# Patient Record
Sex: FEMALE | Race: WHITE | NOT HISPANIC OR LATINO | Employment: OTHER | URBAN - METROPOLITAN AREA
[De-identification: names, ages, dates, MRNs, and addresses within clinical notes are randomized per-mention and may not be internally consistent; named-entity substitution may affect disease eponyms.]

---

## 2017-04-18 ENCOUNTER — HOSPITAL ENCOUNTER (OUTPATIENT)
Dept: LAB | Facility: MEDICAL CENTER | Age: 82
End: 2017-04-18
Attending: INTERNAL MEDICINE
Payer: MEDICARE

## 2017-04-18 LAB
ALBUMIN SERPL BCP-MCNC: 4.5 G/DL (ref 3.2–4.9)
ALBUMIN/GLOB SERPL: 1.6 G/DL
ALP SERPL-CCNC: 62 U/L (ref 30–99)
ALT SERPL-CCNC: 16 U/L (ref 2–50)
ANION GAP SERPL CALC-SCNC: 7 MMOL/L (ref 0–11.9)
APPEARANCE UR: CLEAR
AST SERPL-CCNC: 24 U/L (ref 12–45)
BACTERIA #/AREA URNS HPF: ABNORMAL /HPF
BASOPHILS # BLD AUTO: 1.3 % (ref 0–1.8)
BASOPHILS # BLD: 0.11 K/UL (ref 0–0.12)
BILIRUB SERPL-MCNC: 0.6 MG/DL (ref 0.1–1.5)
BILIRUB UR QL STRIP.AUTO: NEGATIVE
BUN SERPL-MCNC: 14 MG/DL (ref 8–22)
CALCIUM SERPL-MCNC: 10.1 MG/DL (ref 8.5–10.5)
CHLORIDE SERPL-SCNC: 104 MMOL/L (ref 96–112)
CHOLEST SERPL-MCNC: 171 MG/DL (ref 100–199)
CO2 SERPL-SCNC: 29 MMOL/L (ref 20–33)
COLOR UR: YELLOW
CREAT SERPL-MCNC: 0.99 MG/DL (ref 0.5–1.4)
EOSINOPHIL # BLD AUTO: 0.28 K/UL (ref 0–0.51)
EOSINOPHIL NFR BLD: 3.3 % (ref 0–6.9)
EPI CELLS #/AREA URNS HPF: ABNORMAL /HPF
ERYTHROCYTE [DISTWIDTH] IN BLOOD BY AUTOMATED COUNT: 47.8 FL (ref 35.9–50)
EST. AVERAGE GLUCOSE BLD GHB EST-MCNC: 117 MG/DL
GFR SERPL CREATININE-BSD FRML MDRD: 54 ML/MIN/1.73 M 2
GLOBULIN SER CALC-MCNC: 2.9 G/DL (ref 1.9–3.5)
GLUCOSE SERPL-MCNC: 92 MG/DL (ref 65–99)
GLUCOSE UR STRIP.AUTO-MCNC: NEGATIVE MG/DL
HBA1C MFR BLD: 5.7 % (ref 0–5.6)
HCT VFR BLD AUTO: 46 % (ref 37–47)
HDLC SERPL-MCNC: 80 MG/DL
HGB BLD-MCNC: 14.9 G/DL (ref 12–16)
IMM GRANULOCYTES # BLD AUTO: 0.03 K/UL (ref 0–0.11)
IMM GRANULOCYTES NFR BLD AUTO: 0.4 % (ref 0–0.9)
KETONES UR STRIP.AUTO-MCNC: NEGATIVE MG/DL
LDLC SERPL CALC-MCNC: 72 MG/DL
LEUKOCYTE ESTERASE UR QL STRIP.AUTO: ABNORMAL
LYMPHOCYTES # BLD AUTO: 2.38 K/UL (ref 1–4.8)
LYMPHOCYTES NFR BLD: 28.2 % (ref 22–41)
MCH RBC QN AUTO: 31.2 PG (ref 27–33)
MCHC RBC AUTO-ENTMCNC: 32.4 G/DL (ref 33.6–35)
MCV RBC AUTO: 96.2 FL (ref 81.4–97.8)
MICRO URNS: ABNORMAL
MONOCYTES # BLD AUTO: 0.72 K/UL (ref 0–0.85)
MONOCYTES NFR BLD AUTO: 8.5 % (ref 0–13.4)
MUCOUS THREADS #/AREA URNS HPF: ABNORMAL /HPF
NEUTROPHILS # BLD AUTO: 4.92 K/UL (ref 2–7.15)
NEUTROPHILS NFR BLD: 58.3 % (ref 44–72)
NITRITE UR QL STRIP.AUTO: NEGATIVE
NRBC # BLD AUTO: 0 K/UL
NRBC BLD AUTO-RTO: 0 /100 WBC
PH UR STRIP.AUTO: 6.5 [PH]
PLATELET # BLD AUTO: 322 K/UL (ref 164–446)
PMV BLD AUTO: 10.6 FL (ref 9–12.9)
POTASSIUM SERPL-SCNC: 3.8 MMOL/L (ref 3.6–5.5)
PROT SERPL-MCNC: 7.4 G/DL (ref 6–8.2)
PROT UR QL STRIP: NEGATIVE MG/DL
RBC # BLD AUTO: 4.78 M/UL (ref 4.2–5.4)
RBC # URNS HPF: ABNORMAL /HPF
RBC UR QL AUTO: ABNORMAL
SODIUM SERPL-SCNC: 140 MMOL/L (ref 135–145)
SP GR UR STRIP.AUTO: 1.01
T4 SERPL-MCNC: 6.7 UG/DL (ref 4–12)
TRIGL SERPL-MCNC: 96 MG/DL (ref 0–149)
TSH SERPL DL<=0.005 MIU/L-ACNC: 1.96 UIU/ML (ref 0.3–3.7)
WBC # BLD AUTO: 8.4 K/UL (ref 4.8–10.8)
WBC #/AREA URNS HPF: ABNORMAL /HPF

## 2017-04-18 PROCEDURE — 81001 URINALYSIS AUTO W/SCOPE: CPT

## 2017-04-18 PROCEDURE — 83036 HEMOGLOBIN GLYCOSYLATED A1C: CPT

## 2017-04-18 PROCEDURE — 80061 LIPID PANEL: CPT

## 2017-04-18 PROCEDURE — 84436 ASSAY OF TOTAL THYROXINE: CPT

## 2017-04-18 PROCEDURE — 36415 COLL VENOUS BLD VENIPUNCTURE: CPT

## 2017-04-18 PROCEDURE — 80053 COMPREHEN METABOLIC PANEL: CPT

## 2017-04-18 PROCEDURE — 85025 COMPLETE CBC W/AUTO DIFF WBC: CPT

## 2017-04-18 PROCEDURE — 84443 ASSAY THYROID STIM HORMONE: CPT

## 2017-10-07 ENCOUNTER — HOSPITAL ENCOUNTER (OUTPATIENT)
Dept: LAB | Facility: MEDICAL CENTER | Age: 82
End: 2017-10-07
Attending: FAMILY MEDICINE
Payer: MEDICARE

## 2017-10-07 LAB
ANION GAP SERPL CALC-SCNC: 10 MMOL/L (ref 0–11.9)
BUN SERPL-MCNC: 12 MG/DL (ref 8–22)
CALCIUM SERPL-MCNC: 9.7 MG/DL (ref 8.5–10.5)
CHLORIDE SERPL-SCNC: 103 MMOL/L (ref 96–112)
CO2 SERPL-SCNC: 25 MMOL/L (ref 20–33)
CREAT SERPL-MCNC: 0.88 MG/DL (ref 0.5–1.4)
EST. AVERAGE GLUCOSE BLD GHB EST-MCNC: 117 MG/DL
GFR SERPL CREATININE-BSD FRML MDRD: >60 ML/MIN/1.73 M 2
GLUCOSE SERPL-MCNC: 86 MG/DL (ref 65–99)
HBA1C MFR BLD: 5.7 % (ref 0–5.6)
POTASSIUM SERPL-SCNC: 4 MMOL/L (ref 3.6–5.5)
SODIUM SERPL-SCNC: 138 MMOL/L (ref 135–145)

## 2017-10-07 PROCEDURE — 80048 BASIC METABOLIC PNL TOTAL CA: CPT

## 2017-10-07 PROCEDURE — 80061 LIPID PANEL: CPT

## 2017-10-07 PROCEDURE — 36415 COLL VENOUS BLD VENIPUNCTURE: CPT

## 2017-10-07 PROCEDURE — 83036 HEMOGLOBIN GLYCOSYLATED A1C: CPT

## 2017-10-09 LAB
ALBUMIN SERPL BCP-MCNC: 4.4 G/DL (ref 3.2–4.9)
ALBUMIN/GLOB SERPL: 1.6 G/DL
ALP SERPL-CCNC: 61 U/L (ref 30–99)
ALT SERPL-CCNC: 14 U/L (ref 2–50)
ANION GAP SERPL CALC-SCNC: 10 MMOL/L (ref 0–11.9)
AST SERPL-CCNC: 22 U/L (ref 12–45)
BILIRUB SERPL-MCNC: 0.5 MG/DL (ref 0.1–1.5)
BUN SERPL-MCNC: 12 MG/DL (ref 8–22)
CALCIUM SERPL-MCNC: 9.7 MG/DL (ref 8.5–10.5)
CHLORIDE SERPL-SCNC: 103 MMOL/L (ref 96–112)
CHOLEST SERPL-MCNC: 174 MG/DL (ref 100–199)
CO2 SERPL-SCNC: 25 MMOL/L (ref 20–33)
CREAT SERPL-MCNC: 0.88 MG/DL (ref 0.5–1.4)
GLOBULIN SER CALC-MCNC: 2.7 G/DL (ref 1.9–3.5)
GLUCOSE SERPL-MCNC: 86 MG/DL (ref 65–99)
HDLC SERPL-MCNC: 68 MG/DL
LDLC SERPL CALC-MCNC: 89 MG/DL
POTASSIUM SERPL-SCNC: 4 MMOL/L (ref 3.6–5.5)
PROT SERPL-MCNC: 7.1 G/DL (ref 6–8.2)
SODIUM SERPL-SCNC: 138 MMOL/L (ref 135–145)
TRIGL SERPL-MCNC: 86 MG/DL (ref 0–149)

## 2017-11-21 ENCOUNTER — HOSPITAL ENCOUNTER (OUTPATIENT)
Dept: RADIOLOGY | Facility: MEDICAL CENTER | Age: 82
End: 2017-11-21
Attending: FAMILY MEDICINE
Payer: MEDICARE

## 2017-11-21 DIAGNOSIS — Z12.39 SCREENING FOR MALIGNANT NEOPLASM OF BREAST: ICD-10-CM

## 2017-11-21 PROCEDURE — G0202 SCR MAMMO BI INCL CAD: HCPCS

## 2018-04-12 ENCOUNTER — HOSPITAL ENCOUNTER (OUTPATIENT)
Dept: LAB | Facility: MEDICAL CENTER | Age: 83
End: 2018-04-12
Attending: FAMILY MEDICINE
Payer: MEDICARE

## 2018-04-12 LAB
ALBUMIN SERPL BCP-MCNC: 4.6 G/DL (ref 3.2–4.9)
ALBUMIN/GLOB SERPL: 1.6 G/DL
ALP SERPL-CCNC: 58 U/L (ref 30–99)
ALT SERPL-CCNC: 15 U/L (ref 2–50)
ANION GAP SERPL CALC-SCNC: 7 MMOL/L (ref 0–11.9)
AST SERPL-CCNC: 23 U/L (ref 12–45)
BILIRUB SERPL-MCNC: 0.7 MG/DL (ref 0.1–1.5)
BUN SERPL-MCNC: 16 MG/DL (ref 8–22)
CALCIUM SERPL-MCNC: 9.7 MG/DL (ref 8.5–10.5)
CHLORIDE SERPL-SCNC: 104 MMOL/L (ref 96–112)
CHOLEST SERPL-MCNC: 171 MG/DL (ref 100–199)
CO2 SERPL-SCNC: 28 MMOL/L (ref 20–33)
CREAT SERPL-MCNC: 1.02 MG/DL (ref 0.5–1.4)
EST. AVERAGE GLUCOSE BLD GHB EST-MCNC: 117 MG/DL
GLOBULIN SER CALC-MCNC: 2.9 G/DL (ref 1.9–3.5)
GLUCOSE SERPL-MCNC: 96 MG/DL (ref 65–99)
HBA1C MFR BLD: 5.7 % (ref 0–5.6)
HDLC SERPL-MCNC: 66 MG/DL
LDLC SERPL CALC-MCNC: 85 MG/DL
POTASSIUM SERPL-SCNC: 4.1 MMOL/L (ref 3.6–5.5)
PROT SERPL-MCNC: 7.5 G/DL (ref 6–8.2)
SODIUM SERPL-SCNC: 139 MMOL/L (ref 135–145)
TRIGL SERPL-MCNC: 99 MG/DL (ref 0–149)

## 2018-04-12 PROCEDURE — 80053 COMPREHEN METABOLIC PANEL: CPT

## 2018-04-12 PROCEDURE — 83036 HEMOGLOBIN GLYCOSYLATED A1C: CPT

## 2018-04-12 PROCEDURE — 36415 COLL VENOUS BLD VENIPUNCTURE: CPT

## 2018-04-12 PROCEDURE — 80061 LIPID PANEL: CPT

## 2018-05-18 DIAGNOSIS — Z01.812 PRE-OPERATIVE LABORATORY EXAMINATION: ICD-10-CM

## 2018-05-18 DIAGNOSIS — Z01.810 PRE-OPERATIVE CARDIOVASCULAR EXAMINATION: ICD-10-CM

## 2018-05-18 LAB
ANION GAP SERPL CALC-SCNC: 8 MMOL/L (ref 0–11.9)
BUN SERPL-MCNC: 16 MG/DL (ref 8–22)
CALCIUM SERPL-MCNC: 9.7 MG/DL (ref 8.5–10.5)
CHLORIDE SERPL-SCNC: 104 MMOL/L (ref 96–112)
CO2 SERPL-SCNC: 24 MMOL/L (ref 20–33)
CREAT SERPL-MCNC: 1.01 MG/DL (ref 0.5–1.4)
EKG IMPRESSION: NORMAL
GLUCOSE SERPL-MCNC: 97 MG/DL (ref 65–99)
POTASSIUM SERPL-SCNC: 3.9 MMOL/L (ref 3.6–5.5)
SODIUM SERPL-SCNC: 136 MMOL/L (ref 135–145)

## 2018-05-18 PROCEDURE — 36415 COLL VENOUS BLD VENIPUNCTURE: CPT

## 2018-05-18 PROCEDURE — 80048 BASIC METABOLIC PNL TOTAL CA: CPT

## 2018-05-18 PROCEDURE — 93005 ELECTROCARDIOGRAM TRACING: CPT

## 2018-05-18 PROCEDURE — 93010 ELECTROCARDIOGRAM REPORT: CPT | Performed by: INTERNAL MEDICINE

## 2018-05-18 RX ORDER — DIPHENHYDRAMINE HCL 25 MG
25 TABLET ORAL EVERY 6 HOURS PRN
COMMUNITY
End: 2020-03-02

## 2018-05-18 NOTE — OR NURSING
Preadmit appt:  Patient instructed to continue regularly prescribed medications through day before surgery.  Pt will stop vitamins and herbals today and plans to stop aspirin one week before surgery. She and  will call dr to confirm arrival time the day of surgery.

## 2018-05-29 ENCOUNTER — HOSPITAL ENCOUNTER (OUTPATIENT)
Facility: MEDICAL CENTER | Age: 83
End: 2018-05-29
Attending: ORTHOPAEDIC SURGERY | Admitting: ORTHOPAEDIC SURGERY
Payer: MEDICARE

## 2018-05-29 VITALS
RESPIRATION RATE: 16 BRPM | TEMPERATURE: 96.8 F | SYSTOLIC BLOOD PRESSURE: 171 MMHG | BODY MASS INDEX: 28.64 KG/M2 | WEIGHT: 167.77 LBS | DIASTOLIC BLOOD PRESSURE: 84 MMHG | HEIGHT: 64 IN | OXYGEN SATURATION: 95 % | HEART RATE: 58 BPM

## 2018-05-29 PROBLEM — M65.331 TRIGGER FINGER, RIGHT MIDDLE FINGER: Status: ACTIVE | Noted: 2018-05-29

## 2018-05-29 PROCEDURE — A9270 NON-COVERED ITEM OR SERVICE: HCPCS | Performed by: ORTHOPAEDIC SURGERY

## 2018-05-29 PROCEDURE — 160048 HCHG OR STATISTICAL LEVEL 1-5: Performed by: ORTHOPAEDIC SURGERY

## 2018-05-29 PROCEDURE — 160002 HCHG RECOVERY MINUTES (STAT): Performed by: ORTHOPAEDIC SURGERY

## 2018-05-29 PROCEDURE — 160046 HCHG PACU - 1ST 60 MINS PHASE II: Performed by: ORTHOPAEDIC SURGERY

## 2018-05-29 PROCEDURE — 700111 HCHG RX REV CODE 636 W/ 250 OVERRIDE (IP)

## 2018-05-29 PROCEDURE — A6222 GAUZE <=16 IN NO W/SAL W/O B: HCPCS | Performed by: ORTHOPAEDIC SURGERY

## 2018-05-29 PROCEDURE — 700102 HCHG RX REV CODE 250 W/ 637 OVERRIDE(OP): Performed by: ORTHOPAEDIC SURGERY

## 2018-05-29 PROCEDURE — 501838 HCHG SUTURE GENERAL: Performed by: ORTHOPAEDIC SURGERY

## 2018-05-29 PROCEDURE — 160025 RECOVERY II MINUTES (STATS): Performed by: ORTHOPAEDIC SURGERY

## 2018-05-29 PROCEDURE — 502576 HCHG PACK, HAND: Performed by: ORTHOPAEDIC SURGERY

## 2018-05-29 PROCEDURE — 700111 HCHG RX REV CODE 636 W/ 250 OVERRIDE (IP): Performed by: ORTHOPAEDIC SURGERY

## 2018-05-29 PROCEDURE — 160028 HCHG SURGERY MINUTES - 1ST 30 MINS LEVEL 3: Performed by: ORTHOPAEDIC SURGERY

## 2018-05-29 PROCEDURE — 700105 HCHG RX REV CODE 258: Performed by: ORTHOPAEDIC SURGERY

## 2018-05-29 PROCEDURE — 700101 HCHG RX REV CODE 250

## 2018-05-29 PROCEDURE — 160009 HCHG ANES TIME/MIN: Performed by: ORTHOPAEDIC SURGERY

## 2018-05-29 PROCEDURE — 160035 HCHG PACU - 1ST 60 MINS PHASE I: Performed by: ORTHOPAEDIC SURGERY

## 2018-05-29 RX ORDER — SCOLOPAMINE TRANSDERMAL SYSTEM 1 MG/1
1 PATCH, EXTENDED RELEASE TRANSDERMAL
Status: DISCONTINUED | OUTPATIENT
Start: 2018-05-29 | End: 2018-05-29 | Stop reason: HOSPADM

## 2018-05-29 RX ORDER — LIDOCAINE HYDROCHLORIDE 10 MG/ML
INJECTION, SOLUTION INFILTRATION; PERINEURAL
Status: DISCONTINUED | OUTPATIENT
Start: 2018-05-29 | End: 2018-05-29 | Stop reason: HOSPADM

## 2018-05-29 RX ORDER — DEXAMETHASONE SODIUM PHOSPHATE 4 MG/ML
4 INJECTION, SOLUTION INTRA-ARTICULAR; INTRALESIONAL; INTRAMUSCULAR; INTRAVENOUS; SOFT TISSUE
Status: DISCONTINUED | OUTPATIENT
Start: 2018-05-29 | End: 2018-05-29 | Stop reason: HOSPADM

## 2018-05-29 RX ORDER — ONDANSETRON 2 MG/ML
4 INJECTION INTRAMUSCULAR; INTRAVENOUS EVERY 4 HOURS PRN
Status: DISCONTINUED | OUTPATIENT
Start: 2018-05-29 | End: 2018-05-29 | Stop reason: HOSPADM

## 2018-05-29 RX ORDER — HALOPERIDOL 5 MG/ML
1 INJECTION INTRAMUSCULAR EVERY 6 HOURS PRN
Status: DISCONTINUED | OUTPATIENT
Start: 2018-05-29 | End: 2018-05-29 | Stop reason: HOSPADM

## 2018-05-29 RX ORDER — BUPIVACAINE HYDROCHLORIDE 5 MG/ML
INJECTION, SOLUTION EPIDURAL; INTRACAUDAL
Status: DISCONTINUED | OUTPATIENT
Start: 2018-05-29 | End: 2018-05-29 | Stop reason: HOSPADM

## 2018-05-29 RX ORDER — LIDOCAINE HYDROCHLORIDE 10 MG/ML
INJECTION, SOLUTION EPIDURAL; INFILTRATION; INTRACAUDAL; PERINEURAL
Status: COMPLETED
Start: 2018-05-29 | End: 2018-05-29

## 2018-05-29 RX ORDER — SODIUM CHLORIDE, SODIUM LACTATE, POTASSIUM CHLORIDE, CALCIUM CHLORIDE 600; 310; 30; 20 MG/100ML; MG/100ML; MG/100ML; MG/100ML
INJECTION, SOLUTION INTRAVENOUS
Status: DISCONTINUED | OUTPATIENT
Start: 2018-05-29 | End: 2018-05-29 | Stop reason: HOSPADM

## 2018-05-29 RX ORDER — HYDROCODONE BITARTRATE AND ACETAMINOPHEN 7.5; 325 MG/1; MG/1
0.5 TABLET ORAL EVERY 4 HOURS PRN
Status: DISCONTINUED | OUTPATIENT
Start: 2018-05-29 | End: 2018-05-29 | Stop reason: HOSPADM

## 2018-05-29 RX ADMIN — SODIUM CHLORIDE, POTASSIUM CHLORIDE, SODIUM LACTATE AND CALCIUM CHLORIDE: 600; 310; 30; 20 INJECTION, SOLUTION INTRAVENOUS at 07:52

## 2018-05-29 RX ADMIN — LIDOCAINE HYDROCHLORIDE 0.1 ML: 10 INJECTION, SOLUTION EPIDURAL; INFILTRATION; INTRACAUDAL; PERINEURAL at 07:45

## 2018-05-29 ASSESSMENT — PAIN SCALES - GENERAL
PAINLEVEL_OUTOF10: 0

## 2018-05-29 NOTE — OR NURSING
0942: care assumed of awake/alert pt who denies discomfort. L wrist elevated with icepack over dressing.  0950: Pt dressed in own clothes with assistance, resting comfortably.  1000: report to Linnea HURST

## 2018-05-29 NOTE — OR NURSING
1055  Received from pacu  Up to chair without difficulty left hand dressing c/d/I  Fingers warm  Elevated  Ice pack applied  No c/o pain  1130 meets discharge criteria

## 2018-05-29 NOTE — OR NURSING
1055 received from pacu  Up to chair without difficulty  Left hand dressing c/d/I  Fingers warm  Cap refill<3 sec  elevted

## 2018-05-29 NOTE — OR NURSING
1040: Dr Pfeiffer updated with BP, does not wish to treat in PACU - advises pt take own meds immediately on arrival home.  1051: Orders received from surgeon. No change in surgical site assessment. Meets criteria to transfer to Stage 2

## 2018-05-29 NOTE — OR NURSING
1000 Received report from Kathy HURST, assumed care of pt. Pt awake, denies nausea and pain with non-labored and spontaneous respirations via room air, VSS. Pt meets criteria for stage II status. Pt stage II status in PACU I with same RN pt awaiting D/C order from MD.    1017 Report to ARIS Chavez.

## 2018-05-29 NOTE — OR SURGEON
Immediate Post OP Note    PreOp Diagnosis: R middle trigger finger    PostOp Diagnosis: same    Procedure(s):  TRIGGER FINGER RELEASE-  MIDDLE - Wound Class: Clean    Surgeon(s):  Frandy Liz M.D.    Anesthesiologist/Type of Anesthesia:  Anesthesiologist: Vandana Pfeiffer M.D./General    Surgical Staff:  Circulator: Sabine Allen R.N.  Scrub Person: He Louie    Specimens removed if any:  * No specimens in log *    Estimated Blood Loss: min    Findings: adequate release    Complications: none        5/29/2018 10:50 AM Frandy Liz M.D.

## 2018-05-29 NOTE — OP REPORT
DATE OF SERVICE:  05/29/2018    PREOPERATIVE DIAGNOSES:  Left middle trigger finger.    POSTOPERATIVE DIAGNOSES:  Left middle trigger finger.    PROCEDURE PERFORMED:  Left middle finger trigger release.    SURGEON:  Frandy Liz MD    ANESTHESIA:  IV sedation with local.    ESTIMATED BLOOD LOSS:  Minimal.    DRAINS:  None.    COMPLICATIONS:  None.    INDICATIONS:  The patient is a female who has symptoms of a trigger finger,   wishes to have an operative release.  Risks, benefits, complications, and   alternatives were explained to the patient.  All questions were answered.  No   guarantees were given.  Signed consent was obtained.    DESCRIPTION OF PROCEDURE:  The patient was taken to operating room and laid   supine on the table.  All bony prominences were well padded.  Good IV sedation   was given.  The left upper extremity was prepped and draped in the usual   sterile fashion using a ChloraPrep.  Limb was exsanguinated with elevation,   tourniquet raised, total tourniquet time was under 15 minutes.  The area was   infiltrated with Marcaine and lidocaine, both without epinephrine.  An oblique   incision was made over the A1 pulley, was identified, sharply incised,   carried proximally and distally, found to be a nice release.  Tendon tracked   nicely with no catching.  Wound irrigated, closed with nylon in simple   fashion.  Sterile dressing of Xeroform, 4x4, Sof-Rol, bias was applied.  All   needle and sponge counts were correct.  The patient tolerated the procedure   well and was transferred to recovery in stable condition.       ____________________________________     MD RADHA LA / NTS    DD:  05/29/2018 11:57:25  DT:  05/29/2018 12:15:49    D#:  [id number]  Job#:  [id number]

## 2018-05-29 NOTE — DISCHARGE INSTRUCTIONS
ACTIVITY: Rest and take it easy for the first 24 hours.  A responsible adult is recommended to remain with you during that time.  It is normal to feel sleepy.  We encourage you to not do anything that requires balance, judgment or coordination.    MILD FLU-LIKE SYMPTOMS ARE NORMAL. YOU MAY EXPERIENCE GENERALIZED MUSCLE ACHES, THROAT IRRITATION, HEADACHE AND/OR SOME NAUSEA.    FOR 24 HOURS DO NOT:  Drive, operate machinery or run household appliances.  Drink beer or alcoholic beverages.   Make important decisions or sign legal documents.    SPECIAL INSTRUCTIONS: Follow up with your primary care physician about your change in EKG as discussed with your doctor prior to surgery.  Please bring the copy of your EKG with you. Take your blood pressure medication as soon as you get home.    Keep dressing clean and dry   Elevate extremity   May remove dressing 3-4 days and shower   Encourage moving all fingers    DIET: To avoid nausea, slowly advance diet as tolerated, avoiding spicy or greasy foods for the first day.  Add more substantial food to your diet according to your physician's instructions.  INCREASE FLUIDS AND FIBER TO AVOID CONSTIPATION.    FOLLOW-UP APPOINTMENT:  A follow-up appointment should be arranged with your doctor; call to schedule.    You should CALL YOUR PHYSICIAN if you develop:  Fever greater than 101 degrees F.  Pain not relieved by medication, or persistent nausea or vomiting.  Excessive bleeding (blood soaking through dressing) or unexpected drainage from the wound.  Extreme redness or swelling around the incision site, drainage of pus or foul smelling drainage.  Inability to urinate or empty your bladder within 8 hours.  Problems with breathing or chest pain.    You should call 911 if you develop problems with breathing or chest pain.  If you are unable to contact your doctor or surgical center, you should go to the nearest emergency room or urgent care center.  Physician's telephone #: [phone]    If any questions arise, call your doctor.  If your doctor is not available, please feel free to call the Surgical Center at [phone].  The Center is open Monday through Friday from 7AM to 7PM.  You can also call the HEALTH HOTLINE open 24 hours/day, 7 days/week and speak to a nurse at [phone], or toll free at [phone].    A registered nurse may call you a few days after your surgery to see how you are doing after your procedure.    MEDICATIONS: Resume taking daily medication.  Take prescribed pain medication with food.  If no medication is prescribed, you may take non-aspirin pain medication if needed.  PAIN MEDICATION CAN BE VERY CONSTIPATING.  Take a stool softener or laxative such as senokot, pericolace, or milk of magnesia if needed.    Prescription given for Ultram.  Last pain medication given at n/a.    If your physician has prescribed pain medication that includes Acetaminophen (Tylenol), do not take additional Acetaminophen (Tylenol) while taking the prescribed medication.    Depression / Suicide Risk    As you are discharged from this [facility] facility, it is important to learn how to keep safe from harming yourself.    Recognize the warning signs:  · Abrupt changes in personality, positive or negative- including increase in energy   · Giving away possessions  · Change in eating patterns- significant weight changes-  positive or negative  · Change in sleeping patterns- unable to sleep or sleeping all the time   · Unwillingness or inability to communicate  · Depression  · Unusual sadness, discouragement and loneliness  · Talk of wanting to die  · Neglect of personal appearance   · Rebelliousness- reckless behavior  · Withdrawal from people/activities they love  · Confusion- inability to concentrate     If you or a loved one observes any of these behaviors or has concerns about self-harm, here's what you can do:  · Talk about it- your feelings and reasons for harming  yourself  · Remove any means that you might use to hurt yourself (examples: pills, rope, extension cords, firearm)  · Get professional help from the community (Mental Health, Substance Abuse, psychological counseling)  · Do not be alone:Call your Safe Contact- someone whom you trust who will be there for you.  · Call your local CRISIS HOTLINE 310-3329 or 889-976-0741  · Call your local Children's Mobile Crisis Response Team [place] (354) 231-3898 or www.[employer]Co  · Call the toll free National Suicide Prevention Hotlines   · National Suicide Prevention Lifeline 254-369-EMEQ (2752)  · National Hope Line Network 800-SUICIDE (481-4964)

## 2018-05-29 NOTE — OR NURSING
0854: To PACU from OR via bed, respirations spontaneous and non-labored. Icepack applied over c/d/i left hand surgical dressings. No c/o pain or nausea at this time.  0905: No change.  0920: No change.  0935: No change.  0940: Meets criteria to transfer to stage 2.  Awaiting discharge orders.  0942: Handoff report given to ARIS Chvaez.

## 2018-10-30 ENCOUNTER — HOSPITAL ENCOUNTER (OUTPATIENT)
Dept: LAB | Facility: MEDICAL CENTER | Age: 83
End: 2018-10-30
Attending: FAMILY MEDICINE
Payer: MEDICARE

## 2018-10-30 LAB
ALBUMIN SERPL BCP-MCNC: 4.5 G/DL (ref 3.2–4.9)
ALBUMIN/GLOB SERPL: 1.6 G/DL
ALP SERPL-CCNC: 52 U/L (ref 30–99)
ALT SERPL-CCNC: 15 U/L (ref 2–50)
ANION GAP SERPL CALC-SCNC: 10 MMOL/L (ref 0–11.9)
AST SERPL-CCNC: 19 U/L (ref 12–45)
BILIRUB SERPL-MCNC: 0.9 MG/DL (ref 0.1–1.5)
BUN SERPL-MCNC: 13 MG/DL (ref 8–22)
CALCIUM SERPL-MCNC: 10.2 MG/DL (ref 8.5–10.5)
CHLORIDE SERPL-SCNC: 104 MMOL/L (ref 96–112)
CHOLEST SERPL-MCNC: 153 MG/DL (ref 100–199)
CO2 SERPL-SCNC: 26 MMOL/L (ref 20–33)
CREAT SERPL-MCNC: 0.95 MG/DL (ref 0.5–1.4)
EST. AVERAGE GLUCOSE BLD GHB EST-MCNC: 131 MG/DL
FASTING STATUS PATIENT QL REPORTED: NORMAL
GLOBULIN SER CALC-MCNC: 2.9 G/DL (ref 1.9–3.5)
GLUCOSE SERPL-MCNC: 80 MG/DL (ref 65–99)
HBA1C MFR BLD: 6.2 % (ref 0–5.6)
HDLC SERPL-MCNC: 69 MG/DL
LDLC SERPL CALC-MCNC: 64 MG/DL
POTASSIUM SERPL-SCNC: 3.8 MMOL/L (ref 3.6–5.5)
PROT SERPL-MCNC: 7.4 G/DL (ref 6–8.2)
SODIUM SERPL-SCNC: 140 MMOL/L (ref 135–145)
TRIGL SERPL-MCNC: 102 MG/DL (ref 0–149)

## 2018-10-30 PROCEDURE — 36415 COLL VENOUS BLD VENIPUNCTURE: CPT

## 2018-10-30 PROCEDURE — 83036 HEMOGLOBIN GLYCOSYLATED A1C: CPT

## 2018-10-30 PROCEDURE — 80061 LIPID PANEL: CPT

## 2018-10-30 PROCEDURE — 80053 COMPREHEN METABOLIC PANEL: CPT

## 2019-04-08 ENCOUNTER — OFFICE VISIT (OUTPATIENT)
Dept: URGENT CARE | Facility: CLINIC | Age: 84
End: 2019-04-08
Payer: MEDICARE

## 2019-04-08 ENCOUNTER — APPOINTMENT (OUTPATIENT)
Dept: RADIOLOGY | Facility: IMAGING CENTER | Age: 84
End: 2019-04-08
Attending: NURSE PRACTITIONER
Payer: MEDICARE

## 2019-04-08 VITALS
TEMPERATURE: 98.4 F | OXYGEN SATURATION: 94 % | DIASTOLIC BLOOD PRESSURE: 86 MMHG | BODY MASS INDEX: 28.68 KG/M2 | RESPIRATION RATE: 16 BRPM | HEIGHT: 64 IN | WEIGHT: 168 LBS | HEART RATE: 80 BPM | SYSTOLIC BLOOD PRESSURE: 118 MMHG

## 2019-04-08 DIAGNOSIS — S20.211A RIB CONTUSION, RIGHT, INITIAL ENCOUNTER: ICD-10-CM

## 2019-04-08 DIAGNOSIS — R07.81 RIB PAIN ON RIGHT SIDE: ICD-10-CM
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
## 2019-04-08 PROCEDURE — 71101 X-RAY EXAM UNILAT RIBS/CHEST: CPT | Mod: 26,RT | Performed by: NURSE PRACTITIONER

## 2019-04-08 PROCEDURE — 99203 OFFICE O/P NEW LOW 30 MIN: CPT | Performed by: NURSE PRACTITIONER

## 2019-04-08 ASSESSMENT — ENCOUNTER SYMPTOMS
SENSORY CHANGE: 0
FALLS: 1
SPEECH CHANGE: 0
PALPITATIONS: 0
NECK PAIN: 0
VOMITING: 0
SPUTUM PRODUCTION: 0
ABDOMINAL PAIN: 0
HEADACHES: 0
SHORTNESS OF BREATH: 0
FEVER: 0
LOSS OF CONSCIOUSNESS: 0
COUGH: 0
WHEEZING: 0
DIZZINESS: 0
ANOREXIA: 0

## 2019-04-08 ASSESSMENT — LIFESTYLE VARIABLES: SUBSTANCE_ABUSE: 0

## 2019-04-08 NOTE — PROGRESS NOTES
"Subjective:      Linda Casas is a 84 y.o. female who presents with Rib Injury (x 1 wk, Rt. side rib injury after fall.   Pain worse with deep breaths and sneezing. )        Reviewed past medical, surgical and family history with patient. Reviewed prescription and OTC medications with patient in electronic health record today.       Allergies   Allergen Reactions   • Ampicillin Rash     Feels \"rotten\"          Rib Injury   This is a new problem. The current episode started in the past 7 days (Mechanical trip and fall into her yard onto a pile of bricks. ). The problem occurs constantly. The problem has been unchanged. Pertinent negatives include no abdominal pain, anorexia, chest pain, coughing, fever, headaches, neck pain or vomiting. The symptoms are aggravated by walking, twisting and coughing (deep breathing). She has tried NSAIDs, acetaminophen and ice for the symptoms. The treatment provided mild relief.       Review of Systems   Constitutional: Negative for fever.   Respiratory: Negative for cough, sputum production, shortness of breath and wheezing.    Cardiovascular: Negative for chest pain and palpitations.   Gastrointestinal: Negative for abdominal pain, anorexia and vomiting.   Musculoskeletal: Positive for falls. Negative for neck pain.   Neurological: Negative for dizziness, sensory change, speech change, loss of consciousness and headaches.   Psychiatric/Behavioral: Negative for substance abuse.           Objective:     /86 (BP Location: Left arm, Patient Position: Sitting, BP Cuff Size: Adult)   Pulse 80   Temp 36.9 °C (98.4 °F) (Temporal)   Resp 16   Ht 1.626 m (5' 4\")   Wt 76.2 kg (168 lb)   SpO2 94%   BMI 28.84 kg/m²      Physical Exam   Constitutional: She is oriented to person, place, and time. Vital signs are normal. She appears well-developed and well-nourished.  Non-toxic appearance. She does not have a sickly appearance. She does not appear ill. No distress. "   HENT:   Head: Normocephalic.   Right Ear: Hearing, external ear and ear canal normal. Tympanic membrane is injected. Tympanic membrane is not erythematous. No middle ear effusion.   Left Ear: Hearing, external ear and ear canal normal. Tympanic membrane is injected. Tympanic membrane is not erythematous.  No middle ear effusion.   Nose: Rhinorrhea present. No mucosal edema. Right sinus exhibits no maxillary sinus tenderness and no frontal sinus tenderness. Left sinus exhibits no maxillary sinus tenderness and no frontal sinus tenderness.   Mouth/Throat: Uvula is midline. Posterior oropharyngeal erythema present. No oropharyngeal exudate, posterior oropharyngeal edema or tonsillar abscesses.   Eyes: Pupils are equal, round, and reactive to light.   Neck: Trachea normal, normal range of motion and full passive range of motion without pain. Neck supple.   Cardiovascular: Normal rate and regular rhythm.  PMI is not displaced.    Pulmonary/Chest: Effort normal and breath sounds normal. No respiratory distress. She has no decreased breath sounds. She has no wheezes. She has no rhonchi. She has no rales.   Abdominal: Soft. Normal appearance. There is no tenderness.   Musculoskeletal:        Thoracic back: She exhibits decreased range of motion and tenderness. She exhibits no bony tenderness, no swelling, no edema, no deformity, no pain, no spasm and normal pulse.        Arms:  Lymphadenopathy:     She has no cervical adenopathy.        Right: No supraclavicular adenopathy present.        Left: No supraclavicular adenopathy present.   Neurological: She is alert and oriented to person, place, and time. She has normal strength. Gait normal.   Skin: Skin is warm and dry.   Psychiatric: She has a normal mood and affect. Her speech is normal and behavior is normal.   Nursing note and vitals reviewed.      Xray: no acute rib fracture or cardiopulmonary process  by my read.  Radiologist impression:     4//2019 4:38  PM    HISTORY/REASON FOR EXAM:  Pain Following Trauma.  Right rib/chest pain    TECHNIQUE/EXAM DESCRIPTION AND NUMBER OF VIEWS:  3 images of the right ribs and chest.    COMPARISON: 1 view chest 3/4/2014    FINDINGS:  The lungs are clear.  Lungs are probably hyperinflated.    Heart and mediastinum: normal in size.    Pleura: There are no pleural effusion or pneumothoraces.    Osseous structures: No significant bony abnormality is present.     Impression       1.  No acute cardiopulmonary abnormality identified.    2.  No right rib fracture identified   Reading Provider Reading Date               Assessment/Plan:     1. Rib pain on right side  [id number] (WITH 1-VIEW CXR) RIGHT   2. Rib contusion, right, initial encounter     3. Fall due to stumbling, initial encounter         Deep breathing exercises discussed with pt.   OTC  analgesic of choice. Follow manufactures dosing and safety precautions.     Ice/ heat packs prn pain     Return to clinic or PCP  4-5 days if current symptoms are not resolving in a satisfactory manner or sooner if new or worsening symptoms occur.   Patient was advised of signs and symptoms which would warrant further evaluation and /or emergent evaluation in ER.  Verbalized agreement with this treatment plan and seemed to understand without barriers. Questions were encouraged and answered to patients satisfaction.     Aftercare instructions were given to pt

## 2019-04-26 ENCOUNTER — HOSPITAL ENCOUNTER (OUTPATIENT)
Dept: LAB | Facility: MEDICAL CENTER | Age: 84
End: 2019-04-26
Attending: FAMILY MEDICINE
Payer: MEDICARE

## 2019-04-26 LAB
ALBUMIN SERPL BCP-MCNC: 4.7 G/DL (ref 3.2–4.9)
ALBUMIN/GLOB SERPL: 1.6 G/DL
ALP SERPL-CCNC: 57 U/L (ref 30–99)
ALT SERPL-CCNC: 12 U/L (ref 2–50)
ANION GAP SERPL CALC-SCNC: 8 MMOL/L (ref 0–11.9)
AST SERPL-CCNC: 22 U/L (ref 12–45)
BILIRUB SERPL-MCNC: 0.8 MG/DL (ref 0.1–1.5)
BUN SERPL-MCNC: 19 MG/DL (ref 8–22)
CALCIUM SERPL-MCNC: 9.9 MG/DL (ref 8.5–10.5)
CHLORIDE SERPL-SCNC: 106 MMOL/L (ref 96–112)
CHOLEST SERPL-MCNC: 164 MG/DL (ref 100–199)
CO2 SERPL-SCNC: 27 MMOL/L (ref 20–33)
CREAT SERPL-MCNC: 0.98 MG/DL (ref 0.5–1.4)
EST. AVERAGE GLUCOSE BLD GHB EST-MCNC: 123 MG/DL
FASTING STATUS PATIENT QL REPORTED: NORMAL
GLOBULIN SER CALC-MCNC: 2.9 G/DL (ref 1.9–3.5)
GLUCOSE SERPL-MCNC: 102 MG/DL (ref 65–99)
HBA1C MFR BLD: 5.9 % (ref 0–5.6)
HDLC SERPL-MCNC: 71 MG/DL
LDLC SERPL CALC-MCNC: 78 MG/DL
POTASSIUM SERPL-SCNC: 4 MMOL/L (ref 3.6–5.5)
PROT SERPL-MCNC: 7.6 G/DL (ref 6–8.2)
SODIUM SERPL-SCNC: 141 MMOL/L (ref 135–145)
TRIGL SERPL-MCNC: 74 MG/DL (ref 0–149)

## 2019-04-26 PROCEDURE — 80053 COMPREHEN METABOLIC PANEL: CPT

## 2019-04-26 PROCEDURE — 80061 LIPID PANEL: CPT

## 2019-04-26 PROCEDURE — 36415 COLL VENOUS BLD VENIPUNCTURE: CPT

## 2019-04-26 PROCEDURE — 83036 HEMOGLOBIN GLYCOSYLATED A1C: CPT

## 2019-07-24 ENCOUNTER — APPOINTMENT (OUTPATIENT)
Dept: RADIOLOGY | Facility: IMAGING CENTER | Age: 84
End: 2019-07-24
Attending: FAMILY MEDICINE
Payer: MEDICARE

## 2019-07-24 ENCOUNTER — OFFICE VISIT (OUTPATIENT)
Dept: URGENT CARE | Facility: CLINIC | Age: 84
End: 2019-07-24
Payer: MEDICARE

## 2019-07-24 VITALS
HEIGHT: 64 IN | BODY MASS INDEX: 26.63 KG/M2 | RESPIRATION RATE: 16 BRPM | TEMPERATURE: 97.1 F | HEART RATE: 76 BPM | WEIGHT: 156 LBS | DIASTOLIC BLOOD PRESSURE: 80 MMHG | OXYGEN SATURATION: 94 % | SYSTOLIC BLOOD PRESSURE: 118 MMHG

## 2019-07-24 DIAGNOSIS — S89.92XA INJURY OF LEFT KNEE, INITIAL ENCOUNTER: ICD-10-CM

## 2019-07-24 DIAGNOSIS — S59.902A INJURY OF LEFT ELBOW, INITIAL ENCOUNTER: ICD-10-CM

## 2019-07-24 DIAGNOSIS — R07.81 PLEURITIC CHEST PAIN: ICD-10-CM
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
## 2019-07-24 DIAGNOSIS — S80.02XA CONTUSION OF LEFT KNEE, INITIAL ENCOUNTER: ICD-10-CM

## 2019-07-24 DIAGNOSIS — S50.02XA CONTUSION OF LEFT ELBOW, INITIAL ENCOUNTER: ICD-10-CM

## 2019-07-24 DIAGNOSIS — S69.92XA HAND INJURY, LEFT, INITIAL ENCOUNTER: ICD-10-CM

## 2019-07-24 DIAGNOSIS — S62.397A OTHER FRACTURE OF FIFTH METACARPAL BONE, LEFT HAND, INITIAL ENCOUNTER FOR CLOSED FRACTURE: ICD-10-CM

## 2019-07-24 DIAGNOSIS — S00.83XA CONTUSION OF FACE, INITIAL ENCOUNTER: ICD-10-CM

## 2019-07-24 PROCEDURE — 73080 X-RAY EXAM OF ELBOW: CPT | Mod: TC,LT | Performed by: FAMILY MEDICINE

## 2019-07-24 PROCEDURE — 71046 X-RAY EXAM CHEST 2 VIEWS: CPT | Mod: TC | Performed by: FAMILY MEDICINE

## 2019-07-24 PROCEDURE — 73130 X-RAY EXAM OF HAND: CPT | Mod: TC,LT | Performed by: FAMILY MEDICINE

## 2019-07-24 PROCEDURE — 73564 X-RAY EXAM KNEE 4 OR MORE: CPT | Mod: TC,LT | Performed by: FAMILY MEDICINE

## 2019-07-24 PROCEDURE — 99214 OFFICE O/P EST MOD 30 MIN: CPT | Performed by: FAMILY MEDICINE

## 2019-07-24 ASSESSMENT — ENCOUNTER SYMPTOMS
MYALGIAS: 0
EYE DISCHARGE: 0
BRUISES/BLEEDS EASILY: 0
FOCAL WEAKNESS: 0
DOUBLE VISION: 0
WEIGHT LOSS: 0
BLURRED VISION: 0
EYE REDNESS: 0
SENSORY CHANGE: 0

## 2019-07-24 NOTE — PROGRESS NOTES
Subjective:      Linda Casas is a 85 y.o. female who presents with Fall (x today, fell and brusing all over, pain on Lt small finger and brusing on Lt. side of head)            Onset this morning mechanical GLF in yard. Tripped over garden hose. Fell onto pavers striking left forehead and face, left elbow, and left hand. No LOC. Denies HA. No mental status change. Pain severity L hand 9/10. Left elbow 6/10. L face is mild pain. No vomit. No vision change. No neck or back pain.  No CP or abdominal pain.         Review of Systems   Constitutional: Negative for malaise/fatigue and weight loss.   Eyes: Negative for blurred vision, double vision, discharge and redness.   Musculoskeletal: Negative for joint pain and myalgias.   Skin: Negative for itching and rash.   Neurological: Negative for sensory change and focal weakness.   Endo/Heme/Allergies: Does not bruise/bleed easily.     .  Medications, Allergies, and current problem list reviewed today in Epic  PMH:  has a past medical history of Anxiety (9/14/2010); Cataract; High cholesterol; Hypertension; OSTEOPOROSIS (9/14/2010); Pain (03/04/14); Snoring; and Urinary incontinence. She also has no past medical history of Breast cancer (HCC).  MEDS:   Current Outpatient Prescriptions:   •  diphenhydrAMINE (BENADRYL) 25 MG Tab, Take 25 mg by mouth every 6 hours as needed for Sleep., Disp: , Rfl:   •  fluoxetine (PROZAC) 40 MG capsule, Take 40 mg by mouth every day., Disp: , Rfl:   •  pravastatin (PRAVACHOL) 10 MG Tab, Take 10 mg by mouth every evening., Disp: , Rfl:   •  Cyanocobalamin (VITAMIN B-12) 1000 MCG Tab, Take 1,000 mcg by mouth every day., Disp: , Rfl:   •  Cholecalciferol (VITAMIN D-3 PO), Take  by mouth every day. Takes two, Disp: , Rfl:   •  vitamin e (VITAMIN E) 400 UNIT Cap, Take 400 Units by mouth every day., Disp: , Rfl:   •  aspirin EC (ECOTRIN) 81 MG Tablet Delayed Response, Take 81 mg by mouth every evening., Disp: , Rfl:   •   "Apoaequorin (PREVAGEN EXTRA STRENGTH PO), Take 1 Cap by mouth every day., Disp: , Rfl:   •  LISINOPRIL PO, Take 1 Cap by mouth every day., Disp: , Rfl:   ALLERGIES:   Allergies   Allergen Reactions   • Ampicillin Rash     Feels \"rotten\"      SURGHX:   Past Surgical History:   Procedure Laterality Date   • TRIGGER FINGER RELEASE Left 5/29/2018    Procedure: TRIGGER FINGER RELEASE-  MIDDLE;  Surgeon: Frandy Liz M.D.;  Location: SURGERY [facility];  Service: Orthopedics   • JULIAN BY LAPAROSCOPY  5/23/2016    Procedure: JULIAN BY LAPAROSCOPY;  Surgeon: Adan Kearns M.D.;  Location: SURGERY [facility];  Service:    • KNEE ARTHROSCOPY  3/11/2014    Performed by Bonilla Valera M.D. at SURGERY [facility]   • BLADDER SUSPENSION  2001   • HYSTERECTOMY, TOTAL ABDOMINAL  2000   • COLON RESECTION      partial; no CA     SOCHX:  reports that she quit smoking about 49 years ago. She has a 6.00 pack-year smoking history. She has never used smokeless tobacco. She reports that she does not drink alcohol or use drugs.  FH: Reviewed with patient, not pertinent to this visit.        Objective:     /80 (BP Location: Left arm, Patient Position: Sitting, BP Cuff Size: Adult)   Pulse 76   Temp 36.2 °C (97.1 °F) (Temporal)   Resp 16   Ht 1.626 m (5' 4\")   Wt 70.8 kg (156 lb)   SpO2 94%   BMI 26.78 kg/m²      Physical Exam   Constitutional: She is oriented to person, place, and time. She appears well-developed and well-nourished. No distress.   HENT:   Head: Normocephalic and atraumatic.       Right Ear: External ear normal.   Left Ear: External ear normal.   Eyes: Pupils are equal, round, and reactive to light. EOM are normal.   Neck: Normal range of motion. Neck supple.   No midline bony tenderness or axial load tenderness   Cardiovascular: Normal rate, regular rhythm and normal heart sounds.    No murmur heard.  Pulmonary/Chest: Effort normal and breath sounds normal.   Musculoskeletal:   Left elbow: " Tender radial aspect without deformity.  Range of motion intact.  Distal neurovascular intact.     Left hand: Tender fifth metacarpal without obvious deformity.  Marked soft tissue swelling and ecchymosis.  Distal neurovascular intact.   Neurological: She is alert and oriented to person, place, and time. No cranial nerve deficit.   No focal deficits   Skin: Skin is warm and dry. No rash noted.   Few superficial abrasions               Assessment/Plan:   X-ray left hand per radiology:  1.  There is a mildly comminuted but nondisplaced fracture of the left 5th metacarpal head/neck region.  2.  There is multifocal degenerative change throughout the hand and wrist.    X-ray left elbow per radiology:  1.  There is no acute fracture or joint effusion of the left elbow.  2.  There is mild degenerative calcification along the epicondylar regions of the distal humerus.    1. Fall, initial encounter     2. Contusion of face, initial encounter     3. Other fracture of fifth metacarpal bone, left hand, initial encounter for closed fracture  REFERRAL TO SPORTS MEDICINE   4. Contusion of left elbow, initial encounter     5. Contusion of left knee, initial encounter     6. Pleuritic chest pain  DX-CHEST-2 VIEWS     Differential diagnosis, natural history, supportive care, and indications for immediate follow-up discussed at length.

## 2019-07-24 NOTE — PROGRESS NOTES
Progressively worse anterior chest wall pain with associated shortness of breath while in clinic.  No hemoptysis.    Exam:  Chest: Tender inferior sternum and lower left anterior ribs without deformity.  No crepitus or subcutaneous air.  Lungs remain clear to auscultation.    Chest x-ray no active disease per radiology

## 2019-07-24 NOTE — PROGRESS NOTES
Left knee pain noticed while in urgent care walking to x-ray.  No swelling.  Pain is moderate to severe with weightbearing.  No locking.  No prior injury.  Small superficial abrasion.    Exam:  Left knee: Tender lateral aspect of tibial plateau.  Range of motion intact.  No obvious effusion.  No instability.  Distal neurovascular intact.    X-ray left knee per radiology:  1.  There is no acute fracture or joint effusion.  2.  There is mild degenerative change in all 3 compartments.

## 2019-07-30 ENCOUNTER — OFFICE VISIT (OUTPATIENT)
Dept: MEDICAL GROUP | Facility: CLINIC | Age: 84
End: 2019-07-30
Payer: MEDICARE

## 2019-07-30 VITALS
WEIGHT: 156 LBS | HEART RATE: 82 BPM | BODY MASS INDEX: 26.63 KG/M2 | TEMPERATURE: 98.7 F | HEIGHT: 64 IN | OXYGEN SATURATION: 96 % | SYSTOLIC BLOOD PRESSURE: 120 MMHG | DIASTOLIC BLOOD PRESSURE: 76 MMHG | RESPIRATION RATE: 16 BRPM

## 2019-07-30 DIAGNOSIS — S62.337A CLOSED FRACTURE OF NECK OF FIFTH METACARPAL BONE OF LEFT HAND, INITIAL ENCOUNTER: ICD-10-CM

## 2019-07-30 PROCEDURE — 26600 TREAT METACARPAL FRACTURE: CPT | Mod: F4 | Performed by: FAMILY MEDICINE

## 2019-07-30 ASSESSMENT — ENCOUNTER SYMPTOMS
DIZZINESS: 0
CHILLS: 0
VOMITING: 0
FEVER: 0
NAUSEA: 0
SHORTNESS OF BREATH: 0

## 2019-07-30 NOTE — PROGRESS NOTES
Subjective:      Linda Casas is a 85 y.o. female who presents with Hand Injury (Referral from / L hand injury )     Referred by Mckay Henderson M.D. for evaluation of LEFT hand pain (right-hand-dominant)    HPI   LEFT hand pain (right-hand-dominant)  Date of injury, July 24, 2019  fall in her garden on gravel  Pain is predominantly at the fifth metacarpal head with motion of the LEFT hand at the MCP joints  No radiation  ache  Improved with rest immobilization  POSITIVE swelling which has persisted  POSITIVE night symptoms due to splint  Not currently taking medication for pain  Seen at urgent care  Denies any prior issues or injuries to the LEFT hand    She is an avid  and homemaker    Review of Systems   Constitutional: Negative for chills and fever.   Respiratory: Negative for shortness of breath.    Cardiovascular: Positive for chest pain.   Gastrointestinal: Negative for nausea and vomiting.   Neurological: Negative for dizziness.   She did have some pain with deep inspiration after the fall which has been slowly improving    PMH:  has a past medical history of Anxiety (9/14/2010); Cataract; High cholesterol; Hypertension; OSTEOPOROSIS (9/14/2010); Pain (03/04/14); Snoring; and Urinary incontinence. She also has no past medical history of Breast cancer (HCC).  MEDS:   Current Outpatient Prescriptions:   •  diphenhydrAMINE (BENADRYL) 25 MG Tab, Take 25 mg by mouth every 6 hours as needed for Sleep., Disp: , Rfl:   •  Apoaequorin (PREVAGEN EXTRA STRENGTH PO), Take 1 Cap by mouth every day., Disp: , Rfl:   •  LISINOPRIL PO, Take 1 Cap by mouth every day., Disp: , Rfl:   •  fluoxetine (PROZAC) 40 MG capsule, Take 40 mg by mouth every day., Disp: , Rfl:   •  pravastatin (PRAVACHOL) 10 MG Tab, Take 10 mg by mouth every evening., Disp: , Rfl:   •  Cyanocobalamin (VITAMIN B-12) 1000 MCG Tab, Take 1,000 mcg by mouth every day., Disp: , Rfl:   •  Cholecalciferol (VITAMIN D-3 PO), Take  by mouth  "every day. Takes two, Disp: , Rfl:   •  vitamin e (VITAMIN E) 400 UNIT Cap, Take 400 Units by mouth every day., Disp: , Rfl:   •  aspirin EC (ECOTRIN) 81 MG Tablet Delayed Response, Take 81 mg by mouth every evening., Disp: , Rfl:   ALLERGIES:   Allergies   Allergen Reactions   • Ampicillin Rash     Feels \"rotten\"      SURGHX:   Past Surgical History:   Procedure Laterality Date   • TRIGGER FINGER RELEASE Left 5/29/2018    Procedure: TRIGGER FINGER RELEASE-  MIDDLE;  Surgeon: Frandy Liz M.D.;  Location: SURGERY [facility];  Service: Orthopedics   • JULIAN BY LAPAROSCOPY  5/23/2016    Procedure: JULIAN BY LAPAROSCOPY;  Surgeon: Adan Kearns M.D.;  Location: SURGERY [facility];  Service:    • KNEE ARTHROSCOPY  3/11/2014    Performed by Bonilla Valera M.D. at SURGERY [facility]   • BLADDER SUSPENSION  2001   • HYSTERECTOMY, TOTAL ABDOMINAL  2000   • COLON RESECTION      partial; no CA     SOCHX:  reports that she quit smoking about 49 years ago. She has a 6.00 pack-year smoking history. She has never used smokeless tobacco. She reports that she does not drink alcohol or use drugs.  FH: Family history was reviewed, no pertinent findings to report     Objective:     /76 (BP Location: Right arm, Patient Position: Sitting, BP Cuff Size: Adult)   Pulse 82   Temp 37.1 °C (98.7 °F) (Temporal)   Resp 16   Ht 1.626 m (5' 4\")   Wt 70.8 kg (156 lb)   SpO2 96%   BMI 26.78 kg/m²       Physical Exam     Hand exam    NAD  Alert and oriented    BILATERAL WRIST exam  Range of motion intact  No tenderness along scaphoid, TFCC insertion, distal radius or distal ulna  Tinel's testing is NEGATIVE  The hand is otherwise neurovascularly intact    BILATERAL hand exam  POSITIVE swelling of the LEFT hand compared to the right  NO deformity or rotational deformity  Range of motion of all MCP, DIP and PIP joints limited on the LEFT hand compared to the right to approximately 70% normal motion  Pinky " opposition NORMAL, but some slight difficulty performing in the LEFT  Grind test is NEGATIVE  Collateral ligament testing is NORMAL     Assessment/Plan:     1. Closed fracture of neck of fifth metacarpal bone of left hand, initial encounter       Date of injury, July 24, 2019  fall in her garden on gravel    Fracture was stabilized in the office TODAY (July 30, 2019) any customized removable ulnar gutter splint    The plan is to continue fracture immobilization for a total of 4 weeks (removal on or after August 21, 2019)    Return in about 2 weeks (around 8/13/2019).  For fracture check            7/24/2019 10:13 AM    HISTORY/REASON FOR EXAM:  Fall with left 5th metacarpal pain.      TECHNIQUE/EXAM DESCRIPTION AND NUMBER OF VIEWS:  3 views of the LEFT hand.    COMPARISON: None    FINDINGS:    There is mild swelling over the ulnar aspect of the left hand.    There is a mildly comminuted but not significantly displaced fracture of the left 5th metacarpal head and neck region. There is no definite intra-articular extension.    Remainder of the hand demonstrates multifocal degenerative change throughout the IP joints as well as the 1st CMC joint. There is degenerative change in the wrist with calcification of the TFCC.    Bones are mildly osteopenic.   Impression       1.  There is a mildly comminuted but nondisplaced fracture of the left 5th metacarpal head/neck region.  2.  There is multifocal degenerative change throughout the hand and wrist.     Interpreted in the office today with the patient    Thank you Mckay Henderson M.D. for allowing me to participate in caring for your patient.  Around April 2018

## 2019-08-13 ENCOUNTER — OFFICE VISIT (OUTPATIENT)
Dept: MEDICAL GROUP | Facility: CLINIC | Age: 84
End: 2019-08-13
Payer: MEDICARE

## 2019-08-13 ENCOUNTER — APPOINTMENT (OUTPATIENT)
Dept: RADIOLOGY | Facility: IMAGING CENTER | Age: 84
End: 2019-08-13
Attending: FAMILY MEDICINE
Payer: MEDICARE

## 2019-08-13 VITALS
OXYGEN SATURATION: 91 % | SYSTOLIC BLOOD PRESSURE: 118 MMHG | BODY MASS INDEX: 26.63 KG/M2 | RESPIRATION RATE: 18 BRPM | DIASTOLIC BLOOD PRESSURE: 72 MMHG | HEART RATE: 87 BPM | WEIGHT: 156 LBS | HEIGHT: 64 IN | TEMPERATURE: 98 F

## 2019-08-13 DIAGNOSIS — S62.337D: ICD-10-CM

## 2019-08-13 DIAGNOSIS — M25.532 WRIST PAIN, LEFT: ICD-10-CM

## 2019-08-13 PROCEDURE — 99024 POSTOP FOLLOW-UP VISIT: CPT | Performed by: FAMILY MEDICINE

## 2019-08-13 PROCEDURE — 73110 X-RAY EXAM OF WRIST: CPT | Mod: TC,LT | Performed by: FAMILY MEDICINE

## 2019-08-13 NOTE — PROGRESS NOTES
"Subjective:      Linda Casas is a 85 y.o. female who presents with Hand Injury (Referral from / L hand injury )     Referred by Mckay Henderson M.D. for evaluation of LEFT hand pain (right-hand-dominant)    HPI   LEFT hand pain (right-hand-dominant)  Date of injury, July 24, 2019  fall in her garden on gravel  Pain is predominantly at the fifth metacarpal head with motion of the LEFT hand at the MCP joints  Fracture site is IMPROVED    Now she has NEW problem  She has had intermittent LEFT wrist pain  Radial sided  Intermittently aching and sharp  Associated with swelling  No new or repeat injury    She is an avid  and homemaker     Objective:     /72 (BP Location: Right arm, Patient Position: Sitting, BP Cuff Size: Adult)   Pulse 87   Temp 36.7 °C (98 °F) (Temporal)   Resp 18   Ht 1.626 m (5' 4\")   Wt 70.8 kg (156 lb)   SpO2 91%   BMI 26.78 kg/m²         Physical Exam     Hand exam    NAD  Alert and oriented    BILATERAL WRIST exam  Range of motion intact  POSITIVE tenderness along the LEFT scaphoid, with mild tenderness along the distal radius  The hand is otherwise neurovascularly intact    BILATERAL hand exam  POSITIVE swelling of the LEFT hand compared to the right  NO deformity or rotational deformity  Range of motion of all MCP, DIP and PIP joints limited on the LEFT hand compared to the right to approximately 70% normal motion  Pinky opposition NORMAL, but some slight difficulty performing in the LEFT  Grind test is NEGATIVE  Collateral ligament testing is NORMAL     Assessment/Plan:     1. Wrist pain, left  DX-WRIST-COMPLETE 3+ LEFT   2. Closed fracture of neck of fifth metacarpal bone of left hand with routine healing, subsequent encounter       Date of injury, July 24, 2019  fall in her garden on gravel    Fracture was stabilized (July 30, 2019) any customized removable ulnar gutter splint    The plan is to continue fracture immobilization for a total of 4 weeks " (removal on or after August 21, 2019)    NEW complaint at today's visit, radial sided wrist pain with distal radius tenderness and scaphoid tenderness  Repeat x-rays performed in the office TODAY (August 13, 2019) demonstrates normal alignment without notable fracture    Return in about 2 weeks (around 8/27/2019).  For fracture check, to see how she is doing OUT of immobilization        8/13/2019 9:38 AM    HISTORY/REASON FOR EXAM:  Left wrist pain, trauma.    TECHNIQUE/EXAM DESCRIPTION AND NUMBER OF VIEWS:  4 views of the LEFT wrist.    COMPARISON: 7/24/2019    FINDINGS:    BONE MINERALIZATION: Slightly decreased.  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 No erosions.  FRACTURE: Minimally comminuted fracture of the fifth metacarpal neck, similar to prior study. No other acute fractures.  DISLOCATION: None.  SOFT TISSUES: Chondrocalcinosis of TFCC.      Impression         1. No acute fracture or dislocation.  2. Minimally comminuted fracture of the fifth metacarpal neck, similar to prior study.  3. Chondrocalcinosis of TFCC, suggestive of underlying CPPD.                 7/24/2019 10:13 AM    HISTORY/REASON FOR EXAM:  Fall with left 5th metacarpal pain.      TECHNIQUE/EXAM DESCRIPTION AND NUMBER OF VIEWS:  3 views of the LEFT hand.    COMPARISON: None    FINDINGS:    There is mild swelling over the ulnar aspect of the left hand.    There is a mildly comminuted but not significantly displaced fracture of the left 5th metacarpal head and neck region. There is no definite intra-articular extension.    Remainder of the hand demonstrates multifocal degenerative change throughout the IP joints as well as the 1st CMC joint. There is degenerative change in the wrist with calcification of the TFCC.    Bones are mildly osteopenic.   Impression       1.  There is a mildly comminuted but nondisplaced fracture of the left 5th metacarpal head/neck region.  2.  There is multifocal degenerative change throughout the hand and  wrist.     Thank you Mckay Henderson M.D. for allowing me to participate in caring for your patient.  Around April 2018

## 2019-08-27 ENCOUNTER — OFFICE VISIT (OUTPATIENT)
Dept: MEDICAL GROUP | Facility: CLINIC | Age: 84
End: 2019-08-27
Payer: MEDICARE

## 2019-08-27 VITALS
SYSTOLIC BLOOD PRESSURE: 110 MMHG | RESPIRATION RATE: 18 BRPM | WEIGHT: 156 LBS | DIASTOLIC BLOOD PRESSURE: 72 MMHG | TEMPERATURE: 97.3 F | HEIGHT: 64 IN | BODY MASS INDEX: 26.63 KG/M2 | OXYGEN SATURATION: 94 % | HEART RATE: 91 BPM

## 2019-08-27 DIAGNOSIS — S62.337D: ICD-10-CM

## 2019-08-27 DIAGNOSIS — M25.532 WRIST PAIN, LEFT: ICD-10-CM

## 2019-08-27 DIAGNOSIS — M77.02 MEDIAL EPICONDYLITIS, LEFT: ICD-10-CM

## 2019-08-27 PROCEDURE — 99213 OFFICE O/P EST LOW 20 MIN: CPT | Mod: 25 | Performed by: FAMILY MEDICINE

## 2019-08-27 NOTE — PROGRESS NOTES
"Subjective:      Linda Casas is a 85 y.o. female who presents with Hand Injury (Referral from / L hand injury )     Referred by Mckay Henderson M.D. for evaluation of LEFT hand pain (right-hand-dominant)    HPI   LEFT hand pain (right-hand-dominant)  Date of injury, July 24, 2019  fall in her garden on gravel  Pain is predominantly at the fifth metacarpal head with motion of the LEFT hand at the MCP joints  No fx site pain    Now having pain alt LEFT medial elbow  Also noticed some pain in the LEFT medial elbow  Worse with direct pressure the LEFT medial elbow  No radicular symptoms or radiation the pain  No numbness or tingling in her LEFT hand  She does not recall any trauma to the elbow  No swelling     LEFT wrist, last time she had pain was 3 days at the whole wrist  Achy pain, worse with palpation  No radiation of the pain  No discernible wrist swelling    She is an avid  and homemaker     Objective:     /72 (BP Location: Left arm, Patient Position: Sitting, BP Cuff Size: Adult)   Pulse 91   Temp 36.3 °C (97.3 °F) (Temporal)   Resp 18   Ht 1.626 m (5' 4\")   Wt 70.8 kg (156 lb)   SpO2 94%   BMI 26.78 kg/m²     Physical Exam     Hand exam    NAD  Alert and oriented    BILATERAL WRIST exam  Range of motion intact  NO tenderness along the LEFT scaphoid, MINUTE tenderness along the distal radius  The hand is otherwise neurovascularly intact    BILATERAL hand exam  POSITIVE swelling of the LEFT hand compared to the right  NO deformity or rotational deformity  Range of motion of all MCP, DIP and PIP joints limited on the LEFT hand compared to the right to approximately 70% normal motion  Pinky opposition NORMAL, but some slight difficulty performing in the LEFT  Grind test is NEGATIVE  Collateral ligament testing is NORMAL    BILATERAL ELBOW exam  Range of motion [intact]  [No] swelling  POSITIVE tenderness at the LEFT medial epicondyle without tenderness at the lateral " epicondyle  Mccall test [NEGATIVE]     Assessment/Plan:     1. Closed fracture of neck of fifth metacarpal bone of left hand with routine healing, subsequent encounter     2. Wrist pain, left     3. Medial epicondylitis, left       Date of injury, July 24, 2019  fall in her garden on gravel    Fracture was stabilized (July 30, 2019) any customized removable ulnar gutter splint    Completed fracture immobilization for a total of 4 weeks (removal on or after August 21, 2019)  Advised to discontinue splint use at this time since she is still using it  Her  states that she had taken off 2 days early and started doing some gardening, since she was experiencing some pain, she went ahead and put it back on until today's visit    She has significant wrist stiffness  Recommended occupational therapy, but patient DECLINED at this time since she is NOT a big fan of occupational therapy    Return in about 2 weeks (around 9/10/2019).  For recheck of her LEFT wrist range of motion, consider trying to recommend occupational therapy again if it is still significant since this may be contributing to her LEFT medial epicondylitis and will result in decreased function over time        8/13/2019 9:38 AM    HISTORY/REASON FOR EXAM:  Left wrist pain, trauma.    TECHNIQUE/EXAM DESCRIPTION AND NUMBER OF VIEWS:  4 views of the LEFT wrist.    COMPARISON: 7/24/2019    FINDINGS:    BONE MINERALIZATION: Slightly decreased.  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 No erosions.  FRACTURE: Minimally comminuted fracture of the fifth metacarpal neck, similar to prior study. No other acute fractures.  DISLOCATION: None.  SOFT TISSUES: Chondrocalcinosis of TFCC.      Impression         1. No acute fracture or dislocation.  2. Minimally comminuted fracture of the fifth metacarpal neck, similar to prior study.  3. Chondrocalcinosis of TFCC, suggestive of underlying CPPD.                 7/24/2019 10:13 AM    HISTORY/REASON FOR EXAM:   Fall with left 5th metacarpal pain.      TECHNIQUE/EXAM DESCRIPTION AND NUMBER OF VIEWS:  3 views of the LEFT hand.    COMPARISON: None    FINDINGS:    There is mild swelling over the ulnar aspect of the left hand.    There is a mildly comminuted but not significantly displaced fracture of the left 5th metacarpal head and neck region. There is no definite intra-articular extension.    Remainder of the hand demonstrates multifocal degenerative change throughout the IP joints as well as the 1st CMC joint. There is degenerative change in the wrist with calcification of the TFCC.    Bones are mildly osteopenic.   Impression       1.  There is a mildly comminuted but nondisplaced fracture of the left 5th metacarpal head/neck region.  2.  There is multifocal degenerative change throughout the hand and wrist.     Thank you Mckay Henderson M.D. for allowing me to participate in caring for your patient.  Around April 2018

## 2019-09-10 ENCOUNTER — OFFICE VISIT (OUTPATIENT)
Dept: MEDICAL GROUP | Facility: CLINIC | Age: 84
End: 2019-09-10
Payer: MEDICARE

## 2019-09-10 VITALS
DIASTOLIC BLOOD PRESSURE: 76 MMHG | WEIGHT: 156 LBS | RESPIRATION RATE: 16 BRPM | HEART RATE: 77 BPM | TEMPERATURE: 98.2 F | SYSTOLIC BLOOD PRESSURE: 120 MMHG | HEIGHT: 64 IN | OXYGEN SATURATION: 97 % | BODY MASS INDEX: 26.63 KG/M2

## 2019-09-10 DIAGNOSIS — S62.337D: ICD-10-CM

## 2019-09-10 PROCEDURE — 99024 POSTOP FOLLOW-UP VISIT: CPT | Performed by: FAMILY MEDICINE

## 2019-09-10 NOTE — PROGRESS NOTES
"Subjective:      Linda Casas is a 85 y.o. female who presents with Hand Injury (Referral from / L hand injury )     Referred by Mckay Henderson M.D. for evaluation of LEFT hand pain (right-hand-dominant)    HPI   FOLLOW UP LEFT hand fifth metacarpal fracture (right-hand-dominant)  Date of injury, July 24, 2019  fall in her garden on gravel  Pain is predominantly at the fifth metacarpal head with motion of the LEFT hand at the MCP joints  No longer having pain  She is regained full function of her hand  No restrictions at this point    She is an avid  and homemaker     Objective:     /76 (BP Location: Right arm, Patient Position: Sitting, BP Cuff Size: Adult)   Pulse 77   Temp 36.8 °C (98.2 °F) (Temporal)   Resp 16   Ht 1.626 m (5' 4\")   Wt 70.8 kg (156 lb)   SpO2 97%   BMI 26.78 kg/m²     Physical Exam     Hand exam    NAD  Alert and oriented    BILATERAL hand exam  NO swelling   NO deformity or rotational deformity  Range of motion of all MCP, DIP and PIP joints NORMAL  Grind test is NEGATIVE  Collateral ligament testing is NORMAL     Assessment/Plan:     1. Closed fracture of neck of fifth metacarpal bone of left hand with routine healing, subsequent encounter       Date of injury, July 24, 2019  fall in her garden on gravel    Fracture was stabilized (July 30, 2019) any customized removable ulnar gutter splint    Fracture immobilized for a total of 4 weeks (removed after August 21, 2019)    NO wrist stiffness    Follow-up as needed        8/13/2019 9:38 AM    HISTORY/REASON FOR EXAM:  Left wrist pain, trauma.    TECHNIQUE/EXAM DESCRIPTION AND NUMBER OF VIEWS:  4 views of the LEFT wrist.    COMPARISON: 7/24/2019    FINDINGS:    BONE MINERALIZATION: Slightly decreased.  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 No erosions.  FRACTURE: Minimally comminuted fracture of the fifth metacarpal neck, similar to prior study. No other acute fractures.  DISLOCATION: None.  SOFT " TISSUES: Chondrocalcinosis of TFCC.      Impression         1. No acute fracture or dislocation.  2. Minimally comminuted fracture of the fifth metacarpal neck, similar to prior study.  3. Chondrocalcinosis of TFCC, suggestive of underlying CPPD.                 7/24/2019 10:13 AM    HISTORY/REASON FOR EXAM:  Fall with left 5th metacarpal pain.      TECHNIQUE/EXAM DESCRIPTION AND NUMBER OF VIEWS:  3 views of the LEFT hand.    COMPARISON: None    FINDINGS:    There is mild swelling over the ulnar aspect of the left hand.    There is a mildly comminuted but not significantly displaced fracture of the left 5th metacarpal head and neck region. There is no definite intra-articular extension.    Remainder of the hand demonstrates multifocal degenerative change throughout the IP joints as well as the 1st CMC joint. There is degenerative change in the wrist with calcification of the TFCC.    Bones are mildly osteopenic.   Impression       1.  There is a mildly comminuted but nondisplaced fracture of the left 5th metacarpal head/neck region.  2.  There is multifocal degenerative change throughout the hand and wrist.     Thank you Mckay Henderson M.D. for allowing me to participate in caring for your patient.  Around April 2018

## 2019-09-11 ENCOUNTER — HOSPITAL ENCOUNTER (EMERGENCY)
Facility: MEDICAL CENTER | Age: 84
End: 2019-09-11
Attending: EMERGENCY MEDICINE
Payer: MEDICARE

## 2019-09-11 VITALS
SYSTOLIC BLOOD PRESSURE: 155 MMHG | HEART RATE: 85 BPM | TEMPERATURE: 97.6 F | BODY MASS INDEX: 26.95 KG/M2 | RESPIRATION RATE: 18 BRPM | OXYGEN SATURATION: 93 % | WEIGHT: 157.85 LBS | DIASTOLIC BLOOD PRESSURE: 94 MMHG | HEIGHT: 64 IN

## 2019-09-11 DIAGNOSIS — R04.0 EPISTAXIS: ICD-10-CM

## 2019-09-11 PROCEDURE — 99284 EMERGENCY DEPT VISIT MOD MDM: CPT

## 2019-09-11 PROCEDURE — A9270 NON-COVERED ITEM OR SERVICE: HCPCS | Performed by: EMERGENCY MEDICINE

## 2019-09-11 PROCEDURE — 700101 HCHG RX REV CODE 250

## 2019-09-11 PROCEDURE — 303620 HCHG EPISTAXIS CONTROL

## 2019-09-11 PROCEDURE — 700101 HCHG RX REV CODE 250: Performed by: EMERGENCY MEDICINE

## 2019-09-11 PROCEDURE — 700102 HCHG RX REV CODE 250 W/ 637 OVERRIDE(OP): Performed by: EMERGENCY MEDICINE

## 2019-09-11 PROCEDURE — A9270 NON-COVERED ITEM OR SERVICE: HCPCS

## 2019-09-11 PROCEDURE — 700102 HCHG RX REV CODE 250 W/ 637 OVERRIDE(OP)

## 2019-09-11 RX ADMIN — SILVER NITRATE APPLICATORS 4 STICK: 25; 75 STICK TOPICAL at 01:15

## 2019-09-11 RX ADMIN — Medication 1 ML: at 01:15

## 2019-09-11 RX ADMIN — PHENYLEPHRINE HYDROCHLORIDE 1 SPRAY: 0.25 SPRAY NASAL at 01:15

## 2019-09-11 RX ADMIN — SILVER NITRATE APPLICATORS 1 APPLICATOR: 25; 75 STICK TOPICAL at 02:15

## 2019-09-11 RX ADMIN — SILVER NITRATE APPLICATORS 4 APPLICATOR: 25; 75 STICK TOPICAL at 03:00

## 2019-09-11 RX ADMIN — TETRACAINE HCL 1 ML: 10 INJECTION SUBARACHNOID at 01:15

## 2019-09-11 RX ADMIN — PHENYLEPHRINE HYDROCHLORIDE 1 SPRAY: 0.25 SPRAY NASAL at 02:15

## 2019-09-11 RX ADMIN — PHENYLEPHRINE HYDROCHLORIDE 1 SPRAY: 0.25 SPRAY NASAL at 03:00

## 2019-09-11 NOTE — ED TRIAGE NOTES
"Chief Complaint   Patient presents with   • Epistaxis     hx of same, needed cauterization       Pt to triage for the above complaint.  Pt states that her nose has been bleeding for two hours and does report taking Aspirin.  Pt does appear to be gagging on the blood and is actively bleeding despite clamp being in place.      Charge RN notified. Pt to R12 via wheelchair, given Pineda to manage blood.    BP (!) 166/102   Pulse 85   Temp 36 °C (96.8 °F) (Temporal)   Resp 18   Ht 1.626 m (5' 4\")   Wt 71.6 kg (157 lb 13.6 oz)   SpO2 93%   BMI 27.09 kg/m²      "

## 2019-09-11 NOTE — ED NOTES
Pt discharged home with , no IV.  Bleeding stopped at this time.  Pt and  instructed on careful use of afrin and nose clip if bleeding resumes.  Able to ambulate with steady gait to exit independently.

## 2019-09-11 NOTE — DISCHARGE INSTRUCTIONS
Most nosebleeds can be stopped at home by using the following directions:    1. Apply 15 minutes of direct pressure using the nose clip. Then,  2. Use 2 sprays of Afrin in the affected side. Then,  3. Apply 15 minutes of direct pressure using the nose clip. Then,  4. Use 2 sprays of Afrin in the affected side. Then,  5. Apply 15 minutes of direct pressure using a nose clip.    Seek medical attention if this 45 minute procedure does not stop your nosebleed.    Follow up with Dr. Hawkins using the contact information provided here, or with your own ENT physician.

## 2019-09-11 NOTE — ED PROVIDER NOTES
ED Provider Note    Scribed for Robbie Vo M.D. by Robbie Vo. 2019  1:05 AM    CHIEF COMPLAINT  Chief Complaint   Patient presents with   • Epistaxis     hx of same, needed cauterization        HPI  Linda Casas is a 85 y.o. female who presents to the Emergency Room for epistaxis from the left nare for the last 2 hours.  She had a self-limited nosebleed within the past couple of weeks.  She had a more significant bleed, similar to this 1, about 10 years ago, and had to have cautery performed by ENT.  She has had no trauma.  She is not anticoagulated.  She has not had any recent illness or injury.    REVIEW OF SYSTEMS  See HPI for further details.    PAST MEDICAL HISTORY   has a past medical history of Anxiety (2010), Cataract, High cholesterol, Hypertension, OSTEOPOROSIS (2010), Pain (14), Snoring, and Urinary incontinence.    SOCIAL HISTORY  Social History     Tobacco Use   • Smoking status: Former Smoker     Packs/day: 2.00     Years: 3.00     Pack years: 6.00     Last attempt to quit: 1970     Years since quittin.7   • Smokeless tobacco: Never Used   Substance and Sexual Activity   • Alcohol use: No     Alcohol/week: 0.0 oz   • Drug use: No   • Sexual activity: Not on file       SURGICAL HISTORY   has a past surgical history that includes hysterectomy, total abdominal (); bladder suspension (); colon resection; knee arthroscopy (3/11/2014); regla by laparoscopy (2016); and trigger finger release (Left, 2018).    CURRENT MEDICATIONS  Home Medications     Reviewed by Iram Moore R.N. (Registered Nurse) on 19 at 0052  Med List Status: Partial   Medication Last Dose Status   Apoaequorin (PREVAGEN EXTRA STRENGTH PO)  Active   aspirin EC (ECOTRIN) 81 MG Tablet Delayed Response 9/10/2019 Active   Cholecalciferol (VITAMIN D-3 PO)  Active   Cyanocobalamin (VITAMIN B-12) 1000 MCG Tab  Active   diphenhydrAMINE (BENADRYL) 25 MG Tab   "Active   fluoxetine (PROZAC) 40 MG capsule  Active   LISINOPRIL PO  Active   pravastatin (PRAVACHOL) 10 MG Tab  Active   vitamin e (VITAMIN E) 400 UNIT Cap  Active                ALLERGIES  Allergies   Allergen Reactions   • Ampicillin Rash     Feels \"rotten\"        PHYSICAL EXAM  VITAL SIGNS: BP (!) 166/102   Pulse 85   Temp 36 °C (96.8 °F) (Temporal)   Resp 18   Ht 1.626 m (5' 4\")   Wt 71.6 kg (157 lb 13.6 oz)   SpO2 93%   BMI 27.09 kg/m²   Pulse ox interpretation: I interpret this pulse ox as normal.  Constitutional: Alert in no apparent distress.  HENT: Normocephalic, Atraumatic, Bilateral external ears normal. Nose normal.  Slow steady drip of fresh blood from the left nare.  Eyes: Conjunctiva normal, non-icteric.   Heart: Regular rate and rythm, no murmurs.    Lungs: Clear to auscultation bilaterally.  Skin: Warm, Dry, No erythema, No rash.   Neurologic: Alert, Grossly non-focal.   Psychiatric: Affect normal, Judgment normal, Mood normal, Appears appropriate and not intoxicated.     COURSE & MEDICAL DECISION MAKING  The patient's VS, Nurses notes reviewed. (See chart for details)    1:05 AM Patient seen and examined at bedside.  She will be treated with an epistaxis procedure.    Epistaxis Procedure Note    Indication: Bleeding    Pre-medication: Topical LET    Procedure: The patient was positioned appropriately and the nares were cleared as well as possible. The bleeding site was localized to the left nare. LET was applied via cotton ball and a nasal clip was applied for 10 minutes. Bleeding slowed after 10 minutes but was not completely stopped. A new LET-soaked cotton ball was applied for 10 minutes.  Hemostasis was improved, and a bleeding vessel at the lower anterior septum was cauterized, but there still appears to be bleeding below the inferior turbinate, perhaps more posteriorly. Afrin was applied via cotton ball and nasal clip for 10 minutes. The low mid septal bleeding vessel was clearly " visible, but too wet to cauterize. Per family request, another 10 minute attempt with Afrin on a cotton ball and nasal clip was attempted. Epistaxis was completely obtained, and the site of the previous bleeding vessel was cauterized with silver nitrate, and the patient was observed for an additional 10 minutes, without recurrence of bleeding.    The patient tolerated the procedure well.    Complications: None    Procedure Time: 50 Minutes     The patient will return for new or worsening symptoms and is stable at the time of discharge.    The patient is referred to a primary physician for blood pressure management, diabetic screening, and for all other preventative health concerns.    DISPOSITION:  Patient will be discharged home in stable condition.    FOLLOW UP:  Trent Hawkins M.D.  [address] [place] [id number]  [phone]      For ENT consultation      OUTPATIENT MEDICATIONS:  New Prescriptions    No medications on file         FINAL IMPRESSION  1. Epistaxis

## 2019-09-12 ENCOUNTER — HOSPITAL ENCOUNTER (EMERGENCY)
Facility: MEDICAL CENTER | Age: 84
End: 2019-09-12
Attending: EMERGENCY MEDICINE
Payer: MEDICARE

## 2019-09-12 VITALS
WEIGHT: 157.41 LBS | HEIGHT: 64 IN | TEMPERATURE: 97.9 F | DIASTOLIC BLOOD PRESSURE: 72 MMHG | BODY MASS INDEX: 26.87 KG/M2 | OXYGEN SATURATION: 98 % | RESPIRATION RATE: 16 BRPM | HEART RATE: 80 BPM | SYSTOLIC BLOOD PRESSURE: 150 MMHG

## 2019-09-12 DIAGNOSIS — R04.0 EPISTAXIS: ICD-10-CM

## 2019-09-12 PROCEDURE — 303620 HCHG EPISTAXIS CONTROL

## 2019-09-12 PROCEDURE — 99283 EMERGENCY DEPT VISIT LOW MDM: CPT

## 2019-09-12 RX ORDER — CEPHALEXIN 500 MG/1
500 CAPSULE ORAL 4 TIMES DAILY
Qty: 20 CAP | Refills: 0 | Status: SHIPPED | OUTPATIENT
Start: 2019-09-12 | End: 2019-09-17

## 2019-09-12 NOTE — ED PROVIDER NOTES
ED Provider Note    CHIEF COMPLAINT  Chief Complaint   Patient presents with   • Epistaxis     x1.5 hours. Pt seen in ER last night for the same thing and pt's nose was cauterized but it started back up again when she was coughing today. Pt takes baby aspirin daily, no other blood thinners.       HPI  Linda Casas is a 85 y.o. female who presents to the emerge department for recurrent epistaxis.  Patient was here yesterday for similar.  Cauterized and did well today however this evening had a coughing/sneezing fit and then had rebleeding.  As per instructed to use the nasal clamp and Afrin with no success and after running out of the Afrin that said to come the emerge department for further care.    REVIEW OF SYSTEMS  See HPI for further details. All other systems are negative.     PAST MEDICAL HISTORY   has a past medical history of Anxiety (2010), Cataract, High cholesterol, Hypertension, OSTEOPOROSIS (2010), Pain (14), Snoring, and Urinary incontinence.    SOCIAL HISTORY  Social History     Tobacco Use   • Smoking status: Former Smoker     Packs/day: 2.00     Years: 3.00     Pack years: 6.00     Last attempt to quit: 1970     Years since quittin.7   • Smokeless tobacco: Never Used   Substance and Sexual Activity   • Alcohol use: No     Alcohol/week: 0.0 oz   • Drug use: No   • Sexual activity: Not on file       SURGICAL HISTORY   has a past surgical history that includes hysterectomy, total abdominal (); bladder suspension (); colon resection; knee arthroscopy (3/11/2014); regla by laparoscopy (2016); and trigger finger release (Left, 2018).    CURRENT MEDICATIONS  Home Medications     Reviewed by Xiomara Santizo R.N. (Registered Nurse) on 19 at 0231  Med List Status: <None>   Medication Last Dose Status   Apoaequorin (PREVAGEN EXTRA STRENGTH PO)  Active   aspirin EC (ECOTRIN) 81 MG Tablet Delayed Response  Active   Cholecalciferol  "(VITAMIN D-3 PO)  Active   Cyanocobalamin (VITAMIN B-12) 1000 MCG Tab  Active   diphenhydrAMINE (BENADRYL) 25 MG Tab  Active   fluoxetine (PROZAC) 40 MG capsule  Active   LISINOPRIL PO  Active   pravastatin (PRAVACHOL) 10 MG Tab  Active   vitamin e (VITAMIN E) 400 UNIT Cap  Active                ALLERGIES  Allergies   Allergen Reactions   • Ampicillin Rash     Feels \"rotten\"        PHYSICAL EXAM  VITAL SIGNS: /72   Pulse 80   Temp 36.6 °C (97.9 °F) (Temporal)   Resp 16   Ht 1.626 m (5' 4\")   Wt 71.4 kg (157 lb 6.5 oz)   SpO2 98%   BMI 27.02 kg/m²  @RACHEAL[[id number]::@  Pulse ox interpretation: I interpret this pulse ox as normal.  Constitutional: Alert in no apparent distress.  HENT: Normocephalic, Atraumatic, Bilateral external ears normal.  Active bleeding site from the left K plexus.  Eyes: Pupils are equal and reactive. Conjunctiva normal, non-icteric.   Heart: Regular rate and rythm, no murmurs.    Lungs: Clear to auscultation bilaterally.  Skin: Warm, Dry, No erythema, No rash.   Neurologic: Alert, Grossly non-focal.   Psychiatric: Affect normal, Judgment normal, Mood normal, Appears appropriate and not intoxicated.     Procedure note:    Nasal bleed kit used to visualize left nares and persistent bleeding from the left anterior nose.  Given prior cautery and lack of vasoconstrictive therapy being successful we have through shared decision making agreed upon a nasal packing which was placed (Rhino Rocket).  Patient tolerated well.      COURSE & MEDICAL DECISION MAKING  Pertinent Labs & Imaging studies reviewed. (See chart for details)  Patient presented the emerge part with the above complaint.  Repeat epistaxis.  She tolerated the nasal packing well and bleeding has stopped.  She is understanding of return precautions yet again but is also understanding of outpatient follow-up with ENT for packing removal.  She has been started empirically on antibiotics given the packing placement.  New para  The " patient will not drink alcohol nor drive with prescribed medications. The patient will return for worsening symptoms and is stable at the time of discharge. The patient verbalizes understanding and will comply.    FINAL IMPRESSION  1. Epistaxis               Electronically signed by: Herbert Powell, 9/12/2019 3:16 AM

## 2019-09-12 NOTE — ED TRIAGE NOTES
"Susugulshannilson Dickinsonluca Casas   85 y.o. female   Chief Complaint   Patient presents with   • Epistaxis     x1.5 hours. Pt seen in ER last night for the same thing and pt's nose was cauterized but it started back up again when she was coughing today. Pt takes baby aspirin daily, no other blood thinners.      Pt amb to triage with steady gait for above complaint. Moderate bleeding noted in triage with pt periodically spitting out blood. Pt has nose clamp on from last night's ER visit as well.    Pt is alert and oriented, speaking in full sentences, follows commands and responds appropriately to questions. NAD. Resp are even and unlabored.   Pt placed in lobby. Pt educated on triage process. Pt encouraged to alert staff for any changes.    BP (!) 169/88   Pulse 88   Temp 36.4 °C (97.5 °F) (Temporal)   Resp 14   Ht 1.626 m (5' 4\")   Wt 71.4 kg (157 lb 6.5 oz)   SpO2 95%   BMI 27.02 kg/m²   "

## 2019-09-25 ENCOUNTER — HOSPITAL ENCOUNTER (OUTPATIENT)
Dept: RADIOLOGY | Facility: MEDICAL CENTER | Age: 84
End: 2019-09-25
Attending: FAMILY MEDICINE
Payer: MEDICARE

## 2019-09-25 DIAGNOSIS — Z12.39 BREAST SCREENING, UNSPECIFIED: ICD-10-CM

## 2019-09-25 PROCEDURE — 77063 BREAST TOMOSYNTHESIS BI: CPT

## 2019-10-28 ENCOUNTER — HOSPITAL ENCOUNTER (OUTPATIENT)
Dept: LAB | Facility: MEDICAL CENTER | Age: 84
End: 2019-10-28
Attending: FAMILY MEDICINE
Payer: MEDICARE

## 2019-10-28 LAB
ALBUMIN SERPL BCP-MCNC: 4.3 G/DL (ref 3.2–4.9)
ALBUMIN/GLOB SERPL: 1.5 G/DL
ALP SERPL-CCNC: 50 U/L (ref 30–99)
ALT SERPL-CCNC: 9 U/L (ref 2–50)
ANION GAP SERPL CALC-SCNC: 8 MMOL/L (ref 0–11.9)
AST SERPL-CCNC: 20 U/L (ref 12–45)
BILIRUB SERPL-MCNC: 0.6 MG/DL (ref 0.1–1.5)
BUN SERPL-MCNC: 16 MG/DL (ref 8–22)
CALCIUM SERPL-MCNC: 9.3 MG/DL (ref 8.5–10.5)
CHLORIDE SERPL-SCNC: 106 MMOL/L (ref 96–112)
CHOLEST SERPL-MCNC: 198 MG/DL (ref 100–199)
CO2 SERPL-SCNC: 26 MMOL/L (ref 20–33)
CREAT SERPL-MCNC: 1.02 MG/DL (ref 0.5–1.4)
FASTING STATUS PATIENT QL REPORTED: NORMAL
GLOBULIN SER CALC-MCNC: 2.9 G/DL (ref 1.9–3.5)
GLUCOSE SERPL-MCNC: 89 MG/DL (ref 65–99)
HDLC SERPL-MCNC: 65 MG/DL
LDLC SERPL CALC-MCNC: 112 MG/DL
POTASSIUM SERPL-SCNC: 3.6 MMOL/L (ref 3.6–5.5)
PROT SERPL-MCNC: 7.2 G/DL (ref 6–8.2)
SODIUM SERPL-SCNC: 140 MMOL/L (ref 135–145)
TRIGL SERPL-MCNC: 105 MG/DL (ref 0–149)

## 2019-10-28 PROCEDURE — 80053 COMPREHEN METABOLIC PANEL: CPT

## 2019-10-28 PROCEDURE — 80061 LIPID PANEL: CPT

## 2019-10-28 PROCEDURE — 83036 HEMOGLOBIN GLYCOSYLATED A1C: CPT

## 2019-10-28 PROCEDURE — 36415 COLL VENOUS BLD VENIPUNCTURE: CPT

## 2019-10-29 LAB
EST. AVERAGE GLUCOSE BLD GHB EST-MCNC: 103 MG/DL
HBA1C MFR BLD: 5.2 % (ref 0–5.6)

## 2019-11-26 ENCOUNTER — OFFICE VISIT (OUTPATIENT)
Dept: MEDICAL GROUP | Facility: PHYSICIAN GROUP | Age: 84
End: 2019-11-26
Payer: MEDICARE

## 2019-11-26 VITALS
DIASTOLIC BLOOD PRESSURE: 62 MMHG | SYSTOLIC BLOOD PRESSURE: 110 MMHG | WEIGHT: 154.8 LBS | BODY MASS INDEX: 26.43 KG/M2 | HEART RATE: 70 BPM | RESPIRATION RATE: 16 BRPM | TEMPERATURE: 98.6 F | HEIGHT: 64 IN | OXYGEN SATURATION: 94 %

## 2019-11-26 DIAGNOSIS — Z91.81 AT HIGH RISK FOR INJURY RELATED TO FALL: ICD-10-CM

## 2019-11-26 DIAGNOSIS — M80.80XS LOCALIZED OSTEOPOROSIS WITH CURRENT PATHOLOGICAL FRACTURE, SEQUELA: ICD-10-CM

## 2019-11-26 DIAGNOSIS — M85.80 OSTEOPENIA, UNSPECIFIED LOCATION: Chronic | ICD-10-CM

## 2019-11-26 DIAGNOSIS — I10 ESSENTIAL HYPERTENSION: ICD-10-CM

## 2019-11-26 PROCEDURE — 99214 OFFICE O/P EST MOD 30 MIN: CPT | Performed by: FAMILY MEDICINE

## 2019-11-26 RX ORDER — LOSARTAN POTASSIUM 100 MG/1
50 TABLET ORAL
Refills: 0 | COMMUNITY
Start: 2019-10-21 | End: 2020-01-06 | Stop reason: SDUPTHER

## 2019-11-26 ASSESSMENT — PATIENT HEALTH QUESTIONNAIRE - PHQ9: CLINICAL INTERPRETATION OF PHQ2 SCORE: 0

## 2019-11-26 NOTE — ASSESSMENT & PLAN NOTE
Stable. Monitoring BP at home. Currently taking losartan 50mg qOD as directed.  Denies lightheadedness, vision changes, headache, palpitations or leg swelling. BP is 110/62.

## 2019-11-26 NOTE — PROGRESS NOTES
Subjective:     Chief Complaint   Patient presents with   • Establish Care     no concerns        HPI:   Linda presents today to discuss the following.  Prior PCP: Dr. Nicholson    At high risk for injury related to fall  The patient has a hx of frequent falls. She recently fell  and experienced a left 5th metacarpal fracture.   She has not gone through PT for help. No hx of hip fractures.   Last DXA scan was  and only positive for osteopenia.     Hypertension goal BP (blood pressure) < 140/80  Stable. Monitoring BP at home. Currently taking losartan 50mg qOD as directed.  Denies lightheadedness, vision changes, headache, palpitations or leg swelling. BP is 110/62.       Osteopenia  The patient completed a DEXA scan back in  which was only positive for osteopenia.  She was noted to have a hip fracture risk greater than 3%.  As mentioned, she does have an increased risk for falls.  Per chart review she used to be on bisphosphonate treatment back in ?  She appears to be on a bisphosphonate holiday.  She is taking vitamin D for fall prevention.      Past Medical History:   Diagnosis Date   • Anxiety 2010   • Cataract    • High cholesterol    • Hypertension     history of but no treatment   • OSTEOPOROSIS 2010   • Pain 14    6/10=L knee   • Snoring     no sleep study   • Urinary incontinence     only at times at night when sleeping       Social History     Tobacco Use   • Smoking status: Former Smoker     Packs/day: 2.00     Years: 3.00     Pack years: 6.00     Last attempt to quit: 1970     Years since quittin.9   • Smokeless tobacco: Never Used   Substance Use Topics   • Alcohol use: Not Currently     Alcohol/week: 0.0 oz     Comment: rarely    • Drug use: Never       Current Outpatient Medications Ordered in Epic   Medication Sig Dispense Refill   • losartan (COZAAR) 100 MG Tab Take 50 mg by mouth every day.  0   • diphenhydrAMINE (BENADRYL) 25 MG Tab Take 25 mg by mouth every  "6 hours as needed for Sleep.     • Apoaequorin (PREVAGEN EXTRA STRENGTH PO) Take 1 Cap by mouth every day.     • fluoxetine (PROZAC) 40 MG capsule Take 40 mg by mouth every day.     • pravastatin (PRAVACHOL) 10 MG Tab Take 10 mg by mouth every evening.     • Cyanocobalamin (VITAMIN B-12) 1000 MCG Tab Take 1,000 mcg by mouth every day.     • Cholecalciferol (VITAMIN D-3 PO) Take  by mouth every day. Takes two     • vitamin e (VITAMIN E) 400 UNIT Cap Take 400 Units by mouth every day.     • aspirin EC (ECOTRIN) 81 MG Tablet Delayed Response Take 81 mg by mouth every evening.       No current Epic-ordered facility-administered medications on file.        Allergies:  Ampicillin    Health Maintenance: Completed    ROS:  Gen: no fevers/chills,   Eyes: no changes in vision  ENT: no sore throat, no hearing loss, no bloody nose  Pulm: no sob, no cough  CV: no chest pain, no palpitations  GI: no nausea/vomiting, no diarrhea  Neuro: no headaches    Objective:       Exam:  /62 (BP Location: Left arm, Patient Position: Sitting, BP Cuff Size: Adult)   Pulse 70   Temp 37 °C (98.6 °F) (Temporal)   Resp 16   Ht 1.626 m (5' 4\")   Wt 70.2 kg (154 lb 12.8 oz)   SpO2 94%   BMI 26.57 kg/m²  Body mass index is 26.57 kg/m².    Gen: Alert and oriented, No apparent distress.  HENT: TMs are clear. Oropharynx is w/o erythema or exudates. Neck is supple without cervical lymphadenopathy. No JVD.   Eyes: PERRLA  Lungs: Normal effort, CTA bilaterally, no wheezes, rhonchi, or rales  CV: Regular rate and rhythm. No murmurs, rubs, or gallops.  Abdomen: Soft, nontender, nondistended. Normal bowel sounds. Liver and spleen are not palpable  Ext: No clubbing, cyanosis, edema.  Skin: no rash, lesions or ulcers  Neuro: Moves all extremities.  Psych: AAOx3    Assessment & Plan:     85 y.o. female with the following -     1. At high risk for injury related to fall  This is a chronic, stable condition. Of note, she is at high risk for injury " related to fall.  She had a fall most recently in September 2019.  Currently she is taking vitamin D supplementation which does help to prevent falls.  At this time I recommend physical therapy for balance and gait strengthening.  The patient would like to hold off on physical therapy at this time.  Fall precautions were given.  We will continue to address physical therapy at a later time.    2. Localized osteoporosis with current pathological fracture, sequela  3. Osteopenia, unspecified location  This is a chronic condition, stable.  Patient is noted to have a history of osteoporosis in the past.  Most recent DEXA scan in 2015 was only positive for osteopenia.  She has been off bisphosphonates for many years.  No further DEXA scan has been done in the past.  At this time I recommend repeat DEXA scan. If she were to be positive for osteoporosis again, she would be agreeable to starting bisphosphonate medication again.  Patient is agreeable to plan.  -Await DEXA scan findings  - DS-BONE DENSITY STUDY (DEXA); Future    4. Essential hypertension  This is a chronic condition, stable.  The patient has a history of hypertension currently well controlled with losartan 50 mg daily.  Blood pressure in the office today is 110/62.  Goal blood pressure for her would be less than 150/90 per JNC 8 guidelines.  -Continue present regimen      Return in about 3 months (around 2/26/2020) for FU on conditions .    Please note that this dictation was created using voice recognition software. I have made every reasonable attempt to correct obvious errors, but I expect that there are errors of grammar and possibly content that I did not discover before finalizing the note.

## 2019-11-26 NOTE — ASSESSMENT & PLAN NOTE
The patient has a hx of frequent falls. She recently fell 9/19 and experienced a left 5th metacarpal fracture.   She has not gone through PT for help. No hx of hip fractures.   Last DXA scan was 2015 and only positive for osteopenia.

## 2019-11-26 NOTE — ASSESSMENT & PLAN NOTE
The patient completed a DEXA scan back in 2015 which was only positive for osteopenia.  She was noted to have a hip fracture risk greater than 3%.  As mentioned, she does have an increased risk for falls.  Per chart review she used to be on bisphosphonate treatment back in 2012?  She appears to be on a bisphosphonate holiday.  She is taking vitamin D for fall prevention.

## 2019-12-10 ENCOUNTER — TELEPHONE (OUTPATIENT)
Dept: MEDICAL GROUP | Facility: PHYSICIAN GROUP | Age: 84
End: 2019-12-10

## 2019-12-10 ENCOUNTER — HOSPITAL ENCOUNTER (OUTPATIENT)
Dept: RADIOLOGY | Facility: MEDICAL CENTER | Age: 84
End: 2019-12-10
Attending: FAMILY MEDICINE
Payer: MEDICARE

## 2019-12-10 DIAGNOSIS — Z91.81 AT HIGH RISK FOR INJURY RELATED TO FALL: ICD-10-CM

## 2019-12-10 DIAGNOSIS — M81.0 AGE-RELATED OSTEOPOROSIS WITHOUT CURRENT PATHOLOGICAL FRACTURE: ICD-10-CM

## 2019-12-10 DIAGNOSIS — M80.80XS LOCALIZED OSTEOPOROSIS WITH CURRENT PATHOLOGICAL FRACTURE, SEQUELA: ICD-10-CM

## 2019-12-10 PROCEDURE — 77080 DXA BONE DENSITY AXIAL: CPT

## 2019-12-10 RX ORDER — ALENDRONATE SODIUM 70 MG/1
70 TABLET ORAL
Qty: 4 TAB | Refills: 11 | Status: SHIPPED | OUTPATIENT
Start: 2019-12-10 | End: 2020-04-20 | Stop reason: SDUPTHER

## 2019-12-10 NOTE — TELEPHONE ENCOUNTER
Phone Number Called: [phone] (home)       Call outcome: left message for patient to call back regarding message below    Message: lvm to call back regarding lab results. LM

## 2019-12-10 NOTE — TELEPHONE ENCOUNTER
During previous phone call I let patient know medication would be sent today and to follow up with pharmacy. PAOLO

## 2019-12-10 NOTE — TELEPHONE ENCOUNTER
Phone Number Called: [phone] (home)     Call outcome: spoke to patient regarding message below    Message: patient would like to move forward with medication

## 2019-12-10 NOTE — TELEPHONE ENCOUNTER
----- Message from Otto Odom M.D. sent at 12/10/2019 10:05 AM PST -----  Please call patient to let her know that her bone scan results are in.  She is positive for osteoporosis which is extreme weakening of her bones.  I strongly recommend that we begin treatment for osteoporosis.  The medication is taken once a week.  I would like to know if she is agreeable to starting this treatment.  I could send the prescription to her pharmacy.  Please let me know    Thank you,    Otto Odom MD  Family Medicine Physician  [facility]  [phone]

## 2020-01-06 RX ORDER — LOSARTAN POTASSIUM 100 MG/1
50 TABLET ORAL
Qty: 30 TAB | Refills: 0 | Status: SHIPPED | OUTPATIENT
Start: 2020-01-06 | End: 2020-02-07

## 2020-03-02 ENCOUNTER — OFFICE VISIT (OUTPATIENT)
Dept: MEDICAL GROUP | Facility: PHYSICIAN GROUP | Age: 85
End: 2020-03-02
Payer: MEDICARE

## 2020-03-02 VITALS
SYSTOLIC BLOOD PRESSURE: 110 MMHG | WEIGHT: 154.8 LBS | HEIGHT: 64 IN | DIASTOLIC BLOOD PRESSURE: 70 MMHG | OXYGEN SATURATION: 94 % | TEMPERATURE: 97.8 F | RESPIRATION RATE: 16 BRPM | HEART RATE: 76 BPM | BODY MASS INDEX: 26.43 KG/M2

## 2020-03-02 DIAGNOSIS — R53.83 OTHER FATIGUE: ICD-10-CM

## 2020-03-02 PROCEDURE — 99214 OFFICE O/P EST MOD 30 MIN: CPT | Performed by: FAMILY MEDICINE

## 2020-03-02 NOTE — PROGRESS NOTES
Subjective:     Chief Complaint   Patient presents with   • Back Pain     rt mid back pain    • Fatigue       HPI:   Linda presents today to discuss the following.    Other fatigue  This is a new condition.  Symptom onset: The patient has been feeling fatigued for the past 3 months.   Current symptoms: She is sleeping well and approx 9hrs nightly. During the day she starts feeling tired.   Since onset symptoms are: Unchanged  Modifying factors: Tested negative for DM 10/19.   Treatments tried: The patient is on different medications and benadryl is on her med list. She is unsure if taking.   Associated symptoms: Denies any blood loss.         Past Medical History:   Diagnosis Date   • Anxiety 2010   • Cataract    • High cholesterol    • Hypertension     history of but no treatment   • OSTEOPOROSIS 2010   • Pain 03/04/14    6/10=L knee   • Snoring     no sleep study   • Urinary incontinence     only at times at night when sleeping       Social History     Tobacco Use   • Smoking status: Former Smoker     Packs/day: 2.00     Years: 3.00     Pack years: 6.00     Last attempt to quit: 1970     Years since quittin.2   • Smokeless tobacco: Never Used   Substance Use Topics   • Alcohol use: Not Currently     Alcohol/week: 0.0 oz     Comment: rarely    • Drug use: Never       Current Outpatient Medications Ordered in Epic   Medication Sig Dispense Refill   • losartan (COZAAR) 100 MG Tab Take 1/2 tablet by mouth every day. 90 Tab 0   • alendronate (FOSAMAX) 70 MG Tab Take 1 Tab by mouth every 7 days. 4 Tab 11   • Apoaequorin (PREVAGEN EXTRA STRENGTH PO) Take 1 Cap by mouth every day.     • fluoxetine (PROZAC) 40 MG capsule Take 40 mg by mouth every day.     • pravastatin (PRAVACHOL) 10 MG Tab Take 10 mg by mouth every evening.     • Cyanocobalamin (VITAMIN B-12) 1000 MCG Tab Take 1,000 mcg by mouth every day.     • Cholecalciferol (VITAMIN D-3 PO) Take  by mouth every day. Takes two     • vitamin e  "(VITAMIN E) 400 UNIT Cap Take 400 Units by mouth every day.       No current [facility]-ordered facility-administered medications on file.        Allergies:  Ampicillin    Health Maintenance: Completed    ROS:  Gen: no fevers/chills, no changes in weight  Eyes: no changes in vision  ENT: no sore throat, no hearing loss, no bloody nose  Pulm: no sob, no cough  CV: no chest pain, no palpitations  GI: no nausea/vomiting, no diarrhea      Objective:     Exam:  /70 (BP Location: Left arm, Patient Position: Sitting, BP Cuff Size: Adult)   Pulse 76   Temp 36.6 °C (97.8 °F) (Temporal)   Resp 16   Ht 1.626 m (5' 4\")   Wt 70.2 kg (154 lb 12.8 oz)   SpO2 94%   BMI 26.57 kg/m²  Body mass index is 26.57 kg/m².    Constitutional: Alert, no distress, well-groomed.  Skin: Warm, dry, good turgor, no rashes in visible areas.  Eye: Equal, round and reactive, conjunctiva clear, lids normal.  ENMT: Lips without lesions, good dentition, moist mucous membranes.  Neck: Trachea midline, no masses, no thyromegaly.  Respiratory: Unlabored respiratory effort, no cough.  MSK: Normal gait, moves all extremities.  Neuro: Grossly non-focal.   Psych: Alert and oriented x3, normal affect and mood.    Assessment & Plan:     85 y.o. female with the following -     1. Other fatigue  This is a new problem.  The patient mentions that over the past 3 months she has been feeling increasingly fatigued.  Etiology is not entirely clear.  However, I did go over her medication reconciliation and I see Benadryl as an active medication.  She is unsure whether she is taking this medication or not.  I did discuss the potential side effect of this medication and could explain her fatigue.  She will go home to verify that she is not taking this medicine.  Moreover, I will establish baseline labs to make sure that were not missing any anemia or thyroid disease.  The patient is agreeable to plan.  - CBC WITHOUT DIFFERENTIAL; Future  - Comp Metabolic Panel; " Future  - TSH WITH REFLEX TO FT4; Future      Return in about 3 months (around 6/2/2020) for FU on fatigue .    Please note that this dictation was created using voice recognition software. I have made every reasonable attempt to correct obvious errors, but I expect that there are errors of grammar and possibly content that I did not discover before finalizing the note.

## 2020-03-02 NOTE — ASSESSMENT & PLAN NOTE
This is a new condition.  Symptom onset: The patient has been feeling fatigued for the past 3 months.   Current symptoms: She is sleeping well and approx 9hrs nightly. During the day she starts feeling tired.   Since onset symptoms are: Unchanged  Modifying factors: Tested negative for DM 10/19.   Treatments tried: The patient is on different medications and benadryl is on her med list. She is unsure if taking.   Associated symptoms: Denies any blood loss.

## 2020-03-10 ENCOUNTER — HOSPITAL ENCOUNTER (OUTPATIENT)
Dept: LAB | Facility: MEDICAL CENTER | Age: 85
End: 2020-03-10
Attending: FAMILY MEDICINE
Payer: MEDICARE

## 2020-03-10 DIAGNOSIS — R53.83 OTHER FATIGUE: ICD-10-CM

## 2020-03-10 LAB
ALBUMIN SERPL BCP-MCNC: 4.4 G/DL (ref 3.2–4.9)
ALBUMIN/GLOB SERPL: 1.5 G/DL
ALP SERPL-CCNC: 52 U/L (ref 30–99)
ALT SERPL-CCNC: 9 U/L (ref 2–50)
ANION GAP SERPL CALC-SCNC: 11 MMOL/L (ref 0–11.9)
AST SERPL-CCNC: 17 U/L (ref 12–45)
BILIRUB SERPL-MCNC: 0.7 MG/DL (ref 0.1–1.5)
BUN SERPL-MCNC: 14 MG/DL (ref 8–22)
CALCIUM SERPL-MCNC: 10.5 MG/DL (ref 8.5–10.5)
CHLORIDE SERPL-SCNC: 104 MMOL/L (ref 96–112)
CO2 SERPL-SCNC: 27 MMOL/L (ref 20–33)
CREAT SERPL-MCNC: 1.02 MG/DL (ref 0.5–1.4)
ERYTHROCYTE [DISTWIDTH] IN BLOOD BY AUTOMATED COUNT: 51.7 FL (ref 35.9–50)
GLOBULIN SER CALC-MCNC: 3 G/DL (ref 1.9–3.5)
GLUCOSE SERPL-MCNC: 90 MG/DL (ref 65–99)
HCT VFR BLD AUTO: 46.5 % (ref 37–47)
HGB BLD-MCNC: 15.2 G/DL (ref 12–16)
MCH RBC QN AUTO: 30.5 PG (ref 27–33)
MCHC RBC AUTO-ENTMCNC: 32.7 G/DL (ref 33.6–35)
MCV RBC AUTO: 93.2 FL (ref 81.4–97.8)
PLATELET # BLD AUTO: 295 K/UL (ref 164–446)
PMV BLD AUTO: 10.5 FL (ref 9–12.9)
POTASSIUM SERPL-SCNC: 3.7 MMOL/L (ref 3.6–5.5)
PROT SERPL-MCNC: 7.4 G/DL (ref 6–8.2)
RBC # BLD AUTO: 4.99 M/UL (ref 4.2–5.4)
SODIUM SERPL-SCNC: 142 MMOL/L (ref 135–145)
WBC # BLD AUTO: 7.9 K/UL (ref 4.8–10.8)

## 2020-03-10 PROCEDURE — 80053 COMPREHEN METABOLIC PANEL: CPT

## 2020-03-10 PROCEDURE — 84443 ASSAY THYROID STIM HORMONE: CPT

## 2020-03-10 PROCEDURE — 36415 COLL VENOUS BLD VENIPUNCTURE: CPT

## 2020-03-10 PROCEDURE — 85027 COMPLETE CBC AUTOMATED: CPT

## 2020-03-12 ENCOUNTER — TELEPHONE (OUTPATIENT)
Dept: MEDICAL GROUP | Facility: PHYSICIAN GROUP | Age: 85
End: 2020-03-12

## 2020-03-12 LAB — TSH SERPL DL<=0.005 MIU/L-ACNC: 2.24 UIU/ML (ref 0.38–5.33)

## 2020-03-12 NOTE — TELEPHONE ENCOUNTER
----- Message from Otto Odom M.D. sent at 3/12/2020 11:49 AM PDT -----  Please call patient to let know:    Thyroid results are also normal.    Thank you,    Otto Odom MD  Family Medicine Physician  [facility]  [phone]

## 2020-03-12 NOTE — TELEPHONE ENCOUNTER
Phone Number Called: [phone] (home)     Call outcome: Spoke to Patients      Message: notified of results. No questions

## 2020-03-12 NOTE — TELEPHONE ENCOUNTER
Phone Number Called: [phone] (home)     Call outcome: Spoke to patient regarding message below.    Message: Spoke to Pt  Chilo. Informed of results,voiced understanding.    ----- Message from Otto Odom M.D. sent at 3/12/2020  9:09 AM PDT -----  Please call patient to let know:    Her electrolytes are normal and liver is working well.    Kidney function is stable.  The rest of her labs are stable.  Thank you,    Otto Odom MD  Family Medicine Physician  [facility]  [phone]

## 2020-04-20 ENCOUNTER — TELEPHONE (OUTPATIENT)
Dept: MEDICAL GROUP | Facility: PHYSICIAN GROUP | Age: 85
End: 2020-04-20

## 2020-04-20 DIAGNOSIS — M81.0 AGE-RELATED OSTEOPOROSIS WITHOUT CURRENT PATHOLOGICAL FRACTURE: ICD-10-CM

## 2020-04-20 RX ORDER — ALENDRONATE SODIUM 70 MG/1
70 TABLET ORAL
Qty: 4 TAB | Refills: 11 | Status: SHIPPED | OUTPATIENT
Start: 2020-04-20 | End: 2022-04-04

## 2020-04-20 RX ORDER — FLUOXETINE HYDROCHLORIDE 40 MG/1
40 CAPSULE ORAL DAILY
Qty: 90 CAP | Refills: 3 | Status: SHIPPED | OUTPATIENT
Start: 2020-04-20 | End: 2021-05-25 | Stop reason: SDUPTHER

## 2020-04-20 NOTE — TELEPHONE ENCOUNTER
1. Caller Name: Pt's  Peter                      Call Back Number: [phone] (home)       How would the patient prefer to be contacted with a response: Phone call do NOT leave a detailed message    Pt's  called requesting a call back.

## 2020-04-20 NOTE — TELEPHONE ENCOUNTER
Phone Number Called: [phone] (home)     Call outcome: Did not leave a detailed message. Requested patient to call back.

## 2020-05-07 ENCOUNTER — APPOINTMENT (OUTPATIENT)
Dept: RADIOLOGY | Facility: MEDICAL CENTER | Age: 85
End: 2020-05-07
Attending: EMERGENCY MEDICINE
Payer: MEDICARE

## 2020-05-07 ENCOUNTER — HOSPITAL ENCOUNTER (EMERGENCY)
Facility: MEDICAL CENTER | Age: 85
End: 2020-05-07
Attending: EMERGENCY MEDICINE
Payer: MEDICARE

## 2020-05-07 ENCOUNTER — PATIENT OUTREACH (OUTPATIENT)
Dept: HEALTH INFORMATION MANAGEMENT | Facility: OTHER | Age: 85
End: 2020-05-07

## 2020-05-07 VITALS
HEART RATE: 62 BPM | BODY MASS INDEX: 27.12 KG/M2 | WEIGHT: 158 LBS | SYSTOLIC BLOOD PRESSURE: 170 MMHG | TEMPERATURE: 98.4 F | OXYGEN SATURATION: 95 % | DIASTOLIC BLOOD PRESSURE: 93 MMHG | RESPIRATION RATE: 22 BRPM

## 2020-05-07 DIAGNOSIS — I16.0 HYPERTENSIVE URGENCY: ICD-10-CM

## 2020-05-07 LAB
ANION GAP SERPL CALC-SCNC: 12 MMOL/L (ref 7–16)
BASOPHILS # BLD AUTO: 0.7 % (ref 0–1.8)
BASOPHILS # BLD: 0.05 K/UL (ref 0–0.12)
BUN SERPL-MCNC: 15 MG/DL (ref 8–22)
CALCIUM SERPL-MCNC: 10 MG/DL (ref 8.5–10.5)
CHLORIDE SERPL-SCNC: 103 MMOL/L (ref 96–112)
CO2 SERPL-SCNC: 25 MMOL/L (ref 20–33)
CREAT SERPL-MCNC: 0.81 MG/DL (ref 0.5–1.4)
EKG IMPRESSION: NORMAL
EOSINOPHIL # BLD AUTO: 0.16 K/UL (ref 0–0.51)
EOSINOPHIL NFR BLD: 2.1 % (ref 0–6.9)
ERYTHROCYTE [DISTWIDTH] IN BLOOD BY AUTOMATED COUNT: 50.4 FL (ref 35.9–50)
GLUCOSE SERPL-MCNC: 100 MG/DL (ref 65–99)
HCT VFR BLD AUTO: 44.2 % (ref 37–47)
HGB BLD-MCNC: 14.2 G/DL (ref 12–16)
IMM GRANULOCYTES # BLD AUTO: 0.02 K/UL (ref 0–0.11)
IMM GRANULOCYTES NFR BLD AUTO: 0.3 % (ref 0–0.9)
LYMPHOCYTES # BLD AUTO: 1.54 K/UL (ref 1–4.8)
LYMPHOCYTES NFR BLD: 20.6 % (ref 22–41)
MCH RBC QN AUTO: 30.1 PG (ref 27–33)
MCHC RBC AUTO-ENTMCNC: 32.1 G/DL (ref 33.6–35)
MCV RBC AUTO: 93.6 FL (ref 81.4–97.8)
MONOCYTES # BLD AUTO: 0.68 K/UL (ref 0–0.85)
MONOCYTES NFR BLD AUTO: 9.1 % (ref 0–13.4)
NEUTROPHILS # BLD AUTO: 5.03 K/UL (ref 2–7.15)
NEUTROPHILS NFR BLD: 67.2 % (ref 44–72)
NRBC # BLD AUTO: 0 K/UL
NRBC BLD-RTO: 0 /100 WBC
NT-PROBNP SERPL IA-MCNC: 1284 PG/ML (ref 0–125)
PLATELET # BLD AUTO: 283 K/UL (ref 164–446)
PMV BLD AUTO: 10.6 FL (ref 9–12.9)
POTASSIUM SERPL-SCNC: 3.9 MMOL/L (ref 3.6–5.5)
RBC # BLD AUTO: 4.72 M/UL (ref 4.2–5.4)
SODIUM SERPL-SCNC: 140 MMOL/L (ref 135–145)
TROPONIN T SERPL-MCNC: 10 NG/L (ref 6–19)
WBC # BLD AUTO: 7.5 K/UL (ref 4.8–10.8)

## 2020-05-07 PROCEDURE — 83880 ASSAY OF NATRIURETIC PEPTIDE: CPT

## 2020-05-07 PROCEDURE — 84484 ASSAY OF TROPONIN QUANT: CPT

## 2020-05-07 PROCEDURE — 80048 BASIC METABOLIC PNL TOTAL CA: CPT

## 2020-05-07 PROCEDURE — 99284 EMERGENCY DEPT VISIT MOD MDM: CPT

## 2020-05-07 PROCEDURE — 71045 X-RAY EXAM CHEST 1 VIEW: CPT

## 2020-05-07 PROCEDURE — 700102 HCHG RX REV CODE 250 W/ 637 OVERRIDE(OP): Performed by: EMERGENCY MEDICINE

## 2020-05-07 PROCEDURE — A9270 NON-COVERED ITEM OR SERVICE: HCPCS | Performed by: EMERGENCY MEDICINE

## 2020-05-07 PROCEDURE — 85025 COMPLETE CBC W/AUTO DIFF WBC: CPT

## 2020-05-07 PROCEDURE — 93005 ELECTROCARDIOGRAM TRACING: CPT | Performed by: EMERGENCY MEDICINE

## 2020-05-07 RX ORDER — ENALAPRIL MALEATE 5 MG/1
5 TABLET ORAL ONCE
Status: COMPLETED | OUTPATIENT
Start: 2020-05-07 | End: 2020-05-07

## 2020-05-07 RX ORDER — LOSARTAN POTASSIUM 100 MG/1
100 TABLET ORAL DAILY
Qty: 90 TAB | Refills: 0 | Status: SHIPPED | OUTPATIENT
Start: 2020-05-07 | End: 2020-06-06

## 2020-05-07 RX ADMIN — ENALAPRIL MALEATE 5 MG: 5 TABLET ORAL at 11:16

## 2020-05-07 ASSESSMENT — FIBROSIS 4 INDEX: FIB4 SCORE: 1.65

## 2020-05-07 NOTE — PROGRESS NOTES
Mbr spouse called stating his wife has been having high BP and wanted to know what to do. Adv our active symptom protocol states to take pt to ER. Mbr spouse asked where is it located adv he can go to [facility] on [address] [facility] or the one in [facility]. Mbr spouse understood and agreed to take pt to ER

## 2020-05-07 NOTE — ED PROVIDER NOTES
ED Provider Note    CHIEF COMPLAINT  Chief Complaint   Patient presents with   • Hypertension       HPI  Linda Casas is a 86 y.o. female who presents complaining of her blood pressure being out of control over the last week.  Patient states she has been taking her blood pressure medication without any results.  Patient denies headache chest pain numbness tingling or weakness the extremities or difficulty breathing.  Patient states her blood pressures been running 200 systolic at home    REVIEW OF SYSTEMS  See HPI for further details.  No fever no chills.  No cough or cold symptoms.  No vomiting or diarrhea.  All other systems are negative.    PAST MEDICAL HISTORY  Past Medical History:   Diagnosis Date   • Anxiety 2010   • Cataract    • High cholesterol    • Hypertension     history of but no treatment   • OSTEOPOROSIS 2010   • Pain 03/04/14    6/10=L knee   • Snoring     no sleep study   • Urinary incontinence     only at times at night when sleeping       FAMILY HISTORY  Family History   Problem Relation Age of Onset   • No Known Problems Father    • No Known Problems Mother        SOCIAL HISTORY  Social History     Socioeconomic History   • Marital status:      Spouse name: Not on file   • Number of children: Not on file   • Years of education: Not on file   • Highest education level: Not on file   Occupational History   • Not on file   Social Needs   • Financial resource strain: Not on file   • Food insecurity     Worry: Not on file     Inability: Not on file   • Transportation needs     Medical: Not on file     Non-medical: Not on file   Tobacco Use   • Smoking status: Former Smoker     Packs/day: 2.00     Years: 3.00     Pack years: 6.00     Last attempt to quit: 1970     Years since quittin.3   • Smokeless tobacco: Never Used   Substance and Sexual Activity   • Alcohol use: Not Currently     Alcohol/week: 0.0 oz     Comment: rarely    • Drug use: Never   • Sexual  activity: Yes     Partners: Male   Lifestyle   • Physical activity     Days per week: Not on file     Minutes per session: Not on file   • Stress: Not on file   Relationships   • Social connections     Talks on phone: Not on file     Gets together: Not on file     Attends [employer] service: Not on file     Active member of club or organization: Not on file     Attends meetings of clubs or organizations: Not on file     Relationship status: Not on file   • Intimate partner violence     Fear of current or ex partner: Not on file     Emotionally abused: Not on file     Physically abused: Not on file     Forced sexual activity: Not on file   Other Topics Concern   • Not on file   Social History Narrative   • Not on file       SURGICAL HISTORY  Past Surgical History:   Procedure Laterality Date   • TRIGGER FINGER RELEASE Left 5/29/2018    Procedure: TRIGGER FINGER RELEASE-  MIDDLE;  Surgeon: Frandy Liz M.D.;  Location: SURGERY [facility];  Service: Orthopedics   • JULIAN BY LAPAROSCOPY  5/23/2016    Procedure: JULIAN BY LAPAROSCOPY;  Surgeon: Adan Kearns M.D.;  Location: SURGERY [facility];  Service:    • KNEE ARTHROSCOPY  3/11/2014    Performed by Bonilla Valera M.D. at SURGERY [facility]   • BLADDER SUSPENSION  2001   • HYSTERECTOMY, TOTAL ABDOMINAL  2000   • COLON RESECTION      partial; no CA       CURRENT MEDICATIONS  Home Medications     Reviewed by Iram King R.N. (Registered Nurse) on 05/07/20 at 0937  Med List Status: <None>   Medication Last Dose Status   alendronate (FOSAMAX) 70 MG Tab  Active   Apoaequorin (PREVAGEN EXTRA STRENGTH PO)  Active   Cholecalciferol (VITAMIN D-3 PO)  Active   Cyanocobalamin (VITAMIN B-12) 1000 MCG Tab  Active   fluoxetine (PROZAC) 40 MG capsule  Active   losartan (COZAAR) 100 MG Tab  Active   pravastatin (PRAVACHOL) 10 MG Tab  Active   vitamin e (VITAMIN E) 400 UNIT Cap  Active                ALLERGIES  Allergies   Allergen Reactions   • Ampicillin  "Rash     Feels \"rotten\"        PHYSICAL EXAM  VITAL SIGNS: BP (!) 166/98   Pulse 72   Temp 36.3 °C (97.3 °F) (Temporal)   Resp 14   Wt 71.7 kg (158 lb)   SpO2 94%   BMI 27.12 kg/m²   Constitutional: Well developed, Well nourished, No acute distress, Non-toxic appearance.   HENT: Normocephalic, Atraumatic, Bilateral external ears normal, Oropharynx moist, No oral exudates, Nose normal.   Eyes: CIERRA, EOMI, Conjunctiva normal, No discharge.   Neck: Normal range of motion, No tenderness, Supple, No stridor. No nuchal rigidity  Lymphatic: No lymphadenopathy noted.   Cardiovascular: Regular rate and rhythm  Thorax & Lungs: Clear without wheezing  Abdomen: Bowel sounds normal, Soft, No tenderness, No masses, No pulsatile masses.   Skin: Warm, Dry, No erythema, No rash.   Back: No tenderness, No CVA tenderness.   Extremities: No edema, No tenderness, No cyanosis, No clubbing. Dorsalis pedis pulses 2+ equal bilaterally. Radial pulses 2+ equal bilaterally.  Neurologic: Alert & oriented x 3, Normal motor function, Normal sensory function, No focal deficits noted.     EKG  Sinus rhythm at a rate of 66.  Normal P waves.  Abnormal QRS with left bundle branch block.  Abnormal ST segments and T waves.  Abnormal EKG showing a left bundle branch block without acute change.    RADIOLOGY/PROCEDURES  DX-CHEST-PORTABLE (1 VIEW)   Final Result      Cardiomegaly.          Results for orders placed or performed during the hospital encounter of 05/07/20   CBC WITH DIFFERENTIAL   Result Value Ref Range    WBC 7.5 4.8 - 10.8 K/uL    RBC 4.72 4.20 - 5.40 M/uL    Hemoglobin 14.2 12.0 - 16.0 g/dL    Hematocrit 44.2 37.0 - 47.0 %    MCV 93.6 81.4 - 97.8 fL    MCH 30.1 27.0 - 33.0 pg    MCHC 32.1 (L) 33.6 - 35.0 g/dL    RDW 50.4 (H) 35.9 - 50.0 fL    Platelet Count 283 164 - 446 K/uL    MPV 10.6 9.0 - 12.9 fL    Neutrophils-Polys 67.20 44.00 - 72.00 %    Lymphocytes 20.60 (L) 22.00 - 41.00 %    Monocytes 9.10 0.00 - 13.40 %    Eosinophils " 2.10 0.00 - 6.90 %    Basophils 0.70 0.00 - 1.80 %    Immature Granulocytes 0.30 0.00 - 0.90 %    Nucleated RBC 0.00 /100 WBC    Neutrophils (Absolute) 5.03 2.00 - 7.15 K/uL    Lymphs (Absolute) 1.54 1.00 - 4.80 K/uL    Monos (Absolute) 0.68 0.00 - 0.85 K/uL    Eos (Absolute) 0.16 0.00 - 0.51 K/uL    Baso (Absolute) 0.05 0.00 - 0.12 K/uL    Immature Granulocytes (abs) 0.02 0.00 - 0.11 K/uL    NRBC (Absolute) 0.00 K/uL   BASIC METABOLIC PANEL   Result Value Ref Range    Sodium 140 135 - 145 mmol/L    Potassium 3.9 3.6 - 5.5 mmol/L    Chloride 103 96 - 112 mmol/L    Co2 25 20 - 33 mmol/L    Glucose 100 (H) 65 - 99 mg/dL    Bun 15 8 - 22 mg/dL    Creatinine 0.81 0.50 - 1.40 mg/dL    Calcium 10.0 8.5 - 10.5 mg/dL    Anion Gap 12.0 7.0 - 16.0   TROPONIN   Result Value Ref Range    Troponin T 10 6 - 19 ng/L   proBrain Natriuretic Peptide, NT   Result Value Ref Range    NT-proBNP 1284 (H) 0 - 125 pg/mL   ESTIMATED GFR   Result Value Ref Range    GFR If African American >60 >60 mL/min/1.73 m 2    GFR If Non African American >60 >60 mL/min/1.73 m 2   EKG   Result Value Ref Range    Report       [facility]    Test Date:  2020  Pt Name:    JAMES SWAN           Department: ER  MRN:        [id number]                      Room:       [facility]  Gender:     Female                       Technician: [id number]  :        [id number]                   Requested By:ER TRIAGE PROTOCOL  Order #:    [id number]                    Reading MD:    Measurements  Intervals                                Axis  Rate:       66                           P:          33  AK:         176                          QRS:        -66  QRSD:       136                          T:          32  QT:         456  QTc:        478    Interpretive Statements  SINUS RHYTHM  LEFT BUNDLE BRANCH BLOCK  Compared to ECG 2018 11:11:11  No significant changes         COURSE & MEDICAL DECISION MAKING  Pertinent Labs & Imaging  studies reviewed. (See chart for details)  Patient has a mild elevation of her BNP.  But is otherwise asymptomatic not hypoxic and not short of breath.  I discussed this case with the cardiologist on call Dr. Kirkland who thought she should receive an outpatient echo and we will double her Cozaar for now.    Patient was discharged home in stable condition      FINAL IMPRESSION  1.  Hypertension  2.  Elevated BNP  3.      Please note that this dictation was created using voice recognition software. I have worked with consultants from the vendor as well as technical experts from [facility] to optimize the interface. I have made every reasonable attempt to correct obvious errors, but I expect that there are errors of grammar and possibly content that I did not discover before finalizing the note.      Electronically signed by: Jt Núñez M.D., 5/7/2020 10:35 AM

## 2020-05-07 NOTE — ED NOTES
Pt. States dizziness intermittently, pt denies chest pain, pt. In bed resting, will continue to monitor.

## 2020-05-07 NOTE — ED TRIAGE NOTES
Patient comes in with complaints of elevated BP, history of elevated bp for which she takes medications, no other complaints, no sob or CP.  Patient is very hard of hearing.

## 2020-05-07 NOTE — ED NOTES
Pt. Being discharged at this time, pt. Denies questions or needs, pt. Provided with discharge paperwork and printed prescriptions. Pt. Ambulates out of ED independently.

## 2020-05-13 ENCOUNTER — TELEPHONE (OUTPATIENT)
Dept: CARDIOLOGY | Facility: MEDICAL CENTER | Age: 85
End: 2020-05-13

## 2020-05-13 ENCOUNTER — OFFICE VISIT (OUTPATIENT)
Dept: MEDICAL GROUP | Facility: PHYSICIAN GROUP | Age: 85
End: 2020-05-13
Payer: MEDICARE

## 2020-05-13 ENCOUNTER — HOSPITAL ENCOUNTER (OUTPATIENT)
Dept: CARDIOLOGY | Facility: MEDICAL CENTER | Age: 85
End: 2020-05-13
Attending: NURSE PRACTITIONER
Payer: MEDICARE

## 2020-05-13 VITALS
SYSTOLIC BLOOD PRESSURE: 128 MMHG | WEIGHT: 151 LBS | BODY MASS INDEX: 25.78 KG/M2 | DIASTOLIC BLOOD PRESSURE: 88 MMHG | HEIGHT: 64 IN | RESPIRATION RATE: 18 BRPM | TEMPERATURE: 97.2 F | HEART RATE: 67 BPM | OXYGEN SATURATION: 88 %

## 2020-05-13 DIAGNOSIS — I51.7 CARDIOMEGALY: ICD-10-CM

## 2020-05-13 DIAGNOSIS — I10 HYPERTENSION GOAL BP (BLOOD PRESSURE) < 140/80: Chronic | ICD-10-CM

## 2020-05-13 LAB
LV EJECT FRACT  99904: 60
LV EJECT FRACT MOD 2C 99903: 73.34
LV EJECT FRACT MOD 4C 99902: 62.59
LV EJECT FRACT MOD BP 99901: 65.94

## 2020-05-13 PROCEDURE — 93306 TTE W/DOPPLER COMPLETE: CPT | Mod: 26 | Performed by: INTERNAL MEDICINE

## 2020-05-13 PROCEDURE — 93306 TTE W/DOPPLER COMPLETE: CPT

## 2020-05-13 PROCEDURE — 99214 OFFICE O/P EST MOD 30 MIN: CPT | Performed by: NURSE PRACTITIONER

## 2020-05-13 RX ORDER — AMLODIPINE BESYLATE 5 MG/1
5 TABLET ORAL DAILY
Qty: 90 TAB | Refills: 0 | Status: SHIPPED | OUTPATIENT
Start: 2020-05-13 | End: 2020-06-12

## 2020-05-13 ASSESSMENT — FIBROSIS 4 INDEX: FIB4 SCORE: 1.72

## 2020-05-13 ASSESSMENT — PATIENT HEALTH QUESTIONNAIRE - PHQ9: CLINICAL INTERPRETATION OF PHQ2 SCORE: 0

## 2020-05-13 NOTE — PROGRESS NOTES
Chief Complaint   Patient presents with   • Dizziness     x2 weeks ago, dizziness, pt was seen in the ER, BP issues   • Medication Management     jonathan does not seem to be working       HISTORY OF PRESENT ILLNESS: Patient is a 86 y.o. female established patient who presents today to discuss HTN.    Hypertension goal BP (blood pressure) < 140/80  Chronic in nature. Was seen in the hospital 5/7/2020 related to uncontrolled blood pressure. States BP continues to be uncontrolled. States that she has been taking 100mg Losartan in the morning and 50mg in the evening.  Patient states that even adding 50 mg in the evening she is noticing blood pressure is 160/100 in the morning prior to her first dose of blood pressure medicine at over 500.  Patient states that blood pressure will start to increase in the afternoons and evening time which is why they added the 50 mg, but states they are still seeing elevated blood pressures.  Patient's blood pressure has been consistently 150s to 160s over 90s 200s despite taking medication.  Patient states that when her blood pressure is elevated she is having lightheaded dizziness.  States that she does not currently have any dizziness.  Patient states that she has not felt like she would fall but her  states that she has fallen 4 times recently.  States that she has not injured herself with falls.  Denies any vision changes, headaches, palpitations or leg swelling.  In the hospital there was some noted abnormality on her EKG as well as mild elevation of BNP.  Chest x-ray did show some cardiomegaly.  Outpatient echo was recommended.   states that he is concerned she has a blockage and states that he would like her seen by cardiology.       Patient Active Problem List    Diagnosis Date Noted   • Other fatigue 03/02/2020   • At high risk for injury related to fall 11/26/2019   • Trigger finger, right middle finger 05/29/2018   • Cholecystolithiasis 05/23/2016   • Tear of  medial cartilage or meniscus of knee, current 2014   • Hypertension goal BP (blood pressure) < 140/80 2012   • Osteopenia 2010   • Anxiety 2010       Allergies:Ampicillin    Current Outpatient Medications   Medication Sig Dispense Refill   • amLODIPine (NORVASC) 5 MG Tab Take 1 Tab by mouth every day. 90 Tab 0   • losartan (COZAAR) 100 MG Tab Take 1 Tab by mouth every day for 30 days. 90 Tab 0   • alendronate (FOSAMAX) 70 MG Tab Take 1 Tab by mouth every 7 days. 4 Tab 11   • fluoxetine (PROZAC) 40 MG capsule Take 1 Cap by mouth every day. 90 Cap 3   • Apoaequorin (PREVAGEN EXTRA STRENGTH PO) Take 1 Cap by mouth every day.     • pravastatin (PRAVACHOL) 10 MG Tab Take 10 mg by mouth every evening.     • Cyanocobalamin (VITAMIN B-12) 1000 MCG Tab Take 1,000 mcg by mouth every day.     • Cholecalciferol (VITAMIN D-3 PO) Take  by mouth every day. Takes two     • vitamin e (VITAMIN E) 400 UNIT Cap Take 400 Units by mouth every day.       No current facility-administered medications for this visit.        Social History     Tobacco Use   • Smoking status: Former Smoker     Packs/day: 2.00     Years: 3.00     Pack years: 6.00     Last attempt to quit: 1970     Years since quittin.3   • Smokeless tobacco: Never Used   Substance Use Topics   • Alcohol use: Not Currently     Alcohol/week: 0.0 oz     Comment: rarely    • Drug use: Never       Family Status   Relation Name Status   • Fa  (Not Specified)        unknown   • Mo   at age 84     Family History   Problem Relation Age of Onset   • No Known Problems Father    • No Known Problems Mother        Review of Systems:   Constitutional:  Negative for fever, chills, weight loss and malaise/fatigue.   HEENT:  Negative for ear pain, nosebleeds, congestion, sore throat and neck pain.    Eyes:  Negative for blurred vision.   Respiratory:  Negative for cough, sputum production, shortness of breath and wheezing.    Cardiovascular:  Negative for  "chest pain, palpitations, orthopnea and leg swelling.   Gastrointestinal:  Negative for heartburn, nausea, vomiting and abdominal pain.   Genitourinary:  Negative for dysuria, urgency and frequency.   Musculoskeletal:  Negative for myalgias, back pain and joint pain.   Skin:  Negative for rash and itching.   Neurological:  Negative for dizziness, tingling, tremors, sensory change, focal weakness and headaches.   Endo/Heme/Allergies:  Does not bruise/bleed easily.   Psychiatric/Behavioral:  Negative for depression, suicidal ideas and memory loss.  The patient is not nervous/anxious and does not have insomnia.    All other systems reviewed and are negative except as in HPI.    Exam:  /88   Pulse 67   Temp 36.2 °C (97.2 °F)   Resp 18   Ht 1.626 m (5' 4\")   Wt 68.5 kg (151 lb)   SpO2 88%   General:  Normal appearing. No distress.  Pulmonary:  Clear to ausculation.  Normal effort. No rales, ronchi, or wheezing.  Cardiovascular:  Regular rate and rhythm without murmur. Carotid and radial pulses are intact and equal bilaterally.  Abdomen:  Soft, nontender, nondistended. Normal bowel sounds. Liver and spleen are not palpable  Neurologic:  Grossly nonfocal  Lymph:  No cervical, supraclavicular or axillary lymph nodes are palpable  Skin:  Warm and dry.  No obvious lesions.  Musculoskeletal:  Normal gait. No extremity cyanosis, clubbing, or edema.  Psych:  Normal mood and affect. Alert and oriented x3. Judgment and insight is normal.      PLAN:    1. Cardiomegaly  Plan at this time is for patient to complete echocardiogram as recommended in the hospital.  Patient denies any chest pain, shortness of breath and blood pressure is within normal limits in clinic today.  Note that she has been contacted by cardiology and echo was completed today,  - EC-ECHOCARDIOGRAM COMPLETE W/O CONT; Future  - REFERRAL TO CARDIOLOGY    2. Hypertension goal BP (blood pressure) < 140/80  Considering patient report of elevated blood " pressure at home especially in the evening, early morning plan at this time is for patient to take losartan 100 mg in the a.m. discussed with patient that greater than 100 mg is above recommended dosing for losartan.  Plan to add amlodipine 5 mg in the evening and monitor blood pressure closely.  Discussed with patient other options for blood pressure management including hydrochlorothiazide which patient declines at this time related to concern with frequent urination.  - REFERRAL TO CARDIOLOGY  - amLODIPine (NORVASC) 5 MG Tab; Take 1 Tab by mouth every day.  Dispense: 90 Tab; Refill: 0    Follow-up with Dr. Odom in 2 weeks. Patient is encouraged to be seen in the emergency room for chest pain, palpitations, shortness of breath, dizziness, severe abdominal pain or other concerning symptoms.    Please note that this dictation was created using voice recognition software. I have made every reasonable attempt to correct obvious errors, but I expect that there are errors of grammar and possibly content that I did not discover before finalizing the note.    Assessment/Plan:  1. Cardiomegaly  EC-ECHOCARDIOGRAM COMPLETE W/O CONT    REFERRAL TO CARDIOLOGY   2. Hypertension goal BP (blood pressure) < 140/80  REFERRAL TO CARDIOLOGY    amLODIPine (NORVASC) 5 MG Tab          I have placed the below orders and discussed them with an approved delegating provider. The MA is performing the below orders under the direction of Dr. Lin.

## 2020-05-13 NOTE — TELEPHONE ENCOUNTER
Called patient to see if he has followed with a cardiologist in the past to get records prior to new patient appt with [facility]. Spoke with , who states she has not seen a cardiologist in the past, nor has she had any cardiac testing outside of [facility].

## 2020-05-13 NOTE — ASSESSMENT & PLAN NOTE
Chronic in nature. Was seen in the hospital 5/7/2020 related to uncontrolled blood pressure. States BP continues to be uncontrolled. States that she has been taking 100mg Losartan in the morning and 50mg in the evening.  Patient states that even adding 50 mg in the evening she is noticing blood pressure is 160/100 in the morning prior to her first dose of blood pressure medicine at over 500.  Patient states that blood pressure will start to increase in the afternoons and evening time which is why they added the 50 mg, but states they are still seeing elevated blood pressures.  Patient's blood pressure has been consistently 150s to 160s over 90s 200s despite taking medication.  Patient states that when her blood pressure is elevated she is having lightheaded dizziness.  States that she does not currently have any dizziness.  Patient states that she has not felt like she would fall but her  states that she has fallen 4 times recently.  States that she has not injured herself with falls.  Denies any vision changes, headaches, palpitations or leg swelling.  In the hospital there was some noted abnormality on her EKG as well as mild elevation of BNP.  Chest x-ray did show some cardiomegaly.  Outpatient echo was recommended.   states that he is concerned she has a blockage and states that he would like her seen by cardiology.

## 2020-05-15 ENCOUNTER — OFFICE VISIT (OUTPATIENT)
Dept: CARDIOLOGY | Facility: MEDICAL CENTER | Age: 85
End: 2020-05-15
Payer: MEDICARE

## 2020-05-15 VITALS
BODY MASS INDEX: 25.63 KG/M2 | WEIGHT: 150.13 LBS | DIASTOLIC BLOOD PRESSURE: 80 MMHG | SYSTOLIC BLOOD PRESSURE: 136 MMHG | HEIGHT: 64 IN | OXYGEN SATURATION: 93 % | HEART RATE: 64 BPM

## 2020-05-15 DIAGNOSIS — R42 VERTIGO: ICD-10-CM

## 2020-05-15 DIAGNOSIS — I10 HYPERTENSION, ESSENTIAL: ICD-10-CM

## 2020-05-15 PROCEDURE — 99204 OFFICE O/P NEW MOD 45 MIN: CPT | Performed by: INTERNAL MEDICINE

## 2020-05-15 ASSESSMENT — FIBROSIS 4 INDEX: FIB4 SCORE: 1.72

## 2020-05-15 NOTE — PROGRESS NOTES
CARDIOLOGY NEW PATIENT CONSULTATION    PCP: Otto Odom M.D.    1. Hypertension, essential  Poorly controlled.  Amlodipine initiated 2 days ago with some improvement in pressure already.  Recommended continuing home blood pressure monitoring.  If blood pressure remains greater than 150 in 48 hours she will increase amlodipine to 10 mg daily.  Continue losartan.  Call with blood pressure results in 7 to 10 days    2. Vertigo  At least moderate intensity of vertigo associated with head movements.  No signs of cerebellar disease on examination today.  Suspect peripheral vertigo.  Recommended expectant management for the time being.  If symptoms persist by the time she sees Dr. Odom in follow-up it might be reasonable to pursue additional diagnostics at that time.    3.  Valvular heart disease mild aortic and mitral insufficiency  - Can consider echocardiograms every few years depending on her overall health status    Follow up with myself in 4 weeks    Chief Complaint   Patient presents with   • Hypertension   • Cardiomegaly   • Dizziness       History: Linda Casas is a 86 y.o. female with a past medical history of hypertension and hyperlipidemia presenting for consultation regarding high blood pressure and vertigo.  Symptoms have been present for the last several days and resulted in a fall and ER trip. She has been found to have high blood pressure but no other pathology on limited evaluation. Amlodipine 5 mg daily was added to losartan 100 mg recently and BP has declined from 180s to 140s. She continue to experience episodic moderate intensity vertigo- most pronounced with position changes and head movements. She is monitoring her BP twice daily and during acute symptoms. She gardens daily and has no angina, dyspnea, edema or near syncope.     ROS:  All other systems reviewed and negative except as per the HPI    PE:  /80 (BP Location: Left arm, Patient Position: Sitting, BP  "Cuff Size: Adult)   Pulse 64   Ht 1.626 m (5' 4\")   Wt 68.1 kg (150 lb 2.1 oz)   SpO2 93%   BMI 25.77 kg/m²   GEN: Well appearing  HEENT: Symmetric face. Anicteric sclerae. Moist mucus membranes  NECK: No JVD. No lymphadenopathy  CARDIAC: Normal PMI, regular, normal S1, S2.  VASCULATURE: Normal carotid amplitude without bruit.   RESP: Clear to auscultation bilaterally  ABD: Soft, non-tender, non-distended  EXT: No edema, no clubbing or cyanosis  SKIN: Warm and dry  NEURO: No gross deficits  PSYCH: Appropriate affect, participates in conversation    Past Medical History:   Diagnosis Date   • Anxiety 9/14/2010   • Cataract    • High cholesterol    • Hypertension     history of but no treatment   • OSTEOPOROSIS 9/14/2010   • Pain 03/04/14    6/10=L knee   • Snoring     no sleep study   • Urinary incontinence     only at times at night when sleeping     Past Surgical History:   Procedure Laterality Date   • TRIGGER FINGER RELEASE Left 5/29/2018    Procedure: TRIGGER FINGER RELEASE-  MIDDLE;  Surgeon: Frandy Liz M.D.;  Location: SURGERY [facility];  Service: Orthopedics   • JULIAN BY LAPAROSCOPY  5/23/2016    Procedure: JULIAN BY LAPAROSCOPY;  Surgeon: Adan Kearns M.D.;  Location: [facility];  Service:    • KNEE ARTHROSCOPY  3/11/2014    Performed by Bonilla Valera M.D. at SURGERY [facility] ORS   • BLADDER SUSPENSION  2001   • HYSTERECTOMY, TOTAL ABDOMINAL  2000   • COLON RESECTION      partial; no CA     Allergies   Allergen Reactions   • Ampicillin Rash     Feels \"rotten\"      Outpatient Encounter Medications as of 5/15/2020   Medication Sig Dispense Refill   • amLODIPine (NORVASC) 5 MG Tab Take 1 Tab by mouth every day. 90 Tab 0   • losartan (COZAAR) 100 MG Tab Take 1 Tab by mouth every day for 30 days. 90 Tab 0   • alendronate (FOSAMAX) 70 MG Tab Take 1 Tab by mouth every 7 days. 4 Tab 11   • fluoxetine (PROZAC) 40 MG capsule Take 1 Cap by mouth every day. 90 Cap 3   • Apoaequorin " (PREVAGEN EXTRA STRENGTH PO) Take 1 Cap by mouth every day.     • pravastatin (PRAVACHOL) 10 MG Tab Take 10 mg by mouth every evening.     • Cyanocobalamin (VITAMIN B-12) 1000 MCG Tab Take 1,000 mcg by mouth every day.     • Cholecalciferol (VITAMIN D-3 PO) Take  by mouth every day. Takes two     • vitamin e (VITAMIN E) 400 UNIT Cap Take 400 Units by mouth every day.       No facility-administered encounter medications on file as of 5/15/2020.      Social History     Socioeconomic History   • Marital status:      Spouse name: Not on file   • Number of children: Not on file   • Years of education: Not on file   • Highest education level: Not on file   Occupational History   • Not on file   Social Needs   • Financial resource strain: Not on file   • Food insecurity     Worry: Not on file     Inability: Not on file   • Transportation needs     Medical: Not on file     Non-medical: Not on file   Tobacco Use   • Smoking status: Former Smoker     Packs/day: 2.00     Years: 3.00     Pack years: 6.00     Last attempt to quit: 1970     Years since quittin.4   • Smokeless tobacco: Never Used   Substance and Sexual Activity   • Alcohol use: Not Currently     Alcohol/week: 0.0 oz     Comment: rarely    • Drug use: Never   • Sexual activity: Yes     Partners: Male   Lifestyle   • Physical activity     Days per week: Not on file     Minutes per session: Not on file   • Stress: Not on file   Relationships   • Social connections     Talks on phone: Not on file     Gets together: Not on file     Attends Taoist service: Not on file     Active member of club or organization: Not on file     Attends meetings of clubs or organizations: Not on file     Relationship status: Not on file   • Intimate partner violence     Fear of current or ex partner: Not on file     Emotionally abused: Not on file     Physically abused: Not on file     Forced sexual activity: Not on file   Other Topics Concern   • Not on file   Social  History Narrative   • Not on file         Family History   Problem Relation Age of Onset   • No Known Problems Father    • No Known Problems Mother          Studies  Lab Results   Component Value Date/Time    CHOLSTRLTOT 198 10/28/2019 08:28 AM     (H) 10/28/2019 08:28 AM    HDL 65 10/28/2019 08:28 AM    TRIGLYCERIDE 105 10/28/2019 08:28 AM       Lab Results   Component Value Date/Time    SODIUM 140 05/07/2020 10:40 AM    POTASSIUM 3.9 05/07/2020 10:40 AM    CHLORIDE 103 05/07/2020 10:40 AM    CO2 25 05/07/2020 10:40 AM    GLUCOSE 100 (H) 05/07/2020 10:40 AM    BUN 15 05/07/2020 10:40 AM    CREATININE 0.81 05/07/2020 10:40 AM    CREATININE 0.9 08/28/2006 11:45 AM     Lab Results   Component Value Date/Time    ALKPHOSPHAT 52 03/10/2020 09:03 AM    ASTSGOT 17 03/10/2020 09:03 AM    ALTSGPT 9 03/10/2020 09:03 AM    TBILIRUBIN 0.7 03/10/2020 09:03 AM        For this encounter I directly reviewed ECG tracings, Echo images and medical records I agree with the interpretations in the electronic health record

## 2020-05-15 NOTE — PATIENT INSTRUCTIONS
If BP remains > 150 after 2 days then increase to amlodipine to 10 mg daily    Call with BP in 7-10 days.

## 2020-05-18 ENCOUNTER — TELEPHONE (OUTPATIENT)
Dept: MEDICAL GROUP | Facility: PHYSICIAN GROUP | Age: 85
End: 2020-05-18

## 2020-05-18 NOTE — TELEPHONE ENCOUNTER
----- Message from RUTHIE Zhou sent at 5/15/2020  6:42 PM PDT -----  Please call pt and give lab results: I see this was discussed with Dr. Hernandez, overall it is reassuring. Please let myself or Dr. Hernandez know if you have questions or concerns.

## 2020-05-18 NOTE — TELEPHONE ENCOUNTER
Phone Number Called: [phone] (home)     Call outcome: Spoke to patient regarding message below.    Message: Spoke with patient's  and let them know providers message.

## 2020-05-19 ENCOUNTER — TELEPHONE (OUTPATIENT)
Dept: CARDIOLOGY | Facility: MEDICAL CENTER | Age: 85
End: 2020-05-19

## 2020-05-19 NOTE — TELEPHONE ENCOUNTER
MC/efrain    Pt's  Chilo calling to report pt's b/p was good 117/70, then all of a sudden b/p went up  177/100.   He is asking for med advice & discussion going forward.  Please call Chilo .

## 2020-05-19 NOTE — TELEPHONE ENCOUNTER
Returned call. Chilo explains that at night pt's BP has been 115-120/70-80, so he hasn't been giving her amlodipine 5mg in the evening because he was afraid BP would drop too low. But then in the mornings her BP is 170s/100. Advised that as long as SBP is greater than 100 in the evening and pt is feeling well, he should give amlodipine. He agrees and will notify us of further issues.

## 2020-05-20 NOTE — DOCUMENTATION QUERY
[facility]                                                                       Query Response Note      PATIENT:               JAMES SWAN  ACCT #:                  [id number]  MRN:                     [id number]  :                      1934  ADMIT DATE:       2020 9:44 AM  DISCH DATE:        2020 12:26 PM  RESPONDING  PROVIDER #:        [id number]           QUERY TEXT:    Your assistance is needed in determining the reason  for NATRIURETIC PEPTIDE (BNP) that was ordered.  This service is non-covered by the diagnoses documented in the medical record.      Can you please provide an additional diagnosis that describes the sign or symptom that  (prompted/initiated) the NATRIURETIC PEPTIDE (BNP).    NOTE:  If an appropriate answer is not listed below, please respond with a new note.        The patient's Clinical Indicators include:  NATRIURETIC PEPTIDE (BNP)  Options provided:   -- Other related diagnosis, Please specify diagnosis demonstrating medical necessity for lab/imaging/other test.)   -- Unable to determine      Query created by: Razia Keyes on 2020 8:24 AM    RESPONSE TEXT:    Unable to determine          Electronically signed by:  REINALDO MAE MD 2020 2:26 PM

## 2020-05-29 ENCOUNTER — TELEPHONE (OUTPATIENT)
Dept: CARDIOLOGY | Facility: MEDICAL CENTER | Age: 85
End: 2020-05-29

## 2020-05-29 NOTE — TELEPHONE ENCOUNTER
Returned call to pt's , Chilo. Chilo reports pt has had low BP all day. He says her lowest BP today has been 105/72. Chilo states that pt has been feeling dizzy all day, only when walking. He states she has drank 2 bottles of water today. Advised pt's  to encourage pt to drink more water, rise slowly when standing and continue to monitor BP. Advised Chilo that if pt's dizziness gets worse or she feels like she is going to pass out, she should go to the ER, otherwise her ongoing dizziness should be discussed at pt's PCP appt on 6/2/20.

## 2020-05-29 NOTE — TELEPHONE ENCOUNTER
MC/reza    Pt's  Chilo calling to report pt having dizziness and low b/p all day today.  B/P few minutes ago: 105/72 h/r 82.    Please call to advise about meds etc.[phone]

## 2020-06-02 ENCOUNTER — OFFICE VISIT (OUTPATIENT)
Dept: MEDICAL GROUP | Facility: PHYSICIAN GROUP | Age: 85
End: 2020-06-02
Payer: MEDICARE

## 2020-06-02 VITALS
WEIGHT: 152.8 LBS | HEART RATE: 80 BPM | BODY MASS INDEX: 26.09 KG/M2 | HEIGHT: 64 IN | DIASTOLIC BLOOD PRESSURE: 78 MMHG | SYSTOLIC BLOOD PRESSURE: 128 MMHG | OXYGEN SATURATION: 97 % | RESPIRATION RATE: 16 BRPM | TEMPERATURE: 97.7 F

## 2020-06-02 DIAGNOSIS — I10 ESSENTIAL HYPERTENSION: ICD-10-CM

## 2020-06-02 DIAGNOSIS — F41.9 ANXIETY: Chronic | ICD-10-CM

## 2020-06-02 PROCEDURE — 99213 OFFICE O/P EST LOW 20 MIN: CPT | Performed by: FAMILY MEDICINE

## 2020-06-02 ASSESSMENT — FIBROSIS 4 INDEX: FIB4 SCORE: 1.72

## 2020-06-02 NOTE — ASSESSMENT & PLAN NOTE
This is a chronic problem.  The patient has a hx of anxiety currently on prozac 40mg daily which does help.   Denies any recent stressful events in her life. However, the recent covid19 events has increased her stress levels.

## 2020-06-02 NOTE — NON-PROVIDER
Annual Health Assessment Questions:    1.  Are you currently engaging in any exercise or physical activity? Yes    2.  How would you describe your mood or emotional well-being today? good    3.  Have you had any falls in the last year? No    4.  Have you noticed any problems with your balance or had difficulty walking? No    5.  In the last six months have you experienced any leakage of urine? No    6. DPA/Advanced Directive: Patient has Advanced Directive on file.

## 2020-06-02 NOTE — PROGRESS NOTES
Subjective:     Chief Complaint   Patient presents with   • Follow-Up       HPI:   Linda presents today to discuss the following.    Essential hypertension  This is a chronic condition.  Onset: The patient has a history of hypertension recently poorly controlled.  She had to go to the emergency room early May 2020 for this.  Blood pressure at the time was persistently in the 160s systolic.    Modifying factors: Since then the patient was taking over 100 mg of losartan daily.  Typically she was doing 100 mg in the morning and 50 mg at night.  She met with 1 of our providers for follow-up and instead continue with losartan 100 mg daily and added amlodipine 5 mg at night.  She also met with cardiology for this and they decided to continue the current regimen.   Current symptoms: None. However, she has not taken her BP pills over the past 2 days.  Blood pressure in the office today is 128/78.   Associated symptoms: None.     Anxiety  This is a chronic problem.  The patient has a hx of anxiety currently on prozac 40mg daily which does help.   Denies any recent stressful events in her life. However, the recent covid19 events has increased her stress levels.       Past Medical History:   Diagnosis Date   • Anxiety 2010   • Cataract    • High cholesterol    • Hypertension     history of but no treatment   • OSTEOPOROSIS 2010   • Pain 14    6/10=L knee   • Snoring     no sleep study   • Urinary incontinence     only at times at night when sleeping       Social History     Tobacco Use   • Smoking status: Former Smoker     Packs/day: 2.00     Years: 3.00     Pack years: 6.00     Last attempt to quit: 1970     Years since quittin.4   • Smokeless tobacco: Never Used   Substance Use Topics   • Alcohol use: Not Currently     Alcohol/week: 0.0 oz     Comment: rarely    • Drug use: Never       Current Outpatient Medications Ordered in Epic   Medication Sig Dispense Refill   • amLODIPine (NORVASC) 5 MG Tab  "Take 1 Tab by mouth every day. 90 Tab 0   • losartan (COZAAR) 100 MG Tab Take 1 Tab by mouth every day for 30 days. 90 Tab 0   • alendronate (FOSAMAX) 70 MG Tab Take 1 Tab by mouth every 7 days. 4 Tab 11   • fluoxetine (PROZAC) 40 MG capsule Take 1 Cap by mouth every day. 90 Cap 3   • Apoaequorin (PREVAGEN EXTRA STRENGTH PO) Take 1 Cap by mouth every day.     • pravastatin (PRAVACHOL) 10 MG Tab Take 10 mg by mouth every evening.     • Cyanocobalamin (VITAMIN B-12) 1000 MCG Tab Take 1,000 mcg by mouth every day.     • Cholecalciferol (VITAMIN D-3 PO) Take  by mouth every day. Takes two     • vitamin e (VITAMIN E) 400 UNIT Cap Take 400 Units by mouth every day.       No current [facility]-ordered facility-administered medications on file.        Allergies:  Ampicillin    Health Maintenance: Completed    ROS:  Gen: no fevers/chills, no changes in weight  Eyes: no changes in vision  ENT: no sore throat, no hearing loss, no bloody nose  Pulm: no sob, no cough  CV: no chest pain, no palpitations      Objective:     Exam:  /78 (BP Location: Left arm, Patient Position: Sitting, BP Cuff Size: Adult)   Pulse 80   Temp 36.5 °C (97.7 °F) (Temporal)   Resp 16   Ht 1.626 m (5' 4\")   Wt 69.3 kg (152 lb 12.8 oz)   SpO2 97%   BMI 26.23 kg/m²  Body mass index is 26.23 kg/m².    Constitutional: Alert, no distress, well-groomed.  Skin: Warm, dry, good turgor, no rashes in visible areas.  Eye: Equal, round and reactive, conjunctiva clear, lids normal.  ENMT: Lips without lesions, good dentition, moist mucous membranes.  Neck: Trachea midline, no masses, no thyromegaly.  Respiratory: Unlabored respiratory effort, no cough.  MSK: Normal gait, moves all extremities.  Neuro: Grossly non-focal.   Psych: Alert and oriented x3, normal affect and mood.      Assessment & Plan:     86 y.o. female with the following -     1. Essential hypertension  This is a chronic issue.  The patient has a history of essential hypertension.  She was " previously taking losartan 50 mg daily.  However her blood pressure was going up into the 160s over 90s and had to go to the emergency room.  They went up on losartan to 100 mg daily and followed up with us in cardiology.  Blood pressure was still not controlled and instead she was placed on amlodipine 5 mg along with losartan 100 mg.  This caused a lot of dizziness.  Self discontinued her medicines approximately 2 days ago.  Blood pressure in the office today without medicine is 128/78.  As such, I would like her to continue to log her blood pressure at home.  Goal blood pressure for her should be less than 150/90 per JNC 8 guidelines.  If her blood pressure is starting to go beyond 150/90 the patient was instructed to restart losartan at 50 mg daily.  Notify me of this.  She will hold off taking amlodipine as this causes dizziness.  Return precautions given today.    2. Anxiety  Is a chronic issue.  The patient has a history of anxiety controlled with Prozac 40 mg daily.  She has had elevated blood pressure in the past and I wonder if there was an anxiety component with.  She mentions that COVID-19 has caused stress.  She continues to take Prozac 40 mg daily.      Return in about 3 months (around 9/2/2020) for HTN.    Please note that this dictation was created using voice recognition software. I have made every reasonable attempt to correct obvious errors, but I expect that there are errors of grammar and possibly content that I did not discover before finalizing the note.

## 2020-06-02 NOTE — ASSESSMENT & PLAN NOTE
This is a chronic condition.  Onset: The patient has a history of hypertension recently poorly controlled.  She had to go to the emergency room early May 2020 for this.  Blood pressure at the time was persistently in the 160s systolic.    Modifying factors: Since then the patient was taking over 100 mg of losartan daily.  Typically she was doing 100 mg in the morning and 50 mg at night.  She met with 1 of our providers for follow-up and instead continue with losartan 100 mg daily and added amlodipine 5 mg at night.  She also met with cardiology for this and they decided to continue the current regimen.   Current symptoms: None. However, she has not taken her BP pills over the past 2 days.  Blood pressure in the office today is 128/78.   Associated symptoms: None.

## 2020-06-12 ENCOUNTER — OFFICE VISIT (OUTPATIENT)
Dept: CARDIOLOGY | Facility: MEDICAL CENTER | Age: 85
End: 2020-06-12
Payer: MEDICARE

## 2020-06-12 VITALS
HEART RATE: 64 BPM | DIASTOLIC BLOOD PRESSURE: 62 MMHG | SYSTOLIC BLOOD PRESSURE: 114 MMHG | OXYGEN SATURATION: 93 % | HEIGHT: 64 IN | BODY MASS INDEX: 26.53 KG/M2 | WEIGHT: 155.42 LBS

## 2020-06-12 DIAGNOSIS — I10 HYPERTENSION, ESSENTIAL: ICD-10-CM

## 2020-06-12 DIAGNOSIS — E78.5 DYSLIPIDEMIA: ICD-10-CM

## 2020-06-12 PROCEDURE — 99213 OFFICE O/P EST LOW 20 MIN: CPT | Performed by: INTERNAL MEDICINE

## 2020-06-12 ASSESSMENT — FIBROSIS 4 INDEX: FIB4 SCORE: 1.72

## 2020-06-12 NOTE — PROGRESS NOTES
"CARDIOLOGY OUTPATIENT FOLLOWUP    PCP: Otto Odom M.D.    1. Hypertension, essential  Well-controlled daily medication, reasonable to continue using amlodipine as needed.  No longer having orthostasis.    2. Dyslipidemia  Reasonable control without medication.      Follow up with myself as needed      Chief Complaint   Patient presents with   • Hypertension       History: Linda Casas is a 86 y.o. female hypertension hyperlipidemia.  Last month when I saw her she was reporting vertigo sensations triggered by head movements.  Today she reports that these were actually more lightheadedness in quality.  They are associated with antihypertensive medication, particularly aggravated after the introduction of amlodipine.  She has been monitoring the home blood pressure twice a day and will taking amlodipine as needed.  With this approach she records typical blood pressure readings of 120/50 and is no longer having these lightheaded episodes.  She continues to attend regarding managing numerous xavier which she works diligently at protecting them from the wind transporting them in and out of her home.  She does all this without cardiovascular symptoms.    ROS:  All other systems reviewed and negative except as per the HPI    PE:  /62 (BP Location: Left arm, Patient Position: Sitting, BP Cuff Size: Adult)   Pulse 64   Ht 1.626 m (5' 4\")   Wt 70.5 kg (155 lb 6.8 oz)   SpO2 93%   BMI 26.68 kg/m²   Gen: Well appearing  HEENT: Symmetric face. Anicteric sclerae. Moist mucus membranes  NECK: No JVD. No lymphadenopathy  CARDIAC: Normal PMI, regular, normal S1, S2.  VASCULATURE: Normal carotid amplitude without bruit.   RESP: Clear to auscultation bilaterally  ABD: Soft, non-tender, non-distended  EXT: No edema, no clubbing or cyanosis  SKIN: Warm and dry  NEURO: No gross deficits  PSYCH: Appropriate affect, participates in conversation    Past Medical History:   Diagnosis Date   • Anxiety " "2010   • Cataract    • High cholesterol    • Hypertension     history of but no treatment   • OSTEOPOROSIS 2010   • Pain 14    6/10=L knee   • Snoring     no sleep study   • Urinary incontinence     only at times at night when sleeping     Allergies   Allergen Reactions   • Ampicillin Rash     Feels \"rotten\"      Outpatient Encounter Medications as of 2020   Medication Sig Dispense Refill   • aspirin 81 MG tablet Take 81 mg by mouth every day.     • alendronate (FOSAMAX) 70 MG Tab Take 1 Tab by mouth every 7 days. 4 Tab 11   • fluoxetine (PROZAC) 40 MG capsule Take 1 Cap by mouth every day. 90 Cap 3   • Apoaequorin (PREVAGEN EXTRA STRENGTH PO) Take 1 Cap by mouth every day.     • Cyanocobalamin (VITAMIN B-12) 1000 MCG Tab Take 1,000 mcg by mouth every day.     • Cholecalciferol (VITAMIN D-3 PO) Take  by mouth every day. Takes two     • vitamin e (VITAMIN E) 400 UNIT Cap Take 400 Units by mouth every day.     • [DISCONTINUED] amLODIPine (NORVASC) 5 MG Tab Take 1 Tab by mouth every day. 90 Tab 0   • [DISCONTINUED] pravastatin (PRAVACHOL) 10 MG Tab Take 10 mg by mouth every evening.       No facility-administered encounter medications on file as of 2020.      Social History     Socioeconomic History   • Marital status:      Spouse name: Not on file   • Number of children: Not on file   • Years of education: Not on file   • Highest education level: Not on file   Occupational History   • Not on file   Social Needs   • Financial resource strain: Not on file   • Food insecurity     Worry: Not on file     Inability: Not on file   • Transportation needs     Medical: Not on file     Non-medical: Not on file   Tobacco Use   • Smoking status: Former Smoker     Packs/day: 2.00     Years: 3.00     Pack years: 6.00     Last attempt to quit: 1970     Years since quittin.4   • Smokeless tobacco: Never Used   Substance and Sexual Activity   • Alcohol use: Not Currently     Alcohol/week: 0.0 " oz     Comment: rarely    • Drug use: Never   • Sexual activity: Yes     Partners: Male   Lifestyle   • Physical activity     Days per week: Not on file     Minutes per session: Not on file   • Stress: Not on file   Relationships   • Social connections     Talks on phone: Not on file     Gets together: Not on file     Attends Adventism service: Not on file     Active member of club or organization: Not on file     Attends meetings of clubs or organizations: Not on file     Relationship status: Not on file   • Intimate partner violence     Fear of current or ex partner: Not on file     Emotionally abused: Not on file     Physically abused: Not on file     Forced sexual activity: Not on file   Other Topics Concern   • Not on file   Social History Narrative   • Not on file       Studies  Lab Results   Component Value Date/Time    CHOLSTRLTOT 198 10/28/2019 08:28 AM     (H) 10/28/2019 08:28 AM    HDL 65 10/28/2019 08:28 AM    TRIGLYCERIDE 105 10/28/2019 08:28 AM       Lab Results   Component Value Date/Time    SODIUM 140 05/07/2020 10:40 AM    POTASSIUM 3.9 05/07/2020 10:40 AM    CHLORIDE 103 05/07/2020 10:40 AM    CO2 25 05/07/2020 10:40 AM    GLUCOSE 100 (H) 05/07/2020 10:40 AM    BUN 15 05/07/2020 10:40 AM    CREATININE 0.81 05/07/2020 10:40 AM    CREATININE 0.9 08/28/2006 11:45 AM     Lab Results   Component Value Date/Time    ALKPHOSPHAT 52 03/10/2020 09:03 AM    ASTSGOT 17 03/10/2020 09:03 AM    ALTSGPT 9 03/10/2020 09:03 AM    TBILIRUBIN 0.7 03/10/2020 09:03 AM        For this encounter I directly reviewed ECG tracings and medical records I agree with the interpretations in the electronic health record

## 2020-09-26 ENCOUNTER — IMMUNIZATION (OUTPATIENT)
Dept: SOCIAL WORK | Facility: CLINIC | Age: 85
End: 2020-09-26
Payer: MEDICARE

## 2020-09-26 DIAGNOSIS — Z23 NEED FOR VACCINATION: ICD-10-CM

## 2020-09-26 PROCEDURE — 90662 IIV NO PRSV INCREASED AG IM: CPT | Performed by: REGISTERED NURSE

## 2020-09-26 PROCEDURE — G0008 ADMIN INFLUENZA VIRUS VAC: HCPCS | Performed by: REGISTERED NURSE

## 2020-11-09 ENCOUNTER — OFFICE VISIT (OUTPATIENT)
Dept: MEDICAL GROUP | Facility: PHYSICIAN GROUP | Age: 85
End: 2020-11-09
Payer: MEDICARE

## 2020-11-09 ENCOUNTER — HOSPITAL ENCOUNTER (OUTPATIENT)
Facility: MEDICAL CENTER | Age: 85
End: 2020-11-09
Attending: FAMILY MEDICINE
Payer: MEDICARE

## 2020-11-09 VITALS
RESPIRATION RATE: 18 BRPM | OXYGEN SATURATION: 95 % | BODY MASS INDEX: 25.3 KG/M2 | HEIGHT: 64 IN | TEMPERATURE: 98.2 F | HEART RATE: 80 BPM | SYSTOLIC BLOOD PRESSURE: 128 MMHG | WEIGHT: 148.2 LBS | DIASTOLIC BLOOD PRESSURE: 84 MMHG

## 2020-11-09 DIAGNOSIS — R39.9 UTI SYMPTOMS: ICD-10-CM

## 2020-11-09 DIAGNOSIS — N30.00 ACUTE CYSTITIS WITHOUT HEMATURIA: ICD-10-CM

## 2020-11-09 LAB
APPEARANCE UR: CLEAR
BILIRUB UR STRIP-MCNC: NORMAL MG/DL
COLOR UR AUTO: YELLOW
GLUCOSE UR STRIP.AUTO-MCNC: NORMAL MG/DL
KETONES UR STRIP.AUTO-MCNC: NORMAL MG/DL
LEUKOCYTE ESTERASE UR QL STRIP.AUTO: NORMAL
NITRITE UR QL STRIP.AUTO: NORMAL
PH UR STRIP.AUTO: 5.5 [PH] (ref 5–8)
PROT UR QL STRIP: NORMAL MG/DL
RBC UR QL AUTO: NORMAL
SP GR UR STRIP.AUTO: 1.02
UROBILINOGEN UR STRIP-MCNC: 0.2 MG/DL

## 2020-11-09 PROCEDURE — 87077 CULTURE AEROBIC IDENTIFY: CPT

## 2020-11-09 PROCEDURE — 81002 URINALYSIS NONAUTO W/O SCOPE: CPT | Performed by: FAMILY MEDICINE

## 2020-11-09 PROCEDURE — 87086 URINE CULTURE/COLONY COUNT: CPT

## 2020-11-09 PROCEDURE — 99214 OFFICE O/P EST MOD 30 MIN: CPT | Performed by: FAMILY MEDICINE

## 2020-11-09 PROCEDURE — 99000 SPECIMEN HANDLING OFFICE-LAB: CPT | Performed by: FAMILY MEDICINE

## 2020-11-09 PROCEDURE — 87186 SC STD MICRODIL/AGAR DIL: CPT

## 2020-11-09 RX ORDER — NITROFURANTOIN 25; 75 MG/1; MG/1
100 CAPSULE ORAL 2 TIMES DAILY
Qty: 10 CAP | Refills: 0 | Status: SHIPPED | OUTPATIENT
Start: 2020-11-09 | End: 2020-11-14

## 2020-11-09 ASSESSMENT — FIBROSIS 4 INDEX: FIB4 SCORE: 1.72

## 2020-11-09 NOTE — PROGRESS NOTES
"  Chief Complaint   Patient presents with   • UTI       HPI:  Symptom onset: 10 days ago   Current symptoms: Painful, urgent, frequent voids. No blood noted in urine.  Since onset symptoms are: Unchanged  Treatments tried: OTC antispasm med.  Associated symptoms: Negative for fever, flank pain, nausea and vomiting, vaginal discharge, pelvic pain.  History is negative for frequent UTI.     ROS:  Denies fever, chills, vomiting or abdominal pain.     OBJECTIVE:  /84 (BP Location: Left arm, Patient Position: Sitting, BP Cuff Size: Adult)   Pulse 80   Temp 36.8 °C (98.2 °F) (Temporal)   Resp 18   Ht 1.626 m (5' 4\")   Wt 67.2 kg (148 lb 3.2 oz)   SpO2 95%   Gen: Alert, NAD.  Chest: Lungs clear to auscultation, CV RRR.  Abdomen: Soft, tender in suprapubic region. No CVAT. Normal bowel sounds.     Lab Results   Component Value Date    POCCOLOR Yellow 11/09/2020    POCAPPEAR Clear 11/09/2020    POCLEUKEST Small 11/09/2020    POCNITRITE Pos 11/09/2020    POCUROBILIGE 0.2 11/09/2020    POCPROTEIN Neg 11/09/2020    POCURPH 5.5 11/09/2020    POCBLOOD Neg 11/09/2020    POCSPGRV 1.025 11/09/2020    POCKETONES Neg 11/09/2020    POCBILIRUBIN Neg 11/09/2020    POCGLUCUA Neg 11/09/2020          ASSESSMENT/PLAN:     1. UTI symptoms    2. Acute cystitis without hematuria          1. Abnormal urine dipstick in office. Urine sent for culture. Start antibiotics.  2. Provided education to drink plenty of fluids, wipe front to back every void and bowel movement.   3. Return to clinic if symptoms not improving within 3-4 days or in case of vomiting, fever, increasing pain.  "

## 2020-11-12 ENCOUNTER — TELEPHONE (OUTPATIENT)
Dept: MEDICAL GROUP | Facility: PHYSICIAN GROUP | Age: 85
End: 2020-11-12

## 2020-11-12 LAB
BACTERIA UR CULT: ABNORMAL
BACTERIA UR CULT: ABNORMAL
SIGNIFICANT IND 70042: ABNORMAL
SITE SITE: ABNORMAL
SOURCE SOURCE: ABNORMAL

## 2020-11-12 NOTE — TELEPHONE ENCOUNTER
----- Message from Otto Odom M.D. sent at 11/12/2020  9:24 AM PST -----  Urine test results are positive for UTI.  Continue with the antibiotic    Thank you,    Dr. DAREN Odom  Family Medicine Physician  [facility]  [phone]

## 2020-11-12 NOTE — TELEPHONE ENCOUNTER
Phone Number Called: [phone] (home)       Call outcome: Spoke to patient regarding message below.    Message: Patient notified of results and to continue with antibiotic. Patient verbalized understanding and had no questions or concerns.

## 2021-01-07 ENCOUNTER — OFFICE VISIT (OUTPATIENT)
Dept: MEDICAL GROUP | Facility: PHYSICIAN GROUP | Age: 86
End: 2021-01-07
Payer: MEDICARE

## 2021-01-07 VITALS
HEART RATE: 80 BPM | HEIGHT: 64 IN | OXYGEN SATURATION: 97 % | RESPIRATION RATE: 16 BRPM | DIASTOLIC BLOOD PRESSURE: 86 MMHG | SYSTOLIC BLOOD PRESSURE: 126 MMHG | TEMPERATURE: 98.7 F | BODY MASS INDEX: 25.99 KG/M2 | WEIGHT: 152.2 LBS

## 2021-01-07 DIAGNOSIS — R41.3 MEMORY LOSS: ICD-10-CM

## 2021-01-07 PROCEDURE — 99214 OFFICE O/P EST MOD 30 MIN: CPT | Performed by: FAMILY MEDICINE

## 2021-01-07 ASSESSMENT — FIBROSIS 4 INDEX: FIB4 SCORE: 1.72

## 2021-01-07 ASSESSMENT — PATIENT HEALTH QUESTIONNAIRE - PHQ9: CLINICAL INTERPRETATION OF PHQ2 SCORE: 0

## 2021-01-07 NOTE — PROGRESS NOTES
Subjective:     Chief Complaint   Patient presents with   • Memory Loss   • UTI     from visit        HPI:   Linda presents today to discuss the following.    Memory loss  This is a new condition.    Symptom onset: started about past year   Current symptoms: short term memory loss and worsening. No long term memory loss. No longer driving.   Since onset symptoms are: Unchanged  Modifying factors: no family hx of dementia. TSH checked 3/20 and normal. Denies any depression.   Treatments tried: none   Associated symptoms: Denies          Past Medical History:   Diagnosis Date   • Anxiety 2010   • Cataract    • High cholesterol    • Hypertension     history of but no treatment   • OSTEOPOROSIS 2010   • Pain 03/04/14    6/10=L knee   • Snoring     no sleep study   • Urinary incontinence     only at times at night when sleeping       Social History     Tobacco Use   • Smoking status: Former Smoker     Packs/day: 2.00     Years: 3.00     Pack years: 6.00     Quit date: 1970     Years since quittin.0   • Smokeless tobacco: Never Used   Substance Use Topics   • Alcohol use: Not Currently     Alcohol/week: 0.0 oz     Comment: rarely    • Drug use: Never       Current Outpatient Medications Ordered in Epic   Medication Sig Dispense Refill   • aspirin 81 MG tablet Take 81 mg by mouth every day.     • alendronate (FOSAMAX) 70 MG Tab Take 1 Tab by mouth every 7 days. 4 Tab 11   • fluoxetine (PROZAC) 40 MG capsule Take 1 Cap by mouth every day. 90 Cap 3   • Apoaequorin (PREVAGEN EXTRA STRENGTH PO) Take 1 Cap by mouth every day.     • Cyanocobalamin (VITAMIN B-12) 1000 MCG Tab Take 1,000 mcg by mouth every day.     • Cholecalciferol (VITAMIN D-3 PO) Take  by mouth every day. Takes two     • vitamin e (VITAMIN E) 400 UNIT Cap Take 400 Units by mouth every day.       No current [facility]-ordered facility-administered medications on file.        Allergies:  Ampicillin    Health Maintenance:  "Completed    ROS:  Gen: no fevers/chills, no changes in weight  Eyes: no changes in vision  ENT: no sore throat, no hearing loss, no bloody nose  Pulm: no sob, no cough  CV: no chest pain, no palpitations  GI: no nausea/vomiting, no diarrhea    Objective:     Exam:  /86 (BP Location: Left arm, Patient Position: Sitting, BP Cuff Size: Adult)   Pulse 80   Temp 37.1 °C (98.7 °F) (Temporal)   Resp 16   Ht 1.626 m (5' 4\")   Wt 69 kg (152 lb 3.2 oz)   SpO2 97%   BMI 26.13 kg/m²  Body mass index is 26.13 kg/m².    Constitutional: Alert, no distress, well-groomed.  Skin: Warm, dry, good turgor, no rashes in visible areas.  Eye: Equal, round and reactive, conjunctiva clear, lids normal.  ENMT: Lips without lesions, good dentition, moist mucous membranes.  Neck: Trachea midline, no masses, no thyromegaly.  Respiratory: Unlabored respiratory effort, no cough.  MSK: Normal gait, moves all extremities.  Neuro: Grossly non-focal.   Psych: Alert and oriented x3, normal affect and mood.    MoCA testing 15 points out of 30    Assessment & Plan:     86 y.o. female with the following -     1. Memory loss  This is a new problem.  The patient's  mentions that the patient has been increasingly forgetful over this past year.  She often has to ask the same question multiple times.  She has trouble with attention as well.  Denies any long-term memory loss at this time.  There is a heavy concern for dementia.  MoCA testing in the office today is 15 out of 30 points.  At this time I would like her to be evaluated by neurology.  Patient and  are agreeable to plan.  - REFERRAL TO NEUROLOGY      Return in about 6 months (around 7/7/2021).    Please note that this dictation was created using voice recognition software. I have made every reasonable attempt to correct obvious errors, but I expect that there are errors of grammar and possibly content that I did not discover before finalizing the note.      "

## 2021-01-07 NOTE — ASSESSMENT & PLAN NOTE
This is a new condition.    Symptom onset: started about past year   Current symptoms: short term memory loss and worsening. No long term memory loss. No longer driving.   Since onset symptoms are: Unchanged  Modifying factors: no family hx of dementia. TSH checked 3/20 and normal. Denies any depression.   Treatments tried: none   Associated symptoms: Denies

## 2021-01-08 DIAGNOSIS — Z23 NEED FOR VACCINATION: ICD-10-CM

## 2021-01-28 ENCOUNTER — IMMUNIZATION (OUTPATIENT)
Dept: FAMILY PLANNING/WOMEN'S HEALTH CLINIC | Facility: IMMUNIZATION CENTER | Age: 86
End: 2021-01-28
Attending: INTERNAL MEDICINE
Payer: MEDICARE

## 2021-01-28 DIAGNOSIS — Z23 NEED FOR VACCINATION: ICD-10-CM

## 2021-01-28 DIAGNOSIS — Z23 ENCOUNTER FOR VACCINATION: Primary | ICD-10-CM

## 2021-01-28 PROCEDURE — 91301 MODERNA SARS-COV-2 VACCINE: CPT | Performed by: INTERNAL MEDICINE

## 2021-01-28 PROCEDURE — 0011A MODERNA SARS-COV-2 VACCINE: CPT | Performed by: INTERNAL MEDICINE

## 2021-02-22 ENCOUNTER — OFFICE VISIT (OUTPATIENT)
Dept: NEUROLOGY | Facility: MEDICAL CENTER | Age: 86
End: 2021-02-22
Attending: STUDENT IN AN ORGANIZED HEALTH CARE EDUCATION/TRAINING PROGRAM
Payer: MEDICARE

## 2021-02-22 VITALS
DIASTOLIC BLOOD PRESSURE: 76 MMHG | BODY MASS INDEX: 25.73 KG/M2 | TEMPERATURE: 97.1 F | SYSTOLIC BLOOD PRESSURE: 118 MMHG | HEART RATE: 71 BPM | OXYGEN SATURATION: 97 % | WEIGHT: 149.91 LBS

## 2021-02-22 DIAGNOSIS — G30.9 MIXED DEMENTIA (HCC): ICD-10-CM

## 2021-02-22 DIAGNOSIS — R41.3 MEMORY LOSS: ICD-10-CM

## 2021-02-22 DIAGNOSIS — F01.50 MIXED DEMENTIA (HCC): ICD-10-CM

## 2021-02-22 DIAGNOSIS — R41.89 COGNITIVE IMPAIRMENT: ICD-10-CM

## 2021-02-22 DIAGNOSIS — F02.80 MIXED DEMENTIA (HCC): ICD-10-CM

## 2021-02-22 PROCEDURE — 99211 OFF/OP EST MAY X REQ PHY/QHP: CPT | Performed by: STUDENT IN AN ORGANIZED HEALTH CARE EDUCATION/TRAINING PROGRAM

## 2021-02-22 PROCEDURE — 99204 OFFICE O/P NEW MOD 45 MIN: CPT | Performed by: STUDENT IN AN ORGANIZED HEALTH CARE EDUCATION/TRAINING PROGRAM

## 2021-02-22 RX ORDER — DONEPEZIL HYDROCHLORIDE 5 MG/1
5 TABLET, FILM COATED ORAL EVERY EVENING
Qty: 30 TABLET | Refills: 5 | Status: SHIPPED | OUTPATIENT
Start: 2021-02-22 | End: 2022-04-04

## 2021-02-22 ASSESSMENT — FIBROSIS 4 INDEX: FIB4 SCORE: 1.72

## 2021-02-22 NOTE — PATIENT INSTRUCTIONS
Neurology Visit 02/22/21:    1. Proper Diet: We recommend the Mediterranean diet   2. Proper Vascular Health: Making sure that your primary care provider (PCP) is screening for and treating all vascular risk factors (diabetes, high cholesterol, high blood pressure, and such)  3. Quit smoking, if you smoke   4. Exercise as tolerated with a goal of at least 30 minutes 5 days a week or a total of 150 minutes per week  5. Focus on what you enjoy and remove stress from your life. Meditation is highly recommended.  6. Try learning new hobbies or skills, even if you’re not great at them  7. Regular social interaction: Maintain an active social life as much as possible   8. Keep your brain active with cognitively stimulating activities such as brain games and puzzles  9. Address ALL sleeping issues:  • Get 7-8 hours of sleep per night  • It is crucial to treat sleep apnea   10. Maintain a predictable daily routine  11. Maintain adequate hydration  12. B complex vitamins  13. Vitamin C  14. Vitamin E in food  15. Vitamin D supplementation if deficient  16. Eat fish  17. Dental hygiene  18. Use buneo (curcumin)  19. Consume foods that are good antioxidants:  • Beans  • Berries  • Grape juice  • Pomegranate juice  • Green tea    Fred Stone M.D.

## 2021-02-22 NOTE — PROGRESS NOTES
GENERAL NEUROLOGY INITIAL ENCOUNTER  REFERRING PROVIDER:  Otto Odom M.D.  [address]  [place] [address]      CHIEF COMPLAINT(S): Memory loss    History of present illness:  Linda Casas 86 y.o. female presents today for memory loss. She arrives with her       Symptom onset: started abot 7-8 months ago per husaband. Forgetting things she just said, forgets what she sais mid sentence.  Current symptoms: short term memory loss and worsening. No long term memory loss. No longer driving.   Since onset symptoms are: Unchanged  Modifying factors: no family hx of dementia. TSH checked 3/20 and normal. Denies any depression.   Treatments tried: none   Associated symptoms: Denies      Patient's PMH, PSH, FH, and SH were reviewed.    Medications and allergies were reviewed.        Past medical history:   Past Medical History:   Diagnosis Date   • Anxiety 9/14/2010   • Cataract    • High cholesterol    • Hypertension     history of but no treatment   • OSTEOPOROSIS 9/14/2010   • Pain 03/04/14    6/10=L knee   • Snoring     no sleep study   • Urinary incontinence     only at times at night when sleeping       Past surgical history:   Past Surgical History:   Procedure Laterality Date   • TRIGGER FINGER RELEASE Left 5/29/2018    Procedure: TRIGGER FINGER RELEASE-  MIDDLE;  Surgeon: Frandy Liz M.D.;  Location: [facility];  Service: Orthopedics   • JULIAN BY LAPAROSCOPY  5/23/2016    Procedure: JULIAN BY LAPAROSCOPY;  Surgeon: Adan Kearns M.D.;  Location: [facility];  Service:    • KNEE ARTHROSCOPY  3/11/2014    Performed by Bonilla Valera M.D. at [facility]   • BLADDER SUSPENSION  2001   • HYSTERECTOMY, TOTAL ABDOMINAL  2000   • COLON RESECTION      partial; no CA       Family history:   Family History   Problem Relation Age of Onset   • No Known Problems Father    • No Known Problems Mother        Social history:   Social History      Socioeconomic History   • Marital status:      Spouse name: Not on file   • Number of children: Not on file   • Years of education: Not on file   • Highest education level: Not on file   Occupational History   • Not on file   Tobacco Use   • Smoking status: Former Smoker     Packs/day: 2.00     Years: 3.00     Pack years: 6.00     Quit date: 1970     Years since quittin.1   • Smokeless tobacco: Never Used   Substance and Sexual Activity   • Alcohol use: Not Currently     Alcohol/week: 0.0 oz     Comment: rarely    • Drug use: Never   • Sexual activity: Yes     Partners: Male   Other Topics Concern   • Not on file   Social History Narrative   • Not on file     Social Determinants of Health     Financial Resource Strain:    • Difficulty of Paying Living Expenses:    Food Insecurity:    • Worried About Running Out of Food in the Last Year:    • Ran Out of Food in the Last Year:    Transportation Needs:    • Lack of Transportation (Medical):    • Lack of Transportation (Non-Medical):    Physical Activity:    • Days of Exercise per Week:    • Minutes of Exercise per Session:    Stress:    • Feeling of Stress :    Social Connections:    • Frequency of Communication with Friends and Family:    • Frequency of Social Gatherings with Friends and Family:    • Attends L[employer] day Services:    • Active Member of Clubs or Organizations:    • Attends Club or Organization Meetings:    • Marital Status:    Intimate Partner Violence:    • Fear of Current or Ex-Partner:    • Emotionally Abused:    • Physically Abused:    • Sexually Abused:        Current medications:   Current Outpatient Medications   Medication   • donepezil (ARICEPT) 5 MG Tab   • aspirin 81 MG tablet   • alendronate (FOSAMAX) 70 MG Tab   • Apoaequorin (PREVAGEN EXTRA STRENGTH PO)   • Cyanocobalamin (VITAMIN B-12) 1000 MCG Tab   • Cholecalciferol (VITAMIN D-3 PO)   • vitamin e (VITAMIN E) 400 UNIT Cap   • fluoxetine (PROZAC) 40 MG capsule     No  "current facility-administered medications for this visit.       Medication Allergy:  Allergies   Allergen Reactions   • Ampicillin Rash     Feels \"rotten\"          Review of systems:   Pertinent positives and negatives are as outlined above      Physical examination:   Vitals:    02/22/21 0756   BP: 118/76   Pulse: 71   Temp: 36.2 °C (97.1 °F)   SpO2: 97%       General: Patient in no acute distress, pleasant and cooperative.  HEENT: Normocephalic, no signs of acute trauma.   Neck: appears supple, here is normal range of motion. No tenderness on exam.   Chest: clear to auscultation. Symmetrical chest rise with inhalation. No cough.   CV: RRR, no murmurs.   Skin: no signs of acute rashes or trauma.   Musculoskeletal: joints exhibit full range of motion. There are no signs of joint or muscle swelling.   Psychiatric: pertinent positives as discussed above    NEUROLOGICAL EXAM:   Mental status:  orientation: Awake, alert and oriented to self, situation  Attention: Intact  Speech and language: speech is clear and fluent. The patient is able to name, repeat and comprehend. No word substitions or paraphasic errors  Memory: impaired short term recall  Cranial nerve exam:   I: smell Not tested   II: visual acuity  Not Tested   II: Fundoscpic NT   II: visual fields Full to confrontation  Visual neglect: absent   II: pupils Equal, round, reactive to light   III,VII: ptosis None   III,IV,VI: extraocular muscles  EOMI   V: mastication Normal   V: facial light touch sensation  Normal   V,VII: corneal reflex  Not tested   VII: facial muscle function - upper  Normal   VII: facial muscle function - lower Normal   VIII: hearing Not tested   IX: soft palate elevation  Normal   IX,X: gag reflex Not tested   XI: trapezius strength  NT   XI: sternocleidomastoid strength NT   XI: neck flexion strength  NT   XII: tongue  Protrudes Midline     Motor exam:   • Strength is 5/5 in the distal and proximal upper and lower extremities   • Tone is " normal.  • No abnormal movements were seen on exam.   • No muscle fasciculation  • No areas of atrophy  Sensory exam reveals normal sense of light touch, proprioception, vibration and pinprick in all extremities. Cortical sensory testing: Normal. Test of neglect: none present to simultaneous stimulation with touch  Deep tendon reflexes:  trace throughout. Plantar responses are mute.There is no clonus.   Coordination: shows a normal finger-nose-finger. Normal rapidly alternating movements. Heel-knee-shin movements smooth and coordinated bilaterally.   Gait:   • The patient was able to get up from seated position on first attempt without requiring assistance.   • Found to be steady when walking.   • Movements were fluid with normal arm swing.   • The patient was able to turn without difficulties or tendency to fall.   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 Getting updated MRI. Starting Aricept and drawing labs as below.     We had an extensive discussion about the importance of adhering to healthy lifestyle:    1. Proper Diet: We recommend the Mediterranean diet   2. Proper Vascular Health: Making sure that your primary care provider (PCP) is screening for and treating all vascular risk factors (diabetes, high cholesterol, high blood pressure, and such)  3. Quit smoking, if you smoke   4. Exercise as tolerated with a goal of at least 30 minutes 5 days a week or a total of 150 minutes per week  5. Focus on what you enjoy and remove stress from your life. Meditation is highly recommended.  6. Try learning new hobbies or skills, even if you’re not great at them  7. Regular social interaction: Maintain an active social life as much as possible   8. Keep  your brain active with cognitively stimulating activities such as brain games and puzzles  9. Address ALL sleeping issues:  • Get 7-8 hours of sleep per night  • It is crucial to treat sleep apnea   10. Maintain a predictable daily routine  11. Maintain adequate hydration  12. B complex vitamins  13. Vitamin C  14. Vitamin E in food  15. Vitamin D supplementation if deficient  16. Eat fish  17. Dental hygiene  18. Use bueno (curcumin)  19. Consume foods that are good antioxidants:  • Beans  • Berries  • Grape juice  • Pomegranate juice  • Green tea      Patient/family agree with plan, as outlined:    Visit Diagnoses     ICD-10-CM   1. Memory loss  R41.3   2. Mixed dementia (HCC)  G30.9    F01.50    F02.80   3. Cognitive impairment  R41.89        Orders Placed This Encounter   • MR-BRAIN-W/O   • VITAMIN B12   • FOLATE   • METHYLMALONIC ACID, SERUM   • TSH+FREE T4   • VITAMIN B1   • donepezil (ARICEPT) 5 MG Tab            FOLLOW-UP:   6 weeks            BILLING DOCUMENTATION:       Counseling:  I spent a total of 47 minutes of face-to-face time in this visit. Over 50% of the time of the visit today was spent on counseling and or coordination of care wtih the patient and/or family, as above in assessment in plan.       Fred Stone MD  Epilepsy and General Neurology  Department of Neurology  Clinical  of Neurology [facility].

## 2021-03-05 ENCOUNTER — IMMUNIZATION (OUTPATIENT)
Dept: FAMILY PLANNING/WOMEN'S HEALTH CLINIC | Facility: IMMUNIZATION CENTER | Age: 86
End: 2021-03-05
Payer: MEDICARE

## 2021-03-05 DIAGNOSIS — Z23 ENCOUNTER FOR VACCINATION: Primary | ICD-10-CM

## 2021-03-05 PROCEDURE — 91301 MODERNA SARS-COV-2 VACCINE: CPT

## 2021-03-05 PROCEDURE — 0012A MODERNA SARS-COV-2 VACCINE: CPT

## 2021-05-25 RX ORDER — FLUOXETINE HYDROCHLORIDE 40 MG/1
40 CAPSULE ORAL DAILY
Qty: 90 CAPSULE | Refills: 0 | Status: SHIPPED | OUTPATIENT
Start: 2021-05-25 | End: 2021-08-24

## 2021-06-01 ENCOUNTER — APPOINTMENT (OUTPATIENT)
Dept: MEDICAL GROUP | Facility: PHYSICIAN GROUP | Age: 86
End: 2021-06-01
Payer: MEDICARE

## 2021-06-24 PROBLEM — S52.502A CLOSED FRACTURE OF LEFT DISTAL RADIUS: Status: ACTIVE | Noted: 2021-06-24

## 2021-07-08 ENCOUNTER — APPOINTMENT (OUTPATIENT)
Dept: MEDICAL GROUP | Facility: PHYSICIAN GROUP | Age: 86
End: 2021-07-08
Payer: MEDICARE

## 2021-07-20 ENCOUNTER — TELEPHONE (OUTPATIENT)
Dept: MEDICAL GROUP | Facility: PHYSICIAN GROUP | Age: 86
End: 2021-07-20

## 2021-07-20 NOTE — TELEPHONE ENCOUNTER
VOICEMAIL  1. Caller Name: Chilo Casas                         Call Back Number: [phone] (home)       2. Message: Chilo  lvm stating his wife has been having more sever dementia, he believes she needs to see a specialist you referred her to before, in the past his wife declined services but he believes that its time now.     3. Patient approves office to leave a detailed voicemail/MyChart message: yes

## 2021-07-31 ENCOUNTER — HOSPITAL ENCOUNTER (OUTPATIENT)
Dept: RADIOLOGY | Facility: MEDICAL CENTER | Age: 86
End: 2021-07-31
Attending: STUDENT IN AN ORGANIZED HEALTH CARE EDUCATION/TRAINING PROGRAM
Payer: MEDICARE

## 2021-07-31 DIAGNOSIS — R41.89 COGNITIVE IMPAIRMENT: ICD-10-CM

## 2021-07-31 DIAGNOSIS — R41.3 MEMORY LOSS: ICD-10-CM

## 2021-07-31 PROCEDURE — 70551 MRI BRAIN STEM W/O DYE: CPT | Mod: MH

## 2021-08-10 ENCOUNTER — PATIENT OUTREACH (OUTPATIENT)
Dept: HEALTH INFORMATION MANAGEMENT | Facility: OTHER | Age: 86
End: 2021-08-10

## 2021-08-10 DIAGNOSIS — R41.3 MEMORY LOSS: ICD-10-CM

## 2021-08-10 DIAGNOSIS — R41.82 ALTERED MENTAL STATUS, UNSPECIFIED ALTERED MENTAL STATUS TYPE: ICD-10-CM

## 2021-08-10 NOTE — NON-PROVIDER
Outcome: Called to schedule LATIA. Spoke to spouse. Stated will discuss with pt and PCP. Will call back if pt wants to schedule    Please transfer to Patient Outreach Team at [phone] when patient returns call.      [facility] Verified: yes    Attempt # 1

## 2021-08-24 RX ORDER — FLUOXETINE HYDROCHLORIDE 40 MG/1
40 CAPSULE ORAL DAILY
Qty: 90 CAPSULE | Refills: 3 | Status: SHIPPED | OUTPATIENT
Start: 2021-08-24 | End: 2021-09-24

## 2021-09-23 ENCOUNTER — TELEPHONE (OUTPATIENT)
Dept: MEDICAL GROUP | Facility: PHYSICIAN GROUP | Age: 86
End: 2021-09-23

## 2021-09-23 NOTE — TELEPHONE ENCOUNTER
VOICEMAIL  1. Caller Name: Chilo Casas                            Call Back Number: [phone]      2. Message: Patients  left Voicemail stating Wife has had a fall in the back yard and is a lot of pain, Patients  is asking for heavy pain medication because she is in a lot of pain.    3. Patient approves office to leave a detailed voicemail/MyChart message: N\A

## 2021-09-23 NOTE — TELEPHONE ENCOUNTER
Phone Number Called: [phone]    Call outcome: Spoke to patient regarding message below.    Message: Spoke to patient to let him know in order for provider prescribe any medicatoin patient would have to make an appointment, I have also advised Patients  if they cannot wait for appt they need to seek Urgent care or ER for the appropiate treatment his wife needs, Patients  stated he has already made an appointment for tomorrow and will discuss medication management.

## 2021-09-24 ENCOUNTER — TELEPHONE (OUTPATIENT)
Dept: MEDICAL GROUP | Facility: PHYSICIAN GROUP | Age: 86
End: 2021-09-24

## 2021-09-24 ENCOUNTER — OFFICE VISIT (OUTPATIENT)
Dept: MEDICAL GROUP | Facility: PHYSICIAN GROUP | Age: 86
End: 2021-09-24
Payer: MEDICARE

## 2021-09-24 VITALS
WEIGHT: 145.6 LBS | SYSTOLIC BLOOD PRESSURE: 146 MMHG | BODY MASS INDEX: 24.86 KG/M2 | HEART RATE: 73 BPM | DIASTOLIC BLOOD PRESSURE: 88 MMHG | OXYGEN SATURATION: 92 % | TEMPERATURE: 97.8 F | HEIGHT: 64 IN
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
## 2021-09-24 PROCEDURE — 99213 OFFICE O/P EST LOW 20 MIN: CPT | Performed by: FAMILY MEDICINE

## 2021-09-24 RX ORDER — MELOXICAM 7.5 MG/1
7.5 TABLET ORAL DAILY
Qty: 30 TABLET | Refills: 1 | Status: SHIPPED | OUTPATIENT
Start: 2021-09-24 | End: 2022-04-04

## 2021-09-24 ASSESSMENT — FIBROSIS 4 INDEX: FIB4 SCORE: 1.74

## 2021-09-24 NOTE — ASSESSMENT & PLAN NOTE
New issue  She fell at home 3 days ago  No head injury  Minor scrapes to her extremities  No LOC  She was very weak and had a hard time getting up   helped her  She is having pain to her wounds  APAP is not helping

## 2021-09-24 NOTE — TELEPHONE ENCOUNTER
Patient came in today for her 11:30am appointment she wanted to note she was having lower back pain as well. Patient forgot to mention to her provider at the time of her appointment but wants this concern to be documented and wants Provider notifed.

## 2021-09-24 NOTE — TELEPHONE ENCOUNTER
VOICEMAIL  1. Caller Name: Chilo Casas                          Call Back Number: [phone]     2. Message: Patients  LVM Asking what was the name of the medication his wife was allergic too, He just wanted to make sure she's okay to take the medication that was prescribed. I informed patient his wife was allergic to ampicillin not meloxicam. Patients  was also notified that the provider was aware of his wifes back pain and if back pain is persistent  to come back for a follow up appt.    3. Patient approves office to leave a detailed voicemail/MyChart message: N\A

## 2021-09-24 NOTE — TELEPHONE ENCOUNTER
Phone Number Called: [phone]     Call outcome: Did not leave a detailed message. Requested patient to call back.    Message: Unable to leave VM, Vmbox not set up.

## 2021-09-24 NOTE — PROGRESS NOTES
"Subjective:     Chief Complaint   Patient presents with   • Fall     Serious fall, back yard,   • Medication Management     Pain medication       HPI:   Linda presents today to discuss the following.    Fall  New issue  She fell at home 3 days ago  No head injury  Minor scrapes to her extremities  No LOC  She was very weak and had a hard time getting up   helped her  She is having pain to her wounds  APAP is not helping      Past Medical History:   Diagnosis Date   • Anxiety 9/14/2010   • Cataract    • High cholesterol    • Hypertension     history of but no treatment   • OSTEOPOROSIS 9/14/2010   • Pain 03/04/14 6/10=L knee   • Snoring     no sleep study   • Urinary incontinence     only at times at night when sleeping       Current Outpatient Medications Ordered in Epic   Medication Sig Dispense Refill   • meloxicam (MOBIC) 7.5 MG Tab Take 1 Tablet by mouth every day. 30 Tablet 1   • alendronate (FOSAMAX) 70 MG Tab Take 1 Tab by mouth every 7 days. 4 Tab 11   • Cyanocobalamin (VITAMIN B-12) 1000 MCG Tab Take 1,000 mcg by mouth every day.     • Cholecalciferol (VITAMIN D-3 PO) Take  by mouth every day. Takes two     • vitamin e (VITAMIN E) 400 UNIT Cap Take 400 Units by mouth every day.     • donepezil (ARICEPT) 5 MG Tab Take 1 tablet by mouth every evening. 30 tablet 5     No current Epic-ordered facility-administered medications on file.       Allergies:  Ampicillin    Health Maintenance: Completed    ROS:  Gen: no fevers/chills, no changes in weight  Eyes: no changes in vision  Pulm: no sob, no cough  CV: no chest pain, no palpitations  GI: no nausea/vomiting, no diarrhea      Objective:     Exam:  /88 (BP Location: Right arm, Patient Position: Sitting, BP Cuff Size: Adult)   Pulse 73   Temp 36.6 °C (97.8 °F) (Temporal)   Ht 1.626 m (5' 4\")   Wt 66 kg (145 lb 9.6 oz)   SpO2 92%   BMI 24.99 kg/m²  Body mass index is 24.99 kg/m².      Constitutional: Alert, no distress, well-groomed.  Skin: " Warm, dry, good turgor, no rashes in visible areas.  Eye: Equal, round and reactive, conjunctiva clear, lids normal.  ENMT: Lips without lesions, good dentition, moist mucous membranes.  Neck: Trachea midline, no masses, no thyromegaly.  Respiratory: Unlabored respiratory effort, no cough.  MSK: Normal gait, moves all extremities.  Neuro: Grossly non-focal.   Psych: Alert and oriented x3, normal affect and mood.        Assessment & Plan:     87 y.o. female with the following -     1. Fall, initial encounter  This is a new problem.  The patient experienced a fall at home.  Fortunately it only involves minor scrapes without any head injury or loss of consciousness.  Fall precautions were endorsed and reinforced today.  I did offer a trial of physical therapy for strengthening and gait training but she and  declined.  I will treat her generalized pain with meloxicam today.  Reassess in 3 months.  - meloxicam (MOBIC) 7.5 MG Tab; Take 1 Tablet by mouth every day.  Dispense: 30 Tablet; Refill: 1      Return in about 3 months (around 12/24/2021).    Please note that this dictation was created using voice recognition software. I have made every reasonable attempt to correct obvious errors, but I expect that there are errors of grammar and possibly content that I did not discover before finalizing the note.

## 2021-09-28 ENCOUNTER — TELEPHONE (OUTPATIENT)
Dept: MEDICAL GROUP | Facility: PHYSICIAN GROUP | Age: 86
End: 2021-09-28
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
## 2021-09-28 NOTE — TELEPHONE ENCOUNTER
VOICEMAIL  1. Caller Name:  Chilo Casas                    Call Back Number: [phone] (home)     2. Message: Patients  LVM stating his wife recently saw PCP for a bad fall and they are requesting xray or MRI.  Routing to PCP to advise.     3. Patient approves office to leave a detailed voicemail/MyChart message: yes

## 2021-09-28 NOTE — TELEPHONE ENCOUNTER
Phone Number Called: [phone] (home)     Call outcome: Spoke to patient regarding message below.    Message: Spoke with patient's  and let them know Otto Odom M.D. ordered xray.  They will present to [facility].   is familiar with location as he gets infusions at their lab.

## 2021-10-01 ENCOUNTER — HOSPITAL ENCOUNTER (OUTPATIENT)
Dept: RADIOLOGY | Facility: MEDICAL CENTER | Age: 86
End: 2021-10-01
Attending: FAMILY MEDICINE
Payer: MEDICARE
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
## 2021-10-01 PROCEDURE — 72100 X-RAY EXAM L-S SPINE 2/3 VWS: CPT

## 2021-10-04 ENCOUNTER — TELEPHONE (OUTPATIENT)
Dept: MEDICAL GROUP | Facility: PHYSICIAN GROUP | Age: 86
End: 2021-10-04

## 2021-10-04 DIAGNOSIS — S32.000A COMPRESSION FRACTURE OF LUMBAR VERTEBRA, UNSPECIFIED LUMBAR VERTEBRAL LEVEL, INITIAL ENCOUNTER (HCC): ICD-10-CM

## 2021-10-04 NOTE — TELEPHONE ENCOUNTER
Phone Number Called: [phone]      Call outcome: Did not leave a detailed message. Requested patient to call back.    Message: Unable to leave VM, pt VM not set up.

## 2021-10-04 NOTE — TELEPHONE ENCOUNTER
Phone Number Called: [phone]    Call outcome: Did not leave a detailed message. Requested patient to call back.    Message: VM not set up, unable to leave VM

## 2021-10-04 NOTE — TELEPHONE ENCOUNTER
----- Message from Otto Odom M.D. sent at 10/1/2021  4:38 PM PDT -----  Please call patient to let know:  X-rays of the lumbar back shows evidence of loss of height of some of the vertebral bodies of the lumbar spine.  This could have been fractures from the past that caused loss of height of the bones and compression.  If she still having pretty bad pain I can refer to a spine specialist to go over these results

## 2021-10-04 NOTE — TELEPHONE ENCOUNTER
VOICEMAIL  1. Caller Name: Chilo Casas ()                Call Back Number: [phone]      2. Message: Patients   called requesting lab results.    3. Patient approves office to leave a detailed voicemail/MyChart message: N\A

## 2021-10-05 NOTE — TELEPHONE ENCOUNTER
Phone Number Called: [phone]      Call outcome: Spoke to patient regarding message below.    Message: Spoke to patients  and informed them of the the following results.:  Message from Otto Odom M.D. sent at 10/1/2021  4:38 PM PDT -----  Please call patient to let know:  X-rays of the lumbar back shows evidence of loss of height of some of the vertebral bodies of the lumbar spine.  This could have been fractures from the past that caused loss of height of the bones and compression.  If she still having pretty bad pain I can refer to a spine specialist to go over these results    Patient acknowledged and would the referral to be placed.

## 2021-10-06 ENCOUNTER — TELEPHONE (OUTPATIENT)
Dept: MEDICAL GROUP | Facility: PHYSICIAN GROUP | Age: 86
End: 2021-10-06

## 2021-10-06 ENCOUNTER — TELEPHONE (OUTPATIENT)
Dept: HEALTH INFORMATION MANAGEMENT | Facility: OTHER | Age: 86
End: 2021-10-06

## 2021-10-06 NOTE — TELEPHONE ENCOUNTER
Outcome: Pt spouse called to check status on referral for spine. Adv referral was processed and sent to [facility] Phone: [phone].     Please transfer to Patient Outreach Team at [phone] when patient returns call.

## 2021-10-06 NOTE — TELEPHONE ENCOUNTER
Phone Number Called: [phone] (home)     Call outcome: Tried to amando the Pt back, no answer on phone    Message: spouse was contacted and had referral info provided by [facility] today, already.  No further actions required at this time.

## 2021-10-06 NOTE — TELEPHONE ENCOUNTER
VOICEMAIL  1. Caller Name: spouse Chilo                      Call Back Number: [phone] (home)     2. Message: spouse called twice today to ask after referral to spine specialist for Pt.  Stated his new phone was not set up for VM would have to try and reach him to speak in person because a VM could not be left.  Pt is not set up for [facility].

## 2021-10-06 NOTE — TELEPHONE ENCOUNTER
Outcome: Called in regards to get phone number for [facility] [place] given number [phone].     Please transfer to Patient Outreach Team at [phone] when patient returns call.

## 2021-10-07 ENCOUNTER — TELEPHONE (OUTPATIENT)
Dept: MEDICAL GROUP | Facility: PHYSICIAN GROUP | Age: 86
End: 2021-10-07

## 2021-10-07 NOTE — TELEPHONE ENCOUNTER
"Patients  came in to the office this morning, Spoke with him regarding the referral and gave him a letter with the referral information on it. Patients  also denied going to ER or Urgent care due to \"bad experiences\" in the past. They will schedule as soon as possible.  "

## 2021-10-07 NOTE — TELEPHONE ENCOUNTER
Phone Number Called: [phone]     Call outcome: Did not leave a detailed message. Requested patient to call back.    Message: unable to leave VM, will try again at a later time

## 2021-10-07 NOTE — TELEPHONE ENCOUNTER
Phone Number Called: [phone]      Call outcome: Did not leave a detailed message. Requested patient to call back.    Message: Unable to leave VM, Patient needs to be notified with the following.     [facility]  [address]  [place] [address]  Phone: [phone]   Fax: [phone]     Sincerely,     Your Healthcare Team.

## 2021-10-07 NOTE — TELEPHONE ENCOUNTER
Phone Number Called: [phone]     Call outcome: Did not leave a detailed message. Requested patient to call back.    Message: unable to leave VM, Mailbox not set up.

## 2021-10-07 NOTE — TELEPHONE ENCOUNTER
Phone Number Called: [phone]      Call outcome: Kassy recieved Call    Message:pt  called to follow up about the refferal for spine referral surgery.. pt  stated that his wife the patient is in tremendous pain, and would like to expedite the referral.. pt  stated that he has called numerous times in the office ([place])..Patient  said, nobody has responded to him yet... Please reach out to the patient  regarding to the referral.. Thank you.. [phone] (Chilo Casas)   Patients wife needs Urgent Referral.  Please Advise

## 2021-10-08 NOTE — TELEPHONE ENCOUNTER
Patients  came in and received a printed referral and a letter stating [facility]. They will be reaching out to the location.

## 2021-10-12 ENCOUNTER — TELEPHONE (OUTPATIENT)
Dept: MEDICAL GROUP | Facility: PHYSICIAN GROUP | Age: 86
End: 2021-10-12

## 2021-10-12 RX ORDER — METHOCARBAMOL 750 MG/1
750 TABLET, FILM COATED ORAL 4 TIMES DAILY
Qty: 56 TABLET | Refills: 0 | Status: SHIPPED | OUTPATIENT
Start: 2021-10-12 | End: 2021-10-26

## 2021-10-12 NOTE — TELEPHONE ENCOUNTER
Phone Number Called: [phone] or [phone]      Call outcome: Chilo Casas ()    Message: Patients  called regarding Patient is in excruciating pain, Patients  what he can do to help her. Patients  asked if he could give her some of his muscle relaxers, I have let him know they should not share medication.     Please Advise.

## 2021-10-12 NOTE — TELEPHONE ENCOUNTER
Phone Number Called: [phone]     Call outcome: Spoke to patient regarding message below.    Message: Spoke to patients  and informed him that a prescription has been sent out to their pharmacy and Patients  stated they have an appointment to back specialist tomorrow.

## 2021-10-21 ENCOUNTER — HOSPITAL ENCOUNTER (OUTPATIENT)
Dept: RADIOLOGY | Facility: MEDICAL CENTER | Age: 86
End: 2021-10-21
Attending: ORTHOPAEDIC SURGERY
Payer: MEDICARE

## 2021-10-21 DIAGNOSIS — M54.16 LUMBAR RADICULOPATHY: ICD-10-CM

## 2021-10-21 PROCEDURE — 72148 MRI LUMBAR SPINE W/O DYE: CPT | Mod: MH

## 2021-11-01 ENCOUNTER — OFFICE VISIT (OUTPATIENT)
Dept: MEDICAL GROUP | Facility: PHYSICIAN GROUP | Age: 86
End: 2021-11-01
Payer: MEDICARE

## 2021-11-01 VITALS
BODY MASS INDEX: 24.55 KG/M2 | WEIGHT: 143.8 LBS | DIASTOLIC BLOOD PRESSURE: 84 MMHG | SYSTOLIC BLOOD PRESSURE: 130 MMHG | OXYGEN SATURATION: 93 % | HEIGHT: 64 IN | TEMPERATURE: 97.7 F | HEART RATE: 74 BPM

## 2021-11-01 DIAGNOSIS — S32.011A CLOSED STABLE BURST FRACTURE OF FIRST LUMBAR VERTEBRA, INITIAL ENCOUNTER (HCC): ICD-10-CM

## 2021-11-01 PROCEDURE — 99213 OFFICE O/P EST LOW 20 MIN: CPT | Performed by: FAMILY MEDICINE

## 2021-11-01 ASSESSMENT — FIBROSIS 4 INDEX: FIB4 SCORE: 1.74

## 2021-11-01 NOTE — PROGRESS NOTES
"Subjective:     Chief Complaint   Patient presents with   • Other     Surgical clearance       HPI:   Linda presents today to discuss the following.    Closed stable burst fracture of first lumbar vertebra (HCC)  Chronic issue  Had MRI and positive from lumbar L1 fracture  Now established with spine surgery.  Has had a lot of pain for this  Due for kyphoplasty and needs clearance with Dr Barton   Not on any blood thinners       Past Medical History:   Diagnosis Date   • Anxiety 9/14/2010   • Cataract    • High cholesterol    • Hypertension     history of but no treatment   • OSTEOPOROSIS 9/14/2010   • Pain 03/04/14    6/10=L knee   • Snoring     no sleep study   • Urinary incontinence     only at times at night when sleeping       Current Outpatient Medications Ordered in Epic   Medication Sig Dispense Refill   • donepezil (ARICEPT) 5 MG Tab Take 1 tablet by mouth every evening. 30 tablet 5   • alendronate (FOSAMAX) 70 MG Tab Take 1 Tab by mouth every 7 days. 4 Tab 11   • Cyanocobalamin (VITAMIN B-12) 1000 MCG Tab Take 1,000 mcg by mouth every day.     • Cholecalciferol (VITAMIN D-3 PO) Take  by mouth every day. Takes two     • vitamin e (VITAMIN E) 400 UNIT Cap Take 400 Units by mouth every day.     • meloxicam (MOBIC) 7.5 MG Tab Take 1 Tablet by mouth every day. 30 Tablet 1     No current Epic-ordered facility-administered medications on file.       Allergies:  Ampicillin    Health Maintenance: Completed    ROS:  Gen: no fevers/chills, no changes in weight  Eyes: no changes in vision  Pulm: no sob, no cough  CV: no chest pain, no palpitations  GI: no nausea/vomiting, no diarrhea      Objective:     Exam:  /84 (BP Location: Right arm, Patient Position: Sitting, BP Cuff Size: Adult)   Pulse 74   Temp 36.5 °C (97.7 °F) (Temporal)   Ht 1.626 m (5' 4\")   Wt 65.2 kg (143 lb 12.8 oz)   SpO2 93%   BMI 24.68 kg/m²  Body mass index is 24.68 kg/m².        Constitutional: Alert, no distress, well-groomed.  Skin: " Warm, dry, good turgor, no rashes in visible areas.  Eye: Equal, round and reactive, conjunctiva clear, lids normal.  ENMT: Lips without lesions, good dentition, moist mucous membranes.  Neck: Trachea midline, no masses, no thyromegaly.  Respiratory: Unlabored respiratory effort, no cough.  MSK: Normal gait, moves all extremities.  Neuro: Grossly non-focal.   Psych: Alert and oriented x3, normal affect and mood.        Assessment & Plan:     87 y.o. female with the following -     1. Closed stable burst fracture of first lumbar vertebra, initial encounter ([facility])  This is a new problem.  The patient had trauma from mechanical ground-level fall in the recent past.  Imaging was positive for fracture of the lumbar 1 spine.  Now following up with spine surgery and scheduled for a kyphoplasty.  Presenting today for clearance for this.  She is scheduled for this this week.  I will go ahead and authorize this and handout paperwork.  She is medically optimized for procedure.      No follow-ups on file.    Please note that this dictation was created using voice recognition software. I have made every reasonable attempt to correct obvious errors, but I expect that there are errors of grammar and possibly content that I did not discover before finalizing the note.

## 2021-11-01 NOTE — ASSESSMENT & PLAN NOTE
Chronic issue  Had MRI and positive from lumbar L1 fracture  Now established with spine surgery.  Has had a lot of pain for this  Due for kyphoplasty and needs clearance with Dr Barton   Not on any blood thinners

## 2021-11-04 ENCOUNTER — HOSPITAL ENCOUNTER (OUTPATIENT)
Dept: RADIOLOGY | Facility: MEDICAL CENTER | Age: 86
End: 2021-11-04
Attending: ORTHOPAEDIC SURGERY | Admitting: ORTHOPAEDIC SURGERY
Payer: MEDICARE

## 2021-11-04 ENCOUNTER — PRE-ADMISSION TESTING (OUTPATIENT)
Dept: ADMISSIONS | Facility: MEDICAL CENTER | Age: 86
End: 2021-11-04
Attending: ORTHOPAEDIC SURGERY
Payer: MEDICARE

## 2021-11-04 DIAGNOSIS — Z01.812 PRE-OPERATIVE LABORATORY EXAMINATION: ICD-10-CM

## 2021-11-04 DIAGNOSIS — Z01.810 PRE-OPERATIVE CARDIOVASCULAR EXAMINATION: ICD-10-CM

## 2021-11-04 DIAGNOSIS — Z01.811 PRE-OPERATIVE RESPIRATORY EXAMINATION: ICD-10-CM

## 2021-11-04 LAB
ANION GAP SERPL CALC-SCNC: 13 MMOL/L (ref 7–16)
APTT PPP: 32.1 SEC (ref 24.7–36)
BASOPHILS # BLD AUTO: 0.6 % (ref 0–1.8)
BASOPHILS # BLD: 0.09 K/UL (ref 0–0.12)
BUN SERPL-MCNC: 21 MG/DL (ref 8–22)
CALCIUM SERPL-MCNC: 9.7 MG/DL (ref 8.5–10.5)
CHLORIDE SERPL-SCNC: 104 MMOL/L (ref 96–112)
CO2 SERPL-SCNC: 23 MMOL/L (ref 20–33)
CREAT SERPL-MCNC: 1.02 MG/DL (ref 0.5–1.4)
EKG IMPRESSION: NORMAL
EOSINOPHIL # BLD AUTO: 0.21 K/UL (ref 0–0.51)
EOSINOPHIL NFR BLD: 1.5 % (ref 0–6.9)
ERYTHROCYTE [DISTWIDTH] IN BLOOD BY AUTOMATED COUNT: 55.9 FL (ref 35.9–50)
ERYTHROCYTE [SEDIMENTATION RATE] IN BLOOD BY WESTERGREN METHOD: 4 MM/HOUR (ref 0–25)
EST. AVERAGE GLUCOSE BLD GHB EST-MCNC: 114 MG/DL
GLUCOSE SERPL-MCNC: 105 MG/DL (ref 65–99)
HBA1C MFR BLD: 5.6 % (ref 4–5.6)
HCT VFR BLD AUTO: 47.2 % (ref 37–47)
HGB BLD-MCNC: 15.3 G/DL (ref 12–16)
IMM GRANULOCYTES # BLD AUTO: 0.06 K/UL (ref 0–0.11)
IMM GRANULOCYTES NFR BLD AUTO: 0.4 % (ref 0–0.9)
INR PPP: 1.03 (ref 0.87–1.13)
LYMPHOCYTES # BLD AUTO: 2.06 K/UL (ref 1–4.8)
LYMPHOCYTES NFR BLD: 14.4 % (ref 22–41)
MCH RBC QN AUTO: 31.4 PG (ref 27–33)
MCHC RBC AUTO-ENTMCNC: 32.4 G/DL (ref 33.6–35)
MCV RBC AUTO: 96.7 FL (ref 81.4–97.8)
MONOCYTES # BLD AUTO: 1.24 K/UL (ref 0–0.85)
MONOCYTES NFR BLD AUTO: 8.7 % (ref 0–13.4)
NEUTROPHILS # BLD AUTO: 10.61 K/UL (ref 2–7.15)
NEUTROPHILS NFR BLD: 74.4 % (ref 44–72)
NRBC # BLD AUTO: 0 K/UL
NRBC BLD-RTO: 0 /100 WBC
PLATELET # BLD AUTO: 281 K/UL (ref 164–446)
PMV BLD AUTO: 10.7 FL (ref 9–12.9)
POTASSIUM SERPL-SCNC: 3.7 MMOL/L (ref 3.6–5.5)
PROTHROMBIN TIME: 13.2 SEC (ref 12–14.6)
RBC # BLD AUTO: 4.88 M/UL (ref 4.2–5.4)
SARS-COV-2 RNA RESP QL NAA+PROBE: NOTDETECTED
SODIUM SERPL-SCNC: 140 MMOL/L (ref 135–145)
SPECIMEN SOURCE: NORMAL
WBC # BLD AUTO: 14.3 K/UL (ref 4.8–10.8)

## 2021-11-04 PROCEDURE — U0003 INFECTIOUS AGENT DETECTION BY NUCLEIC ACID (DNA OR RNA); SEVERE ACUTE RESPIRATORY SYNDROME CORONAVIRUS 2 (SARS-COV-2) (CORONAVIRUS DISEASE [COVID-19]), AMPLIFIED PROBE TECHNIQUE, MAKING USE OF HIGH THROUGHPUT TECHNOLOGIES AS DESCRIBED BY CMS-2020-01-R: HCPCS

## 2021-11-04 PROCEDURE — 85025 COMPLETE CBC W/AUTO DIFF WBC: CPT

## 2021-11-04 PROCEDURE — 80048 BASIC METABOLIC PNL TOTAL CA: CPT

## 2021-11-04 PROCEDURE — 85610 PROTHROMBIN TIME: CPT

## 2021-11-04 PROCEDURE — 83036 HEMOGLOBIN GLYCOSYLATED A1C: CPT

## 2021-11-04 PROCEDURE — 85730 THROMBOPLASTIN TIME PARTIAL: CPT

## 2021-11-04 PROCEDURE — 85652 RBC SED RATE AUTOMATED: CPT

## 2021-11-04 PROCEDURE — C9803 HOPD COVID-19 SPEC COLLECT: HCPCS

## 2021-11-04 PROCEDURE — 36415 COLL VENOUS BLD VENIPUNCTURE: CPT

## 2021-11-04 PROCEDURE — 93010 ELECTROCARDIOGRAM REPORT: CPT | Performed by: INTERNAL MEDICINE

## 2021-11-04 PROCEDURE — U0005 INFEC AGEN DETEC AMPLI PROBE: HCPCS

## 2021-11-04 PROCEDURE — 71046 X-RAY EXAM CHEST 2 VIEWS: CPT

## 2021-11-04 PROCEDURE — 93005 ELECTROCARDIOGRAM TRACING: CPT

## 2021-11-04 ASSESSMENT — FIBROSIS 4 INDEX: FIB4 SCORE: 1.74

## 2021-11-05 NOTE — OR NURSING
Preop tests including CXR and labwork faxed to Dr. Barton 11/5/2021 @ 0808.  This RN called Dr. Barton's office to update on WBC 14.3 and CXR 11/5/2021 @ 0811.

## 2021-11-06 ENCOUNTER — HOSPITAL ENCOUNTER (OUTPATIENT)
Facility: MEDICAL CENTER | Age: 86
End: 2021-11-06
Attending: ORTHOPAEDIC SURGERY | Admitting: ORTHOPAEDIC SURGERY
Payer: MEDICARE

## 2021-11-06 ENCOUNTER — APPOINTMENT (OUTPATIENT)
Dept: RADIOLOGY | Facility: MEDICAL CENTER | Age: 86
End: 2021-11-06
Attending: ORTHOPAEDIC SURGERY
Payer: MEDICARE

## 2021-11-06 ENCOUNTER — ANESTHESIA (OUTPATIENT)
Dept: SURGERY | Facility: MEDICAL CENTER | Age: 86
End: 2021-11-06
Payer: MEDICARE

## 2021-11-06 ENCOUNTER — ANESTHESIA EVENT (OUTPATIENT)
Dept: SURGERY | Facility: MEDICAL CENTER | Age: 86
End: 2021-11-06
Payer: MEDICARE

## 2021-11-06 VITALS
SYSTOLIC BLOOD PRESSURE: 179 MMHG | WEIGHT: 142.2 LBS | BODY MASS INDEX: 24.28 KG/M2 | RESPIRATION RATE: 18 BRPM | HEART RATE: 73 BPM | DIASTOLIC BLOOD PRESSURE: 77 MMHG | HEIGHT: 64 IN | TEMPERATURE: 98.3 F | OXYGEN SATURATION: 94 %

## 2021-11-06 DIAGNOSIS — G89.18 POST-OPERATIVE PAIN: ICD-10-CM

## 2021-11-06 LAB
APPEARANCE UR: CLEAR
BACTERIA #/AREA URNS HPF: NEGATIVE /HPF
BILIRUB UR QL STRIP.AUTO: NEGATIVE
COLOR UR: YELLOW
EPI CELLS #/AREA URNS HPF: NEGATIVE /HPF
ERYTHROCYTE [DISTWIDTH] IN BLOOD BY AUTOMATED COUNT: 54.7 FL (ref 35.9–50)
GLUCOSE BLD-MCNC: 88 MG/DL (ref 65–99)
GLUCOSE UR STRIP.AUTO-MCNC: NEGATIVE MG/DL
HCT VFR BLD AUTO: 46.7 % (ref 37–47)
HGB BLD-MCNC: 15.6 G/DL (ref 12–16)
HYALINE CASTS #/AREA URNS LPF: ABNORMAL /LPF
KETONES UR STRIP.AUTO-MCNC: NEGATIVE MG/DL
LEUKOCYTE ESTERASE UR QL STRIP.AUTO: ABNORMAL
MCH RBC QN AUTO: 32.5 PG (ref 27–33)
MCHC RBC AUTO-ENTMCNC: 33.4 G/DL (ref 33.6–35)
MCV RBC AUTO: 97.3 FL (ref 81.4–97.8)
MICRO URNS: ABNORMAL
MUCOUS THREADS #/AREA URNS HPF: ABNORMAL /HPF
NITRITE UR QL STRIP.AUTO: NEGATIVE
PH UR STRIP.AUTO: 6.5 [PH] (ref 5–8)
PLATELET # BLD AUTO: 253 K/UL (ref 164–446)
PMV BLD AUTO: 10.3 FL (ref 9–12.9)
PROT UR QL STRIP: NEGATIVE MG/DL
RBC # BLD AUTO: 4.8 M/UL (ref 4.2–5.4)
RBC # URNS HPF: ABNORMAL /HPF
RBC UR QL AUTO: ABNORMAL
SP GR UR STRIP.AUTO: 1.02
UROBILINOGEN UR STRIP.AUTO-MCNC: 1 MG/DL
WBC # BLD AUTO: 11.3 K/UL (ref 4.8–10.8)
WBC #/AREA URNS HPF: ABNORMAL /HPF

## 2021-11-06 PROCEDURE — 88307 TISSUE EXAM BY PATHOLOGIST: CPT

## 2021-11-06 PROCEDURE — 700102 HCHG RX REV CODE 250 W/ 637 OVERRIDE(OP): Performed by: ANESTHESIOLOGY

## 2021-11-06 PROCEDURE — A9270 NON-COVERED ITEM OR SERVICE: HCPCS | Performed by: ANESTHESIOLOGY

## 2021-11-06 PROCEDURE — 160039 HCHG SURGERY MINUTES - EA ADDL 1 MIN LEVEL 3: Performed by: ORTHOPAEDIC SURGERY

## 2021-11-06 PROCEDURE — 85027 COMPLETE CBC AUTOMATED: CPT

## 2021-11-06 PROCEDURE — L8699 PROSTHETIC IMPLANT NOS: HCPCS | Performed by: ORTHOPAEDIC SURGERY

## 2021-11-06 PROCEDURE — 500864 HCHG NEEDLE, SPINAL 18G: Performed by: ORTHOPAEDIC SURGERY

## 2021-11-06 PROCEDURE — 72100 X-RAY EXAM L-S SPINE 2/3 VWS: CPT

## 2021-11-06 PROCEDURE — 160046 HCHG PACU - 1ST 60 MINS PHASE II: Performed by: ORTHOPAEDIC SURGERY

## 2021-11-06 PROCEDURE — 160002 HCHG RECOVERY MINUTES (STAT): Performed by: ORTHOPAEDIC SURGERY

## 2021-11-06 PROCEDURE — 700117 HCHG RX CONTRAST REV CODE 255: Performed by: ORTHOPAEDIC SURGERY

## 2021-11-06 PROCEDURE — 88311 DECALCIFY TISSUE: CPT

## 2021-11-06 PROCEDURE — 82962 GLUCOSE BLOOD TEST: CPT

## 2021-11-06 PROCEDURE — 502240 HCHG MISC OR SUPPLY RC 0272: Performed by: ORTHOPAEDIC SURGERY

## 2021-11-06 PROCEDURE — 700111 HCHG RX REV CODE 636 W/ 250 OVERRIDE (IP): Performed by: ORTHOPAEDIC SURGERY

## 2021-11-06 PROCEDURE — 700111 HCHG RX REV CODE 636 W/ 250 OVERRIDE (IP): Performed by: ANESTHESIOLOGY

## 2021-11-06 PROCEDURE — 160025 RECOVERY II MINUTES (STATS): Performed by: ORTHOPAEDIC SURGERY

## 2021-11-06 PROCEDURE — 81001 URINALYSIS AUTO W/SCOPE: CPT

## 2021-11-06 PROCEDURE — 700105 HCHG RX REV CODE 258: Performed by: ORTHOPAEDIC SURGERY

## 2021-11-06 PROCEDURE — 700101 HCHG RX REV CODE 250: Performed by: ORTHOPAEDIC SURGERY

## 2021-11-06 PROCEDURE — 160047 HCHG PACU  - EA ADDL 30 MINS PHASE II: Performed by: ORTHOPAEDIC SURGERY

## 2021-11-06 PROCEDURE — 501838 HCHG SUTURE GENERAL: Performed by: ORTHOPAEDIC SURGERY

## 2021-11-06 PROCEDURE — 160035 HCHG PACU - 1ST 60 MINS PHASE I: Performed by: ORTHOPAEDIC SURGERY

## 2021-11-06 PROCEDURE — 700101 HCHG RX REV CODE 250: Performed by: ANESTHESIOLOGY

## 2021-11-06 PROCEDURE — 160036 HCHG PACU - EA ADDL 30 MINS PHASE I: Performed by: ORTHOPAEDIC SURGERY

## 2021-11-06 PROCEDURE — 160048 HCHG OR STATISTICAL LEVEL 1-5: Performed by: ORTHOPAEDIC SURGERY

## 2021-11-06 PROCEDURE — 160028 HCHG SURGERY MINUTES - 1ST 30 MINS LEVEL 3: Performed by: ORTHOPAEDIC SURGERY

## 2021-11-06 PROCEDURE — 160009 HCHG ANES TIME/MIN: Performed by: ORTHOPAEDIC SURGERY

## 2021-11-06 DEVICE — BONE CEMENT & MIXER FOR KYPHO: Type: IMPLANTABLE DEVICE | Site: SPINE LUMBAR | Status: FUNCTIONAL

## 2021-11-06 RX ORDER — SODIUM CHLORIDE, SODIUM LACTATE, POTASSIUM CHLORIDE, CALCIUM CHLORIDE 600; 310; 30; 20 MG/100ML; MG/100ML; MG/100ML; MG/100ML
INJECTION, SOLUTION INTRAVENOUS CONTINUOUS
Status: DISCONTINUED | OUTPATIENT
Start: 2021-11-06 | End: 2021-11-06 | Stop reason: HOSPADM

## 2021-11-06 RX ORDER — OXYCODONE HCL 5 MG/5 ML
10 SOLUTION, ORAL ORAL
Status: DISCONTINUED | OUTPATIENT
Start: 2021-11-06 | End: 2021-11-09 | Stop reason: HOSPADM

## 2021-11-06 RX ORDER — HALOPERIDOL 5 MG/ML
1 INJECTION INTRAMUSCULAR
Status: DISCONTINUED | OUTPATIENT
Start: 2021-11-06 | End: 2021-11-09 | Stop reason: HOSPADM

## 2021-11-06 RX ORDER — CEFAZOLIN SODIUM 2 G/100ML
2 INJECTION, SOLUTION INTRAVENOUS ONCE
Status: DISCONTINUED | OUTPATIENT
Start: 2021-11-06 | End: 2021-11-06 | Stop reason: HOSPADM

## 2021-11-06 RX ORDER — CEFAZOLIN SODIUM 1 G/3ML
INJECTION, POWDER, FOR SOLUTION INTRAMUSCULAR; INTRAVENOUS PRN
Status: DISCONTINUED | OUTPATIENT
Start: 2021-11-06 | End: 2021-11-06 | Stop reason: SURG

## 2021-11-06 RX ORDER — ONDANSETRON 2 MG/ML
4 INJECTION INTRAMUSCULAR; INTRAVENOUS
Status: DISCONTINUED | OUTPATIENT
Start: 2021-11-06 | End: 2021-11-09 | Stop reason: HOSPADM

## 2021-11-06 RX ORDER — HYDRALAZINE HYDROCHLORIDE 20 MG/ML
5 INJECTION INTRAMUSCULAR; INTRAVENOUS
Status: DISCONTINUED | OUTPATIENT
Start: 2021-11-06 | End: 2021-11-09 | Stop reason: HOSPADM

## 2021-11-06 RX ORDER — SODIUM CHLORIDE, SODIUM LACTATE, POTASSIUM CHLORIDE, CALCIUM CHLORIDE 600; 310; 30; 20 MG/100ML; MG/100ML; MG/100ML; MG/100ML
INJECTION, SOLUTION INTRAVENOUS CONTINUOUS
Status: DISCONTINUED | OUTPATIENT
Start: 2021-11-06 | End: 2021-11-09 | Stop reason: HOSPADM

## 2021-11-06 RX ORDER — DEXAMETHASONE SODIUM PHOSPHATE 4 MG/ML
INJECTION, SOLUTION INTRA-ARTICULAR; INTRALESIONAL; INTRAMUSCULAR; INTRAVENOUS; SOFT TISSUE PRN
Status: DISCONTINUED | OUTPATIENT
Start: 2021-11-06 | End: 2021-11-06 | Stop reason: SURG

## 2021-11-06 RX ORDER — HYDROCODONE BITARTRATE AND ACETAMINOPHEN 5; 325 MG/1; MG/1
1-2 TABLET ORAL EVERY 4 HOURS PRN
Qty: 35 TABLET | Refills: 0 | Status: SHIPPED | OUTPATIENT
Start: 2021-11-06 | End: 2021-11-13

## 2021-11-06 RX ORDER — ONDANSETRON 2 MG/ML
INJECTION INTRAMUSCULAR; INTRAVENOUS PRN
Status: DISCONTINUED | OUTPATIENT
Start: 2021-11-06 | End: 2021-11-06 | Stop reason: SURG

## 2021-11-06 RX ORDER — ROCURONIUM BROMIDE 10 MG/ML
INJECTION, SOLUTION INTRAVENOUS PRN
Status: DISCONTINUED | OUTPATIENT
Start: 2021-11-06 | End: 2021-11-06 | Stop reason: SURG

## 2021-11-06 RX ORDER — ACETAMINOPHEN 500 MG
500 TABLET ORAL ONCE
Status: COMPLETED | OUTPATIENT
Start: 2021-11-06 | End: 2021-11-06

## 2021-11-06 RX ORDER — OXYCODONE HCL 5 MG/5 ML
5 SOLUTION, ORAL ORAL
Status: DISCONTINUED | OUTPATIENT
Start: 2021-11-06 | End: 2021-11-09 | Stop reason: HOSPADM

## 2021-11-06 RX ORDER — BUPIVACAINE HYDROCHLORIDE AND EPINEPHRINE 5; 5 MG/ML; UG/ML
INJECTION, SOLUTION EPIDURAL; INTRACAUDAL; PERINEURAL
Status: DISCONTINUED | OUTPATIENT
Start: 2021-11-06 | End: 2021-11-06 | Stop reason: HOSPADM

## 2021-11-06 RX ORDER — LIDOCAINE HYDROCHLORIDE 40 MG/ML
SOLUTION TOPICAL PRN
Status: DISCONTINUED | OUTPATIENT
Start: 2021-11-06 | End: 2021-11-06 | Stop reason: SURG

## 2021-11-06 RX ORDER — LABETALOL HYDROCHLORIDE 5 MG/ML
5 INJECTION, SOLUTION INTRAVENOUS
Status: DISCONTINUED | OUTPATIENT
Start: 2021-11-06 | End: 2021-11-09 | Stop reason: HOSPADM

## 2021-11-06 RX ADMIN — DEXAMETHASONE SODIUM PHOSPHATE 4 MG: 4 INJECTION, SOLUTION INTRA-ARTICULAR; INTRALESIONAL; INTRAMUSCULAR; INTRAVENOUS; SOFT TISSUE at 14:04

## 2021-11-06 RX ADMIN — FENTANYL CITRATE 50 MCG: 50 INJECTION, SOLUTION INTRAMUSCULAR; INTRAVENOUS at 13:47

## 2021-11-06 RX ADMIN — HYDRALAZINE HYDROCHLORIDE 5 MG: 20 INJECTION INTRAMUSCULAR; INTRAVENOUS at 15:25

## 2021-11-06 RX ADMIN — LABETALOL HYDROCHLORIDE 5 MG: 5 INJECTION, SOLUTION INTRAVENOUS at 15:02

## 2021-11-06 RX ADMIN — VANCOMYCIN HYDROCHLORIDE 1000 MG: 1 INJECTION, POWDER, LYOPHILIZED, FOR SOLUTION INTRAVENOUS at 12:36

## 2021-11-06 RX ADMIN — SUGAMMADEX 200 MG: 100 INJECTION, SOLUTION INTRAVENOUS at 14:32

## 2021-11-06 RX ADMIN — ONDANSETRON 4 MG: 2 INJECTION INTRAMUSCULAR; INTRAVENOUS at 14:32

## 2021-11-06 RX ADMIN — FENTANYL CITRATE 50 MCG: 50 INJECTION, SOLUTION INTRAMUSCULAR; INTRAVENOUS at 14:23

## 2021-11-06 RX ADMIN — FENTANYL CITRATE 50 MCG: 50 INJECTION, SOLUTION INTRAMUSCULAR; INTRAVENOUS at 13:50

## 2021-11-06 RX ADMIN — LIDOCAINE HYDROCHLORIDE 0.5 ML: 10 INJECTION, SOLUTION EPIDURAL; INFILTRATION; INTRACAUDAL; PERINEURAL at 12:37

## 2021-11-06 RX ADMIN — ACETAMINOPHEN 500 MG: 500 TABLET ORAL at 12:34

## 2021-11-06 RX ADMIN — SODIUM CHLORIDE, POTASSIUM CHLORIDE, SODIUM LACTATE AND CALCIUM CHLORIDE: 600; 310; 30; 20 INJECTION, SOLUTION INTRAVENOUS at 12:34

## 2021-11-06 RX ADMIN — ROCURONIUM BROMIDE 40 MG: 10 INJECTION, SOLUTION INTRAVENOUS at 13:50

## 2021-11-06 RX ADMIN — PROPOFOL 100 MG: 10 INJECTION, EMULSION INTRAVENOUS at 13:50

## 2021-11-06 RX ADMIN — HYDRALAZINE HYDROCHLORIDE 5 MG: 20 INJECTION INTRAMUSCULAR; INTRAVENOUS at 15:35

## 2021-11-06 RX ADMIN — LIDOCAINE HYDROCHLORIDE 4 ML: 40 SOLUTION TOPICAL at 13:52

## 2021-11-06 RX ADMIN — CEFAZOLIN SODIUM 2 G: 2 INJECTION, SOLUTION INTRAVENOUS at 14:30

## 2021-11-06 RX ADMIN — CEFAZOLIN 2 G: 330 INJECTION, POWDER, FOR SOLUTION INTRAMUSCULAR; INTRAVENOUS at 13:55

## 2021-11-06 ASSESSMENT — FIBROSIS 4 INDEX: FIB4 SCORE: 1.75

## 2021-11-06 NOTE — OR NURSING
Transported pt to discharge. 2 rn check of dressings. Pt's right dressing is saturated w/ hematoma.     1600 Called Dr Barton' service. Dr Barton returns call. Informed him that right back dressing is saturated and there is a hematoma.  Dr Barton orders:  Place ice on site for 1/2 hours. Then removed dressing and place a pressure dressing.    Message relayed to Leora HURST. Order placed as nursing communication

## 2021-11-06 NOTE — OR SURGEON
Immediate Post OP Note    PreOp Diagnosis: L1 compression fracture      PostOp Diagnosis: same      Procedure(s):  KYPHOPLASTY - L1 AND BIOPSY - Wound Class: Clean    Surgeon(s):  Tim Barton M.D.    Anesthesiologist/Type of Anesthesia:  Anesthesiologist: Jt Lorenzana M.D./General    Surgical Staff:  Circulator: Duc Tolliver R.N.  Scrub Person: Master Sheppard  Radiology Technologist: Rob Rubio    Specimens removed if any:  ID Type Source Tests Collected by Time Destination   A : L1 Biopsy Other Other PATHOLOGY SPECIMEN Tim Barton M.D. 11/6/2021  2:32 PM        Estimated Blood Loss: 3cc    Findings: none    Complications: none        11/6/2021 2:39 PM Tim Barton M.D.

## 2021-11-06 NOTE — ANESTHESIA TIME REPORT
Anesthesia Start and Stop Event Times     Date Time Event    11/6/2021 1323 Ready for Procedure     1344 Anesthesia Start     1446 Anesthesia Stop        Responsible Staff  11/06/21    Name Role Begin End    Jt Lorenzana M.D. Anesth 1344 1446        Preop Diagnosis (Free Text):  Pre-op Diagnosis     L1 COMPRESSION FRACTURE, OSTEOPOROSIS        Preop Diagnosis (Codes):    Premium Reason  E. Weekend    Comments:

## 2021-11-06 NOTE — PROGRESS NOTES
1620 Pt in phase 2, resting in rMax,  at bedside. drsg on right lower back saturated.   Dr Barton aware. Orders  given to Tammy RN  (see note)  Will continue to monitor pt in phase 2

## 2021-11-06 NOTE — OR NURSING
Report to Leora BREWER RN    kyphoplasty of L1 by Dr Barton.  Gauze/bashir. Small amount drainage. Stable. No brace.    Hypertensive post op . Gave labetalol 5 iv and hydralazine 5 mg iv x 2.     MARTINEZ. Denies pain. Denies nausea. Declines offer of water/juice. Coughing due to throat irritation. Subsiding.    VS HR 60-70' /72 92-95% room air. RR 20.

## 2021-11-06 NOTE — DISCHARGE INSTRUCTIONS
ACTIVITY: Rest and take it easy for the first 24 hours.  A responsible adult is recommended to remain with you during that time.  It is normal to feel sleepy.  We encourage you to not do anything that requires balance, judgment or coordination.    MILD FLU-LIKE SYMPTOMS ARE NORMAL. YOU MAY EXPERIENCE GENERALIZED MUSCLE ACHES, THROAT IRRITATION, HEADACHE AND/OR SOME NAUSEA.    FOR 24 HOURS DO NOT:  Drive, operate machinery or run household appliances.  Drink beer or alcoholic beverages.   Make important decisions or sign legal documents.    SPECIAL INSTRUCTIONS:       Kyphoplasty, Care After  This sheet gives you information about how to care for yourself after your procedure. Your health care provider may also give you more specific instructions. If you have problems or questions, contact your health care provider.  What can I expect after the procedure?  After your procedure, it is common to have back pain.  Follow these instructions at home:  Medicines  · Take over-the-counter and prescription medicines only as told by your health care provider.  · Ask your health care provider if the medicine prescribed to you:  ? Requires you to avoid driving or using heavy machinery.  ? Can cause constipation. You may need to take steps to prevent or treat constipation, such as:  § Drink enough fluid to keep your urine pale yellow.  § Take over-the-counter or prescription medicines.  § Eat foods that are high in fiber, such as beans, whole grains, and fresh fruits and vegetables.  § Limit foods that are high in fat and processed sugars, such as fried or sweet foods.  Puncture site care    · Follow instructions from your health care provider about how to take care of your puncture site. Make sure you:  ? Wash your hands with soap and water before and after you change your bandage (dressing). If soap and water are not available, use hand .  ? Change your dressing as told by your health care provider.  ? Leave skin glue  or adhesive strips in place. These skin closures may need to be in place for 2 weeks or longer. If adhesive strip edges start to loosen and curl up, you may trim the loose edges. Do not remove adhesive strips completely unless your health care provider tells you to do that.  · Check your puncture site every day for signs of infection. Watch for:  ? Redness, swelling, or pain.  ? Fluid or blood.  ? Warmth.  ? Pus or a bad smell.  · Keep your dressing dry until your health care provider says that it can be removed.  Managing pain, stiffness, and swelling    · If directed, put ice on the painful area.  ? Put ice in a plastic bag.  ? Place a towel between your skin and the bag.  ? Leave the ice on for 20 minutes, 2-3 times a day.  Activity  · Rest your back and avoid intense physical activity for as long as told by your health care provider.  · Avoid bending, lifting, or twisting your back for as long as told by your health care provider.  · Return to your normal activities as told by your health care provider. Ask your health care provider what activities are safe for you.  · Do not lift anything that is heavier than 5 lb (2.2 kg). You may need to avoid heavy lifting for several weeks.  General instructions  · Do not use any products that contain nicotine or tobacco, such as cigarettes, e-cigarettes, and chewing tobacco. These can delay bone healing. If you need help quitting, ask your health care provider.  · Do not drive for 24 hours if you were given a sedative during your procedure.  · Keep all follow-up visits as told by your health care provider. This is important.  Contact a health care provider if:  · You have a fever or chills.  · You have redness, swelling, or pain at the site of your puncture.  · You have fluid, blood, or pus coming from the puncture site.  · You have pain that gets worse or does not get better with medicine.  · You develop numbness or weakness in any part of your body.  Get help right away  if:  · You have chest pain.  · You have difficulty breathing.  · You have weakness, numbness, or tingling in your legs.  · You cannot control your bladder or bowel movements.  · You suddenly become weak or numb on one side of your body.  · You become very confused.  · You have trouble speaking or understanding, or both.  Summary  · Follow instructions from your health care provider about how to take care of your puncture site.  · Take over-the-counter and prescription medicines only as told by your health care provider.  · Rest your back and avoid intense physical activity for as long as told by your health care provider.  · Contact a health care provider if you have pain that gets worse or does not get better with medicine.  · Keep all follow-up visits as told by your health care provider. This is important.      DIET: To avoid nausea, slowly advance diet as tolerated, avoiding spicy or greasy foods for the first day.  Add more substantial food to your diet according to your physician's instructions.  Babies can be fed formula or breast milk as soon as they are hungry.  INCREASE FLUIDS AND FIBER TO AVOID CONSTIPATION.    SURGICAL DRESSING/BATHING: Follow instructions given to you by MD. Call with any questions.    FOLLOW-UP APPOINTMENT:  A follow-up appointment should be arranged with your doctor in 1-2 weeks; call to schedule.    You should CALL YOUR PHYSICIAN if you develop:  Fever greater than 101 degrees F.  Pain not relieved by medication, or persistent nausea or vomiting.  Excessive bleeding (blood soaking through dressing) or unexpected drainage from the wound.  Extreme redness or swelling around the incision site, drainage of pus or foul smelling drainage.  Inability to urinate or empty your bladder within 8 hours.  Problems with breathing or chest pain.    You should call 911 if you develop problems with breathing or chest pain.  If you are unable to contact your doctor or surgical center, you should go to  the nearest emergency room or urgent care center.  Physician's telephone #: [phone]    If any questions arise, call your doctor.  If your doctor is not available, please feel free to call the Surgical Center at [phone]. The Contact Center is open Monday through Friday 7AM to 5PM and may speak to a nurse at [phone], or toll free at [phone].     A registered nurse may call you a few days after your surgery to see how you are doing after your procedure.    MEDICATIONS: Resume taking daily medication.  Take prescribed pain medication with food.  If no medication is prescribed, you may take non-aspirin pain medication if needed.  PAIN MEDICATION CAN BE VERY CONSTIPATING.  Take a stool softener or laxative such as senokot, pericolace, or milk of magnesia if needed.    Prescription given for Norco.  Last pain medication given at N/A (Can have next pain medication at anytime).    If your physician has prescribed pain medication that includes Acetaminophen (Tylenol), do not take additional Acetaminophen (Tylenol) while taking the prescribed medication.    Depression / Suicide Risk    As you are discharged from this [facility] facility, it is important to learn how to keep safe from harming yourself.    Recognize the warning signs:  · Abrupt changes in personality, positive or negative- including increase in energy   · Giving away possessions  · Change in eating patterns- significant weight changes-  positive or negative  · Change in sleeping patterns- unable to sleep or sleeping all the time   · Unwillingness or inability to communicate  · Depression  · Unusual sadness, discouragement and loneliness  · Talk of wanting to die  · Neglect of personal appearance   · Rebelliousness- reckless behavior  · Withdrawal from people/activities they love  · Confusion- inability to concentrate     If you or a loved one observes any of these behaviors or has concerns about self-harm, here's what you can  do:  · Talk about it- your feelings and reasons for harming yourself  · Remove any means that you might use to hurt yourself (examples: pills, rope, extension cords, firearm)  · Get professional help from the community (Mental Health, Substance Abuse, psychological counseling)  · Do not be alone:Call your Safe Contact- someone whom you trust who will be there for you.  · Call your local CRISIS HOTLINE 941-6964 or 498-676-2038  · Call your local Children's Mobile Crisis Response Team [place] (712) 100-4669 or www.[employer]  · Call the toll free National Suicide Prevention Hotlines   · National Suicide Prevention Lifeline 561-921-TXKS (6919)  · National Hope Line Network 800-SUICIDE (286-7072)

## 2021-11-06 NOTE — ANESTHESIA PROCEDURE NOTES
Airway    Date/Time: 11/6/2021 1:53 PM  Performed by: Jt Lorenzana M.D.  Authorized by: Jt oLrenzana M.D.     Location:  OR  Urgency:  Elective  Difficult Airway: No    Indications for Airway Management:  Anesthesia      Spontaneous Ventilation: absent    Sedation Level:  Deep  Preoxygenated: Yes    Patient Position:  Sniffing  Mask Difficulty Assessment:  2 - vent by mask + OA or adjuvant +/- NMBA  Final Airway Type:  Endotracheal airway  Final Endotracheal Airway:  ETT  Cuffed: Yes    Technique Used for Successful ETT Placement:  Direct laryngoscopy    Insertion Site:  Oral  Blade Type:  Shane  Laryngoscope Blade/Videolaryngoscope Blade Size:  3  ETT Size (mm):  7.0  Measured from:  Teeth  ETT to Teeth (cm):  20  Placement Verified by: auscultation and capnometry    Cormack-Lehane Classification:  Grade IIa - partial view of glottis  Number of Attempts at Approach:  1   Atraumatic DLx1

## 2021-11-06 NOTE — OP REPORT
DATE OF SERVICE:  11/06/2021     SERVICE:  Orthopedic Spine Surgery.     PREOPERATIVE DIAGNOSES:  1.  L1 compression fracture with burst features.  2.  Osteoporosis.  3.  Pathological fracture, L1.     POSTOPERATIVE DIAGNOSES:  1.  L1 compression fracture with burst features.  2.  Osteoporosis.  3.  Pathological fracture, L1.     PROCEDURES:  1.  L1 vertebral compression fracture surgical reduction and internal fixation   (kyphoplasty) using 20 mm Kyphon balloon tamps.  2.  Deep vertebral biopsy, L1.  3.  Greater than 1 hour use of operating room fluoroscopy.     SURGEON:  Tim Barton MD     ASSISTANT SURGEON:  None.     ANESTHESIA:  General.     ANESTHESIOLOGIST:  Jt Lorenzana MD     COMPLICATIONS:  None.     ESTIMATED BLOOD LOSS:  3 mL.     DESCRIPTION OF PROCEDURE:  After informed consent was obtained, the patient   was brought to the operating room and given general anesthetic.  She was   placed prone on the operating room table and given preoperative IV Ancef.    After the field was draped, a time-out was called correctly identifying the   patient and the procedure to be done.  We then brought in the AP and lateral   C-arms and the L1 vertebral body was visualized.  We injected the area of the   incisions with 20 mL of 0.5% Marcaine with epinephrine.  A small incision was   made to the right of midline at the level of L1.  A Jamshidi was then advanced   to the 1:30 position of the pedicle on the AP.  The Jamshidi was then   advanced to the medial border of the pedicle on the AP at the same time as it   was at the posterior margin of the vertebral body on the lateral.  The center   of the Jamshidi was then removed and a bone biopsy cannula was then placed   deep within the vertebral body and a bone biopsy was harvested for permanent   section evaluation.  A drill and bone void filler were then used to create a   path for the 20 mm balloon.  The 20 mm balloon was then placed and inflated to   44 psi.  The  guidepin was then removed from the balloon.     We then did the same sequence of events on the left side except the biopsy was   not taken.  Once both balloons were in place, we used sequentially higher   pressures to create a cavity and placed pressure on the endplates.  Cement was   then mixed.  Both balloons were removed and cement was placed under low   pressure using live direct fluoroscopic visualization during insertion of   cement.  Excellent fill was achieved.  Cement was then allowed to harden and   all instrumentation was removed.  The wounds were closed with benzoin and   Steri-Strips.  The procedure was terminated.  Wound dressings were applied.    No complications were experienced.  Blood loss was approximately 3 mL. The   patient was aroused from general anesthesia and brought in stable condition to   recovery room.        ______________________________  MD ANA YATES/MAC    DD:  11/06/2021 14:44  DT:  11/06/2021 15:53    Job#:  [id number]

## 2021-11-06 NOTE — ANESTHESIA POSTPROCEDURE EVALUATION
Patient: Linda Casas    Procedure Summary     Date: 11/06/21 Room / Location: [facility] 07 / SURGERY [facility]    Anesthesia Start: 1344 Anesthesia Stop: 1446    Procedure: KYPHOPLASTY - L1 AND BIOPSY (N/A Spine Lumbar) Diagnosis: (L1 COMPRESSION FRACTURE, OSTEOPOROSIS)    Surgeons: Tim Barton M.D. Responsible Provider: Jt Lorenzana M.D.    Anesthesia Type: general ASA Status: 2          Final Anesthesia Type: general  Last vitals  BP   Blood Pressure : (!) 182/85    Temp   36.8 °C (98.3 °F)    Pulse   90   Resp   (!) 27    SpO2   90 %      Anesthesia Post Evaluation    Patient location during evaluation: PACU  Patient participation: complete - patient participated  Level of consciousness: awake and alert    Airway patency: patent  Anesthetic complications: no  Cardiovascular status: hemodynamically stable  Respiratory status: acceptable  Hydration status: euvolemic    PONV: none          No complications documented.     Nurse Pain Score: 0 (NPRS)

## 2021-11-06 NOTE — ANESTHESIA PREPROCEDURE EVALUATION
L1 compression fracture    Relevant Problems   CARDIAC   (positive) Essential hypertension      Other   (positive) Anxiety   (positive) Closed stable burst fracture of first lumbar vertebra (HCC)       Physical Exam    Airway   Mallampati: II  TM distance: >3 FB  Neck ROM: full       Cardiovascular - normal exam  Rhythm: regular  Rate: normal  (-) murmur     Dental - normal exam           Pulmonary - normal exam  Breath sounds clear to auscultation     Abdominal    Neurological - normal exam                 Anesthesia Plan    ASA 2       Plan - general       Airway plan will be ETT          Induction: intravenous    Postoperative Plan: Postoperative administration of opioids is intended.    Pertinent diagnostic labs and testing reviewed    Informed Consent:    Anesthetic plan and risks discussed with patient.    Use of blood products discussed with: patient whom consented to blood products.

## 2021-11-06 NOTE — OR NURSING
Called . No answer. Left message. Pt doing well. Orders to go home. Will call when pt in discharge.

## 2021-11-06 NOTE — OR NURSING
Dressings checked. Left dressing w/ small amount drainage. Stable from previous check. Right dressing small amount new drainage.  Pt continues to deny pain. C/o of coughing from throat irritation.

## 2021-11-08 LAB — PATHOLOGY CONSULT NOTE: NORMAL

## 2021-11-09 ENCOUNTER — TELEPHONE (OUTPATIENT)
Dept: MEDICAL GROUP | Facility: PHYSICIAN GROUP | Age: 86
End: 2021-11-09

## 2022-01-06 ENCOUNTER — APPOINTMENT (OUTPATIENT)
Dept: MEDICAL GROUP | Facility: PHYSICIAN GROUP | Age: 87
End: 2022-01-06
Payer: MEDICARE

## 2022-01-21 ENCOUNTER — TELEPHONE (OUTPATIENT)
Dept: HEALTH INFORMATION MANAGEMENT | Facility: OTHER | Age: 87
End: 2022-01-21

## 2022-03-17 ENCOUNTER — OFFICE VISIT (OUTPATIENT)
Dept: MEDICAL GROUP | Facility: PHYSICIAN GROUP | Age: 87
End: 2022-03-17
Payer: MEDICARE

## 2022-03-17 VITALS
WEIGHT: 142.8 LBS | HEIGHT: 64 IN | TEMPERATURE: 98 F | OXYGEN SATURATION: 93 % | RESPIRATION RATE: 16 BRPM | HEART RATE: 83 BPM | SYSTOLIC BLOOD PRESSURE: 136 MMHG | BODY MASS INDEX: 24.38 KG/M2 | DIASTOLIC BLOOD PRESSURE: 80 MMHG

## 2022-03-17 DIAGNOSIS — I10 ESSENTIAL HYPERTENSION: ICD-10-CM

## 2022-03-17 DIAGNOSIS — F41.9 ANXIETY: Chronic | ICD-10-CM

## 2022-03-17 PROCEDURE — 99214 OFFICE O/P EST MOD 30 MIN: CPT | Performed by: STUDENT IN AN ORGANIZED HEALTH CARE EDUCATION/TRAINING PROGRAM

## 2022-03-17 RX ORDER — ESCITALOPRAM OXALATE 20 MG/1
20 TABLET ORAL DAILY
Qty: 30 TABLET | Refills: 2 | Status: SHIPPED | OUTPATIENT
Start: 2022-03-17 | End: 2022-06-29

## 2022-03-17 ASSESSMENT — PATIENT HEALTH QUESTIONNAIRE - PHQ9: CLINICAL INTERPRETATION OF PHQ2 SCORE: 0

## 2022-03-17 NOTE — PROGRESS NOTES
Subjective:     CC:  Diagnoses of Anxiety and Essential hypertension were pertinent to this visit.    HISTORY OF THE PRESENT ILLNESS: Patient is a 87 y.o. female. This pleasant patient is here today to establish care and discuss depression.  She is present today with her sister and sister-in-law who is a retired RN and provides much of her care.  Her prior PCP was Otto Odom MD.    1.  Depression  2.  Anxiety  Longstanding but worse at this point in time.  This is in the context of cognitive decline.  Her  Mr. Chilo Casas is in the hospital right now, hospice, he is unlikely to be able to return home.  He does call her frequently and is in a confused state and this greatly distresses Ms. Casas.  She had been taking fluoxetine 40 mg daily.  She finds that this has not helped her at all.  This is currently the only prescription medication that she is taking.  She desires to try another medication to see if this helps with her anxiety and depression.    Her diet has recently been exclusively cake and donuts.    3.  Essential hypertension  History of essential hypertension, not currently under any treatment.  Patient presents today 136/80.    Active Diagnosis:    Patient Active Problem List   Diagnosis   • Osteopenia   • Anxiety   • Essential hypertension   • Tear of medial cartilage or meniscus of knee, current   • Cholecystolithiasis   • Trigger finger, right middle finger   • At high risk for injury related to fall   • Other fatigue   • Memory loss   • Closed fracture of left distal radius   • Fall   • Closed stable burst fracture of first lumbar vertebra (HCC)        Current Outpatient Medications Ordered in Epic   Medication Sig Dispense Refill   • escitalopram (LEXAPRO) 20 MG tablet Take 1 Tablet by mouth every day. 30 Tablet 2   • meloxicam (MOBIC) 7.5 MG Tab Take 1 Tablet by mouth every day. (Patient not taking: No sig reported) 30 Tablet 1   • donepezil (ARICEPT) 5 MG Tab Take 1 tablet by  "mouth every evening. (Patient not taking: No sig reported) 30 tablet 5   • alendronate (FOSAMAX) 70 MG Tab Take 1 Tab by mouth every 7 days. (Patient not taking: No sig reported) 4 Tab 11   • Cyanocobalamin (VITAMIN B-12) 1000 MCG Tab Take 1,000 mcg by mouth every day. (Patient not taking: No sig reported)     • Cholecalciferol (VITAMIN D-3 PO) Take  by mouth every day. Takes two (Patient not taking: No sig reported)     • vitamin e (VITAMIN E) 400 UNIT Cap Take 400 Units by mouth every day. (Patient not taking: No sig reported)       No current [facility]-ordered facility-administered medications on file.     ROS:   Gen: No fevers/chills, no changes in weight  Pulm: No cough, unexplained SOB.  CV: No chest pain/pressure, no palpitations  GI: No nausea/vomiting, no diarrhea  : Baseline nocturia.  MSk: No myalgias  Skin: No rash/skin changes  Neuro: No headaches, no numbness/tingling      Objective:     Exam: /80 (BP Location: Left arm, Patient Position: Sitting, BP Cuff Size: Adult)   Pulse 83   Temp 36.7 °C (98 °F) (Temporal)   Resp 16   Ht 1.626 m (5' 4\")   Wt 64.8 kg (142 lb 12.8 oz)   SpO2 93%  Body mass index is 24.51 kg/m².    General: Normal appearing. No distress.  HEENT: Normocephalic. Eyes conjunctiva clear lids without ptosis, pupils equal and reactive to light accommodation.  Patient noted to be hard of hearing.  Neck: Supple without JVD. Thyroid is not enlarged.  Pulmonary: Clear to ausculation.  Normal effort. No rales, ronchi, or wheezing.  Cardiovascular: Regular rate and rhythm without murmur. Radial pulses are intact and equal bilaterally.  Abdomen: Nondistended.    Neurologic: Grossly nonfocal.  CN II through XII intact.  Lymph: No cervical or supraclavicular lymph nodes are palpable  Skin: Warm and dry.  No obvious lesions.  Musculoskeletal: Normal gait. No extremity cyanosis, clubbing, or edema.  Psych: Depressed mood and affect.  Patient is tearful when discussing her .  Alert " and oriented x3. Judgment and insight are normal.  She is concerned about her  returning home.  On one hand she feels she cannot possibly manage caring for him and on the other hand she has a great loyalty for him.    A chaperone was offered to the patient during today's exam. Patient declined chaperone.    Labs:   11/6/2021:  -CBC, elevated WBCs 11.3, RDW 54.7    11/4/2021:  -CMP showing elevated fasting glucose of 105, GFR 51  -Glycohemoglobin 5.6    Assessment & Plan:   87 y.o. female with the following -    1.  Anxiety  2.  Depression  -Chronic, in exacerbation.  -Discontinue fluoxetine  -Start Escitalopram 20 mg daily.  I have discussed with the patient this may take 2 to 4 weeks to have an effect.  -I did guide the patient and her associates to speak with the hospice nurse managing her 's care at the hospital and offer them some recommendations on how they may best arrange for Mr. Casas's care and thus evaluate some degree of Ms. Casas's distress.  -We discussed the need for a healthier diet.  The patient may continue with the cake and donuts as her weight is in the healthy range but I implored her to add lean protein and vegetables every day no matter what.  She seem to find this a reasonable recommendation.    3.  Healthcare maintenance  -Due to the patient's emotional state we will defer routine healthcare maintenance at this visit.    Return in about 3 months (around 6/17/2022), or if symptoms worsen or fail to improve.    Please note that this dictation was created using voice recognition software. I have made every reasonable attempt to correct obvious errors, but I expect that there are errors of grammar and possibly content that I did not discover before finalizing the note.      Duc Samuel PA-C 3/17/2022

## 2022-04-04 ENCOUNTER — APPOINTMENT (OUTPATIENT)
Dept: RADIOLOGY | Facility: MEDICAL CENTER | Age: 87
DRG: 246 | End: 2022-04-04
Attending: EMERGENCY MEDICINE
Payer: MEDICARE

## 2022-04-04 ENCOUNTER — APPOINTMENT (OUTPATIENT)
Dept: CARDIOLOGY | Facility: MEDICAL CENTER | Age: 87
DRG: 246 | End: 2022-04-04
Attending: INTERNAL MEDICINE
Payer: MEDICARE

## 2022-04-04 ENCOUNTER — HOSPITAL ENCOUNTER (INPATIENT)
Facility: MEDICAL CENTER | Age: 87
LOS: 6 days | DRG: 246 | End: 2022-04-10
Attending: EMERGENCY MEDICINE | Admitting: HOSPITALIST
Payer: MEDICARE

## 2022-04-04 ENCOUNTER — APPOINTMENT (OUTPATIENT)
Dept: RADIOLOGY | Facility: MEDICAL CENTER | Age: 87
DRG: 246 | End: 2022-04-04
Attending: INTERNAL MEDICINE
Payer: MEDICARE

## 2022-04-04 DIAGNOSIS — E87.70 HYPERVOLEMIA, UNSPECIFIED HYPERVOLEMIA TYPE: ICD-10-CM

## 2022-04-04 DIAGNOSIS — I50.21 CHF (CONGESTIVE HEART FAILURE), NYHA CLASS III, ACUTE, SYSTOLIC (HCC): ICD-10-CM

## 2022-04-04 DIAGNOSIS — R06.02 SOB (SHORTNESS OF BREATH): ICD-10-CM

## 2022-04-04 DIAGNOSIS — R53.81 DEBILITY: ICD-10-CM

## 2022-04-04 DIAGNOSIS — I10 HYPERTENSION, UNSPECIFIED TYPE: ICD-10-CM

## 2022-04-04 PROBLEM — I48.0 PAROXYSMAL ATRIAL FIBRILLATION (HCC): Status: ACTIVE | Noted: 2022-04-04

## 2022-04-04 PROBLEM — E53.8 B12 DEFICIENCY: Status: ACTIVE | Noted: 2022-04-04

## 2022-04-04 LAB
ALBUMIN SERPL BCP-MCNC: 4.4 G/DL (ref 3.2–4.9)
ALBUMIN/GLOB SERPL: 1.6 G/DL
ALP SERPL-CCNC: 65 U/L (ref 30–99)
ALT SERPL-CCNC: 7 U/L (ref 2–50)
ANION GAP SERPL CALC-SCNC: 13 MMOL/L (ref 7–16)
AST SERPL-CCNC: 14 U/L (ref 12–45)
BASOPHILS # BLD AUTO: 0.4 % (ref 0–1.8)
BASOPHILS # BLD: 0.05 K/UL (ref 0–0.12)
BILIRUB SERPL-MCNC: 0.7 MG/DL (ref 0.1–1.5)
BUN SERPL-MCNC: 13 MG/DL (ref 8–22)
CALCIUM SERPL-MCNC: 9.8 MG/DL (ref 8.5–10.5)
CHLORIDE SERPL-SCNC: 104 MMOL/L (ref 96–112)
CO2 SERPL-SCNC: 22 MMOL/L (ref 20–33)
CREAT SERPL-MCNC: 0.79 MG/DL (ref 0.5–1.4)
D DIMER PPP IA.FEU-MCNC: 0.5 UG/ML (FEU) (ref 0–0.5)
EKG IMPRESSION: NORMAL
EKG IMPRESSION: NORMAL
EOSINOPHIL # BLD AUTO: 0.17 K/UL (ref 0–0.51)
EOSINOPHIL NFR BLD: 1.5 % (ref 0–6.9)
ERYTHROCYTE [DISTWIDTH] IN BLOOD BY AUTOMATED COUNT: 48.9 FL (ref 35.9–50)
FOLATE SERPL-MCNC: 7.2 NG/ML
GFR SERPLBLD CREATININE-BSD FMLA CKD-EPI: 72 ML/MIN/1.73 M 2
GLOBULIN SER CALC-MCNC: 2.7 G/DL (ref 1.9–3.5)
GLUCOSE SERPL-MCNC: 99 MG/DL (ref 65–99)
HCT VFR BLD AUTO: 44.7 % (ref 37–47)
HGB BLD-MCNC: 14.4 G/DL (ref 12–16)
IMM GRANULOCYTES # BLD AUTO: 0.04 K/UL (ref 0–0.11)
IMM GRANULOCYTES NFR BLD AUTO: 0.3 % (ref 0–0.9)
LV EJECT FRACT  99904: 35
LV EJECT FRACT MOD 2C 99903: 27.56
LV EJECT FRACT MOD 4C 99902: 40.25
LV EJECT FRACT MOD BP 99901: 32.64
LYMPHOCYTES # BLD AUTO: 1.54 K/UL (ref 1–4.8)
LYMPHOCYTES NFR BLD: 13.2 % (ref 22–41)
MCH RBC QN AUTO: 30.8 PG (ref 27–33)
MCHC RBC AUTO-ENTMCNC: 32.2 G/DL (ref 33.6–35)
MCV RBC AUTO: 95.7 FL (ref 81.4–97.8)
MONOCYTES # BLD AUTO: 1.03 K/UL (ref 0–0.85)
MONOCYTES NFR BLD AUTO: 8.8 % (ref 0–13.4)
NEUTROPHILS # BLD AUTO: 8.85 K/UL (ref 2–7.15)
NEUTROPHILS NFR BLD: 75.8 % (ref 44–72)
NRBC # BLD AUTO: 0 K/UL
NRBC BLD-RTO: 0 /100 WBC
NT-PROBNP SERPL IA-MCNC: 5372 PG/ML (ref 0–125)
PLATELET # BLD AUTO: 280 K/UL (ref 164–446)
PMV BLD AUTO: 11.2 FL (ref 9–12.9)
POTASSIUM SERPL-SCNC: 3.7 MMOL/L (ref 3.6–5.5)
PROT SERPL-MCNC: 7.1 G/DL (ref 6–8.2)
RBC # BLD AUTO: 4.67 M/UL (ref 4.2–5.4)
SODIUM SERPL-SCNC: 139 MMOL/L (ref 135–145)
TROPONIN T SERPL-MCNC: 15 NG/L (ref 6–19)
TSH SERPL DL<=0.005 MIU/L-ACNC: 3.28 UIU/ML (ref 0.38–5.33)
VIT B12 SERPL-MCNC: 209 PG/ML (ref 211–911)
WBC # BLD AUTO: 11.7 K/UL (ref 4.8–10.8)

## 2022-04-04 PROCEDURE — 93005 ELECTROCARDIOGRAM TRACING: CPT

## 2022-04-04 PROCEDURE — 70450 CT HEAD/BRAIN W/O DYE: CPT | Mod: ME

## 2022-04-04 PROCEDURE — 83880 ASSAY OF NATRIURETIC PEPTIDE: CPT

## 2022-04-04 PROCEDURE — 85025 COMPLETE CBC W/AUTO DIFF WBC: CPT

## 2022-04-04 PROCEDURE — 99221 1ST HOSP IP/OBS SF/LOW 40: CPT | Mod: FS | Performed by: INTERNAL MEDICINE

## 2022-04-04 PROCEDURE — 84443 ASSAY THYROID STIM HORMONE: CPT

## 2022-04-04 PROCEDURE — 82607 VITAMIN B-12: CPT

## 2022-04-04 PROCEDURE — 85379 FIBRIN DEGRADATION QUANT: CPT

## 2022-04-04 PROCEDURE — A9270 NON-COVERED ITEM OR SERVICE: HCPCS | Performed by: INTERNAL MEDICINE

## 2022-04-04 PROCEDURE — 93010 ELECTROCARDIOGRAM REPORT: CPT | Performed by: INTERNAL MEDICINE

## 2022-04-04 PROCEDURE — 71045 X-RAY EXAM CHEST 1 VIEW: CPT

## 2022-04-04 PROCEDURE — 700111 HCHG RX REV CODE 636 W/ 250 OVERRIDE (IP): Performed by: INTERNAL MEDICINE

## 2022-04-04 PROCEDURE — 93306 TTE W/DOPPLER COMPLETE: CPT

## 2022-04-04 PROCEDURE — 93005 ELECTROCARDIOGRAM TRACING: CPT | Performed by: EMERGENCY MEDICINE

## 2022-04-04 PROCEDURE — 93005 ELECTROCARDIOGRAM TRACING: CPT | Performed by: HOSPITALIST

## 2022-04-04 PROCEDURE — 770020 HCHG ROOM/CARE - TELE (206)

## 2022-04-04 PROCEDURE — 99285 EMERGENCY DEPT VISIT HI MDM: CPT

## 2022-04-04 PROCEDURE — 84484 ASSAY OF TROPONIN QUANT: CPT

## 2022-04-04 PROCEDURE — 96374 THER/PROPH/DIAG INJ IV PUSH: CPT

## 2022-04-04 PROCEDURE — 93306 TTE W/DOPPLER COMPLETE: CPT | Mod: 26 | Performed by: INTERNAL MEDICINE

## 2022-04-04 PROCEDURE — 700111 HCHG RX REV CODE 636 W/ 250 OVERRIDE (IP): Performed by: EMERGENCY MEDICINE

## 2022-04-04 PROCEDURE — 99223 1ST HOSP IP/OBS HIGH 75: CPT | Mod: AI,GC | Performed by: HOSPITALIST

## 2022-04-04 PROCEDURE — 36415 COLL VENOUS BLD VENIPUNCTURE: CPT

## 2022-04-04 PROCEDURE — 700102 HCHG RX REV CODE 250 W/ 637 OVERRIDE(OP): Performed by: INTERNAL MEDICINE

## 2022-04-04 PROCEDURE — 82746 ASSAY OF FOLIC ACID SERUM: CPT

## 2022-04-04 PROCEDURE — 93005 ELECTROCARDIOGRAM TRACING: CPT | Performed by: INTERNAL MEDICINE

## 2022-04-04 PROCEDURE — 80053 COMPREHEN METABOLIC PANEL: CPT

## 2022-04-04 RX ORDER — SPIRONOLACTONE 25 MG/1
12.5 TABLET ORAL
Status: DISCONTINUED | OUTPATIENT
Start: 2022-04-04 | End: 2022-04-05

## 2022-04-04 RX ORDER — BISACODYL 10 MG
10 SUPPOSITORY, RECTAL RECTAL
Status: DISCONTINUED | OUTPATIENT
Start: 2022-04-04 | End: 2022-04-10 | Stop reason: HOSPADM

## 2022-04-04 RX ORDER — FUROSEMIDE 10 MG/ML
20 INJECTION INTRAMUSCULAR; INTRAVENOUS
Status: DISCONTINUED | OUTPATIENT
Start: 2022-04-05 | End: 2022-04-05

## 2022-04-04 RX ORDER — HYDRALAZINE HYDROCHLORIDE 20 MG/ML
10 INJECTION INTRAMUSCULAR; INTRAVENOUS EVERY 4 HOURS PRN
Status: DISCONTINUED | OUTPATIENT
Start: 2022-04-04 | End: 2022-04-10 | Stop reason: HOSPADM

## 2022-04-04 RX ORDER — LISINOPRIL 5 MG/1
5 TABLET ORAL
Status: DISCONTINUED | OUTPATIENT
Start: 2022-04-04 | End: 2022-04-04

## 2022-04-04 RX ORDER — CHOLECALCIFEROL (VITAMIN D3) 125 MCG
1000 CAPSULE ORAL DAILY
Status: DISCONTINUED | OUTPATIENT
Start: 2022-04-04 | End: 2022-04-05

## 2022-04-04 RX ORDER — AMOXICILLIN 250 MG
2 CAPSULE ORAL 2 TIMES DAILY
Status: DISCONTINUED | OUTPATIENT
Start: 2022-04-04 | End: 2022-04-10 | Stop reason: HOSPADM

## 2022-04-04 RX ORDER — FUROSEMIDE 10 MG/ML
40 INJECTION INTRAMUSCULAR; INTRAVENOUS
Status: DISCONTINUED | OUTPATIENT
Start: 2022-04-05 | End: 2022-04-04

## 2022-04-04 RX ORDER — ONDANSETRON 4 MG/1
4 TABLET, ORALLY DISINTEGRATING ORAL EVERY 4 HOURS PRN
Status: DISCONTINUED | OUTPATIENT
Start: 2022-04-04 | End: 2022-04-10 | Stop reason: HOSPADM

## 2022-04-04 RX ORDER — ONDANSETRON 2 MG/ML
4 INJECTION INTRAMUSCULAR; INTRAVENOUS EVERY 4 HOURS PRN
Status: DISCONTINUED | OUTPATIENT
Start: 2022-04-04 | End: 2022-04-10 | Stop reason: HOSPADM

## 2022-04-04 RX ORDER — POLYETHYLENE GLYCOL 3350 17 G/17G
1 POWDER, FOR SOLUTION ORAL
Status: DISCONTINUED | OUTPATIENT
Start: 2022-04-04 | End: 2022-04-10 | Stop reason: HOSPADM

## 2022-04-04 RX ORDER — FUROSEMIDE 10 MG/ML
40 INJECTION INTRAMUSCULAR; INTRAVENOUS ONCE
Status: COMPLETED | OUTPATIENT
Start: 2022-04-04 | End: 2022-04-04

## 2022-04-04 RX ORDER — LISINOPRIL 5 MG/1
5 TABLET ORAL NIGHTLY
Status: DISCONTINUED | OUTPATIENT
Start: 2022-04-04 | End: 2022-04-05

## 2022-04-04 RX ORDER — METOPROLOL SUCCINATE 25 MG/1
25 TABLET, EXTENDED RELEASE ORAL EVERY EVENING
Status: DISCONTINUED | OUTPATIENT
Start: 2022-04-04 | End: 2022-04-05

## 2022-04-04 RX ADMIN — LISINOPRIL 5 MG: 5 TABLET ORAL at 12:23

## 2022-04-04 RX ADMIN — ENOXAPARIN SODIUM 40 MG: 40 INJECTION SUBCUTANEOUS at 17:59

## 2022-04-04 RX ADMIN — METOPROLOL SUCCINATE 25 MG: 25 TABLET, EXTENDED RELEASE ORAL at 17:58

## 2022-04-04 RX ADMIN — LISINOPRIL 5 MG: 5 TABLET ORAL at 20:48

## 2022-04-04 RX ADMIN — CYANOCOBALAMIN TAB 500 MCG 1000 MCG: 500 TAB at 17:58

## 2022-04-04 RX ADMIN — SPIRONOLACTONE 12.5 MG: 25 TABLET, FILM COATED ORAL at 12:23

## 2022-04-04 RX ADMIN — SENNOSIDES AND DOCUSATE SODIUM 2 TABLET: 50; 8.6 TABLET ORAL at 18:00

## 2022-04-04 RX ADMIN — FUROSEMIDE 40 MG: 10 INJECTION, SOLUTION INTRAMUSCULAR; INTRAVENOUS at 09:30

## 2022-04-04 ASSESSMENT — ENCOUNTER SYMPTOMS
SORE THROAT: 0
CONSTIPATION: 0
FOCAL WEAKNESS: 0
DOUBLE VISION: 0
COUGH: 0
DIZZINESS: 0
BACK PAIN: 0
MYALGIAS: 0
NECK PAIN: 0
HEADACHES: 0
DIARRHEA: 0
CHILLS: 0
PALPITATIONS: 0
PND: 1
VOMITING: 0
ABDOMINAL PAIN: 0
SINUS PAIN: 0
ORTHOPNEA: 1
FEVER: 0
SENSORY CHANGE: 0
NAUSEA: 0
HEARTBURN: 0
SHORTNESS OF BREATH: 1
DEPRESSION: 0
SPEECH CHANGE: 0
SPUTUM PRODUCTION: 0
BLURRED VISION: 0

## 2022-04-04 ASSESSMENT — LIFESTYLE VARIABLES
HAVE YOU EVER FELT YOU SHOULD CUT DOWN ON YOUR DRINKING: NO
TOTAL SCORE: 0
HAVE PEOPLE ANNOYED YOU BY CRITICIZING YOUR DRINKING: NO
TOTAL SCORE: 0
EVER FELT BAD OR GUILTY ABOUT YOUR DRINKING: NO
HOW MANY TIMES IN THE PAST YEAR HAVE YOU HAD 5 OR MORE DRINKS IN A DAY: 0
TOTAL SCORE: 0
AVERAGE NUMBER OF DAYS PER WEEK YOU HAVE A DRINK CONTAINING ALCOHOL: 0
ON A TYPICAL DAY WHEN YOU DRINK ALCOHOL HOW MANY DRINKS DO YOU HAVE: 0
CONSUMPTION TOTAL: NEGATIVE
ALCOHOL_USE: NO
EVER HAD A DRINK FIRST THING IN THE MORNING TO STEADY YOUR NERVES TO GET RID OF A HANGOVER: NO
DOES PATIENT WANT TO STOP DRINKING: NO
SUBSTANCE_ABUSE: 0

## 2022-04-04 ASSESSMENT — COGNITIVE AND FUNCTIONAL STATUS - GENERAL
MOVING FROM LYING ON BACK TO SITTING ON SIDE OF FLAT BED: A LITTLE
DAILY ACTIVITIY SCORE: 23
TURNING FROM BACK TO SIDE WHILE IN FLAT BAD: A LITTLE
DRESSING REGULAR LOWER BODY CLOTHING: A LITTLE
SUGGESTED CMS G CODE MODIFIER DAILY ACTIVITY: CI
MOBILITY SCORE: 21
SUGGESTED CMS G CODE MODIFIER MOBILITY: CJ
CLIMB 3 TO 5 STEPS WITH RAILING: A LITTLE

## 2022-04-04 ASSESSMENT — PATIENT HEALTH QUESTIONNAIRE - PHQ9
2. FEELING DOWN, DEPRESSED, IRRITABLE, OR HOPELESS: NOT AT ALL
SUM OF ALL RESPONSES TO PHQ9 QUESTIONS 1 AND 2: 0
1. LITTLE INTEREST OR PLEASURE IN DOING THINGS: NOT AT ALL

## 2022-04-04 ASSESSMENT — PAIN DESCRIPTION - PAIN TYPE: TYPE: ACUTE PAIN

## 2022-04-04 NOTE — ASSESSMENT & PLAN NOTE
Patient initially presented with a wandering pacemaker, however on telemetry was noted to convert to A. fib.  She was asymptomatic (sleeping comfortably) rate controlled with a rate in the 80s, and hemodynamically stable with a blood pressure of 120s/70s.      -We will hold off of beta-blocker in the setting of acute CHF exacerbation  -[id number], patient should be started on anticoagulation, however given history of falls will have risk versus benefits discussion prior to initiating.

## 2022-04-04 NOTE — ED TRIAGE NOTES
"  Chief Complaint   Patient presents with   • Shortness of Breath     X 3 days     Pt to triage for above complaint. Pt reports SOB for the last 3 days. Denies any pain. Denies hx COPD, no known COVID exposure, COVID vaccinated x 3, +pneumonia and flu vaccinated. Pt speaking in full sentences, normal rate, depth and pattern of breathing, NADN. EKG obtained in triage.     Pt is alert/oriented, following commands, and answering questions appropriately. No acute distress noted in triage and respirations are even and unlabored.   Pt placed in lobby and educated on triage process. Pt encouraged to alert staff for any changes in condition.    .BP (!) 177/109   Pulse 86   Temp 36.3 °C (97.4 °F) (Temporal)   Resp 16   Ht 1.626 m (5' 4\")   Wt 63.5 kg (140 lb)   SpO2 91%   BMI 24.03 kg/m²     "

## 2022-04-04 NOTE — ASSESSMENT & PLAN NOTE
Improved  Longstanding history of uncontrolled hypertension (not on any antihypertensives) presents with /81 with SOB X 3 days, pulmonary edema on CXR. Concern for flash pulmonary edema. Improved blood pressures on medications.     -Start heart failure regimen: ACE inhibitor, Aldactone, Lasix 10 mg, daily  -Hydralazine as needed for blood pressure control

## 2022-04-04 NOTE — ASSESSMENT & PLAN NOTE
Family members note worsening memory loss in the patient.   Patient has had increased confusion over the past two days. Concern for acute delirium.   Work-up: Noted to have low B12, normal TSH and folate.  RPR: non-reactive, HIV: non-reactive. Consider B12 deficiency vs. Vascular dementia.   MOCA: 5  -Orthostatics  -Ammonia: pending  -UA  -Chest Xray  -Geriatrics following  -Repleting B12  -Starting thiamine  -Geriatrics on board, appreciate recommendation

## 2022-04-04 NOTE — ASSESSMENT & PLAN NOTE
Acute Decompensated Heart Failure: HFpEF (has uncontroled HTN, LVH, grade 2 diastolic dysfunction, LVEF 35-40%, decreased systolic function.   NYHA stage III, NTproBNP on admission: 5372, Chest X ray on admission: pulmonary edema b/l. EKG on admission: Wandering pacemaker, left ventricular hypertrophy, left axis deviation, left bundle branch block.Troponin on admission: 15  ECHO shoed decreased systolic function, LVEF 35-30%, global hypokinesis.     -Monitor on telemetry  -Strict I/Os and Daily weights   -2gm sodium diet, fluid restriction to 2000 ml/24 hour.   -Provide supplemental oxygen if hypoxic (SpO2<90%), orders for position patient upright.   -3.125 g Carvediolol  -Decreased to 10mg  Lasix oral  - lisinopril 5 mg daily, Aldactone 12.5 mg daily.  Will not start beta-blocker in the setting of acutely decompensated heart failure, recommend this be started outpatient.  -Follow BMP  -Left heart catheterization today  -Heart failure counseling and CHF education to be provided on the day of discharge.   -Cardiology consulted, to enroll patient with close outpatient cardiology follow-up

## 2022-04-04 NOTE — ASSESSMENT & PLAN NOTE
Patient has had > 2 falls in the past 1 year.  Her family is concerned that her dementia is worsening.   Head CT: diffuse atrophy and periventricular white matter changes consistent with chronic small vessel disease which may be a cause of dementia.   -Fall precautions

## 2022-04-04 NOTE — H&P
History & Physical Note    Date of Admission: 4/4/2022  Admission Status: Emergency  [facility] Team: UNSAL[facility]  Attending: Dr. Sal M.D.   Senior Resident: Dr. Vargas  Contact Number: [phone]    Chief Complaint: Shortness of breath     History of Present Illness (HPI): Linda is a 87 y.o. female with past medical history of HTN(not on meds), left wrist fracture(6/21), closed burst L1 fracture(11/21) s/p kyphoplasty who presented 4/4/2022 with shortness of breath X 3 days.   Patient has been experiencing significant orthopnea requiring(>2 pillows at night), proximal nocturnal dyspnea for the past 3 days.  She also feels short of breath with mild exertion like walking around the house.  For the past 2 weeks she has noticed some swelling in her legs.  She lives with her  and eats Meals on Wheels, and denies excessive salt intake or excessive use of canned foods.  She does drink 3 cans of 7-Up every day.  She denies of any chest pain, palpitations, lightheadedness, syncope, recent fever/chills, recent infections, recent medication changes, past history of heart attacks.  On chart review, I did note that she was seen by cardiology in 2020 for uncontrolled hypertension and started on amlodipine, and patient is unable to tell me when this medication was stopped.  Last echo in 2020 showed LVEF 60%, grade 2 diastolic dysfunction, mildly dilated LA, mild MR and mild TR, RVSP 35.  Note, patient had >2 falls in the last year, her family is concerned that her dementia is worsening.  On presentation patient was hypertensive 191/91 saturating 90% on room air.  Labs are notable for a white count of 11.7, troponin 15, BNP 5372.  Chest x-ray shows significant interstitial edema.  She received 40 mg IV Lasix.  Patient initially noted to have a wandering pacemaker on her EKG, however later converted to rate controlled hemodynamically stable atrial fibrillation.     Review of Systems:   Review of Systems   Constitutional:  Negative for chills, fever and malaise/fatigue.   HENT: Negative for sinus pain and sore throat.    Eyes: Negative for blurred vision and double vision.   Respiratory: Positive for shortness of breath. Negative for cough and sputum production.    Cardiovascular: Positive for orthopnea, leg swelling and PND. Negative for chest pain and palpitations.   Gastrointestinal: Negative for abdominal pain, constipation, diarrhea, heartburn, nausea and vomiting.   Genitourinary: Negative for dysuria.   Musculoskeletal: Negative for back pain, myalgias and neck pain.   Neurological: Negative for dizziness, sensory change, speech change, focal weakness and headaches.   Psychiatric/Behavioral: Negative for depression, substance abuse and suicidal ideas.       Past Medical History:   Past Medical History was reviewed with patient.   has a past medical history of Anxiety (9/14/2010), Cataract, High cholesterol, Hypertension, OSTEOPOROSIS (9/14/2010), Pain (03/04/14), Pain (11/04/2021), Snoring, and Urinary incontinence.    She has no past medical history of Breast cancer (HCC).    Past Surgical History: Past Surgical History was reviewed with patient.   has a past surgical history that includes hysterectomy, total abdominal (2000); bladder suspension (2001); knee arthroscopy (3/11/2014); regla by laparoscopy (5/23/2016); trigger finger release (Left, 5/29/2018); colon resection; appendectomy (1969); and kyphoplasty (N/A, 11/6/2021).    Medications: Medications have been reviewed with patient.  Prior to Admission Medications   Prescriptions Last Dose Informant Patient Reported? Taking?   Cholecalciferol (VITAMIN D-3 PO) 4/2/2022 at Unknown time  Yes No   Sig: Take 1 Capsule by mouth every day.   VITAMIN E PO 4/2/2022 at Unknown time  Yes No   Sig: Take 1 Capsule by mouth every day.   escitalopram (LEXAPRO) 20 MG tablet 4/2/2022 at Unknown time  No No   Sig: Take 1 Tablet by mouth every day.   multivitamin (THERAGRAN) Tab 4/2/2022 at  "Unknown time  Yes Yes   Sig: Take 1 Tablet by mouth every day.      Facility-Administered Medications: None        Allergies: Allergies have been reviewed with patient.  Allergies   Allergen Reactions   • Ampicillin Rash     Feels \"rotten\"        Family History:   family history includes No Known Problems in her father and mother.     Social History:   Tobacco: Denies   Alcohol: Denies   Recreational drugs (illegal and prescription):  Denies   Employment: Retired  Activity Level: Moderate   Living situation:  Lives with   Recent travel:  No  Primary Care Provider: reviewed Duc Samuel P.A.-C.  Other (stressors, spirituality, exposures):  No  Physical Exam:   Vitals:  Temp:  [36.3 °C (97.4 °F)-36.7 °C (98.1 °F)] 36.7 °C (98.1 °F)  Pulse:  [78-87] 78  Resp:  [16] 16  BP: (148-191)/() 148/82  SpO2:  [90 %-92 %] 92 %    Physical Exam  Constitutional:       General: She is not in acute distress.     Comments: Lying at 45 degrees   HENT:      Head: Normocephalic and atraumatic.      Nose: Nose normal.      Mouth/Throat:      Mouth: Mucous membranes are moist.   Eyes:      Extraocular Movements: Extraocular movements intact.      Conjunctiva/sclera: Conjunctivae normal.   Cardiovascular:      Rate and Rhythm: Normal rate and regular rhythm.      Pulses: Normal pulses.      Heart sounds: No murmur heard.  Pulmonary:      Effort: Pulmonary effort is normal.      Breath sounds: Rales (bilateral crackles) present.   Abdominal:      Palpations: Abdomen is soft.      Tenderness: There is no abdominal tenderness.   Musculoskeletal:      Cervical back: Normal range of motion and neck supple. No rigidity.      Right lower leg: Edema (trace) present.      Left lower leg: Edema (trace) present.   Skin:     General: Skin is warm.      Capillary Refill: Capillary refill takes less than 2 seconds.   Neurological:      General: No focal deficit present.      Mental Status: She is alert and oriented to person, place, " and time.   Psychiatric:         Mood and Affect: Mood normal.         Behavior: Behavior normal.         Labs:   Recent Labs     04/04/22  0725   WBC 11.7*   RBC 4.67   HEMOGLOBIN 14.4   HEMATOCRIT 44.7   MCV 95.7   MCH 30.8   RDW 48.9   PLATELETCT 280   MPV 11.2   NEUTSPOLYS 75.80*   LYMPHOCYTES 13.20*   MONOCYTES 8.80   EOSINOPHILS 1.50   BASOPHILS 0.40     Recent Labs     04/04/22  0725   SODIUM 139   POTASSIUM 3.7   CHLORIDE 104   CO2 22   GLUCOSE 99   BUN 13     Recent Labs     04/04/22  0725   ALBUMIN 4.4   TBILIRUBIN 0.7   ALKPHOSPHAT 65   TOTPROTEIN 7.1   ALTSGPT 7   ASTSGOT 14   CREATININE 0.79        EKG: Per my read, Wandering pacemaker, left ventricular hypertrophy, left axis deviation, left bundle branch block (seen on prior EKGs), ventricular rate 88.     Imaging:   DX-CHEST-PORTABLE (1 VIEW)   Final Result      Interstitial edema. No focal consolidation or pleural effusions.      EC-ECHOCARDIOGRAM COMPLETE W/O CONT    (Results Pending)   CT-HEAD W/O    (Results Pending)        Previous Data Review: reviewed    * CHF (congestive heart failure), NYHA class III, acute, systolic (HCC)- (present on admission)  Assessment & Plan  Acute Decompensated Heart Failure: Possibly HFpEF (has uncontroled HTN, LVH, grade 2 diastolic dysfunction at prior TTE) vs systolic(from newly worsened valvular dysfunction vs silent MI vs arrhythmia associated cardiomyopathy).   NYHA stage III, NTproBNP on admission: 5372, Chest X ray on admission: pulmonary edema b/l. EKG on admission: Wandering pacemaker, left ventricular hypertrophy, left axis deviation, left bundle branch block.Troponin on admission: 15  Last ECHO 5/2020: LVEF 60%, grade 2 diastolic dysfunction, mildly dilated LA, mild MR and mild TR, RVSP 35.    -Monitor on telemetry  -Strict I/Os and Daily weights   -2gm sodium diet, fluid restriction to 2000 ml/24 hour.   -Provide supplemental oxygen if hypoxic (SpO2<90%), orders for position patient upright.    -Echocardiogram  -Start Lasix 40 mg IV daily, lisinopril 5 mg daily, Aldactone 12.5 mg daily.  Will not start beta-blocker in the setting of acutely decompensated heart failure, recommend this be started outpatient.  -Follow BMP  -Heart failure counseling and CHF education to be provided on the day of discharge.   -Cardiology consulted, to enroll patient with close outpatient cardiology follow-up      Paroxysmal atrial fibrillation (HCC)- (present on admission)  Assessment & Plan  Patient initially presented with a wandering pacemaker, however on telemetry was noted to convert to A. fib.  She was asymptomatic (sleeping comfortably) rate controlled with a rate in the 80s, and hemodynamically stable with a blood pressure of 120s/70s.    -Follow-up on echocardiogram  -We will hold off of beta-blocker in the setting of acute CHF exacerbation  -[id number] 5, patient should be started on anticoagulation, however given history of falls will have risk versus benefits discussion prior to initiating.    Hypertensive emergency- (present on admission)  Assessment & Plan  Patient longstanding history of uncontrolled hypertension (not on any antihypertensives) presents with /81 with SOB X 3 days, pulmonary edema on CXR (?flash pulmonary edema).    -Start heart failure regimen: ACE inhibitor, Aldactone, Lasix   -Hydralazine as needed for blood pressure control    B12 deficiency- (present on admission)  Assessment & Plan  Noted to have B12 deficiency as part of her dementia work-up.    -Start B12 supplementation    Memory loss- (present on admission)  Assessment & Plan  Family members note worsening memory loss in the patient.    Work-up: Noted to have low B12, normal TSH and folate.    -Replete B12, will start on thiamine  -Head CT to rule out chronic SDH due to falls        At high risk for injury related to fall- (present on admission)  Assessment & Plan  Patient has had > 2 falls in the past 1 year.  Her family is  concerned that her dementia is worsening.    -Fall precautions  -Head CT to rule out chronic SDH/other hemorrhage prior to starting anticoagulation       Please note that this dictation was created using voice recognition software. I have made every reasonable attempt to correct obvious errors, but there may be errors of grammar and possibly content that I did not discover before finalizing the note.

## 2022-04-04 NOTE — CARE PLAN
The patient is Stable - Low risk of patient condition declining or worsening    Shift Goals  Clinical Goals: diurese  Patient Goals: rest  Family Goals: priya    Progress made toward(s) clinical / shift goals:    Problem: Knowledge Deficit - Standard  Goal: Patient and family/care givers will demonstrate understanding of plan of care, disease process/condition, diagnostic tests and medications  Outcome: Progressing     Problem: Fall Risk  Goal: Patient will remain free from falls  Outcome: Progressing       Patient is not progressing towards the following goals:

## 2022-04-05 ENCOUNTER — HOME HEALTH ADMISSION (OUTPATIENT)
Dept: HOME HEALTH SERVICES | Facility: HOME HEALTHCARE | Age: 87
End: 2022-04-05
Payer: MEDICARE

## 2022-04-05 LAB
ANION GAP SERPL CALC-SCNC: 14 MMOL/L (ref 7–16)
BUN SERPL-MCNC: 19 MG/DL (ref 8–22)
CALCIUM SERPL-MCNC: 10 MG/DL (ref 8.5–10.5)
CHLORIDE SERPL-SCNC: 103 MMOL/L (ref 96–112)
CHOLEST SERPL-MCNC: 173 MG/DL (ref 100–199)
CO2 SERPL-SCNC: 24 MMOL/L (ref 20–33)
CREAT SERPL-MCNC: 1.04 MG/DL (ref 0.5–1.4)
EKG IMPRESSION: NORMAL
ERYTHROCYTE [DISTWIDTH] IN BLOOD BY AUTOMATED COUNT: 47.7 FL (ref 35.9–50)
GFR SERPLBLD CREATININE-BSD FMLA CKD-EPI: 52 ML/MIN/1.73 M 2
GLUCOSE SERPL-MCNC: 100 MG/DL (ref 65–99)
HCT VFR BLD AUTO: 45.4 % (ref 37–47)
HDLC SERPL-MCNC: 69 MG/DL
HGB BLD-MCNC: 15 G/DL (ref 12–16)
HIV 1+2 AB+HIV1 P24 AG SERPL QL IA: NORMAL
LDLC SERPL CALC-MCNC: 88 MG/DL
MCH RBC QN AUTO: 30.6 PG (ref 27–33)
MCHC RBC AUTO-ENTMCNC: 33 G/DL (ref 33.6–35)
MCV RBC AUTO: 92.7 FL (ref 81.4–97.8)
PLATELET # BLD AUTO: 317 K/UL (ref 164–446)
PMV BLD AUTO: 11.1 FL (ref 9–12.9)
POTASSIUM SERPL-SCNC: 3.3 MMOL/L (ref 3.6–5.5)
RBC # BLD AUTO: 4.9 M/UL (ref 4.2–5.4)
SODIUM SERPL-SCNC: 141 MMOL/L (ref 135–145)
T PALLIDUM AB SER QL IA: NORMAL
TRIGL SERPL-MCNC: 80 MG/DL (ref 0–149)
WBC # BLD AUTO: 10.7 K/UL (ref 4.8–10.8)

## 2022-04-05 PROCEDURE — 770020 HCHG ROOM/CARE - TELE (206)

## 2022-04-05 PROCEDURE — A9270 NON-COVERED ITEM OR SERVICE: HCPCS | Performed by: STUDENT IN AN ORGANIZED HEALTH CARE EDUCATION/TRAINING PROGRAM

## 2022-04-05 PROCEDURE — 80048 BASIC METABOLIC PNL TOTAL CA: CPT

## 2022-04-05 PROCEDURE — 36415 COLL VENOUS BLD VENIPUNCTURE: CPT

## 2022-04-05 PROCEDURE — 80061 LIPID PANEL: CPT

## 2022-04-05 PROCEDURE — A9270 NON-COVERED ITEM OR SERVICE: HCPCS | Performed by: INTERNAL MEDICINE

## 2022-04-05 PROCEDURE — 99233 SBSQ HOSP IP/OBS HIGH 50: CPT | Performed by: INTERNAL MEDICINE

## 2022-04-05 PROCEDURE — 700102 HCHG RX REV CODE 250 W/ 637 OVERRIDE(OP): Performed by: INTERNAL MEDICINE

## 2022-04-05 PROCEDURE — 700111 HCHG RX REV CODE 636 W/ 250 OVERRIDE (IP): Performed by: INTERNAL MEDICINE

## 2022-04-05 PROCEDURE — 700102 HCHG RX REV CODE 250 W/ 637 OVERRIDE(OP)

## 2022-04-05 PROCEDURE — 85027 COMPLETE CBC AUTOMATED: CPT

## 2022-04-05 PROCEDURE — 86780 TREPONEMA PALLIDUM: CPT

## 2022-04-05 PROCEDURE — 93010 ELECTROCARDIOGRAM REPORT: CPT | Performed by: INTERNAL MEDICINE

## 2022-04-05 PROCEDURE — 700111 HCHG RX REV CODE 636 W/ 250 OVERRIDE (IP): Performed by: STUDENT IN AN ORGANIZED HEALTH CARE EDUCATION/TRAINING PROGRAM

## 2022-04-05 PROCEDURE — 700102 HCHG RX REV CODE 250 W/ 637 OVERRIDE(OP): Performed by: STUDENT IN AN ORGANIZED HEALTH CARE EDUCATION/TRAINING PROGRAM

## 2022-04-05 PROCEDURE — 87389 HIV-1 AG W/HIV-1&-2 AB AG IA: CPT

## 2022-04-05 PROCEDURE — 700111 HCHG RX REV CODE 636 W/ 250 OVERRIDE (IP)

## 2022-04-05 PROCEDURE — 99233 SBSQ HOSP IP/OBS HIGH 50: CPT | Mod: GC | Performed by: HOSPITALIST

## 2022-04-05 PROCEDURE — A9270 NON-COVERED ITEM OR SERVICE: HCPCS

## 2022-04-05 RX ORDER — LOSARTAN POTASSIUM 25 MG/1
25 TABLET ORAL EVERY EVENING
Status: DISCONTINUED | OUTPATIENT
Start: 2022-04-05 | End: 2022-04-07

## 2022-04-05 RX ORDER — FUROSEMIDE 20 MG/1
20 TABLET ORAL
Status: DISCONTINUED | OUTPATIENT
Start: 2022-04-06 | End: 2022-04-06

## 2022-04-05 RX ORDER — SPIRONOLACTONE 25 MG/1
25 TABLET ORAL
Status: DISCONTINUED | OUTPATIENT
Start: 2022-04-06 | End: 2022-04-10 | Stop reason: HOSPADM

## 2022-04-05 RX ORDER — CARVEDILOL 3.12 MG/1
3.12 TABLET ORAL 2 TIMES DAILY WITH MEALS
Status: DISCONTINUED | OUTPATIENT
Start: 2022-04-05 | End: 2022-04-10 | Stop reason: HOSPADM

## 2022-04-05 RX ORDER — POTASSIUM CHLORIDE 20 MEQ/1
40 TABLET, EXTENDED RELEASE ORAL ONCE
Status: COMPLETED | OUTPATIENT
Start: 2022-04-05 | End: 2022-04-05

## 2022-04-05 RX ORDER — CYANOCOBALAMIN 1000 UG/ML
1000 INJECTION, SOLUTION INTRAMUSCULAR; SUBCUTANEOUS DAILY
Status: COMPLETED | OUTPATIENT
Start: 2022-04-05 | End: 2022-04-07

## 2022-04-05 RX ADMIN — CARVEDILOL 3.12 MG: 3.12 TABLET, FILM COATED ORAL at 17:49

## 2022-04-05 RX ADMIN — POTASSIUM CHLORIDE 40 MEQ: 20 TABLET, EXTENDED RELEASE ORAL at 07:41

## 2022-04-05 RX ADMIN — CYANOCOBALAMIN TAB 500 MCG 1000 MCG: 500 TAB at 05:03

## 2022-04-05 RX ADMIN — CARVEDILOL 3.12 MG: 3.12 TABLET, FILM COATED ORAL at 09:55

## 2022-04-05 RX ADMIN — ENOXAPARIN SODIUM 40 MG: 40 INJECTION SUBCUTANEOUS at 17:49

## 2022-04-05 RX ADMIN — CYANOCOBALAMIN 1000 MCG: 1000 INJECTION INTRAMUSCULAR; SUBCUTANEOUS at 10:40

## 2022-04-05 RX ADMIN — APIXABAN 5 MG: 5 TABLET, FILM COATED ORAL at 10:28

## 2022-04-05 RX ADMIN — SENNOSIDES AND DOCUSATE SODIUM 2 TABLET: 50; 8.6 TABLET ORAL at 05:03

## 2022-04-05 RX ADMIN — FUROSEMIDE 20 MG: 10 INJECTION, SOLUTION INTRAMUSCULAR; INTRAVENOUS at 05:03

## 2022-04-05 RX ADMIN — LOSARTAN POTASSIUM 25 MG: 25 TABLET, FILM COATED ORAL at 17:49

## 2022-04-05 RX ADMIN — SPIRONOLACTONE 12.5 MG: 25 TABLET, FILM COATED ORAL at 05:03

## 2022-04-05 ASSESSMENT — ENCOUNTER SYMPTOMS
ORTHOPNEA: 0
FOCAL WEAKNESS: 0
CONSTIPATION: 0
ABDOMINAL PAIN: 0
DIZZINESS: 0
DIARRHEA: 0
MYALGIAS: 0
FEVER: 0
BACK PAIN: 0
HEADACHES: 0
PALPITATIONS: 0
CHILLS: 0
WEAKNESS: 0
VOMITING: 0
SHORTNESS OF BREATH: 1
HEARTBURN: 0
EYES NEGATIVE: 1
COUGH: 0
NAUSEA: 0
WEIGHT LOSS: 0
NECK PAIN: 0
HEMOPTYSIS: 0
TINGLING: 0
PSYCHIATRIC NEGATIVE: 1

## 2022-04-05 ASSESSMENT — CHA2DS2 SCORE
AGE 75 OR GREATER: YES
HYPERTENSION: YES
CHF OR LEFT VENTRICULAR DYSFUNCTION: YES
PRIOR STROKE OR TIA OR THROMBOEMBOLISM: NO
VASCULAR DISEASE: NO
SEX: FEMALE
CHA2DS2 VASC SCORE: 5
DIABETES: NO
AGE 65 TO 74: NO

## 2022-04-05 ASSESSMENT — PAIN DESCRIPTION - PAIN TYPE: TYPE: ACUTE PAIN

## 2022-04-05 NOTE — CARE PLAN
The patient is Stable - Low risk of patient condition declining or worsening    Shift Goals  Clinical Goals: Diurese  Patient Goals: sleep  Family Goals: priya    Progress made toward(s) clinical / shift goals:    Problem: Knowledge Deficit - Standard  Goal: Patient and family/care givers will demonstrate understanding of plan of care, disease process/condition, diagnostic tests and medications  Outcome: Progressing, can be forgetful at times     Problem: Fall Risk  Goal: Patient will remain free from falls  Outcome: Progressing       Patient is not progressing towards the following goals:

## 2022-04-05 NOTE — PROGRESS NOTES
4 Eyes Skin Assessment Completed by ARIS Us and Holland RN.    Head WDL  Ears WDL  Nose WDL  Mouth WDL  Neck WDL  Breast/Chest WDL  Shoulder Blades Redness and Blanching  Spine Redness and Blanching  (R) Arm/Elbow/Hand WDL  (L) Arm/Elbow/Hand WDL  Abdomen WDL  Groin WDL  Scrotum/Coccyx/Buttocks WDL  (R) Leg Edema  (L) Leg Edema  (R) Heel/Foot/Toe Redness and Blanching  (L) Heel/Foot/Toe Redness and Blanching          Devices In Places ECG and Tele Box      Interventions In Place Pillows and Heels Loaded W/Pillows    Possible Skin Injury No    Pictures Uploaded Into Epic N/A  Wound Consult Placed N/A  RN Wound Prevention Protocol Ordered No

## 2022-04-05 NOTE — DISCHARGE PLANNING
[facility] TCN chart review completed. Collaborated with Mario ALBERTO prior to meeting with the pt. The most current review of medical record, knowledge of pt's PLOF and social support, LACE+ score of 69, 6 clicks scores of 21 mobility were considered.      Pt seen at bedside. Introduced TCN program. Provided education regarding differences in post acute resources including IRF, SNF and HH. Discussed HT/SCP plan benefits including [facility] Beds and [facility] transitional care services. Pt verbalizes understanding, though would note pt does appear somewhat confused. Per chart review, this may be her baseline cognition and noted family concerns with worsening mentation/cognition PTA. Appears per chart review and after discussion with team/CM, pt is currently staying with son (and family is alternating with re: assistance given cognitive concerns). Would also note that [facility] CM has been involved with pt and family PTA and does appear to be aware of social concerns/family dynamics which are impacting care, etc. TCN will monitor for mobility concerns and possibly request PT and OT as needed (though currently no consults in chart). Discussed choice with Mario ALBERTO who is confirming from family with re: HH as unclear if pt able to effectively sign choice currently given her cognition (appears family ok with HH at time of writing this note per chart review and has been obtained). Current plan appears to be to return to home with HH and [facility] services with continued family support as prior (appears primarily son and daughter in law). TCN will continue to follow and collaborate with discharge planning team as additional post acute needs arise. Thank you.

## 2022-04-05 NOTE — DISCHARGE PLANNING
Anticipated Discharge Disposition: Home with HH when clear    Action: ARIS ALBERTO and SCP ELIAS Gregg discussed the case with patient's daughter-in-law Rachele.  Rachele explained that she is the patient's primary caretaker, and the patient's sister is available on her off days.  She should be able to take the patient home with support.  RN CM explained that HH and [facility] can be arranged, Rachele would appreciate that.  Rachele explained that [facility] was previously set up for the patient's  Chilo as well.  MERRILL Gregg will arrange [facility], HH referral sent to [facility] .    Barriers to Discharge: HH arrangements, medical clearance     Plan: Case coordination to continue to follow up with medical team to discuss discharge barriers.

## 2022-04-05 NOTE — DISCHARGE PLANNING
Anticipated Discharge Disposition: Home with HH    Action: Pt discussed during IDT rounds. Pt is currently on RA. Pt is currently living with son Chilo. Pt 6 clicks score is 21. MD to place HH referral. With [facility] insurance, [facility] is contracted.     Barriers to Discharge: HH order    Plan: continue to follow

## 2022-04-05 NOTE — NON-PROVIDER
Daily Progress Note:     Date of Service: 4/5/2022  Primary Team: [facility] IM Team Blue/Gray  Attending: ELISE Huang M.D.   Senior Resident: Dr. Tomas  Intern: Dr. Ramsey  Contact:  [phone]    Chief Complaint:   SOB    Subjective:   88 y/o female w/ hx of htn, recurrent falls and dementia admitted yesterday for worsening SOB for the past 3 days. Per ER note, she reported worsening orthopnea, proximal nocturnal dyspnea and SOB on exertion. She reported swelling in her legs for 2 wks. At admission, pt was hypertensive and had a wandering pacemaker. Later, tele showed SR w/ conversion to afib. Chest xray showed bilateral interstitial edema. Pt was started on Lasix. Today pt states her breathing is feeling better and was able to walk to the bathroom with no complication. She denies fever/chills, chest pain, palpitations, dizziness, abdominal pain or n/v. Pt is a poor historian most likely due to worsening dementia.    Consultants/Specialty:  Cardiology    Review of Systems:    Gen: denies fevers/chills  Cardio: denies chest pain, palpitations  Pulm: denies SOB  GI: denies abdominal pain, n/v  Neuro: denies dizziness, lightheadedness    Objective Data:   Physical Exam:   Vitals:   Temp:  [36.1 °C (97 °F)-36.9 °C (98.5 °F)] 36.1 °C (97 °F)  Pulse:  [74-87] 78  Resp:  [15-16] 16  BP: (144-191)/(78-91) 148/78  SpO2:  [88 %-94 %] 92 %    Gen: no acute distress  Cardiac: RRR, normal heart sounds  Pulm: no increased work of breathing, lung sounds clear to auscultation  Skin: cap refill<3 sec  Msk: no swelling to bilateral lower extremities  Psych: A and O x2 (person and place)    Labs:   ESTIMATED GFR [[id number]] (Abnormal) Collected: 04/05/22 0111   Order Status: Completed Updated: 04/05/22 0159    GFR (CKD-EPI) 52 Abnormal  mL/min/1.73 m 2      Basic Metabolic Panel (BMP) [[id number]] (Abnormal) Collected: 04/05/22 0111   Order Status: Completed Specimen: Blood Updated: 04/05/22 0159    Sodium 141 mmol/L      Potassium 3.3 Low  mmol/L     Chloride 103 mmol/L     Co2 24 mmol/L     Glucose 100 High  mg/dL     Bun 19 mg/dL     Creatinine 1.04 mg/dL     Calcium 10.0 mg/dL     Anion Gap 14.0     Lipid Profile [[id number]] Collected: 04/05/22 0111   Order Status: Completed Specimen: Blood Updated: 04/05/22 0159    Cholesterol,Tot 173 mg/dL     Triglycerides 80 mg/dL     HDL 69 mg/dL     LDL 88 mg/dL      CBC without Differential [[id number]] (Abnormal) Collected: 04/05/22 0111   Order Status: Completed Specimen: Blood Updated: 04/05/22 0137    WBC 10.7 K/uL     RBC 4.90 M/uL     Hemoglobin 15.0 g/dL     Hematocrit 45.4 %     MCV 92.7 fL     MCH 30.6 pg     MCHC 33.0 Low  g/dL     RDW 47.7 fL     Platelet Count 317 K/uL     MPV 11.1 fL      VITAMIN B12 [[id number]] (Abnormal) Collected: 04/04/22 0725   Order Status: Completed Updated: 04/04/22 1204    Vitamin B12 -True Cobalamin 209 Low  pg/mL        ED:  D dimer was normal  Elevated at 5372  Imaging:   EC-ECHOCARDIOGRAM COMPLETE W/O CONT [[id number]] Collected: 04/04/22 1635   Order Status: Completed Updated: 04/04/22 1924    Eject.Frac. MOD BP 32.64    Eject.Frac. MOD 4C 40.25    Eject.Frac. MOD 2C 27.56    Left Ventrical Ejection Fraction 35   Narrative:     Transthoracic   Echo Report       Echocardiography Laboratory     CONCLUSIONS   Compared to the images of the prior study 5/13/2020, there has been   reduction in systolic function.   Moderately reduced left ventricular systolic function.   The left ventricular ejection fraction is visually estimated to be 35%   to 40% with [id number] variation.   Global hypokinesis with regional variation.   Moderate aortic insufficiency. Pressure half time is  313 msec.   Right ventricular systolic pressure is estimated to be 45  mmHg.     JAMES SWAN   Exam Date:         04/04/2022                      16:35   Exam Location:     Inpatient   Priority:          Routine     Ordering Physician:        SARA GALICIA   Referring  Physician:       [id number] Vega   Sonographer:               Boom Hagan                              RDCS, RVT     Age:    87     Gender:    F   MRN:    [id number]   :    1934   BSA:    1.68   Ht (in):    64     Wt (lb):    140   Exam Type:     Complete     Indications:     Arrhythmia, Chest Pain, Shortness of breath   ICD Codes:       427.9  786.5  786.05     CPT Codes:       74875     BP:   148    /   82     HR:   85   Technical Quality:       Technically difficult study -                            adequate information is obtained     MEASUREMENTS  (Male / Female) Normal Values   2D ECHO   LV Diastolic Diameter PLAX        4.7 cm                4.2 - 5.9 / 3.9 - 5.3   cm   LV Systolic Diameter PLAX         4.1 cm                2.1 - 4.0 cm   IVS Diastolic Thickness           1.1 cm                   LVPW Diastolic Thickness          1.1 cm                   LVOT Diameter                     2.2 cm                   Estimated LV Ejection Fraction    35 %                     LV Ejection Fraction MOD BP       32.6 %                >= 55  %   LV Ejection Fraction MOD 4C       40.3 %                   LV Ejection Fraction MOD 2C       27.6 %                   IVC Diameter                      1.6 cm                     DOPPLER   AV Peak Velocity                  1.1 m/s                 AV Peak Gradient                  5.2 mmHg                 AV Mean Gradient                  2.8 mmHg                 AI Pressure Half Time             342 ms                   LVOT Peak Velocity                0.72 m/s                 AV Area Cont Eq vti               2.9 cm2                 MV Velocity Time Integral         28.5 cm                 Mitral E Point Velocity           0.77 m/s                 Mitral E to A Ratio               0.69                     MV Pressure Half Time             62.3 ms                 MV Area PHT                       3.5 cm2                 MV Deceleration Time              215 ms                    TR Peak Velocity                  286 cm/s                 PV Peak Velocity                  1 m/s                   PV Peak Gradient                  4 mmHg                   RVOT Peak Velocity                0.75 m/s                   * Indicates values subject to auto-interpretation   LV EF:  35    %     FINDINGS   Left Ventricle   Normal left ventricular chamber size. Normal left ventricular wall   thickness. Moderately reduced left ventricular systolic function. The   left ventricular ejection fraction is visually estimated to be 35% to   40%. Global hypokinesis with regional variation. Diastolic function is   difficult to assess with arrhythmia.     Right Ventricle   Normal right ventricular size. Normal right ventricular systolic   function.     Right Atrium   Normal right atrial size. Normal inferior vena cava size and   inspiratory collapse.     Left Atrium   Normal left atrial size. Left atrial volume index is  27  mL/sq m.     Mitral Valve   Mild mitral annular calcification. No mitral stenosis. Mild mitral   regurgitation.     Aortic Valve   The aortic valve is not well visualized. No aortic valve stenosis.   Moderate aortic insufficiency. Pressure half time is  313 msec.     Tricuspid Valve   The tricuspid valve is not well visualized. No tricuspid stenosis.   Moderate tricuspid regurgitation. Right ventricular systolic pressure   is estimated to be 45  mmHg.     Pulmonic Valve   The pulmonic valve is not well visualized. No pulmonic stenosis. Mild   pulmonic insufficiency.     Pericardium   Normal pericardium without effusion.     Aorta   The ascending aorta diameter is  2.9 cm.      CT-HEAD W/O [[id number]] Resulted: 04/04/22 1614   Order Status: Completed Updated: 04/04/22 1616   Narrative:       4/4/2022 4:03 PM     HISTORY/REASON FOR EXAM:  Facial fracture, follow up; Head trauma, minor (Age >= 65y)     TECHNIQUE/EXAM DESCRIPTION AND NUMBER OF VIEWS:  CT scan of the head without  contrast.     Contiguous 5 mm axial sections were obtained from the skull base through the vertex.     Up to date radiation dose reduction adjustments have been utilized to meet ALARA standards for radiation dose reduction.     COMPARISON: MRI 7/31/2021     FINDINGS:     No acute hemorrhage, mass-effect, or midline shift.   There is diffuse atrophy.   Periventricular white matter changes are consistent with chronic small vessel disease.   Ventricles and cisterns are patent. No ischemia or intracranial mass   is identified. The paranasal sinuses and mastoid air cells are clear.      Impression:         1.  No acute intracranial process.     2. Diffuse atrophy and periventricular white matter changes consistent with chronic small vessel disease.     DX-CHEST-PORTABLE (1 VIEW) [[id number]] Resulted: 04/04/22 0822   Order Status: Completed Updated: 04/04/22 0824   Narrative:       4/4/2022 7:55 AM     HISTORY/REASON FOR EXAM:  Shortness of Breath.     TECHNIQUE/EXAM DESCRIPTION AND NUMBER OF VIEWS:   Single portable view of the chest.     COMPARISON:  11/4/2021.     FINDINGS:     LUNGS: Bilateral interstitial edema. No focal consolidation. No effusions.   PNEUMOTHORAX: None.   LINES AND TUBES: None.   MEDIASTINUM: No cardiomegaly. Atherosclerosis.   MUSCULOSKELETAL STRUCTURES: No acute displaced fracture.   Augmentation of upper lumbar vertebral body. Cholecystectomy.    Impression:       Interstitial edema. No focal consolidation or pleural effusions.       Problem Representation: 88 y/o female w/ hx of htn and dementia admitted for SOB. Echo shows HFrEF and chest xray shows cardiomegaly. Pt's low B12 and changes consistent w/ small vessel disease can indicate a secondary cause of dementia.    CHF (congestive heart failure), NYHA class III, acute, systolic (HCC)- (present on admission)  Assessment & Plan  Acute HFrEF  Repeat Echo shows EF 35-40%, moderately reduced L ventricular systolic function, global  hypokinesis  NYHA stage III, NTproBNP on admission: 5372, Chest X ray on admission showed pulmonary edema. EKG on admission: Wandering pacemaker, left ventricular hypertrophy, left axis deviation, left bundle branch block.Troponin on admission: 15  Last ECHO 5/2020: LVEF 60%, grade 2 diastolic dysfunction, mildly dilated LA, mild MR and mild TR, RVSP 35.  -Monitor on telemetry  -Strict I/Os and Daily weights   -2gm sodium diet, fluid restriction to 2000 ml/24 hour.   -Supplemental oxygen PRN for hypoxia  -3.125 g carvedilol  -20 mg PO Lasix, lisinopril 5 mg daily, Aldactone 12.5 mg daily.    -Monitor BMP  -Cardiology consulted     Paroxysmal atrial fibrillation (HCC)- (present on admission)  Assessment & Plan  EKGs showed frequent atrial ectopy  Patient initially presented with a wandering pacemaker, however on telemetry was noted to convert to A fib.  -[id number] 5, patient should be started on anticoagulation, pt's risk of stroke outweighs pt's hx of recurrent falls  -Avoid beta blocker in setting of acute decompensated CHF  -Per cardiology, discontinue PO anticoagulation due to unclear afib and likelihood of needing invasive procedures     Hypertensive emergency- (present on admission)  Assessment & Plan  Patient longstanding history of uncontrolled hypertension (not on any antihypertensives) presents with /81 with SOB X 3 days, pulmonary edema on CXR shows possible flash pulmonary edema. Head CT shows diffuse atrophy and periventricular white matter changes consistent w/ chronic small vessel disease     -Start heart failure regimen: ACE inhibitor, Aldactone, Lasix   -Hydralazine as needed for blood pressure control     B12 deficiency- (present on admission)  Assessment & Plan  Noted to have B12 deficiency as part of her dementia work-up.  -Start B12 supplementation     Memory loss- (present on admission)  Assessment & Plan  Family members note worsening memory loss in the patient.    Pt has low B12, normal  TSH and folate.  Heat CT shows diffuse atrophy and periventricular white matter changes consistent w/ chronic small vessel disease  Differential includes B12 deficiency vs vascular dementia  -Replete B12, will start on thiamine  -RPR non reactive     At high risk for injury related to fall- (present on admission)  Assessment & Plan  Patient has had > 2 falls in the past 1 year.  Her family is concerned that her dementia is worsening.   -Fall precautions  -Head CT shows no evidence of SDH/other hemorrhage

## 2022-04-05 NOTE — PROGRESS NOTES
Report received at bedside, pt care assumed, tele box on. Pt aaox4 forgetful at times, no signs of distress noted at this time. Patient resting comfortably in bed. POC discussed with pt and verbalizes no questions. Pt c/o of no pain. Pt denies any additional needs at this time. Bed in lowest position, pt educated on fall risk and verbalized understanding, call light within reach, bed alarm plugged in and on. Will continue hourly rounding.

## 2022-04-05 NOTE — FACE TO FACE
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 Pt found sleeping with no family present. Pt agreeable to therapy session.     Pain/Comfort:  · Pain Rating 1: 0/10  · Pain Rating Post-Intervention 1: 0/10    Patients cultural, spiritual, [employer] conflicts given the current situation: none stated     Objective:     Patient found with: peripheral IV    General Precautions: Standard, fall   Orthopedic Precautions:N/A   Braces: N/A     Occupational Performance:    Bed Mobility:    · Patient completed Supine to Sit with supervision with HOB elevated and siderail.   · Patient completed Sit to Supine with supervision     Functional Mobility/Transfers:  · Patient completed Sit <> Stand Transfer with supervision  with  no assistive device ; increased time prior to standing due to mild dizziness upon sitting EOB.   · Functional Mobility: Pt performed functional mobility within room simulating household mobility using no AD, x 1 minor LOB noted but pt able to recover (I)'d . Pt declined to ambulate within hallway 2* feeling "weak" and nauseous    Activities of Daily Living:  · No ADLs performed this date. Pt declined to perform at this time 2* stating she would complete later in AM.     Patient left supine with all lines intact, call button in reach and RN notified    AMPA 6 Click:  AMPAC Total Score: 24    Treatment & Education:  - Pt declined to complete self-care tasks standing at the sink this date.   - Pt educated on the importance of OOB mobility to prevent functional decline while receiving treatment.   - Pt education on calling RN or having family member to ambulate with the pt in hallway for line management and safety.   - " Face to Face Supporting Documentation - Home Health    The encounter with this patient was in whole or in part the primary reason for home health admission.    Date of encounter:   Patient:                    MRN:                       YOB: 2022  Linda Casas  [id number]  4/18/1934     Home health to see patient for:  Physical Therapy evaluation and treatment and Occupational therapy evaluation and treatment    Skilled need for:  Exacerbation of Chronic Disease State Debility    Skilled nursing interventions to include:  Line/Drain/Airway education and care and Comment: n/a    Homebound status evidenced by:  Need the aid of supportive devices such as crutches, canes, wheelchairs or walkers. Leaving home requires a considerable and taxing effort. There is a normal inability to leave the home.    Community Physician to provide follow up care: Duc Samuel P.A.-C.     Optional Interventions? No      I certify the face to face encounter for this home health care referral meets the CMS requirements and the encounter/clinical assessment with the patient was, in whole, or in part, for the medical condition(s) listed above, which is the primary reason for home health care. Based on my clinical findings: the service(s) are medically necessary, support the need for home health care, and the homebound criteria are met.  I certify that this patient has had a face to face encounter by myself.  Мария Tomas M.D. - NPI: [id number]     "Pt educated to wear mask and gloves when ambulating outside of pt's room.   Education:    Assessment:     Noreen Mac is a 67 y.o. 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  She presents with performance deficits affecting function are weakness.  Pt tolerated session well but limited mobility this date 2* pt feeling "weak". Pt will continue to benefit from skilled OT services to ensure maximal safety and prevent functional decline while receiving treatment.     Rehab Prognosis:  good; patient would benefit from acute skilled OT services to address these deficits and reach maximum level of function.       Plan:     Patient to be seen 1 x/week to address the above listed problems via self-care/home management, therapeutic activities, therapeutic exercises  · Plan of Care Expires: 06/29/18  · Plan of Care Reviewed with: patient    This Plan of care has been discussed with the patient who was involved in its development and understands and is in agreement with the identified goals and treatment plan    GOALS:    Occupational Therapy Goals        Problem: Occupational Therapy Goal    Goal Priority Disciplines Outcome Interventions   Occupational Therapy Goal     OT, PT/OT Ongoing (interventions implemented as appropriate)    Description:  Goals to be met by: 6/18/18     Patient will demonstrate no loss of functional independence with ADLs by performing basic ADL's at mod I/I level:                          Time Tracking:     OT Date of Treatment: 06/12/18  OT Start Time: 0805  OT Stop Time: 0813  OT Total Time (min): 8 min    Billable Minutes:Therapeutic Activity 8    Lupe Wild OT  6/12/2018    "

## 2022-04-05 NOTE — DISCHARGE PLANNING
ATTN: Case Management  RE: Referral for Home Health    As of 04/05/2022  we have accepted the Home Health referral for the patient listed above.    A [facility] clinician will be out to see the patient within 48 hours. If you have any questions or concerns regarding the patient’s transition to Home Health, please do not hesitate to contact us at [phone].      We look forward to collaborating with you,  [facility] Health Team

## 2022-04-05 NOTE — PROGRESS NOTES
Bedside report received and patient care assumed. Pt is resting in bed, , with no signs of pain, and is on RA. Tele box on. All fall precautions are in place, belongings at bedside table.  Pt call light within reach. Will continue to monitor.

## 2022-04-05 NOTE — PROGRESS NOTES
Daily Progress Note:     Date of Service: 4/5/2022  Primary Team: [facility] Team   Attending: ELISE Huang M.D.   Senior Resident: Dr. ORLANDO Tomas  Intern: Dr. PURNIMA Ramsey  Contact:  [phone]    Chief Complaint:   Shortness of breath     ID: Mrs. Linda Casas is a 87 year old female presented with shortness of breath for three days, with proximal nocturnal dyspnea and increased swelling in her legs. Found to be in CHF exacerbation and to have wandering pacemaker syndrome.  She has a past medical history of hypertension, left wrist fracture (6/21), s/p kyphoplasty and diastolic heart failure( ECHO 2020 LVEF 60%).       Interval history:     She has been doing well overall, she states that she feels that her shortness of breath has improved. She denies any chest pain and has a hard time describing symptoms and current situation. She is AOx3, but has a hard time with short term memory or discussing complex events. She is concerned about taking care of her  who is currently also hospitalized.     ECHO 4/4/2022: Decreased systolic function. Moderately decreased left ventricular systolic function. LVEF 35-40%, with global hypokinesis.       Consultants/Specialty:  Interventional Cardiology    Review of Systems:    Review of Systems   Constitutional: Negative for chills, fever, malaise/fatigue and weight loss.   HENT: Negative.    Eyes: Negative.    Respiratory: Positive for shortness of breath (Improving). Negative for cough and hemoptysis.    Cardiovascular: Positive for chest pain (Resolving) and leg swelling (Resolving). Negative for palpitations and orthopnea.   Gastrointestinal: Negative for abdominal pain, constipation, diarrhea, heartburn, nausea and vomiting.   Genitourinary: Negative.    Musculoskeletal: Negative for back pain, joint pain, myalgias and neck pain.   Neurological: Negative for dizziness, tingling, focal weakness, weakness and headaches.   Endo/Heme/Allergies: Negative.     Psychiatric/Behavioral: Negative.        Objective:   Physical Exam:   Vitals:   Temp:  [36.1 °C (97 °F)-36.9 °C (98.5 °F)] 36.6 °C (97.9 °F)  Pulse:  [63-84] 80  Resp:  [15-16] 16  BP: (130-151)/(71-91) 131/73  SpO2:  [87 %-94 %] 89 %    Physical Exam  Constitutional:       General: She is not in acute distress.     Appearance: Normal appearance. She is not ill-appearing, toxic-appearing or diaphoretic.   HENT:      Right Ear: Tympanic membrane normal.      Left Ear: Tympanic membrane normal.      Nose: Nose normal. No congestion or rhinorrhea.      Mouth/Throat:      Pharynx: No oropharyngeal exudate or posterior oropharyngeal erythema.   Eyes:      General: No scleral icterus.        Right eye: No discharge.         Left eye: No discharge.      Extraocular Movements: Extraocular movements intact.      Pupils: Pupils are equal, round, and reactive to light.   Cardiovascular:      Rate and Rhythm: Normal rate. Rhythm irregular.      Pulses: Normal pulses.      Heart sounds: No murmur heard.    No gallop.   Pulmonary:      Effort: Pulmonary effort is normal. No respiratory distress.      Breath sounds: Rales present. No wheezing.   Abdominal:      General: Abdomen is flat. There is no distension.      Palpations: Abdomen is soft. There is no mass.      Tenderness: There is no abdominal tenderness. There is no guarding or rebound.      Hernia: No hernia is present.   Genitourinary:     General: Normal vulva.   Musculoskeletal:         General: No swelling or tenderness.      Cervical back: Normal range of motion. No rigidity or tenderness.      Right lower leg: Edema (Trace edema) present.      Left lower leg: Edema (Trace edema) present.   Skin:     General: Skin is warm and dry.   Neurological:      Mental Status: She is alert. Mental status is at baseline.           Labs:   Recent Labs     04/04/22  0725 04/05/22  0111   WBC 11.7* 10.7   RBC 4.67 4.90   HEMOGLOBIN 14.4 15.0   HEMATOCRIT 44.7 45.4   MCV 95.7 92.7    MCH 30.8 30.6   RDW 48.9 47.7   PLATELETCT 280 317   MPV 11.2 11.1   NEUTSPOLYS 75.80*  --    LYMPHOCYTES 13.20*  --    MONOCYTES 8.80  --    EOSINOPHILS 1.50  --    BASOPHILS 0.40  --      Recent Labs     04/04/22  0725 04/05/22  0111   SODIUM 139 141   POTASSIUM 3.7 3.3*   CHLORIDE 104 103   CO2 22 24   GLUCOSE 99 100*   BUN 13 19     Recent Labs     04/04/22  0725 04/05/22  0111   ALBUMIN 4.4  --    TBILIRUBIN 0.7  --    ALKPHOSPHAT 65  --    TOTPROTEIN 7.1  --    ALTSGPT 7  --    ASTSGOT 14  --    CREATININE 0.79 1.04       Imaging:   EC-ECHOCARDIOGRAM COMPLETE W/O CONT   Final Result      CT-HEAD W/O   Final Result         1.  No acute intracranial process.      2. Diffuse atrophy and periventricular white matter changes consistent with chronic small vessel disease.      DX-CHEST-PORTABLE (1 VIEW)   Final Result      Interstitial edema. No focal consolidation or pleural effusions.          Assessment and Plan:  * CHF (congestive heart failure), NYHA class III, acute, systolic (HCC)- (present on admission)  Assessment & Plan  Acute Decompensated Heart Failure: HFpEF (has uncontroled HTN, LVH, grade 2 diastolic dysfunction, LVEF 35-40%, decreased systolic function.   NYHA stage III, NTproBNP on admission: 5372, Chest X ray on admission: pulmonary edema b/l. EKG on admission: Wandering pacemaker, left ventricular hypertrophy, left axis deviation, left bundle branch block.Troponin on admission: 15  ECHO shoed decreased systolic function, LVEF 35-30%, global hypokinesis.     -Monitor on telemetry  -Strict I/Os and Daily weights   -2gm sodium diet, fluid restriction to 2000 ml/24 hour.   -Provide supplemental oxygen if hypoxic (SpO2<90%), orders for position patient upright.   -3.125 g Carvediolol  -Decrased to 20 mg  Lasix oral  - lisinopril 5 mg daily, Aldactone 12.5 mg daily.  Will not start beta-blocker in the setting of acutely decompensated heart failure, recommend this be started outpatient.  -Follow  BMP  -Heart failure counseling and CHF education to be provided on the day of discharge.   -Cardiology consulted, to enroll patient with close outpatient cardiology follow-up      B12 deficiency- (present on admission)  Assessment & Plan  Noted to have B12 deficiency as part of her dementia work-up.    -Start B12 supplementation    Paroxysmal atrial fibrillation (HCC)- (present on admission)  Assessment & Plan  Patient initially presented with a wandering pacemaker, however on telemetry was noted to convert to A. fib.  She was asymptomatic (sleeping comfortably) rate controlled with a rate in the 80s, and hemodynamically stable with a blood pressure of 120s/70s.      -We will hold off of beta-blocker in the setting of acute CHF exacerbation  -[id number], patient should be started on anticoagulation, however given history of falls will have risk versus benefits discussion prior to initiating.    Memory loss- (present on admission)  Assessment & Plan  Family members note worsening memory loss in the patient.    Work-up: Noted to have low B12, normal TSH and folate.  RPR: non-reactive, HIV: non-reactive. Consider B12 deficiency vs. Vascular dementia.     -Repleting B12  -Starting thiamine          At high risk for injury related to fall- (present on admission)  Assessment & Plan  Patient has had > 2 falls in the past 1 year.  Her family is concerned that her dementia is worsening.   Head CT: diffuse atrophy and periventricular white matter changes consistent with chronic small vessel disease which may be a cause of dementia.   -Fall precautions      Hypertensive emergency- (present on admission)  Assessment & Plan   Longstanding history of uncontrolled hypertension (not on any antihypertensives) presents with /81 with SOB X 3 days, pulmonary edema on CXR. Concern for flash pulmonary edema. Improved blood pressures on medications.     -Start heart failure regimen: ACE inhibitor, Aldactone, Lasix   -Hydralazine  as needed for blood pressure control      DVT: 40 mg IV enoxaparin

## 2022-04-05 NOTE — PROGRESS NOTES
CARDIOLOGY PROGRESS NOTE    Date: 4/5/2022      Patient Name: Linda Casas  YOB: 1934  MRN: [id number]    Assessment/Recommendations:   # Acute decompensated systolic heart failure: Her echocardiogram demonstrated moderately reduced left ventricular systolic function.  Per the patient, this is a new diagnosis.  Underlying etiology remains unclear at this time, however, ischemic is likely given the patient's age and risk factors as well as significant regional wall motion abnormalities.  Other potential etiologies would include hypertensive and LBBB-induced cardiomyopathy she now appears relatively euvolemic.  -Agree with changing to oral diuretic  -Will change to carvedilol given significant hypertension  -Losartan 25 mg nightly, increase as tolerated  -Spironolactone 25 mg daily  -Follow daily BMP  -Will discuss cardiac catheterization with the patient and her family, which is an invasive procedure that carries a risk of serious complications    # Hypertensive urgency: BP control improved.  -See above for management    # Frequent atrial ectopy: I personally reviewed her telemetry yesterday as well as her ECGs.  She had very frequent atrial ectopy, but did not convincingly develop clear sustained atrial fibrillation on my review.  Unfortunately, she has significant baseline wander and did not have all leads in place while on telemetry, which makes evaluating her telemetry challenging.  In the absence of clear atrial fibrillation and his likelihood of needing invasive procedures, will discontinue oral anticoagulation.    Events/Subjective:   TTE yesterday showed diagnosed left ventricular systolic dysfunction with regional wall motion abnormalities.  She diuresed well and is currently lying flat comfortably and reports no ongoing shortness of breath.  She denies ever being told she had a prior heart attack or heart failure.    Current Facility-Administered Medications   Medication  Dose Route Frequency Provider Last Rate Last Admin   • [START ON 4/6/2022] spironolactone (ALDACTONE) tablet 25 mg  25 mg Oral Q DAY Michael Lewis M.D.       • losartan (COZAAR) tablet 25 mg  25 mg Oral Q EVENING Michael Lewis M.D.       • carvedilol (COREG) tablet 3.125 mg  3.125 mg Oral BID WITH MEALS Michael Lewis M.D.   3.125 mg at 04/05/22 0955   • [START ON 4/6/2022] furosemide (LASIX) tablet 20 mg  20 mg Oral Q DAY ELISE Huang M.D.       • apixaban (ELIQUIS) tablet 5 mg  5 mg Oral BID Мария Tomas M.D.   5 mg at 04/05/22 1028   • cyanocobalamin (VITAMIN B-12) injection 1,000 mcg  1,000 mcg Intramuscular DAILY Мария Tomas M.D.   1,000 mcg at 04/05/22 1040   • senna-docusate (PERICOLACE or SENOKOT S) 8.6-50 MG per tablet 2 Tablet  2 Tablet Oral BID Gilda Vargas M.D.   2 Tablet at 04/05/22 0503    And   • polyethylene glycol/lytes (MIRALAX) PACKET 1 Packet  1 Packet Oral QDAY PRSARAH Vargas M.D.        And   • magnesium hydroxide (MILK OF MAGNESIA) suspension 30 mL  30 mL Oral QDAY RICKIE Vargas M.D.        And   • bisacodyl (DULCOLAX) suppository 10 mg  10 mg Rectal QDAY PRSARAH Vargas M.D.       • hydrALAZINE (APRESOLINE) injection 10 mg  10 mg Intravenous Q4HRS PRSARAH Vargas M.D.       • ondansetron (ZOFRAN) syringe/vial injection 4 mg  4 mg Intravenous Q4HRS PRSARAH Vargas M.D.       • ondansetron (ZOFRAN ODT) dispertab 4 mg  4 mg Oral Q4HRS PRSARAH Vargas M.D.           Home Medications:     Current Facility-Administered Medications   Medication Dose Route Frequency Provider Last Rate Last Admin   • [START ON 4/6/2022] spironolactone (ALDACTONE) tablet 25 mg  25 mg Oral Q DAY Michael Lewis M.D.       • losartan (COZAAR) tablet 25 mg  25 mg Oral Q EVENING Michael Lewis M.D.       • carvedilol (COREG) tablet 3.125 mg  3.125 mg Oral BID WITH MEALS Michael Lewis M.D.   3.125 mg at 04/05/22 0955   • [START ON 4/6/2022] furosemide (LASIX) tablet 20 mg   "20 mg Oral Q DAY ELISE Huang M.D.       • apixaban (ELIQUIS) tablet 5 mg  5 mg Oral BID Мария Tomas M.D.   5 mg at 04/05/22 1028   • cyanocobalamin (VITAMIN B-12) injection 1,000 mcg  1,000 mcg Intramuscular DAILY Мария Tomas M.D.   1,000 mcg at 04/05/22 1040   • senna-docusate (PERICOLACE or SENOKOT S) 8.6-50 MG per tablet 2 Tablet  2 Tablet Oral BID Gilda Vargas M.D.   2 Tablet at 04/05/22 0503    And   • polyethylene glycol/lytes (MIRALAX) PACKET 1 Packet  1 Packet Oral QDAY RICKIE Vargas M.D.        And   • magnesium hydroxide (MILK OF MAGNESIA) suspension 30 mL  30 mL Oral QDAY RICKIE Vargas M.D.        And   • bisacodyl (DULCOLAX) suppository 10 mg  10 mg Rectal QDAY RICKIE Vargas M.D.       • hydrALAZINE (APRESOLINE) injection 10 mg  10 mg Intravenous Q4HRS RICKIE Vargas M.D.       • ondansetron (ZOFRAN) syringe/vial injection 4 mg  4 mg Intravenous Q4HRS RICKIE Vargas M.D.       • ondansetron (ZOFRAN ODT) dispertab 4 mg  4 mg Oral Q4HRS RICKIE Vargas M.D.           Vitals:   /73   Pulse 80   Temp 36.6 °C (97.9 °F) (Temporal)   Resp 16   Ht 1.626 m (5' 4\")   Wt 63.5 kg (140 lb)   SpO2 89%    BP Readings from Last 4 Encounters:   04/05/22 131/73   03/17/22 136/80   11/06/21 (!) 179/77   11/01/21 130/84     Wt Readings from Last 4 Encounters:   04/04/22 63.5 kg (140 lb)   03/17/22 64.8 kg (142 lb 12.8 oz)   11/06/21 64.5 kg (142 lb 3.2 oz)   11/01/21 65.2 kg (143 lb 12.8 oz)     Body mass index is 24.03 kg/m².    Physical Examination:   General: Well-appearing, no acute distress  Neck: Supple, full range of motion, no jugular venous distension  Pulmonary: Normal respiratory effort, clear to auscultation bilaterally  Cardiovascular: Regular rate and rhythm, no murmurs or gallops appreciated  Extremities: Warm and well perfused, no lower extremity edema  Neurological: Alert and oriented, no gross focal motor deficits    Laboratories:   Estimated Creatinine Clearance: " 32.9 mL/min (by C-G formula based on SCr of 1.04 mg/dL).  Recent Labs     04/04/22  0725 04/05/22  0111   CREATININE 0.79 1.04   BUN 13 19   POTASSIUM 3.7 3.3*   SODIUM 139 141   CALCIUM 9.8 10.0   CO2 22 24   ALBUMIN 4.4  --      Recent Labs     04/04/22  0725 04/05/22  0111   GLUCOSE 99 100*     Recent Labs     04/04/22  0725   ASTSGOT 14   ALTSGPT 7   ALKPHOSPHAT 65     Recent Labs     04/04/22  0725 04/05/22  0111   WBC 11.7* 10.7   HEMOGLOBIN 14.4 15.0   PLATELETCT 280 317     Recent Labs     04/04/22  0725   TROPONINT 15   NTPROBNP 5372*     Lab Results   Component Value Date/Time    LDL 88 04/05/2022 0111     (H) 10/28/2019 0828    LDL 78 04/26/2019 0830     Lab Results   Component Value Date/Time    HDL 69 04/05/2022 0111    HDL 65 10/28/2019 0828    HDL 71 04/26/2019 0830       Lab Results   Component Value Date/Time    TRIGLYCERIDE 80 04/05/2022 0111    TRIGLYCERIDE 105 10/28/2019 0828    TRIGLYCERIDE 74 04/26/2019 0830       Lab Results   Component Value Date/Time    CHOLSTRLTOT 173 04/05/2022 0111    CHOLSTRLTOT 198 10/28/2019 0828    CHOLSTRLTOT 164 04/26/2019 0830       Telemetry:   Continues to be in NSR with frequent atrial ectopy    Cardiac Studies and Procedures:   Echocardiography  Moderately reduced left ventricular systolic function.   The left ventricular ejection fraction is visually estimated to be 35- 40% with [id number] variation.   Global hypokinesis with regional variation.  Moderate aortic insufficiency. Pressure half time is  313 msec.  Right ventricular systolic pressure is estimated to be 45  mmHg.  IVC not interpretable, going out of plane of US.    XR/CT/MRI  Interstitial edema. No focal consolidation or pleural effusions.    Disposition:   Cardiology will continue to follow.    Please contact us if there are any questions or concerns.      Michael Lewis MD, FACC, [facility]  Interventional Cardiology  [facility]  Medicine, [facility]  [address] [address]  [place] [address]  Phone: [phone]  Fax: [phone]

## 2022-04-06 ENCOUNTER — APPOINTMENT (OUTPATIENT)
Dept: CARDIOLOGY | Facility: MEDICAL CENTER | Age: 87
DRG: 246 | End: 2022-04-06
Attending: INTERNAL MEDICINE
Payer: MEDICARE

## 2022-04-06 LAB
ALBUMIN SERPL BCP-MCNC: 4.3 G/DL (ref 3.2–4.9)
ALBUMIN/GLOB SERPL: 1.7 G/DL
ALP SERPL-CCNC: 61 U/L (ref 30–99)
ALT SERPL-CCNC: 10 U/L (ref 2–50)
ANION GAP SERPL CALC-SCNC: 12 MMOL/L (ref 7–16)
AST SERPL-CCNC: 22 U/L (ref 12–45)
BASOPHILS # BLD AUTO: 0.6 % (ref 0–1.8)
BASOPHILS # BLD: 0.06 K/UL (ref 0–0.12)
BILIRUB SERPL-MCNC: 0.5 MG/DL (ref 0.1–1.5)
BUN SERPL-MCNC: 29 MG/DL (ref 8–22)
CALCIUM SERPL-MCNC: 9.8 MG/DL (ref 8.5–10.5)
CHLORIDE SERPL-SCNC: 102 MMOL/L (ref 96–112)
CO2 SERPL-SCNC: 25 MMOL/L (ref 20–33)
CREAT SERPL-MCNC: 1.17 MG/DL (ref 0.5–1.4)
EKG IMPRESSION: NORMAL
EKG IMPRESSION: NORMAL
EOSINOPHIL # BLD AUTO: 0.28 K/UL (ref 0–0.51)
EOSINOPHIL NFR BLD: 2.7 % (ref 0–6.9)
ERYTHROCYTE [DISTWIDTH] IN BLOOD BY AUTOMATED COUNT: 48 FL (ref 35.9–50)
GFR SERPLBLD CREATININE-BSD FMLA CKD-EPI: 45 ML/MIN/1.73 M 2
GLOBULIN SER CALC-MCNC: 2.6 G/DL (ref 1.9–3.5)
GLUCOSE SERPL-MCNC: 101 MG/DL (ref 65–99)
HCT VFR BLD AUTO: 45.8 % (ref 37–47)
HGB BLD-MCNC: 14.9 G/DL (ref 12–16)
IMM GRANULOCYTES # BLD AUTO: 0.05 K/UL (ref 0–0.11)
IMM GRANULOCYTES NFR BLD AUTO: 0.5 % (ref 0–0.9)
LYMPHOCYTES # BLD AUTO: 2.87 K/UL (ref 1–4.8)
LYMPHOCYTES NFR BLD: 27.3 % (ref 22–41)
MCH RBC QN AUTO: 30.7 PG (ref 27–33)
MCHC RBC AUTO-ENTMCNC: 32.5 G/DL (ref 33.6–35)
MCV RBC AUTO: 94.2 FL (ref 81.4–97.8)
MONOCYTES # BLD AUTO: 1.07 K/UL (ref 0–0.85)
MONOCYTES NFR BLD AUTO: 10.2 % (ref 0–13.4)
NEUTROPHILS # BLD AUTO: 6.17 K/UL (ref 2–7.15)
NEUTROPHILS NFR BLD: 58.7 % (ref 44–72)
NRBC # BLD AUTO: 0 K/UL
NRBC BLD-RTO: 0 /100 WBC
PLATELET # BLD AUTO: 326 K/UL (ref 164–446)
PMV BLD AUTO: 10.8 FL (ref 9–12.9)
POTASSIUM SERPL-SCNC: 3.6 MMOL/L (ref 3.6–5.5)
PROT SERPL-MCNC: 6.9 G/DL (ref 6–8.2)
RBC # BLD AUTO: 4.86 M/UL (ref 4.2–5.4)
SODIUM SERPL-SCNC: 139 MMOL/L (ref 135–145)
WBC # BLD AUTO: 10.5 K/UL (ref 4.8–10.8)

## 2022-04-06 PROCEDURE — 93010 ELECTROCARDIOGRAM REPORT: CPT | Mod: 76 | Performed by: INTERNAL MEDICINE

## 2022-04-06 PROCEDURE — 93005 ELECTROCARDIOGRAM TRACING: CPT | Performed by: INTERNAL MEDICINE

## 2022-04-06 PROCEDURE — 700111 HCHG RX REV CODE 636 W/ 250 OVERRIDE (IP): Performed by: STUDENT IN AN ORGANIZED HEALTH CARE EDUCATION/TRAINING PROGRAM

## 2022-04-06 PROCEDURE — 700111 HCHG RX REV CODE 636 W/ 250 OVERRIDE (IP): Performed by: HOSPITALIST

## 2022-04-06 PROCEDURE — 93010 ELECTROCARDIOGRAM REPORT: CPT | Performed by: INTERNAL MEDICINE

## 2022-04-06 PROCEDURE — 700102 HCHG RX REV CODE 250 W/ 637 OVERRIDE(OP): Performed by: HOSPITALIST

## 2022-04-06 PROCEDURE — 99233 SBSQ HOSP IP/OBS HIGH 50: CPT | Performed by: INTERNAL MEDICINE

## 2022-04-06 PROCEDURE — 36415 COLL VENOUS BLD VENIPUNCTURE: CPT

## 2022-04-06 PROCEDURE — A9270 NON-COVERED ITEM OR SERVICE: HCPCS | Performed by: HOSPITALIST

## 2022-04-06 PROCEDURE — A9270 NON-COVERED ITEM OR SERVICE: HCPCS | Performed by: INTERNAL MEDICINE

## 2022-04-06 PROCEDURE — 99222 1ST HOSP IP/OBS MODERATE 55: CPT | Performed by: INTERNAL MEDICINE

## 2022-04-06 PROCEDURE — 770020 HCHG ROOM/CARE - TELE (206)

## 2022-04-06 PROCEDURE — 700102 HCHG RX REV CODE 250 W/ 637 OVERRIDE(OP): Performed by: INTERNAL MEDICINE

## 2022-04-06 PROCEDURE — 99233 SBSQ HOSP IP/OBS HIGH 50: CPT | Mod: GC | Performed by: HOSPITALIST

## 2022-04-06 PROCEDURE — 85025 COMPLETE CBC W/AUTO DIFF WBC: CPT

## 2022-04-06 PROCEDURE — 93005 ELECTROCARDIOGRAM TRACING: CPT | Performed by: HOSPITALIST

## 2022-04-06 PROCEDURE — 80053 COMPREHEN METABOLIC PANEL: CPT

## 2022-04-06 RX ORDER — FUROSEMIDE 20 MG/1
10 TABLET ORAL
Status: DISCONTINUED | OUTPATIENT
Start: 2022-04-07 | End: 2022-04-08

## 2022-04-06 RX ADMIN — FUROSEMIDE 20 MG: 20 TABLET ORAL at 06:00

## 2022-04-06 RX ADMIN — SPIRONOLACTONE 25 MG: 25 TABLET, FILM COATED ORAL at 05:59

## 2022-04-06 RX ADMIN — ENOXAPARIN SODIUM 30 MG: 30 INJECTION SUBCUTANEOUS at 17:44

## 2022-04-06 RX ADMIN — CYANOCOBALAMIN 1000 MCG: 1000 INJECTION INTRAMUSCULAR; SUBCUTANEOUS at 05:59

## 2022-04-06 RX ADMIN — CARVEDILOL 3.12 MG: 3.12 TABLET, FILM COATED ORAL at 07:41

## 2022-04-06 RX ADMIN — CARVEDILOL 3.12 MG: 3.12 TABLET, FILM COATED ORAL at 17:45

## 2022-04-06 RX ADMIN — LOSARTAN POTASSIUM 25 MG: 25 TABLET, FILM COATED ORAL at 17:44

## 2022-04-06 ASSESSMENT — ENCOUNTER SYMPTOMS
ABDOMINAL PAIN: 0
ORTHOPNEA: 0
BACK PAIN: 0
PSYCHIATRIC NEGATIVE: 1
WEAKNESS: 0
FEVER: 0
FOCAL WEAKNESS: 0
VOMITING: 0
CONSTIPATION: 0
SHORTNESS OF BREATH: 0
WEIGHT LOSS: 0
COUGH: 0
DIARRHEA: 0
NECK PAIN: 0
HEMOPTYSIS: 0
TINGLING: 0
HEADACHES: 0
HEARTBURN: 0
CHILLS: 0
DIZZINESS: 0
NAUSEA: 0
PALPITATIONS: 0
EYES NEGATIVE: 1
MYALGIAS: 0

## 2022-04-06 ASSESSMENT — PAIN DESCRIPTION - PAIN TYPE: TYPE: ACUTE PAIN

## 2022-04-06 ASSESSMENT — FIBROSIS 4 INDEX: FIB4 SCORE: 1.86

## 2022-04-06 NOTE — DISCHARGE INSTRUCTIONS
Discharge Instructions    Discharged to home by car with relative. Discharged via wheelchair, hospital escort: Yes.  Special equipment needed: Not Applicable    Be sure to schedule a follow-up appointment with your primary care doctor or any specialists as instructed.     Discharge Plan:   Diet Plan: Discussed  Activity Level: Discussed  Confirmed Follow up Appointment: Appointment Scheduled  Confirmed Symptoms Management: Discussed  Medication Reconciliation Updated: Yes    I understand that a diet low in cholesterol, fat, and sodium is recommended for good health. Unless I have been given specific instructions below for another diet, I accept this instruction as my diet prescription.     Special Instructions:   HF Patient Discharge Instructions  · Monitor your weight daily, and maintain a weight chart, to track your weight changes.   · Activity as tolerated, unless your Doctor has ordered otherwise.  · Follow a low fat, low cholesterol, low salt diet unless instructed otherwise by your Doctor. Read the labels on the back of food products and track your intake of fat, cholesterol and salt.   · Fluid Restriction Yes- 2000 mL fluid restriction. If a Fluid Restriction has been ordered by your Doctor, measure fluids with a measuring cup to ensure that you are not exceeding the restriction.   · No smoking.  · Oxygen No. If your Doctor has ordered that you wear Oxygen at home, it is important to wear it as ordered.  · Did you receive an explanation from staff on the importance of taking each of your medications and why it is necessary to keep taking them unless your doctor says to stop? Yes  · Were all of your questions answered about how to manage your heart failure and what to do if you have increased signs and symptoms after you go home? Yes  · Do you feel like your heart failure care team involved you in the care treatment plan and allowed you to make decisions regarding your care while in the hospital and addressed  any discharge needs you might have? Yes    See the educational handout provided at discharge for more information on monitoring your daily weight, activity and diet. This also explains more about Heart Failure, symptoms of a flare-up and some of the tests that you have undergone.     Warning Signs of a Flare-Up include:  · Swelling in the ankles or lower legs.  · Shortness of breath, while at rest, or while doing normal activities.   · Shortness of breath at night when in bed, or coughing in bed.   · Requiring more pillows to sleep at night, or needing to sit up at night to sleep.  · Feeling weak, dizzy or fatigued.     When to call your Doctor:  · Call [facility] seven days a week from 8:00 a.m. to 8:00 p.m. for medical questions [phone].  · Call your Primary Care Physician or Cardiologist if:   1. You experience any pain radiating to your jaw or neck.  2. You have any difficulty breathing.  3. You experience weight gain of 3 lbs in a day or 5 lbs in a week.   4. You feel any palpitations or irregular heartbeats.  5. You become dizzy or lose consciousness.   If you have had an angiogram or had a pacemaker or AICD placed, and experience:  1. Bleeding, drainage or swelling at the surgical / puncture site.  2. Fever greater than 100.0 F  3. Shock from internal defibrillator.  4. Cool and / or numb extremities.      · Is patient discharged on Warfarin / Coumadin?   No     Depression / Suicide Risk    As you are discharged from this [facility], it is important to learn how to keep safe from harming yourself.    Recognize the warning signs:  · Abrupt changes in personality, positive or negative- including increase in energy   · Giving away possessions  · Change in eating patterns- significant weight changes-  positive or negative  · Change in sleeping patterns- unable to sleep or sleeping all the time   · Unwillingness or inability to communicate  · Depression  · Unusual sadness,  discouragement and loneliness  · Talk of wanting to die  · Neglect of personal appearance   · Rebelliousness- reckless behavior  · Withdrawal from people/activities they love  · Confusion- inability to concentrate     If you or a loved one observes any of these behaviors or has concerns about self-harm, here's what you can do:  · Talk about it- your feelings and reasons for harming yourself  · Remove any means that you might use to hurt yourself (examples: pills, rope, extension cords, firearm)  · Get professional help from the community (Mental Health, Substance Abuse, psychological counseling)  · Do not be alone:Call your Safe Contact- someone whom you trust who will be there for you.  · Call your local CRISIS HOTLINE 925-4645 or 163-284-9877  · Call your local Children's Mobile Crisis Response Team [place] (345) 314-4496 or www.Parature  · Call the toll free National Suicide Prevention Hotlines   · National Suicide Prevention Lifeline 733-277-EXTO (1828)  · [employer]ine[employer] Line Network 800-SUICIDE (519-9179)      Heart Failure, Diagnosis    Heart failure means that your heart is not able to pump blood in the right way. This makes it hard for your body to work well. Heart failure is usually a long-term (chronic) condition. You must take good care of yourself and follow your treatment plan from your doctor.  What are the causes?  This condition may be caused by:  · High blood pressure.  · Build up of cholesterol and fat in the arteries.  · Heart attack. This injures the heart muscle.  · Heart valves that do not open and close properly.  · Damage of the heart muscle. This is also called cardiomyopathy.  · Lung disease.  · Abnormal heart rhythms.  What increases the risk?  The risk of heart failure goes up as a person ages. This condition is also more likely to develop in people who:  · Are overweight.  · Are male.  · Smoke or chew tobacco.  · Abuse alcohol or illegal drugs.  · Have taken medicines that  can damage the heart.  · Have diabetes.  · Have abnormal heart rhythms.  · Have thyroid problems.  · Have low blood counts (anemia).  What are the signs or symptoms?  Symptoms of this condition include:  · Shortness of breath.  · Coughing.  · Swelling of the feet, ankles, legs, or belly.  · Losing weight for no reason.  · Trouble breathing.  · Waking from sleep because of the need to sit up and get more air.  · Rapid heartbeat.  · Being very tired.  · Feeling dizzy, or feeling like you may pass out (faint).  · Having no desire to eat.  · Feeling like you may vomit (nauseous).  · Peeing (urinating) more at night.  · Feeling confused.  How is this treated?         This condition may be treated with:  · Medicines. These can be given to treat blood pressure and to make the heart muscles stronger.  · Changes in your daily life. These may include eating a healthy diet, staying at a healthy body weight, quitting tobacco and illegal drug use, or doing exercises.  · Surgery. Surgery can be done to open blocked valves, or to put devices in the heart, such as pacemakers.  · A donor heart (heart transplant). You will receive a healthy heart from a donor.  Follow these instructions at home:  · Treat other conditions as told by your doctor. These may include high blood pressure, diabetes, thyroid disease, or abnormal heart rhythms.  · Learn as much as you can about heart failure.  · Get support as you need it.  · Keep all follow-up visits as told by your doctor. This is important.  Summary  · Heart failure means that your heart is not able to pump blood in the right way.  · This condition is caused by high blood pressure, heart attack, or damage of the heart muscle.  · Symptoms of this condition include shortness of breath and swelling of the feet, ankles, legs, or belly. You may also feel very tired or feel like you may vomit.  · You may be treated with medicines, surgery, or changes in your daily life.  · Treat other health  conditions as told by your doctor.  This information is not intended to replace advice given to you by your health care provider. Make sure you discuss any questions you have with your health care provider.  Document Released: 09/26/2009 Document Revised: 03/06/2020 Document Reviewed: 03/06/2020  Elsevier Patient Education © 2020 [employer]    Heart Failure Action Plan  A heart failure action plan helps you understand what to do when you have symptoms of heart failure. Follow the plan that was created by you and your health care provider. Review your plan each time you visit your health care provider.  Red zone  These signs and symptoms mean you should get medical help right away:  · You have trouble breathing when resting.  · You have a dry cough that is getting worse.  · You have swelling or pain in your legs or abdomen that is getting worse.  · You suddenly gain more than 2-3 lb (0.9-1.4 kg) in a day, or more than 5 lb (2.3 kg) in one week. This amount may be more or less depending on your condition.  · You have trouble staying awake or you feel confused.  · You have chest pain.  · You do not have an appetite.  · You pass out.  If you experience any of these symptoms:  · Call your local emergency services (911 in the [place] right away or seek help at the emergency department of the nearest hospital.  Yellow zone  These signs and symptoms mean your condition may be getting worse and you should make some changes:  · You have trouble breathing when you are active or you need to sleep with extra pillows.  · You have swelling in your legs or abdomen.  · You gain 2-3 lb (0.9-1.4 kg) in one day, or 5 lb (2.3 kg) in one week. This amount may be more or less depending on your condition.  · You get tired easily.  · You have trouble sleeping.  · You have a dry cough.  If you experience any of these symptoms:  · Contact your health care provider within the next day.  · Your health care provider may adjust your  medicines.  Green zone  These signs mean you are doing well and can continue what you are doing:  · You do not have shortness of breath.  · You have very little swelling or no new swelling.  · Your weight is stable (no gain or loss).  · You have a normal activity level.  · You do not have chest pain or any other new symptoms.  Follow these instructions at home:  · Take over-the-counter and prescription medicines only as told by your health care provider.  · Weigh yourself daily. Your target weight is __________ lb (__________ kg).  ? Call your health care provider if you gain more than __________ lb (__________ kg) in a day, or more than __________ lb (__________ kg) in one week.  · Eat a heart-healthy diet. Work with a diet and nutrition specialist (dietitian) to create an eating plan that is best for you.  · Keep all follow-up visits as told by your health care provider. This is important.  Where to find more information  · American Heart Association: www.heart.org  Summary  · Follow the action plan that was created by you and your health care provider.  · Get help right away if you have any symptoms in the Red zone.  This information is not intended to replace advice given to you by your health care provider. Make sure you discuss any questions you have with your health care provider.  Document Released: 01/27/2018 Document Revised: 11/30/2018 Document Reviewed: 01/27/2018  Elsevier Patient Education © 2020 [employer]      Heart Failure, Self Care  Heart failure is a serious condition. This sheet explains things you need to do to take care of yourself at home. To help you stay as healthy as possible, you may be asked to change your diet, take certain medicines, and make other changes in your life. Your doctor may also give you more specific instructions. If you have problems or questions, call your doctor.  What are the risks?  Having heart failure makes it more likely for you to have some problems. These  problems can get worse if you do not take good care of yourself. Problems may include:  · Blood clotting problems. This may cause a stroke.  · Damage to the kidneys, liver, or lungs.  · Abnormal heart rhythms.  Supplies needed:  · Scale for weighing yourself.  · Blood pressure monitor.  · Notebook.  · Medicines.  How to care for yourself when you have heart failure  Medicines  Take over-the-counter and prescription medicines only as told by your doctor. Take your medicines every day.  · Do not stop taking your medicine unless your doctor tells you to do so.  · Do not skip any medicines.  · Get your prescriptions refilled before you run out of medicine. This is important.  Eating and drinking    · Eat heart-healthy foods. Talk with a diet specialist (dietitian) to create an eating plan.  · Choose foods that:  ? Have no trans fat.  ? Are low in saturated fat and cholesterol.  · Choose healthy foods, such as:  ? Fresh or frozen fruits and vegetables.  ? Fish.  ? Low-fat (lean) meats.  ? Legumes, such as beans, peas, and lentils.  ? Fat-free or low-fat dairy products.  ? Whole-grain foods.  ? High-fiber foods.  · Limit salt (sodium) if told by your doctor. Ask your diet specialist to tell you which seasonings are healthy for your heart.  · Cook in healthy ways instead of frying. Healthy ways of cooking include roasting, grilling, broiling, baking, poaching, steaming, and stir-frying.  · Limit how much fluid you drink, if told by your doctor.  Alcohol use  · Do not drink alcohol if:  ? Your doctor tells you not to drink.  ? Your heart was damaged by alcohol, or you have very bad heart failure.  ? You are pregnant, may be pregnant, or are planning to become pregnant.  · If you drink alcohol:  ? Limit how much you use to:  § 0-1 drink a day for women.  § 0-2 drinks a day for men.  ? Be aware of how much alcohol is in your drink. In the [place] one drink equals one 12 oz bottle of beer (355 mL), one 5 oz glass of wine (148  mL), or one 1½ oz glass of hard liquor (44 mL).  Lifestyle    · Do not use any products that contain nicotine or tobacco, such as cigarettes, e-cigarettes, and chewing tobacco. If you need help quitting, ask your doctor.  ? Do not use nicotine gum or patches before talking to your doctor.  · Do not use illegal drugs.  · Lose weight if told by your doctor.  · Do physical activity if told by your doctor. Talk to your doctor before you begin an exercise if:  ? You are an older adult.  ? You have very bad heart failure.  · Learn to manage stress. If you need help, ask your doctor.  · Get rehab (rehabilitation) to help you stay independent and to help with your quality of life.  · Plan time to rest when you get tired.  Check weight and blood pressure    · Weigh yourself every day. This will help you to know if fluid is building up in your body.  ? Weigh yourself every morning after you pee (urinate) and before you eat breakfast.  ? Wear the same amount of clothing each time.  ? Write down your daily weight. Give your record to your doctor.  · Check and write down your blood pressure as told by your doctor.  · Check your pulse as told by your doctor.  Dealing with very hot and very cold weather  · If it is very hot:  ? Avoid activities that take a lot of energy.  ? Use air conditioning or fans, or find a cooler place.  ? Avoid caffeine and alcohol.  ? Wear clothing that is loose-fitting, lightweight, and light-colored.  · If it is very cold:  ? Avoid activities that take a lot of energy.  ? Layer your clothes.  ? Wear mittens or gloves, a hat, and a scarf when you go outside.  ? Avoid alcohol.  Follow these instructions at home:  · Stay up to date with shots (vaccines). Get pneumococcal and flu (influenza) shots.  · Keep all follow-up visits as told by your doctor. This is important.  Contact a doctor if:  · You gain weight quickly.  · You have increasing shortness of breath.  · You cannot do your normal  activities.  · You get tired easily.  · You cough a lot.  · You don't feel like eating or feel like you may vomit (nauseous).  · You become puffy (swell) in your hands, feet, ankles, or belly (abdomen).  · You cannot sleep well because it is hard to breathe.  · You feel like your heart is beating fast (palpitations).  · You get dizzy when you stand up.  Get help right away if:  · You have trouble breathing.  · You or someone else notices a change in your behavior, such as having trouble staying awake.  · You have chest pain or discomfort.  · You pass out (faint).  These symptoms may be an emergency. Do not wait to see if the symptoms will go away. Get medical help right away. Call your local emergency services (911 in the [place] Do not drive yourself to the hospital.  Summary  · Heart failure is a serious condition. To care for yourself, you may have to change your diet, take medicines, and make other lifestyle changes.  · Take your medicines every day. Do not stop taking them unless your doctor tells you to do so.  · Eat heart-healthy foods, such as fresh or frozen fruits and vegetables, fish, lean meats, legumes, fat-free or low-fat dairy products, and whole-grain or high-fiber foods.  · Ask your doctor if you can drink alcohol. You may have to stop alcohol use if you have very bad heart failure.  · Contact your doctor if you gain weight quickly or feel that your heart is beating too fast. Get help right away if you pass out, or have chest pain or trouble breathing.  This information is not intended to replace advice given to you by your health care provider. Make sure you discuss any questions you have with your health care provider.  Document Released: 04/01/2020 Document Revised: 03/31/2020 Document Reviewed: 04/01/2020  Elsevier Patient Education © 2020 [employer]    Heart Failure Exacerbation   Heart failure is a condition in which the heart does not fill up with enough blood, and therefore does not pump  enough blood and oxygen to the body. When this happens, parts of the body do not get the blood and oxygen they need to function properly. This can cause symptoms such as breathing problems, fatigue, swelling, and confusion.  Heart failure exacerbation refers to heart failure symptoms that get worse. The symptoms may get worse suddenly or develop slowly over time. Heart failure exacerbation is a serious medical problem that should be treated right away.  What are the causes?  A heart failure exacerbation can be triggered by:  · Not taking your heart failure medicines correctly.  · Infections.  · Eating an unhealthy diet or a diet that is high in salt (sodium).  · Drinking too much fluid.  · Drinking alcohol.  · Taking illegal drugs, such as cocaine or methamphetamine.  · Not exercising.  Other causes include:  · Other heart conditions such as an irregular heartbeat (arrhythmia).  · Anemia.  · Other medical problems, such as kidney failure.  Sometimes the cause of the exacerbation is not known.  What are the signs or symptoms?  When heart failure symptoms suddenly or slowly get worse, this may be a sign of heart failure exacerbation. Symptoms of heart failure include:  · Breathing problems or shortness of breath.  · Chronic coughing or wheezing.  · Fatigue.  · Nausea or lack of appetite.  · Feeling light-headed.  · Confusion or memory loss.  · Increased heart rate or irregular heartbeat.  · Buildup of fluid in the legs, ankles, feet, or abdomen.  · Difficulty breathing when lying down.  How is this diagnosed?  This condition is diagnosed based on:  · Your symptoms and medical history.  · A physical exam.  You may also have tests, including:  · Electrocardiogram (ECG). This test measures the electrical activity of your heart.  · Echocardiogram. This test uses sound waves to take a picture of your heart to see how well it works.  · Blood tests.  · Imaging tests, such as:  ? Chest X-ray.  ? MRI.  ? Ultrasound.  · Stress  test. This test examines how well your heart functions when you exercise. Your heart is monitored while you exercise on a treadmill or exercise bike. If you cannot exercise, medicines may be used to increase your heartbeat in place of exercise.  · Cardiac catheterization. During this test, a thin, flexible tube (catheter) is inserted into a blood vessel and threaded up to your heart. This test allows your health care provider to check the arteries that lead to your heart (coronary arteries).  · Right heart catheterization. During this test, the pressure in your heart is measured.  How is this treated?  This condition may be treated by:  · Adjusting your heart medicines.  · Maintaining a healthy lifestyle. This includes:  ? Eating a heart-healthy diet that is low in sodium.  ? Not using any products that contain nicotine or tobacco, such as cigarettes and e-cigarettes.  ? Regular exercise.  ? Monitoring your fluid intake.  ? Monitoring your weight and reporting changes to your health care provider.  · Treating sleep apnea, if you have this condition.  · Surgery. This may include:  ? Implanting a device that helps both sides of your heart contract at the same time (cardiac resynchronization therapy device). This can help with heart function and relieve heart failure symptoms.  ? Implanting a device that can correct heart rhythm problems (implantable cardioverter defibrillator).  ? Connecting a device to your heart to help it pump blood (ventricular assist device).  ? Heart transplant.  Follow these instructions at home:  Medicines  · Take over-the-counter and prescription medicines only as told by your health care provider.  · Do not stop taking your medicines or change the amount you take. If you are having problems or side effects from your medicines, talk to your health care provider.  · If you are having difficulty paying for your medicines, contact a  or your clinic. There are many programs to assist  with medicine costs.  · Talk to your health care provider before starting any new medicines or supplements.  · Make sure your health care provider and pharmacist have a list of all the medicines you are taking.  Eating and drinking    · Avoid drinking alcohol.  · Eat a heart-healthy diet as told by your health care provider. This includes:  ? Plenty of fruits and vegetables.  ? Lean proteins.  ? Low-fat dairy.  ? Whole grains.  ? Foods that are low in sodium.  Activity    · Exercise regularly as told by your health care provider. Balance exercise with rest.  · Ask your health care provider what activities are safe for you. This includes sexual activity, exercise, and daily tasks at home or work.  Lifestyle  · Do not use any products that contain nicotine or tobacco, such as cigarettes and e-cigarettes. If you need help quitting, ask your health care provider.  · Maintain a healthy weight. Ask your health care provider what weight is healthy for you.  · Consider joining a patient support group. This can help with emotional problems you may have, such as stress and anxiety.  General instructions  · Talk to your health care provider about flu and pneumonia vaccines.  · Keep a list of medicines that you are taking. This may help in emergency situations.  · Keep all follow-up visits as told by your health care provider. This is important.  Contact a health care provider if:  · You have questions about your medicines or you miss a dose.  · You feel anxious, depressed, or stressed.  · You have swelling in your feet, ankles, legs, or abdomen.  · You have shortness of breath during activity or exercise.  · You have a cough.  · You have a fever.  · You have trouble sleeping.  · You gain 2-3 lb (1-1.4 kg) in 24 hours or 5 lb (2.3 kg) in a week.  Get help right away if:  · You have chest pain.  · You have shortness of breath while resting.  · You have severe fatigue.  · You are confused.  · You have severe dizziness.  · You  have a rapid or irregular heartbeat.  · You have nausea or you vomit.  · You have a cough that is worse at night or you cannot lie flat.  · You have a cough that will not go away.  · You have severe depression or sadness.  Summary  · When heart failure symptoms get worse, it is called heart failure exacerbation.  · Common causes of this condition include taking medicines incorrectly, infections, and drinking alcohol.  · This condition may be treated by adjusting medicines, maintaining a healthy lifestyle, or surgery.  · Do not stop taking your medicines or change the amount you take. If you are having problems or side effects from your medicines, talk to your health care provider.  This information is not intended to replace advice given to you by your health care provider. Make sure you discuss any questions you have with your health care provider.  Document Released: 05/01/2018 Document Revised: 11/30/2018 Document Reviewed: 05/01/2018  Elsevier Patient Education © 2020 [employer] Inc[employer]

## 2022-04-06 NOTE — PROGRESS NOTES
CARDIOLOGY PROGRESS NOTE    Date: 4/6/2022      Patient Name: Linda Casas  YOB: 1934  MRN: [id number]    Assessment/Recommendations:   # Acute decompensated systolic heart failure: Her echocardiogram demonstrated moderately reduced left ventricular systolic function.  Per the patient, this is a new diagnosis.  Underlying etiology remains unclear at this time, however, ischemic is highly probable given the patient's age and risk factors as well as significant regional wall motion abnormalities.  Other potential etiologies would include hypertensive and LBBB-induced cardiomyopathy.  She did have B12 deficiency, although B1 deficiency to the degree that causes beriberi is very rare.  She now appears euvolemic.  -Okay to hold oral diuretic today with slight Cr bump, but ultimately she will likely require a maintenance dose given that she presented with heart failure and has reduced EF  -Continue carvedilol 3.125 mg bid  -Continue losartan 25 mg nightly  -Continue spironolactone 25 mg daily  -Follow daily BMP  -Ultimately, she should undergo inpatient or outpatient cardiac catheterization if his is within the patient and her family's wishes. However, I am little concerned about her mental status (answers some questions very clearly, at other times seems confused) and I am not sure about her capacity to make this decision. Inpatient cath is not necessary in the absence of unstable angina and this decision could be deferred to the outpatient setting with family present.    # Hypertensive urgency: BP control improved.  -See above for management     # Frequent atrial ectopy: I personally reviewed her telemetry yesterday as well as her ECGs.  She had very frequent atrial ectopy, but did not convincingly develop clear sustained atrial fibrillation on my review.  Unfortunately, her telemetry is difficult to interpret due to low P-wave amplitudes and baseline wander.  In the absence of clear  "atrial fibrillation would hold on oral anticoagulation for now. She will likely need outpatient monitoring eventually.    Events/Subjective:   No acute complaints this morning, overall feels better.    Home Medications:     Current Facility-Administered Medications   Medication Dose Route Frequency Provider Last Rate Last Admin   • spironolactone (ALDACTONE) tablet 25 mg  25 mg Oral Q DAY Michael Lewis M.D.   25 mg at 04/06/22 0559   • losartan (COZAAR) tablet 25 mg  25 mg Oral Q EVENING Michael Lewis M.D.   25 mg at 04/05/22 1749   • carvedilol (COREG) tablet 3.125 mg  3.125 mg Oral BID WITH MEALS Michael Lewis M.D.   3.125 mg at 04/06/22 0741   • cyanocobalamin (VITAMIN B-12) injection 1,000 mcg  1,000 mcg Intramuscular DAILY Мария Tomas M.D.   1,000 mcg at 04/06/22 0559   • enoxaparin (LOVENOX) inj 40 mg  40 mg Subcutaneous DAILY Miladys Ramsey M.D.   40 mg at 04/05/22 1749   • senna-docusate (PERICOLACE or SENOKOT S) 8.6-50 MG per tablet 2 Tablet  2 Tablet Oral BID Gilda Vargas M.D.   2 Tablet at 04/05/22 0503    And   • polyethylene glycol/lytes (MIRALAX) PACKET 1 Packet  1 Packet Oral QDAY RICKIE Vargas M.D.        And   • magnesium hydroxide (MILK OF MAGNESIA) suspension 30 mL  30 mL Oral QDAY RICKIE Vargas M.D.        And   • bisacodyl (DULCOLAX) suppository 10 mg  10 mg Rectal QDAY PRSARAH Vargas M.D.       • hydrALAZINE (APRESOLINE) injection 10 mg  10 mg Intravenous Q4HRS RICKIE Vargas M.D.       • ondansetron (ZOFRAN) syringe/vial injection 4 mg  4 mg Intravenous Q4HRS PRSARAH Vargas M.D.       • ondansetron (ZOFRAN ODT) dispertab 4 mg  4 mg Oral Q4HRS PRSARAH Vargas M.D.           Vitals:   /82   Pulse 72   Temp 37.1 °C (98.7 °F) (Temporal)   Resp 14   Ht 1.626 m (5' 4\")   Wt 63.5 kg (140 lb)   SpO2 92%    BP Readings from Last 4 Encounters:   04/06/22 136/82   03/17/22 136/80   11/06/21 (!) 179/77   11/01/21 130/84     Wt Readings from Last 4 " Encounters:   04/04/22 63.5 kg (140 lb)   03/17/22 64.8 kg (142 lb 12.8 oz)   11/06/21 64.5 kg (142 lb 3.2 oz)   11/01/21 65.2 kg (143 lb 12.8 oz)     Body mass index is 24.03 kg/m².    Physical Examination:   General: Frail, but in no acute distress  Neck: Full range of motion, no jugular venous distension  Pulmonary: Normal respiratory effort, no distress  Cardiovascular: Frequent ectopy  Extremities: Warm and well perfused, no lower extremity edema  Neurological: Alert, good memory of past events, no focal deficits    Laboratories:   Estimated Creatinine Clearance: 29.3 mL/min (by C-G formula based on SCr of 1.17 mg/dL).  Recent Labs     04/04/22  0725 04/05/22  0111 04/06/22  0112   CREATININE 0.79 1.04 1.17   BUN 13 19 29*   POTASSIUM 3.7 3.3* 3.6   SODIUM 139 141 139   CALCIUM 9.8 10.0 9.8   CO2 22 24 25   ALBUMIN 4.4  --  4.3     Recent Labs     04/04/22  0725 04/05/22  0111 04/06/22  0112   GLUCOSE 99 100* 101*     Recent Labs     04/04/22  0725 04/06/22  0112   ASTSGOT 14 22   ALTSGPT 7 10   ALKPHOSPHAT 65 61     Recent Labs     04/04/22  0725 04/05/22  0111 04/06/22  0112   WBC 11.7* 10.7 10.5   HEMOGLOBIN 14.4 15.0 14.9   PLATELETCT 280 317 326     Recent Labs     04/04/22  0725   TROPONINT 15   NTPROBNP 5372*     Lab Results   Component Value Date/Time    LDL 88 04/05/2022 0111     (H) 10/28/2019 0828    LDL 78 04/26/2019 0830     Lab Results   Component Value Date/Time    HDL 69 04/05/2022 0111    HDL 65 10/28/2019 0828    HDL 71 04/26/2019 0830       Lab Results   Component Value Date/Time    TRIGLYCERIDE 80 04/05/2022 0111    TRIGLYCERIDE 105 10/28/2019 0828    TRIGLYCERIDE 74 04/26/2019 0830       Lab Results   Component Value Date/Time    CHOLSTRLTOT 173 04/05/2022 0111    CHOLSTRLTOT 198 10/28/2019 0828    CHOLSTRLTOT 164 04/26/2019 0830       Telemetry:   Continues to be in NSR with frequent atrial ectopy    Cardiac Studies and Procedures:   Echocardiography  Moderately reduced left  ventricular systolic function.   The left ventricular ejection fraction is visually estimated to be 35- 40% with afzh-pi-dmme variation.   Global hypokinesis with regional variation.  Moderate aortic insufficiency. Pressure half time is  313 msec.  Right ventricular systolic pressure is estimated to be 45  mmHg.  IVC not interpretable, going out of plane of US.    XR/CT/MRI  Interstitial edema. No focal consolidation or pleural effusions.    Disposition:   Cardiology will continue to follow.    Please contact us if there are any questions or concerns.      Michael Lewis MD, FACC, [facility]  Interventional Cardiology  [facility], [facility]  [address] [address]  Phone: [phone]  Fax: [phone]

## 2022-04-06 NOTE — CARE PLAN
Problem: Knowledge Deficit - Standard  Goal: Patient and family/care givers will demonstrate understanding of plan of care, disease process/condition, diagnostic tests and medications  Outcome: Progressing     Problem: Fall Risk  Goal: Patient will remain free from falls  Outcome: Progressing     Problem: Discharge Barriers/Planning  Goal: Patient's continuum of care needs are met  Outcome: Progressing  Flowsheets (Taken 4/5/2022 0394)  Continuum of Care Needs: Assessed for discharge barriers   The patient is Stable - Low risk of patient condition declining or worsening    Shift Goals  Clinical Goals: Hemodynamic stability, rest  Patient Goals: rest  Family Goals: priya    Progress made toward(s) clinical / shift goals:  Pt remains hemodynamically stable throughout shift     Patient is not progressing towards the following goals:

## 2022-04-06 NOTE — PROGRESS NOTES
1. Her telemetry was reported this AM as AF, although on my review I felt this was most likely NSR with frequent PACs. A formal 12-lead ECG confirmed NSR with frequent PACs and not AF.    2. I spoke with patient's son, Efren, (over the phone) and with the patient (in person) about cath separately. I reviewed the reasons for considering a cath and the major complications that can occur. Both the son and the patient were in agreement with proceeding with cath and consented verbally to the procedure following these discussions. Will make NPO and plan for cath tomorrow if renal function stable.

## 2022-04-06 NOTE — PROGRESS NOTES
Report received, pt care assumed, tele box on and rate verified. VSS and pt is on room air. Pt aaox3 with disorientation to time, no signs of distress noted at this time. POC discussed with pt and verbalizes no questions. Pt c/o of no pain at this time. Pt denies any additional needs at this time. Bed in lowest position, bed alarm on, pt educated on fall risk and verbalized understanding, call light within reach, will continue with plan of care.

## 2022-04-06 NOTE — CONSULTS
Geriatric Consult.   Patient Name: Linda Casas  Patient Age, Gender: 87 y.o., female  Date of Consult:4/6/2022      Name of Requesting Consultant(s): ELISE Huang M.D.  Consultant: Mika Villasenor M.D.   Reason for Consult: Memory changes    Chief Complaint:   Chief Complaint   Patient presents with   • Shortness of Breath     X 3 days       History of Present Illness:  Linda is a 87 y.o. female  PMH of hypertension, vertebral compression fracture status post kyphoplasty. Admitted for acute onset shortness of breath found to have new onset heart failure.    History obtained from chart review, medical staff and patient. Patient is a limited historian.     The patient overall is hoping to go home.  She is mostly concerned about calling her son who is currently taking care of her cat.  We did review of systems, as seen below.  She tells me her  is currently hospitalized and that people are rotating staying with her at her home.  She notes that she is never been depressed before but she currently is depressed.    Prior to admission the patient: History is limited visit was provided by the patient and not corroborated by family.     • Activity Level: Light   • ADL and IADL Status:  dependent with   grocery shopping, prepping meals, transportation, independent with  bathing, toileting, dressing, feeding, transferring house work  • A pillbox is used for medications No  • Does the patient have someone help with ADL or IADL? Yes; If yes, who helps?  Family  • The patient feels safe at home: Yes    The patient's code status was reviewed: No    Immunization History   Administered Date(s) Administered   • INFLUENZA TIV (IM) 10/08/2012   • Influenza (IM) Preservative Free - HISTORICAL DATA 10/19/2017   • Influenza Vaccine Adult HD 10/29/2013, 10/04/2016, 09/26/2020   • Influenza Vaccine Pediatric Split - Historical Data 10/19/2005, 10/17/2007   • Influenza Vaccine Quad Inj (Pf) 11/05/2018,  10/16/2019   • Moderna SARS-CoV-2 Vaccine 01/28/2021, 03/05/2021   • Pneumococcal Conjugate Vaccine (Prevnar/PCV-13) 10/04/2016   • Pneumococcal polysaccharide vaccine (PPSV-23) 10/19/2005, 10/08/2012   • Tdap Vaccine 04/04/2013       ROS:A 14 sytem ROS is negative other than as stated above in HPI or noted below.   Weight loss in last six months: yes -patient feels like she has lost weight but cannot tell me how much  Dietary changes: no  Poor appetite: no  Nausea: no  Vomiting:no  Diarrhea: no  Constipation: no  Urinary Incontinence: yes -she says she has daily urgency and cannot make it to the bathroom on time  Fecal Incontinence: no  Memory Problems: yes -she does endorse memory problems but cannot elaborate  Confusion: no  Falls in the last year: no  Vision issues: no  Hearing Issues: yes -she does feel like her hearing is going down a little bit  Review of Systems      Past Medical History:   Diagnosis Date   • Anxiety 9/14/2010   • Cataract    • High cholesterol    • Hypertension     history of but no treatment   • OSTEOPOROSIS 9/14/2010   • Pain 03/04/14    6/10=L knee   • Pain 11/04/2021    low back   • Snoring     no sleep study   • Urinary incontinence     only at times at night when sleeping       Current Facility-Administered Medications:   •  [START ON 4/7/2022] furosemide (LASIX) tablet 10 mg, 10 mg, Oral, Q DAY, Miladys Ramsey M.D.  •  enoxaparin (LOVENOX) inj 30 mg, 30 mg, Subcutaneous, DAILY, ELISE Huang M.D.  •  spironolactone (ALDACTONE) tablet 25 mg, 25 mg, Oral, Q DAY, Michael Lewis M.D., 25 mg at 04/06/22 0559  •  losartan (COZAAR) tablet 25 mg, 25 mg, Oral, Q EVENING, Michael Lewis M.D., 25 mg at 04/05/22 4429  •  carvedilol (COREG) tablet 3.125 mg, 3.125 mg, Oral, BID WITH MEALS, Michael Lewis M.D., 3.125 mg at 04/06/22 0741  •  cyanocobalamin (VITAMIN B-12) injection 1,000 mcg, 1,000 mcg, Intramuscular, DAILY, Мария Tomas M.D., 1,000 mcg at 04/06/22 0559  •   senna-docusate (PERICOLACE or SENOKOT S) 8.6-50 MG per tablet 2 Tablet, 2 Tablet, Oral, BID, 2 Tablet at 04/05/22 0503 **AND** polyethylene glycol/lytes (MIRALAX) PACKET 1 Packet, 1 Packet, Oral, QDAY PRN **AND** magnesium hydroxide (MILK OF MAGNESIA) suspension 30 mL, 30 mL, Oral, QDAY PRN **AND** bisacodyl (DULCOLAX) suppository 10 mg, 10 mg, Rectal, QDAY PRN, Gilda Vargas M.D.  •  hydrALAZINE (APRESOLINE) injection 10 mg, 10 mg, Intravenous, Q4HRS PRN, Gilda Vargas M.D.  •  ondansetron (ZOFRAN) syringe/vial injection 4 mg, 4 mg, Intravenous, Q4HRS PRN, Gilda Vargas M.D.  •  ondansetron (ZOFRAN ODT) dispertab 4 mg, 4 mg, Oral, Q4HRS PRN, Gilda Vargas M.D.  Social History     Socioeconomic History   • Marital status:      Spouse name: Not on file   • Number of children: Not on file   • Years of education: Not on file   • Highest education level: Not on file   Occupational History   • Not on file   Tobacco Use   • Smoking status: Never Smoker   • Smokeless tobacco: Never Used   Vaping Use   • Vaping Use: Never used   Substance and Sexual Activity   • Alcohol use: Not Currently     Alcohol/week: 0.0 oz     Comment: 1 time per month   • Drug use: Never   • Sexual activity: Yes     Partners: Male   Other Topics Concern   • Not on file   Social History Narrative    ** Merged History Encounter **          Social Determinants of Health     Financial Resource Strain: Not on file   Food Insecurity: Not on file   Transportation Needs: Not on file   Physical Activity: Not on file   Stress: Not on file   Social Connections: Not on file   Intimate Partner Violence: Not on file   Housing Stability: Not on file     Family History   Problem Relation Age of Onset   • No Known Problems Father    • No Known Problems Mother      Past Surgical History:   Procedure Laterality Date   • KYPHOPLASTY N/A 11/6/2021    Procedure: KYPHOPLASTY - L1 AND BIOPSY;  Surgeon: Tim Barton M.D.;  Location: SURGERY [facility];  Service:  "Orthopedics   • TRIGGER FINGER RELEASE Left 5/29/2018    Procedure: TRIGGER FINGER RELEASE-  MIDDLE;  Surgeon: Frandy Liz M.D.;  Location: SURGERY [facility];  Service: Orthopedics   • JULIAN BY LAPAROSCOPY  5/23/2016    Procedure: JULIAN BY LAPAROSCOPY;  Surgeon: Adan Kearns M.D.;  Location: SURGERY [facility];  Service:    • KNEE ARTHROSCOPY  3/11/2014    Performed by Bonilla Valera M.D. at SURGERY [facility]   • BLADDER SUSPENSION  2001   • HYSTERECTOMY, TOTAL ABDOMINAL  2000   • APPENDECTOMY  1969   • COLON RESECTION      partial; no CA         Physical Exam:  /83   Pulse 78   Temp 36.6 °C (97.9 °F) (Temporal)   Resp 18   Ht 1.626 m (5' 4\")   Wt 63.5 kg (140 lb)   SpO2 90%   BMI 24.03 kg/m²   Gen: No acute distress  Vision Screen: normal  Hearing Screen: grossly intact  CV: RRR, 2+ radial pulses and DP pulses bilaterally  Lungs: Normal effort, CAB  Abd: s/nt/nd  Gait: we walked around the room overall mood is normal  Depression Screen: PHQ 2 positive  Delirium Screen: Negative, no signs of inattention, fluctuating mental status.  She does have some disorganized thinking      Ambulation screen: Walked around the room and she overall did well    Labs: CBC, CMP, HIV, TSH RPR reviewed.  She does have a low B12 level and is currently on replacement  Imaging: Patient received a CT head, without acute abnormality, chest x-ray with interstitial edema on admission she also got it echo which showed an EF of 35%, global hypokinesis, with moderate AI, RSVP 45  EKG: Patient has received multiple EKGs this admission, the EKG done on 4 6 at 1030 showed sinus rhythm with left bundle branch block QTC of 455    Assessment: 87-year-old female admitted with acute onset shortness of breath found to be in acute failure with plans for cardiac catheterization tomorrow.  She also appears to have a dementia syndrome      Plan:  Dementia syndrome  -Patient appears to have some depression which could " confound this diagnosis; -Recommend outpatient reassessment of depression; it appears isolation and loneliness is playing a large factor however and consideration of treatment with counseling and/or pharmacological therapy could also be considered  -She also has a low B12 level, being replaced.  No additional laboratory work-up to recommend  -CT head without acute abnormality  -Did not have a chance to call family today, will need to discuss with them tomorrow about her progression over time to see if any further work-up is appropriate  -I discussed with the nurse importance of mobility we got her into a chair during my assessment.  -Patient has higher risk of acute confusion during her stay, see delirium prevention precautions below    Acute systolic heart failure  -Getting diuresed, planning cardiac cath tomorrow to assess for ischemic causes  -Currently on a beta-blocker, ARB, aldosterone antagonist; pending results of cardiac cath would recommend orthostatic vital signs prior to discharge to make sure we are not causing symptomatic hypotension.    Urinary incontinence  -Consider timed voiding, every 3-4 hours, (per patient preference) help avoid number of incontinent episodes    Weight loss  -Patient has had about a 10 pound weight loss over the last year or so.  -We will need to follow-up with family to ensure what her access to food looks like    Disposition  -Patient can likely go home at discharge as long as she has family support; encourage long-term care planning with family given these mental status changes as noted above    Interventions to be considered in all patients in order to minimize the risk of delirium.   -do not disturb patient (vitals or lab draws) between the hours of 10 PM and 6 AM.  -ideally the patient should not sleep during the day and we should avoid day time naps.   -up in chair for meals  -ambulate at least three times daily, as able  -watch for constipation  -timed voiding - ask  patient is they would like to go to the bathroom q 2-3 hours, except during the do not disturb hours.   -remove all necessary lines (central lines, peripheral IVs, feeding tubes, pennington catheters)  -unless the patient has shown harm to self or others I would recommend against use of restraints - either chemical or physical (antipsychotics)   -minimize polypharmacy, do not dose medication during sleep hours      Thank you for this interesting consult. We will continue to follow this patient along with you.       Mika Villasenor M.D.  Geriatric Attending  Available Monday-Sunday 8:00-4:00 (excudling holidays)  Available me Voalte Me Messaging Application   This note was partially dictated with voice recognition software, for any confusion please do not hesitate to contact me.       Direct Links to Patient Handouts:    CDC Healthy Aging  NIH National [place] on Aging  [facility] Patient Resources    Links that can be Cut and Paste into your browser:    Healthy Aging  www.healthinaging.org  Staying Active  www.activeandhealthy.nsw.gov.au  Geriatrics Online   [email or web address]-[email or web address]

## 2022-04-06 NOTE — HEART FAILURE PROGRAM
New HF with EF of 35% where it was 60 in 2020.    Nurses: HF has been added as a title to the education tab and to the care plan. Would you please take credit for the great work that you're doing by documenting in these? Thank you!  Providers: below are Guideline Directed Medical Therapy (GDMT) for HFrEF. If any cannot be prescribed by discharge, would you please note the clinical reason for each in your discharge summary? Thanks! Michelle  • Evidence Based Beta Blocker (bisoprolol, carvedilol, or toprol xl), for EF of 40% or less  • RADHA - I, for EF of 40% or less ARNI is preferred If not cost prohibitive for patient  • SGLT2 inhibitor with proven ASCVD, HF, or DKD benefit Jardiance/empagliflozin): If not cost prohibitive for patient  • Aldosterone antagonist, for EF of 35% or less, if applicable  • Anticoagulation for atrial arrhythmia, if applicable  • Glycemic control for DM + HF, if applicable  • Lipid lowering medication for DM + HF, if applicable  • Hydralazine Hydrochloride/Isosorbide Dinitrate. The combination of hydralazine and isosorbide- dinitrate is recommended to reduce morbidity and mortality for patients self-described  Americans with NYHA class III-IV HFrEF (EF 40% or less), receiving optimal therapy with ACE inhibitors and beta blockers, unless contraindicated (Class I, ELISA: A).  • Pneumococcal vaccine, if not previously received  • Influenza vaccine for current season, if not previously received  • Smoking cessation counseling documented, if applicable  • Device screening, if applicable  • Referral to disease management program specializing in heart failure care- requested that schedulers notify me if patient cannot be scheduled at the Heart Failure Clinic

## 2022-04-06 NOTE — NON-PROVIDER
Daily Progress Note:     Date of Service: 4/6/2022  Primary Team: [facility] Blue/Gray  Attending: ELISE Huang M.D.   Senior Resident: Dr. Huang  Intern: Dr. Miladys Ramsey  Contact:  [phone]    Chief Complaint:   SOB    Subjective:  86 y/o female w/ hx of htn, recurrent falls and dementia admitted yesterday for worsening SOB for the past 3 days. Later, tele showed pt converted to afib. Today, pt reports improved SOB. Pt denies chest pain, palpitations or dizziness.    Consultants/Specialty:  Cardiology    Review of Systems:    Gen: denies fevers/chills  Cardio: denies chest pain, palpitations  Pulm: denies SOB  GI: denies abdominal pain, n/v  Neuro: denies dizziness, lightheadedness    Objective Data:   Physical Exam:   Vitals:   Temp:  [36.1 °C (96.9 °F)-37.2 °C (99 °F)] 36.6 °C (97.8 °F)  Pulse:  [62-80] 63  Resp:  [16-18] 18  BP: (128-150)/(69-88) 134/88  SpO2:  [87 %-94 %] 91 %    Intake/Output Summary (Last 24 hours) at 4/6/2022 0654  Last data filed at 4/6/2022 0600  Gross per 24 hour   Intake 1150 ml   Output 200 ml   Net 950 ml       Gen: no acute distress  Cardiac: RRR, normal heart sounds  Pulm: no increased work of breathing, lung sounds clear to auscultation  Skin: cap refill<3 sec  Msk: no swelling to bilateral lower extremities  Psych: A and O x2 (person and place)      Labs:   ESTIMATED GFR [[id number]] (Abnormal) Collected: 04/06/22 0112   Order Status: Completed Updated: 04/06/22 0156    GFR (CKD-EPI) 45 Abnormal  mL/min/1.73 m 2      Comp Metabolic Panel [[id number]] (Abnormal) Collected: 04/06/22 0112   Order Status: Completed Specimen: Blood Updated: 04/06/22 0156    Sodium 139 mmol/L     Potassium 3.6 mmol/L     Chloride 102 mmol/L     Co2 25 mmol/L     Anion Gap 12.0    Glucose 101 High  mg/dL     Bun 29 High  mg/dL     Creatinine 1.17 mg/dL     Calcium 9.8 mg/dL     AST(SGOT) 22 U/L     ALT(SGPT) 10 U/L     Alkaline Phosphatase 61 U/L     Total Bilirubin 0.5 mg/dL     Albumin  4.3 g/dL     Total Protein 6.9 g/dL     Globulin 2.6 g/dL     A-G Ratio 1.7 g/dL      CBC WITH DIFFERENTIAL [[id number]] (Abnormal) Collected: 04/06/22 0112   Order Status: Completed Specimen: Blood Updated: 04/06/22 0138    WBC 10.5 K/uL     RBC 4.86 M/uL     Hemoglobin 14.9 g/dL     Hematocrit 45.8 %     MCV 94.2 fL     MCH 30.7 pg     MCHC 32.5 Low  g/dL     RDW 48.0 fL     Platelet Count 326 K/uL     MPV 10.8 fL     Neutrophils-Polys 58.70 %     Lymphocytes 27.30 %     Monocytes 10.20 %     Eosinophils 2.70 %     Basophils 0.60 %     Immature Granulocytes 0.50 %     Nucleated RBC 0.00 /100 WBC     Neutrophils (Absolute) 6.17 K/uL     Lymphs (Absolute) 2.87 K/uL     Monos (Absolute) 1.07 High  K/uL     Eos (Absolute) 0.28 K/uL     Baso (Absolute) 0.06 K/uL     Immature Granulocytes (abs) 0.05 K/uL     NRBC (Absolute) 0.00 K/uL        Imaging:   No new imaging    Problem Representation: 86 y/o female w/ hx of htn and dementia admitted for SOB. Pt continues to improve clinically. Pt's low B12 and changes consistent w/ small vessel disease can indicate a secondary cause of dementia.    Acute HFrEF, NYHA class III- (present on admission)  Assessment & Plan  Repeat Echo shows EF 35-40%, moderately reduced L ventricular systolic function, global hypokinesis. Last ECHO 5/2020: LVEF 60%, grade 2 diastolic dysfunction, mildly dilated LA, mild MR and mild TR, RVSP 35.  BNP on admission: 5372, Chest X ray on admission showed pulmonary edema. EKG on admission showed wandering pacemaker, left ventricular hypertrophy, left axis deviation, left bundle branch block.Troponin on admission 15  Cr increased from 1.04 to 1.17  -Monitor on telemetry  -Strict I/Os and Daily weights   -2gm sodium diet, fluid restriction to 2000 ml/24 hour.   -Supplemental oxygen PRN for hypoxia  -3.125 mg carvedilol  -Decrease Lasix to 10 mg PO due to declining kidney function, lisinopril 5 mg daily, Aldactone 12.5 mg daily.    -Monitor BMP  -Cardiology  consulted     Paroxysmal atrial fibrillation (HCC)- (present on admission)  Assessment & Plan  EKGs showed frequent atrial ectopy  Patient initially presented with a wandering pacemaker, however on telemetry was noted to convert to A fib.  -[id number] 5, patient should be started on anticoagulation, pt's risk of stroke outweighs pt's hx of recurrent falls  -Avoid beta blocker in setting of acute decompensated CHF  -Pt appears to be compensated  -Per cardiology, discontinue PO anticoagulation due to unclear afib and likelihood of needing invasive procedures     Hypertensive emergency- (present on admission)  Assessment & Plan  Patient longstanding history of uncontrolled hypertension (not on any antihypertensives) presents with /81 with SOB X 3 days, pulmonary edema on CXR shows possible flash pulmonary edema. Head CT shows diffuse atrophy and periventricular white matter changes consistent w/ chronic small vessel disease     -Continue failure regimen: ACE inhibitor, Aldactone, Lasix   -Hydralazine as needed for blood pressure control     B12 deficiency- (present on admission)  Assessment & Plan  Noted to have B12 deficiency as part of her dementia work-up.  -Start IV B12 supplementation     Memory loss- (present on admission)  Assessment & Plan  Family members note worsening memory loss in the patient.    Pt has low B12, normal TSH and folate.  Heat CT shows diffuse atrophy and periventricular white matter changes consistent w/ chronic small vessel disease  Differential includes B12 deficiency vs vascular dementia  -Replete B12, will start on thiamine  -RPR non reactive  -MOCA 5/30, confounding factors include being in the hospital, tiredness and primary language not being English  -Consult geriatrics  -Consult social work about possible placement into memory care unit     At high risk for injury related to fall- (present on admission)  Assessment & Plan  Patient has had > 2 falls in the past 1 year.  Her  family is concerned that her dementia is worsening.   -Fall precautions  -Head CT shows no evidence of SDH/other hemorrhage

## 2022-04-06 NOTE — PROGRESS NOTES
Daily Progress Note:     Date of Service: 4/6/2022  Primary Team: [facility] Team   Attending: ELISE Huang M.D.   Senior Resident: Dr. ORLANDO Tomas  Intern: Dr. PURNIMA Ramsey  Contact:  [phone]    Chief Complaint:   Shortness of breath     ID: Mrs. Linda Casas is a 87 year old female presented with shortness of breath for three days, with proximal nocturnal dyspnea and increased swelling in her legs. Found to be in CHF exacerbation and to have wandering pacemaker syndrome.  She has a past medical history of hypertension, left wrist fracture (6/21), s/p kyphoplasty and diastolic heart failure( ECHO 2020 LVEF 60%).       Interval history:     Patient states that she is feeling much better than prior. She does not endorse any current symptoms. MOCA was performed, patient was found to have a 5/30, indicating severe cognitive impairment. Patient's native language is Icelandic, so this may have caused some lowering of the score.   Discussed memory care for Linda with family. Family is agreeable to have her come home to be looked after by family, when she is medically cleared.       Consultants/Specialty:  Interventional Cardiology    Review of Systems:    Review of Systems   Constitutional: Negative for chills, fever, malaise/fatigue and weight loss.   HENT: Negative.    Eyes: Negative.    Respiratory: Negative for cough, hemoptysis and shortness of breath (Resolved, patient able to lay flat).    Cardiovascular: Positive for chest pain (Resolving) and leg swelling (Resolving). Negative for palpitations and orthopnea.   Gastrointestinal: Negative for abdominal pain, constipation, diarrhea, heartburn, nausea and vomiting.   Genitourinary: Negative.    Musculoskeletal: Negative for back pain, joint pain, myalgias and neck pain.   Neurological: Negative for dizziness, tingling, focal weakness, weakness and headaches.   Endo/Heme/Allergies: Negative.    Psychiatric/Behavioral: Negative.        Objective:    Physical Exam:   Vitals:   Temp:  [36.1 °C (96.9 °F)-37.2 °C (99 °F)] 36.8 °C (98.2 °F)  Pulse:  [62-72] 72  Resp:  [14-18] 16  BP: (125-136)/(67-88) 125/67  SpO2:  [90 %-94 %] 90 %    Physical Exam  Constitutional:       General: She is not in acute distress.     Appearance: Normal appearance. She is not ill-appearing, toxic-appearing or diaphoretic.   HENT:      Right Ear: Tympanic membrane normal.      Left Ear: Tympanic membrane normal.      Nose: Nose normal. No congestion or rhinorrhea.      Mouth/Throat:      Pharynx: No oropharyngeal exudate or posterior oropharyngeal erythema.   Eyes:      General: No scleral icterus.        Right eye: No discharge.         Left eye: No discharge.      Extraocular Movements: Extraocular movements intact.      Pupils: Pupils are equal, round, and reactive to light.   Cardiovascular:      Rate and Rhythm: Normal rate. Rhythm irregular.      Pulses: Normal pulses.      Heart sounds: No murmur heard.    No gallop.   Pulmonary:      Effort: Pulmonary effort is normal. No respiratory distress.      Breath sounds: Rales (Improving) present. No wheezing.   Abdominal:      General: Abdomen is flat. There is no distension.      Palpations: Abdomen is soft. There is no mass.      Tenderness: There is no abdominal tenderness. There is no guarding or rebound.      Hernia: No hernia is present.   Genitourinary:     General: Normal vulva.   Musculoskeletal:         General: No swelling or tenderness.      Cervical back: Normal range of motion. No rigidity or tenderness.      Right lower leg: Edema (Trace edema) present.      Left lower leg: Edema (Trace edema) present.   Skin:     General: Skin is warm and dry.   Neurological:      Mental Status: She is alert. Mental status is at baseline.           Labs:   Recent Labs     04/04/22  0725 04/05/22  0111 04/06/22  0112   WBC 11.7* 10.7 10.5   RBC 4.67 4.90 4.86   HEMOGLOBIN 14.4 15.0 14.9   HEMATOCRIT 44.7 45.4 45.8   MCV 95.7 92.7 94.2    MCH 30.8 30.6 30.7   RDW 48.9 47.7 48.0   PLATELETCT 280 317 326   MPV 11.2 11.1 10.8   NEUTSPOLYS 75.80*  --  58.70   LYMPHOCYTES 13.20*  --  27.30   MONOCYTES 8.80  --  10.20   EOSINOPHILS 1.50  --  2.70   BASOPHILS 0.40  --  0.60     Recent Labs     04/04/22  0725 04/05/22  0111 04/06/22  0112   SODIUM 139 141 139   POTASSIUM 3.7 3.3* 3.6   CHLORIDE 104 103 102   CO2 22 24 25   GLUCOSE 99 100* 101*   BUN 13 19 29*     Recent Labs     04/04/22  0725 04/05/22  0111 04/06/22  0112   ALBUMIN 4.4  --  4.3   TBILIRUBIN 0.7  --  0.5   ALKPHOSPHAT 65  --  61   TOTPROTEIN 7.1  --  6.9   ALTSGPT 7  --  10   ASTSGOT 14  --  22   CREATININE 0.79 1.04 1.17       Imaging:   EC-ECHOCARDIOGRAM COMPLETE W/O CONT   Final Result      CT-HEAD W/O   Final Result         1.  No acute intracranial process.      2. Diffuse atrophy and periventricular white matter changes consistent with chronic small vessel disease.      DX-CHEST-PORTABLE (1 VIEW)   Final Result      Interstitial edema. No focal consolidation or pleural effusions.          Assessment and Plan:  * CHF (congestive heart failure), NYHA class III, acute, systolic (HCC)- (present on admission)  Assessment & Plan  Acute Decompensated Heart Failure: HFpEF (has uncontroled HTN, LVH, grade 2 diastolic dysfunction, LVEF 35-40%, decreased systolic function.   NYHA stage III, NTproBNP on admission: 5372, Chest X ray on admission: pulmonary edema b/l. EKG on admission: Wandering pacemaker, left ventricular hypertrophy, left axis deviation, left bundle branch block.Troponin on admission: 15  ECHO shoed decreased systolic function, LVEF 35-30%, global hypokinesis.     -Monitor on telemetry  -Strict I/Os and Daily weights   -2gm sodium diet, fluid restriction to 2000 ml/24 hour.   -Provide supplemental oxygen if hypoxic (SpO2<90%), orders for position patient upright.   -3.125 g Carvediolol  -Decreased to 10mg  Lasix oral  - lisinopril 5 mg daily, Aldactone 12.5 mg daily.  Will not  start beta-blocker in the setting of acutely decompensated heart failure, recommend this be started outpatient.  -Follow BMP  -Left heart catheterization tomorrow 4/7, NPO midnight  -Heart failure counseling and CHF education to be provided on the day of discharge.   -Cardiology consulted, to enroll patient with close outpatient cardiology follow-up      B12 deficiency- (present on admission)  Assessment & Plan  Noted to have B12 deficiency as part of her dementia work-up.    -Start B12 supplementation    Paroxysmal atrial fibrillation (HCC)- (present on admission)  Assessment & Plan  Patient initially presented with a wandering pacemaker, however on telemetry was noted to convert to A. fib.  She was asymptomatic (sleeping comfortably) rate controlled with a rate in the 80s, and hemodynamically stable with a blood pressure of 120s/70s.      -We will hold off of beta-blocker in the setting of acute CHF exacerbation  -[id number] 5, patient should be started on anticoagulation, however given history of falls will have risk versus benefits discussion prior to initiating.    Memory loss- (present on admission)  Assessment & Plan  Family members note worsening memory loss in the patient.    Work-up: Noted to have low B12, normal TSH and folate.  RPR: non-reactive, HIV: non-reactive. Consider B12 deficiency vs. Vascular dementia.   MOCA: 5    -Repleting B12  -Starting thiamine  -Geriatrics consulted          At high risk for injury related to fall- (present on admission)  Assessment & Plan  Patient has had > 2 falls in the past 1 year.  Her family is concerned that her dementia is worsening.   Head CT: diffuse atrophy and periventricular white matter changes consistent with chronic small vessel disease which may be a cause of dementia.   -Fall precautions      Hypertensive emergency- (present on admission)  Assessment & Plan   Longstanding history of uncontrolled hypertension (not on any antihypertensives) presents with BP  191/81 with SOB X 3 days, pulmonary edema on CXR. Concern for flash pulmonary edema. Improved blood pressures on medications.     -Start heart failure regimen: ACE inhibitor, Aldactone, Lasix   -Hydralazine as needed for blood pressure control      DVT: 40 mg IV enoxaparin

## 2022-04-07 ENCOUNTER — APPOINTMENT (OUTPATIENT)
Dept: CARDIOLOGY | Facility: MEDICAL CENTER | Age: 87
DRG: 246 | End: 2022-04-07
Attending: INTERNAL MEDICINE
Payer: MEDICARE

## 2022-04-07 LAB
ALBUMIN SERPL BCP-MCNC: 4.3 G/DL (ref 3.2–4.9)
ALBUMIN/GLOB SERPL: 1.7 G/DL
ALP SERPL-CCNC: 53 U/L (ref 30–99)
ALT SERPL-CCNC: 12 U/L (ref 2–50)
ANION GAP SERPL CALC-SCNC: 10 MMOL/L (ref 7–16)
AST SERPL-CCNC: 17 U/L (ref 12–45)
BASOPHILS # BLD AUTO: 0.5 % (ref 0–1.8)
BASOPHILS # BLD: 0.05 K/UL (ref 0–0.12)
BILIRUB SERPL-MCNC: 0.6 MG/DL (ref 0.1–1.5)
BUN SERPL-MCNC: 27 MG/DL (ref 8–22)
CALCIUM SERPL-MCNC: 10 MG/DL (ref 8.5–10.5)
CHLORIDE SERPL-SCNC: 102 MMOL/L (ref 96–112)
CO2 SERPL-SCNC: 27 MMOL/L (ref 20–33)
CREAT SERPL-MCNC: 1.11 MG/DL (ref 0.5–1.4)
EKG IMPRESSION: NORMAL
EOSINOPHIL # BLD AUTO: 0.25 K/UL (ref 0–0.51)
EOSINOPHIL NFR BLD: 2.7 % (ref 0–6.9)
ERYTHROCYTE [DISTWIDTH] IN BLOOD BY AUTOMATED COUNT: 49.2 FL (ref 35.9–50)
GFR SERPLBLD CREATININE-BSD FMLA CKD-EPI: 48 ML/MIN/1.73 M 2
GLOBULIN SER CALC-MCNC: 2.5 G/DL (ref 1.9–3.5)
GLUCOSE SERPL-MCNC: 103 MG/DL (ref 65–99)
HCT VFR BLD AUTO: 46.4 % (ref 37–47)
HGB BLD-MCNC: 15.1 G/DL (ref 12–16)
IMM GRANULOCYTES # BLD AUTO: 0.04 K/UL (ref 0–0.11)
IMM GRANULOCYTES NFR BLD AUTO: 0.4 % (ref 0–0.9)
LYMPHOCYTES # BLD AUTO: 2.69 K/UL (ref 1–4.8)
LYMPHOCYTES NFR BLD: 28.8 % (ref 22–41)
MCH RBC QN AUTO: 31 PG (ref 27–33)
MCHC RBC AUTO-ENTMCNC: 32.5 G/DL (ref 33.6–35)
MCV RBC AUTO: 95.3 FL (ref 81.4–97.8)
MONOCYTES # BLD AUTO: 0.93 K/UL (ref 0–0.85)
MONOCYTES NFR BLD AUTO: 10 % (ref 0–13.4)
NEUTROPHILS # BLD AUTO: 5.37 K/UL (ref 2–7.15)
NEUTROPHILS NFR BLD: 57.6 % (ref 44–72)
NRBC # BLD AUTO: 0 K/UL
NRBC BLD-RTO: 0 /100 WBC
PLATELET # BLD AUTO: 314 K/UL (ref 164–446)
PMV BLD AUTO: 10.7 FL (ref 9–12.9)
POTASSIUM SERPL-SCNC: 3.8 MMOL/L (ref 3.6–5.5)
PROT SERPL-MCNC: 6.8 G/DL (ref 6–8.2)
RBC # BLD AUTO: 4.87 M/UL (ref 4.2–5.4)
SODIUM SERPL-SCNC: 139 MMOL/L (ref 135–145)
WBC # BLD AUTO: 9.3 K/UL (ref 4.8–10.8)

## 2022-04-07 PROCEDURE — 80053 COMPREHEN METABOLIC PANEL: CPT

## 2022-04-07 PROCEDURE — 700102 HCHG RX REV CODE 250 W/ 637 OVERRIDE(OP)

## 2022-04-07 PROCEDURE — 85347 COAGULATION TIME ACTIVATED: CPT

## 2022-04-07 PROCEDURE — 700102 HCHG RX REV CODE 250 W/ 637 OVERRIDE(OP): Performed by: INTERNAL MEDICINE

## 2022-04-07 PROCEDURE — 99232 SBSQ HOSP IP/OBS MODERATE 35: CPT | Performed by: INTERNAL MEDICINE

## 2022-04-07 PROCEDURE — 700111 HCHG RX REV CODE 636 W/ 250 OVERRIDE (IP): Performed by: STUDENT IN AN ORGANIZED HEALTH CARE EDUCATION/TRAINING PROGRAM

## 2022-04-07 PROCEDURE — 700111 HCHG RX REV CODE 636 W/ 250 OVERRIDE (IP)

## 2022-04-07 PROCEDURE — B2111ZZ FLUOROSCOPY OF MULTIPLE CORONARY ARTERIES USING LOW OSMOLAR CONTRAST: ICD-10-PCS | Performed by: INTERNAL MEDICINE

## 2022-04-07 PROCEDURE — 700101 HCHG RX REV CODE 250

## 2022-04-07 PROCEDURE — 85025 COMPLETE CBC W/AUTO DIFF WBC: CPT

## 2022-04-07 PROCEDURE — 99153 MOD SED SAME PHYS/QHP EA: CPT

## 2022-04-07 PROCEDURE — A9270 NON-COVERED ITEM OR SERVICE: HCPCS

## 2022-04-07 PROCEDURE — 93458 L HRT ARTERY/VENTRICLE ANGIO: CPT | Mod: 26,59 | Performed by: INTERNAL MEDICINE

## 2022-04-07 PROCEDURE — 99152 MOD SED SAME PHYS/QHP 5/>YRS: CPT | Mod: 59 | Performed by: INTERNAL MEDICINE

## 2022-04-07 PROCEDURE — 4A023N7 MEASUREMENT OF CARDIAC SAMPLING AND PRESSURE, LEFT HEART, PERCUTANEOUS APPROACH: ICD-10-PCS | Performed by: INTERNAL MEDICINE

## 2022-04-07 PROCEDURE — B240ZZ3 ULTRASONOGRAPHY OF SINGLE CORONARY ARTERY, INTRAVASCULAR: ICD-10-PCS | Performed by: INTERNAL MEDICINE

## 2022-04-07 PROCEDURE — 93005 ELECTROCARDIOGRAM TRACING: CPT | Performed by: INTERNAL MEDICINE

## 2022-04-07 PROCEDURE — 700117 HCHG RX CONTRAST REV CODE 255: Performed by: INTERNAL MEDICINE

## 2022-04-07 PROCEDURE — A9270 NON-COVERED ITEM OR SERVICE: HCPCS | Performed by: INTERNAL MEDICINE

## 2022-04-07 PROCEDURE — 36415 COLL VENOUS BLD VENIPUNCTURE: CPT

## 2022-04-07 PROCEDURE — 93010 ELECTROCARDIOGRAM REPORT: CPT | Mod: 59 | Performed by: INTERNAL MEDICINE

## 2022-04-07 PROCEDURE — 99233 SBSQ HOSP IP/OBS HIGH 50: CPT | Mod: GC | Performed by: HOSPITALIST

## 2022-04-07 PROCEDURE — 92928 PRQ TCAT PLMT NTRAC ST 1 LES: CPT | Mod: LD | Performed by: INTERNAL MEDICINE

## 2022-04-07 PROCEDURE — 700111 HCHG RX REV CODE 636 W/ 250 OVERRIDE (IP): Performed by: HOSPITALIST

## 2022-04-07 PROCEDURE — 027035Z DILATION OF CORONARY ARTERY, ONE ARTERY WITH TWO DRUG-ELUTING INTRALUMINAL DEVICES, PERCUTANEOUS APPROACH: ICD-10-PCS | Performed by: INTERNAL MEDICINE

## 2022-04-07 PROCEDURE — 770020 HCHG ROOM/CARE - TELE (206)

## 2022-04-07 PROCEDURE — 99233 SBSQ HOSP IP/OBS HIGH 50: CPT | Performed by: INTERNAL MEDICINE

## 2022-04-07 PROCEDURE — 92978 ENDOLUMINL IVUS OCT C 1ST: CPT | Mod: 26,LD | Performed by: INTERNAL MEDICINE

## 2022-04-07 RX ORDER — LIDOCAINE HYDROCHLORIDE 20 MG/ML
INJECTION, SOLUTION INFILTRATION; PERINEURAL
Status: COMPLETED
Start: 2022-04-07 | End: 2022-04-07

## 2022-04-07 RX ORDER — HEPARIN SODIUM 1000 [USP'U]/ML
INJECTION, SOLUTION INTRAVENOUS; SUBCUTANEOUS
Status: COMPLETED
Start: 2022-04-07 | End: 2022-04-07

## 2022-04-07 RX ORDER — CLOPIDOGREL 300 MG/1
TABLET, FILM COATED ORAL
Status: COMPLETED
Start: 2022-04-07 | End: 2022-04-07

## 2022-04-07 RX ORDER — LOSARTAN POTASSIUM 50 MG/1
50 TABLET ORAL EVERY EVENING
Status: DISCONTINUED | OUTPATIENT
Start: 2022-04-07 | End: 2022-04-10 | Stop reason: HOSPADM

## 2022-04-07 RX ORDER — ASPIRIN 81 MG/1
TABLET, CHEWABLE ORAL
Status: COMPLETED
Start: 2022-04-07 | End: 2022-04-07

## 2022-04-07 RX ORDER — MIDAZOLAM HYDROCHLORIDE 1 MG/ML
INJECTION INTRAMUSCULAR; INTRAVENOUS
Status: COMPLETED
Start: 2022-04-07 | End: 2022-04-07

## 2022-04-07 RX ORDER — CLOPIDOGREL 300 MG/1
600 TABLET, FILM COATED ORAL ONCE
Status: DISCONTINUED | OUTPATIENT
Start: 2022-04-07 | End: 2022-04-07

## 2022-04-07 RX ORDER — SODIUM CHLORIDE 9 MG/ML
INJECTION, SOLUTION INTRAVENOUS CONTINUOUS
Status: ACTIVE | OUTPATIENT
Start: 2022-04-07 | End: 2022-04-07

## 2022-04-07 RX ORDER — VERAPAMIL HYDROCHLORIDE 2.5 MG/ML
INJECTION, SOLUTION INTRAVENOUS
Status: COMPLETED
Start: 2022-04-07 | End: 2022-04-07

## 2022-04-07 RX ORDER — HEPARIN SODIUM 200 [USP'U]/100ML
INJECTION, SOLUTION INTRAVENOUS
Status: COMPLETED
Start: 2022-04-07 | End: 2022-04-07

## 2022-04-07 RX ORDER — CLOPIDOGREL BISULFATE 75 MG/1
75 TABLET ORAL DAILY
Status: DISCONTINUED | OUTPATIENT
Start: 2022-04-08 | End: 2022-04-10 | Stop reason: HOSPADM

## 2022-04-07 RX ADMIN — SPIRONOLACTONE 25 MG: 25 TABLET, FILM COATED ORAL at 06:02

## 2022-04-07 RX ADMIN — IOHEXOL 70 ML: 350 INJECTION, SOLUTION INTRAVENOUS at 17:52

## 2022-04-07 RX ADMIN — HEPARIN SODIUM: 1000 INJECTION, SOLUTION INTRAVENOUS; SUBCUTANEOUS at 17:47

## 2022-04-07 RX ADMIN — VERAPAMIL HYDROCHLORIDE 2.5 MG: 2.5 INJECTION, SOLUTION INTRAVENOUS at 17:06

## 2022-04-07 RX ADMIN — FUROSEMIDE 10 MG: 20 TABLET ORAL at 06:01

## 2022-04-07 RX ADMIN — MIDAZOLAM HYDROCHLORIDE 1 MG: 1 INJECTION, SOLUTION INTRAMUSCULAR; INTRAVENOUS at 17:52

## 2022-04-07 RX ADMIN — LIDOCAINE HYDROCHLORIDE: 20 INJECTION, SOLUTION INFILTRATION; PERINEURAL at 17:06

## 2022-04-07 RX ADMIN — HEPARIN SODIUM 2000 UNITS: 200 INJECTION, SOLUTION INTRAVENOUS at 17:06

## 2022-04-07 RX ADMIN — CARVEDILOL 3.12 MG: 3.12 TABLET, FILM COATED ORAL at 08:29

## 2022-04-07 RX ADMIN — FENTANYL CITRATE 50 MCG: 50 INJECTION, SOLUTION INTRAMUSCULAR; INTRAVENOUS at 17:52

## 2022-04-07 RX ADMIN — LOSARTAN POTASSIUM 50 MG: 50 TABLET, FILM COATED ORAL at 18:45

## 2022-04-07 RX ADMIN — ENOXAPARIN SODIUM 30 MG: 30 INJECTION SUBCUTANEOUS at 18:44

## 2022-04-07 RX ADMIN — HEPARIN SODIUM: 1000 INJECTION, SOLUTION INTRAVENOUS; SUBCUTANEOUS at 17:07

## 2022-04-07 RX ADMIN — CYANOCOBALAMIN 1000 MCG: 1000 INJECTION INTRAMUSCULAR; SUBCUTANEOUS at 06:01

## 2022-04-07 RX ADMIN — SENNOSIDES AND DOCUSATE SODIUM 2 TABLET: 50; 8.6 TABLET ORAL at 06:01

## 2022-04-07 RX ADMIN — CARVEDILOL 3.12 MG: 3.12 TABLET, FILM COATED ORAL at 18:45

## 2022-04-07 RX ADMIN — NITROGLYCERIN 10 ML: 20 INJECTION INTRAVENOUS at 17:06

## 2022-04-07 RX ADMIN — CLOPIDOGREL BISULFATE 600 MG: 300 TABLET, FILM COATED ORAL at 17:53

## 2022-04-07 RX ADMIN — ASPIRIN 81 MG 324 MG: 81 TABLET ORAL at 17:05

## 2022-04-07 ASSESSMENT — ENCOUNTER SYMPTOMS
DIARRHEA: 0
DIZZINESS: 0
TINGLING: 0
VOMITING: 0
NAUSEA: 0
HEARTBURN: 0
CHILLS: 0
WEAKNESS: 0
EYES NEGATIVE: 1
ORTHOPNEA: 0
NECK PAIN: 0
COUGH: 0
HEMOPTYSIS: 0
WEIGHT LOSS: 0
PSYCHIATRIC NEGATIVE: 1
CONSTIPATION: 0
SHORTNESS OF BREATH: 1
PALPITATIONS: 0
HEADACHES: 0
MYALGIAS: 0
BACK PAIN: 0
FEVER: 0
ABDOMINAL PAIN: 0
FOCAL WEAKNESS: 0

## 2022-04-07 ASSESSMENT — PAIN DESCRIPTION - PAIN TYPE
TYPE: ACUTE PAIN

## 2022-04-07 ASSESSMENT — FIBROSIS 4 INDEX: FIB4 SCORE: 1.36

## 2022-04-07 NOTE — CARE PLAN
Problem: Communication  Goal: The ability to communicate needs accurately and effectively will improve  Outcome: Progressing     Problem: Discharge Barriers/Planning  Goal: Patient's continuum of care needs are met  Outcome: Progressing     Problem: Hemodynamics  Goal: Patient's hemodynamics, fluid balance and neurologic status will be stable or improve  Outcome: Progressing     Problem: Respiratory  Goal: Patient will achieve/maintain optimum respiratory ventilation and gas exchange  Outcome: Progressing   The patient is Watcher - Medium risk of patient condition declining or worsening    Shift Goals  Clinical Goals: NPO midnight, hemodynamics  Patient Goals: rest  Family Goals: priya    Progress made toward(s) clinical / shift goals:  Pt NPO for cath tomorrow, hemodynamics stable     Patient is not progressing towards the following goals:

## 2022-04-07 NOTE — NON-PROVIDER
Daily Progress Note:     Date of Service: 4/7/2022  Primary Team: [facility]  Attending: ELISE Huang M.D.   Senior Resident: Dr. Tomas  Intern: Dr. Miladys Ramsey  Contact:  [phone]    Chief Complaint:   SOB    Subjective:  88 y/o female w/ hx of htn, recurrent falls and dementia admitted for SOB. Today, pt reports improved SOB. Pt denies chest pain, palpitations or dizziness. Pt reports some lightheadedness.    Interval Hx:  Cardiology discussed cardiac cath placement today w/ pt's family and they are agreeable. Pt tolerated Lasix 10 mg.    Consultants/Specialty:  Cardiology    Review of Systems:    Gen: denies fevers/chills  Cardio: denies chest pain, palpitations  Pulm: reports SOB  GI: denies abdominal pain, n/v  Neuro: denies dizziness, reports lightheadedness  Msk: denies leg swelling    Objective Data:   Physical Exam:   Vitals:   Temp:  [36.1 °C (96.9 °F)-37.1 °C (98.7 °F)] 36.1 °C (96.9 °F)  Pulse:  [63-83] 83  Resp:  [14-18] 18  BP: (125-136)/(67-83) 128/77  SpO2:  [90 %-95 %] 90 %       Intake/Output Summary (Last 24 hours) at 4/7/2022 1419  Last data filed at 4/6/2022 2000  Gross per 24 hour   Intake --   Output 200 ml   Net -200 ml          Gen: no acute distress  Cardiac: RRR, normal heart sounds  Pulm: no increased work of breathing, lung sounds clear to auscultation, no orthopnea  Skin: cap refill<3 sec  Msk: no swelling to bilateral lower extremities  Psych: mood normal      Labs:   Recent Labs     04/05/22  0111 04/06/22  0112 04/07/22  0316   SODIUM 141 139 139   POTASSIUM 3.3* 3.6 3.8   CHLORIDE 103 102 102   CO2 24 25 27   GLUCOSE 100* 101* 103*   BUN 19 29* 27*     Recent Labs     04/04/22  0725 04/05/22  0111 04/06/22  0112 04/07/22  0316   ALBUMIN 4.4  --  4.3 4.3   TBILIRUBIN 0.7  --  0.5 0.6   ALKPHOSPHAT 65  --  61 53   TOTPROTEIN 7.1  --  6.9 6.8   ALTSGPT 7  --  10 12   ASTSGOT 14  --  22 17   CREATININE 0.79 1.04 1.17 1.11     Recent Labs     04/04/22  0725  04/05/22  0111 04/06/22  0112 04/07/22 0316   WBC 11.7* 10.7 10.5 9.3   RBC 4.67 4.90 4.86 4.87   HEMOGLOBIN 14.4 15.0 14.9 15.1   HEMATOCRIT 44.7 45.4 45.8 46.4   MCV 95.7 92.7 94.2 95.3   MCH 30.8 30.6 30.7 31.0   RDW 48.9 47.7 48.0 49.2   PLATELETCT 280 317 326 314   MPV 11.2 11.1 10.8 10.7   NEUTSPOLYS 75.80*  --  58.70 57.60   LYMPHOCYTES 13.20*  --  27.30 28.80   MONOCYTES 8.80  --  10.20 10.00   EOSINOPHILS 1.50  --  2.70 2.70   BASOPHILS 0.40  --  0.60 0.50     ESTIMATED GFR [[id number]] (Abnormal) Collected: 04/07/22 0316   Order Status: Completed Updated: 04/07/22 0409    GFR (CKD-EPI) 48 Abnormal  mL/min/1.73 m 2        Imaging:   No new imaging    Problem Representation: 86 y/o female w/ hx of htn and dementia admitted for SOB. Pt continues to improve clinically. Pt's low B12 and changes consistent w/ small vessel disease can indicate a secondary cause of dementia. Stabilization of creatinine indicates renal tolerance to Lasix. Due to HF and arrhythmia, pt would benefit from cardiac cath placement.     Acute HFrEF, NYHA class III- (present on admission)  Assessment & Plan  Diagnosis of acute HFrEF likely.  Repeat Echo shows EF 35-40%, moderately reduced L ventricular systolic function, global hypokinesis. Last ECHO 5/2020: LVEF 60%, grade 2 diastolic dysfunction, mildly dilated LA, mild MR and mild TR, RVSP 35.  BNP on admission: 5372, Chest X ray on admission showed pulmonary edema. Troponin on admission 15  Cr stabilized to 1.11    -Monitor on telemetry  -Strict I/Os and Daily weights   -2gm sodium diet, fluid restriction to 2000 ml/24 hour.   -Supplemental oxygen PRN for hypoxia  -3.125 mg carvedilol  -Continue Lasix 10 mg PO, lisinopril 5 mg daily, Aldactone 12.5 mg daily.    -Monitor BMP  -Cardiology will to place cardiac cath     Arrhythmia- (present on admission)  Assessment & Plan  Patient initially presented with a wandering pacemaker, however on telemetry was noted to convert to A fib. EKG  shows frequent ectopy. Per interventional radiology, more likely NSR w/ frequent PACs than afib.    -Prophylactic anticoagulation unnecessary in absence of true afib  -Avoid beta blocker in setting of acute decompensated CHF  -Pt appears to be compensated    B12 deficiency- (present on admission)  Assessment & Plan  Noted to have B12 deficiency as part of her dementia work-up.  -Start IV B12 supplementation     Memory loss- (present on admission)  Assessment & Plan  Most likely, differential includes B12 deficiency vs vascular dementia. Per geriatrics, depression can be a confounding factor due to isolation. Pt has low B12, and normal TSH and folate. Head CT shows diffuse atrophy and periventricular white matter changes consistent w/ chronic small vessel disease. RPR nonreactive. MOCA score 5/30. Confounding factors include being in the hospital, depression and primary language being Italian. However, controlling for these variables would most likely not be enough to raise the score to normal.    -Replete B12, start on thiamine  -Discussed w/ family placement into memory care unit  -Per geriatrics, pt can be discharged to home if she has family support     Hypertensive emergency- (present on admission)  Assessment & Plan  Patient longstanding history of uncontrolled hypertension. Head CT shows diffuse atrophy and periventricular white matter changes consistent w/ chronic small vessel disease. Blood pressure appears to be well controlled.    -Continue failure regimen: ACE inhibitor, Aldactone, Lasix   -Hydralazine as needed for blood pressure control    At high risk for injury related to fall- (present on admission)  Assessment & Plan  Patient has had > 2 falls in the past 1 year.  Her family is concerned that her dementia is worsening.     -Fall precautions  -Head CT shows no evidence of SDH/other hemorrhage

## 2022-04-07 NOTE — PROGRESS NOTES
"Geriatric Progress Note:  4/7/2022  Chief Complaint   Patient presents with   • Shortness of Breath     X 3 days     86 y/o F admitted with chest pain found to have new onset acute CHF. Planning for cardiac catheterization. Geriatrics consulted for memory changes. She notes she did not want to go into the chair this morning. Only recalls being in the chair. She is cold this morning, denies feeling feverish.     S: no acute events; npo for cath today. Patient denies CP, confusion; is cold this AM,     ROS: a 14 pt ROS was negative other than as stated above.     O:/78   Pulse 91   Temp 36.8 °C (98.3 °F) (Temporal)   Resp 16   Ht 1.626 m (5' 4\")   Wt 63.1 kg (139 lb 1.8 oz)   SpO2 93%   BMI 23.88 kg/m²   Gen: NAD, alert, pleasant,   CV: RRR, 2+ radial pulses bilateall  Lungs: CAB  Abd:s/nt/nd  Ext: arthritic changes noted to hands bilaterally  Delirium Screen: negative; oriented to person, place, situation, oriented to month, got the year incorrect, able to say days of week backwards without issue    Labs/Imaging/EKG: CBC, CMP this AM unremarkable. EKG yesterday afternoon  APC and LBB, [id number]    Assessment: 87-year-old female admitted with acute onset shortness of breath found to be in acute failure with plans for cardiac catheterization tomorrow.  She also appears to have a dementia syndrome     Plan:   Dementia Syndrome  Low B12  Concern for depression  -patient has help at home with shipping, meal prep, transportation but is independent n other ADL and IADL, per her report; the need for helps appears related to cognitive, rather than physical limitations.   - is currently hospitalized  -lab work up otherwise negative  -no concerning symptoms to suggest another type of dementia syndrome.     Recommendations  -f/u mood as outpatient, consider treatment with counseling/meds as needed  -continue b12 treatment,   -continue to engage family in d/c planning    Acute sCHF  concern or ischemic cause, " getting cath  -watch for orthostatic hypotension with heart medications. - carvedilol, losartan, spirolactone, furosemide    Urinary incontinence  -Consider timed voiding, every 3-4 hours, (per patient preference) help avoid number of incontinent episodes     Weight loss  -Patient has had about a 10 pound weight loss over the last year or so.  -We will need to follow-up with family to ensure what her access to food looks like      Thank you for including us in the care of this patient. We will follow along tomorrow.     Mika Villasenor M.D.  Geriatric Physician   Monday - Sunday 8-5      This note was partially complete completed using voice recognition software.  I did my best to correct errors prior to submitting this note, but for any concerns please do not hesitate to contact me.

## 2022-04-07 NOTE — DISCHARGE PLANNING
[facility] TCN chart review completed. Collaborated with Nicole ALBERTO. No new TCN needs at this time. TCN will continue to follow and collaborate with discharge planning team as additional post acute needs arise. Thank you.     Previously completed:  - Choice forms: HH  - [facility] introduced (Y), referral (sent).

## 2022-04-07 NOTE — PROGRESS NOTES
CARDIOLOGY PROGRESS NOTE    Date: 4/7/2022      Patient Name: Linda Casas  YOB: 1934  MRN: [id number]    Assessment/Recommendations:   # Acute decompensated systolic heart failure: Her echocardiogram demonstrated moderately reduced left ventricular systolic function.  Per the patient, this is a new diagnosis.  Underlying etiology remains unclear at this time, however, ischemic is highly probable given the patient's age and risk factors as well as significant regional wall motion abnormalities.  Other potential etiologies would include hypertensive and LBBB-induced cardiomyopathy.  She did have B12 deficiency, although B1 deficiency to the degree that causes beriberi is very rare.  She notes increasing orthopneic symptoms this morning.  -Restart low-dose furosemide today, will assess filling pressures during cath  -Continue carvedilol 3.125 mg bid  -Increase losartan to 50 mg nightly  -Continue spironolactone 25 mg daily  -Follow daily BMP  -Discussed cardiac catheterization, which is an invasive procedure that carries risk of serious complications, with the patient's son and the patient yesterday.  After this discussion, they were in agreement with proceeding with cardiac catheterization today.    # Hypertensive urgency: BP control improved.  -See above for management     # Frequent atrial ectopy: I personally reviewed her telemetry as well as her ECGs.  She has had very frequent atrial ectopy, but has not convincingly developed clear sustained atrial fibrillation on my review.  Unfortunately, her telemetry is difficult to interpret due to low P-wave amplitudes and baseline wander.  In the absence of clear atrial fibrillation would hold on oral anticoagulation for now.  -She will need outpatient monitoring on dishcarge    Events/Subjective:   She reports feeling more short of breath with lying down this morning.  Home Medications:     Current Facility-Administered Medications  "  Medication Dose Route Frequency Provider Last Rate Last Admin   • furosemide (LASIX) tablet 10 mg  10 mg Oral Q DAY Miladys Ramsey M.D.   10 mg at 04/07/22 0601   • enoxaparin (LOVENOX) inj 30 mg  30 mg Subcutaneous DAILY ELISE Huang M.D.   30 mg at 04/06/22 1744   • spironolactone (ALDACTONE) tablet 25 mg  25 mg Oral Q DAY Michael Lewis M.D.   25 mg at 04/07/22 0602   • losartan (COZAAR) tablet 25 mg  25 mg Oral Q EVENING Michael Lewis M.D.   25 mg at 04/06/22 1744   • carvedilol (COREG) tablet 3.125 mg  3.125 mg Oral BID WITH MEALS Michael Lewis M.D.   3.125 mg at 04/07/22 0829   • senna-docusate (PERICOLACE or SENOKOT S) 8.6-50 MG per tablet 2 Tablet  2 Tablet Oral BID Gilda Vargas M.D.   2 Tablet at 04/07/22 0601    And   • polyethylene glycol/lytes (MIRALAX) PACKET 1 Packet  1 Packet Oral QDAY RICKIE Vargas M.D.        And   • magnesium hydroxide (MILK OF MAGNESIA) suspension 30 mL  30 mL Oral QDAY RICKIE Vargas M.D.        And   • bisacodyl (DULCOLAX) suppository 10 mg  10 mg Rectal QDAY RICKIE Vargas M.D.       • hydrALAZINE (APRESOLINE) injection 10 mg  10 mg Intravenous Q4HRS RICKIE Vargas M.D.       • ondansetron (ZOFRAN) syringe/vial injection 4 mg  4 mg Intravenous Q4HRS RICKIE Vargas M.D.       • ondansetron (ZOFRAN ODT) dispertab 4 mg  4 mg Oral Q4HRS RICKIE Vargas M.D.         Vitals:   /78   Pulse 91   Temp 36.8 °C (98.3 °F) (Temporal)   Resp 16   Ht 1.626 m (5' 4\")   Wt 63.1 kg (139 lb 1.8 oz)   SpO2 93%    BP Readings from Last 4 Encounters:   04/07/22 132/78   03/17/22 136/80   11/06/21 (!) 179/77   11/01/21 130/84     Wt Readings from Last 4 Encounters:   04/07/22 63.1 kg (139 lb 1.8 oz)   03/17/22 64.8 kg (142 lb 12.8 oz)   11/06/21 64.5 kg (142 lb 3.2 oz)   11/01/21 65.2 kg (143 lb 12.8 oz)     Body mass index is 23.88 kg/m².    Physical Examination:   General: Frail, but in no acute distress  Neck: Full range of motion, no clear " jugular venous distension  Pulmonary: Normal respiratory effort, no distress, no oxygen  Cardiovascular: Frequent ectopy  Extremities: Warm and well perfused, very trace lower extremity edema  Neurological: Alert, good memory of past events, no focal deficits    Laboratories:   Estimated Creatinine Clearance: 30.8 mL/min (by C-G formula based on SCr of 1.11 mg/dL).  Recent Labs     04/05/22 0111 04/06/22 0112 04/07/22  0316   CREATININE 1.04 1.17 1.11   BUN 19 29* 27*   POTASSIUM 3.3* 3.6 3.8   SODIUM 141 139 139   CALCIUM 10.0 9.8 10.0   CO2 24 25 27   ALBUMIN  --  4.3 4.3     Recent Labs     04/05/22 0111 04/06/22 0112 04/07/22  0316   GLUCOSE 100* 101* 103*     Recent Labs     04/06/22 0112 04/07/22 0316   ASTSGOT 22 17   ALTSGPT 10 12   ALKPHOSPHAT 61 53     Recent Labs     04/05/22 0111 04/06/22 0112 04/07/22  0316   WBC 10.7 10.5 9.3   HEMOGLOBIN 15.0 14.9 15.1   PLATELETCT 317 326 314     No results for input(s): TROPONINT, NTPROBNP, HBA1C in the last 72 hours.  Lab Results   Component Value Date/Time    LDL 88 04/05/2022 0111     (H) 10/28/2019 0828    LDL 78 04/26/2019 0830     Lab Results   Component Value Date/Time    HDL 69 04/05/2022 0111    HDL 65 10/28/2019 0828    HDL 71 04/26/2019 0830       Lab Results   Component Value Date/Time    TRIGLYCERIDE 80 04/05/2022 0111    TRIGLYCERIDE 105 10/28/2019 0828    TRIGLYCERIDE 74 04/26/2019 0830       Lab Results   Component Value Date/Time    CHOLSTRLTOT 173 04/05/2022 0111    CHOLSTRLTOT 198 10/28/2019 0828    CHOLSTRLTOT 164 04/26/2019 0830       Telemetry:   Continues to be in NSR with frequent atrial ectopy    Cardiac Studies and Procedures:   Echocardiography  Moderately reduced left ventricular systolic function.   The left ventricular ejection fraction is visually estimated to be 35- 40% with hoil-op-vkbu variation.   Global hypokinesis with regional variation.  Moderate aortic insufficiency. Pressure half time is  313 msec.  Right  ventricular systolic pressure is estimated to be 45  mmHg.  IVC not interpretable, going out of plane of US.    XR/CT/MRI  Interstitial edema. No focal consolidation or pleural effusions.    Disposition:   Cardiology will continue to follow.    Please contact us if there are any questions or concerns.      Michael Lewis MD, FACC, [facility]  Interventional Cardiology  [facility], [address]  [address] [address]  Phone: [phone]  Fax: [phone]

## 2022-04-07 NOTE — PROGRESS NOTES
Daily Progress Note:     Date of Service: 4/7/2022  Primary Team: [facility] Team   Attending: ELISE Huang M.D.   Senior Resident: Dr. ORLANDO Tomas  Intern: Dr. PURNIMA Ramsey  Contact:  [phone]    Chief Complaint:   Shortness of breath     ID: Mrs. Linda Casas is a 87 year old female presented with shortness of breath for three days, with proximal nocturnal dyspnea and increased swelling in her legs. Found to be in CHF exacerbation and to have wandering pacemaker syndrome.  She has a past medical history of hypertension, left wrist fracture (6/21), s/p kyphoplasty and diastolic heart failure( ECHO 2020 LVEF 60%).       Interval history:     Patient has felt improved overall. She has had some shortness of breath, but no chest pain. No events overnight.   Left heart catherization begun today, awaiting results.       Consultants/Specialty:  Interventional Cardiology    Review of Systems:    Review of Systems   Constitutional: Negative for chills, fever, malaise/fatigue and weight loss.   HENT: Negative.    Eyes: Negative.    Respiratory: Positive for shortness of breath. Negative for cough and hemoptysis.    Cardiovascular: Positive for chest pain (Resolving) and leg swelling (Resolving). Negative for palpitations and orthopnea.   Gastrointestinal: Negative for abdominal pain, constipation, diarrhea, heartburn, nausea and vomiting.   Genitourinary: Negative.    Musculoskeletal: Negative for back pain, joint pain, myalgias and neck pain.   Neurological: Negative for dizziness, tingling, focal weakness, weakness and headaches.   Endo/Heme/Allergies: Negative.    Psychiatric/Behavioral: Negative.        Objective:   Physical Exam:    Vitals:   Temp:  [36.1 °C (96.9 °F)-36.9 °C (98.5 °F)] 36.9 °C (98.5 °F)  Pulse:  [63-92] 72  Resp:  [16-18] 18  BP: ()/(56-82) 135/74  SpO2:  [90 %-98 %] 98 %    Physical Exam  Constitutional:       General: She is not in acute distress.     Appearance: Normal appearance.  She is not ill-appearing, toxic-appearing or diaphoretic.   HENT:      Right Ear: Tympanic membrane normal.      Left Ear: Tympanic membrane normal.      Nose: Nose normal. No congestion or rhinorrhea.      Mouth/Throat:      Pharynx: No oropharyngeal exudate or posterior oropharyngeal erythema.   Eyes:      General: No scleral icterus.        Right eye: No discharge.         Left eye: No discharge.      Extraocular Movements: Extraocular movements intact.      Pupils: Pupils are equal, round, and reactive to light.   Cardiovascular:      Rate and Rhythm: Normal rate. Rhythm irregular.      Pulses: Normal pulses.      Heart sounds: No murmur heard.    No gallop.   Pulmonary:      Effort: Pulmonary effort is normal. No respiratory distress.      Breath sounds: Rales (Improving) present. No wheezing.   Abdominal:      General: Abdomen is flat. There is no distension.      Palpations: Abdomen is soft. There is no mass.      Tenderness: There is no abdominal tenderness. There is no guarding or rebound.      Hernia: No hernia is present.   Genitourinary:     General: Normal vulva.   Musculoskeletal:         General: No swelling or tenderness.      Cervical back: Normal range of motion. No rigidity or tenderness.      Right lower leg: No edema.      Left lower leg: No edema.   Skin:     General: Skin is warm and dry.   Neurological:      Mental Status: She is alert. Mental status is at baseline.           Labs:   Recent Labs     04/05/22 0111 04/06/22 0112 04/07/22 0316   WBC 10.7 10.5 9.3   RBC 4.90 4.86 4.87   HEMOGLOBIN 15.0 14.9 15.1   HEMATOCRIT 45.4 45.8 46.4   MCV 92.7 94.2 95.3   MCH 30.6 30.7 31.0   RDW 47.7 48.0 49.2   PLATELETCT 317 326 314   MPV 11.1 10.8 10.7   NEUTSPOLYS  --  58.70 57.60   LYMPHOCYTES  --  27.30 28.80   MONOCYTES  --  10.20 10.00   EOSINOPHILS  --  2.70 2.70   BASOPHILS  --  0.60 0.50     Recent Labs     04/05/22 0111 04/06/22 0112 04/07/22 0316   SODIUM 141 139 139   POTASSIUM 3.3*  3.6 3.8   CHLORIDE 103 102 102   CO2 24 25 27   GLUCOSE 100* 101* 103*   BUN 19 29* 27*     Recent Labs     04/05/22  0111 04/06/22  0112 04/07/22  0316   ALBUMIN  --  4.3 4.3   TBILIRUBIN  --  0.5 0.6   ALKPHOSPHAT  --  61 53   TOTPROTEIN  --  6.9 6.8   ALTSGPT  --  10 12   ASTSGOT  --  22 17   CREATININE 1.04 1.17 1.11       Imaging:   EC-ECHOCARDIOGRAM COMPLETE W/O CONT   Final Result      CT-HEAD W/O   Final Result         1.  No acute intracranial process.      2. Diffuse atrophy and periventricular white matter changes consistent with chronic small vessel disease.      DX-CHEST-PORTABLE (1 VIEW)   Final Result      Interstitial edema. No focal consolidation or pleural effusions.      CL-LEFT HEART CATHETERIZATION WITH POSSIBLE INTERVENTION    (Results Pending)       Assessment and Plan:  * CHF (congestive heart failure), NYHA class III, acute, systolic (HCC)- (present on admission)  Assessment & Plan  Acute Decompensated Heart Failure: HFpEF (has uncontroled HTN, LVH, grade 2 diastolic dysfunction, LVEF 35-40%, decreased systolic function.   NYHA stage III, NTproBNP on admission: 5372, Chest X ray on admission: pulmonary edema b/l. EKG on admission: Wandering pacemaker, left ventricular hypertrophy, left axis deviation, left bundle branch block.Troponin on admission: 15  ECHO shoed decreased systolic function, LVEF 35-30%, global hypokinesis.     -Monitor on telemetry  -Strict I/Os and Daily weights   -2gm sodium diet, fluid restriction to 2000 ml/24 hour.   -Provide supplemental oxygen if hypoxic (SpO2<90%), orders for position patient upright.   -3.125 g Carvediolol  -Decreased to 10mg  Lasix oral  - lisinopril 5 mg daily, Aldactone 12.5 mg daily.  Will not start beta-blocker in the setting of acutely decompensated heart failure, recommend this be started outpatient.  -Follow BMP  -Left heart catheterization today  -Heart failure counseling and CHF education to be provided on the day of discharge.    -Cardiology consulted, to enroll patient with close outpatient cardiology follow-up      B12 deficiency- (present on admission)  Assessment & Plan  Noted to have B12 deficiency as part of her dementia work-up.    -Start B12 supplementation    Paroxysmal atrial fibrillation (HCC)- (present on admission)  Assessment & Plan  Patient initially presented with a wandering pacemaker, however on telemetry was noted to convert to A. fib.  She was asymptomatic (sleeping comfortably) rate controlled with a rate in the 80s, and hemodynamically stable with a blood pressure of 120s/70s.      -We will hold off of beta-blocker in the setting of acute CHF exacerbation  -[id number], patient should be started on anticoagulation, however given history of falls will have risk versus benefits discussion prior to initiating.    Memory loss- (present on admission)  Assessment & Plan  Family members note worsening memory loss in the patient.    Work-up: Noted to have low B12, normal TSH and folate.  RPR: non-reactive, HIV: non-reactive. Consider B12 deficiency vs. Vascular dementia.   MOCA: 5  -Geriatrics following  -Repleting B12  -Starting thiamine  -Geriatrics consulted          At high risk for injury related to fall- (present on admission)  Assessment & Plan  Patient has had > 2 falls in the past 1 year.  Her family is concerned that her dementia is worsening.   Head CT: diffuse atrophy and periventricular white matter changes consistent with chronic small vessel disease which may be a cause of dementia.   -Fall precautions      Hypertensive emergency- (present on admission)  Assessment & Plan   Longstanding history of uncontrolled hypertension (not on any antihypertensives) presents with /81 with SOB X 3 days, pulmonary edema on CXR. Concern for flash pulmonary edema. Improved blood pressures on medications.     -Start heart failure regimen: ACE inhibitor, Aldactone, Lasix   -Hydralazine as needed for blood pressure  control      DVT: 40 mg IV enoxaparin

## 2022-04-07 NOTE — PROGRESS NOTES
Report received from Hola Jimenez. Assumed pt care. Pt is resting in bed on RA, no complaints of paion. Pt A&O x 4. Fall precautions in place, call light and belongings within reach, bed in lowest position. No signs of distress.

## 2022-04-07 NOTE — DISCHARGE PLANNING
HTH/SCP TCN chart review completed. Collaborated with Nicole ALBERTO. No new TCN needs at this time. TCN will continue to follow and collaborate with discharge planning team as additional post acute needs arise. Thank you.    Previously completed:t  - Choice forms: HH  - [facility] introduced (Y), referral (sent).

## 2022-04-08 PROBLEM — I25.5 ISCHEMIC CARDIOMYOPATHY: Status: ACTIVE | Noted: 2022-04-08

## 2022-04-08 PROBLEM — I48.0 PAROXYSMAL ATRIAL FIBRILLATION (HCC): Status: RESOLVED | Noted: 2022-04-04 | Resolved: 2022-04-08

## 2022-04-08 PROBLEM — R53.81 DEBILITY: Status: ACTIVE | Noted: 2022-04-08

## 2022-04-08 PROBLEM — I50.21 CHF (CONGESTIVE HEART FAILURE), NYHA CLASS III, ACUTE, SYSTOLIC (HCC): Status: RESOLVED | Noted: 2022-04-04 | Resolved: 2022-04-08

## 2022-04-08 PROBLEM — I49.1 PREMATURE ATRIAL COMPLEX: Status: ACTIVE | Noted: 2022-04-08

## 2022-04-08 LAB
ACT BLD: 244 SEC (ref 74–137)
ACT BLD: 255 SEC (ref 74–137)
ALBUMIN SERPL BCP-MCNC: 4.1 G/DL (ref 3.2–4.9)
ALBUMIN/GLOB SERPL: 1.6 G/DL
ALP SERPL-CCNC: 54 U/L (ref 30–99)
ALT SERPL-CCNC: 9 U/L (ref 2–50)
ANION GAP SERPL CALC-SCNC: 12 MMOL/L (ref 7–16)
AST SERPL-CCNC: 18 U/L (ref 12–45)
BASOPHILS # BLD AUTO: 0.5 % (ref 0–1.8)
BASOPHILS # BLD: 0.06 K/UL (ref 0–0.12)
BILIRUB SERPL-MCNC: 0.6 MG/DL (ref 0.1–1.5)
BUN SERPL-MCNC: 25 MG/DL (ref 8–22)
CALCIUM SERPL-MCNC: 9.9 MG/DL (ref 8.5–10.5)
CHLORIDE SERPL-SCNC: 101 MMOL/L (ref 96–112)
CO2 SERPL-SCNC: 24 MMOL/L (ref 20–33)
CREAT SERPL-MCNC: 1.18 MG/DL (ref 0.5–1.4)
EOSINOPHIL # BLD AUTO: 0.13 K/UL (ref 0–0.51)
EOSINOPHIL NFR BLD: 1.1 % (ref 0–6.9)
ERYTHROCYTE [DISTWIDTH] IN BLOOD BY AUTOMATED COUNT: 48.8 FL (ref 35.9–50)
GFR SERPLBLD CREATININE-BSD FMLA CKD-EPI: 44 ML/MIN/1.73 M 2
GLOBULIN SER CALC-MCNC: 2.6 G/DL (ref 1.9–3.5)
GLUCOSE SERPL-MCNC: 113 MG/DL (ref 65–99)
HCT VFR BLD AUTO: 42.8 % (ref 37–47)
HGB BLD-MCNC: 13.9 G/DL (ref 12–16)
IMM GRANULOCYTES # BLD AUTO: 0.08 K/UL (ref 0–0.11)
IMM GRANULOCYTES NFR BLD AUTO: 0.7 % (ref 0–0.9)
LYMPHOCYTES # BLD AUTO: 1.86 K/UL (ref 1–4.8)
LYMPHOCYTES NFR BLD: 15.7 % (ref 22–41)
MCH RBC QN AUTO: 30.8 PG (ref 27–33)
MCHC RBC AUTO-ENTMCNC: 32.5 G/DL (ref 33.6–35)
MCV RBC AUTO: 94.7 FL (ref 81.4–97.8)
MONOCYTES # BLD AUTO: 1.17 K/UL (ref 0–0.85)
MONOCYTES NFR BLD AUTO: 9.9 % (ref 0–13.4)
NEUTROPHILS # BLD AUTO: 8.55 K/UL (ref 2–7.15)
NEUTROPHILS NFR BLD: 72.1 % (ref 44–72)
NRBC # BLD AUTO: 0 K/UL
NRBC BLD-RTO: 0 /100 WBC
PLATELET # BLD AUTO: 317 K/UL (ref 164–446)
PMV BLD AUTO: 11 FL (ref 9–12.9)
POTASSIUM SERPL-SCNC: 3.9 MMOL/L (ref 3.6–5.5)
PROT SERPL-MCNC: 6.7 G/DL (ref 6–8.2)
RBC # BLD AUTO: 4.52 M/UL (ref 4.2–5.4)
SODIUM SERPL-SCNC: 137 MMOL/L (ref 135–145)
WBC # BLD AUTO: 11.9 K/UL (ref 4.8–10.8)

## 2022-04-08 PROCEDURE — A9270 NON-COVERED ITEM OR SERVICE: HCPCS | Performed by: INTERNAL MEDICINE

## 2022-04-08 PROCEDURE — A9270 NON-COVERED ITEM OR SERVICE: HCPCS | Performed by: STUDENT IN AN ORGANIZED HEALTH CARE EDUCATION/TRAINING PROGRAM

## 2022-04-08 PROCEDURE — 700102 HCHG RX REV CODE 250 W/ 637 OVERRIDE(OP): Performed by: INTERNAL MEDICINE

## 2022-04-08 PROCEDURE — 99232 SBSQ HOSP IP/OBS MODERATE 35: CPT | Performed by: INTERNAL MEDICINE

## 2022-04-08 PROCEDURE — 770020 HCHG ROOM/CARE - TELE (206)

## 2022-04-08 PROCEDURE — 85025 COMPLETE CBC W/AUTO DIFF WBC: CPT

## 2022-04-08 PROCEDURE — 99356 PR PROLONGED SVC I/P OR OBS SETTING 1ST HOUR: CPT | Mod: GC | Performed by: HOSPITALIST

## 2022-04-08 PROCEDURE — 97535 SELF CARE MNGMENT TRAINING: CPT

## 2022-04-08 PROCEDURE — 36415 COLL VENOUS BLD VENIPUNCTURE: CPT

## 2022-04-08 PROCEDURE — 97166 OT EVAL MOD COMPLEX 45 MIN: CPT

## 2022-04-08 PROCEDURE — A9270 NON-COVERED ITEM OR SERVICE: HCPCS

## 2022-04-08 PROCEDURE — 700102 HCHG RX REV CODE 250 W/ 637 OVERRIDE(OP)

## 2022-04-08 PROCEDURE — 700102 HCHG RX REV CODE 250 W/ 637 OVERRIDE(OP): Performed by: STUDENT IN AN ORGANIZED HEALTH CARE EDUCATION/TRAINING PROGRAM

## 2022-04-08 PROCEDURE — 80053 COMPREHEN METABOLIC PANEL: CPT

## 2022-04-08 PROCEDURE — 97162 PT EVAL MOD COMPLEX 30 MIN: CPT

## 2022-04-08 PROCEDURE — 700111 HCHG RX REV CODE 636 W/ 250 OVERRIDE (IP): Performed by: HOSPITALIST

## 2022-04-08 PROCEDURE — 99232 SBSQ HOSP IP/OBS MODERATE 35: CPT | Mod: 25,GC | Performed by: HOSPITALIST

## 2022-04-08 RX ORDER — OMEPRAZOLE 20 MG/1
20 CAPSULE, DELAYED RELEASE ORAL DAILY
Status: DISCONTINUED | OUTPATIENT
Start: 2022-04-08 | End: 2022-04-10 | Stop reason: HOSPADM

## 2022-04-08 RX ORDER — CETIRIZINE HYDROCHLORIDE 10 MG/1
5 TABLET ORAL DAILY
Status: DISCONTINUED | OUTPATIENT
Start: 2022-04-08 | End: 2022-04-10 | Stop reason: HOSPADM

## 2022-04-08 RX ORDER — FUROSEMIDE 20 MG/1
10 TABLET ORAL
Status: DISCONTINUED | OUTPATIENT
Start: 2022-04-09 | End: 2022-04-10 | Stop reason: HOSPADM

## 2022-04-08 RX ADMIN — ENOXAPARIN SODIUM 30 MG: 30 INJECTION SUBCUTANEOUS at 17:09

## 2022-04-08 RX ADMIN — SENNOSIDES AND DOCUSATE SODIUM 2 TABLET: 50; 8.6 TABLET ORAL at 05:22

## 2022-04-08 RX ADMIN — SPIRONOLACTONE 25 MG: 25 TABLET, FILM COATED ORAL at 05:22

## 2022-04-08 RX ADMIN — ASPIRIN 81 MG: 81 TABLET, COATED ORAL at 05:22

## 2022-04-08 RX ADMIN — CLOPIDOGREL BISULFATE 75 MG: 75 TABLET ORAL at 05:22

## 2022-04-08 RX ADMIN — CARVEDILOL 3.12 MG: 3.12 TABLET, FILM COATED ORAL at 17:04

## 2022-04-08 RX ADMIN — LOSARTAN POTASSIUM 50 MG: 50 TABLET, FILM COATED ORAL at 17:04

## 2022-04-08 RX ADMIN — OMEPRAZOLE 20 MG: 20 CAPSULE, DELAYED RELEASE ORAL at 10:42

## 2022-04-08 RX ADMIN — CETIRIZINE HYDROCHLORIDE 5 MG: 10 TABLET, FILM COATED ORAL at 17:01

## 2022-04-08 RX ADMIN — CARVEDILOL 3.12 MG: 3.12 TABLET, FILM COATED ORAL at 05:22

## 2022-04-08 RX ADMIN — FUROSEMIDE 10 MG: 20 TABLET ORAL at 05:22

## 2022-04-08 ASSESSMENT — ENCOUNTER SYMPTOMS
NAUSEA: 0
ABDOMINAL PAIN: 0
DIZZINESS: 0
TINGLING: 0
COUGH: 0
PSYCHIATRIC NEGATIVE: 1
PALPITATIONS: 0
HEMOPTYSIS: 0
SHORTNESS OF BREATH: 1
HEARTBURN: 0
FEVER: 0
DIARRHEA: 0
NECK PAIN: 0
FOCAL WEAKNESS: 0
ORTHOPNEA: 0
EYES NEGATIVE: 1
CHILLS: 0
WEIGHT LOSS: 0
HEADACHES: 0
WEAKNESS: 0
VOMITING: 0
CONSTIPATION: 0
BACK PAIN: 0
MYALGIAS: 0

## 2022-04-08 ASSESSMENT — COGNITIVE AND FUNCTIONAL STATUS - GENERAL
HELP NEEDED FOR BATHING: A LITTLE
SUGGESTED CMS G CODE MODIFIER DAILY ACTIVITY: CJ
WALKING IN HOSPITAL ROOM: A LITTLE
CLIMB 3 TO 5 STEPS WITH RAILING: A LITTLE
DRESSING REGULAR LOWER BODY CLOTHING: A LITTLE
MOBILITY SCORE: 22
TOILETING: A LITTLE
DAILY ACTIVITIY SCORE: 21
SUGGESTED CMS G CODE MODIFIER MOBILITY: CJ

## 2022-04-08 ASSESSMENT — PAIN DESCRIPTION - PAIN TYPE
TYPE: ACUTE PAIN
TYPE: ACUTE PAIN

## 2022-04-08 ASSESSMENT — GAIT ASSESSMENTS
GAIT LEVEL OF ASSIST: STANDBY ASSIST
DEVIATION: BRADYKINETIC;DECREASED TOE OFF
DISTANCE (FEET): 150

## 2022-04-08 ASSESSMENT — ACTIVITIES OF DAILY LIVING (ADL): TOILETING: INDEPENDENT

## 2022-04-08 NOTE — NON-PROVIDER
Daily Progress Note:     Date of Service: 4/8/2022  Primary Team: [facility] IM Team Blue/Gray  Attending: ELISE Huang M.D.   Senior Resident: Dr. Huang  Intern: Dr. Miladys Ramsey  Contact:  [phone]    Reason for admission:   Management of HF    Interval Hx:   88 y/o female admitted for SOB, wandering pacemaker and heart catheterization. Last night pt reported dizziness on walking to the bathroom. Pt continues to report SOB. Pt reports some cough. Pt denies chest pain or palpitations.    Pt was taken for cardiac cath yesterday. She was found to have ischemic cardiomyopathy w/ severe stenosis in the LAD and occlusion of the OM 3. Cardiac placed 2 drug eluting stents.     Consultants/Specialty:  Cardiology    Review of Systems:   Gen: denies fevers/chills  Cardio: denies chest pain, palpitations  Pulm: reports SOB, reports cough  GI: denies abdominal pain, n/v  Neuro: reports dizziness  Msk: denies leg swelling       Objective Data:   Physical Exam:   Vitals:   Temp:  [36.1 °C (96.9 °F)-36.9 °C (98.5 °F)] 36.4 °C (97.6 °F)  Pulse:  [67-92] 76  Resp:  [16-20] 18  BP: ()/(56-94) 124/67  SpO2:  [92 %-98 %] 95 %      Intake/Output Summary (Last 24 hours) at 4/8/2022 0612  Last data filed at 4/7/2022 2000  Gross per 24 hour   Intake 340 ml   Output --   Net 340 ml     Gen: no acute distress  Cardiac: RRR, normal heart sounds  Pulm: no increased work of breathing, lung sounds clear to auscultation, no orthopnea  Skin: cap refill<3 sec, no rash  Msk: no swelling to bilateral lower extremities  Psych: mood normal     Labs:   ESTIMATED GFR [[id number]] (Abnormal) Collected: 04/08/22 0058   Order Status: Completed Updated: 04/08/22 0156    GFR (CKD-EPI) 44 Abnormal  mL/min/1.73 m 2      Recent Labs     04/06/22  0112 04/07/22  0316 04/08/22  0058   SODIUM 139 139 137   POTASSIUM 3.6 3.8 3.9   CHLORIDE 102 102 101   CO2 25 27 24   GLUCOSE 101* 103* 113*   BUN 29* 27* 25*     Recent Labs     04/06/22  0112  22  0316 22  0058   ALBUMIN 4.3 4.3 4.1   TBILIRUBIN 0.5 0.6 0.6   ALKPHOSPHAT 61 53 54   TOTPROTEIN 6.9 6.8 6.7   ALTSGPT 10 12 9   ASTSGOT 22 17 18   CREATININE 1.17 1.11 1.18     Recent Labs     22  0112 22  0316 22  0058   WBC 10.5 9.3 11.9*   RBC 4.86 4.87 4.52   HEMOGLOBIN 14.9 15.1 13.9   HEMATOCRIT 45.8 46.4 42.8   MCV 94.2 95.3 94.7   MCH 30.7 31.0 30.8   RDW 48.0 49.2 48.8   PLATELETCT 326 314 317   MPV 10.8 10.7 11.0   NEUTSPOLYS 58.70 57.60 72.10*   LYMPHOCYTES 27.30 28.80 15.70*   MONOCYTES 10.20 10.00 9.90   EOSINOPHILS 2.70 2.70 1.10   BASOPHILS 0.60 0.50 0.50     Imaging:   Results for orders placed or performed during the hospital encounter of 22   EKG   Result Value Ref Range    Report       [facility] Emergency Dept.    Test Date:  2022  Pt Name:    JAMES SWAN           Department: ER  MRN:        [id number]                      Room:       [facility]  Gender:     Female                       Technician: [id number]  :        1934                   Requested By:ER TRIAGE PROTOCOL  Order #:    [id number]                    Reading MD: Linnea Sandra MD    Measurements  Intervals                                Axis  Rate:       81                           P:          54  WY:         183                          QRS:        -45  QRSD:       141                          T:          115  QT:         378  QTc:        432    Interpretive Statements  Sinus rhythm  Atrial premature complex  Left bundle branch block  ST elevation secondary to IVCD  Compared to ECG 2021 14:32:16  Atrial premature complex(es) now present  Intraventricular conduction delay now present  ST (T wave) deviation now present  Electronically Signed On 2022 15:09:33 PDT b y Linnea Sandra MD     EKG   Result Value Ref Range    Report       [facility] Emergency Dept.    Test Date:  2022  Pt Name:    JAMES SWAN            Department: ER  MRN:        [id number]                      Room:       T711  Gender:     Female                       Technician: [id number]  :        1934                   Requested By:ER TRIAGE PROTOCOL  Order #:    [id number]                    Reading MD: Mel Sandra MD    Measurements  Intervals                                Axis  Rate:       84                           P:          54  NC:         184                          QRS:        -58  QRSD:       138                          T:          104  QT:         392  QTc:        464    Interpretive Statements  UNKNOWN RHYTHM, IRREGULAR RATE    LEFT BUNDLE BRANCH BLOCK  Compared to ECG 2022 06:06:39  Sinus rhythm no longer present  Atrial premature complex(es) no longer present  Intraventricular conduction delay no longer present  ST (T wave) deviation no longer present  Electronically Signed On  15:09:35 PDT by Mel Sandra MD     EKG (NOW)   Result Value Ref Range    Report       [facility]    Test Date:  2022  Pt Name:    JAMES SWAN           Department: ER  MRN:        [id number]                      Room:       11  Gender:     Female                       Technician: JANEY  :        1934                   Requested By:MEL SANDRA  Order #:    [id number]                    Reading MD: Alberto Marin MD    Measurements  Intervals                                Axis  Rate:       88                           P:          43  NC:         188                          QRS:        -64  QRSD:       130                          T:          101  QT:         444  QTc:        538    Interpretive Statements  SINUS RHYTHM  ECTOPIC ATRIAL TACHYCARDIA  IVCD, CONSIDER ATYPICAL RBBB  LEFT VENTRICULAR HYPERTROPHY  ANTERIOR INFARCT, AGE INDETERMINATE  Electronically Signed On 2022 0:06:53 PDT by Alberto Marin MD     EKG   Result Value Ref Range    Report       [facility]    Test Date:  2022  Pt Name:     JAMES SWAN           Department: 171  MRN:        [id number]                      Room:       T711  Gender:     Female                       Technician: JANEY  :        1934                   Requested By:SARA GALICIA  Order #:    [id number]                    Reading MD: Alberto Marin MD    Measurements  Intervals                                Axis  Rate:       103                          P:          38  DC:         196                          QRS:        -70  QRSD:       132                          T:          98  QT:         368  QTc:        482    Interpretive Statements  SINUS TACHYCARDIA WITH IRREGULAR RATE    Electronically Signed On 2022 0:08:34 PDT by Alberto Marin MD     EKG   Result Value Ref Range    Report       [facility]    Test Date:  2022  Pt Name:    JAMES SWAN           Department: 171  MRN:        [id number]                      Room:       T711  Gender:     Female                       Technician: DGG  :        1934                   Requested By:TAM REYNOSO  Order #:    [id number]                    Reading MD: Alberto Marin MD    Measurements  Intervals                                Axis  Rate:       78                           P:          61  DC:         200                          QRS:        -68  QRSD:       138                          T:          97  QT:         412  QTc:        470    Interpretive Statements  SINUS RHYTHM  ATRIAL PREMATURE COMPLEX  LEFT BUNDLE BRANCH BLOCK  Compared to ECG 2022 14:42:41  Atrial premature complex(es) now present  Left bundle-branch block now present  Sinus tachycardia no longer present  Electronically Signed On 2022 0:09:31 PDT by Alberto Marin MD     EKG   Result Value Ref Range    Report       [facility]    Test Date:  2022  Pt Name:    JAMES SWAN           Department: 171  MRN:        [id number]                      Room:       T711  Gender:     Female                        Technician: [facility]  :        1934                   Requested By:EROS BURGOS  Order #:    [id number]                    Reading MD: Eladio Iraheta MD    Measurements  Intervals                                Axis  Rate:       70                           P:          35  IL:         193                          QRS:        -66  QRSD:       134                          T:          108  QT:         421  QTc:        455    Interpretive Statements  SINUS RHYTHM  ATRIAL PREMATURE COMPLEX  LEFT BUNDLE BRANCH BLOCK  Compared to ECG 2022 22:24:17  No significant changes  Electronically Signed On 2022 12:19:14 PDT by Eladio Iraheta MD     EKG   Result Value Ref Range    Report       [facility]    Test Date:  2022  Pt Name:    JAMES SWAN           Department: 171  MRN:        [id number]                      Room:       T711  Gender:     Female                       Technician: [facility]  :        1934                   Requested By:TAM REYNOSO  Order #:    [id number]                    Reading MD: Eladio Iraheta MD    Measurements  Intervals                                Axis  Rate:       69                           P:          46  IL:         192                          QRS:        -65  QRSD:       136                          T:          113  QT:         424  QTc:        455    Interpretive Statements  SINUS RHYTHM  ATRIAL PREMATURE COMPLEX  LEFT BUNDLE BRANCH BLOCK  Electronically Signed On 2022 19:53:10 PDT by Eladio Iraheta MD     EKG STAT   Result Value Ref Range    Report       [facility]    Test Date:  2022  Pt Name:    JAMES SWAN           Department: 171  MRN:        [id number]                      Room:       T711  Gender:     Female                       Technician: DGG  :        1934                   Requested By:WING BANEGAS  Order #:    [id number]                    Reading MD: Derik Wilson  MD    Measurements  Intervals                                Axis  Rate:       71                           P:          35  WY:         188                          QRS:        -68  QRSD:       136                          T:          93  QT:         416  QTc:        453    Interpretive Statements  SINUS RHYTHM  LEFT BUNDLE BRANCH BLOCK  Compared to ECG 04/06/2022 18:26:08  Atrial premature complex(es) no longer present  Electronically Signed On 4-7-2022 21:51:59 PDT by Derik Wilson MD         Problem Representation: 88 y/o female w/ hx of htn and dementia admitted for SOB. Pt continues to improve clinically. Pt's low B12 and changes consistent w/ small vessel disease can indicate a secondary cause of dementia. Stabilization of creatinine indicates renal tolerance to Lasix. Cardiac cath showed ischemic cardiomyopathy.    Ischemic cardiomyopathy- (present on admission)  Assessment & Plan  Per cath lab, pt has acute HFrEF most likely secondary to ischemic cardiomyopathy. LAD had 80 and 90% stenosis and complete occlusion in OM 3. 4/4 Echo shows EF 35-40%, moderately reduced L ventricular systolic function, global hypokinesis. HF and initial SOB possibly due to a recent MI secondary to CAD.    -Monitor on telemetry  -Strict I/Os and Daily weights   -Cardiac diet  -Supplemental oxygen PRN for hypoxia  -3.125 mg carvedilol  -Losartan 50 mg, Aldactone 25 mg daily.    -Lasix 10 mg daily  -Per cardiology, short course dual antiplatelet therapy for 3 months w/ clopidogrel, aspirin due to risk of bleeding     Arrhythmia- (present on admission)  Assessment & Plan  Most likely secondary to CAD and HFrEF. Patient initially presented with a wandering pacemaker, however on telemetry was noted to convert to A fib. Per interventional radiology, more likely NSR w/ frequent PACs than afib. Repeat EKG no longer shows atrial premature complexes.     -Prophylactic anticoagulation unnecessary in absence of true afib  -Appears to be  improved after cath lab    At high risk for injury related to fall- (present on admission)  Assessment & Plan  Patient has had > 2 falls in the past 1 year.  Her family is concerned that her dementia is worsening. Pt had unsteadiness while walking after stent placement.    -PT/OT evaluation  -Test pt's balance w/ pt's family present to determine if pt is safe to d/c to family  -Fall precautions  -Head CT shows no evidence of SDH/other hemorrhage      B12 deficiency- (present on admission)  Assessment & Plan  Noted to have B12 deficiency as part of her dementia work-up.  -IV B12 supplementation given     Memory loss- (present on admission)  Assessment & Plan  Most likely, differential includes B12 deficiency vs vascular dementia. Per geriatrics, depression can be a confounding factor due to isolation. Pt has low B12, and normal TSH and folate. Head CT shows diffuse atrophy and periventricular white matter changes consistent w/ chronic small vessel disease. RPR nonreactive. MOCA score 5/30. Confounding factors include being in the hospital, depression and primary language being [place]. However, controlling for these variables would most likely not be enough to raise the score to normal.     -Replete B12, start on thiamine  -Discussed w/ family placement into memory care unit  -Per geriatrics, pt can be discharged to home if she has family support     Hypertensive emergency- (present on admission)  Assessment & Plan  Patient longstanding history of uncontrolled hypertension. Head CT shows diffuse atrophy and periventricular white matter changes consistent w/ chronic small vessel disease. Blood pressure appears to be well controlled.     -Continue failure regimen: ACE inhibitor, Aldactone  -Hydralazine as needed for blood pressure control

## 2022-04-08 NOTE — PROGRESS NOTES
CARDIOLOGY PROGRESS NOTE    Date: 4/8/2022      Patient Name: Linda Casas  YOB: 1934  MRN: [id number]    Assessment/Recommendations:   # Acute decompensated systolic heart failure: Her echocardiogram demonstrated moderately reduced left ventricular systolic function.  Coronary angiography yesterday showed severe LAD disease and an occluded OM3.  The LAD was treated with PCI.  -Continue carvedilol 3.125 mg bid  -Continue losartan 50 mg nightly  -Continue spironolactone 25 mg daily  -Continue furosemide 10 mg daily  -Follow daily BMP    # Coronary artery disease: Now s/p PCI to LAD  -Recommend short-course of DAPT (3 months) given high bleeding risk  -Given her age and frailty, will start prophylactic PPI for duration of DAPT and then stop    # Hypertensive urgency: BP control improved.  -See above for management     # Frequent atrial ectopy: I personally reviewed her telemetry as well as her ECGs.  She has had very frequent atrial ectopy, but has not convincingly developed clear sustained atrial fibrillation on my review.  Unfortunately, her telemetry is difficult to interpret due to low P-wave amplitudes and baseline wander.  In the absence of clear atrial fibrillation would hold on oral anticoagulation for now.  -She will need outpatient monitoring on dishcarge    Events/Subjective:   More confused this AM, frustrated with staff    Home Medications:     Current Facility-Administered Medications   Medication Dose Route Frequency Provider Last Rate Last Admin   • [START ON 4/9/2022] furosemide (LASIX) tablet 10 mg  10 mg Oral Q DAY Michael Lewis M.D.       • losartan (COZAAR) tablet 50 mg  50 mg Oral Q EVENING Michael Lewis M.D.   50 mg at 04/07/22 1845   • aspirin EC (ECOTRIN) tablet 81 mg  81 mg Oral DAILY Homer Hernandez M.D.   81 mg at 04/08/22 0522   • clopidogrel (PLAVIX) tablet 75 mg  75 mg Oral DAILY Homer Hernandez M.D.   75 mg at 04/08/22 0522   • enoxaparin  "(LOVENOX) inj 30 mg  30 mg Subcutaneous DAILY ELISE Huang M.D.   30 mg at 04/07/22 1844   • spironolactone (ALDACTONE) tablet 25 mg  25 mg Oral Q DAY Michael Lewis M.D.   25 mg at 04/08/22 0522   • carvedilol (COREG) tablet 3.125 mg  3.125 mg Oral BID WITH MEALS Michael Lewis M.D.   3.125 mg at 04/08/22 0522   • senna-docusate (PERICOLACE or SENOKOT S) 8.6-50 MG per tablet 2 Tablet  2 Tablet Oral BID Gilda Vargas M.D.   2 Tablet at 04/08/22 0522    And   • polyethylene glycol/lytes (MIRALAX) PACKET 1 Packet  1 Packet Oral QDAY RICKIE Vargas M.D.        And   • magnesium hydroxide (MILK OF MAGNESIA) suspension 30 mL  30 mL Oral QDAY RICKIE Vargas M.D.        And   • bisacodyl (DULCOLAX) suppository 10 mg  10 mg Rectal QDAY RICKIE Vargas M.D.       • hydrALAZINE (APRESOLINE) injection 10 mg  10 mg Intravenous Q4HRS RICKIE Vargas M.D.       • ondansetron (ZOFRAN) syringe/vial injection 4 mg  4 mg Intravenous Q4HRS RICKIE Vargas M.D.       • ondansetron (ZOFRAN ODT) dispertab 4 mg  4 mg Oral Q4HRS RICKIE Vargas M.D.         Vitals:   /94   Pulse 67   Temp 36.6 °C (97.8 °F) (Temporal)   Resp 18   Ht 1.626 m (5' 4\")   Wt 63.1 kg (139 lb 1.8 oz)   SpO2 92%    BP Readings from Last 4 Encounters:   04/08/22 142/94   03/17/22 136/80   11/06/21 (!) 179/77   11/01/21 130/84     Wt Readings from Last 4 Encounters:   04/07/22 63.1 kg (139 lb 1.8 oz)   03/17/22 64.8 kg (142 lb 12.8 oz)   11/06/21 64.5 kg (142 lb 3.2 oz)   11/01/21 65.2 kg (143 lb 12.8 oz)     Body mass index is 23.88 kg/m².    Physical Examination:   General: Frail, but in no acute distress  Neck: Full range of motion, no clear jugular venous distension  Pulmonary: Normal respiratory effort, no distress, no oxygen  Cardiovascular: Frequent ectopy  Extremities: Warm and well perfused, no lower extremity edema  Neurological: Alert, but more confused and agitated this morning    Laboratories:   Estimated " Creatinine Clearance: 29 mL/min (by C-G formula based on SCr of 1.18 mg/dL).  Recent Labs     04/06/22  0112 04/07/22 0316 04/08/22  0058   CREATININE 1.17 1.11 1.18   BUN 29* 27* 25*   POTASSIUM 3.6 3.8 3.9   SODIUM 139 139 137   CALCIUM 9.8 10.0 9.9   CO2 25 27 24   ALBUMIN 4.3 4.3 4.1     Recent Labs     04/06/22  0112 04/07/22 0316 04/08/22  0058   GLUCOSE 101* 103* 113*     Recent Labs     04/06/22  0112 04/07/22 0316 04/08/22  0058   ASTSGOT 22 17 18   ALTSGPT 10 12 9   ALKPHOSPHAT 61 53 54     Recent Labs     04/06/22 0112 04/07/22 0316 04/08/22  0058   WBC 10.5 9.3 11.9*   HEMOGLOBIN 14.9 15.1 13.9   PLATELETCT 326 314 317     No results for input(s): TROPONINT, NTPROBNP, HBA1C in the last 72 hours.  Lab Results   Component Value Date/Time    LDL 88 04/05/2022 0111     (H) 10/28/2019 0828    LDL 78 04/26/2019 0830     Lab Results   Component Value Date/Time    HDL 69 04/05/2022 0111    HDL 65 10/28/2019 0828    HDL 71 04/26/2019 0830       Lab Results   Component Value Date/Time    TRIGLYCERIDE 80 04/05/2022 0111    TRIGLYCERIDE 105 10/28/2019 0828    TRIGLYCERIDE 74 04/26/2019 0830       Lab Results   Component Value Date/Time    CHOLSTRLTOT 173 04/05/2022 0111    CHOLSTRLTOT 198 10/28/2019 0828    CHOLSTRLTOT 164 04/26/2019 0830       Telemetry:   Continues to be in NSR with frequent atrial ectopy    Cardiac Studies and Procedures:   Echocardiography  Moderately reduced left ventricular systolic function.   The left ventricular ejection fraction is visually estimated to be 35- 40% with [id number] variation.   Global hypokinesis with regional variation.  Moderate aortic insufficiency. Pressure half time is  313 msec.  Right ventricular systolic pressure is estimated to be 45  mmHg.  IVC not interpretable, going out of plane of US.    Cardiac Catheterization  Left Main: Normal  LAD: Mid 40% stenosis, distal 80 and 90% stenosis- sequential lesions.  LCx: Ectasia in the mid AV groove.  The first  OM has ostial 20% stenosis, the second arm has ostial 60% stenosis, the third OM is occluded and appears to be small  RCA: Dominant, 30% proximal stenosis.  The PDA has a mid 30% stenosis.  The PLB is normal    XR/CT/MRI  Interstitial edema. No focal consolidation or pleural effusions.    Disposition:   Appears stable from cardiovascular perspective for discharge with follow up BMP early next week, however, she may need assessment for rehab. We will arrange close cardiology clinic follow up and an outpatient monitor.    Please contact us if there are any questions or concerns.      Michael Lewis MD, FACC, [facility]  Interventional Cardiology  [facility], [address]  [address] [place] [id number]  Phone: [phone]  Fax: [phone]

## 2022-04-08 NOTE — PROGRESS NOTES
Daily Progress Note:     Date of Service: 4/8/2022  Primary Team: [facility] Team   Attending: ELISE Huang M.D.   Senior Resident: Dr. ORLANDO Tomas  Intern: Dr. PURNIMA Ramsey  Contact:  [phone]    Chief Complaint:   Shortness of breath     ID: Mrs. Linda Casas is a 87 year old female presented with shortness of breath for three days, with proximal nocturnal dyspnea and increased swelling in her legs. Found to be in CHF exacerbation and to have wandering pacemaker syndrome.  She has a past medical history of hypertension, left wrist fracture (6/21), s/p kyphoplasty and diastolic heart failure( ECHO 2020 LVEF 60%).       Interval history/subjective:  -The patient was seen at the bedside without any significant complaints.  -She reported being weak since she admitted to the hospital  -Underwent left heart cath on 4/30/2022 and showed severe LAD disease  and occlusion of OM 3 s/p 2 DENIS stents.  The patient tolerated procedure very well, without any immediate complications.  -Was a little confused this early morning.  -Assessed by PT/OT, she will will likely need SNF placement. Awaiting their official PT/OT documentation.  -Spoke with Aishwarya (family member over the phone),she reported that she can only provide babysitting care at home but won't able to provide any care if she is weak or require physical assistance.    Consultants/Specialty:  Interventional Cardiology    Review of Systems:    Review of Systems   Constitutional: Negative for chills, fever, malaise/fatigue and weight loss.   HENT: Negative.    Eyes: Negative.    Respiratory: Positive for shortness of breath. Negative for cough and hemoptysis.    Cardiovascular: Positive for chest pain (Resolving) and leg swelling (Resolving). Negative for palpitations and orthopnea.   Gastrointestinal: Negative for abdominal pain, constipation, diarrhea, heartburn, nausea and vomiting.   Genitourinary: Negative.    Musculoskeletal: Negative for back pain,  joint pain, myalgias and neck pain.   Neurological: Negative for dizziness, tingling, focal weakness, weakness and headaches.   Endo/Heme/Allergies: Negative.    Psychiatric/Behavioral: Negative.        Objective:   Physical Exam:    Vitals:   Temp:  [36.1 °C (96.9 °F)-36.9 °C (98.5 °F)] 36.6 °C (97.8 °F)  Pulse:  [67-89] 74  Resp:  [18-20] 18  BP: (104-149)/(63-94) 104/73  SpO2:  [92 %-98 %] 96 %    Physical Exam  Constitutional:       General: She is not in acute distress.     Appearance: Normal appearance. She is not ill-appearing, toxic-appearing or diaphoretic.   HENT:      Right Ear: Tympanic membrane normal.      Left Ear: Tympanic membrane normal.      Nose: Nose normal. No congestion or rhinorrhea.      Mouth/Throat:      Pharynx: No oropharyngeal exudate or posterior oropharyngeal erythema.   Eyes:      General: No scleral icterus.        Right eye: No discharge.         Left eye: No discharge.      Extraocular Movements: Extraocular movements intact.      Pupils: Pupils are equal, round, and reactive to light.   Cardiovascular:      Rate and Rhythm: Normal rate. Rhythm irregular.      Pulses: Normal pulses.      Heart sounds: No murmur heard.    No gallop.   Pulmonary:      Effort: Pulmonary effort is normal. No respiratory distress.      Breath sounds: Rales (Improving) present. No wheezing.   Abdominal:      General: Abdomen is flat. There is no distension.      Palpations: Abdomen is soft. There is no mass.      Tenderness: There is no abdominal tenderness. There is no guarding or rebound.      Hernia: No hernia is present.   Genitourinary:     General: Normal vulva.   Musculoskeletal:         General: No swelling or tenderness.      Cervical back: Normal range of motion. No rigidity or tenderness.      Right lower leg: No edema.      Left lower leg: No edema.   Skin:     General: Skin is warm and dry.   Neurological:      Mental Status: She is alert. Mental status is at baseline.           Labs:    Recent Labs     04/06/22 0112 04/07/22 0316 04/08/22  0058   WBC 10.5 9.3 11.9*   RBC 4.86 4.87 4.52   HEMOGLOBIN 14.9 15.1 13.9   HEMATOCRIT 45.8 46.4 42.8   MCV 94.2 95.3 94.7   MCH 30.7 31.0 30.8   RDW 48.0 49.2 48.8   PLATELETCT 326 314 317   MPV 10.8 10.7 11.0   NEUTSPOLYS 58.70 57.60 72.10*   LYMPHOCYTES 27.30 28.80 15.70*   MONOCYTES 10.20 10.00 9.90   EOSINOPHILS 2.70 2.70 1.10   BASOPHILS 0.60 0.50 0.50     Recent Labs     04/06/22 0112 04/07/22 0316 04/08/22  0058   SODIUM 139 139 137   POTASSIUM 3.6 3.8 3.9   CHLORIDE 102 102 101   CO2 25 27 24   GLUCOSE 101* 103* 113*   BUN 29* 27* 25*     Recent Labs     04/06/22 0112 04/07/22 0316 04/08/22  0058   ALBUMIN 4.3 4.3 4.1   TBILIRUBIN 0.5 0.6 0.6   ALKPHOSPHAT 61 53 54   TOTPROTEIN 6.9 6.8 6.7   ALTSGPT 10 12 9   ASTSGOT 22 17 18   CREATININE 1.17 1.11 1.18       Imaging:   EC-ECHOCARDIOGRAM COMPLETE W/O CONT   Final Result      CT-HEAD W/O   Final Result         1.  No acute intracranial process.      2. Diffuse atrophy and periventricular white matter changes consistent with chronic small vessel disease.      DX-CHEST-PORTABLE (1 VIEW)   Final Result      Interstitial edema. No focal consolidation or pleural effusions.      CL-LEFT HEART CATHETERIZATION WITH POSSIBLE INTERVENTION    (Results Pending)       Assessment and Plan:  * Ischemic cardiomyopathy  Assessment & Plan  Presented with acute Decompensated Heart Failure: HFpEF ejection fraction of 35%  NTproBNP on admission: 5372,   Chest X ray on admission: pulmonary edema b/l.   EKG on admission: Wandering pacemaker, left ventricular hypertrophy, left axis deviation, left bundle branch block.  Underwent invasive ischemic work-up (left heart catheterization) on 4/7/2022 showing severe LAD disease and occluded OM 3, status post PCI with 2 DENIS stenting the distal LAD.  Started on dual antiplatelet therapy, aspirin and Plavix.  She is currently near euvolemia  On Lasix 10 mg daily  BMP  daily    Debility  Assessment & Plan  Physical deconditioning associated with possible cognitive impairment  Awaiting official evaluation by physical therapy/Occupational Therapy.  The patient may need SNF placement.    Premature atrial complex  Assessment & Plan  Multiple PACs versus atrial fibrillation  Cardiology is holding off anticoagulation till complete assessment    B12 deficiency- (present on admission)  Assessment & Plan  Noted to have B12 deficiency as part of her dementia work-up.  On B12 supplementation    Memory loss- (present on admission)  Assessment & Plan  Family members note worsening memory loss in the patient.    Work-up: Noted to have low B12, normal TSH and folate.  RPR: non-reactive, HIV: non-reactive. Consider B12 deficiency vs. Vascular dementia.   MOCA: 5  -Geriatrics following  -Repleting B12  -Starting thiamine  -Geriatrics on board, appreciate recommendation          At high risk for injury related to fall- (present on admission)  Assessment & Plan  Patient has had > 2 falls in the past 1 year.  Her family is concerned that her dementia is worsening.   Head CT: diffuse atrophy and periventricular white matter changes consistent with chronic small vessel disease which may be a cause of dementia.   -Fall precautions      Hypertensive emergency- (present on admission)  Assessment & Plan  Improved  Longstanding history of uncontrolled hypertension (not on any antihypertensives) presents with /81 with SOB X 3 days, pulmonary edema on CXR. Concern for flash pulmonary edema. Improved blood pressures on medications.     -Start heart failure regimen: ACE inhibitor, Aldactone, Lasix   -Hydralazine as needed for blood pressure control      DVT: 40 mg IV enoxaparin

## 2022-04-08 NOTE — PROGRESS NOTES
"Geriatric Progress Note:  4/7/2022  Chief Complaint   Patient presents with   • Shortness of Breath     X 3 days     86 y/o F admitted with chest pain found to have new onset acute CHF. Planning for cardiac catheterization. Geriatrics consulted for memory changes. She notes she did not want to go into the chair this morning. Only recalls being in the chair. She is cold this morning, denies feeling feverish.     S: cath yesterday s/p two overlapping stents to LAD other dz noted in cath as well; increase in losartain. Today patietn feels poor, substernal discomfort taht comes and goes, related to breathing, no CP; feels weak and tired. Denies confusion. Had BM yesterday    ROS: a 14 pt ROS was negative other than as stated above.     O:/94   Pulse 67   Temp 36.6 °C (97.8 °F) (Temporal)   Resp 18   Ht 1.626 m (5' 4\")   Wt 63.1 kg (139 lb 1.8 oz)   SpO2 92%   BMI 23.88 kg/m²   Gen: NAD, alert, pleasant,   CV: RRR, 2+ radial pulses bilateally, no LE edema  Lungs: CAB  Abd:Signicant LUQ pain, no rebound, repeat exam pain was more diffuse.   Back: no CVA tenderness  Ext: arthritic changes noted to hands bilaterally  Delirium Screen: postive; oriented to person, place, situation, not oriented to month, got the year incorrect, unable to say days of week backwards without issue    Labs/Imaging/EKG: slight increase in WBC, CMP this AM unremarkable.     Assessment: 87-year-old female admitted with acute onset shortness of breath found to be in acute failure with plans for cardiac catheterization tomorrow.  She also appears to have a dementia syndrome     Plan:     Delirium  Screened positive for delirium this morning  PE and history point to possible abdominal pathology. However is admitted for heart concerns. Telemetry unremarkable. Had increase in BP meds yesterday. VSS; Had BM yesterday; patient with no appetite. Patient RN reported has taken days to improve from anesthesia in the past.     Recommendations: "   Recommend upright portable chest xray and close monitoring.   If abdominal pain does not improve, consider CT scan or other imaging.   -communicated to primary team and recommend assessment and further workup; RN aware of patient's status    Dementia Syndrome  Low B12  Concern for depression  -patient has help at home with shipping, meal prep, transportation but is independent n other ADL and IADL, per her report; the need for helps appears related to cognitive, rather than physical limitations.   - is currently hospitalized  -lab work up otherwise negative  -no concerning symptoms to suggest another type of dementia syndrome.     Recommendations  -f/u mood as outpatient, consider treatment with counseling/meds as needed  -continue b12 treatment,   -continue to engage family in d/c planning    Acute sCHF  CAD  CHF likely ischemic in nature based on cath results  S/p LAD stents (2 overlapping)  -watch for orthostatic hypotension with heart medications. - carvedilol, losartan, spirolactone, furosemide    Urinary incontinence  -Consider timed voiding, every 3-4 hours, (per patient preference) help avoid number of incontinent episodes     Weight loss  -Patient has had about a 10 pound weight loss over the last year or so.  -We will need to follow-up with family to ensure what her access to food looks like      Thank you for including us in the care of this patient. We will follow along tomorrow.     Mika Villasenor M.D.  Geriatric Physician   Monday - Sunday 8-5      This note was partially complete completed using voice recognition software.  I did my best to correct errors prior to submitting this note, but for any concerns please do not hesitate to contact me.

## 2022-04-08 NOTE — PROGRESS NOTES
Report received from Hola Goncalves. Assumed pt care. Pt is resting in bed on 2L 02 NC. Pt A&O x 3 disoriented to time. Fall precautions in place, call light and belongings within reach, bed in lowest position. No signs of distress.

## 2022-04-08 NOTE — CARE PLAN
The patient is Stable - Low risk of patient condition declining or worsening    Shift Goals  Clinical Goals: ambulation,decrease 02 needs  Patient Goals: rest  Family Goals: priya    Progress made toward(s) clinical / shift goals:       Problem: Communication  Goal: The ability to communicate needs accurately and effectively will improve  4/8/2022 1312 by Amna Lino R.N.  Outcome: Progressing  4/8/2022 1312 by Amna Lino R.N.  Outcome: Progressing     Problem: Hemodynamics  Goal: Patient's hemodynamics, fluid balance and neurologic status will be stable or improve  4/8/2022 1312 by Amna Lino R.N.  Outcome: Progressing  4/8/2022 1312 by Amna Lino R.N.  Outcome: Progressing     Problem: Mobility  Goal: Patient's capacity to carry out activities will improve  Outcome: Progressing       Patient is not progressing towards the following goals: n/a

## 2022-04-08 NOTE — THERAPY
Occupational Therapy   Initial Evaluation     Patient Name: Linda Casas  Age:  87 y.o., Sex:  female  Medical Record #: [id number]  Today's Date: 4/8/2022     Precautions  Precautions: Fall Risk    Assessment  Patient is 87 y.o. female presenting with decreased independence in ADL and functional mobility. Educated on safety with functional mobility and importance of OOB activity. Patient would benefit from OT while in hospital setting to increase independence in ADL and functional mobility. Recommendation: Anticipating home with family with HHOT    Plan    Recommend Occupational Therapy 3 times per week until therapy goals are met for the following treatments:  Neuro Re-Education / Balance, Self Care/Activities of Daily Living, Therapeutic Activities, and Therapeutic Exercises.       Discharge Recommendations: (P)  (Anticipate home with family HHOT)     Subjective    Patient alert and cooperative during session. Nurse bogdan occupational therapist to work with patient.      Objective       04/08/22 0750   Total Time Spent   Total Time Spent (Mins) 30   Charge Group   OT Evaluation OT Evaluation Mod   Initial Contact Note    Initial Contact Note Order Received and Verified, Occupational Therapy Evaluation in Progress with Full Report to Follow.   Prior Level of ADL Function   Self Feeding Independent   Grooming / Hygiene Independent   Bathing Independent   Dressing Independent   Toileting Independent   Comments per patient   History of Falls   History of Falls Yes   Precautions   Precautions Fall Risk   Cognition    Comments Patient cooperative during session. Low volume of speech and mumbles when speaking.   Passive ROM Upper Body   Passive ROM Upper Body WDL   Active ROM Upper Body   Active ROM Upper Body  WDL   Dominant Hand Right   Strength Upper Body   Comments >/=3+/5 during functional mobility   Sensation Upper Body   Comments BUE light touch sensation WFL   Coordination Upper Body   Comments BUE  fine/gross motor WFL   Bed Mobility    Sit to Supine Contact Guard Assist   Scooting Contact Guard Assist   ADL Assessment   Eating Supervision   Grooming   (Setup standing sink side washed face and hands, SBA for standing balance)   Upper Body Dressing Supervision   Lower Body Dressing   (setup for donning socks)   How much help from another person does the patient currently need...   Putting on and taking off regular lower body clothing? 3   Bathing (including washing, rinsing, and drying)? 3   Toileting, which includes using a toilet, bedpan, or urinal? 3   Putting on and taking off regular upper body clothing? 4   Taking care of personal grooming such as brushing teeth? 4   Eating meals? 4   6 Clicks Daily Activity Score 21   Functional Mobility   Sit to Stand   (SBA/CGA with FWW)   Comments Ambualted in room with FWW CGA/SBA, stood sink side for simple grooming task. Ambulated from sink side to R side of bed with FWW.   Activity Tolerance   Comments Patient with good activity tolerance during session   Short Term Goals   Short Term Goal # 1 Patient will perform UB/LB dressing with supervision   Short Term Goal # 2 Patient will perform 3/3 simple grooming task with supervision standing sink side, supervision for standing balance   Short Term Goal # 3 Patient will perform toileting with supervision   Education Group   Additional Comments Edcuated on safety with functional mobility during ADL task   Problem List   Problem List Decreased Activity Tolerance;Decreased Functional Mobility;Decreased Active Daily Living Skills   Anticipated Discharge Equipment and Recommendations   Discharge Recommendations   (Anticipate home with family HHOT)   Interdisciplinary Plan of Care Collaboration   Collaboration Comments SBAR nsg functional mobility and ADL status   Session Information   Date / Session Number  4/8 #1 (1/3 4/14)   Priority 3

## 2022-04-08 NOTE — DISCHARGE PLANNING
H[facility] TCN chart review completed. Collaborated with Mario ALBERTO.  As prior, no new TCN needs at this time. TCN will continue to follow and collaborate with discharge planning team as additional post acute needs arise. Thank you.     Previously completed:  - Choice forms: HH  - [facility] introduced (Y), referral (sent).

## 2022-04-08 NOTE — THERAPY
"Physical Therapy   Initial Evaluation     Patient Name: Linda Casas  Age:  87 y.o., Sex:  female  Medical Record #: [id number]  Today's Date: 4/8/2022     Precautions  Precautions: Fall Risk    Assessment  Patient is 87 y.o. female presenting for SOB and acute onset HF w/ EF 35% now POD1 LHC and PCI LADx2 w/ a hx of increasing falls over the previous year.  She lives alone in an DELTA receiving assistance w/ meals, otherwise reporting she was previously IND (see flowsheet for home set up and PLOF).  Currently, the pt is limited by reduced activity tolerance and SOB, however able to demo functional independence.  She is able to perform bed mobility spv w/ hob flat and no bed rails, along w/ STS eob w/ no AD sba.  The pt then demo'd gait distance 150' using no AD sba via slow ella and limited toe clearance, no LOB observed, distance limited by + \"talk test\" and fatigue (BP and pulse rate stable throughout).  Provided pt edu regarding phase 1 of cardiac rehab, use of talk test, and home activity modifications w/ good carryover demo'd.  Recommend pt dc home w/ OP cardiac rehab f/u given dx and current functional independence. Will not follow, please re consult should there be a change in the pt's condition.       Plan    Recommend Physical Therapy for Evaluation only     DC Equipment Recommendations: None  Discharge Recommendations: Other - (OP Cardiac Rehab)       Subjective/Objective       04/08/22 1038   Prior Living Situation   Prior Services Home-Independent   Housing / Facility Assisted Living Residence   Steps Into Home 0   Steps In Home 0   Equipment Owned None   Lives with - Patient's Self Care Capacity Alone and Able to Care For Self   Comments pt lives alone at assisted living, previously independent, however receives meal services   Prior Level of Functional Mobility   Bed Mobility Independent   Transfer Status Independent   Ambulation Independent   Distance Ambulation (Feet)   (limited " community distances)   Assistive Devices Used None   Stairs Independent   History of Falls   History of Falls Yes   Date of Last Fall   (pt reports multiple falls over the previous few months)   Cognition    Cognition / Consciousness WDL   Level of Consciousness Alert   Comments pt pleasant and cooperative   Passive ROM Lower Body   Passive ROM Lower Body WDL   Active ROM Lower Body    Active ROM Lower Body  WDL   Comments observed during functional mobility   Strength Lower Body   Lower Body Strength  WDL   Comments as above   Sensation Lower Body   Lower Extremity Sensation   WDL   Comments pt denies numbness/tingling in LEs   Coordination Lower Body    Coordination Lower Body  WDL   Balance Assessment   Sitting Balance (Static) Fair +   Sitting Balance (Dynamic) Fair +   Standing Balance (Static) Fair   Standing Balance (Dynamic) Fair   Weight Shift Sitting Good   Weight Shift Standing Fair   Comments stand w/ no AD   Gait Analysis   Gait Level Of Assist Standby Assist   Assistive Device None   Distance (Feet) 150   # of Times Distance was Traveled 1   Deviation Bradykinetic;Decreased Toe Off   Weight Bearing Status no restrictions   Vision Deficits Impacting Mobility denies   Comments distance limited by fatigue   Bed Mobility    Supine to Sit Supervised   Sit to Supine Supervised   Scooting Supervised   Comments hob flat, no bed rails   Functional Mobility   Sit to Stand Standby Assist   Bed, Chair, Wheelchair Transfer Standby Assist   Transfer Method Stand Step   Mobility STS eob w/ no AD   Activity Tolerance   Sitting Edge of Bed 25min   Standing 8min   Edema / Skin Assessment   Edema / Skin  Not Assessed   Education Group   Education Provided Role of Physical Therapist  (cardiac rehab edu)   Role of Physical Therapist Patient Response Patient;Acceptance;Explanation;Demonstration;Action Demonstration   Additional Comments handout given for cardiac rehab edu, pt receptive of edu   Problem List    Problems  Decreased Activity Tolerance   Session Information   Date / Session Number  4/8- eval only

## 2022-04-08 NOTE — CARE PLAN
The patient is Watcher - Medium risk of patient condition declining or worsening    Shift Goals  Clinical Goals: cardiac cath, hemodynamic stability  Patient Goals: rest  Family Goals: priya    Progress made toward(s) clinical / shift goals:    Problem: Communication  Goal: The ability to communicate needs accurately and effectively will improve  Outcome: Progressing     Problem: Respiratory  Goal: Patient will achieve/maintain optimum respiratory ventilation and gas exchange  Outcome: Progressing       Patient is not progressing towards the following goals:

## 2022-04-08 NOTE — ASSESSMENT & PLAN NOTE
Physical deconditioning associated with possible cognitive impairment. PT and OT recommending outpatient cardiac rehab.  Spoke with family, will consider group home vs. Memory care.

## 2022-04-08 NOTE — PROCEDURES
"CARDIAC CATHETERIZATION REPORT    REFERRING: Pranay Lewis MD    PROCEDURE PHYSICIAN: Homer Hernandez MD, Merged with [facility], [facility]  ASSISTANT: None    IMPRESSIONS:  1.  Ischemic cardiomyopathy with severe stenosis in the LAD and occlusion of the OM 3  2.  Successful PCI of the LAD using 2 overlapping drug-eluting stents, IVUS guidance  3.  Normal LVEDP    Recommendations:  Dual antiplatelet therapy for 12 months    Pre-procedure diagnosis: New cardiomyopathy  Post-procedure diagnosis: Ischemic cardiomyopathy    Procedure performed  Selective coronary angiography  Left heart catheterization  Percutaneous coronary intervention - Stent Placement (LAD)  Intravascular Ultrasound (LAD)    Conscious sedation was supervised by myself and administered by trained personnel using fentanyl and versed between 1703 and 1752. The patient tolerated sedation without complication.     Procedure Description  1. Access: 5/6 Dutch right radial artery Micropuncture technique was utilized following local anesthesia with lidocaine.  A radial cocktail containing verapamil and saline was administered in the radial artery sheath    2. Diagnostic description: The catheter was passed to the central circulation with the aide of J tipped 0.35\" wire. A 5F TIG 4.0 was used to inject the coronary circulationand enter the left ventricle during invasive hemodynamic monitoring.     3. Description of intervention: After confirming therapeutic anticoagulation the LAD lesion was crossed with a prowater wire. I performed IVUS then predilated with lesion with a 3.0x27 NC balloon and deployed a 3.0x30 mm Edenilson DENIS followed by a second 3.0x18 mm Edenilson DENIS more proximally. The stent length was post dilated with a 3.0x27 NC balloon and subsequently a 3.5x15 NC more proximally. Subsequent angiograms showed excellent result.     4. Closing: At completion of the procedure the relevant equipment was removed from the body and hemostasis achieved by Radial band    Findings "   Hemodynamics:   Aorta: 112/81 mmHg  LVEDP: 7 mmHg  No significant pullback gradient across the aortic valve    Coronary Anatomy   Left Main: Normal   LAD: Mid 40% stenosis, distal 80 and 90% stenosis- sequential lesions.   LCx: Ectasia in the mid AV groove.  The first OM has ostial 20% stenosis, the second arm has ostial 60% stenosis, the third OM is occluded and appears to be small   RCA: Dominant, 30% proximal stenosis.  The PDA has a mid 30% stenosis.  The PLB is normal     Results of intervention to the LAD  Pre: 90% stenosis and MILADIS III flow  Post: 0% residual stenosis and MILADIS III flow. No site complication.     Technical Factors  1. Complications: None  2. Estimated Blood Loss: 70 cc  3. Specimens: None  4. Contrast Volume: 70 ml  5. Medications: Radial cocktail (Verapamil 2.5 mg, Nitroglycerin 100 mcg) Heparin to maintain ACT >250 Clopidogrel 600 mg Intracoronary nitroglycerin  6. Radiation (air kerma): 269 mGy

## 2022-04-08 NOTE — ASSESSMENT & PLAN NOTE
Presented with acute Decompensated Heart Failure: HFrEF ejection fraction of 35%  NTproBNP on admission: 5372,   Chest X ray on admission: pulmonary edema b/l.   EKG on admission: Wandering pacemaker, left ventricular hypertrophy, left axis deviation, left bundle branch block.  Underwent invasive ischemic work-up (left heart catheterization) on 4/7/2022 showing severe LAD disease and occluded OM 3, status post PCI with 2 DENIS stenting the distal LAD.  Started on dual antiplatelet therapy, aspirin and Plavix.  She is currently near euvolemia  On Lasix 10 mg daily  BMP daily

## 2022-04-09 ENCOUNTER — APPOINTMENT (OUTPATIENT)
Dept: RADIOLOGY | Facility: MEDICAL CENTER | Age: 87
DRG: 246 | End: 2022-04-09
Payer: MEDICARE

## 2022-04-09 LAB
AMMONIA PLAS-SCNC: 17 UMOL/L (ref 11–45)
ANION GAP SERPL CALC-SCNC: 11 MMOL/L (ref 7–16)
APPEARANCE UR: CLEAR
BACTERIA #/AREA URNS HPF: NEGATIVE /HPF
BASOPHILS # BLD AUTO: 0.3 % (ref 0–1.8)
BASOPHILS # BLD: 0.04 K/UL (ref 0–0.12)
BILIRUB UR QL STRIP.AUTO: NEGATIVE
BUN SERPL-MCNC: 18 MG/DL (ref 8–22)
CALCIUM SERPL-MCNC: 10 MG/DL (ref 8.5–10.5)
CHLORIDE SERPL-SCNC: 101 MMOL/L (ref 96–112)
CO2 SERPL-SCNC: 23 MMOL/L (ref 20–33)
COLOR UR: YELLOW
CREAT SERPL-MCNC: 0.86 MG/DL (ref 0.5–1.4)
EOSINOPHIL # BLD AUTO: 0.17 K/UL (ref 0–0.51)
EOSINOPHIL NFR BLD: 1.4 % (ref 0–6.9)
EPI CELLS #/AREA URNS HPF: NEGATIVE /HPF
ERYTHROCYTE [DISTWIDTH] IN BLOOD BY AUTOMATED COUNT: 47.8 FL (ref 35.9–50)
GFR SERPLBLD CREATININE-BSD FMLA CKD-EPI: 65 ML/MIN/1.73 M 2
GLUCOSE SERPL-MCNC: 105 MG/DL (ref 65–99)
GLUCOSE UR STRIP.AUTO-MCNC: NEGATIVE MG/DL
HCT VFR BLD AUTO: 44.3 % (ref 37–47)
HGB BLD-MCNC: 14.5 G/DL (ref 12–16)
HYALINE CASTS #/AREA URNS LPF: ABNORMAL /LPF
IMM GRANULOCYTES # BLD AUTO: 0.06 K/UL (ref 0–0.11)
IMM GRANULOCYTES NFR BLD AUTO: 0.5 % (ref 0–0.9)
KETONES UR STRIP.AUTO-MCNC: NEGATIVE MG/DL
LEUKOCYTE ESTERASE UR QL STRIP.AUTO: ABNORMAL
LYMPHOCYTES # BLD AUTO: 2.26 K/UL (ref 1–4.8)
LYMPHOCYTES NFR BLD: 19.1 % (ref 22–41)
MCH RBC QN AUTO: 30.7 PG (ref 27–33)
MCHC RBC AUTO-ENTMCNC: 32.7 G/DL (ref 33.6–35)
MCV RBC AUTO: 93.9 FL (ref 81.4–97.8)
MICRO URNS: ABNORMAL
MONOCYTES # BLD AUTO: 1.52 K/UL (ref 0–0.85)
MONOCYTES NFR BLD AUTO: 12.8 % (ref 0–13.4)
NEUTROPHILS # BLD AUTO: 7.8 K/UL (ref 2–7.15)
NEUTROPHILS NFR BLD: 65.9 % (ref 44–72)
NITRITE UR QL STRIP.AUTO: NEGATIVE
NRBC # BLD AUTO: 0 K/UL
NRBC BLD-RTO: 0 /100 WBC
PH UR STRIP.AUTO: 6.5 [PH] (ref 5–8)
PLATELET # BLD AUTO: 275 K/UL (ref 164–446)
PMV BLD AUTO: 10.7 FL (ref 9–12.9)
POTASSIUM SERPL-SCNC: 3.8 MMOL/L (ref 3.6–5.5)
PROT UR QL STRIP: NEGATIVE MG/DL
RBC # BLD AUTO: 4.72 M/UL (ref 4.2–5.4)
RBC # URNS HPF: ABNORMAL /HPF
RBC UR QL AUTO: NEGATIVE
SODIUM SERPL-SCNC: 135 MMOL/L (ref 135–145)
SP GR UR STRIP.AUTO: 1.02
UROBILINOGEN UR STRIP.AUTO-MCNC: 1 MG/DL
WBC # BLD AUTO: 11.9 K/UL (ref 4.8–10.8)
WBC #/AREA URNS HPF: ABNORMAL /HPF

## 2022-04-09 PROCEDURE — 85025 COMPLETE CBC W/AUTO DIFF WBC: CPT

## 2022-04-09 PROCEDURE — 71045 X-RAY EXAM CHEST 1 VIEW: CPT

## 2022-04-09 PROCEDURE — 700102 HCHG RX REV CODE 250 W/ 637 OVERRIDE(OP): Performed by: INTERNAL MEDICINE

## 2022-04-09 PROCEDURE — 80048 BASIC METABOLIC PNL TOTAL CA: CPT

## 2022-04-09 PROCEDURE — 700102 HCHG RX REV CODE 250 W/ 637 OVERRIDE(OP): Performed by: STUDENT IN AN ORGANIZED HEALTH CARE EDUCATION/TRAINING PROGRAM

## 2022-04-09 PROCEDURE — A9270 NON-COVERED ITEM OR SERVICE: HCPCS | Performed by: STUDENT IN AN ORGANIZED HEALTH CARE EDUCATION/TRAINING PROGRAM

## 2022-04-09 PROCEDURE — 82140 ASSAY OF AMMONIA: CPT

## 2022-04-09 PROCEDURE — A9270 NON-COVERED ITEM OR SERVICE: HCPCS | Performed by: INTERNAL MEDICINE

## 2022-04-09 PROCEDURE — 99233 SBSQ HOSP IP/OBS HIGH 50: CPT | Mod: GC | Performed by: HOSPITALIST

## 2022-04-09 PROCEDURE — 770020 HCHG ROOM/CARE - TELE (206)

## 2022-04-09 PROCEDURE — 700111 HCHG RX REV CODE 636 W/ 250 OVERRIDE (IP): Performed by: STUDENT IN AN ORGANIZED HEALTH CARE EDUCATION/TRAINING PROGRAM

## 2022-04-09 PROCEDURE — 81001 URINALYSIS AUTO W/SCOPE: CPT

## 2022-04-09 PROCEDURE — 99232 SBSQ HOSP IP/OBS MODERATE 35: CPT | Performed by: INTERNAL MEDICINE

## 2022-04-09 PROCEDURE — 36415 COLL VENOUS BLD VENIPUNCTURE: CPT

## 2022-04-09 RX ORDER — FLUTICASONE PROPIONATE 50 MCG
2 SPRAY, SUSPENSION (ML) NASAL DAILY
Status: DISCONTINUED | OUTPATIENT
Start: 2022-04-09 | End: 2022-04-09 | Stop reason: DRUGHIGH

## 2022-04-09 RX ORDER — FLUTICASONE PROPIONATE 50 MCG
2 SPRAY, SUSPENSION (ML) NASAL DAILY
Status: DISCONTINUED | OUTPATIENT
Start: 2022-04-09 | End: 2022-04-10 | Stop reason: HOSPADM

## 2022-04-09 RX ADMIN — CARVEDILOL 3.12 MG: 3.12 TABLET, FILM COATED ORAL at 17:18

## 2022-04-09 RX ADMIN — FUROSEMIDE 10 MG: 20 TABLET ORAL at 05:58

## 2022-04-09 RX ADMIN — CETIRIZINE HYDROCHLORIDE 5 MG: 10 TABLET, FILM COATED ORAL at 05:58

## 2022-04-09 RX ADMIN — CARVEDILOL 3.12 MG: 3.12 TABLET, FILM COATED ORAL at 08:06

## 2022-04-09 RX ADMIN — ENOXAPARIN SODIUM 40 MG: 40 INJECTION SUBCUTANEOUS at 17:18

## 2022-04-09 RX ADMIN — LOSARTAN POTASSIUM 50 MG: 50 TABLET, FILM COATED ORAL at 17:19

## 2022-04-09 RX ADMIN — SENNOSIDES AND DOCUSATE SODIUM 2 TABLET: 50; 8.6 TABLET ORAL at 17:19

## 2022-04-09 RX ADMIN — SPIRONOLACTONE 25 MG: 25 TABLET, FILM COATED ORAL at 05:57

## 2022-04-09 RX ADMIN — ASPIRIN 81 MG: 81 TABLET, COATED ORAL at 05:57

## 2022-04-09 RX ADMIN — OMEPRAZOLE 20 MG: 20 CAPSULE, DELAYED RELEASE ORAL at 05:57

## 2022-04-09 RX ADMIN — FLUTICASONE PROPIONATE 100 MCG: 50 SPRAY, METERED NASAL at 00:27

## 2022-04-09 RX ADMIN — CLOPIDOGREL BISULFATE 75 MG: 75 TABLET ORAL at 05:57

## 2022-04-09 ASSESSMENT — FIBROSIS 4 INDEX: FIB4 SCORE: 1.9

## 2022-04-09 ASSESSMENT — PAIN DESCRIPTION - PAIN TYPE
TYPE: ACUTE PAIN;CHRONIC PAIN
TYPE: ACUTE PAIN

## 2022-04-09 NOTE — CARE PLAN
The patient is Stable - Low risk of patient condition declining or worsening    Shift Goals  Clinical Goals: ambulation,decrease 02 needs  Patient Goals: rest  Family Goals: priya    Progress made toward(s) clinical / shift goals:      Problem: Communication  Goal: The ability to communicate needs accurately and effectively will improve  4/8/2022 1852 by Amna Lino R.N.  Outcome: Progressing  4/8/2022 1312 by Amna Lino R.N.  Outcome: Progressing  4/8/2022 1312 by Amna Lino R.N.  Outcome: Progressing     Problem: Hemodynamics  Goal: Patient's hemodynamics, fluid balance and neurologic status will be stable or improve  4/8/2022 1852 by Amna Lino R.N.  Outcome: Progressing  4/8/2022 1312 by Amna Lino R.N.  Outcome: Progressing  4/8/2022 1312 by JOSE JosueN.  Outcome: Progressing     Problem: Respiratory  Goal: Patient will achieve/maintain optimum respiratory ventilation and gas exchange  Outcome: Progressing     Problem: Mobility  Goal: Patient's capacity to carry out activities will improve  4/8/2022 1852 by Amna Lino R.N.  Outcome: Progressing  4/8/2022 1312 by Amna Lino R.N.  Outcome: Progressing       Patient is not progressing towards the following goals:  N/a

## 2022-04-09 NOTE — PROGRESS NOTES
Pt sitting up in bed, calm. AOx3; unaware of date. Patient reports no pain. Does report nasal soreness from blowing nose. Given moisturizer to patient. All skin is intact. PIV dressing changed. Call light within reach. Fall armband ON. Bed alarm ON. Fall education reinforced to patient. No further needs at this time.

## 2022-04-09 NOTE — NON-PROVIDER
Daily Progress Note:     Date of Service: 4/9/2022  Primary Team: [facility] IM Team Blue/Gray  Attending: ELISE Huang M.D.   Senior Resident: Dr. Tomas  Intern: Dr. Ramsey  Contact:  [phone]    Reason for admission:   S/p stent placement    Interval Hx:   88 y/o female admitted for SOB s/p heart catheterization. Yesterday pt was seen by PT/OT who recommended d/c w/ outpatient cardiac rehab and HHOT to family. Pt had some PACs and PVCs on telemetry monitoring.    She reports epigastric pain exacerbated by breathing. She denies dizziness or SOB. Pt was slightly confused this morning and was unable to answer all my questions.    Consultants/Specialty:  Cardiology, PT/OT, geriatrics    Review of Systems:    Gen: denies fever  Cardiac: denies chest pain  GI: reports epigastric pain exacerbated by breathing  Neuro: denies dizziness    Objective Data:   Physical Exam:   Vitals:   Temp:  [36.6 °C (97.8 °F)-37.1 °C (98.7 °F)] 36.6 °C (97.9 °F)  Pulse:  [67-80] 79  Resp:  [18-20] 20  BP: (104-154)/(73-94) 154/88  SpO2:  [90 %-97 %] 92 %    Gen: somnolent, slightly confused, could not answer some questions  Cardiac: RRR, normal heart sounds  Pulm: no increased work of breathing, clear to auscultation, no orthopnea  GI: no tenderness to palpation  Skin: cap refill <3 sec, no rash  Msk: no swelling to bilateral extremities  Psych: mood normal    Labs:   ESTIMATED GFR [[id number]] Collected: 04/09/22 0150   Order Status: Completed Updated: 04/09/22 0233    GFR (CKD-EPI) 65 mL/min/1.73 m 2      Recent Labs     04/07/22 0316 04/08/22 0058 04/09/22  0150   SODIUM 139 137 135   POTASSIUM 3.8 3.9 3.8   CHLORIDE 102 101 101   CO2 27 24 23   GLUCOSE 103* 113* 105*   BUN 27* 25* 18     Recent Labs     04/07/22 0316 04/08/22  0058 04/09/22  0150   ALBUMIN 4.3 4.1  --    TBILIRUBIN 0.6 0.6  --    ALKPHOSPHAT 53 54  --    TOTPROTEIN 6.8 6.7  --    ALTSGPT 12 9  --    ASTSGOT 17 18  --    CREATININE 1.11 1.18 0.86     Recent Labs      04/07/22  0316 04/08/22  0058 04/09/22  0150   WBC 9.3 11.9* 11.9*   RBC 4.87 4.52 4.72   HEMOGLOBIN 15.1 13.9 14.5   HEMATOCRIT 46.4 42.8 44.3   MCV 95.3 94.7 93.9   MCH 31.0 30.8 30.7   RDW 49.2 48.8 47.8   PLATELETCT 314 317 275   MPV 10.7 11.0 10.7   NEUTSPOLYS 57.60 72.10* 65.90   LYMPHOCYTES 28.80 15.70* 19.10*   MONOCYTES 10.00 9.90 12.80   EOSINOPHILS 2.70 1.10 1.40   BASOPHILS 0.50 0.50 0.30     Imaging:   No new imaging    Problem Representation: 86 y/o female w/ hx of htn and dementia admitted for SOB. Cardiac cath showed ischemic cardiomyopathy. Pt showed physical deconditioning associated w/ cognitive impairment or recovery from stent placement. Pt's dementia may require constant supervision after discharge.    Ischemic cardiomyopathy- (present on admission)  Assessment & Plan  Per cath lab, pt has acute HFrEF most likely secondary to ischemic cardiomyopathy. LAD had 80 and 90% stenosis and complete occlusion in OM 3. 4/4 Echo shows EF 35-40%, moderately reduced L ventricular systolic function, global hypokinesis. HF and initial SOB possibly due to a recent MI secondary to CAD. Pt's creatinine dropped to 0.86 from 1.1.8. Pt can safely resume taking Lasix.     -Monitor on telemetry  -Strict I/Os and Daily weights   -Cardiac diet  -Supplemental oxygen PRN for hypoxia  -3.125 mg carvedilol  -Losartan 50 mg, Aldactone 25 mg daily.    -Lasix 10 mg daily  -Per cardiology, short course dual antiplatelet therapy for 3 months w/ clopidogrel, aspirin   -Pt medically clear for discharge    At high risk for fall- (present on admission)  Assessment & Plan  Patient has had > 2 falls in the past 1 year. Six clicks score is 22/24 so does not qualify for SNF. Pt had unsteadiness while walking after stent placement. PT/OT state pt is safe physically for d/c to family. However, due to pt's dementia, she may need 24/7 care. However, pt's family are unsure if they can provide 24/7 supervision.    -Fall  precautions  -Contact family and case management about d/c to either group home or family  -Pt would likely benefit most from memory care center or group home if family cannot provide continuous care      Arrhythmia- (present on admission)  Assessment & Plan  Most likely secondary to CAD and HFrEF. Patient initially presented with a wandering pacemaker, however on telemetry was noted to convert to A fib. Per interventional radiology, more likely NSR w/ frequent PACs than afib. Repeat EKG no longer shows atrial premature complexes. Pt continues to have PACs/PVCs     -Prophylactic anticoagulation unnecessary in absence of true afib  -Telemetry monitoring    B12 deficiency- (present on admission)  Assessment & Plan  Noted to have B12 deficiency as part of her dementia work-up.  -IV B12 supplementation given     Memory loss- (present on admission)  Assessment & Plan  Most likely, differential includes B12 deficiency vs vascular dementia. Per geriatrics, depression can be a confounding factor due to isolation. Pt has low B12, and normal TSH and folate. Head CT shows diffuse atrophy and periventricular white matter changes consistent w/ chronic small vessel disease. RPR nonreactive. MOCA score 5/30. Confounding factors include being in the hospital, depression and primary language being Wallisian. However, controlling for these variables would most likely not be enough to raise the score to normal.     -Replete B12, start on thiamine  -Discussed w/ family placement into memory care unit  -Per geriatrics, pt can be discharged to home if she has family support     Hypertensive emergency- (present on admission)  Assessment & Plan  Patient longstanding history of uncontrolled hypertension. Head CT shows diffuse atrophy and periventricular white matter changes consistent w/ chronic small vessel disease. Blood pressure appears to be well controlled.     -Continue failure regimen: ACE inhibitor, Aldactone  -Hydralazine as needed  for blood pressure control

## 2022-04-09 NOTE — PROGRESS NOTES
Daily Progress Note:     Date of Service: 4/9/2022  Primary Team: [facility] IM Gray Team   Attending: ELISE Huang M.D.   Senior Resident: Dr. ORLANDO Tomas  Intern: Dr. PURNIMA Ramsey  Contact:  [phone]    Chief Complaint:   Shortness of breath     ID: Mrs. Linda Casas is a 87 year old female presented with shortness of breath for three days, with proximal nocturnal dyspnea and increased swelling in her legs. Found to be in CHF exacerbation and to have wandering pacemaker syndrome.  She has a past medical history of hypertension, left wrist fracture (6/21), s/p kyphoplasty and diastolic heart failure( ECHO 2020 LVEF 60%).       Interval history/subjective:  -The patient was seen at the bedside without any significant complaints.  -She reported being weak since she admitted to the hospital  -Underwent left heart cath on 4/30/2022 and showed severe LAD disease  and occlusion of OM 3 s/p 2 DENIS stents.  The patient tolerated procedure very well, without any immediate complications.  -Patient has become increasingly confused. Concern for acute delirium. She did not have any complaints when I spoke with her this morning. She has been having a stuffy nose over the past two days.   As per Dr. Villasenor, patient is dizzy and becoming more confused.     Consultants/Specialty:  Interventional Cardiology  Geriatrics    Review of Systems:    Review of Systems   Constitutional: Negative for chills, fever, malaise/fatigue and weight loss.   HENT: Negative.    Eyes: Negative.    Respiratory: Positive for shortness of breath. Negative for cough and hemoptysis.    Cardiovascular: Positive for chest pain (Resolving) and leg swelling (Resolving). Negative for palpitations and orthopnea.   Gastrointestinal: Negative for abdominal pain, constipation, diarrhea, heartburn, nausea and vomiting.   Genitourinary: Negative.    Musculoskeletal: Negative for back pain, joint pain, myalgias and neck pain.   Neurological: Negative for dizziness,  tingling, focal weakness, weakness and headaches.   Endo/Heme/Allergies: Negative.    Psychiatric/Behavioral: Negative.        Objective:   Physical Exam:    Vitals:   Temp:  [36.3 °C (97.3 °F)-37.1 °C (98.7 °F)] 36.6 °C (97.8 °F)  Pulse:  [73-89] 89  Resp:  [18-20] 18  BP: (127-154)/(78-89) 127/79  SpO2:  [90 %-97 %] 91 %    Physical Exam  Constitutional:       General: She is not in acute distress.     Appearance: Normal appearance. She is not ill-appearing, toxic-appearing or diaphoretic.   HENT:      Right Ear: Tympanic membrane normal.      Left Ear: Tympanic membrane normal.      Nose: Nose normal. No congestion or rhinorrhea.      Mouth/Throat:      Pharynx: No oropharyngeal exudate or posterior oropharyngeal erythema.   Eyes:      General: No scleral icterus.        Right eye: No discharge.         Left eye: No discharge.      Extraocular Movements: Extraocular movements intact.      Pupils: Pupils are equal, round, and reactive to light.   Cardiovascular:      Rate and Rhythm: Normal rate. Rhythm irregular.      Pulses: Normal pulses.      Heart sounds: No murmur heard.    No gallop.   Pulmonary:      Effort: Pulmonary effort is normal. No respiratory distress.      Breath sounds: Rales (Improving) present. No wheezing.   Abdominal:      General: Abdomen is flat. There is no distension.      Palpations: Abdomen is soft. There is no mass.      Tenderness: There is no abdominal tenderness. There is no guarding or rebound.      Hernia: No hernia is present.   Genitourinary:     General: Normal vulva.   Musculoskeletal:         General: No swelling or tenderness.      Cervical back: Normal range of motion. No rigidity or tenderness.      Right lower leg: No edema.      Left lower leg: No edema.   Skin:     General: Skin is warm and dry.   Neurological:      Mental Status: She is alert. Mental status is at baseline.           Labs:   Recent Labs     04/07/22  0316 04/08/22  0058 04/09/22  0150   WBC 9.3 11.9*  11.9*   RBC 4.87 4.52 4.72   HEMOGLOBIN 15.1 13.9 14.5   HEMATOCRIT 46.4 42.8 44.3   MCV 95.3 94.7 93.9   MCH 31.0 30.8 30.7   RDW 49.2 48.8 47.8   PLATELETCT 314 317 275   MPV 10.7 11.0 10.7   NEUTSPOLYS 57.60 72.10* 65.90   LYMPHOCYTES 28.80 15.70* 19.10*   MONOCYTES 10.00 9.90 12.80   EOSINOPHILS 2.70 1.10 1.40   BASOPHILS 0.50 0.50 0.30     Recent Labs     04/07/22  0316 04/08/22  0058 04/09/22  0150   SODIUM 139 137 135   POTASSIUM 3.8 3.9 3.8   CHLORIDE 102 101 101   CO2 27 24 23   GLUCOSE 103* 113* 105*   BUN 27* 25* 18     Recent Labs     04/07/22  0316 04/08/22  0058 04/09/22  0150   ALBUMIN 4.3 4.1  --    TBILIRUBIN 0.6 0.6  --    ALKPHOSPHAT 53 54  --    TOTPROTEIN 6.8 6.7  --    ALTSGPT 12 9  --    ASTSGOT 17 18  --    CREATININE 1.11 1.18 0.86       Imaging:   DX-CHEST-LIMITED (1 VIEW)   Final Result      1.  No acute cardiopulmonary abnormality identified.      2.  Enlarged cardiac silhouette      EC-ECHOCARDIOGRAM COMPLETE W/O CONT   Final Result      CT-HEAD W/O   Final Result         1.  No acute intracranial process.      2. Diffuse atrophy and periventricular white matter changes consistent with chronic small vessel disease.      DX-CHEST-PORTABLE (1 VIEW)   Final Result      Interstitial edema. No focal consolidation or pleural effusions.      CL-LEFT HEART CATHETERIZATION WITH POSSIBLE INTERVENTION    (Results Pending)       Assessment and Plan:  * Ischemic cardiomyopathy  Assessment & Plan  Presented with acute Decompensated Heart Failure: HFpEF ejection fraction of 35%  NTproBNP on admission: 5372,   Chest X ray on admission: pulmonary edema b/l.   EKG on admission: Wandering pacemaker, left ventricular hypertrophy, left axis deviation, left bundle branch block.  Underwent invasive ischemic work-up (left heart catheterization) on 4/7/2022 showing severe LAD disease and occluded OM 3, status post PCI with 2 DENIS stenting the distal LAD.  Started on dual antiplatelet therapy, aspirin and  Plavix.  She is currently near euvolemia  On Lasix 10 mg daily  BMP daily    Debility  Assessment & Plan  Physical deconditioning associated with possible cognitive impairment. PT and OT recommending outpatient cardiac rehab.  Spoke with family, will consider group home vs. Memory care.       Premature atrial complex  Assessment & Plan  Multiple PACs versus atrial fibrillation  Cardiology is holding off anticoagulation till complete assessment    B12 deficiency- (present on admission)  Assessment & Plan  Noted to have B12 deficiency as part of her dementia work-up.  On B12 supplementation    Memory loss- (present on admission)  Assessment & Plan  Family members note worsening memory loss in the patient.   Patient has had increased confusion over the past two days. Concern for acute delirium.   Work-up: Noted to have low B12, normal TSH and folate.  RPR: non-reactive, HIV: non-reactive. Consider B12 deficiency vs. Vascular dementia.   MOCA: 5  -Orthostatics  -Ammonia: pending  -UA  -Chest Xray  -Geriatrics following  -Repleting B12  -Starting thiamine  -Geriatrics on board, appreciate recommendation          At high risk for injury related to fall- (present on admission)  Assessment & Plan  Patient has had > 2 falls in the past 1 year.  Her family is concerned that her dementia is worsening.   Head CT: diffuse atrophy and periventricular white matter changes consistent with chronic small vessel disease which may be a cause of dementia.   -Fall precautions      Hypertensive emergency- (present on admission)  Assessment & Plan  Improved  Longstanding history of uncontrolled hypertension (not on any antihypertensives) presents with /81 with SOB X 3 days, pulmonary edema on CXR. Concern for flash pulmonary edema. Improved blood pressures on medications.     -Start heart failure regimen: ACE inhibitor, Aldactone, Lasix 10 mg, daily  -Hydralazine as needed for blood pressure control      DVT: 40 mg IV enoxaparin

## 2022-04-09 NOTE — PROGRESS NOTES
Telesitter initiated for safety r/t impulsivity and forgetfulness. Report given to Katy in Telesitting.

## 2022-04-09 NOTE — CARE PLAN
The patient is Stable - Low risk of patient condition declining or worsening    Shift Goals  Clinical Goals: Rest  Patient Goals: Rest  Family Goals: priya    Progress made toward(s) clinical / shift goals:  D/C planning in progress      Problem: Discharge Barriers/Planning  Goal: Patient's continuum of care needs are met  Outcome: Progressing     Problem: Mobility  Goal: Patient's capacity to carry out activities will improve  Outcome: Progressing       Patient is not progressing towards the following goals:

## 2022-04-09 NOTE — PROGRESS NOTES
"Geriatric Progress Note:  4/7/2022  Chief Complaint   Patient presents with   • Shortness of Breath     X 3 days     88 y/o F admitted with chest pain found to have new onset acute CHF. Planning for cardiac catheterization. Geriatrics consulted for memory changes. She notes she did not want to go into the chair this morning. Only recalls being in the chair. She is cold this morning, denies feeling feverish.     S: continued confusion. Is ambulating, no pain.   Having stuffy nose, no CP, no pleurisy   ROS: a 14 pt ROS was negative other than as stated above.   Abdominal pain resolved   Some dizziness with position changes.   RN notes she is more confused as well.     O:/78   Pulse 89   Temp 36.6 °C (97.8 °F) (Temporal)   Resp 18   Ht 1.626 m (5' 4\")   Wt 63.1 kg (139 lb 1.8 oz)   SpO2 91%   BMI 23.88 kg/m²   Gen: NAD, alert, pleasant, sitting in chair  CV: RRR, 2+ radial pulses bilateally, no LE edema  Lungs: LLL crackles   Abd: s/nt/nd  Back: no CVA tenderness  Ext: arthritic changes noted to hands bilaterally  Delirium Screen: postive; oriented to person, not place, situation, date inattention seen on normal questioning     Labs/Imaging/EKG: stable CBC and BMP    Assessment: 87-year-old female admitted with acute onset shortness of breath found to be in acute failure with plans for cardiac catheterization tomorrow.  She also appears to have a dementia syndrome     Plan:     Delirium  Screened positive for delirium again morning  PE and history point to URI pathology; however is admitted for heart concerns. Telemetry unremarkable. Had increase in BP meds . VSS; Had BM yesterday; patient with no appetite. Patient RN reported has taken days to improve from anesthesia in the past.     Recommendations:   Recommend upright portable chest xray  -orthostatic VS to assess for symptomatic hypotension.   -consider repeat CBC tomorrow AM  Continue mobility.   -recommend further workup based on above findings " and/or worsening of mental status     Dementia Syndrome  Low B12  Concern for depression  -patient has help at home with shipping, meal prep, transportation but is independent n other ADL and IADL, per her report; the need for helps appears related to cognitive, rather than physical limitations.   - is currently hospitalized  -lab work up otherwise negative  -no concerning symptoms to suggest another type of dementia syndrome.     Recommendations  -f/u mood as outpatient, consider treatment with counseling/meds as needed  -continue b12 treatment,   -continue to engage family in d/c planning    Acute sCHF  CAD  CHF likely ischemic in nature based on cath results  S/p LAD stents (2 overlapping)  -watch for orthostatic hypotension with heart medications. - carvedilol, losartan, spirolactone, furosemide    Urinary incontinence  -Consider timed voiding, every 3-4 hours, (per patient preference) help avoid number of incontinent episodes     Weight loss  -Patient has had about a 10 pound weight loss over the last year or so.  -We will need to follow-up with family to ensure what her access to food looks like  -consider nutritional supplements.       Thank you for including us in the care of this patient. We will follow along tomorrow.     Mika Villasenor M.D.  Geriatric Physician   Monday - Sunday 8-5      This note was partially complete completed using voice recognition software.  I did my best to correct errors prior to submitting this note, but for any concerns please do not hesitate to contact me.

## 2022-04-09 NOTE — PROGRESS NOTES
Report received from Hola Gardner. Assumed pt care. Pt is resting in bed on RA, no complaints of pain. Pt A&O to self. Fall precautions in place, call light and belongings within reach, bed in lowest position. No signs of distress.

## 2022-04-10 ENCOUNTER — PHARMACY VISIT (OUTPATIENT)
Dept: PHARMACY | Facility: MEDICAL CENTER | Age: 87
End: 2022-04-10
Payer: MEDICARE

## 2022-04-10 VITALS
BODY MASS INDEX: 23.75 KG/M2 | SYSTOLIC BLOOD PRESSURE: 148 MMHG | OXYGEN SATURATION: 92 % | DIASTOLIC BLOOD PRESSURE: 95 MMHG | RESPIRATION RATE: 17 BRPM | HEART RATE: 83 BPM | TEMPERATURE: 99.1 F | WEIGHT: 139.11 LBS | HEIGHT: 64 IN

## 2022-04-10 PROBLEM — Z91.81 AT HIGH RISK FOR INJURY RELATED TO FALL: Status: RESOLVED | Noted: 2019-11-26 | Resolved: 2022-04-10

## 2022-04-10 LAB
ALBUMIN SERPL BCP-MCNC: 4.5 G/DL (ref 3.2–4.9)
ALBUMIN/GLOB SERPL: 1.6 G/DL
ALP SERPL-CCNC: 58 U/L (ref 30–99)
ALT SERPL-CCNC: 7 U/L (ref 2–50)
ANION GAP SERPL CALC-SCNC: 16 MMOL/L (ref 7–16)
AST SERPL-CCNC: 14 U/L (ref 12–45)
BASOPHILS # BLD AUTO: 0.4 % (ref 0–1.8)
BASOPHILS # BLD: 0.05 K/UL (ref 0–0.12)
BILIRUB SERPL-MCNC: 1.3 MG/DL (ref 0.1–1.5)
BUN SERPL-MCNC: 15 MG/DL (ref 8–22)
CALCIUM SERPL-MCNC: 10 MG/DL (ref 8.5–10.5)
CHLORIDE SERPL-SCNC: 102 MMOL/L (ref 96–112)
CO2 SERPL-SCNC: 20 MMOL/L (ref 20–33)
CREAT SERPL-MCNC: 0.88 MG/DL (ref 0.5–1.4)
EOSINOPHIL # BLD AUTO: 0.12 K/UL (ref 0–0.51)
EOSINOPHIL NFR BLD: 1 % (ref 0–6.9)
ERYTHROCYTE [DISTWIDTH] IN BLOOD BY AUTOMATED COUNT: 48.2 FL (ref 35.9–50)
GFR SERPLBLD CREATININE-BSD FMLA CKD-EPI: 63 ML/MIN/1.73 M 2
GLOBULIN SER CALC-MCNC: 2.8 G/DL (ref 1.9–3.5)
GLUCOSE SERPL-MCNC: 107 MG/DL (ref 65–99)
HCT VFR BLD AUTO: 45.1 % (ref 37–47)
HGB BLD-MCNC: 14.8 G/DL (ref 12–16)
IMM GRANULOCYTES # BLD AUTO: 0.09 K/UL (ref 0–0.11)
IMM GRANULOCYTES NFR BLD AUTO: 0.7 % (ref 0–0.9)
LYMPHOCYTES # BLD AUTO: 1.8 K/UL (ref 1–4.8)
LYMPHOCYTES NFR BLD: 14.4 % (ref 22–41)
MCH RBC QN AUTO: 30.8 PG (ref 27–33)
MCHC RBC AUTO-ENTMCNC: 32.8 G/DL (ref 33.6–35)
MCV RBC AUTO: 93.8 FL (ref 81.4–97.8)
MONOCYTES # BLD AUTO: 1.84 K/UL (ref 0–0.85)
MONOCYTES NFR BLD AUTO: 14.7 % (ref 0–13.4)
NEUTROPHILS # BLD AUTO: 8.63 K/UL (ref 2–7.15)
NEUTROPHILS NFR BLD: 68.8 % (ref 44–72)
NRBC # BLD AUTO: 0 K/UL
NRBC BLD-RTO: 0 /100 WBC
PLATELET # BLD AUTO: 296 K/UL (ref 164–446)
PMV BLD AUTO: 11.2 FL (ref 9–12.9)
POTASSIUM SERPL-SCNC: 3.8 MMOL/L (ref 3.6–5.5)
PROT SERPL-MCNC: 7.3 G/DL (ref 6–8.2)
RBC # BLD AUTO: 4.81 M/UL (ref 4.2–5.4)
SODIUM SERPL-SCNC: 138 MMOL/L (ref 135–145)
WBC # BLD AUTO: 12.5 K/UL (ref 4.8–10.8)

## 2022-04-10 PROCEDURE — 700102 HCHG RX REV CODE 250 W/ 637 OVERRIDE(OP): Performed by: INTERNAL MEDICINE

## 2022-04-10 PROCEDURE — A9270 NON-COVERED ITEM OR SERVICE: HCPCS | Performed by: STUDENT IN AN ORGANIZED HEALTH CARE EDUCATION/TRAINING PROGRAM

## 2022-04-10 PROCEDURE — 80053 COMPREHEN METABOLIC PANEL: CPT

## 2022-04-10 PROCEDURE — 99239 HOSP IP/OBS DSCHRG MGMT >30: CPT | Mod: GC | Performed by: HOSPITALIST

## 2022-04-10 PROCEDURE — 36415 COLL VENOUS BLD VENIPUNCTURE: CPT

## 2022-04-10 PROCEDURE — A9270 NON-COVERED ITEM OR SERVICE: HCPCS | Performed by: INTERNAL MEDICINE

## 2022-04-10 PROCEDURE — 99232 SBSQ HOSP IP/OBS MODERATE 35: CPT | Performed by: INTERNAL MEDICINE

## 2022-04-10 PROCEDURE — 700102 HCHG RX REV CODE 250 W/ 637 OVERRIDE(OP): Performed by: STUDENT IN AN ORGANIZED HEALTH CARE EDUCATION/TRAINING PROGRAM

## 2022-04-10 PROCEDURE — RXMED WILLOW AMBULATORY MEDICATION CHARGE

## 2022-04-10 PROCEDURE — 85025 COMPLETE CBC W/AUTO DIFF WBC: CPT

## 2022-04-10 RX ORDER — SPIRONOLACTONE 25 MG/1
25 TABLET ORAL DAILY
Qty: 30 TABLET | Refills: 3 | Status: SHIPPED | OUTPATIENT
Start: 2022-04-11 | End: 2022-04-22 | Stop reason: SDUPTHER

## 2022-04-10 RX ORDER — ASPIRIN 81 MG/1
81 TABLET ORAL DAILY
Qty: 30 TABLET | Refills: 0 | Status: SHIPPED | OUTPATIENT
Start: 2022-04-11 | End: 2022-04-22 | Stop reason: SDUPTHER

## 2022-04-10 RX ORDER — OMEPRAZOLE 20 MG/1
20 CAPSULE, DELAYED RELEASE ORAL DAILY
Qty: 30 CAPSULE | Refills: 0 | Status: SHIPPED | OUTPATIENT
Start: 2022-04-11 | End: 2022-04-22

## 2022-04-10 RX ORDER — FLUTICASONE PROPIONATE 50 MCG
2 SPRAY, SUSPENSION (ML) NASAL DAILY
Qty: 16 G | Refills: 0 | Status: SHIPPED | OUTPATIENT
Start: 2022-04-11 | End: 2022-04-22

## 2022-04-10 RX ORDER — CETIRIZINE HYDROCHLORIDE 5 MG/1
5 TABLET ORAL DAILY
Qty: 30 TABLET | Refills: 0 | Status: SHIPPED | OUTPATIENT
Start: 2022-04-11 | End: 2022-04-22

## 2022-04-10 RX ORDER — CLOPIDOGREL BISULFATE 75 MG/1
75 TABLET ORAL DAILY
Qty: 30 TABLET | Refills: 0 | Status: SHIPPED | OUTPATIENT
Start: 2022-04-11 | End: 2022-04-22 | Stop reason: SDUPTHER

## 2022-04-10 RX ORDER — FUROSEMIDE 20 MG/1
10 TABLET ORAL DAILY
Qty: 60 TABLET | Refills: 0 | Status: SHIPPED | OUTPATIENT
Start: 2022-04-11 | End: 2022-04-22 | Stop reason: SDUPTHER

## 2022-04-10 RX ORDER — LOSARTAN POTASSIUM 50 MG/1
50 TABLET ORAL EVERY EVENING
Qty: 30 TABLET | Refills: 0 | Status: SHIPPED | OUTPATIENT
Start: 2022-04-10 | End: 2022-04-22 | Stop reason: SDUPTHER

## 2022-04-10 RX ORDER — CARVEDILOL 3.12 MG/1
3.12 TABLET ORAL 2 TIMES DAILY WITH MEALS
Qty: 60 TABLET | Refills: 0 | Status: SHIPPED | OUTPATIENT
Start: 2022-04-10 | End: 2022-04-22 | Stop reason: SDUPTHER

## 2022-04-10 RX ADMIN — FUROSEMIDE 10 MG: 20 TABLET ORAL at 05:25

## 2022-04-10 RX ADMIN — CLOPIDOGREL BISULFATE 75 MG: 75 TABLET ORAL at 05:25

## 2022-04-10 RX ADMIN — CETIRIZINE HYDROCHLORIDE 5 MG: 10 TABLET, FILM COATED ORAL at 05:25

## 2022-04-10 RX ADMIN — CARVEDILOL 3.12 MG: 3.12 TABLET, FILM COATED ORAL at 09:08

## 2022-04-10 RX ADMIN — ASPIRIN 81 MG: 81 TABLET, COATED ORAL at 05:25

## 2022-04-10 RX ADMIN — SENNOSIDES AND DOCUSATE SODIUM 2 TABLET: 50; 8.6 TABLET ORAL at 05:25

## 2022-04-10 RX ADMIN — OMEPRAZOLE 20 MG: 20 CAPSULE, DELAYED RELEASE ORAL at 05:29

## 2022-04-10 RX ADMIN — SPIRONOLACTONE 25 MG: 25 TABLET, FILM COATED ORAL at 05:26

## 2022-04-10 NOTE — PROGRESS NOTES
"1715 This RN received phone call from a \"Efren Casas\" requesting an update on pt. Unable to find him listed in names of emergency contacts. This RN asked pt. the name of her son. Pt. States \"Efren\". This RN asked pt. If able to discuss her Medical conditions with him. Pt. States \"NO, I will call him\". This RN assisted pt. in called \"Efren\" [phone].   "

## 2022-04-10 NOTE — PROGRESS NOTES
Patient will be discharged today. Please refer to Discharge Summary for physical exam and review of systems.

## 2022-04-10 NOTE — DISCHARGE PLANNING
[facility] TCN chart review completed. Collaborated with DOLLY Crandall- d/c orders present; pt discharging home with family and accepted by [facility] . Patient seen at bedside with family (son Efren Shen and DIL Mary Shen). Son states the pt will be discharging to her own residence. They are working with [facility] to apply for additional caregiver assistance, but will continue to provide 24/7 support for the pt until extra resources are obtained. Son/DIL given the [facility] contact information and reviewed [facility] transitional care as an additional resource. Questions answered to their satisfaction. No additional TCN needs anticipated this admission. Thank you.    Previously completed:  - Accepted with [facility]    - [facility] introduced (Y), referral (sent).

## 2022-04-10 NOTE — DISCHARGE PLANNING
Ongoing: Pt's son signed 2nd IMM letter. Pt's son reports his wife will move in with the pt until pt is feeling better and asked for HHC. Per chart review, [facility] has accepted the pt. Deepali from [facility] provided pt's son home number for [facility].

## 2022-04-10 NOTE — DISCHARGE SUMMARY
Discharge Summary    Date of Admission: 4/4/2022  Date of Discharge: 04/10/2022.  Discharging Attending: ELISE Huang M.D.   Discharging Senior Resident: Dr. Tomas  Discharging Intern: Dr. Ramsey    CHIEF COMPLAINT ON ADMISSION  Chief Complaint   Patient presents with   • Shortness of Breath     X 3 days       Reason for Admission  New onset HFrEF  Shortness of breath      Admission Date  4/4/2022    CODE STATUS  Full Code    HPI & HOSPITAL COURSE  87-year-old female patient with a past medical history of untreated hypertension, left wrist fracture, closed burst L1 fracture 11/21 status post kyphoplasty who presented 4/4/2022 shortness of breath for 3 days prior to admission associated with significant orthopnea and paroxysmal nocturnal dyspnea.  Upon presentation to the hospital, the patient was hypertensive at 191/91.  Initial blood work-up was remarkable for NT proBNP of 5372 and troponin at 15.  EKG did not show wandering pacemaker.  Chest x-ray showed significant interstitial edema, she received Lasix 40 mg IV 1 dose.  The patient was then admitted to the telemetry floor for further management of new onset heart failure, she was continued on IV diuresis.  She was also started on blood pressure medications.  Echocardiogram was obtained and the cardiology was consulted.  Echocardiogram came back with estimated ejection fraction to be 35% with global hypokinesia with regional variation, moderate aortic insufficiency and RVSP of 45 mmHg which is a new finding compared to the echocardiogram was done in 2020 which showed ejection fraction of 60%.  She underwent left heart catheterization on 4/7/2022 which showed severe LAD disease and occlusion of OM 3 status post 2 drug-eluting stents placement in the LAD, she tolerated the procedure very well without any immediate complications.  She was started on dual antiplatelet therapy (aspirin and Plavix).  Due to the concern of dementia was observed by the medical  team and the patient's family report, basic work-up was obtained and noted to have a low vitamin B12 at 209, normal TSH and normal folate, nonreactive syphilis testing and nonreactive HIV.  MoCA has been also done by the medical team and she scored only 5 out of 30.  Geriatric has been consulted due to worsening mental status that was thought secondary to hospital-acquired delirium on the top of dementia.  Vitamin B12 has been repleted with vitamin B12 injections daily for 3 days.  The patient has been evaluated by PT/OT and recommended outpatient cardiac rehab and discharged home with home health.  The family reported that she will be having 24/7 support at home by several family members.  She was started on losartan and spironolactone given the new diagnosis of ischemic cardiomyopathy.  The patient will be started on dual antiplatelet therapy (aspirin and Plavix) metoprolol succinate, losartan, and spironolactone.  She has been discharged on on prophylactic omeprazole in the setting of new dual antiplatelet therapy.  She also been discharged on Lasix 10 mg tablet to maintain the euvolemia.  The patient needs to follow-up with her PCP and cardiology in the next 1 to 2 weeks for clinical assessment of volume status and for possible medication adjustments.  Basic metabolic profile was also ordered to be done prior to the appointments.  Of note, EKG showed frequent atrial ectopy, there was a concern of A. fib on EKG on the telemetry however it is hard to interpret due to P wave amplitude, no anticoagulation was started, awaiting further outpatient assessment.    Therefore, she is discharged in fair and stable condition to home with close outpatient follow-up.    The patient met 2-midnight criteria for an inpatient stay at the time of discharge.    PHYSICAL EXAM ON DISCHARGE  Temp:  [36.2 °C (97.1 °F)-37.6 °C (99.7 °F)] 37.3 °C (99.1 °F)  Pulse:  [83-90] 83  Resp:  [16-18] 17  BP: (117-149)/(78-95) 148/95  SpO2:  [91  %-97 %] 92 %    Physical Exam  Constitutional:       Comments: Oriented X 1-2   HENT:      Head: Normocephalic and atraumatic.      Nose: Nose normal.   Eyes:      Pupils: Pupils are equal, round, and reactive to light.   Cardiovascular:      Rate and Rhythm: Normal rate.      Pulses: Normal pulses.      Heart sounds: Normal heart sounds. No murmur heard.    No friction rub. No gallop.   Pulmonary:      Effort: Pulmonary effort is normal. No respiratory distress.      Breath sounds: No stridor. No wheezing or rhonchi.   Abdominal:      General: Abdomen is flat. There is no distension.      Palpations: There is no mass.      Tenderness: There is no abdominal tenderness.      Hernia: No hernia is present.   Skin:     Coloration: Skin is not jaundiced or pale.   Neurological:      General: No focal deficit present.      Mental Status: She is alert. Mental status is at baseline.      Cranial Nerves: No cranial nerve deficit.      Sensory: No sensory deficit.      Motor: No weakness.      Coordination: Coordination normal.   Psychiatric:      Comments: intermittently delirious         Discharge Date  04/10/2022    FOLLOW UP ITEMS POST DISCHARGE  PCP follow-up  Cardiology follow up    DISCHARGE DIAGNOSES  Principal Problem:    Ischemic cardiomyopathy POA: Unknown  Active Problems:    Memory loss POA: Yes    B12 deficiency POA: Yes    Premature atrial complex POA: Unknown    Debility POA: Unknown  Resolved Problems:    Hypertensive emergency POA: Yes    At high risk for injury related to fall POA: Yes    CHF (congestive heart failure), NYHA class III, acute, systolic (HCC) POA: Yes    Paroxysmal atrial fibrillation (HCC) POA: Yes      FOLLOW UP  Future Appointments   Date Time Provider Department Center   4/12/2022 10:30 AM [address]   4/18/2022  1:30 PM RUTHIE [facility] None     Duc Samuel P.A.-C.  [address]  [facility] [address]  [place] [address]  [phone]    Schedule an  appointment as soon as possible for a visit in 1 week  As needed, for hospital stay follow up appointment and medication management. Thank you.     [facility]  [address]  [place] [address]  [phone]        Duc Samuel P.A.-C.  [address]  [place] [id number] [phone]            MEDICATIONS ON DISCHARGE     Medication List      START taking these medications      Instructions   aspirin 81 MG EC tablet  Start taking on: April 11, 2022   Take 1 Tablet by mouth every day.  Dose: 81 mg     carvedilol 3.125 MG Tabs  Commonly known as: COREG   Take 1 Tablet by mouth 2 times a day with meals.  Dose: 3.125 mg     cetirizine 5 MG tablet  Start taking on: April 11, 2022  Commonly known as: ZYRTEC   Take 1 Tablet by mouth every day.  Dose: 5 mg     clopidogrel 75 MG Tabs  Start taking on: April 11, 2022  Commonly known as: PLAVIX   Take 1 Tablet by mouth every day.  Dose: 75 mg     fluticasone 50 MCG/ACT nasal spray  Start taking on: April 11, 2022  Commonly known as: FLONASE   Administer 2 Sprays into affected nostril(S) every day.  Dose: 2 Spray     furosemide 20 MG Tabs  Start taking on: April 11, 2022  Commonly known as: LASIX   Take 0.5 Tablets by mouth every day.  Dose: 10 mg     losartan 50 MG Tabs  Commonly known as: COZAAR   Take 1 Tablet by mouth every evening.  Dose: 50 mg     omeprazole 20 MG delayed-release capsule  Start taking on: April 11, 2022  Commonly known as: PRILOSEC   Take 1 Capsule by mouth every day.  Dose: 20 mg     spironolactone 25 MG Tabs  Start taking on: April 11, 2022  Commonly known as: ALDACTONE   Take 1 Tablet by mouth every day.  Dose: 25 mg        CONTINUE taking these medications      Instructions   escitalopram 20 MG tablet  Commonly known as: LEXAPRO   Take 1 Tablet by mouth every day.  Dose: 20 mg     multivitamin Tabs   Take 1 Tablet by mouth every day.  Dose: 1 Tablet     VITAMIN D-3 PO   Take 1 Capsule by mouth every day.  Dose: 1  "Capsule     VITAMIN E PO   Take 1 Capsule by mouth every day.  Dose: 1 Capsule            Allergies  Allergies   Allergen Reactions   • Ampicillin Rash     Feels \"rotten\"        DIET  Orders Placed This Encounter   Procedures   • Diet Order Diet: Cardiac     Standing Status:   Standing     Number of Occurrences:   1     Order Specific Question:   Diet:     Answer:   Cardiac [6]       ACTIVITY  As tolerated.  Weight bearing as tolerated    CONSULTATIONS  Cardiology  Geriatrics      PROCEDURES  Left heart catheterization 4/7/2022      Total time of the discharge process is 40 minutes.  "

## 2022-04-10 NOTE — PROGRESS NOTES
Discharge instructions given to patient. Prescriptions given to patient. Discharge instructions given to patient at bedside, verbalizes understanding and states plans for follow-up with PCP and cardiology. New and home medication review, post-discharge activity level and worsening of symptoms needing follow-up care discussed. Telemetry monitor/IV cathlon removed. All belongings accounted for, all questions answered at this time. Patient escorted out in wheelchair to sons vehicle.

## 2022-04-10 NOTE — PROGRESS NOTES
"Geriatric Progress Note:  4/7/2022  Chief Complaint   Patient presents with   • Shortness of Breath     X 3 days     86 y/o F admitted with chest pain found to have new onset acute CHF. Planning for cardiac catheterization. Geriatrics consulted for memory changes. She notes she did not want to go into the chair this morning. Only recalls being in the chair. She is cold this morning, denies feeling feverish.     S:  UA, Chest xray yesterday     patient with family members present. Patient and family is hoping to go home.   We discussed patients acute on chronic confusion. They feel going home would be beneficial for her. They can offer the support she needs. They noted her confusion is worse than baseline. Discussed precautions below.     Patient was more confused, but more flustered than previous days. Had hearing aids in. Continued confusion. Is ambulating, no pain.   Stuffy nose improved; started on allergy treatment with newer generation H1 blocker and intranasal steroid.     ROS: a 14 pt ROS was negative other than as stated above, bu tlimited due to patient's confusion.        O:/95   Pulse 83   Temp 37.3 °C (99.1 °F) (Temporal)   Resp 17   Ht 1.626 m (5' 4\")   Wt 63.1 kg (139 lb 1.8 oz)   SpO2 92%   BMI 23.88 kg/m²   Gen: NAD, alert, pleasant, sitting in chair  CV: RRR, 2+ radial pulses bilateally, no LE edema  Lungs: CAB  Ext: arthritic changes noted to hands bilaterally  Delirium Screen: postive; unable to say full name and otherwise also disoriented to location and situation.     Labs/Imaging/EKG: mild bump in WBC,CMP unremarkable, UA with blood. Chest xray with decreased pulm infilatrates, but still some congestion by my read    Assessment: 87-year-old female admitted with acute onset shortness of breath found to be in acute failure with plans for cardiac catheterization tomorrow.  She also appears to have a dementia syndrome     Plan:     Delirium  Screened positive for delirium again " morning; assessment this AM limited by family's presence, but patient is overall stable;     Work up for infection negative with UA, CBC, CMP, chest xray  Telemetry unremarkable, no clear role for repeat EKG  Runny nose appeared to be allergies given rapid improvement with intranasal fluticasone  -Did have positive orthostatic VS with normal BP.   Family is hopeful for d/c home today; they note she often has prolong confusion after procedures and have seen her like this before. I discussed how her cogntiion has changed since catheterization and they agree  -no BM yesterday  -we have not found a clearly reversible factor for this patient's delirium, but have tried to minimize potentating factors.     Recommendations  -family and patient want d/c; medically stable, but delirium persists; delirium can be present for up to one month post d/c in 1/3 of patients.   -discussed some people with delirium do not go back to their normal baseline  -discussed risks of continued delirium and possible causes with with family: Specifically we discussed worsening confusion and informed them of the + orthostatic BP and no BM in last day were things to keep an eye on and to have close f/u if these symptoms worsen or do not continue to improve  -overall patient appears to have great support at home and getting back to her familiar environment will be beneficial and we support d/c with Return for evaluation precautions given as above.     Dementia Syndrome  Low B12  Concern for depression  -patient has help at home with shipping, meal prep, transportation but is independent n other ADL and IADL, per her report; the need for helps appears related to cognitive, rather than physical limitations.   - is currently hospitalized  -lab work up otherwise negative  -no concerning symptoms to suggest another type of dementia syndrome.     Recommendations  -f/u mood as outpatient, consider treatment with counseling/meds as needed  -continue  b12 treatment,   -continue to engage family in d/c planning    Acute sCHF  CAD  CHF likely ischemic in nature based on cath results  S/p LAD stents (2 overlapping)  -watch for orthostatic hypotension with heart medications. - carvedilol, losartan, spirolactone, furosemide  -family is looking for guidance with cardiology and medf/u; shared with primary team.     Urinary incontinence  -Consider timed voiding, every 3-4 hours, (per patient preference) help avoid number of incontinent episodes  -UA with blood, recommend repeat as outpatient     Weight loss  -Patient has had about a 10 pound weight loss over the last year or so.  -We will need to follow-up with family to ensure what her access to food looks like  -consider nutritional supplements.       Thank you for including us in the care of this patient.     Mika Villasenor M.D.  Geriatric Physician   Monday - Sunday 8-5      This note was partially complete completed using voice recognition software.  I did my best to correct errors prior to submitting this note, but for any concerns please do not hesitate to contact me.

## 2022-04-10 NOTE — CARE PLAN
The patient is Stable - Low risk of patient condition declining or worsening    Shift Goals  Clinical Goals: Comfort, reorient patient  Patient Goals: Go home  Family Goals: Not present    Progress made toward(s) clinical / shift goals: Patient slept comfortably throughout the night.    Patient is not progressing towards the following goals: Patient reoriented to place and situation multiple times throughout the night.

## 2022-04-11 NOTE — DISCHARGE PLANNING
ELIAS called aging and disability adult protective services on 4/11/2022 at 11:25 am to file a report. Left detailed message, provided call back number [phone]. Awaiting a call back.

## 2022-04-12 ENCOUNTER — HOME CARE VISIT (OUTPATIENT)
Dept: HOME HEALTH SERVICES | Facility: HOME HEALTHCARE | Age: 87
End: 2022-04-12
Payer: MEDICARE

## 2022-04-12 ENCOUNTER — DOCUMENTATION (OUTPATIENT)
Dept: VASCULAR LAB | Facility: MEDICAL CENTER | Age: 87
End: 2022-04-12

## 2022-04-12 ENCOUNTER — TELEPHONE (OUTPATIENT)
Dept: INTERNAL MEDICINE | Facility: OTHER | Age: 87
End: 2022-04-12

## 2022-04-12 VITALS
WEIGHT: 137 LBS | DIASTOLIC BLOOD PRESSURE: 72 MMHG | BODY MASS INDEX: 23.39 KG/M2 | TEMPERATURE: 99.6 F | RESPIRATION RATE: 18 BRPM | HEIGHT: 64 IN | SYSTOLIC BLOOD PRESSURE: 118 MMHG | OXYGEN SATURATION: 95 % | HEART RATE: 70 BPM

## 2022-04-12 PROCEDURE — G0151 HHCP-SERV OF PT,EA 15 MIN: HCPCS

## 2022-04-12 PROCEDURE — 665001 SOC-HOME HEALTH

## 2022-04-12 SDOH — ECONOMIC STABILITY: HOUSING INSECURITY: HOME SAFETY: PATIENT HAS ONE GRAB BAR TO USE NEAR TOILET; WOULD BENEFIT FROM ANOTHER GRAB BAR IN SHOWER

## 2022-04-12 SDOH — ECONOMIC STABILITY: FOOD INSECURITY: MEALS PER DAY: 3

## 2022-04-12 ASSESSMENT — PATIENT HEALTH QUESTIONNAIRE - PHQ9
5. POOR APPETITE OR OVEREATING: 0 - NOT AT ALL
1. LITTLE INTEREST OR PLEASURE IN DOING THINGS: 00
SUM OF ALL RESPONSES TO PHQ QUESTIONS 1-9: 5
2. FEELING DOWN, DEPRESSED, IRRITABLE, OR HOPELESS: 01
CLINICAL INTERPRETATION OF PHQ2 SCORE: 1

## 2022-04-12 ASSESSMENT — FIBROSIS 4 INDEX: FIB4 SCORE: 1.56

## 2022-04-12 ASSESSMENT — ENCOUNTER SYMPTOMS
PERSON REPORTING PAIN: PATIENT
STOOL FREQUENCY: LESS THAN DAILY
BOWEL PATTERN NORMAL: 1
PAIN SEVERITY GOAL: 0/10
PAIN: 1
LOWEST PAIN SEVERITY IN PAST 24 HOURS: 0/10
HIGHEST PAIN SEVERITY IN PAST 24 HOURS: 0/10
CHANGE IN APPETITE: UNCHANGED
DEPRESSED MOOD: 1
NAUSEA: DENIES
SHORTNESS OF BREATH: T
VOMITING: DENIES
LAST BOWEL MOVEMENT: 66210
APPETITE LEVEL: GOOD
HYPERTENSION: 1

## 2022-04-12 ASSESSMENT — ACTIVITIES OF DAILY LIVING (ADL)
PHYSICAL TRANSFERS ASSESSED: 1
TRANSPORTATION COMMENTS: PT REQUIRES ASSIST TO LEAVE HOME; FALL RISK
AMBULATION ASSISTANCE: 1
AMBULATION ASSISTANCE: STAND BY ASSIST
AMBULATION_DISTANCE/DURATION_TOLERATED: 100 FT
AMBULATION ASSISTANCE ON FLAT SURFACES: 1
CURRENT_FUNCTION: STAND BY ASSIST

## 2022-04-12 NOTE — Clinical Note
Primary dx/Skilled need: New diagnosis: cardiomyopathy, heart failure, A-fib; left heart cath on 4/7/2022  PT: 2w4  Zip code: [address]  Disciplines ordered: Physical and occupational therapy; adding skilled nursing and speech therapy  Insurance & authorization: [facility]  Certification period: 4/12/2022 - 6/10/2022  Special considerations: none

## 2022-04-12 NOTE — PROGRESS NOTES
Medication chart review for [facility] services        Current medication list     Current Outpatient Medications:   •  carvedilol, 3.125 mg, Oral, BID WITH MEALS  •  aspirin EC, 81 mg, Oral, DAILY  •  cetirizine, 5 mg, Oral, DAILY (Patient not taking: Reported on 4/12/2022)  •  clopidogrel, 75 mg, Oral, DAILY  •  fluticasone, 2 Spray, Nasal, DAILY (Patient not taking: Reported on 4/12/2022)  •  furosemide, 10 mg, Oral, DAILY  •  losartan, 50 mg, Oral, Q EVENING  •  spironolactone, 25 mg, Oral, DAILY  •  omeprazole, 20 mg, Oral, DAILY (Patient not taking: Reported on 4/12/2022)  •  escitalopram, 20 mg, Oral, DAILY    Discharge summary from: [facility] 4/10/2022     MEDICATIONS ON DISCHARGE          Medication List           START taking these medications      Instructions   aspirin 81 MG EC tablet  Start taking on: April 11, 2022    Take 1 Tablet by mouth every day.  Dose: 81 mg      carvedilol 3.125 MG Tabs  Commonly known as: COREG    Take 1 Tablet by mouth 2 times a day with meals.  Dose: 3.125 mg      cetirizine 5 MG tablet  Start taking on: April 11, 2022  Commonly known as: ZYRTEC    Take 1 Tablet by mouth every day.  Dose: 5 mg      clopidogrel 75 MG Tabs  Start taking on: April 11, 2022  Commonly known as: PLAVIX    Take 1 Tablet by mouth every day.  Dose: 75 mg      fluticasone 50 MCG/ACT nasal spray  Start taking on: April 11, 2022  Commonly known as: FLONASE    Administer 2 Sprays into affected nostril(S) every day.  Dose: 2 Spray      furosemide 20 MG Tabs  Start taking on: April 11, 2022  Commonly known as: LASIX    Take 0.5 Tablets by mouth every day.  Dose: 10 mg      losartan 50 MG Tabs  Commonly known as: COZAAR    Take 1 Tablet by mouth every evening.  Dose: 50 mg      omeprazole 20 MG delayed-release capsule  Start taking on: April 11, 2022  Commonly known as: PRILOSEC    Take 1 Capsule by mouth every day.  Dose: 20 mg      spironolactone 25 MG Tabs  Start taking on: April 11,  "2022  Commonly known as: ALDACTONE    Take 1 Tablet by mouth every day.  Dose: 25 mg                  CONTINUE taking these medications      Instructions   escitalopram 20 MG tablet  Commonly known as: LEXAPRO    Take 1 Tablet by mouth every day.  Dose: 20 mg      multivitamin Tabs    Take 1 Tablet by mouth every day.  Dose: 1 Tablet      VITAMIN D-3 PO    Take 1 Capsule by mouth every day.  Dose: 1 Capsule      VITAMIN E PO    Take 1 Capsule by mouth every day.  Dose: 1 Capsule            Allergies   Allergen Reactions   • Ampicillin Rash     Feels \"rotten\"          Medications/supplements on DC summary but not on home med list     cetirizine 5 MG tablet  Start taking on: April 11, 2022  Commonly known as: ZYRTEC    Take 1 Tablet by mouth every day.  Dose: 5 mg        omeprazole 20 MG delayed-release capsule  Start taking on: April 11, 2022  Commonly known as: PRILOSEC    Take 1 Capsule by mouth every day.  Dose: 20 mg          multivitamin Tabs    Take 1 Tablet by mouth every day.  Dose: 1 Tablet      VITAMIN D-3 PO    Take 1 Capsule by mouth every day.  Dose: 1 Capsule      VITAMIN E PO    Take 1 Capsule by mouth every day.  Dose: 1 Capsule          Medications/supplements on home med list but not DC summary    none      Labs     Lab Results   Component Value Date/Time    SODIUM 138 04/10/2022 02:15 AM    POTASSIUM 3.8 04/10/2022 02:15 AM    CHLORIDE 102 04/10/2022 02:15 AM    CO2 20 04/10/2022 02:15 AM    GLUCOSE 107 (H) 04/10/2022 02:15 AM    BUN 15 04/10/2022 02:15 AM    CREATININE 0.88 04/10/2022 02:15 AM    CREATININE 0.9 08/28/2006 11:45 AM     Lab Results   Component Value Date/Time    ALKPHOSPHAT 58 04/10/2022 02:15 AM    ASTSGOT 14 04/10/2022 02:15 AM    ALTSGPT 7 04/10/2022 02:15 AM    TBILIRUBIN 1.3 04/10/2022 02:15 AM    INR 1.03 11/04/2021 02:30 PM    ALBUMIN 4.5 04/10/2022 02:15 AM        Assessment and Plan:   • Received referral from [facility]. Medications reviewed, and compared with discharge summary " if available.        CC   Duc [facility] P.A.-C.  [address]  [place] [address]  Fax: [phone]    Nikhil David, PharmD, MS, BCACP, [facility]  Phone [phone] fax [phone]    This note was created using voice recognition software (Dragon). The accuracy of the dictation is limited by the abilities of the software. I have reviewed the note prior to signing, however some errors in grammar and context are still possible. If you have any questions related to this note please do not hesitate to contact our office.

## 2022-04-12 NOTE — TELEPHONE ENCOUNTER
"Spoke with Efren's wife Mary. Mary states after giving patient her morning medicines patient experienced a brief episode of \"glazed eyes\" and was fairly somnolent, however quickly \"bounced back\" and remains stable at this time. This has never happened before. Discussed with Mary that my concern is that patient experienced hypotensive episodes, particularly in the setting of carvedilol and lasix/furosemide. Asked Mary to please check patient's blood pressure prior to administration of these medications, and to hold the dose if the SBP was less than 100. Instructed Mary to call our clinic if she has any future questions or concerns. However instructed her to please call 911 if patient becomes unresponsive and difficult to arouse.   "

## 2022-04-12 NOTE — TELEPHONE ENCOUNTER
"Caller Name: Efren (Son)   Call Back Number: [phone]    How would the patient prefer to be contacted with a response: Phone call OK to leave a detailed message    Medication Management: patients son called and stated  prescribed \"a bunch of meds\" and whenever the patient takes them , she is not herself.\"almost zombie like\" per the son. Patients son would like to know what to do.     Please call the patient son  "

## 2022-04-13 ENCOUNTER — HOME CARE VISIT (OUTPATIENT)
Dept: HOME HEALTH SERVICES | Facility: HOME HEALTHCARE | Age: 87
End: 2022-04-13
Payer: MEDICARE

## 2022-04-13 PROBLEM — I15.9 SECONDARY HYPERTENSION: Status: ACTIVE | Noted: 2022-04-13

## 2022-04-13 PROBLEM — F03.90 DEMENTIA WITHOUT BEHAVIORAL DISTURBANCE (HCC): Status: ACTIVE | Noted: 2022-04-13

## 2022-04-13 PROBLEM — N39.46 MIXED STRESS AND URGE URINARY INCONTINENCE: Status: ACTIVE | Noted: 2022-04-13

## 2022-04-14 ENCOUNTER — HOME CARE VISIT (OUTPATIENT)
Dept: HOME HEALTH SERVICES | Facility: HOME HEALTHCARE | Age: 87
End: 2022-04-14
Payer: MEDICARE

## 2022-04-14 VITALS
HEART RATE: 76 BPM | RESPIRATION RATE: 16 BRPM | OXYGEN SATURATION: 97 % | DIASTOLIC BLOOD PRESSURE: 72 MMHG | SYSTOLIC BLOOD PRESSURE: 102 MMHG | TEMPERATURE: 99.5 F

## 2022-04-14 PROCEDURE — G0493 RN CARE EA 15 MIN HH/HOSPICE: HCPCS

## 2022-04-14 PROCEDURE — G0157 HHC PT ASSISTANT EA 15: HCPCS | Mod: CQ

## 2022-04-14 ASSESSMENT — ENCOUNTER SYMPTOMS
DENIES PAIN: 1
PAIN: DENIES ANY PAIN OR DISCOMFORT AT TIME OF NURSING VISIT.
LOWEST PAIN SEVERITY IN PAST 24 HOURS: 0/10
VOMITING: DENIES
HIGHEST PAIN SEVERITY IN PAST 24 HOURS: 0/10
PAIN SEVERITY GOAL: 0/10
PERSON REPORTING PAIN: PATIENT
NAUSEA: DENIES

## 2022-04-14 NOTE — Clinical Note
History pertinent to today's visit; Cardiomyopathy, heart failure, A-fib, left heart cath on 4/7/2022;  VS: /72 Site Left arm Position Sitting Cuff Size Adult Resp 16 SpO2 97% Pulse 76 Temp 37.5 C ( 99.5 F) Temp Source Temporal  PAIN: denies any pain  APPETITE/FLUID: eating well, eating 3 meals plus snacks. limiting salt.  GI/: denies any issue, last BM today- normal  MEDICATION CHANGES: denies any new meds- current med list in HH binder  FALLS: none  RESP: lungs clear  OXYGEN USE: none  EDEMA: none  SKIN: intact  HHA: added 1 x weekly- prefers Wednesday's  NEXT NURSING VISIT: 4.18.22  CC: CM

## 2022-04-15 ENCOUNTER — HOME CARE VISIT (OUTPATIENT)
Dept: HOME HEALTH SERVICES | Facility: HOME HEALTHCARE | Age: 87
End: 2022-04-15
Payer: MEDICARE

## 2022-04-15 VITALS
SYSTOLIC BLOOD PRESSURE: 122 MMHG | OXYGEN SATURATION: 97 % | TEMPERATURE: 99.5 F | RESPIRATION RATE: 16 BRPM | DIASTOLIC BLOOD PRESSURE: 72 MMHG | HEART RATE: 76 BPM

## 2022-04-15 PROCEDURE — G0152 HHCP-SERV OF OT,EA 15 MIN: HCPCS

## 2022-04-15 ASSESSMENT — FIBROSIS 4 INDEX: FIB4 SCORE: 1.56

## 2022-04-15 ASSESSMENT — ENCOUNTER SYMPTOMS
DENIES PAIN: 1
PERSON REPORTING PAIN: PATIENT

## 2022-04-15 NOTE — CASE COMMUNICATION
Linda is doing very well she is eager to get better and needs cuiing to slow down and pace herself. She wants to be blt to amb outside at this time educated her to amb with family only outside but to work on in home amb ind. HEP given ad daughter present Will cont with POC to increase her functional mob and ind to prevent further decline in IND. S/B Priyanka Davis PT

## 2022-04-16 VITALS
BODY MASS INDEX: 23.52 KG/M2 | RESPIRATION RATE: 16 BRPM | DIASTOLIC BLOOD PRESSURE: 62 MMHG | OXYGEN SATURATION: 93 % | TEMPERATURE: 98.9 F | SYSTOLIC BLOOD PRESSURE: 124 MMHG | WEIGHT: 137 LBS | HEART RATE: 75 BPM

## 2022-04-16 ASSESSMENT — ACTIVITIES OF DAILY LIVING (ADL)
CURRENT_FUNCTION: SUPERVISION
TOILETING: 1
PHYSICAL TRANSFERS ASSESSED: 1
ORAL_CARE_ASSESSED: 1
BATHING_CURRENT_FUNCTION: STAND BY ASSIST
AMBULATION ASSISTANCE: 1
ORAL_CARE_CURRENT_FUNCTION: INDEPENDENT
DRESSING_UB_CURRENT_FUNCTION: INDEPENDENT
BATHING ASSESSED: 1
AMBULATION ASSISTANCE: SUPERVISION
DRESSING_LB_CURRENT_FUNCTION: INDEPENDENT
FEEDING: INDEPENDENT
CURRENT_FUNCTION: STAND BY ASSIST
FEEDING ASSESSED: 1
GROOMING_CURRENT_FUNCTION: INDEPENDENT
TOILETING: INDEPENDENT
GROOMING ASSESSED: 1

## 2022-04-16 ASSESSMENT — ENCOUNTER SYMPTOMS
DENIES PAIN: 1
PERSON REPORTING PAIN: PATIENT

## 2022-04-18 RX ORDER — CARVEDILOL 3.12 MG/1
TABLET ORAL
Qty: 60 TABLET | Refills: 0 | OUTPATIENT
Start: 2022-04-18

## 2022-04-18 RX ORDER — SPIRONOLACTONE 25 MG/1
TABLET ORAL
Qty: 30 TABLET | Refills: 3 | OUTPATIENT
Start: 2022-04-18

## 2022-04-18 RX ORDER — FLUTICASONE PROPIONATE 50 MCG
SPRAY, SUSPENSION (ML) NASAL
OUTPATIENT
Start: 2022-04-18

## 2022-04-18 RX ORDER — LOSARTAN POTASSIUM 50 MG/1
TABLET ORAL
Qty: 30 TABLET | Refills: 0 | OUTPATIENT
Start: 2022-04-18

## 2022-04-18 RX ORDER — CLOPIDOGREL BISULFATE 75 MG/1
TABLET ORAL
Qty: 30 TABLET | Refills: 0 | OUTPATIENT
Start: 2022-04-18

## 2022-04-18 RX ORDER — OMEPRAZOLE 20 MG/1
CAPSULE, DELAYED RELEASE ORAL
Qty: 30 CAPSULE | Refills: 0 | OUTPATIENT
Start: 2022-04-18

## 2022-04-18 NOTE — CASE COMMUNICATION
DOLLY noted  ----- Message -----  From: Lilian Chinchilla R.N.  Sent: 4/14/2022   7:03 PM PDT  To: Rhett Tran R.N.      History pertinent to today's visit; Cardiomyopathy, heart failure, A-fib, left heart cath on 4/7/2022;  VS: /72 Site Left arm Position Sitting Cuff Size Adult Resp 16 SpO2 97% Pulse 76 Temp 37.5 C ( 99.5 F) Temp Source Temporal  PAIN: denies any pain  APPETITE/FLUID: eating well, eating 3 meals plus snacks. limiting  salt.  GI/: denies any issue, last BM today- normal  MEDICATION CHANGES: denies any new meds- current med list in HH binder  FALLS: none  RESP: lungs clear  OXYGEN USE: none  EDEMA: none  SKIN: intact  HHA: added 1 x weekly- prefers Wednesday's  NEXT NURSING VISIT: 4.18.22  CC: DOLLY

## 2022-04-19 ENCOUNTER — HOSPITAL ENCOUNTER (OUTPATIENT)
Facility: MEDICAL CENTER | Age: 87
End: 2022-04-19
Attending: PHYSICIAN ASSISTANT
Payer: MEDICARE

## 2022-04-19 ENCOUNTER — HOME CARE VISIT (OUTPATIENT)
Dept: HOME HEALTH SERVICES | Facility: HOME HEALTHCARE | Age: 87
End: 2022-04-19
Payer: MEDICARE

## 2022-04-19 VITALS
RESPIRATION RATE: 16 BRPM | TEMPERATURE: 99.5 F | HEART RATE: 70 BPM | OXYGEN SATURATION: 97 % | DIASTOLIC BLOOD PRESSURE: 68 MMHG | SYSTOLIC BLOOD PRESSURE: 102 MMHG

## 2022-04-19 PROCEDURE — G0153 HHCP-SVS OF S/L PATH,EA 15MN: HCPCS

## 2022-04-19 PROCEDURE — G0299 HHS/HOSPICE OF RN EA 15 MIN: HCPCS

## 2022-04-19 PROCEDURE — 80048 BASIC METABOLIC PNL TOTAL CA: CPT

## 2022-04-19 PROCEDURE — G0151 HHCP-SERV OF PT,EA 15 MIN: HCPCS

## 2022-04-19 ASSESSMENT — ACTIVITIES OF DAILY LIVING (ADL): OASIS_M1830: 05

## 2022-04-19 ASSESSMENT — FIBROSIS 4 INDEX: FIB4 SCORE: 1.57

## 2022-04-19 NOTE — CASE COMMUNICATION
Quality Review Completed for 4/12 SOC OASIS by ILENE Collins, RN on 4/19/2022:  Edits completed by ILENE Collins RN:  1.  changed to 3 per OT eval on 4/15  2.  changed to 4/15 for collaboration  3. Per reports,  changed to 1 and  is daily  4.   changed to 5 due to safety equipment not in place (grab bars)  5.  is yes per SN visit on 4/14 for High risk med education  6. Added Hearing, dyspnea to functional limitati ons on 485 forms

## 2022-04-20 ENCOUNTER — HOME CARE VISIT (OUTPATIENT)
Dept: HOME HEALTH SERVICES | Facility: HOME HEALTHCARE | Age: 87
End: 2022-04-20
Payer: MEDICARE

## 2022-04-20 PROBLEM — D72.829 LEUKOCYTOSIS: Status: ACTIVE | Noted: 2022-04-20

## 2022-04-20 LAB
ANION GAP SERPL CALC-SCNC: 11 MMOL/L (ref 7–16)
BUN SERPL-MCNC: 14 MG/DL (ref 8–22)
CALCIUM SERPL-MCNC: 9.1 MG/DL (ref 8.5–10.5)
CHLORIDE SERPL-SCNC: 104 MMOL/L (ref 96–112)
CO2 SERPL-SCNC: 22 MMOL/L (ref 20–33)
CREAT SERPL-MCNC: 1.1 MG/DL (ref 0.5–1.4)
GFR SERPLBLD CREATININE-BSD FMLA CKD-EPI: 48 ML/MIN/1.73 M 2
GLUCOSE SERPL-MCNC: 144 MG/DL (ref 65–99)
POTASSIUM SERPL-SCNC: 4.2 MMOL/L (ref 3.6–5.5)
SODIUM SERPL-SCNC: 137 MMOL/L (ref 135–145)

## 2022-04-20 PROCEDURE — G0180 MD CERTIFICATION HHA PATIENT: HCPCS | Performed by: STUDENT IN AN ORGANIZED HEALTH CARE EDUCATION/TRAINING PROGRAM

## 2022-04-20 PROCEDURE — G0152 HHCP-SERV OF OT,EA 15 MIN: HCPCS

## 2022-04-20 ASSESSMENT — ENCOUNTER SYMPTOMS: DENIES PAIN: 1

## 2022-04-21 ENCOUNTER — HOME CARE VISIT (OUTPATIENT)
Dept: HOME HEALTH SERVICES | Facility: HOME HEALTHCARE | Age: 87
End: 2022-04-21
Payer: MEDICARE

## 2022-04-21 VITALS
OXYGEN SATURATION: 96 % | DIASTOLIC BLOOD PRESSURE: 82 MMHG | TEMPERATURE: 98.4 F | SYSTOLIC BLOOD PRESSURE: 140 MMHG | RESPIRATION RATE: 16 BRPM | HEART RATE: 65 BPM

## 2022-04-21 VITALS — WEIGHT: 137 LBS | TEMPERATURE: 98.8 F | BODY MASS INDEX: 23.52 KG/M2

## 2022-04-21 VITALS
TEMPERATURE: 98.1 F | SYSTOLIC BLOOD PRESSURE: 118 MMHG | RESPIRATION RATE: 16 BRPM | OXYGEN SATURATION: 94 % | HEART RATE: 64 BPM | DIASTOLIC BLOOD PRESSURE: 68 MMHG

## 2022-04-21 PROCEDURE — G0151 HHCP-SERV OF PT,EA 15 MIN: HCPCS

## 2022-04-21 SDOH — ECONOMIC STABILITY: HOUSING INSECURITY: HOME SAFETY: PT REPORTS NO CHANGES TO MEDICATIONS AND NO FALLS SINCE LAST HH VISIT.

## 2022-04-21 ASSESSMENT — ENCOUNTER SYMPTOMS
DIFFICULTY THINKING: 1
HIGHEST PAIN SEVERITY IN PAST 24 HOURS: 0/10
PERSON REPORTING PAIN: PATIENT
PAIN SEVERITY GOAL: 0/10
MUSCLE WEAKNESS: 1
NAUSEA: DENIES
VOMITING: DENIES
LIMITED RANGE OF MOTION: 1
HIGHEST PAIN SEVERITY IN PAST 24 HOURS: 0/10
DENIES PAIN: 1
PAIN SEVERITY GOAL: 0/10
PERSON REPORTING PAIN: PATIENT
LOWEST PAIN SEVERITY IN PAST 24 HOURS: 0/10
DENIES PAIN: 1
DENIES PAIN: 1
POOR JUDGMENT: 1
LOWEST PAIN SEVERITY IN PAST 24 HOURS: 0/10
PERSON REPORTING PAIN: PATIENT

## 2022-04-22 ENCOUNTER — HOME CARE VISIT (OUTPATIENT)
Dept: HOME HEALTH SERVICES | Facility: HOME HEALTHCARE | Age: 87
End: 2022-04-22
Payer: MEDICARE

## 2022-04-22 ENCOUNTER — OFFICE VISIT (OUTPATIENT)
Dept: CARDIOLOGY | Facility: MEDICAL CENTER | Age: 87
End: 2022-04-22
Payer: MEDICARE

## 2022-04-22 ENCOUNTER — NON-PROVIDER VISIT (OUTPATIENT)
Dept: CARDIOLOGY | Facility: MEDICAL CENTER | Age: 87
End: 2022-04-22
Attending: NURSE PRACTITIONER
Payer: MEDICARE

## 2022-04-22 VITALS
RESPIRATION RATE: 16 BRPM | BODY MASS INDEX: 24.75 KG/M2 | HEART RATE: 62 BPM | DIASTOLIC BLOOD PRESSURE: 70 MMHG | OXYGEN SATURATION: 95 % | WEIGHT: 145 LBS | HEIGHT: 64 IN | SYSTOLIC BLOOD PRESSURE: 136 MMHG

## 2022-04-22 DIAGNOSIS — I48.91 ATRIAL FIBRILLATION, UNSPECIFIED TYPE (HCC): ICD-10-CM

## 2022-04-22 DIAGNOSIS — I49.1 ATRIAL ECTOPY: ICD-10-CM

## 2022-04-22 DIAGNOSIS — I49.1 PREMATURE ATRIAL CONTRACTIONS: ICD-10-CM

## 2022-04-22 DIAGNOSIS — R53.81 DEBILITY: ICD-10-CM

## 2022-04-22 DIAGNOSIS — I25.5 ACC/AHA STAGE B SYSTOLIC HEART FAILURE DUE TO ISCHEMIC CARDIOMYOPATHY (HCC): ICD-10-CM

## 2022-04-22 DIAGNOSIS — I10 HYPERTENSION, ESSENTIAL: ICD-10-CM

## 2022-04-22 DIAGNOSIS — I51.7 CARDIOMEGALY: ICD-10-CM

## 2022-04-22 DIAGNOSIS — E78.5 DYSLIPIDEMIA: ICD-10-CM

## 2022-04-22 DIAGNOSIS — R54 FRAILTY: ICD-10-CM

## 2022-04-22 DIAGNOSIS — I50.20 NYHA CLASS 2 HEART FAILURE WITH REDUCED EJECTION FRACTION (HCC): ICD-10-CM

## 2022-04-22 DIAGNOSIS — I50.20 ACC/AHA STAGE B SYSTOLIC HEART FAILURE DUE TO ISCHEMIC CARDIOMYOPATHY (HCC): ICD-10-CM

## 2022-04-22 DIAGNOSIS — I25.5 ISCHEMIC CARDIOMYOPATHY: ICD-10-CM

## 2022-04-22 DIAGNOSIS — I50.20 ACC/AHA STAGE C SYSTOLIC HEART FAILURE (HCC): ICD-10-CM

## 2022-04-22 PROCEDURE — G0152 HHCP-SERV OF OT,EA 15 MIN: HCPCS

## 2022-04-22 PROCEDURE — 99215 OFFICE O/P EST HI 40 MIN: CPT | Performed by: NURSE PRACTITIONER

## 2022-04-22 PROCEDURE — G2212 PROLONG OUTPT/OFFICE VIS: HCPCS | Performed by: NURSE PRACTITIONER

## 2022-04-22 RX ORDER — ROSUVASTATIN CALCIUM 10 MG/1
10 TABLET, COATED ORAL EVERY EVENING
Qty: 90 TABLET | Refills: 3 | Status: SHIPPED | OUTPATIENT
Start: 2022-04-22 | End: 2022-05-04 | Stop reason: SDUPTHER

## 2022-04-22 RX ORDER — ASPIRIN 81 MG/1
81 TABLET ORAL DAILY
Qty: 90 TABLET | Refills: 3 | Status: SHIPPED | OUTPATIENT
Start: 2022-04-22 | End: 2022-05-27

## 2022-04-22 RX ORDER — SPIRONOLACTONE 25 MG/1
25 TABLET ORAL DAILY
Qty: 90 TABLET | Refills: 3 | Status: SHIPPED | OUTPATIENT
Start: 2022-04-22 | End: 2022-05-27

## 2022-04-22 RX ORDER — CARVEDILOL 3.12 MG/1
3.12 TABLET ORAL 2 TIMES DAILY WITH MEALS
Qty: 180 TABLET | Refills: 3 | Status: SHIPPED | OUTPATIENT
Start: 2022-04-22 | End: 2022-05-27 | Stop reason: SDUPTHER

## 2022-04-22 RX ORDER — LOSARTAN POTASSIUM 50 MG/1
50 TABLET ORAL EVERY EVENING
Qty: 90 TABLET | Refills: 3 | Status: SHIPPED | OUTPATIENT
Start: 2022-04-22 | End: 2022-05-27

## 2022-04-22 RX ORDER — FAMOTIDINE 20 MG/1
20 TABLET, FILM COATED ORAL NIGHTLY
Qty: 90 TABLET | Refills: 0 | Status: SHIPPED | OUTPATIENT
Start: 2022-04-22 | End: 2022-05-27 | Stop reason: SDUPTHER

## 2022-04-22 RX ORDER — FUROSEMIDE 20 MG/1
10 TABLET ORAL
Qty: 30 TABLET | Refills: 5 | Status: SHIPPED | OUTPATIENT
Start: 2022-04-22 | End: 2022-09-19

## 2022-04-22 RX ORDER — CLOPIDOGREL BISULFATE 75 MG/1
75 TABLET ORAL DAILY
Qty: 90 TABLET | Refills: 3 | Status: SHIPPED | OUTPATIENT
Start: 2022-04-22 | End: 2022-06-17

## 2022-04-22 ASSESSMENT — MINNESOTA LIVING WITH HEART FAILURE QUESTIONNAIRE (MLHF)
DIFFICULTY SOCIALIZING WITH FAMILY OR FRIENDS: 0
TIRED, FATIGUED OR LOW ON ENERGY: 0
GIVING YOU SIDE EFFECTS FROM TREATMENTS: 0
HAVING TO SIT OR LIE DOWN DURING THE DAY: 0
MAKING YOU WORRY: 0
DIFFICULTY TO CONCENTRATE OR REMEMBERING THINGS: 0
SWELLING IN ANKLES OR LEGS: 0
WALKING ABOUT OR CLIMBING STAIRS DIFFICULT: 0
DIFFICULTY WORKING TO EARN A LIVING: 0
MAKING YOU STAY IN A HOSPITAL: 0
LOSS OF SELF CONTROL IN YOUR LIFE: 0
MAKING YOU SHORT OF BREATH: 0
DIFFICULTY WITH SEXUAL ACTIVITIES: 0
MAKING YOU FEEL DEPRESSED: 0
EATING LESS FOODS YOU LIKE: 0
DIFFICULTY WITH RECREATIONAL PASTIMES, SPORTS, HOBBIES: 0
COSTING YOU MONEY FOR MEDICAL CARE: 0
FEELING LIKE A BURDEN TO FAMILY AND FRIENDS: 0
TOTAL_SCORE: 0
DIFFICULTY SLEEPING WELL AT NIGHT: 0
WORKING AROUND THE HOUSE OR YARD DIFFICULT: 0
DIFFICULTY GOING AWAY FROM HOME: 0

## 2022-04-22 ASSESSMENT — ENCOUNTER SYMPTOMS
WEIGHT LOSS: 0
MYALGIAS: 0
CLAUDICATION: 0
TINGLING: 0
LOWEST PAIN SEVERITY IN PAST 24 HOURS: 0/10
BRUISES/BLEEDS EASILY: 0
NAUSEA: 0
ABDOMINAL PAIN: 0
FEVER: 0
FALLS: 0
PAIN SEVERITY GOAL: 0/10
VOMITING: 0
DIZZINESS: 0
SHORTNESS OF BREATH: 0
BLURRED VISION: 0
HIGHEST PAIN SEVERITY IN PAST 24 HOURS: 0/10
PERSON REPORTING PAIN: PATIENT
COUGH: 0
DENIES PAIN: 1
PND: 0
LOSS OF CONSCIOUSNESS: 0
BLOOD IN STOOL: 0
PALPITATIONS: 0
ORTHOPNEA: 0

## 2022-04-22 ASSESSMENT — FIBROSIS 4 INDEX: FIB4 SCORE: 1.57

## 2022-04-22 NOTE — PROGRESS NOTES
Home enrollment completed for the 14 day Zio XT Holter monitoring program per RADHA Sarah.  >Monitor to be mailed to patient by [employer].  >Currently pending EOS.

## 2022-04-22 NOTE — PATIENT INSTRUCTIONS
Decrease Furosemide 10 mg daily as needed for weight gain greater than 3 pounds in 1 day or 5 pounds in 1 week.    We added crestor, for your heart    Cardiac event monitor in 2 weeks.    Heart ultrasound in 2-3 months    Blood work in 2-3 weeks

## 2022-04-22 NOTE — CASE COMMUNICATION
I agree with changes.  ----- Message -----  From: Fani Collins R.N.  Sent: 4/19/2022   8:33 AM PDT  To: Constanza Gonzalez PT      Quality Review Completed for 4/12 SOC OASIS by ILENE Collins, RN on 4/19/2022:  Edits completed by ILENE Collins, RN:  1.  changed to 3 per OT eval on 4/15  2.  changed to 4/15 for collaboration  3. Per reports,  changed to 1 and  is daily  4.   changed to 5 due to safety equipment not in pl ace (grab bars)  5.  is yes per SN visit on 4/14 for High risk med education  6. Added Hearing, dyspnea to functional limitations on 485 forms

## 2022-04-22 NOTE — PROGRESS NOTES
Chief Complaint   Patient presents with   • Hypertension     F/V Dx: Hypertension, essential       Subjective     Ree Breanne Casas is a 88 y.o. female who presents today for heart failure and ischemic cardiomyopathy follow-up. Patient was recently hospitalized on 4/4/2022 for new onset heart failure.    In addition to above patient has past medical history of hypertension, hyperlipidemia, chronic back pain.    Today, patient reports she is feeling well.  She does not understand why she needs to take so many medications.  She has been followed by [facility] for speech, nursing, PT, and OT.  Blood pressures have ranged from 118-140s/60s.  Her weight has been stable at home at 137 pounds.  Her son does not believe her scale is accurate; we will provide a scale in office today.  Son also reports prescriptions to be sent to [facility] who can provide pill packs for medication compliance. Patient denies chest pain, shortness of breath, palpitations, dizziness/lightheadedness, orthopnea, PND or Edema.     Today, based on physical examination findings, patient is euvolemic. No JVD, lungs are clear to auscultation, no pitting edema in bilateral lower extremities, no ascites. Dry weight is 137 lbs.    We will optimize her HF OMT per below.  We will decrease her Lasix as needed.  Patient stopped taking all omeprazole, we discussed importance to take while on dual antiplatelet therapy.  Patient verbalizes understanding we will represcribe antacid today.  We also discussed risk and benefits of statin therapy in setting of CAD and her age, patient is agreeable to initiate.  Close follow-up in 1 month.    Past Medical History:   Diagnosis Date   • Anxiety 9/14/2010   • Cataract    • High cholesterol    • Hypertension     history of but no treatment   • OSTEOPOROSIS 9/14/2010   • Pain 03/04/14    6/10=L knee   • Pain 11/04/2021    low back   • Snoring     no sleep study   • Urinary incontinence     only at  times at night when sleeping     Past Surgical History:   Procedure Laterality Date   • KYPHOPLASTY N/A 11/6/2021    Procedure: KYPHOPLASTY - L1 AND BIOPSY;  Surgeon: Tim Barton M.D.;  Location: SURGERY [facility];  Service: Orthopedics   • TRIGGER FINGER RELEASE Left 5/29/2018    Procedure: TRIGGER FINGER RELEASE-  MIDDLE;  Surgeon: Fradny Liz M.D.;  Location: SURGERY [facility];  Service: Orthopedics   • JULIAN BY LAPAROSCOPY  5/23/2016    Procedure: JULIAN BY LAPAROSCOPY;  Surgeon: Adan Kearns M.D.;  Location: [facility];  Service:    • KNEE ARTHROSCOPY  3/11/2014    Performed by Bonilla Valera M.D. at [facility]   • BLADDER SUSPENSION  2001   • HYSTERECTOMY, TOTAL ABDOMINAL  2000   • APPENDECTOMY  1969   • COLON RESECTION      partial; no CA     Family History   Problem Relation Age of Onset   • No Known Problems Father    • No Known Problems Mother      Social History     Socioeconomic History   • Marital status:      Spouse name: Not on file   • Number of children: Not on file   • Years of education: Not on file   • Highest education level: Not on file   Occupational History   • Not on file   Tobacco Use   • Smoking status: Never Smoker   • Smokeless tobacco: Never Used   Vaping Use   • Vaping Use: Never used   Substance and Sexual Activity   • Alcohol use: Not Currently     Alcohol/week: 0.0 oz     Comment: 1 time per month   • Drug use: Never   • Sexual activity: Yes     Partners: Male   Other Topics Concern   • Not on file   Social History Narrative    ** Merged History Encounter **          Social Determinants of Health     Financial Resource Strain: Low Risk    • Difficulty of Paying Living Expenses: Not very hard   Food Insecurity: No Food Insecurity   • Worried About Running Out of Food in the Last Year: Never true   • Ran Out of Food in the Last Year: Never true   Transportation Needs: No Transportation Needs   • Lack of Transportation (Medical):  "No   • Lack of Transportation (Non-Medical): No   Physical Activity: Inactive   • Days of Exercise per Week: 0 days   • Minutes of Exercise per Session: 0 min   Stress: Stress Concern Present   • Feeling of Stress : To some extent   Social Connections: Moderately Isolated   • Frequency of Communication with Friends and Family: More than three times a week   • Frequency of Social Gatherings with Friends and Family: More than three times a week   • Attends Quaker Services: Never   • Active Member of Clubs or Organizations: No   • Attends Club or Organization Meetings: Never   • Marital Status:    Intimate Partner Violence: Not on file   Housing Stability: Low Risk    • Unable to Pay for Housing in the Last Year: No   • Number of Places Lived in the Last Year: 1   • Unstable Housing in the Last Year: No     Allergies   Allergen Reactions   • Ampicillin Rash     Feels \"rotten\"      Outpatient Encounter Medications as of 4/22/2022   Medication Sig Dispense Refill   • furosemide (LASIX) 20 MG Tab Take 0.5 Tablets by mouth 1 time a day as needed (for weight gain more than 3 lbs in 1 day or 5 lbs in 1 week). 30 Tablet 5   • aspirin 81 MG EC tablet Take 1 Tablet by mouth every day. 90 Tablet 3   • carvedilol (COREG) 3.125 MG Tab Take 1 Tablet by mouth 2 times a day with meals. 180 Tablet 3   • clopidogrel (PLAVIX) 75 MG Tab Take 1 Tablet by mouth every day. 90 Tablet 3   • losartan (COZAAR) 50 MG Tab Take 1 Tablet by mouth every evening. 90 Tablet 3   • spironolactone (ALDACTONE) 25 MG Tab Take 1 Tablet by mouth every day. 90 Tablet 3   • rosuvastatin (CRESTOR) 10 MG Tab Take 1 Tablet by mouth every evening. 90 Tablet 3   • famotidine (PEPCID) 20 MG Tab Take 1 Tablet by mouth every evening. 90 Tablet 0   • escitalopram (LEXAPRO) 20 MG tablet Take 1 Tablet by mouth every day. 30 Tablet 2   • [DISCONTINUED] Incontinence Supply Disposable (PREVAIL ADULT BRIEF MEDIUM) Misc 1 Units 4 times a day as needed (For urinary " incontinence). (Patient not taking: Reported on 4/22/2022) 120 Each 2   • [DISCONTINUED] carvedilol (COREG) 3.125 MG Tab Take 1 Tablet by mouth 2 times a day with meals. 60 Tablet 0   • [DISCONTINUED] aspirin 81 MG EC tablet Take 1 Tablet by mouth every day. 30 Tablet 0   • [DISCONTINUED] cetirizine (ZYRTEC) 5 MG tablet Take 1 Tablet by mouth every day. (Patient not taking: No sig reported) 30 Tablet 0   • [DISCONTINUED] clopidogrel (PLAVIX) 75 MG Tab Take 1 Tablet by mouth every day. 30 Tablet 0   • [DISCONTINUED] fluticasone (FLONASE) 50 MCG/ACT nasal spray Administer 2 Sprays into affected nostril(S) every day. (Patient not taking: Reported on 4/22/2022) 16 g 0   • [DISCONTINUED] furosemide (LASIX) 20 MG Tab Take 0.5 Tablet by mouth every day. 60 Tablet 0   • [DISCONTINUED] losartan (COZAAR) 50 MG Tab Take 1 Tablet by mouth every evening. 30 Tablet 0   • [DISCONTINUED] spironolactone (ALDACTONE) 25 MG Tab Take 1 Tablet by mouth every day. 30 Tablet 3   • [DISCONTINUED] omeprazole (PRILOSEC) 20 MG delayed-release capsule Take 1 Capsule by mouth every day. (Patient not taking: No sig reported) 30 Capsule 0     No facility-administered encounter medications on file as of 4/22/2022.     Review of Systems   Constitutional: Negative for fever, malaise/fatigue and weight loss.   Eyes: Negative for blurred vision.   Respiratory: Negative for cough and shortness of breath.    Cardiovascular: Negative for chest pain, palpitations, orthopnea, claudication, leg swelling and PND.   Gastrointestinal: Negative for abdominal pain, blood in stool, nausea and vomiting.   Genitourinary: Negative for dysuria, frequency and hematuria.   Musculoskeletal: Negative for falls and myalgias.   Neurological: Negative for dizziness, tingling and loss of consciousness.   Endo/Heme/Allergies: Does not bruise/bleed easily.              Objective     /70 (BP Location: Left arm, Patient Position: Sitting, BP Cuff Size: Adult)   Pulse 62    "Resp 16   Ht 1.626 m (5' 4\")   Wt 65.8 kg (145 lb)   SpO2 95%   BMI 24.89 kg/m²     Physical Exam  Vitals reviewed.   Constitutional:       Appearance: She is well-developed.   HENT:      Head: Normocephalic and atraumatic.   Eyes:      Pupils: Pupils are equal, round, and reactive to light.   Neck:      Vascular: No JVD.   Cardiovascular:      Rate and Rhythm: Normal rate and regular rhythm.      Heart sounds: Normal heart sounds. No murmur heard.    No friction rub. No gallop.   Pulmonary:      Effort: Pulmonary effort is normal. No respiratory distress.      Breath sounds: Normal breath sounds.   Abdominal:      General: Bowel sounds are normal. There is no distension.      Palpations: Abdomen is soft.   Musculoskeletal:      Right lower leg: No edema.      Left lower leg: No edema.   Skin:     General: Skin is warm and dry.      Findings: No erythema.   Neurological:      General: No focal deficit present.      Mental Status: She is alert and oriented to person, place, and time. Mental status is at baseline.   Psychiatric:         Mood and Affect: Mood normal.         Behavior: Behavior normal.            Lab Results   Component Value Date/Time    SODIUM 137 04/19/2022 03:24 PM    POTASSIUM 4.2 04/19/2022 03:24 PM    CHLORIDE 104 04/19/2022 03:24 PM    CO2 22 04/19/2022 03:24 PM    GLUCOSE 144 (H) 04/19/2022 03:24 PM    BUN 14 04/19/2022 03:24 PM    CREATININE 1.10 04/19/2022 03:24 PM    CREATININE 0.9 08/28/2006 11:45 AM     Lab Results   Component Value Date/Time    WBC 12.5 (H) 04/10/2022 02:15 AM    RBC 4.81 04/10/2022 02:15 AM    HEMOGLOBIN 14.8 04/10/2022 02:15 AM    HEMATOCRIT 45.1 04/10/2022 02:15 AM    MCV 93.8 04/10/2022 02:15 AM    MCH 30.8 04/10/2022 02:15 AM    MCHC 32.8 (L) 04/10/2022 02:15 AM    MPV 11.2 04/10/2022 02:15 AM    NEUTSPOLYS 68.80 04/10/2022 02:15 AM    LYMPHOCYTES 14.40 (L) 04/10/2022 02:15 AM    MONOCYTES 14.70 (H) 04/10/2022 02:15 AM    EOSINOPHILS 1.00 04/10/2022 02:15 AM    " BASOPHILS 0.40 04/10/2022 02:15 AM      No results found for: BNPBTYPENAT   No results found for: CRPCARDIAC  Lab Results   Component Value Date/Time    CHOLSTRLTOT 173 04/05/2022 01:11 AM    LDL 88 04/05/2022 01:11 AM    HDL 69 04/05/2022 01:11 AM    TRIGLYCERIDE 80 04/05/2022 01:11 AM       Lab Results   Component Value Date/Time    PROTHROMBTM 13.2 11/04/2021 02:30 PM    INR 1.03 11/04/2021 02:30 PM     No results found for: TROPONINI, CKMB   Echocardiography  Moderately reduced left ventricular systolic function.   The left ventricular ejection fraction is visually estimated to be 35- 40% with [id number] variation.   Global hypokinesis with regional variation.  Moderate aortic insufficiency. Pressure half time is  313 msec.  Right ventricular systolic pressure is estimated to be 45  mmHg.  IVC not interpretable, going out of plane of US.    [facility] (4/7/2022):   Findings   Hemodynamics:   Aorta: 112/81 mmHg  LVEDP: 7 mmHg  No significant pullback gradient across the aortic valve     Coronary Anatomy              Left Main: Normal              LAD: Mid 40% stenosis, distal 80 and 90% stenosis- sequential lesions.              LCx: Ectasia in the mid AV groove.  The first OM has ostial 20% stenosis, the second arm has ostial 60% stenosis, the third OM is occluded and appears to be small              RCA: Dominant, 30% proximal stenosis.  The PDA has a mid 30% stenosis.  The PLB is normal    IMPRESSIONS:  1.  Ischemic cardiomyopathy with severe stenosis in the LAD and occlusion of the OM 3  2.  Successful PCI of the LAD using 2 overlapping drug-eluting stents, IVUS guidance  3.  Normal LVEDP  Recommendations:  Dual antiplatelet therapy for 12 months    Assessment & Plan     1. Hypertension, essential  Holter Monitor Study    EC-ECHOCARDIOGRAM COMPLETE W/O CONT    Basic Metabolic Panel   2. Dyslipidemia  Holter Monitor Study    EC-ECHOCARDIOGRAM COMPLETE W/O CONT    Basic Metabolic Panel   3. ACC/AHA stage B systolic  heart failure due to ischemic cardiomyopathy (HCC)  Holter Monitor Study    EC-ECHOCARDIOGRAM COMPLETE W/O CONT    Basic Metabolic Panel   4. Ischemic cardiomyopathy  EC-ECHOCARDIOGRAM COMPLETE W/O CONT    Basic Metabolic Panel   5. Cardiomegaly  EC-ECHOCARDIOGRAM COMPLETE W/O CONT    Basic Metabolic Panel   6. Debility  Basic Metabolic Panel   7. Frailty  Basic Metabolic Panel   8. ACC/AHA stage C systolic heart failure (HCC)     9. NYHA class 2 heart failure with reduced ejection fraction (HCC)     10. Atrial ectopy         Medical Decision Making: Today's Assessment/Status/Plan:        HFrEF, Stage C, Class II, LVEF 35-40%: euvolemic  -Heart failure due to ischemic cardiomyopathy  -ACE-I/ARB/ARNI: Continue losartan 50 mg daily  -Evidence Based Beta-blocker: Continue Coreg 3.125 mg twice daily  -Aldosterone Antagonist: Continue spironolactone 25 mg daily  -SGLT2-I: Patient declines at this time.  -Diuretic: Decrease Lasix to as needed  -Labs: BMP in 2 weeks  -Repeat Echo in 3 months, if LVEF not >35%, then will discuss/consider ICD for primary Prevention.  Ordered today  -Reinforced s/sx of worsening heart failure with patient and weight monitoring. Pt verbalizes understanding. Pt to call office if present.    -Heart Failure Education: pt to be contacted by HF nurse for further education  -Advanced care planning: Advanced directive planning guide provided to patient in office today.    CAD s/p DENIS/PCI to LAD: Continue DAPT x 3 months; HLD  -Continue aspirin 81 mg daily  -Continue clopidogrel 75 mg daily  -Initiate rosuvastatin 10 mg daily  -Beta-blocker, ARB per above  -Cardiac rehab referral placed.  Patient declines.  Prefer to continue following with home health, especially PT.  -Pepcid for GI protection while on DAPT  -Discussed worsening symptoms of chest pain, patient to go the emergency room     Hypertension  -Today in office blood pressure is well controlled for her age and frailty  -Home blood pressure  recordings are reported per above.  Encouraged to continue home BP monitoring/log.  -Medication recommendations per above.    Frequent atrial ectopy seen on hospitalization telemetry  -2-week cardiac event monitor ordered    FU in clinic in 4 weeks. Sooner if needed.    Patient verbalizes understanding and agrees with the plan of care.     I personally spent a total of 55 minutes which includes face-to-face time and non-face-to-face time spent on preparing to see the patient, reviewing prior notes and tests, obtaining history from the patient, performing a medically appropriate exam, counseling and educating the patient, ordering medications/tests/procedures/referrals as clinically indicated, and documenting information in the electronic medical record.    RIKY AvilaRCRYSTAL.   [facility]  [phone]    PLEASE NOTE: This Note was created using voice recognition Software. I have made every reasonable attempt to correct obvious errors, but I expect that there are errors of grammar and possibly content that I did not discover before finalizing the note

## 2022-04-23 ENCOUNTER — HOME CARE VISIT (OUTPATIENT)
Dept: HOME HEALTH SERVICES | Facility: HOME HEALTHCARE | Age: 87
End: 2022-04-23
Payer: MEDICARE

## 2022-04-23 NOTE — CASE COMMUNICATION
CM noted   ----- Message -----  From: Constanza Gonzalez, PT  Sent: 4/12/2022   1:06 PM PDT  To: Rhett Tran R.N.      Primary dx/Skilled need: New diagnosis: cardiomyopathy, heart failure, A-fib; left heart cath on 4/7/2022  PT: 2w4  Zip code: [phone]  Disciplines ordered: Physical and occupational therapy; adding skilled nursing and speech therapy  Insurance & authorization: residential Plus  Certification period: 4/12/2022 - 6/10/2022  Spec ial considerations: none

## 2022-04-23 NOTE — Clinical Note
Called to schedule appointment and talked with daughter in law Mary. She states pt had a long visit at cardiologist yesterday and is doing well. She states that she does not need a visit today.

## 2022-04-25 ENCOUNTER — HOME CARE VISIT (OUTPATIENT)
Dept: HOME HEALTH SERVICES | Facility: HOME HEALTHCARE | Age: 87
End: 2022-04-25
Payer: MEDICARE

## 2022-04-25 NOTE — Clinical Note
Missed PT visit: pt was feeling a little off this morning and dtr called to state that pt just wanted to sleep this morning. She has another appt on Thursday and she will see PT then.

## 2022-04-25 NOTE — CASE COMMUNICATION
CM noted.   ----- Message -----  From: Barby Culver R.N.  Sent: 4/23/2022   2:41 PM PDT  To: Rhett Tran R.N.      Called to schedule appointment and talked with daughter in law Mary. She states pt had a long visit at cardiologist yesterday and is doing well. She states that she does not need a visit today.

## 2022-04-26 ENCOUNTER — HOME CARE VISIT (OUTPATIENT)
Dept: HOME HEALTH SERVICES | Facility: HOME HEALTHCARE | Age: 87
End: 2022-04-26
Payer: MEDICARE

## 2022-04-26 VITALS
OXYGEN SATURATION: 97 % | DIASTOLIC BLOOD PRESSURE: 62 MMHG | SYSTOLIC BLOOD PRESSURE: 118 MMHG | HEART RATE: 70 BPM | RESPIRATION RATE: 16 BRPM | TEMPERATURE: 98.2 F

## 2022-04-26 ASSESSMENT — ENCOUNTER SYMPTOMS
DENIES PAIN: 1
PERSON REPORTING PAIN: PATIENT

## 2022-04-26 NOTE — Clinical Note
Kimmie please fax to [facility].     This nurse had called and left messages to pt's daughter Mary for an appointment and for lab draw  Received a call from Mary this morning and said pt doesn't not need blood draw today. She told this nurse that pt has an order for lab draw 2 weeks piror  to next cardiology appointement. States she wants it done together . Informed her lab draw (CBC ) was ordered  by Lebron MORENO of [facility]. States pt is seen by a nurse and physical therapy..She also said pt is doing fine and nurse can come on next schedule SNV. .

## 2022-04-27 PROBLEM — J06.9 VIRAL UPPER RESPIRATORY TRACT INFECTION: Status: ACTIVE | Noted: 2022-04-27

## 2022-04-28 ENCOUNTER — HOME CARE VISIT (OUTPATIENT)
Dept: HOME HEALTH SERVICES | Facility: HOME HEALTHCARE | Age: 87
End: 2022-04-28
Payer: MEDICARE

## 2022-04-28 VITALS
RESPIRATION RATE: 16 BRPM | SYSTOLIC BLOOD PRESSURE: 112 MMHG | DIASTOLIC BLOOD PRESSURE: 66 MMHG | TEMPERATURE: 97.8 F | OXYGEN SATURATION: 95 % | HEART RATE: 65 BPM

## 2022-04-28 PROCEDURE — G0151 HHCP-SERV OF PT,EA 15 MIN: HCPCS

## 2022-04-29 ENCOUNTER — HOME CARE VISIT (OUTPATIENT)
Dept: HOME HEALTH SERVICES | Facility: HOME HEALTHCARE | Age: 87
End: 2022-04-29
Payer: MEDICARE

## 2022-04-29 PROCEDURE — G0299 HHS/HOSPICE OF RN EA 15 MIN: HCPCS

## 2022-04-29 ASSESSMENT — ENCOUNTER SYMPTOMS
LOWEST PAIN SEVERITY IN PAST 24 HOURS: 0/10
PAIN SEVERITY GOAL: 0/10
HIGHEST PAIN SEVERITY IN PAST 24 HOURS: 0/10
PERSON REPORTING PAIN: PATIENT
DENIES PAIN: 1

## 2022-04-29 ASSESSMENT — FIBROSIS 4 INDEX: FIB4 SCORE: 1.57

## 2022-04-29 NOTE — Clinical Note
FOR INFO ONLY    History pertinent to today's visit: HF, pacemaker, dementia   Skilled need: health assessment and education   Pt/Cg response to the services provided: Pt was alert and oriented during visit. Daughter Mary was also present at visit. Vitals taken and all within HH parameters. Pt denies any pain at this time. Pt weight this morning was 144. This was 6 lbs more than last recorded weight about a week ago. Daughter stated that they have changed to a new scale as the old one wasn't working well and there was a difference in the weight readings. She states the weight has been consistently 143-144 since they have started using the new scale. Pt lungs are clear, she has no SOB, or new fatigue, and there is no swelling in lower extremities. Pt is in green zone. Pt has been taking weights but daughter did not have written record. Daughter agrees to start recording weights on chart in Heart booklet. Pt and daughter state that pt has had congestion and cough. She was seen by provider from [facility] on 4/27 who evaluated pt. Daughter states that pt has URI, they started musinex recommended by PCP, and that pt symptoms are improving. Pt still has producative cough but less severe. Pt has an upcoming cardiologist appointment and was sent a [employer] heart monitor to wear for 14 days prior. Daughter asked for assistance in applying monitor to pt. Daughter reported that pt lasix changed to PRN and pt is to start on Crestor but they do not have that yet. Medication list updated. Pt reports no falls. Pt and family know to call HH at anytime with questions or concerns.  Plan for the next visit: health assessment, education, possible discipline discharge  Case communication: DOLLY

## 2022-04-30 ENCOUNTER — HOME CARE VISIT (OUTPATIENT)
Dept: HOME HEALTH SERVICES | Facility: HOME HEALTHCARE | Age: 87
End: 2022-04-30
Payer: MEDICARE

## 2022-04-30 VITALS
BODY MASS INDEX: 24.72 KG/M2 | SYSTOLIC BLOOD PRESSURE: 108 MMHG | DIASTOLIC BLOOD PRESSURE: 62 MMHG | HEART RATE: 60 BPM | WEIGHT: 144 LBS | RESPIRATION RATE: 18 BRPM | TEMPERATURE: 97.9 F | OXYGEN SATURATION: 95 %

## 2022-04-30 ASSESSMENT — ENCOUNTER SYMPTOMS
MUSCLE WEAKNESS: 1
DIFFICULTY THINKING: 1
COUGH: 1
COUGH CHARACTERISTICS: STRONG
COUGH CHARACTERISTICS: PRODUCTIVE
DENIES PAIN: 1

## 2022-04-30 NOTE — Clinical Note
FOR INFO ONLY    This SN was in contact via text message to pt's daughter Mary and this SN explained about this SN's visit and needing covid test to be done as ordered by [facility]. Dtr stated that they did a covid test already and that it was negative. Dtr stated that no need for nursing visit today and no need for another nurse to come in on Tuesday 5/3.

## 2022-05-02 ENCOUNTER — HOME CARE VISIT (OUTPATIENT)
Dept: HOME HEALTH SERVICES | Facility: HOME HEALTHCARE | Age: 87
End: 2022-05-02
Payer: MEDICARE

## 2022-05-02 NOTE — Clinical Note
Missed PT visit, pt's DIL texted that she would like cancel tx today as pt found out her  passed yesterday and does not feel up to doing much today. Pt is scheduled and confirmed for Wednesday for Discharge.

## 2022-05-03 ENCOUNTER — HOME CARE VISIT (OUTPATIENT)
Dept: HOME HEALTH SERVICES | Facility: HOME HEALTHCARE | Age: 87
End: 2022-05-03
Payer: MEDICARE

## 2022-05-03 PROBLEM — N18.31 HYPERTENSIVE KIDNEY DISEASE WITH STAGE 3A CHRONIC KIDNEY DISEASE: Status: ACTIVE | Noted: 2022-05-03

## 2022-05-03 PROBLEM — I50.22 HYPERTENSIVE HEART DISEASE WITH CHRONIC SYSTOLIC CONGESTIVE HEART FAILURE (HCC): Status: ACTIVE | Noted: 2022-05-03

## 2022-05-03 PROBLEM — I12.9 HYPERTENSIVE KIDNEY DISEASE WITH STAGE 3A CHRONIC KIDNEY DISEASE: Status: ACTIVE | Noted: 2022-05-03

## 2022-05-03 PROBLEM — E78.5 DYSLIPIDEMIA: Status: ACTIVE | Noted: 2022-05-03

## 2022-05-03 PROBLEM — G31.9 CEREBRAL ATROPHY (HCC): Status: ACTIVE | Noted: 2022-05-03

## 2022-05-03 PROBLEM — I50.22 CHRONIC SYSTOLIC CONGESTIVE HEART FAILURE (HCC): Status: ACTIVE | Noted: 2022-05-03

## 2022-05-03 PROBLEM — N18.31 STAGE 3A CHRONIC KIDNEY DISEASE: Status: ACTIVE | Noted: 2022-05-03

## 2022-05-03 PROBLEM — F32.4 MAJOR DEPRESSIVE DISORDER WITH SINGLE EPISODE, IN PARTIAL REMISSION (HCC): Status: ACTIVE | Noted: 2022-05-03

## 2022-05-03 PROBLEM — D68.59 HYPERCOAGULABLE STATE (HCC): Status: ACTIVE | Noted: 2022-05-03

## 2022-05-03 PROBLEM — I11.0 HYPERTENSIVE HEART DISEASE WITH CHRONIC SYSTOLIC CONGESTIVE HEART FAILURE (HCC): Status: ACTIVE | Noted: 2022-05-03

## 2022-05-04 ENCOUNTER — HOME CARE VISIT (OUTPATIENT)
Dept: HOME HEALTH SERVICES | Facility: HOME HEALTHCARE | Age: 87
End: 2022-05-04
Payer: MEDICARE

## 2022-05-04 VITALS
HEART RATE: 66 BPM | TEMPERATURE: 97.6 F | RESPIRATION RATE: 18 BRPM | SYSTOLIC BLOOD PRESSURE: 112 MMHG | DIASTOLIC BLOOD PRESSURE: 62 MMHG | OXYGEN SATURATION: 98 %

## 2022-05-04 DIAGNOSIS — I50.22 HYPERTENSIVE HEART DISEASE WITH CHRONIC SYSTOLIC CONGESTIVE HEART FAILURE (HCC): ICD-10-CM

## 2022-05-04 DIAGNOSIS — E78.5 DYSLIPIDEMIA: ICD-10-CM

## 2022-05-04 DIAGNOSIS — I11.0 HYPERTENSIVE HEART DISEASE WITH CHRONIC SYSTOLIC CONGESTIVE HEART FAILURE (HCC): ICD-10-CM

## 2022-05-04 PROCEDURE — G0151 HHCP-SERV OF PT,EA 15 MIN: HCPCS

## 2022-05-04 SDOH — ECONOMIC STABILITY: HOUSING INSECURITY: HOME SAFETY: PT LIVES ALONE BUT DIL HAS A FRIEND STAYING THERE AS A CG FOR NOW

## 2022-05-04 ASSESSMENT — ENCOUNTER SYMPTOMS
PERSON REPORTING PAIN: PATIENT
LOWEST PAIN SEVERITY IN PAST 24 HOURS: 0/10
PAIN SEVERITY GOAL: 0/10
HIGHEST PAIN SEVERITY IN PAST 24 HOURS: 0/10
DENIES PAIN: 1

## 2022-05-04 ASSESSMENT — ACTIVITIES OF DAILY LIVING (ADL)
OASIS_M1830: 01
HOME_HEALTH_OASIS: 00

## 2022-05-06 RX ORDER — ROSUVASTATIN CALCIUM 10 MG/1
10 TABLET, COATED ORAL EVERY EVENING
Qty: 100 TABLET | Refills: 3 | Status: SHIPPED | OUTPATIENT
Start: 2022-05-06 | End: 2022-09-19 | Stop reason: SDUPTHER

## 2022-05-10 NOTE — CASE COMMUNICATION
Labs done per pt and family's request    ----- Message -----  From: Regina Boucher R.N.  Sent: 4/26/2022   8:53 PM PDT  To: MARIA Hayden please fax to Ghazala MORENO of [facility].     This nurse had called and left messages to pt's daughter Mary for an appointment and for lab draw  Received a call from Mary this morning and said pt doesn't not need blood draw today. She told this nurse that pt h as an order for lab draw 2 weeks piror  to next cardiology appointement. States she wants it done together . Informed her lab draw (CBC ) was ordered  by Lebron MORENO of [facility]. States pt is seen by a nurse and physical therapy..She also said pt is doing fine and nurse can come on next schedule SNV. .

## 2022-05-11 ENCOUNTER — HOME CARE VISIT (OUTPATIENT)
Dept: HOME HEALTH SERVICES | Facility: HOME HEALTHCARE | Age: 87
End: 2022-05-11
Payer: MEDICARE

## 2022-05-11 NOTE — CASE COMMUNICATION
I agree with the changes.     Priaynka Davis PT, DPT      ----- Message -----  From: Cheryl Redman R.N.  Sent: 5/11/2022   9:11 AM PDT  To: Priyanka Davis PT      Quality Review for DC OASIS by LUCIANA Redman, RN on  May 11, 2022    Edits completed by LUCIANA Redman, RN:  1. Per narrative that patient ambulates independently, is confused only upon waking and at night, if meds were laid out and patient given a reminder to take th em, she could.   is 2     For information on Fall & Injury Prevention, visit [email or web address]

## 2022-05-11 NOTE — CASE COMMUNICATION
Quality Review for DC OASIS by LUCIANA Redman, ARIS on  May 11, 2022    Edits completed by LUCIANA Redman RN:  1. Per narrative that patient ambulates independently, is confused only upon waking and at night, if meds were laid out and patient given a reminder to take them, she could.   is 2

## 2022-05-13 ENCOUNTER — HOME CARE VISIT (OUTPATIENT)
Dept: HOME HEALTH SERVICES | Facility: HOME HEALTHCARE | Age: 87
End: 2022-05-13
Payer: MEDICARE

## 2022-05-14 NOTE — CASE COMMUNICATION
Home Care On call note: Received call from answering service, states patient's daughter Mary called reporting patient has burning with urination. Mary disconnected the phone prior to SN speaking to her. SN attempted to call Mary back at [phone], received voicemail, message left to call answering service back. Patient is no longer on service with home care, SN attempted to advise Mary of this, and refer to PCP.

## 2022-05-16 ENCOUNTER — TELEPHONE (OUTPATIENT)
Dept: MEDICAL GROUP | Facility: PHYSICIAN GROUP | Age: 87
End: 2022-05-16
Payer: MEDICARE

## 2022-05-16 NOTE — CASE COMMUNICATION
noted   ----- Message -----  From: Priyanka Davis PT  Sent: 5/4/2022  10:44 PM PDT  To: Rhett Tran R.N.      PT [facility] [facility] Discharge completed, effective 5/4/22, all goals are met for HHPT.

## 2022-05-18 ENCOUNTER — TELEPHONE (OUTPATIENT)
Dept: CARDIOLOGY | Facility: MEDICAL CENTER | Age: 87
End: 2022-05-18
Payer: MEDICARE

## 2022-05-18 PROCEDURE — 93248 EXT ECG>7D<15D REV&INTERPJ: CPT | Performed by: INTERNAL MEDICINE

## 2022-05-20 ENCOUNTER — HOSPITAL ENCOUNTER (OUTPATIENT)
Dept: LAB | Facility: MEDICAL CENTER | Age: 87
End: 2022-05-20
Attending: NURSE PRACTITIONER
Payer: MEDICARE

## 2022-05-20 ENCOUNTER — HOSPITAL ENCOUNTER (OUTPATIENT)
Dept: LAB | Facility: MEDICAL CENTER | Age: 87
End: 2022-05-20
Attending: STUDENT IN AN ORGANIZED HEALTH CARE EDUCATION/TRAINING PROGRAM
Payer: MEDICARE

## 2022-05-20 ENCOUNTER — HOSPITAL ENCOUNTER (OUTPATIENT)
Dept: LAB | Facility: MEDICAL CENTER | Age: 87
End: 2022-05-20
Attending: PHYSICIAN ASSISTANT
Payer: MEDICARE

## 2022-05-20 ENCOUNTER — OFFICE VISIT (OUTPATIENT)
Dept: MEDICAL GROUP | Facility: PHYSICIAN GROUP | Age: 87
End: 2022-05-20
Payer: MEDICARE

## 2022-05-20 ENCOUNTER — HOSPITAL ENCOUNTER (OUTPATIENT)
Facility: MEDICAL CENTER | Age: 87
End: 2022-05-20
Attending: STUDENT IN AN ORGANIZED HEALTH CARE EDUCATION/TRAINING PROGRAM
Payer: MEDICARE

## 2022-05-20 VITALS
HEART RATE: 62 BPM | SYSTOLIC BLOOD PRESSURE: 130 MMHG | WEIGHT: 144.4 LBS | BODY MASS INDEX: 26.57 KG/M2 | RESPIRATION RATE: 16 BRPM | OXYGEN SATURATION: 93 % | DIASTOLIC BLOOD PRESSURE: 70 MMHG | TEMPERATURE: 97.8 F | HEIGHT: 62 IN

## 2022-05-20 DIAGNOSIS — R39.9 UTI SYMPTOMS: ICD-10-CM

## 2022-05-20 DIAGNOSIS — E78.5 DYSLIPIDEMIA: ICD-10-CM

## 2022-05-20 DIAGNOSIS — I50.20 ACC/AHA STAGE B SYSTOLIC HEART FAILURE DUE TO ISCHEMIC CARDIOMYOPATHY (HCC): ICD-10-CM

## 2022-05-20 DIAGNOSIS — I25.5 ISCHEMIC CARDIOMYOPATHY: ICD-10-CM

## 2022-05-20 DIAGNOSIS — I25.5 ACC/AHA STAGE B SYSTOLIC HEART FAILURE DUE TO ISCHEMIC CARDIOMYOPATHY (HCC): ICD-10-CM

## 2022-05-20 DIAGNOSIS — R53.81 DEBILITY: ICD-10-CM

## 2022-05-20 DIAGNOSIS — I10 HYPERTENSION, ESSENTIAL: ICD-10-CM

## 2022-05-20 DIAGNOSIS — I51.7 CARDIOMEGALY: ICD-10-CM

## 2022-05-20 DIAGNOSIS — R54 FRAILTY: ICD-10-CM

## 2022-05-20 LAB
ANION GAP SERPL CALC-SCNC: 15 MMOL/L (ref 7–16)
APPEARANCE UR: NORMAL
BILIRUB UR STRIP-MCNC: NEGATIVE MG/DL
BUN SERPL-MCNC: 23 MG/DL (ref 8–22)
CALCIUM SERPL-MCNC: 9.6 MG/DL (ref 8.5–10.5)
CHLORIDE SERPL-SCNC: 102 MMOL/L (ref 96–112)
CO2 SERPL-SCNC: 21 MMOL/L (ref 20–33)
COLOR UR AUTO: NORMAL
CREAT SERPL-MCNC: 1.17 MG/DL (ref 0.5–1.4)
ERYTHROCYTE [DISTWIDTH] IN BLOOD BY AUTOMATED COUNT: 53 FL (ref 35.9–50)
GFR SERPLBLD CREATININE-BSD FMLA CKD-EPI: 45 ML/MIN/1.73 M 2
GLUCOSE SERPL-MCNC: 66 MG/DL (ref 65–99)
GLUCOSE UR STRIP.AUTO-MCNC: NEGATIVE MG/DL
HCT VFR BLD AUTO: 45.6 % (ref 37–47)
HGB BLD-MCNC: 14.5 G/DL (ref 12–16)
KETONES UR STRIP.AUTO-MCNC: NEGATIVE MG/DL
LEUKOCYTE ESTERASE UR QL STRIP.AUTO: NORMAL
MCH RBC QN AUTO: 31 PG (ref 27–33)
MCHC RBC AUTO-ENTMCNC: 31.8 G/DL (ref 33.6–35)
MCV RBC AUTO: 97.4 FL (ref 81.4–97.8)
NITRITE UR QL STRIP.AUTO: NEGATIVE
PH UR STRIP.AUTO: 5.5 [PH] (ref 5–8)
PLATELET # BLD AUTO: 293 K/UL (ref 164–446)
PMV BLD AUTO: 10.2 FL (ref 9–12.9)
POTASSIUM SERPL-SCNC: 4.3 MMOL/L (ref 3.6–5.5)
PROT UR QL STRIP: NEGATIVE MG/DL
RBC # BLD AUTO: 4.68 M/UL (ref 4.2–5.4)
RBC UR QL AUTO: NORMAL
SODIUM SERPL-SCNC: 138 MMOL/L (ref 135–145)
SP GR UR STRIP.AUTO: 1.01
UROBILINOGEN UR STRIP-MCNC: 0.2 MG/DL
WBC # BLD AUTO: 10.1 K/UL (ref 4.8–10.8)

## 2022-05-20 PROCEDURE — 87086 URINE CULTURE/COLONY COUNT: CPT

## 2022-05-20 PROCEDURE — 80048 BASIC METABOLIC PNL TOTAL CA: CPT

## 2022-05-20 PROCEDURE — 87186 SC STD MICRODIL/AGAR DIL: CPT

## 2022-05-20 PROCEDURE — 36415 COLL VENOUS BLD VENIPUNCTURE: CPT

## 2022-05-20 PROCEDURE — 87086 URINE CULTURE/COLONY COUNT: CPT | Mod: 91

## 2022-05-20 PROCEDURE — 87077 CULTURE AEROBIC IDENTIFY: CPT | Mod: 91

## 2022-05-20 PROCEDURE — 87186 SC STD MICRODIL/AGAR DIL: CPT | Mod: 91

## 2022-05-20 PROCEDURE — 99214 OFFICE O/P EST MOD 30 MIN: CPT | Performed by: STUDENT IN AN ORGANIZED HEALTH CARE EDUCATION/TRAINING PROGRAM

## 2022-05-20 PROCEDURE — 87077 CULTURE AEROBIC IDENTIFY: CPT

## 2022-05-20 PROCEDURE — 81002 URINALYSIS NONAUTO W/O SCOPE: CPT | Performed by: STUDENT IN AN ORGANIZED HEALTH CARE EDUCATION/TRAINING PROGRAM

## 2022-05-20 PROCEDURE — 85027 COMPLETE CBC AUTOMATED: CPT

## 2022-05-20 RX ORDER — FLUTICASONE PROPIONATE 50 MCG
SPRAY, SUSPENSION (ML) NASAL
COMMUNITY
Start: 2022-05-02 | End: 2022-05-27

## 2022-05-20 RX ORDER — SULFAMETHOXAZOLE AND TRIMETHOPRIM 800; 160 MG/1; MG/1
1 TABLET ORAL 2 TIMES DAILY
Qty: 6 TABLET | Refills: 0 | Status: SHIPPED | OUTPATIENT
Start: 2022-05-20 | End: 2022-05-24

## 2022-05-20 RX ORDER — OMEPRAZOLE 20 MG/1
CAPSULE, DELAYED RELEASE ORAL
COMMUNITY
Start: 2022-05-02 | End: 2022-05-27

## 2022-05-20 ASSESSMENT — FIBROSIS 4 INDEX: FIB4 SCORE: 1.57

## 2022-05-20 NOTE — PROGRESS NOTES
CC:  The encounter diagnosis was UTI symptoms.    HISTORY OF THE PRESENT ILLNESS: Patient is a 88 y.o. female. This pleasant patient is here today to discuss burning with urination.    10 days ago the patient began to have burning with urination, suprapubic tenderness, cloudy urine.    She is negative for fever, change in smell or urine, CVA tenderness.    She is attempted no treatment.    No problem-specific Assessment & Plan notes found for this encounter.    Current Outpatient Medications Ordered in Epic   Medication Sig Dispense Refill   • omeprazole (PRILOSEC) 20 MG delayed-release capsule      • sulfamethoxazole-trimethoprim (BACTRIM DS) 800-160 MG tablet Take 1 Tablet by mouth 2 times a day. 6 Tablet 0   • rosuvastatin (CRESTOR) 10 MG Tab Take 1 Tablet by mouth every evening. 100 Tablet 3   • guaiFENesin ER (MUCINEX) 600 MG TABLET SR 12 HR Take 1 Tablet by mouth every 12 hours. 28 Tablet 0   • furosemide (LASIX) 20 MG Tab Take 0.5 Tablets by mouth 1 time a day as needed (for weight gain more than 3 lbs in 1 day or 5 lbs in 1 week). 30 Tablet 5   • aspirin 81 MG EC tablet Take 1 Tablet by mouth every day. 90 Tablet 3   • carvedilol (COREG) 3.125 MG Tab Take 1 Tablet by mouth 2 times a day with meals. 180 Tablet 3   • clopidogrel (PLAVIX) 75 MG Tab Take 1 Tablet by mouth every day. 90 Tablet 3   • losartan (COZAAR) 50 MG Tab Take 1 Tablet by mouth every evening. 90 Tablet 3   • spironolactone (ALDACTONE) 25 MG Tab Take 1 Tablet by mouth every day. 90 Tablet 3   • famotidine (PEPCID) 20 MG Tab Take 1 Tablet by mouth every evening. 90 Tablet 0   • escitalopram (LEXAPRO) 20 MG tablet Take 1 Tablet by mouth every day. 30 Tablet 2   • fluticasone (FLONASE) 50 MCG/ACT nasal spray  (Patient not taking: Reported on 5/20/2022)       No current [facility]-ordered facility-administered medications on file.     ROS:   Gen: no fevers/chills, unplanned changes in weight  See HPI.    Objective:     Exam: /70 (BP Location:  "Left arm, Patient Position: Sitting, BP Cuff Size: Adult)   Pulse 62   Temp 36.6 °C (97.8 °F) (Temporal)   Resp 16   Ht 1.575 m (5' 2\")   Wt 65.5 kg (144 lb 6.4 oz)   SpO2 93%  Body mass index is 26.41 kg/m².    General: Normal appearing. No distress.  Abdomen: Soft, nondistended.  Positive for suprapubic tenderness.    Neurologic: Grossly nonfocal  Lymph: No cervical or supraclavicular lymph nodes are palpable  Skin: Warm and dry.  No obvious lesions.    A chaperone was offered to the patient during today's exam. Patient declined chaperone.    Labs:   5/20/2022:  -POCT UA, glucose negative, bilirubin negative, ketones negative, specific gravity 1.015, blood moderate, pH 5.5, protein negative, urobilinogen 0.2, nitrate negative, leukocyte esterase large.    4/10/2022:  -CMP showing elevated fasting glucose of 107, no hepatorenal dysfunction.    Assessment & Plan:   88 y.o. female with the following -    1. UTI symptoms  - Acute uncomplicated illness.  - URINE CULTURE(NEW); Future  - POCT Urinalysis  - Bactrim DS twice daily.  Dispense 6.  Refill 0.    Return if symptoms worsen or fail to improve.    Please note that this dictation was created using voice recognition software. I have made every reasonable attempt to correct obvious errors, but I expect that there are errors of grammar and possibly content that I did not discover before finalizing the note.    Duc Samuel PA-C 5/20/2022  "

## 2022-05-23 ENCOUNTER — TELEPHONE (OUTPATIENT)
Dept: MEDICAL GROUP | Facility: PHYSICIAN GROUP | Age: 87
End: 2022-05-23
Payer: MEDICARE

## 2022-05-23 RX ORDER — NITROFURANTOIN 25; 75 MG/1; MG/1
100 CAPSULE ORAL 2 TIMES DAILY
Qty: 10 CAPSULE | Refills: 0 | Status: SHIPPED | OUTPATIENT
Start: 2022-05-23 | End: 2022-05-27

## 2022-05-23 NOTE — TELEPHONE ENCOUNTER
----- Message from Duc Samuel P.A.-C. sent at 5/23/2022  3:15 PM PDT -----  Please contact Ms. Casas and let her know that she does indeed have a urinary tract infection, E. coli.  The antibiotic I prescribed normally covers this however in this case the infection is resistant.  I prescribed a different antibiotic, nitrofurantoin, I have placed an order for this at her pharmacy of record.  She is to take 1 tablet twice daily for 5 days.    Duc Samuel PA-C

## 2022-05-23 NOTE — CASE COMMUNICATION
noted  ----- Message -----  From: Anais Murdock R.N.  Sent: 5/2/2022   4:21 PM PDT  To: Rhett Tran R.N.      FOR INFO ONLY    This SN was in contact via text message to pt's daughter Mary and this SN explained about this SN's visit and needing covid test to be done as ordered by [facility]. Dtr stated that they did a covid test already and that it was negative. Dtr stated that no need for nursing visit today and no need for another  nurse to come in on Tuesday 5/3.

## 2022-05-23 NOTE — CASE COMMUNICATION
noted  ----- Message -----  From: Priyanka Davis, PT  Sent: 4/25/2022  11:19 AM PDT  To: Rhett Tran R.N.      Missed PT visit: pt was feeling a little off this morning and dtr called to state that pt just wanted to sleep this morning. She has another appt on Thursday and she will see PT then.

## 2022-05-23 NOTE — TELEPHONE ENCOUNTER
Phone Number Called: [phone] (home)     Call outcome: Did not leave a detailed message. Requested patient to call back.    Message: LVM for Pt to cb about message below.

## 2022-05-23 NOTE — CASE COMMUNICATION
noted  ----- Message -----  From: Priyanka Davis, PT  Sent: 5/2/2022   2:30 PM PDT  To: Rhett Tran R.N.      Missed PT visit, pt's DIL texted that she would like cancel tx today as pt found out her  passed yesterday and does not feel up to doing much today. Pt is scheduled and confirmed for Wednesday for Discharge.

## 2022-05-23 NOTE — CASE COMMUNICATION
noted  ----- Message -----  From: Barby Culver R.N.  Sent: 4/30/2022  10:56 AM PDT  To: Rhett Tran R.N.      FOR INFO ONLY    History pertinent to today's visit: HF, pacemaker, dementia   Skilled need: health assessment and education   Pt/Cg response to the services provided: Pt was alert and oriented during visit. Daughter Mary was also present at visit. Vitals taken and all within HH parameters. Pt denies any pain at this time.  Pt weight this morning was 144. This was 6 lbs more than last recorded weight about a week ago. Daughter stated that they have changed to a new scale as the old one wasn't working well and there was a difference in the weight readings. She states the weight has been consistently 143-144 since they have started using the new scale. Pt lungs are clear, she has no SOB, or new fatigue, and there is no swelling in lower extremities. Pt is in  green zone. Pt has been taking weights but daughter did not have written record. Daughter agrees to start recording weights on chart in Heart booklet. Pt and daughter state that pt has had congestion and cough. She was seen by provider from [facility] on 4/27 who evaluated pt. Daughter states that pt has URI, they started musinex recommended by PCP, and that pt symptoms are improving. Pt still has producative cough but less severe. Pt has an  upcoming cardiologist appointment and was sent a Zio heart monitor to wear for 14 days prior. Daughter asked for assistance in applying monitor to pt. Daughter reported that pt lasix changed to PRN and pt is to start on Crestor but they do not have that yet. Medication list updated. Pt reports no falls. Pt and family know to call HH at anytime with questions or concerns.  Plan for the next visit: health assessment, education, possible d iscipline discharge  Case communication: DOLLY

## 2022-05-24 ENCOUNTER — HOME CARE VISIT (OUTPATIENT)
Dept: HOME HEALTH SERVICES | Facility: HOME HEALTHCARE | Age: 87
End: 2022-05-24
Payer: MEDICARE

## 2022-05-24 ENCOUNTER — OFFICE VISIT (OUTPATIENT)
Dept: URGENT CARE | Facility: CLINIC | Age: 87
End: 2022-05-24
Payer: MEDICARE

## 2022-05-24 ENCOUNTER — TELEPHONE (OUTPATIENT)
Dept: MEDICAL GROUP | Facility: PHYSICIAN GROUP | Age: 87
End: 2022-05-24

## 2022-05-24 VITALS
TEMPERATURE: 98.6 F | DIASTOLIC BLOOD PRESSURE: 72 MMHG | RESPIRATION RATE: 18 BRPM | BODY MASS INDEX: 26.81 KG/M2 | OXYGEN SATURATION: 92 % | HEART RATE: 77 BPM | WEIGHT: 142 LBS | HEIGHT: 61 IN | SYSTOLIC BLOOD PRESSURE: 110 MMHG

## 2022-05-24 DIAGNOSIS — T78.40XA ALLERGIC REACTION, INITIAL ENCOUNTER: ICD-10-CM

## 2022-05-24 DIAGNOSIS — B96.20 E. COLI UTI: ICD-10-CM

## 2022-05-24 DIAGNOSIS — N39.0 E. COLI UTI: ICD-10-CM

## 2022-05-24 PROCEDURE — 99214 OFFICE O/P EST MOD 30 MIN: CPT | Performed by: NURSE PRACTITIONER

## 2022-05-24 RX ORDER — CIPROFLOXACIN 250 MG/1
250 TABLET, FILM COATED ORAL 2 TIMES DAILY
Qty: 6 TABLET | Refills: 0 | Status: SHIPPED | OUTPATIENT
Start: 2022-05-24 | End: 2022-05-27

## 2022-05-24 RX ORDER — PREDNISONE 20 MG/1
20 TABLET ORAL DAILY
Qty: 3 TABLET | Refills: 0 | Status: SHIPPED | OUTPATIENT
Start: 2022-05-24 | End: 2022-05-27

## 2022-05-24 ASSESSMENT — VISUAL ACUITY: OU: 1

## 2022-05-24 ASSESSMENT — FIBROSIS 4 INDEX: FIB4 SCORE: 1.59

## 2022-05-24 NOTE — PROGRESS NOTES
Subjective:     Linda Casas is a 88 y.o. female who presents for Allergic Reaction (X yesterday mouth & eyes are watering and itchy, started bactrim and has never had before )       Allergic Reaction  This is a new problem.     Patient brought in by her daughter.  History collected from all.    Started on Bactrim 5/20/2022 for UTI symptoms.  3-day course.     Unique test result dated 5/20/2022 reviewed: Urine culture positive for E. coli with resistance noted to Bactrim DS.    Thinks her urine is still foamy.    Yesterday, patient finished her antibiotic.  However, yesterday, she also started to experience mouth, tongue, lip swelling and pain and eye swelling.    History of past UTIs...    Unique test result dated 11/9/2020 reviewed: Urine culture positive for E. coli    Unique test result dated 11/2/2015 reviewed: Urine culture with mixed skin avinash    Patient was screened prior to rooming and denied COVID-19 diagnosis or contact with a person who has been diagnosed or is suspected to have COVID-19. During this visit, appropriate PPE was worn, hand hygiene was performed, and the patient and any visitors were masked.      PMH:  has a past medical history of Anxiety (9/14/2010), Cataract, High cholesterol, Hypertension, OSTEOPOROSIS (9/14/2010), Pain (03/04/14), Pain (11/04/2021), Snoring, and Urinary incontinence.    She has no past medical history of Breast cancer (HCC).    MEDS:   Current Outpatient Medications:   •  ciprofloxacin (CIPRO) 250 MG Tab, Take 1 Tablet by mouth 2 times a day for 3 days., Disp: 6 Tablet, Rfl: 0  •  predniSONE (DELTASONE) 20 MG Tab, Take 1 Tablet by mouth every day for 3 days., Disp: 3 Tablet, Rfl: 0  •  nitrofurantoin (MACROBID) 100 MG Cap, Take 1 Capsule by mouth 2 times a day., Disp: 10 Capsule, Rfl: 0  •  fluticasone (FLONASE) 50 MCG/ACT nasal spray, , Disp: , Rfl:   •  omeprazole (PRILOSEC) 20 MG delayed-release capsule, , Disp: , Rfl:   •  rosuvastatin (CRESTOR) 10  "MG Tab, Take 1 Tablet by mouth every evening., Disp: 100 Tablet, Rfl: 3  •  guaiFENesin ER (MUCINEX) 600 MG TABLET SR 12 HR, Take 1 Tablet by mouth every 12 hours., Disp: 28 Tablet, Rfl: 0  •  furosemide (LASIX) 20 MG Tab, Take 0.5 Tablets by mouth 1 time a day as needed (for weight gain more than 3 lbs in 1 day or 5 lbs in 1 week)., Disp: 30 Tablet, Rfl: 5  •  aspirin 81 MG EC tablet, Take 1 Tablet by mouth every day., Disp: 90 Tablet, Rfl: 3  •  carvedilol (COREG) 3.125 MG Tab, Take 1 Tablet by mouth 2 times a day with meals., Disp: 180 Tablet, Rfl: 3  •  clopidogrel (PLAVIX) 75 MG Tab, Take 1 Tablet by mouth every day., Disp: 90 Tablet, Rfl: 3  •  losartan (COZAAR) 50 MG Tab, Take 1 Tablet by mouth every evening., Disp: 90 Tablet, Rfl: 3  •  spironolactone (ALDACTONE) 25 MG Tab, Take 1 Tablet by mouth every day., Disp: 90 Tablet, Rfl: 3  •  famotidine (PEPCID) 20 MG Tab, Take 1 Tablet by mouth every evening., Disp: 90 Tablet, Rfl: 0  •  escitalopram (LEXAPRO) 20 MG tablet, Take 1 Tablet by mouth every day., Disp: 30 Tablet, Rfl: 2    ALLERGIES:   Allergies   Allergen Reactions   • Ampicillin Rash     Feels \"rotten\"    • Bactrim Ds      Mouth, lip, tongue, eye swelling, pain, itching     SURGHX:   Past Surgical History:   Procedure Laterality Date   • KYPHOPLASTY N/A 11/6/2021    Procedure: KYPHOPLASTY - L1 AND BIOPSY;  Surgeon: Tim Barton M.D.;  Location: SURGERY [facility];  Service: Orthopedics   • TRIGGER FINGER RELEASE Left 5/29/2018    Procedure: TRIGGER FINGER RELEASE-  MIDDLE;  Surgeon: Frandy Liz M.D.;  Location: [facility];  Service: Orthopedics   • JULIAN BY LAPAROSCOPY  5/23/2016    Procedure: JULIAN BY LAPAROSCOPY;  Surgeon: Adan Kearns M.D.;  Location: [facility];  Service:    • KNEE ARTHROSCOPY  3/11/2014    Performed by Bonilla Valera M.D. at SURGERY [facility] ORS   • BLADDER SUSPENSION  2001   • HYSTERECTOMY, TOTAL ABDOMINAL  2000   • APPENDECTOMY  1969 " "  • COLON RESECTION      partial; no CA     SOCHX:  reports that she has never smoked. She has never used smokeless tobacco. She reports previous alcohol use. She reports that she does not use drugs.     FH: Reviewed with patient, not pertinent to this visit.    Review of Systems   Constitutional: Negative.    HENT:        Mouth, tongue, lip swelling, pain   Skin: Positive for itching.   All other systems reviewed and are negative.    Additional details per HPI.      Objective:     /72 (BP Location: Left arm, Patient Position: Sitting, BP Cuff Size: Adult)   Pulse 77   Temp 37 °C (98.6 °F) (Temporal)   Resp 18   Ht 1.549 m (5' 1\")   Wt 64.4 kg (142 lb)   SpO2 92%   BMI 26.83 kg/m²     Physical Exam  Vitals reviewed.   Constitutional:       General: She is not in acute distress.     Appearance: She is well-developed. She is not ill-appearing or toxic-appearing.   HENT:      Mouth/Throat:      Mouth: Angioedema (Lips, tongue) present.      Pharynx: Oropharynx is clear. No pharyngeal swelling, posterior oropharyngeal erythema or uvula swelling.   Eyes:      General: Vision grossly intact.      Extraocular Movements: Extraocular movements intact.   Cardiovascular:      Rate and Rhythm: Normal rate.   Pulmonary:      Effort: Pulmonary effort is normal. No respiratory distress.      Comments: Phonation normal, speaks in full sentences, no audible wheeze or stridor  Abdominal:      Palpations: Abdomen is soft.      Tenderness: There is no abdominal tenderness.   Musculoskeletal:         General: No deformity. Normal range of motion.      Cervical back: Normal range of motion.   Skin:     General: Skin is warm and dry.      Coloration: Skin is not pale.   Neurological:      Mental Status: She is alert and oriented to person, place, and time.      Sensory: No sensory deficit.      Motor: No weakness.   Psychiatric:         Behavior: Behavior normal. Behavior is cooperative.       Assessment/Plan:     1. E. coli " UTI  - ciprofloxacin (CIPRO) 250 MG Tab; Take 1 Tablet by mouth 2 times a day for 3 days.  Dispense: 6 Tablet; Refill: 0    2. Allergic reaction, initial encounter  - predniSONE (DELTASONE) 20 MG Tab; Take 1 Tablet by mouth every day for 3 days.  Dispense: 3 Tablet; Refill: 0    Rx as above sent electronically.  Resistance to Bactrim DS noted on recent urine culture.  Patient continues to report urinary symptoms.  Start Cipro.    Differential diagnosis, natural history, supportive care, over-the-counter symptom management per 's instructions, close monitoring, and indications for immediate follow-up discussed.     All questions answered. Patient agrees with the plan of care.

## 2022-05-24 NOTE — TELEPHONE ENCOUNTER
Phone Number Called: [phone]    Call outcome: Spoke to patient regarding message below.    Message: Spoke to Jose (brother) and gave lab results. Jose stated they would be seeing the cardiologist on Friday and will start taking the antibiotic.

## 2022-05-27 ENCOUNTER — OFFICE VISIT (OUTPATIENT)
Dept: CARDIOLOGY | Facility: MEDICAL CENTER | Age: 87
End: 2022-05-27
Payer: MEDICARE

## 2022-05-27 VITALS
WEIGHT: 143 LBS | HEIGHT: 62 IN | DIASTOLIC BLOOD PRESSURE: 70 MMHG | SYSTOLIC BLOOD PRESSURE: 122 MMHG | HEART RATE: 55 BPM | OXYGEN SATURATION: 95 % | BODY MASS INDEX: 26.31 KG/M2 | RESPIRATION RATE: 16 BRPM

## 2022-05-27 DIAGNOSIS — I48.0 PAROXYSMAL A-FIB (HCC): ICD-10-CM

## 2022-05-27 DIAGNOSIS — Z95.5 STATUS POST CORONARY ARTERY STENT PLACEMENT: ICD-10-CM

## 2022-05-27 DIAGNOSIS — N18.31 STAGE 3A CHRONIC KIDNEY DISEASE: ICD-10-CM

## 2022-05-27 DIAGNOSIS — E78.5 DYSLIPIDEMIA: ICD-10-CM

## 2022-05-27 DIAGNOSIS — I50.22 CHRONIC SYSTOLIC CONGESTIVE HEART FAILURE (HCC): ICD-10-CM

## 2022-05-27 DIAGNOSIS — Z79.899 HIGH RISK MEDICATION USE: ICD-10-CM

## 2022-05-27 DIAGNOSIS — I25.10 CORONARY ARTERY DISEASE INVOLVING NATIVE CORONARY ARTERY OF NATIVE HEART WITHOUT ANGINA PECTORIS: ICD-10-CM

## 2022-05-27 LAB — EKG IMPRESSION: NORMAL

## 2022-05-27 PROCEDURE — 93000 ELECTROCARDIOGRAM COMPLETE: CPT | Performed by: INTERNAL MEDICINE

## 2022-05-27 PROCEDURE — 99215 OFFICE O/P EST HI 40 MIN: CPT | Mod: 25 | Performed by: NURSE PRACTITIONER

## 2022-05-27 RX ORDER — OMEPRAZOLE 20 MG/1
CAPSULE, DELAYED RELEASE ORAL
Qty: 30 CAPSULE | Refills: 2 | Status: SHIPPED | OUTPATIENT
Start: 2022-05-27 | End: 2022-10-25

## 2022-05-27 RX ORDER — CARVEDILOL 3.12 MG/1
3.12 TABLET ORAL 2 TIMES DAILY WITH MEALS
Qty: 180 TABLET | Refills: 3 | Status: SHIPPED | OUTPATIENT
Start: 2022-05-27 | End: 2022-05-27

## 2022-05-27 RX ORDER — FLUTICASONE PROPIONATE 50 MCG
SPRAY, SUSPENSION (ML) NASAL
Qty: 18.2 ML | Refills: 2 | Status: SHIPPED | OUTPATIENT
Start: 2022-05-27 | End: 2022-09-19

## 2022-05-27 RX ORDER — LOSARTAN POTASSIUM 50 MG/1
TABLET ORAL
Qty: 30 TABLET | Refills: 2 | Status: SHIPPED | OUTPATIENT
Start: 2022-05-27 | End: 2022-09-02 | Stop reason: SDUPTHER

## 2022-05-27 RX ORDER — CLOPIDOGREL BISULFATE 75 MG/1
TABLET ORAL
Qty: 30 TABLET | OUTPATIENT
Start: 2022-05-27

## 2022-05-27 RX ORDER — SPIRONOLACTONE 25 MG/1
TABLET ORAL
Qty: 30 TABLET | Refills: 2 | Status: SHIPPED | OUTPATIENT
Start: 2022-05-27 | End: 2022-09-19 | Stop reason: SDUPTHER

## 2022-05-27 RX ORDER — FAMOTIDINE 20 MG/1
20 TABLET, FILM COATED ORAL NIGHTLY
Qty: 90 TABLET | Refills: 3 | Status: SHIPPED | OUTPATIENT
Start: 2022-05-27 | End: 2022-06-17

## 2022-05-27 RX ORDER — CARVEDILOL 3.12 MG/1
TABLET ORAL
Qty: 60 TABLET | Refills: 2 | Status: SHIPPED | OUTPATIENT
Start: 2022-05-27 | End: 2022-09-19 | Stop reason: SDUPTHER

## 2022-05-27 ASSESSMENT — ENCOUNTER SYMPTOMS
TINGLING: 0
ORTHOPNEA: 0
VOMITING: 0
PND: 0
BLOOD IN STOOL: 0
PALPITATIONS: 0
FEVER: 0
BLURRED VISION: 0
FALLS: 0
DIZZINESS: 0
MYALGIAS: 0
WEIGHT LOSS: 0
LOSS OF CONSCIOUSNESS: 0
SHORTNESS OF BREATH: 0
BRUISES/BLEEDS EASILY: 0
NAUSEA: 0
CLAUDICATION: 0
ABDOMINAL PAIN: 0
CONSTITUTIONAL NEGATIVE: 1
COUGH: 0

## 2022-05-27 ASSESSMENT — FIBROSIS 4 INDEX: FIB4 SCORE: 1.59

## 2022-05-27 NOTE — PROGRESS NOTES
Chief Complaint   Patient presents with   • Atrial Fibrillation   • Cardiomyopathy (Ischemic)   • Congestive Heart Failure     F/V Dx: Chronic systolic congestive heart failure (HCC)       Subjective     Linda Casas is a 88 y.o. female who presents today for heart failure and ischemic cardiomyopathy follow-up with her son, Waqar, and daughter-in-law. Patient was recently hospitalized on 4/4/2022 for new onset heart failure.  Patient was last seen by myself on 4/22/2022.    In addition to above patient has past medical history of hypertension, hyperlipidemia, chronic back pain.    Today, patient reports she has persistent swelling on her tongue that makes it difficult to swallow after being treated with antibiotics for UTI and allergic reaction to Cipro as well as generalized bruising.  Patient son reports polyuria and frequent incontinence. Otherwise, Patient  denies chest pain, shortness of breath, palpitations, dizziness/lightheadedness, orthopnea, PND or Edema. Patient was also previously seen by [facility] who increased her diuretics.  Patient and son do not know her medications, they follow her pill packs.  Reviewed pill pack information.  Medication adjusted per below, written instructions provided in AVS today.    Based on physical examination findings, patient is euvolemic. No JVD, lungs are clear to auscultation, no pitting edema in bilateral lower extremities, no ascites. Dry weight is 143 lbs.    EKG in office personally interpreted by me as sinus bradycardia with LBBB    After reviewing medications, discussed returning patient back to original prescription of Lasix as needed.  Also reinforced to stop omeprazole and take famotidine due to drug interaction with Plavix.  I am represcribing HF OMT so medication changes in pill packs are changed by myself and up-to-date to reduce confusion.    We discussed cardiac event monitor results per below and low burden of PAF.  We will have  patient stop Plavix in July as she is high risk for bleeding.  These instructions were written out for patient and sent to review with her pharmacist to prevent further discrepancies in her pill packs.    Past Medical History:   Diagnosis Date   • Anxiety 9/14/2010   • Cataract    • High cholesterol    • Hypertension     history of but no treatment   • OSTEOPOROSIS 9/14/2010   • Pain 03/04/14    6/10=L knee   • Pain 11/04/2021    low back   • Snoring     no sleep study   • Urinary incontinence     only at times at night when sleeping     Past Surgical History:   Procedure Laterality Date   • KYPHOPLASTY N/A 11/6/2021    Procedure: KYPHOPLASTY - L1 AND BIOPSY;  Surgeon: Tim Barton M.D.;  Location: SURGERY [facility];  Service: Orthopedics   • TRIGGER FINGER RELEASE Left 5/29/2018    Procedure: TRIGGER FINGER RELEASE-  MIDDLE;  Surgeon: Frandy Liz M.D.;  Location: [facility];  Service: Orthopedics   • JULIAN BY LAPAROSCOPY  5/23/2016    Procedure: JULIAN BY LAPAROSCOPY;  Surgeon: Adan Kearns M.D.;  Location: [facility];  Service:    • KNEE ARTHROSCOPY  3/11/2014    Performed by Bonilla Valera M.D. at SURGERY [facility]   • BLADDER SUSPENSION  2001   • HYSTERECTOMY, TOTAL ABDOMINAL  2000   • APPENDECTOMY  1969   • COLON RESECTION      partial; no CA     Family History   Problem Relation Age of Onset   • No Known Problems Father    • No Known Problems Mother      Social History     Socioeconomic History   • Marital status:      Spouse name: Not on file   • Number of children: Not on file   • Years of education: Not on file   • Highest education level: Not on file   Occupational History   • Not on file   Tobacco Use   • Smoking status: Never Smoker   • Smokeless tobacco: Never Used   Vaping Use   • Vaping Use: Never used   Substance and Sexual Activity   • Alcohol use: Not Currently     Alcohol/week: 0.0 oz     Comment: 1 time per month   • Drug use: Never   • Sexual  "activity: Yes     Partners: Male   Other Topics Concern   • Not on file   Social History Narrative    ** Merged History Encounter **          Social Determinants of Health     Financial Resource Strain: Low Risk    • Difficulty of Paying Living Expenses: Not very hard   Food Insecurity: No Food Insecurity   • Worried About Running Out of Food in the Last Year: Never true   • Ran Out of Food in the Last Year: Never true   Transportation Needs: No Transportation Needs   • Lack of Transportation (Medical): No   • Lack of Transportation (Non-Medical): No   Physical Activity: Inactive   • Days of Exercise per Week: 0 days   • Minutes of Exercise per Session: 0 min   Stress: Stress Concern Present   • Feeling of Stress : To some extent   Social Connections: Moderately Isolated   • Frequency of Communication with Friends and Family: More than three times a week   • Frequency of Social Gatherings with Friends and Family: More than three times a week   • Attends Orthodox Services: Never   • Active Member of Clubs or Organizations: No   • Attends Club or Organization Meetings: Never   • Marital Status:    Intimate Partner Violence: Not on file   Housing Stability: Low Risk    • Unable to Pay for Housing in the Last Year: No   • Number of Places Lived in the Last Year: 1   • Unstable Housing in the Last Year: No     Allergies   Allergen Reactions   • Ampicillin Rash     Feels \"rotten\"    • Bactrim Ds      Mouth, lip, tongue, eye swelling, pain, itching   • Ciprofloxacin Swelling     Outpatient Encounter Medications as of 5/27/2022   Medication Sig Dispense Refill   • carvedilol (COREG) 3.125 MG Tab Take 1 Tablet by mouth 2 times a day with meals. 180 Tablet 3   • famotidine (PEPCID) 20 MG Tab Take 1 Tablet by mouth every evening. 90 Tablet 3   • rosuvastatin (CRESTOR) 10 MG Tab Take 1 Tablet by mouth every evening. 100 Tablet 3   • furosemide (LASIX) 20 MG Tab Take 0.5 Tablets by mouth 1 time a day as needed (for " weight gain more than 3 lbs in 1 day or 5 lbs in 1 week). 30 Tablet 5   • clopidogrel (PLAVIX) 75 MG Tab Take 1 Tablet by mouth every day. 90 Tablet 3   • losartan (COZAAR) 50 MG Tab Take 1 Tablet by mouth every evening. 90 Tablet 3   • spironolactone (ALDACTONE) 25 MG Tab Take 1 Tablet by mouth every day. 90 Tablet 3   • escitalopram (LEXAPRO) 20 MG tablet Take 1 Tablet by mouth every day. 30 Tablet 2   • [DISCONTINUED] ciprofloxacin (CIPRO) 250 MG Tab Take 1 Tablet by mouth 2 times a day for 3 days. 6 Tablet 0   • [DISCONTINUED] predniSONE (DELTASONE) 20 MG Tab Take 1 Tablet by mouth every day for 3 days. (Patient not taking: Reported on 5/27/2022) 3 Tablet 0   • [DISCONTINUED] nitrofurantoin (MACROBID) 100 MG Cap Take 1 Capsule by mouth 2 times a day. (Patient not taking: No sig reported) 10 Capsule 0   • [DISCONTINUED] fluticasone (FLONASE) 50 MCG/ACT nasal spray  (Patient not taking: No sig reported)     • [DISCONTINUED] omeprazole (PRILOSEC) 20 MG delayed-release capsule      • [DISCONTINUED] guaiFENesin ER (MUCINEX) 600 MG TABLET SR 12 HR Take 1 Tablet by mouth every 12 hours. (Patient not taking: Reported on 5/27/2022) 28 Tablet 0   • [DISCONTINUED] aspirin 81 MG EC tablet Take 1 Tablet by mouth every day. 90 Tablet 3   • [DISCONTINUED] carvedilol (COREG) 3.125 MG Tab Take 1 Tablet by mouth 2 times a day with meals. 180 Tablet 3   • [DISCONTINUED] famotidine (PEPCID) 20 MG Tab Take 1 Tablet by mouth every evening. 90 Tablet 0     No facility-administered encounter medications on file as of 5/27/2022.     Review of Systems   Constitutional: Negative for fever, malaise/fatigue and weight loss.   Eyes: Negative for blurred vision.   Respiratory: Negative for cough and shortness of breath.    Cardiovascular: Negative for chest pain, palpitations, orthopnea, claudication, leg swelling and PND.   Gastrointestinal: Negative for abdominal pain, blood in stool, nausea and vomiting.   Genitourinary: Negative for  "dysuria, frequency and hematuria.   Musculoskeletal: Negative for falls and myalgias.   Neurological: Negative for dizziness, tingling and loss of consciousness.   Endo/Heme/Allergies: Does not bruise/bleed easily.              Objective     /70 (BP Location: Left arm, Patient Position: Sitting, BP Cuff Size: Adult)   Pulse (!) 55   Resp 16   Ht 1.575 m (5' 2\")   Wt 64.9 kg (143 lb)   SpO2 95%   BMI 26.16 kg/m²     Physical Exam  Vitals reviewed.   Constitutional:       Appearance: She is well-developed.   HENT:      Head: Normocephalic and atraumatic.   Eyes:      Pupils: Pupils are equal, round, and reactive to light.   Neck:      Vascular: No JVD.   Cardiovascular:      Rate and Rhythm: Normal rate and regular rhythm.      Heart sounds: Normal heart sounds. No murmur heard.    No friction rub. No gallop.   Pulmonary:      Effort: Pulmonary effort is normal. No respiratory distress.      Breath sounds: Normal breath sounds.   Abdominal:      General: Bowel sounds are normal. There is no distension.      Palpations: Abdomen is soft.   Musculoskeletal:      Right lower leg: No edema.      Left lower leg: No edema.   Skin:     General: Skin is warm and dry.      Findings: No erythema.   Neurological:      General: No focal deficit present.      Mental Status: She is alert and oriented to person, place, and time. Mental status is at baseline.   Psychiatric:         Mood and Affect: Mood normal.         Behavior: Behavior normal.            Lab Results   Component Value Date/Time    SODIUM 138 05/20/2022 11:18 AM    POTASSIUM 4.3 05/20/2022 11:18 AM    CHLORIDE 102 05/20/2022 11:18 AM    CO2 21 05/20/2022 11:18 AM    GLUCOSE 66 05/20/2022 11:18 AM    BUN 23 (H) 05/20/2022 11:18 AM    CREATININE 1.17 05/20/2022 11:18 AM    CREATININE 0.9 08/28/2006 11:45 AM     Lab Results   Component Value Date/Time    WBC 10.1 05/20/2022 11:19 AM    RBC 4.68 05/20/2022 11:19 AM    HEMOGLOBIN 14.5 05/20/2022 11:19 AM    " HEMATOCRIT 45.6 05/20/2022 11:19 AM    MCV 97.4 05/20/2022 11:19 AM    MCH 31.0 05/20/2022 11:19 AM    MCHC 31.8 (L) 05/20/2022 11:19 AM    MPV 10.2 05/20/2022 11:19 AM    NEUTSPOLYS 68.80 04/10/2022 02:15 AM    LYMPHOCYTES 14.40 (L) 04/10/2022 02:15 AM    MONOCYTES 14.70 (H) 04/10/2022 02:15 AM    EOSINOPHILS 1.00 04/10/2022 02:15 AM    BASOPHILS 0.40 04/10/2022 02:15 AM      No results found for: BNPBTYPENAT   No results found for: CRPCARDIAC  Lab Results   Component Value Date/Time    CHOLSTRLTOT 173 04/05/2022 01:11 AM    LDL 88 04/05/2022 01:11 AM    HDL 69 04/05/2022 01:11 AM    TRIGLYCERIDE 80 04/05/2022 01:11 AM       Lab Results   Component Value Date/Time    PROTHROMBTM 13.2 11/04/2021 02:30 PM    INR 1.03 11/04/2021 02:30 PM     No results found for: TROPONINI, CKMB   Echocardiography  Moderately reduced left ventricular systolic function.   The left ventricular ejection fraction is visually estimated to be 35- 40% with [id number] variation.   Global hypokinesis with regional variation.  Moderate aortic insufficiency. Pressure half time is  313 msec.  Right ventricular systolic pressure is estimated to be 45  mmHg.  IVC not interpretable, going out of plane of US.    [facility] (4/7/2022):   Findings   Hemodynamics:   Aorta: 112/81 mmHg  LVEDP: 7 mmHg  No significant pullback gradient across the aortic valve     Coronary Anatomy              Left Main: Normal              LAD: Mid 40% stenosis, distal 80 and 90% stenosis- sequential lesions.              LCx: Ectasia in the mid AV groove.  The first OM has ostial 20% stenosis, the second arm has ostial 60% stenosis, the third OM is occluded and appears to be small              RCA: Dominant, 30% proximal stenosis.  The PDA has a mid 30% stenosis.  The PLB is normal    IMPRESSIONS:  1.  Ischemic cardiomyopathy with severe stenosis in the LAD and occlusion of the OM 3  2.  Successful PCI of the LAD using 2 overlapping drug-eluting stents, IVUS guidance  3.   Normal LVEDP  Recommendations:  Dual antiplatelet therapy for 12 months     Cardiac event monitor (5/18/2022): Underlying rhythm: Sinus   The average heart rate is 66 BPM, and ranges from  BPM   Atrial events: Occasional atrial ectopy with an burden of 3.5% and rare atrial fibrillation, lasting up to 2 hours and 20 minutes, with an average rate of 104 bpm   Ventricular events: Rare   Pauses, bradyarrhythmia or heart block: None significant   Patient events: one event was submitted for review, the event was not described. The corresponding rhythm was sinus with PACs.     Assessment & Plan     1. Paroxysmal A-fib (HCC)  EKG       Medical Decision Making: Today's Assessment/Status/Plan:        HFrEF, Stage C, Class II, LVEF 35-40%: euvolemic  -Heart failure due to ischemic cardiomyopathy  -ACE-I/ARB/ARNI: Continue losartan 50 mg daily  -Evidence Based Beta-blocker: Continue Coreg 3.125 mg twice daily  -Aldosterone Antagonist: Continue spironolactone 25 mg daily  -SGLT2-I: Patient declines at this time.  -Diuretic: Re-decrease Lasix to as needed  -Repeat Echo in 3 months (scheduled for July), if LVEF not >35%, then will discuss/consider ICD for primary Prevention.  Ordered today  -Reinforced s/sx of worsening heart failure with patient and weight monitoring. Pt verbalizes understanding. Pt to call office if present.    -Heart Failure Education: pt to be contacted by HF nurse for further education  -Advanced care planning: Advanced directive planning guide provided to patient in office today.    CAD s/p DENIS/PCI to LAD: Continue DAPT x 3 months; HLD  -ASA 81 mg daily  -Continue clopidogrel 75 mg daily.  No refills.  Patient may discontinue in July  -Continue rosuvastatin 10 mg daily  -Beta-blocker, ARB per above  -Cardiac rehab referral placed.  Patient declines.  Prefer to continue following with home health, especially PT.  -Famotidine for GI protection while on DAPT  -Discussed worsening symptoms of chest pain,  patient to go the emergency room     Hypertension  -Today in office blood pressure is well controlled for her age and frailty  -Home blood pressure recordings are reported per above.  Encouraged to continue home BP monitoring/log.  -Medication recommendations per above.    PAF  -Sinus rhythm on EKG today  -Rare A. fib on cardiac event monitor.  High bleeding risk  -No OAC at this time.    FU in clinic in 2 months after echocardiogram. Sooner if needed.    Patient verbalizes understanding and agrees with the plan of care.     I personally spent a total of 46 minutes which includes face-to-face time and non-face-to-face time spent on preparing to see the patient, reviewing prior notes and tests, obtaining history from the patient, performing a medically appropriate exam, counseling and educating the patient, ordering medications/tests/procedures/referrals as clinically indicated, and documenting information in the electronic medical record.    MITZI Avila.   [facility]  [phone]    PLEASE NOTE: This Note was created using voice recognition Software. I have made every reasonable attempt to correct obvious errors, but I expect that there are errors of grammar and possibly content that I did not discover before finalizing the note

## 2022-05-27 NOTE — PATIENT INSTRUCTIONS
Replace omeprazole with famotidine and next pill pack    Only take Lasix as needed, take out of pill pack today, the half tablet.    Stop Plavix July 7, 2022.  No refills available.  Resume baby aspirin (81 mg daily) after stopping Plavix    Continue Coreg, losartan, spironolactone as prescribed    Checking Blood Pressure:  -Blood pressure cuff, spend in the $40-65, with good return policy  -It should be automatic, upper arm, measure your arm to get the correct size, probably adult Large  -Put the cuff in place, rest arm on table near height of your heart, sit quietly for 5 min, legs uncrossed, with back support, then take your blood pressure, write it down, keep a log  -Check once a day. Ideally, 2 hours after medications.  -Can bring your cuff to at least one appointment where it can be calibrated to a manual cuff if you are concerned.  -Goal blood pressure is at least under 130/80, ideally under 120/80.  If you think your BP is overall too high, let us know in the office, we can adjust medications, can use [facility] or call the [place] office: [phone].  -If you feel dizzy, please check your blood pressure.  If your blood pressure is less than 90/60 with dizziness call our office at [phone].    -Continue to monitor blood pressures, heart rates, and daily weights in log provided in office today. Please bring to next appointment to review with provider.     Salt=sodium=sea salt, guidelines say stay under 2,500 mg daily   Get salt smart, start looking at labels, count it up.  Salt is hidden in everything, salad dressing, sauces, cheese, most canned food, any processed meat.

## 2022-05-27 NOTE — TELEPHONE ENCOUNTER
Phone Number Called: [phone] (home)     Call outcome:  Spoke with Daughter in law Mary Shen    Message: She informed, Pt had to go to UC as she had an allergic reaction to the Bactrim originally prescribed and was given a different ABX in the UC after they reviewed the Culture results.  Daughter in law wanted to make sure the Pt had the Bactrim listed as an allergy so that she would never be given that again.  Verified the UC had added the Bactrim to her allergy list with the reaction it caused.  No further questions at this time.

## 2022-06-17 ENCOUNTER — OFFICE VISIT (OUTPATIENT)
Dept: MEDICAL GROUP | Facility: PHYSICIAN GROUP | Age: 87
End: 2022-06-17
Payer: MEDICARE

## 2022-06-17 ENCOUNTER — PATIENT OUTREACH (OUTPATIENT)
Dept: HEALTH INFORMATION MANAGEMENT | Facility: OTHER | Age: 87
End: 2022-06-17

## 2022-06-17 VITALS
BODY MASS INDEX: 26.43 KG/M2 | TEMPERATURE: 97.8 F | HEART RATE: 63 BPM | HEIGHT: 62 IN | OXYGEN SATURATION: 94 % | WEIGHT: 143.6 LBS | SYSTOLIC BLOOD PRESSURE: 146 MMHG | DIASTOLIC BLOOD PRESSURE: 70 MMHG | RESPIRATION RATE: 16 BRPM

## 2022-06-17 DIAGNOSIS — F41.9 ANXIETY: Chronic | ICD-10-CM

## 2022-06-17 DIAGNOSIS — F32.4 MAJOR DEPRESSIVE DISORDER WITH SINGLE EPISODE, IN PARTIAL REMISSION (HCC): ICD-10-CM

## 2022-06-17 PROCEDURE — 99213 OFFICE O/P EST LOW 20 MIN: CPT | Performed by: STUDENT IN AN ORGANIZED HEALTH CARE EDUCATION/TRAINING PROGRAM

## 2022-06-17 ASSESSMENT — FIBROSIS 4 INDEX: FIB4 SCORE: 1.59

## 2022-06-17 NOTE — PROGRESS NOTES
CC:  Diagnoses of Anxiety and Major depressive disorder with single episode, 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HCC) were pertinent to this visit.    HISTORY OF THE PRESENT ILLNESS: Patient is a 88 y.o. female. This pleasant patient is here today for follow-up.  Her cardiologist has recently asked her to discontinue her clopidogrel in July.    1.  Anxiety  2.  Depression  Patient was switched to escitalopram on her last visit.  She feels that her symptoms are now very well controlled.  She sleeps adequately.  She has been doing a little bit of exercise around the house and continues to garden.  She still consumes a lot of sweets for breakfast but has improved her lunch and dinner.    No problem-specific Assessment & Plan notes found for this encounter.    Current Outpatient Medications Ordered in Epic   Medication Sig Dispense Refill   • spironolactone (ALDACTONE) 25 MG Tab TAKE ONE TABLET BY MOUTH DAILY 30 Tablet 2   • omeprazole (PRILOSEC) 20 MG delayed-release capsule TAKE ONE CAPSULE BY MOUTH DAILY 30 Capsule 2   • losartan (COZAAR) 50 MG Tab TAKE ONE TABLET BY MOUTH EVERY EVENING 30 Tablet 2   • fluticasone (FLONASE) 50 MCG/ACT nasal spray USE TWO SPRAYS INTO AFFECTED NOSTRIL(S) DAILY 18.2 mL 2   • carvedilol (COREG) 3.125 MG Tab TAKE ONE TABLET BY MOUTH TWICE A DAY 60 Tablet 2   • rosuvastatin (CRESTOR) 10 MG Tab Take 1 Tablet by mouth every evening. 100 Tablet 3   • furosemide (LASIX) 20 MG Tab Take 0.5 Tablets by mouth 1 time a day as needed (for weight gain more than 3 lbs in 1 day or 5 lbs in 1 week). 30 Tablet 5   • escitalopram (LEXAPRO) 20 MG tablet Take 1 Tablet by mouth every day. 30 Tablet 2     No current Epic-ordered facility-administered medications on file.     ROS:   Gen: no fevers/chills, unplanned changes in weight  Eyes: no changes in vision  ENT: no sore throat, no hearing loss, no bloody nose  Pulm: no sob, no cough  CV: no chest pain/pressure, no palpitations  GI: no nausea/vomiting, no diarrhea  :  "no dysuria/nocturia > once per night  MSk: no myalgias  Skin: no rash  Neuro: no headaches, no numbness/tingling      Objective:     Exam: BP (!) 146/70 (BP Location: Left arm, Patient Position: Sitting, BP Cuff Size: Adult)   Pulse 63   Temp 36.6 °C (97.8 °F) (Temporal)   Resp 16   Ht 1.575 m (5' 2\")   Wt 65.1 kg (143 lb 9.6 oz)   SpO2 94%  Body mass index is 26.26 kg/m².    General: Normal appearing. No distress.  Neck: Supple without JVD.  Pulmonary: Clear to ausculation.  Normal effort. No rales, ronchi, or wheezing.  Cardiovascular: Regular rate and rhythm without murmur.   Neurologic: Grossly nonfocal  Skin: Warm and dry.  No obvious lesions.  Musculoskeletal: Normal gait, patient ambulates without assistance.. No extremity cyanosis, clubbing, or edema.  Psych: Normal mood and affect. Alert and oriented x3. Judgment and insight is normal.    A chaperone was offered to the patient during today's exam. Patient declined chaperone.    Labs:   No interval labs.    Assessment & Plan:   88 y.o. female with the following -    1.  Anxiety  2.  Depression  -Chronic, stable.  Well-controlled without side effect.  -Continue escitalopram 20 mg daily.  -Continue with daily exercise.    3.  Health maintenance.  We did discuss the option of having a Shingrix vaccine at her visit today.  She will consider this.    Return in about 6 months (around 12/17/2022).    Please note that this dictation was created using voice recognition software. I have made every reasonable attempt to correct obvious errors, but I expect that there are errors of grammar and possibly content that I did not discover before finalizing the note.    Duc Samuel PA-C 6/17/2022  "

## 2022-06-17 NOTE — PROGRESS NOTES
6/17/22 met with patient & her brother today following the patient's OV with her PCP.  Presented CCM program & provided brochure of same.  Patient declined enrollment.  Patient is already being followed by Aishwarya Bai with the [employer] program.

## 2022-07-05 ENCOUNTER — TELEPHONE (OUTPATIENT)
Dept: MEDICAL GROUP | Facility: PHYSICIAN GROUP | Age: 87
End: 2022-07-05
Payer: MEDICARE

## 2022-07-05 NOTE — TELEPHONE ENCOUNTER
1. Caller Name: Linda Casas                          Call Back Number: [phone]        How would the patient prefer to be contacted with a response: Phone call OK to leave a detailed message    MED MANAGMENT     Patients Son called stating the antidepressants PCP prescribed have not helped Patient she continues to cry frequently and become extremely sad. Son is requesting PCP Change medication or add a different medication.  Please contact Son for Further questions [phone].

## 2022-07-06 NOTE — TELEPHONE ENCOUNTER
Phone Number Called: [phone]     Call outcome: Did not leave a detailed message. Requested patient to call back.    Message: LVM for patients son stating PCP has responded to message and to please call back.

## 2022-07-07 NOTE — TELEPHONE ENCOUNTER
Phone Number Called: [phone]    Call outcome: Spoke to patient regarding message below.    Message: Spoke with madiha he states pt stated never mind as she does not want to make an appointment.

## 2022-07-21 ENCOUNTER — OFFICE VISIT (OUTPATIENT)
Dept: MEDICAL GROUP | Facility: PHYSICIAN GROUP | Age: 87
End: 2022-07-21
Payer: MEDICARE

## 2022-07-21 VITALS
DIASTOLIC BLOOD PRESSURE: 60 MMHG | TEMPERATURE: 98.2 F | WEIGHT: 151.2 LBS | HEART RATE: 62 BPM | BODY MASS INDEX: 27.82 KG/M2 | RESPIRATION RATE: 16 BRPM | SYSTOLIC BLOOD PRESSURE: 118 MMHG | OXYGEN SATURATION: 92 % | HEIGHT: 62 IN

## 2022-07-21 DIAGNOSIS — F32.4 MAJOR DEPRESSIVE DISORDER WITH SINGLE EPISODE, IN PARTIAL REMISSION (HCC): ICD-10-CM

## 2022-07-21 PROCEDURE — 99214 OFFICE O/P EST MOD 30 MIN: CPT | Performed by: STUDENT IN AN ORGANIZED HEALTH CARE EDUCATION/TRAINING PROGRAM

## 2022-07-21 RX ORDER — BUPROPION HYDROCHLORIDE 150 MG/1
150 TABLET, EXTENDED RELEASE ORAL 2 TIMES DAILY
Qty: 60 TABLET | Refills: 2 | Status: SHIPPED
Start: 2022-07-21 | End: 2022-07-21

## 2022-07-21 RX ORDER — BUPROPION HYDROCHLORIDE 150 MG/1
150 TABLET, EXTENDED RELEASE ORAL DAILY
Qty: 60 TABLET | Refills: 2 | Status: SHIPPED | OUTPATIENT
Start: 2022-07-21 | End: 2022-07-26 | Stop reason: SDUPTHER

## 2022-07-21 RX ORDER — FAMOTIDINE 20 MG/1
TABLET, FILM COATED ORAL
COMMUNITY
Start: 2022-06-29 | End: 2022-09-19

## 2022-07-21 ASSESSMENT — FIBROSIS 4 INDEX: FIB4 SCORE: 1.59

## 2022-07-21 NOTE — PROGRESS NOTES
CC:  The encounter diagnosis was Major depressive disorder with single episode, 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HCC).    HISTORY OF THE PRESENT ILLNESS: Patient is a 88 y.o. female. This pleasant patient is here today to discuss depression.     Patient is fully compliant with her escitalopram 20 mg daily.  This has worked well for her up until recent months.  Lately she has been having a lot of thoughts of regret about things that she did not do in the past and will not be able to do in the future.  She has had frequent bouts of crying and anger.  She has noticed that she is very emotionally reactive.  She tells me that she feels isolated and lonely.  She feels a great portion of this is due to the fact that her  is passed away and that she no longer drives.  She does have multiple family members who visit frequently or even stay in her home with her many days out of the month.  She reports no difficulties with sleeping or eating and is maintaining a reasonably good diet.    No history of seizure.    No problem-specific Assessment & Plan notes found for this encounter.      Current Outpatient Medications Ordered in Epic   Medication Sig Dispense Refill   • famotidine (PEPCID) 20 MG Tab      • buPROPion SR (WELLBUTRIN-SR) 150 MG TABLET SR 12 HR sustained-release tablet Take 1 Tablet by mouth 2 times a day. Take one tablet daily for one week then increase to one tablet twice daily. 60 Tablet 2   • escitalopram (LEXAPRO) 20 MG tablet TAKE 1 TABLET BY MOUTH EVERY DAY. 90 Tablet 3   • spironolactone (ALDACTONE) 25 MG Tab TAKE ONE TABLET BY MOUTH DAILY 30 Tablet 2   • omeprazole (PRILOSEC) 20 MG delayed-release capsule TAKE ONE CAPSULE BY MOUTH DAILY 30 Capsule 2   • losartan (COZAAR) 50 MG Tab TAKE ONE TABLET BY MOUTH EVERY EVENING 30 Tablet 2   • fluticasone (FLONASE) 50 MCG/ACT nasal spray USE TWO SPRAYS INTO AFFECTED NOSTRIL(S) DAILY 18.2 mL 2   • carvedilol (COREG) 3.125 MG Tab TAKE ONE TABLET BY MOUTH TWICE A DAY 60  "Tablet 2   • rosuvastatin (CRESTOR) 10 MG Tab Take 1 Tablet by mouth every evening. 100 Tablet 3   • furosemide (LASIX) 20 MG Tab Take 0.5 Tablets by mouth 1 time a day as needed (for weight gain more than 3 lbs in 1 day or 5 lbs in 1 week). 30 Tablet 5     No current Epic-ordered facility-administered medications on file.     ROS:   Gen: no fevers/chills, unplanned changes in weight  See HPI.  Objective:     Exam: /60 (BP Location: Left arm, Patient Position: Sitting, BP Cuff Size: Adult)   Pulse 62   Temp 36.8 °C (98.2 °F) (Temporal)   Resp 16   Ht 1.575 m (5' 2\")   Wt 68.6 kg (151 lb 3.2 oz)   SpO2 92%  Body mass index is 27.65 kg/m².    General: Normal appearing. No distress.  HEENT: Normocephalic. Eyes conjunctiva clear lids without ptosis, pupils equal and reactive to light accommodation, ears normal shape and contour, canals are clear bilaterally, tympanic membranes are benign, nasal mucosa benign, oropharynx is without erythema, edema or exudates.   Neck: Supple without JVD or bruit. Thyroid is not enlarged.  Pulmonary: Clear to ausculation.  Normal effort. No rales, ronchi, or wheezing.  Cardiovascular: Regular rate and rhythm without murmur. Radial pulses are intact and equal bilaterally.  Abdomen: Soft, nontender, nondistended. Normal bowel sounds. Liver and spleen are not palpable  Neurologic: Grossly nonfocal  Lymph: No cervical or supraclavicular lymph nodes are palpable  Skin: Warm and dry.  No obvious lesions.  Musculoskeletal: Normal gait. No extremity cyanosis, clubbing, or edema.  Psych: Normal mood and affect. Alert and oriented x3. Judgment and insight is normal.    A chaperone was offered to the patient during today's exam. Patient declined chaperone.    Labs:   4/10/2022:  -CMP without hepatorenal dysfunction.    -5/20/2022:  -BMP showing BUN 23, GFR 45    Assessment & Plan:   88 y.o. female with the following -    1. Major depressive disorder with single episode, in partial " remission (HCC)  -Chronic, in exacerbation.  -Patient is not interested in counseling.  I let her know that this remains available to her at any time.  -Continue escitalopram 20 mg daily.  -Start bupropion 150 mg extended release daily.  Dispense 30.  Refill 2.    Return in about 1 month (around 8/21/2022).  For reevaluation of medication efficacy.    Please note that this dictation was created using voice recognition software. I have made every reasonable attempt to correct obvious errors, but I expect that there are errors of grammar and possibly content that I did not discover before finalizing the note.    Duc Samuel PA-C 7/21/2022

## 2022-07-22 ENCOUNTER — PATIENT OUTREACH (OUTPATIENT)
Dept: HEALTH INFORMATION MANAGEMENT | Facility: OTHER | Age: 87
End: 2022-07-22
Payer: MEDICARE

## 2022-07-22 ENCOUNTER — HOSPITAL ENCOUNTER (OUTPATIENT)
Dept: CARDIOLOGY | Facility: MEDICAL CENTER | Age: 87
End: 2022-07-22
Attending: NURSE PRACTITIONER
Payer: MEDICARE

## 2022-07-22 DIAGNOSIS — I25.5 ACC/AHA STAGE B SYSTOLIC HEART FAILURE DUE TO ISCHEMIC CARDIOMYOPATHY (HCC): ICD-10-CM

## 2022-07-22 DIAGNOSIS — I50.20 ACC/AHA STAGE B SYSTOLIC HEART FAILURE DUE TO ISCHEMIC CARDIOMYOPATHY (HCC): ICD-10-CM

## 2022-07-22 DIAGNOSIS — I10 HYPERTENSION, ESSENTIAL: ICD-10-CM

## 2022-07-22 DIAGNOSIS — E78.5 DYSLIPIDEMIA: ICD-10-CM

## 2022-07-22 DIAGNOSIS — I25.5 ISCHEMIC CARDIOMYOPATHY: ICD-10-CM

## 2022-07-22 DIAGNOSIS — I51.7 CARDIOMEGALY: ICD-10-CM

## 2022-07-22 DIAGNOSIS — N18.31 STAGE 3A CHRONIC KIDNEY DISEASE: ICD-10-CM

## 2022-07-22 LAB
LV EJECT FRACT  99904: 70
LV EJECT FRACT MOD 2C 99903: 58.46
LV EJECT FRACT MOD 4C 99902: 62.7
LV EJECT FRACT MOD BP 99901: 60.95

## 2022-07-22 PROCEDURE — 93306 TTE W/DOPPLER COMPLETE: CPT | Mod: 26 | Performed by: INTERNAL MEDICINE

## 2022-07-22 PROCEDURE — 93306 TTE W/DOPPLER COMPLETE: CPT

## 2022-07-22 NOTE — PROGRESS NOTES
7/21/22 met with patient and her daughter-in-law Mary to discuss the Personal Care Management program, provided pamphlet of same with contact information.  Previously discussed program with patient and she declined enrollment; however, she is struggling with depression/sadness following her 's death.  Patient wants to see how she progresses in the next month and will make a decision about participation in the program at her next PCP visit on 8/25/22 @ 1000, we will meet following her PCP visit.

## 2022-07-26 NOTE — TELEPHONE ENCOUNTER
Phone Number Called: [phone]    Call outcome: Spoke to patient regarding message below.    Message: Spoke to pt daughter in law and she called the pharmacy and they stated they never received wellbutrin       Phone Number Called: [phone]    Call outcome: spoke to pharmacy     Message: [facility] stated they never received the order for Wellbutrin on 7/21/2022       Please send in new rx

## 2022-07-27 RX ORDER — BUPROPION HYDROCHLORIDE 150 MG/1
150 TABLET, EXTENDED RELEASE ORAL DAILY
Qty: 60 TABLET | Refills: 2 | Status: SHIPPED | OUTPATIENT
Start: 2022-07-27 | End: 2022-09-19

## 2022-08-08 ENCOUNTER — TELEPHONE (OUTPATIENT)
Dept: CARDIOLOGY | Facility: MEDICAL CENTER | Age: 87
End: 2022-08-08
Payer: MEDICARE

## 2022-08-08 NOTE — TELEPHONE ENCOUNTER
ALBINO        Caller: Ary from [facility]  Topic/issue: Their office was calling asking for an updated list for the medications that she is supposed to be taking   Callback Number: [phone]      Thank you    -Miguel VIDALES

## 2022-08-08 NOTE — TELEPHONE ENCOUNTER
[facility]- they do bubble packs and were including both Lasix and spironolactone. Advised lasix is just PRN and should not be in bubble pack

## 2022-08-25 ENCOUNTER — PATIENT OUTREACH (OUTPATIENT)
Dept: HEALTH INFORMATION MANAGEMENT | Facility: OTHER | Age: 87
End: 2022-08-25
Payer: MEDICARE

## 2022-08-25 DIAGNOSIS — N18.31 STAGE 3A CHRONIC KIDNEY DISEASE: ICD-10-CM

## 2022-08-25 NOTE — PROGRESS NOTES
Called and left phone message for patient with contact information.  Also reached out to mobile phone and spoke with her son Waqar about the program, he will speak with his wife Mary (she was with the patient at the last OV w/her PCP and we discussed the PCM program) and have her reach out to me, need to determine if patient wants to enroll in PCM program.  I was going to meet with patient in person following her PCP visit today but that appointment was cancelled.

## 2022-08-26 ENCOUNTER — OFFICE VISIT (OUTPATIENT)
Dept: URGENT CARE | Facility: CLINIC | Age: 87
End: 2022-08-26
Payer: MEDICARE

## 2022-08-26 ENCOUNTER — HOSPITAL ENCOUNTER (OUTPATIENT)
Facility: MEDICAL CENTER | Age: 87
End: 2022-08-26
Attending: NURSE PRACTITIONER
Payer: MEDICARE

## 2022-08-26 ENCOUNTER — PATIENT OUTREACH (OUTPATIENT)
Dept: MEDICAL GROUP | Facility: PHYSICIAN GROUP | Age: 87
End: 2022-08-26

## 2022-08-26 VITALS
TEMPERATURE: 97.4 F | RESPIRATION RATE: 16 BRPM | HEIGHT: 62 IN | HEART RATE: 62 BPM | SYSTOLIC BLOOD PRESSURE: 126 MMHG | OXYGEN SATURATION: 95 % | WEIGHT: 152 LBS | BODY MASS INDEX: 27.97 KG/M2 | DIASTOLIC BLOOD PRESSURE: 72 MMHG

## 2022-08-26 DIAGNOSIS — R35.0 URINARY FREQUENCY: ICD-10-CM

## 2022-08-26 DIAGNOSIS — R39.11 URINARY HESITANCY: ICD-10-CM

## 2022-08-26 DIAGNOSIS — N30.01 ACUTE CYSTITIS WITH HEMATURIA: ICD-10-CM

## 2022-08-26 DIAGNOSIS — I48.0 PAROXYSMAL A-FIB (HCC): ICD-10-CM

## 2022-08-26 DIAGNOSIS — N18.31 STAGE 3A CHRONIC KIDNEY DISEASE: ICD-10-CM

## 2022-08-26 DIAGNOSIS — I50.22 HYPERTENSIVE HEART DISEASE WITH CHRONIC SYSTOLIC CONGESTIVE HEART FAILURE (HCC): ICD-10-CM

## 2022-08-26 DIAGNOSIS — I11.0 HYPERTENSIVE HEART DISEASE WITH CHRONIC SYSTOLIC CONGESTIVE HEART FAILURE (HCC): ICD-10-CM

## 2022-08-26 LAB
APPEARANCE UR: CLEAR
BILIRUB UR STRIP-MCNC: NEGATIVE MG/DL
COLOR UR AUTO: YELLOW
GLUCOSE UR STRIP.AUTO-MCNC: NORMAL MG/DL
KETONES UR STRIP.AUTO-MCNC: NORMAL MG/DL
LEUKOCYTE ESTERASE UR QL STRIP.AUTO: NORMAL
NITRITE UR QL STRIP.AUTO: POSITIVE
PH UR STRIP.AUTO: 5 [PH] (ref 5–8)
PROT UR QL STRIP: 30 MG/DL
RBC UR QL AUTO: NORMAL
SP GR UR STRIP.AUTO: 1.02
UROBILINOGEN UR STRIP-MCNC: 0.2 MG/DL

## 2022-08-26 PROCEDURE — 87077 CULTURE AEROBIC IDENTIFY: CPT

## 2022-08-26 PROCEDURE — 87086 URINE CULTURE/COLONY COUNT: CPT

## 2022-08-26 PROCEDURE — 99214 OFFICE O/P EST MOD 30 MIN: CPT | Performed by: NURSE PRACTITIONER

## 2022-08-26 PROCEDURE — 87186 SC STD MICRODIL/AGAR DIL: CPT

## 2022-08-26 PROCEDURE — 81002 URINALYSIS NONAUTO W/O SCOPE: CPT | Performed by: NURSE PRACTITIONER

## 2022-08-26 RX ORDER — CEFDINIR 300 MG/1
300 CAPSULE ORAL EVERY 12 HOURS
Qty: 10 CAPSULE | Refills: 0 | Status: SHIPPED | OUTPATIENT
Start: 2022-08-26 | End: 2022-08-26

## 2022-08-26 RX ORDER — CEFDINIR 300 MG/1
300 CAPSULE ORAL EVERY 12 HOURS
Qty: 10 CAPSULE | Refills: 0 | Status: SHIPPED | OUTPATIENT
Start: 2022-08-26 | End: 2022-08-31

## 2022-08-26 RX ORDER — CEFDINIR 300 MG/1
300 CAPSULE ORAL 2 TIMES DAILY
Qty: 10 CAPSULE | Refills: 0 | Status: SHIPPED | OUTPATIENT
Start: 2022-08-26 | End: 2022-08-31

## 2022-08-26 ASSESSMENT — FIBROSIS 4 INDEX: FIB4 SCORE: 1.59

## 2022-08-26 NOTE — PROGRESS NOTES
Subjective:   Linda Casas is a 88 y.o. female who presents for UTI (Patient is having UTI symptoms , uncomfortable , burning and frequency x  2 days )       HPI  Patient presents with her son-in-law and granddaughter who help provide history for evaluation of 3 to 4-day history of urinary frequency, hesitancy and pain.  Patient had a UTI back in May, had allergic reaction to Bactrim, however tolerated Cipro.  States her symptoms completely resolved since that time.  She does not have a history of recurrent UTIs.  Denies fever, chills, flank pain.    ROS  All other systems are negative except as documented above within HPI.    MEDS:   Current Outpatient Medications:     cefdinir (OMNICEF) 300 MG Cap, Take 1 Capsule by mouth 2 times a day for 5 days., Disp: 10 Capsule, Rfl: 0    cefdinir (OMNICEF) 300 MG Cap, Take 1 Capsule by mouth every 12 hours for 5 days., Disp: 10 Capsule, Rfl: 0    buPROPion SR (WELLBUTRIN-SR) 150 MG TABLET SR 12 HR sustained-release tablet, Take 1 Tablet by mouth every day., Disp: 60 Tablet, Rfl: 2    famotidine (PEPCID) 20 MG Tab, , Disp: , Rfl:     escitalopram (LEXAPRO) 20 MG tablet, TAKE 1 TABLET BY MOUTH EVERY DAY., Disp: 90 Tablet, Rfl: 3    spironolactone (ALDACTONE) 25 MG Tab, TAKE ONE TABLET BY MOUTH DAILY, Disp: 30 Tablet, Rfl: 2    omeprazole (PRILOSEC) 20 MG delayed-release capsule, TAKE ONE CAPSULE BY MOUTH DAILY, Disp: 30 Capsule, Rfl: 2    losartan (COZAAR) 50 MG Tab, TAKE ONE TABLET BY MOUTH EVERY EVENING, Disp: 30 Tablet, Rfl: 2    fluticasone (FLONASE) 50 MCG/ACT nasal spray, USE TWO SPRAYS INTO AFFECTED NOSTRIL(S) DAILY, Disp: 18.2 mL, Rfl: 2    carvedilol (COREG) 3.125 MG Tab, TAKE ONE TABLET BY MOUTH TWICE A DAY, Disp: 60 Tablet, Rfl: 2    rosuvastatin (CRESTOR) 10 MG Tab, Take 1 Tablet by mouth every evening., Disp: 100 Tablet, Rfl: 3    furosemide (LASIX) 20 MG Tab, Take 0.5 Tablets by mouth 1 time a day as needed (for weight gain more than 3 lbs in 1 day  "or 5 lbs in 1 week)., Disp: 30 Tablet, Rfl: 5  ALLERGIES:   Allergies   Allergen Reactions    Ampicillin Rash     Feels \"rotten\"     Bactrim Ds      Mouth, lip, tongue, eye swelling, pain, itching    Ciprofloxacin Swelling       Patient's PMH, SocHx, SurgHx, FamHx, Drug allergies and medications were reviewed.     Objective:   /72 (BP Location: Left arm, Patient Position: Sitting, BP Cuff Size: Adult)   Pulse 62   Temp 36.3 °C (97.4 °F) (Temporal)   Resp 16   Ht 1.575 m (5' 2\")   Wt 68.9 kg (152 lb)   SpO2 95%   BMI 27.80 kg/m²     Physical Exam  Vitals and nursing note reviewed.   Constitutional:       General: She is awake.      Appearance: Normal appearance. She is well-developed.   HENT:      Head: Normocephalic and atraumatic.      Right Ear: External ear normal.      Left Ear: External ear normal.      Nose: Nose normal.      Mouth/Throat:      Mouth: Mucous membranes are moist.      Pharynx: Oropharynx is clear.   Eyes:      Extraocular Movements: Extraocular movements intact.      Conjunctiva/sclera: Conjunctivae normal.   Cardiovascular:      Rate and Rhythm: Normal rate and regular rhythm.      Heart sounds: Normal heart sounds.   Pulmonary:      Effort: Pulmonary effort is normal.      Breath sounds: Normal breath sounds.   Abdominal:      General: Bowel sounds are normal.      Palpations: Abdomen is soft.      Tenderness: There is abdominal tenderness in the suprapubic area. There is no right CVA tenderness or left CVA tenderness.   Musculoskeletal:         General: Normal range of motion.      Cervical back: Normal range of motion and neck supple.   Skin:     General: Skin is warm and dry.   Neurological:      General: No focal deficit present.      Mental Status: She is alert and oriented to person, place, and time.   Psychiatric:         Mood and Affect: Mood normal.         Behavior: Behavior normal. Behavior is cooperative.         Thought Content: Thought content normal.         " Judgment: Judgment normal.       Assessment/Plan:   Assessment      1. Acute cystitis with hematuria  - POCT Urinalysis  - Urine Culture; Future  - cefdinir (OMNICEF) 300 MG Cap; Take 1 Capsule by mouth 2 times a day for 5 days.  Dispense: 10 Capsule; Refill: 0  - cefdinir (OMNICEF) 300 MG Cap; Take 1 Capsule by mouth every 12 hours for 5 days.  Dispense: 10 Capsule; Refill: 0    2. Stage 3a chronic kidney disease (HCC)    3. Urinary frequency  - POCT Urinalysis  - Urine Culture; Future    4. Urinary hesitancy  - POCT Urinalysis  - Urine Culture; Future        Vital signs stable at today's acute urgent care visit.  Reviewed test results that were completed in the clinic.  Send urine for culture.  Begin antibiotics as listed.  Recommend begin cranberry tablets as well as pushing fluids. Discussed management options (risks, benefits, and alternatives to treatment).     Advised the patient to follow-up with the primary care provider for recheck, reevaluation, and/or consideration of further management if necessary. Return to urgent care with any worsening symptoms or if there is no improvement in their current condition. Red flags discussed and indications to immediately call 911 or present to the Emergency Department.  All questions were encouraged and answered to the patient's satisfaction and understanding, and they agree to the plan of care.     I personally reviewed prior external notes and test results pertinent to today's visit.  I have independently reviewed and interpreted all diagnostics ordered during this urgent care acute visit. Time spent evaluating this patient was a minimum of 30 minutes and includes preparing for visit, counseling/education, exam and evaluation, obtaining history, independent interpretation and ordering lab/test/procedures.      Please note that this dictation was created using voice recognition software. I have made a reasonable attempt to correct obvious errors, but I expect that  there are errors of grammar and possibly content that I did not discover before finalizing the note.

## 2022-09-02 DIAGNOSIS — I10 HYPERTENSION, ESSENTIAL: ICD-10-CM

## 2022-09-02 NOTE — TELEPHONE ENCOUNTER
Is the patient due for a refill? Yes    Was the patient seen the past year? Yes    Date of last office visit: 5/27/22    Does the patient have an upcoming appointment?  Yes   If yes, When? 9/19/22    Provider to refill: ALBINO     Does the patients insurance require a 100 day supply?  Yes    
adult consistency food

## 2022-09-05 RX ORDER — LOSARTAN POTASSIUM 50 MG/1
50 TABLET ORAL EVERY EVENING
Qty: 100 TABLET | Refills: 3 | Status: SHIPPED | OUTPATIENT
Start: 2022-09-05 | End: 2023-06-13 | Stop reason: SDUPTHER

## 2022-09-19 ENCOUNTER — OFFICE VISIT (OUTPATIENT)
Dept: CARDIOLOGY | Facility: MEDICAL CENTER | Age: 87
End: 2022-09-19
Payer: MEDICARE

## 2022-09-19 VITALS
SYSTOLIC BLOOD PRESSURE: 132 MMHG | HEART RATE: 64 BPM | BODY MASS INDEX: 28.16 KG/M2 | RESPIRATION RATE: 15 BRPM | WEIGHT: 153 LBS | HEIGHT: 62 IN | DIASTOLIC BLOOD PRESSURE: 70 MMHG | OXYGEN SATURATION: 94 %

## 2022-09-19 DIAGNOSIS — I48.91 ATRIAL FIBRILLATION, UNSPECIFIED TYPE (HCC): ICD-10-CM

## 2022-09-19 DIAGNOSIS — Z79.899 HIGH RISK MEDICATION USE: ICD-10-CM

## 2022-09-19 DIAGNOSIS — I50.20 ACC/AHA STAGE B SYSTOLIC HEART FAILURE DUE TO ISCHEMIC CARDIOMYOPATHY (HCC): ICD-10-CM

## 2022-09-19 DIAGNOSIS — I50.22 HYPERTENSIVE HEART DISEASE WITH CHRONIC SYSTOLIC CONGESTIVE HEART FAILURE (HCC): ICD-10-CM

## 2022-09-19 DIAGNOSIS — I25.5 ACC/AHA STAGE B SYSTOLIC HEART FAILURE DUE TO ISCHEMIC CARDIOMYOPATHY (HCC): ICD-10-CM

## 2022-09-19 DIAGNOSIS — Z95.5 STATUS POST CORONARY ARTERY STENT PLACEMENT: ICD-10-CM

## 2022-09-19 DIAGNOSIS — N18.31 STAGE 3A CHRONIC KIDNEY DISEASE: ICD-10-CM

## 2022-09-19 DIAGNOSIS — I11.0 HYPERTENSIVE HEART DISEASE WITH CHRONIC SYSTOLIC CONGESTIVE HEART FAILURE (HCC): ICD-10-CM

## 2022-09-19 DIAGNOSIS — R06.09 OTHER FORMS OF DYSPNEA: ICD-10-CM

## 2022-09-19 DIAGNOSIS — I25.10 CORONARY ARTERY DISEASE INVOLVING NATIVE CORONARY ARTERY OF NATIVE HEART WITHOUT ANGINA PECTORIS: ICD-10-CM

## 2022-09-19 DIAGNOSIS — I48.0 PAROXYSMAL A-FIB (HCC): ICD-10-CM

## 2022-09-19 DIAGNOSIS — E78.5 DYSLIPIDEMIA: ICD-10-CM

## 2022-09-19 PROCEDURE — 99214 OFFICE O/P EST MOD 30 MIN: CPT | Performed by: NURSE PRACTITIONER

## 2022-09-19 RX ORDER — SPIRONOLACTONE 25 MG/1
25 TABLET ORAL DAILY
Qty: 90 TABLET | Refills: 3 | Status: SHIPPED | OUTPATIENT
Start: 2022-09-19 | End: 2023-08-17

## 2022-09-19 RX ORDER — CARVEDILOL 3.12 MG/1
3.12 TABLET ORAL 2 TIMES DAILY
Qty: 180 TABLET | Refills: 3 | Status: SHIPPED | OUTPATIENT
Start: 2022-09-19 | End: 2023-06-13 | Stop reason: SDUPTHER

## 2022-09-19 ASSESSMENT — ENCOUNTER SYMPTOMS
SHORTNESS OF BREATH: 0
WEIGHT LOSS: 0
BRUISES/BLEEDS EASILY: 0
PALPITATIONS: 0
BLOOD IN STOOL: 0
DIZZINESS: 0
VOMITING: 0
ORTHOPNEA: 0
TINGLING: 0
MYALGIAS: 0
FEVER: 0
PND: 0
COUGH: 0
ABDOMINAL PAIN: 0
CLAUDICATION: 0
FALLS: 0
NAUSEA: 0
BLURRED VISION: 0
LOSS OF CONSCIOUSNESS: 0

## 2022-09-19 ASSESSMENT — FIBROSIS 4 INDEX: FIB4 SCORE: 1.59

## 2022-09-19 NOTE — TELEPHONE ENCOUNTER
RADHA Plata     OPO was order send order to [facility]  Fax Number: [phone]  Phone : [phone]  Confirmation received of fax.

## 2022-09-19 NOTE — PROGRESS NOTES
Chief Complaint   Patient presents with    Hypertension     F/V DX: Hypertension, essential     Atrial Fibrillation     F/V DX:  Paroxysmal A-fib (HCC)       Subjective     Linda Casas is a 88 y.o. female who presents today for heart failure and ischemic cardiomyopathy follow-up with friend, Mary.  Last hospitalization on 4/4/2022 for new onset heart failure s/p PCI to LADX2 and  OM3.  Patient was last seen by myself on 5/27/2022.    In addition to above patient has past medical history of hypertension, hyperlipidemia, chronic back pain.    Today, we discussed July transthoracic echocardiogram results with improved EF.  Patient reports fatigue and frequent daytime naps after activity such as 2 hours of gardening.  Otherwise, patient denies chest pain, shortness of breath, palpitations, dizziness/lightheadedness, orthopnea, PND or Edema.  Patient endorses medication compliance, but is curious if she can stop taking some of her medications.  We discussed the importance of continuing GDMT for heart improved heart function.  We will continue medications at previous dosing; refills provided.  We will check labs and chest x-ray/O p.o. for diminished lung sounds on exam and daytime fatigue.    Past Medical History:   Diagnosis Date    Anxiety 9/14/2010    Cataract     High cholesterol     Hypertension     history of but no treatment    OSTEOPOROSIS 9/14/2010    Pain 03/04/14    6/10=L knee    Pain 11/04/2021    low back    Snoring     no sleep study    Urinary incontinence     only at times at night when sleeping     Past Surgical History:   Procedure Laterality Date    KYPHOPLASTY N/A 11/6/2021    Procedure: KYPHOPLASTY - L1 AND BIOPSY;  Surgeon: Tim Barton M.D.;  Location: SURGERY [facility];  Service: Orthopedics    TRIGGER FINGER RELEASE Left 5/29/2018    Procedure: TRIGGER FINGER RELEASE-  MIDDLE;  Surgeon: Frandy Liz M.D.;  Location: SURGERY [facility];  Service: Orthopedics     JULIAN BY LAPAROSCOPY  5/23/2016    Procedure: JULIAN BY LAPAROSCOPY;  Surgeon: Adan Kearns M.D.;  Location: SURGERY [facility];  Service:     KNEE ARTHROSCOPY  3/11/2014    Performed by Bonilla Valera M.D. at SURGERY [facility]    BLADDER SUSPENSION  2001    HYSTERECTOMY, TOTAL ABDOMINAL  2000    APPENDECTOMY  1969    COLON RESECTION      partial; no CA     Family History   Problem Relation Age of Onset    No Known Problems Father     No Known Problems Mother      Social History     Socioeconomic History    Marital status:      Spouse name: Not on file    Number of children: Not on file    Years of education: Not on file    Highest education level: Not on file   Occupational History    Not on file   Tobacco Use    Smoking status: Never    Smokeless tobacco: Never   Vaping Use    Vaping Use: Never used   Substance and Sexual Activity    Alcohol use: Not Currently     Alcohol/week: 0.0 oz     Comment: 1 time per month    Drug use: Never    Sexual activity: Yes     Partners: Male   Other Topics Concern    Not on file   Social History Narrative    ** Merged History Encounter **          Social Determinants of Health     Financial Resource Strain: Low Risk     Difficulty of Paying Living Expenses: Not very hard   Food Insecurity: No Food Insecurity    Worried About Running Out of Food in the Last Year: Never true    Ran Out of Food in the Last Year: Never true   Transportation Needs: No Transportation Needs    Lack of Transportation (Medical): No    Lack of Transportation (Non-Medical): No   Physical Activity: Inactive    Days of Exercise per Week: 0 days    Minutes of Exercise per Session: 0 min   Stress: Stress Concern Present    Feeling of Stress : To some extent   Social Connections: Moderately Isolated    Frequency of Communication with Friends and Family: More than three times a week    Frequency of Social Gatherings with Friends and Family: More than three times a week    Attends [employer]  "Services: Never    Active Member of Clubs or Organizations: No    Attends Club or Organization Meetings: Never    Marital Status:    Intimate Partner Violence: Not on file   Housing Stability: Low Risk     Unable to Pay for Housing in the Last Year: No    Number of Places Lived in the Last Year: 1    Unstable Housing in the Last Year: No     Allergies   Allergen Reactions    Ampicillin Rash     Feels \"rotten\"     Bactrim Ds      Mouth, lip, tongue, eye swelling, pain, itching    Ciprofloxacin Swelling     Outpatient Encounter Medications as of 9/19/2022   Medication Sig Dispense Refill    carvedilol (COREG) 3.125 MG Tab Take 1 Tablet by mouth 2 times a day. 180 Tablet 3    spironolactone (ALDACTONE) 25 MG Tab Take 1 Tablet by mouth every day. 90 Tablet 3    losartan (COZAAR) 50 MG Tab Take 1 Tablet by mouth every evening. 100 Tablet 3    escitalopram (LEXAPRO) 20 MG tablet TAKE 1 TABLET BY MOUTH EVERY DAY. 90 Tablet 3    omeprazole (PRILOSEC) 20 MG delayed-release capsule TAKE ONE CAPSULE BY MOUTH DAILY 30 Capsule 2    rosuvastatin (CRESTOR) 10 MG Tab Take 1 Tablet by mouth every evening. 100 Tablet 3    [DISCONTINUED] buPROPion SR (WELLBUTRIN-SR) 150 MG TABLET SR 12 HR sustained-release tablet Take 1 Tablet by mouth every day. (Patient not taking: Reported on 9/19/2022) 60 Tablet 2    [DISCONTINUED] famotidine (PEPCID) 20 MG Tab  (Patient not taking: Reported on 9/19/2022)      [DISCONTINUED] spironolactone (ALDACTONE) 25 MG Tab TAKE ONE TABLET BY MOUTH DAILY 30 Tablet 2    [DISCONTINUED] fluticasone (FLONASE) 50 MCG/ACT nasal spray USE TWO SPRAYS INTO AFFECTED NOSTRIL(S) DAILY (Patient not taking: Reported on 9/19/2022) 18.2 mL 2    [DISCONTINUED] carvedilol (COREG) 3.125 MG Tab TAKE ONE TABLET BY MOUTH TWICE A DAY 60 Tablet 2    [DISCONTINUED] furosemide (LASIX) 20 MG Tab Take 0.5 Tablets by mouth 1 time a day as needed (for weight gain more than 3 lbs in 1 day or 5 lbs in 1 week). (Patient not taking: " "Reported on 9/19/2022) 30 Tablet 5     No facility-administered encounter medications on file as of 9/19/2022.     Review of Systems   Constitutional:  Negative for fever, malaise/fatigue and weight loss.   Eyes:  Negative for blurred vision.   Respiratory:  Negative for cough and shortness of breath.    Cardiovascular:  Negative for chest pain, palpitations, orthopnea, claudication, leg swelling and PND.   Gastrointestinal:  Negative for abdominal pain, blood in stool, nausea and vomiting.   Genitourinary:  Negative for dysuria, frequency and hematuria.   Musculoskeletal:  Negative for falls and myalgias.   Neurological:  Negative for dizziness, tingling and loss of consciousness.   Endo/Heme/Allergies:  Does not bruise/bleed easily.            Objective     /70 (BP Location: Left arm, Patient Position: Sitting, BP Cuff Size: Adult)   Pulse 64   Resp 15   Ht 1.575 m (5' 2\")   Wt 69.4 kg (153 lb)   SpO2 94%   BMI 27.98 kg/m²     Physical Exam  Vitals reviewed.   Constitutional:       Appearance: She is well-developed.   HENT:      Head: Normocephalic and atraumatic.   Eyes:      Pupils: Pupils are equal, round, and reactive to light.   Neck:      Vascular: No JVD.   Cardiovascular:      Rate and Rhythm: Normal rate and regular rhythm.      Heart sounds: Normal heart sounds. No murmur heard.    No friction rub. No gallop.   Pulmonary:      Effort: Pulmonary effort is normal. No respiratory distress.      Breath sounds: Examination of the right-lower field reveals decreased breath sounds. Examination of the left-lower field reveals decreased breath sounds. Decreased breath sounds present.   Abdominal:      General: Bowel sounds are normal. There is no distension.      Palpations: Abdomen is soft.   Musculoskeletal:      Right lower leg: No edema.      Left lower leg: No edema.   Skin:     General: Skin is warm and dry.      Findings: No erythema.   Neurological:      General: No focal deficit present.      " Mental Status: She is alert and oriented to person, place, and time. Mental status is at baseline.   Psychiatric:         Mood and Affect: Mood normal.         Behavior: Behavior normal.          Lab Results   Component Value Date/Time    SODIUM 138 05/20/2022 11:18 AM    POTASSIUM 4.3 05/20/2022 11:18 AM    CHLORIDE 102 05/20/2022 11:18 AM    CO2 21 05/20/2022 11:18 AM    GLUCOSE 66 05/20/2022 11:18 AM    BUN 23 (H) 05/20/2022 11:18 AM    CREATININE 1.17 05/20/2022 11:18 AM    CREATININE 0.9 08/28/2006 11:45 AM     Lab Results   Component Value Date/Time    WBC 10.1 05/20/2022 11:19 AM    RBC 4.68 05/20/2022 11:19 AM    HEMOGLOBIN 14.5 05/20/2022 11:19 AM    HEMATOCRIT 45.6 05/20/2022 11:19 AM    MCV 97.4 05/20/2022 11:19 AM    MCH 31.0 05/20/2022 11:19 AM    MCHC 31.8 (L) 05/20/2022 11:19 AM    MPV 10.2 05/20/2022 11:19 AM    NEUTSPOLYS 68.80 04/10/2022 02:15 AM    LYMPHOCYTES 14.40 (L) 04/10/2022 02:15 AM    MONOCYTES 14.70 (H) 04/10/2022 02:15 AM    EOSINOPHILS 1.00 04/10/2022 02:15 AM    BASOPHILS 0.40 04/10/2022 02:15 AM      No results found for: BNPBTYPENAT   No results found for: CRPCARDIAC  Lab Results   Component Value Date/Time    CHOLSTRLTOT 173 04/05/2022 01:11 AM    LDL 88 04/05/2022 01:11 AM    HDL 69 04/05/2022 01:11 AM    TRIGLYCERIDE 80 04/05/2022 01:11 AM       Lab Results   Component Value Date/Time    PROTHROMBTM 13.2 11/04/2021 02:30 PM    INR 1.03 11/04/2021 02:30 PM     No results found for: TROPONINI, CKMB   Echocardiography  Moderately reduced left ventricular systolic function.   The left ventricular ejection fraction is visually estimated to be 35- 40% with qrrt-rb-ahtr variation.   Global hypokinesis with regional variation.  Moderate aortic insufficiency. Pressure half time is  313 msec.  Right ventricular systolic pressure is estimated to be 45  mmHg.  IVC not interpretable, going out of plane of US.    [facility] (4/7/2022):   Findings   Hemodynamics:   Aorta: 112/81 mmHg  LVEDP: 7 mmHg  No  significant pullback gradient across the aortic valve     Coronary Anatomy              Left Main: Normal              LAD: Mid 40% stenosis, distal 80 and 90% stenosis- sequential lesions.              LCx: Ectasia in the mid AV groove.  The first OM has ostial 20% stenosis, the second arm has ostial 60% stenosis, the third OM is occluded and appears to be small              RCA: Dominant, 30% proximal stenosis.  The PDA has a mid 30% stenosis.  The PLB is normal    IMPRESSIONS:  1.  Ischemic cardiomyopathy with severe stenosis in the LAD and occlusion of the OM 3  2.  Successful PCI of the LAD using 2 overlapping drug-eluting stents, IVUS guidance  3.  Normal LVEDP  Recommendations:  Dual antiplatelet therapy for 12 months     Cardiac event monitor (5/18/2022): Underlying rhythm: Sinus   The average heart rate is 66 BPM, and ranges from  BPM   Atrial events: Occasional atrial ectopy with an burden of 3.5% and rare atrial fibrillation, lasting up to 2 hours and 20 minutes, with an average rate of 104 bpm   Ventricular events: Rare   Pauses, bradyarrhythmia or heart block: None significant   Patient events: one event was submitted for review, the event was not described. The corresponding rhythm was sinus with PACs.     Transthoracic echocardiogram (7/22/2022):  Normal left ventricular size and systolic function.  Normal right ventricular size and systolic function.  The left atrium is normal in size.  Mild mitral regurgitation.  Mild eccentric aortic insufficiency.  Mild tricuspid regurgitation.  Normal pericardium without effusion.     Prior echo on 04/04/2022, improved severity of aortic regurgitation and   improved left ventricular systolic function.     Assessment & Plan     1. 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 Paroxysmal A-fib (HCC)  DX-CHEST-2 VIEWS     OVERNIGHT PULSE OXIMETRY    Comp Metabolic Panel    CBC WITHOUT DIFFERENTIAL      3. Stage 3a chronic kidney disease (HCC)  DX-CHEST-2 VIEWS    OVERNIGHT PULSE OXIMETRY    Comp Metabolic Panel    CBC WITHOUT DIFFERENTIAL      4. High risk medication use  DX-CHEST-2 VIEWS    OVERNIGHT PULSE OXIMETRY    Comp Metabolic Panel    CBC WITHOUT DIFFERENTIAL      5. Status post coronary artery stent placement  DX-CHEST-2 VIEWS    OVERNIGHT PULSE OXIMETRY    Comp Metabolic Panel    CBC WITHOUT DIFFERENTIAL      6. Coronary artery disease involving native coronary artery of native heart without angina pectoris  DX-CHEST-2 VIEWS    OVERNIGHT PULSE OXIMETRY    Comp Metabolic Panel    CBC WITHOUT DIFFERENTIAL      7. Atrial fibrillation, unspecified type (HCC)  DX-CHEST-2 VIEWS    OVERNIGHT PULSE OXIMETRY    Comp Metabolic Panel    CBC WITHOUT DIFFERENTIAL      8. Other forms of dyspnea  DX-CHEST-2 VIEWS    OVERNIGHT PULSE OXIMETRY    Comp Metabolic Panel    CBC WITHOUT DIFFERENTIAL          Medical Decision Making: Today's Assessment/Status/Plan:        HFrecEF, Stage C, Class II, LVEF 60% (previously 35-40%): euvolemic  -Heart failure due to ischemic cardiomyopathy  -ACE-I/ARB/ARNI: Continue losartan 50 mg daily  -Evidence Based Beta-blocker: Continue Coreg 3.125 mg twice daily  -Aldosterone Antagonist: Continue spironolactone 25 mg daily  -SGLT2-I: Patient declines at this time.  -Diuretic:  Lasix to as needed  -EF 60%  -Reinforced s/sx of worsening heart failure with patient and weight monitoring. Pt verbalizes understanding. Pt to call office if present.    -Advanced care planning: Advanced directive planning guide previously provided.  Reminded of importance as previously discussed.  All    CAD s/p DENIS/PCI to LADx2 (4/2022); HLD  -ASA 81 mg daily.  Likely lifelong  -Continue rosuvastatin 10 mg daily  -Beta-blocker, ARB per above  -Cardiac rehab referral placed.  Patient declined  -Discussed worsening symptoms of chest pain,  patient to go the emergency room  -Consider antianginal therapy negative pulmonary work-up     Hypertension; frailty  -Today in office blood pressure is well controlled for her age and frailty  -Home blood pressure recordings are reported per above.  Encouraged to continue home BP monitoring/log.  -Medication recommendations per above.    PAF  -Sinus rhythm on previous EKG.  Appears regular today.   -Rare A. fib on cardiac event monitor.  High bleeding risk  -No OAC at this time.    ARRIAZA; fatigue  -Two-view chest x-ray  -O p.o.  -CBC rule out anemia.  CMP renal, electrolyte, liver abnormalities likely attributed to fatigue.    FU in clinic in 6 months with general cardiology. Sooner if needed.    Patient verbalizes understanding and agrees with the plan of care.     I personally spent a total of 34 minutes which includes face-to-face time and non-face-to-face time spent on preparing to see the patient, reviewing prior notes and tests, obtaining history from the patient, performing a medically appropriate exam, counseling and educating the patient, ordering medications/tests/procedures/referrals as clinically indicated, and documenting information in the electronic medical record.    MITZI Avila.   [facility]  [phone]    PLEASE NOTE: This Note was created using voice recognition Software. I have made every reasonable attempt to correct obvious errors, but I expect that there are errors of grammar and possibly content that I did not discover before finalizing the note

## 2022-09-19 NOTE — TELEPHONE ENCOUNTER
Is the patient due for a refill? Yes    Was the patient seen the past year? No    Date of last office visit: 09/19/22    Does the patient have an upcoming appointment?  No      Provider to refill: Aishwarya Plata      Does the patients insurance require a 100 day supply?  Yes

## 2022-09-20 RX ORDER — ROSUVASTATIN CALCIUM 10 MG/1
10 TABLET, COATED ORAL EVERY EVENING
Qty: 100 TABLET | Refills: 3 | Status: SHIPPED | OUTPATIENT
Start: 2022-09-20 | End: 2023-06-13 | Stop reason: SDUPTHER

## 2022-10-22 ENCOUNTER — HOSPITAL ENCOUNTER (OUTPATIENT)
Dept: LAB | Facility: MEDICAL CENTER | Age: 87
End: 2022-10-22
Attending: NURSE PRACTITIONER
Payer: MEDICARE

## 2022-10-22 DIAGNOSIS — N18.31 STAGE 3A CHRONIC KIDNEY DISEASE: ICD-10-CM

## 2022-10-22 DIAGNOSIS — Z95.5 STATUS POST CORONARY ARTERY STENT PLACEMENT: ICD-10-CM

## 2022-10-22 DIAGNOSIS — I48.91 ATRIAL FIBRILLATION, UNSPECIFIED TYPE (HCC): ICD-10-CM

## 2022-10-22 DIAGNOSIS — I25.5 ACC/AHA STAGE B SYSTOLIC HEART FAILURE DUE TO ISCHEMIC CARDIOMYOPATHY (HCC): ICD-10-CM

## 2022-10-22 DIAGNOSIS — I25.10 CORONARY ARTERY DISEASE INVOLVING NATIVE CORONARY ARTERY OF NATIVE HEART WITHOUT ANGINA PECTORIS: ICD-10-CM

## 2022-10-22 DIAGNOSIS — R06.09 OTHER FORMS OF DYSPNEA: ICD-10-CM

## 2022-10-22 DIAGNOSIS — I50.20 ACC/AHA STAGE B SYSTOLIC HEART FAILURE DUE TO ISCHEMIC CARDIOMYOPATHY (HCC): ICD-10-CM

## 2022-10-22 DIAGNOSIS — I48.0 PAROXYSMAL A-FIB (HCC): ICD-10-CM

## 2022-10-22 DIAGNOSIS — Z79.899 HIGH RISK MEDICATION USE: ICD-10-CM

## 2022-10-22 LAB
ALBUMIN SERPL BCP-MCNC: 4.9 G/DL (ref 3.2–4.9)
ALBUMIN/GLOB SERPL: 2.2 G/DL
ALP SERPL-CCNC: 55 U/L (ref 30–99)
ALT SERPL-CCNC: 10 U/L (ref 2–50)
ANION GAP SERPL CALC-SCNC: 12 MMOL/L (ref 7–16)
AST SERPL-CCNC: 16 U/L (ref 12–45)
BILIRUB SERPL-MCNC: 0.7 MG/DL (ref 0.1–1.5)
BUN SERPL-MCNC: 25 MG/DL (ref 8–22)
CALCIUM SERPL-MCNC: 9.7 MG/DL (ref 8.5–10.5)
CHLORIDE SERPL-SCNC: 103 MMOL/L (ref 96–112)
CO2 SERPL-SCNC: 25 MMOL/L (ref 20–33)
CREAT SERPL-MCNC: 1.32 MG/DL (ref 0.5–1.4)
ERYTHROCYTE [DISTWIDTH] IN BLOOD BY AUTOMATED COUNT: 51.2 FL (ref 35.9–50)
FASTING STATUS PATIENT QL REPORTED: NORMAL
GFR SERPLBLD CREATININE-BSD FMLA CKD-EPI: 39 ML/MIN/1.73 M 2
GLOBULIN SER CALC-MCNC: 2.2 G/DL (ref 1.9–3.5)
GLUCOSE SERPL-MCNC: 111 MG/DL (ref 65–99)
HCT VFR BLD AUTO: 49.4 % (ref 37–47)
HGB BLD-MCNC: 16 G/DL (ref 12–16)
MCH RBC QN AUTO: 31.6 PG (ref 27–33)
MCHC RBC AUTO-ENTMCNC: 32.4 G/DL (ref 33.6–35)
MCV RBC AUTO: 97.4 FL (ref 81.4–97.8)
PLATELET # BLD AUTO: 286 K/UL (ref 164–446)
PMV BLD AUTO: 11.1 FL (ref 9–12.9)
POTASSIUM SERPL-SCNC: 4.4 MMOL/L (ref 3.6–5.5)
PROT SERPL-MCNC: 7.1 G/DL (ref 6–8.2)
RBC # BLD AUTO: 5.07 M/UL (ref 4.2–5.4)
SODIUM SERPL-SCNC: 140 MMOL/L (ref 135–145)
WBC # BLD AUTO: 9.7 K/UL (ref 4.8–10.8)

## 2022-10-22 PROCEDURE — 85027 COMPLETE CBC AUTOMATED: CPT

## 2022-10-22 PROCEDURE — 36415 COLL VENOUS BLD VENIPUNCTURE: CPT

## 2022-10-22 PROCEDURE — 80053 COMPREHEN METABOLIC PANEL: CPT

## 2022-10-25 RX ORDER — OMEPRAZOLE 20 MG/1
CAPSULE, DELAYED RELEASE ORAL
Qty: 90 CAPSULE | Refills: 3 | Status: SHIPPED | OUTPATIENT
Start: 2022-10-25 | End: 2023-04-27

## 2022-10-25 NOTE — TELEPHONE ENCOUNTER
Last seen: 7/21/22 by Dr. Dr. Samuel Next appt: 12/19/22     Does patient have an active prescription for medications requested? No    Received Request Via: Pharmacy

## 2022-11-19 ENCOUNTER — OFFICE VISIT (OUTPATIENT)
Dept: URGENT CARE | Facility: CLINIC | Age: 87
End: 2022-11-19
Payer: MEDICARE

## 2022-11-19 ENCOUNTER — HOSPITAL ENCOUNTER (OUTPATIENT)
Facility: MEDICAL CENTER | Age: 87
End: 2022-11-19
Attending: NURSE PRACTITIONER
Payer: MEDICARE

## 2022-11-19 VITALS
SYSTOLIC BLOOD PRESSURE: 130 MMHG | WEIGHT: 157 LBS | BODY MASS INDEX: 26.8 KG/M2 | RESPIRATION RATE: 20 BRPM | TEMPERATURE: 97.9 F | DIASTOLIC BLOOD PRESSURE: 70 MMHG | OXYGEN SATURATION: 95 % | HEIGHT: 64 IN | HEART RATE: 67 BPM

## 2022-11-19 DIAGNOSIS — R68.83 CHILLS: ICD-10-CM

## 2022-11-19 DIAGNOSIS — R30.0 DYSURIA: ICD-10-CM

## 2022-11-19 DIAGNOSIS — N30.01 ACUTE CYSTITIS WITH HEMATURIA: ICD-10-CM

## 2022-11-19 DIAGNOSIS — N30.01 ACUTE CYSTITIS WITH HEMATURIA: Primary | ICD-10-CM

## 2022-11-19 LAB
APPEARANCE UR: NORMAL
BILIRUB UR STRIP-MCNC: NEGATIVE MG/DL
COLOR UR AUTO: NORMAL
GLUCOSE UR STRIP.AUTO-MCNC: NEGATIVE MG/DL
KETONES UR STRIP.AUTO-MCNC: NORMAL MG/DL
LEUKOCYTE ESTERASE UR QL STRIP.AUTO: NORMAL
NITRITE UR QL STRIP.AUTO: POSITIVE
PH UR STRIP.AUTO: 5.5 [PH] (ref 5–8)
PROT UR QL STRIP: NEGATIVE MG/DL
RBC UR QL AUTO: NORMAL
SP GR UR STRIP.AUTO: 1.02
UROBILINOGEN UR STRIP-MCNC: 0.2 MG/DL

## 2022-11-19 PROCEDURE — 87086 URINE CULTURE/COLONY COUNT: CPT

## 2022-11-19 PROCEDURE — 87186 SC STD MICRODIL/AGAR DIL: CPT

## 2022-11-19 PROCEDURE — 81002 URINALYSIS NONAUTO W/O SCOPE: CPT | Performed by: NURSE PRACTITIONER

## 2022-11-19 PROCEDURE — 87077 CULTURE AEROBIC IDENTIFY: CPT

## 2022-11-19 PROCEDURE — 99213 OFFICE O/P EST LOW 20 MIN: CPT | Performed by: NURSE PRACTITIONER

## 2022-11-19 RX ORDER — CEFDINIR 300 MG/1
300 CAPSULE ORAL EVERY 12 HOURS
Qty: 10 CAPSULE | Refills: 0 | Status: SHIPPED | OUTPATIENT
Start: 2022-11-19 | End: 2022-11-19

## 2022-11-19 RX ORDER — CEFDINIR 300 MG/1
300 CAPSULE ORAL EVERY 12 HOURS
Qty: 10 CAPSULE | Refills: 0 | Status: SHIPPED | OUTPATIENT
Start: 2022-11-19 | End: 2022-11-26

## 2022-11-19 ASSESSMENT — ENCOUNTER SYMPTOMS
FLANK PAIN: 0
FEVER: 1
CHILLS: 1
MYALGIAS: 0

## 2022-11-19 ASSESSMENT — FIBROSIS 4 INDEX: FIB4 SCORE: 1.56

## 2022-11-19 NOTE — PATIENT INSTRUCTIONS
Urinary Tract Infection, Adult  A urinary tract infection (UTI) is an infection of any part of the urinary tract. The urinary tract includes:  The kidneys.  The ureters.  The bladder.  The urethra.  These organs make, store, and get rid of pee (urine) in the body.  What are the causes?  This is caused by germs (bacteria) in your genital area. These germs grow and cause swelling (inflammation) of your urinary tract.  What increases the risk?  You are more likely to develop this condition if:  You have a small, thin tube (catheter) to drain pee.  You cannot control when you pee or poop (incontinence).  You are female, and:  You use these methods to prevent pregnancy:  A medicine that kills sperm (spermicide).  A device that blocks sperm (diaphragm).  You have low levels of a female hormone (estrogen).  You are pregnant.  You have genes that add to your risk.  You are sexually active.  You take antibiotic medicines.  You have trouble peeing because of:  A prostate that is bigger than normal, if you are male.  A blockage in the part of your body that drains pee from the bladder (urethra).  A kidney stone.  A nerve condition that affects your bladder (neurogenic bladder).  Not getting enough to drink.  Not peeing often enough.  You have other conditions, such as:  Diabetes.  A weak disease-fighting system (immune system).  Sickle cell disease.  Gout.  Injury of the spine.  What are the signs or symptoms?  Symptoms of this condition include:  Needing to pee right away (urgently).  Peeing often.  Peeing small amounts often.  Pain or burning when peeing.  Blood in the pee.  Pee that smells bad or not like normal.  Trouble peeing.  Pee that is cloudy.  Fluid coming from the vagina, if you are female.  Pain in the belly or lower back.  Other symptoms include:  Throwing up (vomiting).  No urge to eat.  Feeling mixed up (confused).  Being tired and grouchy (irritable).  A fever.  Watery poop (diarrhea).  How is this  treated?  This condition may be treated with:  Antibiotic medicine.  Other medicines.  Drinking enough water.  Follow these instructions at home:    Medicines  Take over-the-counter and prescription medicines only as told by your doctor.  If you were prescribed an antibiotic medicine, take it as told by your doctor. Do not stop taking it even if you start to feel better.  General instructions  Make sure you:  Pee until your bladder is empty.  Do not hold pee for a long time.  Empty your bladder after sex.  Wipe from front to back after pooping if you are a female. Use each tissue one time when you wipe.  Drink enough fluid to keep your pee pale yellow.  Keep all follow-up visits as told by your doctor. This is important.  Contact a doctor if:  You do not get better after 1-2 days.  Your symptoms go away and then come back.  Get help right away if:  You have very bad back pain.  You have very bad pain in your lower belly.  You have a fever.  You are sick to your stomach (nauseous).  You are throwing up.  Summary  A urinary tract infection (UTI) is an infection of any part of the urinary tract.  This condition is caused by germs in your genital area.  There are many risk factors for a UTI. These include having a small, thin tube to drain pee and not being able to control when you pee or poop.  Treatment includes antibiotic medicines for germs.  Drink enough fluid to keep your pee pale yellow.  This information is not intended to replace advice given to you by your health care provider. Make sure you discuss any questions you have with your health care provider.  Document Released: 06/05/2009 Document Revised: 12/05/2019 Document Reviewed: 06/27/2019  [employer] Medical Patient Education © 2020 [employer]

## 2022-11-19 NOTE — PROGRESS NOTES
"Subjective:     Linda Casas is a 88 y.o. female who presents for Urinary Frequency (X4-5 days)      Urinary Frequency  This is a new problem. The current episode started 1 to 4 weeks ago (2 weeks ago Linda developed UTI symptoms). The problem occurs constantly. Associated symptoms include chills, a fever and urinary symptoms. Pertinent negatives include no myalgias. She has tried rest for the symptoms.   She is accompanied by her family in the clinic today.  Her family states that she has been more aggressive due to her symptoms.  She denies back pain or fever.  Review of Systems   Constitutional:  Positive for chills and fever.   Genitourinary:  Positive for dysuria. Negative for flank pain, frequency, hematuria and urgency.   Musculoskeletal:  Negative for myalgias.     PMH:   Past Medical History:   Diagnosis Date    Anxiety 9/14/2010    Cataract     High cholesterol     Hypertension     history of but no treatment    OSTEOPOROSIS 9/14/2010    Pain 03/04/14    6/10=L knee    Pain 11/04/2021    low back    Snoring     no sleep study    Urinary incontinence     only at times at night when sleeping     ALLERGIES:   Allergies   Allergen Reactions    Ampicillin Rash     Feels \"rotten\"     Bactrim Ds      Mouth, lip, tongue, eye swelling, pain, itching    Ciprofloxacin Swelling     SURGHX:   Past Surgical History:   Procedure Laterality Date    KYPHOPLASTY N/A 11/6/2021    Procedure: KYPHOPLASTY - L1 AND BIOPSY;  Surgeon: Tim Barton M.D.;  Location: SURGERY [facility];  Service: Orthopedics    TRIGGER FINGER RELEASE Left 5/29/2018    Procedure: TRIGGER FINGER RELEASE-  MIDDLE;  Surgeon: Frandy Liz M.D.;  Location: SURGERY [facility];  Service: Orthopedics    JULIAN BY LAPAROSCOPY  5/23/2016    Procedure: JULIAN BY LAPAROSCOPY;  Surgeon: Adan Kearns M.D.;  Location: [facility];  Service:     KNEE ARTHROSCOPY  3/11/2014    Performed by Bonilla Valera M.D. at SURGERY " [facility]    BLADDER SUSPENSION  2001    HYSTERECTOMY, TOTAL ABDOMINAL  2000    APPENDECTOMY  1969    COLON RESECTION      partial; no CA     SOCHX:   Social History     Socioeconomic History    Marital status:    Tobacco Use    Smoking status: Never    Smokeless tobacco: Never   Vaping Use    Vaping Use: Never used   Substance and Sexual Activity    Alcohol use: Not Currently     Alcohol/week: 0.0 oz     Comment: 1 time per month    Drug use: Never    Sexual activity: Yes     Partners: Male   Social History Narrative    ** Merged History Encounter **          Social Determinants of Health     Financial Resource Strain: Low Risk     Difficulty of Paying Living Expenses: Not very hard   Food Insecurity: No Food Insecurity    Worried About Running Out of Food in the Last Year: Never true    Ran Out of Food in the Last Year: Never true   Transportation Needs: No Transportation Needs    Lack of Transportation (Medical): No    Lack of Transportation (Non-Medical): No   Physical Activity: Inactive    Days of Exercise per Week: 0 days    Minutes of Exercise per Session: 0 min   Stress: Stress Concern Present    Feeling of Stress : To some extent   Social Connections: Moderately Isolated    Frequency of Communication with Friends and Family: More than three times a week    Frequency of Social Gatherings with Friends and Family: More than three times a week    Attends [employer] Services: Never    Active Member of Clubs or Organizations: No    Attends Club or Organization Meetings: Never    Marital Status:    Housing Stability: Low Risk     Unable to Pay for Housing in the Last Year: No    Number of Places Lived in the Last Year: 1    Unstable Housing in the Last Year: No     FH:   Family History   Problem Relation Age of Onset    No Known Problems Father     No Known Problems Mother          Objective:   /70 (BP Location: Left arm, Patient Position: Sitting, BP Cuff Size: Adult)   Pulse 67   Temp  "36.6 °C (97.9 °F) (Temporal)   Resp 20   Ht 1.626 m (5' 4\")   Wt 71.2 kg (157 lb)   SpO2 95%   BMI 26.95 kg/m²     Physical Exam  Vitals and nursing note reviewed.   Constitutional:       General: She is not in acute distress.     Appearance: Normal appearance. She is not ill-appearing.   HENT:      Head: Normocephalic and atraumatic.      Right Ear: External ear normal.      Left Ear: External ear normal.      Nose: No congestion or rhinorrhea.      Mouth/Throat:      Mouth: Mucous membranes are moist.   Eyes:      Extraocular Movements: Extraocular movements intact.      Pupils: Pupils are equal, round, and reactive to light.   Cardiovascular:      Rate and Rhythm: Normal rate and regular rhythm.      Pulses: Normal pulses.      Heart sounds: Normal heart sounds.   Pulmonary:      Effort: Pulmonary effort is normal.      Breath sounds: Normal breath sounds.   Abdominal:      General: Abdomen is flat. Bowel sounds are normal.      Palpations: Abdomen is soft.      Tenderness: There is no abdominal tenderness. There is no right CVA tenderness or left CVA tenderness.   Musculoskeletal:         General: Normal range of motion.      Cervical back: Normal range of motion and neck supple.   Skin:     General: Skin is warm and dry.      Capillary Refill: Capillary refill takes less than 2 seconds.   Neurological:      General: No focal deficit present.      Mental Status: She is alert and oriented to person, place, and time. Mental status is at baseline.   Psychiatric:         Mood and Affect: Mood normal.         Behavior: Behavior normal.         Thought Content: Thought content normal.         Judgment: Judgment normal.       POCT urine: Positive nitrates, small blood, small leukocytes  Assessment/Plan:   Assessment      1. Acute cystitis with hematuria  Urine Culture    cefdinir (OMNICEF) 300 MG Cap    DISCONTINUED: cefdinir (OMNICEF) 300 MG Cap      2. Dysuria  POCT Urinalysis    Urine Culture      3. Chills  " Urine Culture        Prescription was sent in to preferred pharmacy. AVS was given and reviewed with patient. Patient educated on red flags of UTI and encouraged to seek care back in UC or ER for  fever, chills, flank pain, or worsening symptoms.

## 2022-12-02 ENCOUNTER — DOCUMENTATION (OUTPATIENT)
Dept: HEALTH INFORMATION MANAGEMENT | Facility: OTHER | Age: 87
End: 2022-12-02
Payer: MEDICARE

## 2022-12-04 ENCOUNTER — TELEPHONE (OUTPATIENT)
Dept: MEDICAL GROUP | Facility: PHYSICIAN GROUP | Age: 87
End: 2022-12-04
Payer: MEDICARE

## 2022-12-05 NOTE — TELEPHONE ENCOUNTER
I received a message from the call center that the patient is over medicated. Patient was given her nighttime medication by her daughter around 5 pm and then the daughter noticed Monday night's medication was also taken out of the pill box. Patient took 2 doses of Losartan 50 mg, Rosuvastatin 10 mg, and Carvedilol 3.125 mg. Daughter reports it has been almost 4 hours since the medication was given and patient has not complained of any side effects yet. We discussed that the major concerns would be hypotension and bradycardia. I advised the daughter to keep a close eye on the patient for any signs of dizziness, lightheadedness, and syncope. Daughter reports she lives in the home with the patient and the patient will be going to bed soon. We discussed the half life of the medication and patient can resume normal doses tomorrow.

## 2023-01-20 ENCOUNTER — HOSPITAL ENCOUNTER (OUTPATIENT)
Facility: MEDICAL CENTER | Age: 88
End: 2023-01-20
Attending: NURSE PRACTITIONER
Payer: MEDICARE

## 2023-01-20 ENCOUNTER — OFFICE VISIT (OUTPATIENT)
Dept: URGENT CARE | Facility: CLINIC | Age: 88
End: 2023-01-20
Payer: MEDICARE

## 2023-01-20 VITALS
HEART RATE: 88 BPM | WEIGHT: 157 LBS | HEIGHT: 64 IN | RESPIRATION RATE: 16 BRPM | TEMPERATURE: 98 F | OXYGEN SATURATION: 95 % | SYSTOLIC BLOOD PRESSURE: 128 MMHG | BODY MASS INDEX: 26.8 KG/M2 | DIASTOLIC BLOOD PRESSURE: 76 MMHG

## 2023-01-20 DIAGNOSIS — N39.0 RECURRENT UTI: ICD-10-CM

## 2023-01-20 LAB
APPEARANCE UR: CLEAR
BILIRUB UR STRIP-MCNC: NEGATIVE MG/DL
COLOR UR AUTO: NORMAL
GLUCOSE UR STRIP.AUTO-MCNC: NEGATIVE MG/DL
KETONES UR STRIP.AUTO-MCNC: NEGATIVE MG/DL
LEUKOCYTE ESTERASE UR QL STRIP.AUTO: NORMAL
NITRITE UR QL STRIP.AUTO: NEGATIVE
PH UR STRIP.AUTO: 5.5 [PH] (ref 5–8)
PROT UR QL STRIP: NEGATIVE MG/DL
RBC UR QL AUTO: NORMAL
SP GR UR STRIP.AUTO: 1.02
UROBILINOGEN UR STRIP-MCNC: 0.2 MG/DL

## 2023-01-20 PROCEDURE — 99214 OFFICE O/P EST MOD 30 MIN: CPT | Performed by: NURSE PRACTITIONER

## 2023-01-20 PROCEDURE — 81002 URINALYSIS NONAUTO W/O SCOPE: CPT | Performed by: NURSE PRACTITIONER

## 2023-01-20 PROCEDURE — 87086 URINE CULTURE/COLONY COUNT: CPT

## 2023-01-20 RX ORDER — CEFDINIR 300 MG/1
300 CAPSULE ORAL 2 TIMES DAILY
Qty: 14 CAPSULE | Refills: 0 | Status: SHIPPED | OUTPATIENT
Start: 2023-01-20 | End: 2023-01-27

## 2023-01-20 ASSESSMENT — ENCOUNTER SYMPTOMS
BACK PAIN: 0
NAUSEA: 0
FEVER: 0
ABDOMINAL PAIN: 1
VOMITING: 0
CHILLS: 0
CONSTITUTIONAL NEGATIVE: 1
FLANK PAIN: 0

## 2023-01-20 ASSESSMENT — FIBROSIS 4 INDEX: FIB4 SCORE: 1.56

## 2023-01-20 ASSESSMENT — VISUAL ACUITY: OU: 1

## 2023-01-20 NOTE — PROGRESS NOTES
Subjective:     Linda Casas is a 88 y.o. female who presents for UTI (Having stomache cramping and pressure x last night )       UTI  This is a new problem. The problem has been gradually worsening. Associated symptoms include abdominal pain. Pertinent negatives include no chills, fever, nausea, urinary symptoms or vomiting.     Daughter-in-law present who helps to provide history.    History of recurrent UTI.  Similar symptoms.  Daughter-in-law reports noticing patient exhibiting mood change.  Patient complaining of suprapubic pressure.  Symptoms ongoing for 2 days.    Last seen in urgent care 11/19/2022.  Urinalysis was consistent with UTI.  Treated with cefdinir.    Unique test result dated 11/19/2022 reviewed: Urine culture positive for E. coli sensitive to cephalosporins    Review of Systems   Constitutional: Negative.  Negative for chills and fever.   Gastrointestinal:  Positive for abdominal pain. Negative for nausea and vomiting.   Genitourinary: Negative.  Negative for dysuria, flank pain, frequency and urgency.   Musculoskeletal:  Negative for back pain.   All other systems reviewed and are negative.    Refer to HPI for additional details.    During this visit, appropriate PPE was worn, hand hygiene was performed, and the patient and any visitors were masked.    PMH:  has a past medical history of Anxiety (9/14/2010), Cataract, High cholesterol, Hypertension, OSTEOPOROSIS (9/14/2010), Pain (03/04/14), Pain (11/04/2021), Snoring, and Urinary incontinence.    She has no past medical history of Breast cancer (HCC).    MEDS:   Current Outpatient Medications:     cefdinir (OMNICEF) 300 MG Cap, Take 1 Capsule by mouth 2 times a day for 7 days., Disp: 14 Capsule, Rfl: 0    omeprazole (PRILOSEC) 20 MG delayed-release capsule, TAKE ONE CAPSULE BY MOUTH DAILY, Disp: 90 Capsule, Rfl: 3    rosuvastatin (CRESTOR) 10 MG Tab, Take 1 Tablet by mouth every evening., Disp: 100 Tablet, Rfl: 3    carvedilol  "(COREG) 3.125 MG Tab, Take 1 Tablet by mouth 2 times a day., Disp: 180 Tablet, Rfl: 3    spironolactone (ALDACTONE) 25 MG Tab, Take 1 Tablet by mouth every day., Disp: 90 Tablet, Rfl: 3    losartan (COZAAR) 50 MG Tab, Take 1 Tablet by mouth every evening., Disp: 100 Tablet, Rfl: 3    escitalopram (LEXAPRO) 20 MG tablet, TAKE 1 TABLET BY MOUTH EVERY DAY., Disp: 90 Tablet, Rfl: 3    ALLERGIES:   Allergies   Allergen Reactions    Ampicillin Rash     Feels \"rotten\"     Bactrim Ds      Mouth, lip, tongue, eye swelling, pain, itching    Ciprofloxacin Swelling     SURGHX:   Past Surgical History:   Procedure Laterality Date    KYPHOPLASTY N/A 11/6/2021    Procedure: KYPHOPLASTY - L1 AND BIOPSY;  Surgeon: Tim Barton M.D.;  Location: [facility];  Service: Orthopedics    TRIGGER FINGER RELEASE Left 5/29/2018    Procedure: TRIGGER FINGER RELEASE-  MIDDLE;  Surgeon: Frandy Liz M.D.;  Location: SURGERY [facility];  Service: Orthopedics    JULIAN BY LAPAROSCOPY  5/23/2016    Procedure: JULIAN BY LAPAROSCOPY;  Surgeon: Adan Kearns M.D.;  Location: SURGERY [facility];  Service:     KNEE ARTHROSCOPY  3/11/2014    Performed by Bonilla Valera M.D. at [facility]    BLADDER SUSPENSION  2001    HYSTERECTOMY, TOTAL ABDOMINAL  2000    APPENDECTOMY  1969    COLON RESECTION      partial; no CA     SOCHX:  reports that she has never smoked. She has never used smokeless tobacco. She reports that she does not currently use alcohol. She reports that she does not use drugs.    FH: Per HPI as applicable/pertinent.      Objective:     /76 (BP Location: Left arm, Patient Position: Sitting, BP Cuff Size: Adult)   Pulse 88   Temp 36.7 °C (98 °F) (Temporal)   Resp 16   Ht 1.626 m (5' 4\")   Wt 71.2 kg (157 lb)   SpO2 95%   BMI 26.95 kg/m²     Physical Exam  Nursing note reviewed.   Constitutional:       General: She is not in acute distress.     Appearance: She is well-developed. She is not " ill-appearing or toxic-appearing.   Eyes:      General: Vision grossly intact.   Cardiovascular:      Rate and Rhythm: Normal rate.   Pulmonary:      Effort: Pulmonary effort is normal. No respiratory distress.   Abdominal:      General: There is no distension.      Palpations: Abdomen is soft.      Tenderness: There is abdominal tenderness in the suprapubic area. There is no right CVA tenderness or left CVA tenderness.   Musculoskeletal:         General: No deformity. Normal range of motion.   Skin:     General: Skin is warm and dry.      Coloration: Skin is not pale.   Neurological:      Mental Status: She is alert and oriented to person, place, and time.      Motor: No weakness.   Psychiatric:         Behavior: Behavior normal. Behavior is cooperative.     UA: trace blood, small LE      Assessment/Plan:     1. Recurrent UTI  - POCT Urinalysis  - URINE CULTURE(NEW); Future  - cefdinir (OMNICEF) 300 MG Cap; Take 1 Capsule by mouth 2 times a day for 7 days.  Dispense: 14 Capsule; Refill: 0    Rx as above sent electronically. Increase fluids.    Daughter-in-law reports patient has not seen urology.  Suggest follow-up with urology.  Referral offered.  They will defer at this time.  They will follow-up with PCP on the 30th.    Vital signs stable, afebrile, no acute distress at this time. Warning signs reviewed. Return precautions discussed.     Differential diagnosis, natural history, supportive care, over-the-counter symptom management per 's instructions, close monitoring, and indications for immediate follow-up discussed.     All questions answered. Patient/daughter-in-law agrees with the plan of care.    Discharge summary provided.     Billing note: established patient with moderate complexity and moderate risk. [id number]. Please refer to LOS tool for details.    Billing note: chronic/recurrent illness with exacerbation/progression; prescription drug management. [id number].

## 2023-01-23 LAB
BACTERIA UR CULT: NORMAL
SIGNIFICANT IND 70042: NORMAL
SITE SITE: NORMAL
SOURCE SOURCE: NORMAL

## 2023-01-30 ENCOUNTER — APPOINTMENT (OUTPATIENT)
Dept: MEDICAL GROUP | Facility: PHYSICIAN GROUP | Age: 88
End: 2023-01-30
Payer: MEDICARE

## 2023-03-13 ENCOUNTER — OFFICE VISIT (OUTPATIENT)
Dept: MEDICAL GROUP | Facility: PHYSICIAN GROUP | Age: 88
End: 2023-03-13
Payer: MEDICARE

## 2023-03-13 ENCOUNTER — HOSPITAL ENCOUNTER (OUTPATIENT)
Facility: MEDICAL CENTER | Age: 88
End: 2023-03-13
Attending: STUDENT IN AN ORGANIZED HEALTH CARE EDUCATION/TRAINING PROGRAM
Payer: MEDICARE

## 2023-03-13 ENCOUNTER — HOSPITAL ENCOUNTER (OUTPATIENT)
Dept: LAB | Facility: MEDICAL CENTER | Age: 88
End: 2023-03-13
Attending: STUDENT IN AN ORGANIZED HEALTH CARE EDUCATION/TRAINING PROGRAM
Payer: MEDICARE

## 2023-03-13 VITALS
OXYGEN SATURATION: 93 % | WEIGHT: 158.6 LBS | RESPIRATION RATE: 16 BRPM | SYSTOLIC BLOOD PRESSURE: 168 MMHG | BODY MASS INDEX: 31.14 KG/M2 | TEMPERATURE: 98.2 F | HEIGHT: 60 IN | DIASTOLIC BLOOD PRESSURE: 80 MMHG | HEART RATE: 70 BPM

## 2023-03-13 DIAGNOSIS — N18.32 HYPERTENSIVE KIDNEY DISEASE WITH STAGE 3B CHRONIC KIDNEY DISEASE: ICD-10-CM

## 2023-03-13 DIAGNOSIS — R30.0 DYSURIA: ICD-10-CM

## 2023-03-13 DIAGNOSIS — I12.9 HYPERTENSIVE KIDNEY DISEASE WITH STAGE 3B CHRONIC KIDNEY DISEASE: ICD-10-CM

## 2023-03-13 DIAGNOSIS — N39.45 CONTINUOUS LEAKAGE OF URINE: ICD-10-CM

## 2023-03-13 DIAGNOSIS — N18.31 STAGE 3A CHRONIC KIDNEY DISEASE: ICD-10-CM

## 2023-03-13 DIAGNOSIS — I15.9 SECONDARY HYPERTENSION: ICD-10-CM

## 2023-03-13 LAB
ALBUMIN SERPL BCP-MCNC: 4.4 G/DL (ref 3.2–4.9)
ALBUMIN/GLOB SERPL: 1.6 G/DL
ALP SERPL-CCNC: 58 U/L (ref 30–99)
ALT SERPL-CCNC: 10 U/L (ref 2–50)
ANION GAP SERPL CALC-SCNC: 12 MMOL/L (ref 7–16)
AST SERPL-CCNC: 17 U/L (ref 12–45)
BILIRUB SERPL-MCNC: 0.5 MG/DL (ref 0.1–1.5)
BUN SERPL-MCNC: 19 MG/DL (ref 8–22)
CALCIUM ALBUM COR SERPL-MCNC: 9.6 MG/DL (ref 8.5–10.5)
CALCIUM SERPL-MCNC: 9.9 MG/DL (ref 8.5–10.5)
CHLORIDE SERPL-SCNC: 104 MMOL/L (ref 96–112)
CO2 SERPL-SCNC: 24 MMOL/L (ref 20–33)
CREAT SERPL-MCNC: 1.27 MG/DL (ref 0.5–1.4)
GFR SERPLBLD CREATININE-BSD FMLA CKD-EPI: 40 ML/MIN/1.73 M 2
GLOBULIN SER CALC-MCNC: 2.7 G/DL (ref 1.9–3.5)
GLUCOSE SERPL-MCNC: 88 MG/DL (ref 65–99)
POTASSIUM SERPL-SCNC: 4.5 MMOL/L (ref 3.6–5.5)
PROT SERPL-MCNC: 7.1 G/DL (ref 6–8.2)
SODIUM SERPL-SCNC: 140 MMOL/L (ref 135–145)

## 2023-03-13 PROCEDURE — 87086 URINE CULTURE/COLONY COUNT: CPT

## 2023-03-13 PROCEDURE — 80053 COMPREHEN METABOLIC PANEL: CPT

## 2023-03-13 PROCEDURE — 36415 COLL VENOUS BLD VENIPUNCTURE: CPT

## 2023-03-13 PROCEDURE — 99214 OFFICE O/P EST MOD 30 MIN: CPT | Performed by: STUDENT IN AN ORGANIZED HEALTH CARE EDUCATION/TRAINING PROGRAM

## 2023-03-13 PROCEDURE — 81002 URINALYSIS NONAUTO W/O SCOPE: CPT | Performed by: STUDENT IN AN ORGANIZED HEALTH CARE EDUCATION/TRAINING PROGRAM

## 2023-03-13 ASSESSMENT — FIBROSIS 4 INDEX: FIB4 SCORE: 1.56

## 2023-03-13 ASSESSMENT — PATIENT HEALTH QUESTIONNAIRE - PHQ9: CLINICAL INTERPRETATION OF PHQ2 SCORE: 0

## 2023-03-14 PROBLEM — R41.3 MEMORY LOSS: Status: RESOLVED | Noted: 2021-01-07 | Resolved: 2023-03-14

## 2023-03-14 PROBLEM — J06.9 VIRAL UPPER RESPIRATORY TRACT INFECTION: Status: RESOLVED | Noted: 2022-04-27 | Resolved: 2023-03-14

## 2023-03-14 NOTE — PROGRESS NOTES
CC:  Diagnoses of Dysuria, Continuous leakage of urine, Secondary hypertension, Stage 3a chronic kidney disease (HCC), and Hypertensive kidney disease with stage 3b chronic kidney disease (HCC) were pertinent to this visit.    HISTORY OF THE PRESENT ILLNESS: Patient is a 88 y.o. female. This pleasant patient is here today to discuss:    1. Dysuria  For approximately 5 days patient is experience cloudy urine with a different odor.  She does feel that there is some degree of urgency.    She denies fever, flank pain, burning with urination, suprapubic tenderness or frequency.    2. Continuous leakage of urine  Continuous leakage of urine for many years.  Patient does use pull-ups and the family needs a care chest request filled out today.  Patient is inquiring about pure wick device for nocturnal maintenance.    3. Secondary hypertension  4. Stage 3b chronic kidney disease (HCC)  5. Hypertensive kidney disease with stage 3a chronic kidney disease (HCC)  Losartan 50 mg daily.  Carvedilol 3.125 mg twice daily.  Spironolactone 25 mg daily.  Patient and her caregivers report full compliance without side effect.  She does report hypertensive today.      Active Diagnosis:    Patient Active Problem List   Diagnosis    Osteopenia    Anxiety    Tear of medial cartilage or meniscus of knee, current    Cholecystolithiasis    Trigger finger, right middle finger    Other fatigue    Closed fracture of left distal radius    Fall    Closed stable burst fracture of first lumbar vertebra (HCC)    Paroxysmal A-fib ([facility])    B12 deficiency    Ischemic cardiomyopathy    Premature atrial complex    Debility    Dementia without behavioral disturbance ([facility])    Secondary hypertension    Mixed stress and urge urinary incontinence    Leukocytosis    BMI 25.0-25.9,adult    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 Devices Misc Adult pull ups please. 150 Each 5    Misc. Devices Misc Pure wick device for urinary incontinence with supplies for 6 months use. 1 Each 0    omeprazole (PRILOSEC) 20 MG delayed-release capsule TAKE ONE CAPSULE BY MOUTH DAILY 90 Capsule 3    rosuvastatin (CRESTOR) 10 MG Tab Take 1 Tablet by mouth every evening. 100 Tablet 3    carvedilol (COREG) 3.125 MG Tab Take 1 Tablet by mouth 2 times a day. 180 Tablet 3    spironolactone (ALDACTONE) 25 MG Tab Take 1 Tablet by mouth every day. 90 Tablet 3    losartan (COZAAR) 50 MG Tab Take 1 Tablet by mouth every evening. 100 Tablet 3    escitalopram (LEXAPRO) 20 MG tablet TAKE 1 TABLET BY MOUTH EVERY DAY. 90 Tablet 3     No current Epic-ordered facility-administered medications on file.     ROS:   See HPI.    Objective:     Exam: BP (!) 168/80 (BP Location: Left arm, Patient Position: Sitting, BP Cuff Size: Adult)   Pulse 70   Temp 36.8 °C (98.2 °F) (Temporal)   Resp 16   Ht 1.524 m (5')   Wt 71.9 kg (158 lb 9.6 oz)   SpO2 93%  Body mass index is 30.97 kg/m².    General: Normal appearing. No distress.  Pulmonary: Clear to ausculation.  Normal effort. No rales, ronchi, or wheezing.  Cardiovascular: Regular rate and rhythm without murmur.   Abdomen: Suprapubic tenderness to deep palpation or CVA tenderness to percussion.  neurologic: Grossly nonfocal.    Skin: Warm and dry.  No obvious lesions.  Musculoskeletal: No extremity cyanosis, clubbing, or edema.  Psych: Normal mood and affect.             Assessment & Plan:   88 y.o. female with the following -    Labs:   3/13/2023:  -POCT UA showing glucose negative, bilirubin  negative, ketones negative, specific gravity 1.015, blood trace lysed, pH 6.0, protein negative, urobilinogen 0.2, nitrate negative, leukocyte esterase negative.    10/22/2022:  -CMP showing glucose 111, BUN 25, GFR 39.    1. Dysuria  -Acute.  -No indication of infection at this point in time.  - POCT Urinalysis  - Comp Metabolic Panel; Future  - URINE CULTURE(NEW); Future    2. Continuous leakage of urine  -Chronic  - Misc. Devices Misc; Adult pull ups please.  Dispense: 150 Each; Refill: 5  - Misc. Devices Misc; Pure wick device for urinary incontinence with supplies for 6 months use.  Dispense: 1 Each; Refill: 0    3. Secondary hypertension  -Chronic, elevated at today's visit.  -Continue losartan 50 mg daily.  -Continue carvedilol 3.125 mg twice daily.  -Continue spironolactone 25 mg daily.    4. Stage 3b chronic kidney disease (HCC)  5. Hypertensive kidney disease with stage 3a chronic kidney disease (HCC)  -Chronic.  -CMP.      Return in about 3 months (around 6/13/2023).  For follow-up on multiple chronic conditions.    Please note that this dictation was created using voice recognition software. I have made every reasonable attempt to correct obvious errors, but I expect that there are errors of grammar and possibly content that I did not discover before finalizing the note.      Duc Samuel PA-C 3/14/2023

## 2023-03-15 LAB
APPEARANCE UR: NORMAL
BACTERIA UR CULT: NORMAL
BILIRUB UR STRIP-MCNC: NEGATIVE MG/DL
COLOR UR AUTO: YELLOW
GLUCOSE UR STRIP.AUTO-MCNC: NEGATIVE MG/DL
KETONES UR STRIP.AUTO-MCNC: NEGATIVE MG/DL
LEUKOCYTE ESTERASE UR QL STRIP.AUTO: NEGATIVE
NITRITE UR QL STRIP.AUTO: NEGATIVE
PH UR STRIP.AUTO: 6 [PH] (ref 5–8)
PROT UR QL STRIP: NEGATIVE MG/DL
RBC UR QL AUTO: NORMAL
SIGNIFICANT IND 70042: NORMAL
SITE SITE: NORMAL
SOURCE SOURCE: NORMAL
SP GR UR STRIP.AUTO: 1.01
UROBILINOGEN UR STRIP-MCNC: 0.2 MG/DL

## 2023-03-17 PROBLEM — N18.32 HYPERTENSIVE KIDNEY DISEASE WITH STAGE 3B CHRONIC KIDNEY DISEASE: Status: ACTIVE | Noted: 2022-05-03

## 2023-03-17 PROBLEM — N18.31 STAGE 3A CHRONIC KIDNEY DISEASE (HCC): Status: RESOLVED | Noted: 2022-05-03 | Resolved: 2023-03-17

## 2023-04-27 ENCOUNTER — OFFICE VISIT (OUTPATIENT)
Dept: CARDIOLOGY | Facility: MEDICAL CENTER | Age: 88
End: 2023-04-27
Payer: MEDICARE

## 2023-04-27 ENCOUNTER — TELEPHONE (OUTPATIENT)
Dept: CARDIOLOGY | Facility: MEDICAL CENTER | Age: 88
End: 2023-04-27

## 2023-04-27 VITALS
RESPIRATION RATE: 16 BRPM | BODY MASS INDEX: 31.41 KG/M2 | SYSTOLIC BLOOD PRESSURE: 110 MMHG | DIASTOLIC BLOOD PRESSURE: 70 MMHG | HEIGHT: 60 IN | WEIGHT: 160 LBS | HEART RATE: 74 BPM | OXYGEN SATURATION: 94 %

## 2023-04-27 DIAGNOSIS — D68.59 HYPERCOAGULABLE STATE (HCC): ICD-10-CM

## 2023-04-27 DIAGNOSIS — I50.22 HYPERTENSIVE HEART DISEASE WITH CHRONIC SYSTOLIC CONGESTIVE HEART FAILURE (HCC): ICD-10-CM

## 2023-04-27 DIAGNOSIS — I48.0 PAROXYSMAL A-FIB (HCC): ICD-10-CM

## 2023-04-27 DIAGNOSIS — I25.5 ISCHEMIC CARDIOMYOPATHY: ICD-10-CM

## 2023-04-27 DIAGNOSIS — I11.0 HYPERTENSIVE HEART DISEASE WITH CHRONIC SYSTOLIC CONGESTIVE HEART FAILURE (HCC): ICD-10-CM

## 2023-04-27 DIAGNOSIS — R53.83 OTHER FATIGUE: ICD-10-CM

## 2023-04-27 DIAGNOSIS — E78.5 DYSLIPIDEMIA: ICD-10-CM

## 2023-04-27 DIAGNOSIS — I50.22 CHRONIC SYSTOLIC CONGESTIVE HEART FAILURE (HCC): ICD-10-CM

## 2023-04-27 PROBLEM — I15.9 SECONDARY HYPERTENSION: Status: RESOLVED | Noted: 2022-04-13 | Resolved: 2023-04-27

## 2023-04-27 PROCEDURE — 99214 OFFICE O/P EST MOD 30 MIN: CPT

## 2023-04-27 RX ORDER — BUPROPION HYDROCHLORIDE 150 MG/1
TABLET, EXTENDED RELEASE ORAL
COMMUNITY
Start: 2023-04-26 | End: 2023-06-12

## 2023-04-27 ASSESSMENT — ENCOUNTER SYMPTOMS
PND: 0
GASTROINTESTINAL NEGATIVE: 1
NEUROLOGICAL NEGATIVE: 1
DEPRESSION: 0
NERVOUS/ANXIOUS: 0
SHORTNESS OF BREATH: 1
PALPITATIONS: 0
ORTHOPNEA: 0
EYES NEGATIVE: 1

## 2023-04-27 ASSESSMENT — FIBROSIS 4 INDEX: FIB4 SCORE: 1.67

## 2023-04-27 NOTE — PROGRESS NOTES
Chief Complaint   Patient presents with    Other     F/V Dx: ACC/AHA stage B systolic heart failure due to ischemic cardiomyopathy (HCC)    Atrial Fibrillation     F/V Dx: Paroxysmal A-fib (HCC)       Subjective     Dianne Casas is a 89 y.o. female who presents today for follow-up of her heart failure.  She has a history of ischemic cardiomyopathy, hospitalized on 4/4/2022 for new onset heart failure status post PCI to LAD x2 and  of OM 3.  Additional history of hypertension, hyperlipidemia, back pain.     They are accompanied today by her son Efren.    They are a patient of Carrie GARCIA, last seen on 9/19/2022.  At that visit she had had some improvements in her EF.    Since their last visit they have been having some shortness of breath and fatigue     Activity: able to do some light house work. Limited due to knee pain.     No symptoms of chest pain, palpitations, or lower extremity edema.      Past Medical History:   Diagnosis Date    Anxiety 9/14/2010    Cataract     High cholesterol     Hypertension     history of but no treatment    OSTEOPOROSIS 9/14/2010    Pain 03/04/14 6/10=L knee    Pain 11/04/2021    low back    Snoring     no sleep study    Urinary incontinence     only at times at night when sleeping     Past Surgical History:   Procedure Laterality Date    KYPHOPLASTY N/A 11/6/2021    Procedure: KYPHOPLASTY - L1 AND BIOPSY;  Surgeon: Tim Barton M.D.;  Location: [facility];  Service: Orthopedics    TRIGGER FINGER RELEASE Left 5/29/2018    Procedure: TRIGGER FINGER RELEASE-  MIDDLE;  Surgeon: Frandy Liz M.D.;  Location: [facility];  Service: Orthopedics    JULIAN BY LAPAROSCOPY  5/23/2016    Procedure: JULIAN BY LAPAROSCOPY;  Surgeon: Adan Kearns M.D.;  Location: [facility];  Service:     KNEE ARTHROSCOPY  3/11/2014    Performed by Bonilla Valera M.D. at [facility]    BLADDER SUSPENSION  2001    HYSTERECTOMY, TOTAL  "ABDOMINAL  2000    APPENDECTOMY  1969    COLON RESECTION      partial; no CA     Family History   Problem Relation Age of Onset    No Known Problems Father     No Known Problems Mother      Social History     Socioeconomic History    Marital status:      Spouse name: Not on file    Number of children: Not on file    Years of education: Not on file    Highest education level: Not on file   Occupational History    Not on file   Tobacco Use    Smoking status: Never     Passive exposure: Never    Smokeless tobacco: Never   Vaping Use    Vaping Use: Never used   Substance and Sexual Activity    Alcohol use: Not Currently     Alcohol/week: 0.0 oz     Comment: 1 time per month    Drug use: Never    Sexual activity: Yes     Partners: Male   Other Topics Concern    Not on file   Social History Narrative    ** Merged History Encounter **          Social Determinants of Health     Financial Resource Strain: Not on file   Food Insecurity: Not on file   Transportation Needs: Not on file   Physical Activity: Not on file   Stress: Not on file   Social Connections: Not on file   Intimate Partner Violence: Not on file   Housing Stability: Not on file     Allergies   Allergen Reactions    Ampicillin Rash     Feels \"rotten\"     Bactrim Ds      Mouth, lip, tongue, eye swelling, pain, itching    Ciprofloxacin Swelling     Outpatient Encounter Medications as of 4/27/2023   Medication Sig Dispense Refill    buPROPion SR (WELLBUTRIN-SR) 150 MG TABLET SR 12 HR sustained-release tablet       Misc. Devices Misc Adult pull ups please. 150 Each 5    Misc. Devices Misc Pure wick device for urinary incontinence with supplies for 6 months use. 1 Each 0    rosuvastatin (CRESTOR) 10 MG Tab Take 1 Tablet by mouth every evening. 100 Tablet 3    carvedilol (COREG) 3.125 MG Tab Take 1 Tablet by mouth 2 times a day. 180 Tablet 3    spironolactone (ALDACTONE) 25 MG Tab Take 1 Tablet by mouth every day. 90 Tablet 3    losartan (COZAAR) 50 MG Tab " Take 1 Tablet by mouth every evening. 100 Tablet 3    escitalopram (LEXAPRO) 20 MG tablet TAKE 1 TABLET BY MOUTH EVERY DAY. 90 Tablet 3    [DISCONTINUED] omeprazole (PRILOSEC) 20 MG delayed-release capsule TAKE ONE CAPSULE BY MOUTH DAILY (Patient not taking: Reported on 4/27/2023) 90 Capsule 3     No facility-administered encounter medications on file as of 4/27/2023.     Review of Systems   Constitutional:  Positive for malaise/fatigue.   HENT: Negative.     Eyes: Negative.    Respiratory:  Positive for shortness of breath.    Cardiovascular:  Negative for chest pain, palpitations, orthopnea, leg swelling and PND.   Gastrointestinal: Negative.    Genitourinary: Negative.    Musculoskeletal:  Positive for joint pain.   Skin: Negative.    Neurological: Negative.    Endo/Heme/Allergies: Negative.    Psychiatric/Behavioral:  Negative for depression. The patient is not nervous/anxious.             Objective     /70 (BP Location: Left arm, Patient Position: Sitting, BP Cuff Size: Adult)   Pulse 74   Resp 16   Ht 1.524 m (5')   Wt 72.6 kg (160 lb)   SpO2 94%   BMI 31.25 kg/m²     Physical Exam  Constitutional:       Appearance: Normal appearance. She is obese.   HENT:      Head: Normocephalic and atraumatic.   Neck:      Vascular: No JVD.   Cardiovascular:      Rate and Rhythm: Normal rate and regular rhythm.      Pulses: Normal pulses.      Heart sounds: Normal heart sounds. No murmur heard.    No friction rub.   Pulmonary:      Effort: Pulmonary effort is normal. No respiratory distress.      Breath sounds: Normal breath sounds.   Abdominal:      General: There is no distension.      Palpations: Abdomen is soft.   Musculoskeletal:      Right lower leg: No edema.      Left lower leg: No edema.   Skin:     General: Skin is warm and dry.   Neurological:      General: No focal deficit present.      Mental Status: She is alert and oriented to person, place, and time.   Psychiatric:         Mood and Affect: Mood  normal.         Behavior: Behavior normal.          Lab Results   Component Value Date/Time    CHOLSTRLTOT 173 04/05/2022 01:11 AM    LDL 88 04/05/2022 01:11 AM    HDL 69 04/05/2022 01:11 AM    TRIGLYCERIDE 80 04/05/2022 01:11 AM       Lab Results   Component Value Date/Time    SODIUM 140 03/13/2023 09:33 AM    POTASSIUM 4.5 03/13/2023 09:33 AM    CHLORIDE 104 03/13/2023 09:33 AM    CO2 24 03/13/2023 09:33 AM    GLUCOSE 88 03/13/2023 09:33 AM    BUN 19 03/13/2023 09:33 AM    CREATININE 1.27 03/13/2023 09:33 AM    CREATININE 0.9 08/28/2006 11:45 AM     Lab Results   Component Value Date/Time    ALKPHOSPHAT 58 03/13/2023 09:33 AM    ASTSGOT 17 03/13/2023 09:33 AM    ALTSGPT 10 03/13/2023 09:33 AM    TBILIRUBIN 0.5 03/13/2023 09:33 AM      Echocardiography  Moderately reduced left ventricular systolic function.   The left ventricular ejection fraction is visually estimated to be 35- 40% with [id number] variation.   Global hypokinesis with regional variation.  Moderate aortic insufficiency. Pressure half time is  313 msec.  Right ventricular systolic pressure is estimated to be 45  mmHg.  IVC not interpretable, going out of plane of US.     [facility] (4/7/2022):   Findings   Hemodynamics:   Aorta: 112/81 mmHg  LVEDP: 7 mmHg  No significant pullback gradient across the aortic valve     Coronary Anatomy              Left Main: Normal              LAD: Mid 40% stenosis, distal 80 and 90% stenosis- sequential lesions.              LCx: Ectasia in the mid AV groove.  The first OM has ostial 20% stenosis, the second arm has ostial 60% stenosis, the third OM is occluded and appears to be small              RCA: Dominant, 30% proximal stenosis.  The PDA has a mid 30% stenosis.  The PLB is normal     IMPRESSIONS:  1.  Ischemic cardiomyopathy with severe stenosis in the LAD and occlusion of the OM 3  2.  Successful PCI of the LAD using 2 overlapping drug-eluting stents, IVUS guidance  3.  Normal LVEDP  Recommendations:  Dual  antiplatelet therapy for 12 months      Cardiac event monitor (5/18/2022): Underlying rhythm: Sinus   The average heart rate is 66 BPM, and ranges from  BPM   Atrial events: Occasional atrial ectopy with an burden of 3.5% and rare atrial fibrillation, lasting up to 2 hours and 20 minutes, with an average rate of 104 bpm   Ventricular events: Rare   Pauses, bradyarrhythmia or heart block: None significant   Patient events: one event was submitted for review, the event was not described. The corresponding rhythm was sinus with PACs.      Transthoracic echocardiogram (7/22/2022):  Normal left ventricular size and systolic function.  Normal right ventricular size and systolic function.  The left atrium is normal in size.  Mild mitral regurgitation.  Mild eccentric aortic insufficiency.  Mild tricuspid regurgitation.  Normal pericardium without effusion.     Prior echo on 04/04/2022, improved severity of aortic regurgitation and   improved left ventricular systolic function.     Chest x-ray 4/9/2022  IMPRESSION:  1.  No acute cardiopulmonary abnormality identified.  2.  Enlarged cardiac silhouette    Assessment & Plan     1. Ischemic cardiomyopathy        2. Chronic systolic congestive heart failure (HCC)        3. Paroxysmal A-fib (HCC)        4. Hypercoagulable state (HCC)        5. Hypertensive heart disease with chronic systolic congestive heart failure (HCC)        6. Dyslipidemia        7. Other fatigue  OVERNIGHT PULSE OXIMETRY          Medical Decision Making: Today's Assessment/Status/Plan:        HFrecEF, Stage C, Class II, LVEF 60% (previously 35-40%): euvolemic  -Heart failure due to ischemic cardiomyopathy  -ACE-I/ARB/ARNI: Continue losartan 50 mg daily  -Evidence Based Beta-blocker: Continue Coreg 3.125 mg twice daily  -Aldosterone Antagonist: Continue spironolactone 25 mg daily  -SGLT2-I: Patient declines at this time.  -Diuretic: None needed at this time  -EF 60%  -Reinforced s/sx of worsening heart  failure with patient and weight monitoring. Pt verbalizes understanding. Pt to call office if present.       CAD s/p DENIS/PCI to LADx2 (4/2022); HLD  -She was not on aspirin, however she is not wanting to take any additional medications at this time, this is reasonable given her age  -Continue rosuvastatin 10 mg daily  We addressed the management of atherosclerotic artery disease.  She is on proper antiplatelet, cholesterol management and beta-blockers as appropriate.  We addressed the potential side effects and laboratory follow-up for these medications.    Hypertension  - good control  -Continue current regimen  - goal < 130/80  - check BP daily 2 hours after taking medication and keep log of measurements      PAF  -Regular rhythm and rate today  -Rare A. fib on cardiac event monitor.    -No OAC at this time.  -[id number] 6  -Has-bled score 2 moderate risk of major bleeding  -We had a long discussion about the risk of stroke versus the risk of bleeding on anticoagulation  -She is not interested in starting anticoagulation, this is reasonable given the relative rarity of A-fib on her monitor, and her age    ARRIAZA; fatigue   -OPO had been ordered on last visit but was canceled for some reason, reordered OPO  -Patient will follow-up with PCP to rule out other noncardiac causes of fatigue    HCC Gap Form    Diagnosis to address: I11.0, I50.22 - Hypertensive heart disease with chronic systolic congestive heart failure (HCC)  Assessment and plan: Chronic, stable. Continue with current defined treatment plan: Continue carvedilol, losartan, spironolactone. Follow-up at least annually.  Diagnosis: I50.22 - Chronic systolic congestive heart failure (HCC)  Assessment and plan: Chronic, stable. Continue with current defined treatment plan: EF improved, continue medications as above. Follow-up at least annually.  Diagnosis: I48.0 - Paroxysmal A-fib (HCC)  Assessment and plan: Chronic, stable. Continue with current defined  treatment plan: Rare incidence on cardiac event monitor, patient refuses anticoagulation, which is reasonable given her risk of bleeding. Follow-up at least annually.  Last edited 04/27/23 09:31 PDT by MITZI Zhang.           Follow up with RADHA Saldaña in 6 months    This note was dictated using Dragon speech recognition software.

## 2023-05-14 NOTE — CASE COMMUNICATION
Discontinued HHA visits for bathing assistance. Patient reports that she takes shower by herself and family provide safety supervision so no need for HHA.   
Adult

## 2023-05-26 DIAGNOSIS — S32.011A CLOSED STABLE BURST FRACTURE OF FIRST LUMBAR VERTEBRA, INITIAL ENCOUNTER (HCC): ICD-10-CM

## 2023-05-30 ENCOUNTER — TELEPHONE (OUTPATIENT)
Dept: CARDIOLOGY | Facility: MEDICAL CENTER | Age: 88
End: 2023-05-30
Payer: MEDICARE

## 2023-05-30 DIAGNOSIS — R79.81 ABNORMAL PULSE OXIMETRY: ICD-10-CM

## 2023-06-02 ENCOUNTER — PATIENT MESSAGE (OUTPATIENT)
Dept: HEALTH INFORMATION MANAGEMENT | Facility: OTHER | Age: 88
End: 2023-06-02

## 2023-06-02 ENCOUNTER — DOCUMENTATION (OUTPATIENT)
Dept: HEALTH INFORMATION MANAGEMENT | Facility: OTHER | Age: 88
End: 2023-06-02
Payer: MEDICARE

## 2023-06-05 ENCOUNTER — HOSPITAL ENCOUNTER (OUTPATIENT)
Dept: RADIOLOGY | Facility: MEDICAL CENTER | Age: 88
End: 2023-06-05
Attending: ORTHOPAEDIC SURGERY
Payer: MEDICARE

## 2023-06-05 DIAGNOSIS — M54.50 LOW BACK PAIN, UNSPECIFIED BACK PAIN LATERALITY, UNSPECIFIED CHRONICITY, UNSPECIFIED WHETHER SCIATICA PRESENT: ICD-10-CM

## 2023-06-05 PROCEDURE — 72148 MRI LUMBAR SPINE W/O DYE: CPT

## 2023-06-07 ENCOUNTER — APPOINTMENT (OUTPATIENT)
Dept: ADMISSIONS | Facility: MEDICAL CENTER | Age: 88
End: 2023-06-07
Attending: ORTHOPAEDIC SURGERY
Payer: MEDICARE

## 2023-06-08 ENCOUNTER — TELEPHONE (OUTPATIENT)
Dept: HEALTH INFORMATION MANAGEMENT | Facility: OTHER | Age: 88
End: 2023-06-08
Payer: MEDICARE

## 2023-06-08 DIAGNOSIS — I25.5 ISCHEMIC CARDIOMYOPATHY: ICD-10-CM

## 2023-06-09 NOTE — TELEPHONE ENCOUNTER
Is the patient due for a refill? Yes    Was the patient seen the past year? Yes    Date of last office visit: 4/27/2023    Does the patient have an upcoming appointment?  Yes   If yes, When? 10/17/2023    Provider to refill:LH    Does the patients insurance require a 100 day supply?  Yes

## 2023-06-12 ENCOUNTER — PRE-ADMISSION TESTING (OUTPATIENT)
Dept: ADMISSIONS | Facility: MEDICAL CENTER | Age: 88
End: 2023-06-12
Attending: ORTHOPAEDIC SURGERY
Payer: MEDICARE

## 2023-06-12 ENCOUNTER — TELEPHONE (OUTPATIENT)
Dept: HEALTH INFORMATION MANAGEMENT | Facility: OTHER | Age: 88
End: 2023-06-12

## 2023-06-12 NOTE — TELEPHONE ENCOUNTER
1. Caller Name: Mary daughter in law                        Call Back Number: [phone]        How would the patient prefer to be contacted with a response: Phone call do NOT leave a detailed message        Pt daughter in law called regarding issue she is having with [facility]. Mary stated she is getting medications pre-packed for pt but is getting medications she is no longer taking. For example pt was told to stop talking spironolactone (ALDACTONE) 25 MG Tab but pt received pills on pre-packed. [facility] but [facility] stated it is not current and does not list all medications.     Is there a way a current list of medications can be sent to [facility]?

## 2023-06-12 NOTE — OR NURSING
Spoke to (dtr-in-law) Mary CAIN for PAT appt. Requests labs be done DOS d/t limited ability to bring in patient. States patient is having terrible pain and doesn't want to put her through being moved multiple times. Carleen, surgery scheduler, notified.     Mary also states she requested twilight sedation for procedure. Msg left with Carleen surgery scheduler, for office to contact patient/Mary to clarify.     Patient has instructions from Dr. Barton for when to hold her aspirin.

## 2023-06-13 DIAGNOSIS — I11.0 HYPERTENSIVE HEART DISEASE WITH CHRONIC SYSTOLIC CONGESTIVE HEART FAILURE (HCC): ICD-10-CM

## 2023-06-13 DIAGNOSIS — I10 HYPERTENSION, ESSENTIAL: ICD-10-CM

## 2023-06-13 DIAGNOSIS — I50.22 HYPERTENSIVE HEART DISEASE WITH CHRONIC SYSTOLIC CONGESTIVE HEART FAILURE (HCC): ICD-10-CM

## 2023-06-13 DIAGNOSIS — E78.5 DYSLIPIDEMIA: ICD-10-CM

## 2023-06-13 RX ORDER — CARVEDILOL 3.12 MG/1
3.12 TABLET ORAL 2 TIMES DAILY
Qty: 200 TABLET | Refills: 3 | Status: ON HOLD | OUTPATIENT
Start: 2023-06-13 | End: 2023-11-09 | Stop reason: SDUPTHER

## 2023-06-13 RX ORDER — LOSARTAN POTASSIUM 50 MG/1
50 TABLET ORAL EVERY EVENING
Qty: 100 TABLET | Refills: 3 | Status: ON HOLD | OUTPATIENT
Start: 2023-06-13 | End: 2023-11-09 | Stop reason: SDUPTHER

## 2023-06-13 RX ORDER — ROSUVASTATIN CALCIUM 10 MG/1
10 TABLET, COATED ORAL EVERY EVENING
Qty: 100 TABLET | Refills: 3 | Status: ON HOLD | OUTPATIENT
Start: 2023-06-13 | End: 2023-11-09 | Stop reason: SDUPTHER

## 2023-06-13 NOTE — TELEPHONE ENCOUNTER
Is the patient due for a refill? Yes    Was the patient seen the past year? Yes    Date of last office visit: 4/27/2023    Does the patient have an upcoming appointment?  Yes   If yes, When? 10/14/2023    Provider to refill:LH    Does the patients insurance require a 100 day supply?  Yes

## 2023-06-15 ENCOUNTER — APPOINTMENT (OUTPATIENT)
Dept: RADIOLOGY | Facility: MEDICAL CENTER | Age: 88
End: 2023-06-15
Attending: ORTHOPAEDIC SURGERY
Payer: MEDICARE

## 2023-06-15 ENCOUNTER — ANESTHESIA EVENT (OUTPATIENT)
Dept: SURGERY | Facility: MEDICAL CENTER | Age: 88
End: 2023-06-15
Payer: MEDICARE

## 2023-06-15 ENCOUNTER — HOSPITAL ENCOUNTER (OUTPATIENT)
Facility: MEDICAL CENTER | Age: 88
End: 2023-06-15
Attending: ORTHOPAEDIC SURGERY | Admitting: ORTHOPAEDIC SURGERY
Payer: MEDICARE

## 2023-06-15 ENCOUNTER — ANESTHESIA (OUTPATIENT)
Dept: SURGERY | Facility: MEDICAL CENTER | Age: 88
End: 2023-06-15
Payer: MEDICARE

## 2023-06-15 VITALS
DIASTOLIC BLOOD PRESSURE: 72 MMHG | TEMPERATURE: 98.6 F | HEIGHT: 65 IN | OXYGEN SATURATION: 92 % | RESPIRATION RATE: 18 BRPM | HEART RATE: 75 BPM | SYSTOLIC BLOOD PRESSURE: 139 MMHG | BODY MASS INDEX: 24.68 KG/M2 | WEIGHT: 148.15 LBS

## 2023-06-15 LAB
ANION GAP SERPL CALC-SCNC: 14 MMOL/L (ref 7–16)
BUN SERPL-MCNC: 19 MG/DL (ref 8–22)
CALCIUM SERPL-MCNC: 10 MG/DL (ref 8.5–10.5)
CHLORIDE SERPL-SCNC: 102 MMOL/L (ref 96–112)
CO2 SERPL-SCNC: 22 MMOL/L (ref 20–33)
CREAT SERPL-MCNC: 1.31 MG/DL (ref 0.5–1.4)
EKG IMPRESSION: NORMAL
ERYTHROCYTE [DISTWIDTH] IN BLOOD BY AUTOMATED COUNT: 52 FL (ref 35.9–50)
ERYTHROCYTE [SEDIMENTATION RATE] IN BLOOD BY WESTERGREN METHOD: 2 MM/HOUR (ref 0–25)
EST. AVERAGE GLUCOSE BLD GHB EST-MCNC: 128 MG/DL
GFR SERPLBLD CREATININE-BSD FMLA CKD-EPI: 39 ML/MIN/1.73 M 2
GLUCOSE SERPL-MCNC: 100 MG/DL (ref 65–99)
HBA1C MFR BLD: 6.1 % (ref 4–5.6)
HCT VFR BLD AUTO: 49.3 % (ref 37–47)
HGB BLD-MCNC: 15.8 G/DL (ref 12–16)
INR PPP: 1.1 (ref 0.87–1.13)
MCH RBC QN AUTO: 30.9 PG (ref 27–33)
MCHC RBC AUTO-ENTMCNC: 32 G/DL (ref 32.2–35.5)
MCV RBC AUTO: 96.3 FL (ref 81.4–97.8)
PATHOLOGY CONSULT NOTE: NORMAL
PLATELET # BLD AUTO: 243 K/UL (ref 164–446)
PMV BLD AUTO: 10.2 FL (ref 9–12.9)
POTASSIUM SERPL-SCNC: 4.2 MMOL/L (ref 3.6–5.5)
PROTHROMBIN TIME: 14.1 SEC (ref 12–14.6)
RBC # BLD AUTO: 5.12 M/UL (ref 4.2–5.4)
SCCMEC + MECA PNL NOSE NAA+PROBE: NEGATIVE
SODIUM SERPL-SCNC: 138 MMOL/L (ref 135–145)
WBC # BLD AUTO: 10.1 K/UL (ref 4.8–10.8)

## 2023-06-15 PROCEDURE — 700101 HCHG RX REV CODE 250: Performed by: ANESTHESIOLOGY

## 2023-06-15 PROCEDURE — 160002 HCHG RECOVERY MINUTES (STAT): Performed by: ORTHOPAEDIC SURGERY

## 2023-06-15 PROCEDURE — 93010 ELECTROCARDIOGRAM REPORT: CPT | Performed by: INTERNAL MEDICINE

## 2023-06-15 PROCEDURE — 160025 RECOVERY II MINUTES (STATS): Performed by: ORTHOPAEDIC SURGERY

## 2023-06-15 PROCEDURE — 85027 COMPLETE CBC AUTOMATED: CPT

## 2023-06-15 PROCEDURE — 72100 X-RAY EXAM L-S SPINE 2/3 VWS: CPT

## 2023-06-15 PROCEDURE — 700111 HCHG RX REV CODE 636 W/ 250 OVERRIDE (IP): Performed by: ORTHOPAEDIC SURGERY

## 2023-06-15 PROCEDURE — 160028 HCHG SURGERY MINUTES - 1ST 30 MINS LEVEL 3: Performed by: ORTHOPAEDIC SURGERY

## 2023-06-15 PROCEDURE — 88311 DECALCIFY TISSUE: CPT

## 2023-06-15 PROCEDURE — 83036 HEMOGLOBIN GLYCOSYLATED A1C: CPT

## 2023-06-15 PROCEDURE — 36415 COLL VENOUS BLD VENIPUNCTURE: CPT

## 2023-06-15 PROCEDURE — 160039 HCHG SURGERY MINUTES - EA ADDL 1 MIN LEVEL 3: Performed by: ORTHOPAEDIC SURGERY

## 2023-06-15 PROCEDURE — 93005 ELECTROCARDIOGRAM TRACING: CPT | Performed by: ORTHOPAEDIC SURGERY

## 2023-06-15 PROCEDURE — 160048 HCHG OR STATISTICAL LEVEL 1-5: Performed by: ORTHOPAEDIC SURGERY

## 2023-06-15 PROCEDURE — 160046 HCHG PACU - 1ST 60 MINS PHASE II: Performed by: ORTHOPAEDIC SURGERY

## 2023-06-15 PROCEDURE — 700105 HCHG RX REV CODE 258: Performed by: ORTHOPAEDIC SURGERY

## 2023-06-15 PROCEDURE — 160009 HCHG ANES TIME/MIN: Performed by: ORTHOPAEDIC SURGERY

## 2023-06-15 PROCEDURE — 80048 BASIC METABOLIC PNL TOTAL CA: CPT

## 2023-06-15 PROCEDURE — 87641 MR-STAPH DNA AMP PROBE: CPT

## 2023-06-15 PROCEDURE — 700101 HCHG RX REV CODE 250: Performed by: ORTHOPAEDIC SURGERY

## 2023-06-15 PROCEDURE — C1713 ANCHOR/SCREW BN/BN,TIS/BN: HCPCS | Performed by: ORTHOPAEDIC SURGERY

## 2023-06-15 PROCEDURE — 88307 TISSUE EXAM BY PATHOLOGIST: CPT

## 2023-06-15 PROCEDURE — 160035 HCHG PACU - 1ST 60 MINS PHASE I: Performed by: ORTHOPAEDIC SURGERY

## 2023-06-15 PROCEDURE — 01942 ANES NEUROMD/NTRVRT LMBR/SAC: CPT | Performed by: ANESTHESIOLOGY

## 2023-06-15 PROCEDURE — 99100 ANES PT EXTEME AGE<1 YR&>70: CPT | Performed by: ANESTHESIOLOGY

## 2023-06-15 PROCEDURE — 85610 PROTHROMBIN TIME: CPT

## 2023-06-15 PROCEDURE — 700111 HCHG RX REV CODE 636 W/ 250 OVERRIDE (IP): Performed by: ANESTHESIOLOGY

## 2023-06-15 PROCEDURE — 85652 RBC SED RATE AUTOMATED: CPT

## 2023-06-15 DEVICE — BONE CEMENT & MIXER FOR KYPHO: Type: IMPLANTABLE DEVICE | Status: FUNCTIONAL

## 2023-06-15 RX ORDER — HYDROCODONE BITARTRATE AND ACETAMINOPHEN 5; 325 MG/1; MG/1
1 TABLET ORAL EVERY 4 HOURS PRN
COMMUNITY
End: 2023-08-17

## 2023-06-15 RX ORDER — BUPIVACAINE HYDROCHLORIDE AND EPINEPHRINE 5; 5 MG/ML; UG/ML
INJECTION, SOLUTION EPIDURAL; INTRACAUDAL; PERINEURAL
Status: DISCONTINUED | OUTPATIENT
Start: 2023-06-15 | End: 2023-06-15 | Stop reason: HOSPADM

## 2023-06-15 RX ORDER — SODIUM CHLORIDE, SODIUM LACTATE, POTASSIUM CHLORIDE, CALCIUM CHLORIDE 600; 310; 30; 20 MG/100ML; MG/100ML; MG/100ML; MG/100ML
INJECTION, SOLUTION INTRAVENOUS CONTINUOUS
Status: DISCONTINUED | OUTPATIENT
Start: 2023-06-15 | End: 2023-06-15 | Stop reason: HOSPADM

## 2023-06-15 RX ORDER — IPRATROPIUM BROMIDE AND ALBUTEROL SULFATE 2.5; .5 MG/3ML; MG/3ML
3 SOLUTION RESPIRATORY (INHALATION)
Status: DISCONTINUED | OUTPATIENT
Start: 2023-06-15 | End: 2023-06-15 | Stop reason: HOSPADM

## 2023-06-15 RX ORDER — DEXAMETHASONE SODIUM PHOSPHATE 4 MG/ML
INJECTION, SOLUTION INTRA-ARTICULAR; INTRALESIONAL; INTRAMUSCULAR; INTRAVENOUS; SOFT TISSUE PRN
Status: DISCONTINUED | OUTPATIENT
Start: 2023-06-15 | End: 2023-06-15 | Stop reason: SURG

## 2023-06-15 RX ORDER — KETOROLAC TROMETHAMINE 30 MG/ML
INJECTION, SOLUTION INTRAMUSCULAR; INTRAVENOUS PRN
Status: DISCONTINUED | OUTPATIENT
Start: 2023-06-15 | End: 2023-06-15 | Stop reason: SURG

## 2023-06-15 RX ORDER — LIDOCAINE HYDROCHLORIDE 20 MG/ML
INJECTION, SOLUTION EPIDURAL; INFILTRATION; INTRACAUDAL; PERINEURAL PRN
Status: DISCONTINUED | OUTPATIENT
Start: 2023-06-15 | End: 2023-06-15 | Stop reason: SURG

## 2023-06-15 RX ORDER — HALOPERIDOL 5 MG/ML
1 INJECTION INTRAMUSCULAR
Status: DISCONTINUED | OUTPATIENT
Start: 2023-06-15 | End: 2023-06-15 | Stop reason: HOSPADM

## 2023-06-15 RX ORDER — ONDANSETRON 2 MG/ML
INJECTION INTRAMUSCULAR; INTRAVENOUS PRN
Status: DISCONTINUED | OUTPATIENT
Start: 2023-06-15 | End: 2023-06-15 | Stop reason: SURG

## 2023-06-15 RX ORDER — CEFAZOLIN SODIUM 1 G/3ML
2 INJECTION, POWDER, FOR SOLUTION INTRAMUSCULAR; INTRAVENOUS ONCE
Status: COMPLETED | OUTPATIENT
Start: 2023-06-15 | End: 2023-06-15

## 2023-06-15 RX ORDER — ROCURONIUM BROMIDE 10 MG/ML
INJECTION, SOLUTION INTRAVENOUS PRN
Status: DISCONTINUED | OUTPATIENT
Start: 2023-06-15 | End: 2023-06-15 | Stop reason: SURG

## 2023-06-15 RX ORDER — ONDANSETRON 2 MG/ML
4 INJECTION INTRAMUSCULAR; INTRAVENOUS
Status: DISCONTINUED | OUTPATIENT
Start: 2023-06-15 | End: 2023-06-15 | Stop reason: HOSPADM

## 2023-06-15 RX ADMIN — DEXAMETHASONE SODIUM PHOSPHATE 4 MG: 4 INJECTION INTRA-ARTICULAR; INTRALESIONAL; INTRAMUSCULAR; INTRAVENOUS; SOFT TISSUE at 07:38

## 2023-06-15 RX ADMIN — VANCOMYCIN HYDROCHLORIDE 1000 MG: 1 INJECTION, POWDER, LYOPHILIZED, FOR SOLUTION INTRAVENOUS at 07:00

## 2023-06-15 RX ADMIN — FENTANYL CITRATE 25 MCG: 50 INJECTION, SOLUTION INTRAMUSCULAR; INTRAVENOUS at 07:34

## 2023-06-15 RX ADMIN — PROPOFOL 100 MG: 10 INJECTION, EMULSION INTRAVENOUS at 07:38

## 2023-06-15 RX ADMIN — FENTANYL CITRATE 25 MCG: 50 INJECTION, SOLUTION INTRAMUSCULAR; INTRAVENOUS at 07:44

## 2023-06-15 RX ADMIN — SUGAMMADEX 200 MG: 100 INJECTION, SOLUTION INTRAVENOUS at 08:16

## 2023-06-15 RX ADMIN — KETOROLAC TROMETHAMINE 15 MG: 30 INJECTION, SOLUTION INTRAMUSCULAR; INTRAVENOUS at 08:16

## 2023-06-15 RX ADMIN — CEFAZOLIN 2 G: 1 INJECTION, POWDER, FOR SOLUTION INTRAMUSCULAR; INTRAVENOUS at 07:46

## 2023-06-15 RX ADMIN — LIDOCAINE HYDROCHLORIDE 100 MG: 20 INJECTION, SOLUTION EPIDURAL; INFILTRATION; INTRACAUDAL at 07:38

## 2023-06-15 RX ADMIN — SODIUM CHLORIDE, POTASSIUM CHLORIDE, SODIUM LACTATE AND CALCIUM CHLORIDE: 600; 310; 30; 20 INJECTION, SOLUTION INTRAVENOUS at 06:20

## 2023-06-15 RX ADMIN — ROCURONIUM BROMIDE 50 MG: 50 INJECTION, SOLUTION INTRAVENOUS at 07:38

## 2023-06-15 RX ADMIN — ONDANSETRON 4 MG: 2 INJECTION INTRAMUSCULAR; INTRAVENOUS at 07:38

## 2023-06-15 RX ADMIN — FENTANYL CITRATE 50 MCG: 50 INJECTION, SOLUTION INTRAMUSCULAR; INTRAVENOUS at 08:08

## 2023-06-15 ASSESSMENT — PAIN DESCRIPTION - PAIN TYPE
TYPE: SURGICAL PAIN

## 2023-06-15 ASSESSMENT — FIBROSIS 4 INDEX: FIB4 SCORE: 1.67

## 2023-06-15 ASSESSMENT — PAIN SCALES - GENERAL: PAIN_LEVEL: 0

## 2023-06-15 NOTE — DISCHARGE INSTRUCTIONS
HOME CARE INSTRUCTIONS    ACTIVITY: Rest and take it easy for the first 24 hours.  A responsible adult is recommended to remain with you during that time.  It is normal to feel sleepy.  We encourage you to not do anything that requires balance, judgment or coordination.    FOR 24 HOURS DO NOT:  Drive, operate machinery or run household appliances.  Drink beer or alcoholic beverages.  Make important decisions or sign legal documents.    DIET: To avoid nausea, slowly advance diet as tolerated, avoiding spicy or greasy foods for the first day.  Add more substantial food to your diet according to your physician's instructions.  Babies can be fed formula or breast milk as soon as they are hungry.  INCREASE FLUIDS AND FIBER TO AVOID CONSTIPATION.    SURGICAL DRESSING/BATHING: Keep dressing clean and dry for 4-5 days, dressing may be removed after 4-5 days.    MEDICATIONS: Resume taking daily medication.  Take prescribed pain medication with food.  If no medication is prescribed, you may take non-aspirin pain medication if needed.  PAIN MEDICATION CAN BE VERY CONSTIPATING.  Take a stool softener or laxative such as senokot, pericolace, or milk of magnesia if needed.    A follow-up appointment should be arranged with your doctor in 2.5-3weeks; call to schedule.    You should CALL YOUR PHYSICIAN if you develop:  Fever greater than 101 degrees F.  Pain not relieved by medication, or persistent nausea or vomiting.  Excessive bleeding (blood soaking through dressing) or unexpected drainage from the wound.  Extreme redness or swelling around the incision site, drainage of pus or foul smelling drainage.  Inability to urinate or empty your bladder within 8 hours.  Problems with breathing or chest pain.    You should call 911 if you develop problems with breathing or chest pain.  If you are unable to contact your doctor or surgical center, you should go to the nearest emergency room or urgent care center.      MILD FLU-LIKE  SYMPTOMS ARE NORMAL.  YOU MAY EXPERIENCE GENERALIZED MUSCLE ACHES, THROAT IRRITATION, HEADACHE AND/OR SOME NAUSEA.    If any questions arise, call your doctor.  If your doctor is not available, please feel free to call the Surgical Center at [phone].  The Center is open Monday through Friday from 7AM to 7PM.      A registered nurse may call you a few days after your surgery to see how you are doing after your procedure.    You may also receive a survey in the mail within the next two weeks and we ask that you take a few moments to complete the survey and return it to us.  Our goal is to provide you with very good care and we value your comments.     Depression / Suicide Risk    As you are discharged from this [facility], it is important to learn how to keep safe from harming yourself.    Recognize the warning signs:  Abrupt changes in personality, positive or negative- including increase in energy   Giving away possessions  Change in eating patterns- significant weight changes-  positive or negative  Change in sleeping patterns- unable to sleep or sleeping all the time   Unwillingness or inability to communicate  Depression  Unusual sadness, discouragement and loneliness  Talk of wanting to die  Neglect of personal appearance   Rebelliousness- reckless behavior  Withdrawal from people/activities they love  Confusion- inability to concentrate     If you or a loved one observes any of these behaviors or has concerns about self-harm, here's what you can do:  Talk about it- your feelings and reasons for harming yourself  Remove any means that you might use to hurt yourself (examples: pills, rope, extension cords, firearm)  Get professional help from the community (Mental Health, Substance Abuse, psychological counseling)  Do not be alone:Call your Safe Contact- someone whom you trust who will be there for you.  Call your local CRISIS HOTLINE 230-5755 or 194-984-4518  Call your local Children's Mobile  Crisis Response Team [place] (542) 601-5257 or www.[employer]  Call the toll free National Suicide Prevention Hotlines   National Suicide Prevention Lifeline 069-402-QRVN (3509)  [facility] Line Network 800-SUICIDE (855-4076)    I acknowledge receipt and understanding of these Home Care instructions.

## 2023-06-15 NOTE — ANESTHESIA POSTPROCEDURE EVALUATION
Patient: Linda Casas    Procedure Summary     Date: 06/15/23 Room / Location: [facility] 06 / SURGERY [facility]    Anesthesia Start: 0734 Anesthesia Stop: 0835    Procedure: L2 KYPHOPLASTY WITH BIOPSY Diagnosis: (L2 COMPRESSION FRACTURE, LUMBAR PAIN)    Surgeons: Tim Barton M.D. Responsible Provider: Kushal Ferreira M.D.    Anesthesia Type: general ASA Status: 3          Final Anesthesia Type: general  Last vitals  BP   Blood Pressure : (!) 169/90    Temp   37.2 °C (98.9 °F)    Pulse   86   Resp   14    SpO2   97 %      Anesthesia Post Evaluation    Patient location during evaluation: PACU  Patient participation: complete - patient participated  Level of consciousness: awake and alert  Pain score: 0    Airway patency: patent  Anesthetic complications: no  Cardiovascular status: hemodynamically stable  Respiratory status: acceptable  Hydration status: euvolemic    PONV: none          No notable events documented.     Nurse Pain Score: 0 (NPRS)

## 2023-06-15 NOTE — OR SURGEON
Immediate Post OP Note    PreOp Diagnosis: L2 compression fracture       PostOp Diagnosis: same      Procedure(s):  L2 KYPHOPLASTY WITH BIOPSY    Surgeon(s):  Tim Barton M.D.    Anesthesiologist/Type of Anesthesia:  Anesthesiologist: Kushal Ferreira M.D./* No anesthesia type entered *    Surgical Staff:  Circulator: Vanessa Kirkland R.N.  Scrub Person: Jayme Yao  Radiology Technologist: Amna Arriaza    Specimens removed if any:  * No specimens in log *    Estimated Blood Loss: 5cc    Findings: none    Complications: none        6/15/2023 8:29 AM Tim Barton M.D.

## 2023-06-15 NOTE — OR NURSING
Patient arrived to PACU 0827  Two lumbar dressings noted, both clean dry intact.   Peripheral pulses intact bilaterally. Vital signs stable with mask 02.     Patient to phase2 via cheryl, report to Maritza HURST. Mary quezada family made aware. Belongings with family member.

## 2023-06-15 NOTE — OR NURSING
Received pt a x 4\  VSS in RA   CDI x 2 lumbar, gauze and tegaderm    DC instructions given to pt, son and daughter in law  PIV removed and belongings returned  Pain 0, nil n/v  Ambulatory w [id number]  Happy to go home

## 2023-06-15 NOTE — ANESTHESIA PREPROCEDURE EVALUATION
Case: [id number] Date/Time: 06/15/23 0715    Procedure: L2 KYPHOPLASTY WITH BIOPSY    Pre-op diagnosis: L2 COMPRESSION FRACTURE, LUMBAR PAIN    Location: [facility] 06 / SURGERY [facility]    Surgeons: Tim Barton M.D.      Lumbar compression fracture.     CHF and ischemic cardiomyopathy. MI with 2 stents last year.     Denies angina, dyspnea, GERD.     Relevant Problems   CARDIAC   (positive) Chronic systolic congestive heart failure (HCC)   (positive) Hypertensive heart disease with chronic systolic congestive heart failure (HCC)   (positive) Paroxysmal A-fib (HCC)   (positive) Premature atrial complex         (positive) Hypertensive kidney disease with stage 3b chronic kidney disease (HCC)       Physical Exam    Airway   Mallampati: II  TM distance: >3 FB  Neck ROM: full       Cardiovascular - normal exam  Rhythm: regular  Rate: normal  (-) murmur     Dental - normal exam           Pulmonary - normal exam  Breath sounds clear to auscultation     Abdominal    Neurological - normal exam                 Anesthesia Plan    ASA 3   ASA physical status 3 criteria: CAD/stents (> 3 months)    Plan - general       Airway plan will be ETT          Induction: intravenous    Postoperative Plan: Postoperative administration of opioids is intended.    Pertinent diagnostic labs and testing reviewed    Informed Consent:    Anesthetic plan and risks discussed with patient.    Use of blood products discussed with: patient whom consented to blood products.

## 2023-06-15 NOTE — OR NURSING
Assumed care in pre - op . Son madiha and daughter in law mary at bedside . Patient verified and signed surgical consent , with the help of Mary  verified , npo , allergies , med /sx hx and med rec done.  Blood sent to lab. Pending result.   Waiting for MD's.   Bed on the lowest position .   Family present at beside.

## 2023-06-15 NOTE — ANESTHESIA PROCEDURE NOTES
Airway    Date/Time: 6/15/2023 7:42 AM    Performed by: Kushal Ferreira M.D.  Authorized by: Kushal Ferreira M.D.    Location:  OR  Urgency:  Elective  Indications for Airway Management:  Anesthesia      Spontaneous Ventilation: absent    Sedation Level:  Deep  Preoxygenated: Yes    Patient Position:  Sniffing  Mask Difficulty Assessment:  0 - not attempted  Final Airway Type:  Endotracheal airway  Final Endotracheal Airway:  ETT  Cuffed: Yes    Technique Used for Successful ETT Placement:  Direct laryngoscopy    Insertion Site:  Oral  Blade Type:  Morrison  Laryngoscope Blade/Videolaryngoscope Blade Size:  2  ETT Size (mm):  6.5  Measured from:  Teeth  ETT to Teeth (cm):  23  Placement Verified by: auscultation and capnometry    Cormack-Lehane Classification:  Grade IIa - partial view of glottis  Number of Attempts at Approach:  1

## 2023-06-15 NOTE — OP REPORT
DATE OF SERVICE:  06/15/2023     SERVICE:  Orthopedic Spine Surgery.     PREOPERATIVE DIAGNOSES:  1.  L2 subacute compression fracture.  2.  Osteoporosis.  2.  Pathological fracture L2.     POSTOPERATIVE DIAGNOSES:  1.  L2 subacute compression fracture.  2.  Osteoporosis.  2.  Pathological fracture L2.     PROCEDURES:  1.  L2 surgical reduction and internal fixation (kyphoplasty) using Kyphon   inflatable balloon tamps.  2.  Greater than one hour use of operating room fluoroscopy.  3.  Deep vertebral biopsy L2 body.     SURGEON:  Tim Barton MD     ASSISTANT SURGEON:  None.     ANESTHESIA:  General.     ANESTHESIOLOGIST:  Kushal Ferreira MD     COMPLICATIONS:  None.     ESTIMATED BLOOD LOSS:  5 mL     DESCRIPTION OF PROCEDURE:  After informed consent was obtained, the patient   was brought to the operating room and given general anesthetic.  She was   placed prone on the operating room table and given preoperative IV Ancef.    After the field was draped, a time-out was called correctly identifying the   patient and the procedure to be done.  We then brought in AP and lateral C   arms and visualized the L2 vertebral body.  We then injected the area of the   incisions with 20 mL of 0.5% Marcaine with epinephrine.  A small incision was   made to the right of midline and a Jamshidi was advanced to the 1:30 position   of the pedicle on the AP.  The Jamshidi was then advanced to the medial border   of the pedicle on the AP at the same time as it was at the posterior margin   of the vertebral body on the lateral.  The center of the Jamshidi was then   removed and a bone biopsy cannula was then placed deep within the vertebral   body and a bone biopsy was harvested for permanent section evaluation.  We   then used a drill and bone void filler to create a path for the balloon.  The   20 mm balloon was then placed deep within the vertebral body and inflated to   44 psi.  The guide pin was then removed from the balloon.      We then did the same sequence of events on the left side except the biopsy was   not taken.  Once both balloons were in place, we used sequentially higher   pressures and created a cavity and placed pressure on the superior endplate.    Excellent expansion of balloons was achieved.  We then mixed cement.  Both   balloons were then retracted and cement was placed and the vertebral body   using live direct fluoroscopic visualization during insertion of cement.    Excellent fill of the vertebral bodies were achieved.  Cement was then allowed   to harden and all instrumentation was removed.  The wounds were closed with   benzoin and Steri-Strips.  Wound dressings were applied and the procedure was   terminated.  No complications were experienced.  Blood loss was 5 mL The   patient was then aroused from general anesthesia and brought in stable   condition to recovery room.        ______________________________  MD ANA YATES/JAMES    DD:  06/15/2023 08:34  DT:  06/15/2023 08:54    Job#:  [id number]

## 2023-06-15 NOTE — ANESTHESIA TIME REPORT
Anesthesia Start and Stop Event Times     Date Time Event    6/15/2023 0727 Ready for Procedure     0734 Anesthesia Start     0835 Anesthesia Stop        Responsible Staff  06/15/23    Name Role Begin End    Kushal Ferreira M.D. Anesth 0734 0835        Overtime Reason:  no overtime (within assigned shift)    Comments:

## 2023-07-20 DIAGNOSIS — F03.90 DEMENTIA WITHOUT BEHAVIORAL DISTURBANCE (HCC): ICD-10-CM

## 2023-08-04 ENCOUNTER — DOCUMENTATION (OUTPATIENT)
Dept: HEALTH INFORMATION MANAGEMENT | Facility: OTHER | Age: 88
End: 2023-08-04
Payer: MEDICARE

## 2023-08-07 ENCOUNTER — TELEPHONE (OUTPATIENT)
Dept: HEALTH INFORMATION MANAGEMENT | Facility: OTHER | Age: 88
End: 2023-08-07
Payer: MEDICARE

## 2023-08-14 SDOH — ECONOMIC STABILITY: INCOME INSECURITY: IN THE LAST 12 MONTHS, WAS THERE A TIME WHEN YOU WERE NOT ABLE TO PAY THE MORTGAGE OR RENT ON TIME?: NO

## 2023-08-14 SDOH — ECONOMIC STABILITY: HOUSING INSECURITY

## 2023-08-14 SDOH — HEALTH STABILITY: PHYSICAL HEALTH: ON AVERAGE, HOW MANY DAYS PER WEEK DO YOU ENGAGE IN MODERATE TO STRENUOUS EXERCISE (LIKE A BRISK WALK)?: 0 DAYS

## 2023-08-14 SDOH — ECONOMIC STABILITY: HOUSING INSECURITY
IN THE LAST 12 MONTHS, WAS THERE A TIME WHEN YOU DID NOT HAVE A STEADY PLACE TO SLEEP OR SLEPT IN A SHELTER (INCLUDING NOW)?: NO

## 2023-08-14 SDOH — HEALTH STABILITY: MENTAL HEALTH
STRESS IS WHEN SOMEONE FEELS TENSE, NERVOUS, ANXIOUS, OR CAN'T SLEEP AT NIGHT BECAUSE THEIR MIND IS TROUBLED. HOW STRESSED ARE YOU?: VERY MUCH

## 2023-08-14 SDOH — ECONOMIC STABILITY: FOOD INSECURITY: WITHIN THE PAST 12 MONTHS, THE FOOD YOU BOUGHT JUST DIDN'T LAST AND YOU DIDN'T HAVE MONEY TO GET MORE.: NEVER TRUE

## 2023-08-14 SDOH — HEALTH STABILITY: PHYSICAL HEALTH: ON AVERAGE, HOW MANY MINUTES DO YOU ENGAGE IN EXERCISE AT THIS LEVEL?: 0 MIN

## 2023-08-14 SDOH — ECONOMIC STABILITY: TRANSPORTATION INSECURITY
IN THE PAST 12 MONTHS, HAS THE LACK OF TRANSPORTATION KEPT YOU FROM MEDICAL APPOINTMENTS OR FROM GETTING MEDICATIONS?: NO

## 2023-08-14 SDOH — ECONOMIC STABILITY: FOOD INSECURITY: WITHIN THE PAST 12 MONTHS, YOU WORRIED THAT YOUR FOOD WOULD RUN OUT BEFORE YOU GOT MONEY TO BUY MORE.: NEVER TRUE

## 2023-08-14 SDOH — ECONOMIC STABILITY: INCOME INSECURITY: HOW HARD IS IT FOR YOU TO PAY FOR THE VERY BASICS LIKE FOOD, HOUSING, MEDICAL CARE, AND HEATING?: NOT HARD AT ALL

## 2023-08-14 SDOH — ECONOMIC STABILITY: TRANSPORTATION INSECURITY
IN THE PAST 12 MONTHS, HAS LACK OF RELIABLE TRANSPORTATION KEPT YOU FROM MEDICAL APPOINTMENTS, MEETINGS, WORK OR FROM GETTING THINGS NEEDED FOR DAILY LIVING?: NO

## 2023-08-14 SDOH — ECONOMIC STABILITY: TRANSPORTATION INSECURITY
IN THE PAST 12 MONTHS, HAS LACK OF TRANSPORTATION KEPT YOU FROM MEETINGS, WORK, OR FROM GETTING THINGS NEEDED FOR DAILY LIVING?: NO

## 2023-08-14 ASSESSMENT — SOCIAL DETERMINANTS OF HEALTH (SDOH)
HOW OFTEN DO YOU ATTENT MEETINGS OF THE CLUB OR ORGANIZATION YOU BELONG TO?: NEVER
HOW OFTEN DO YOU HAVE SIX OR MORE DRINKS ON ONE OCCASION: NEVER
HOW OFTEN DO YOU ATTENT MEETINGS OF THE CLUB OR ORGANIZATION YOU BELONG TO?: NEVER
HOW OFTEN DO YOU ATTEND CHURCH OR RELIGIOUS SERVICES?: NEVER
IN A TYPICAL WEEK, HOW MANY TIMES DO YOU TALK ON THE PHONE WITH FAMILY, FRIENDS, OR NEIGHBORS?: MORE THAN THREE TIMES A WEEK
DO YOU BELONG TO ANY CLUBS OR ORGANIZATIONS SUCH AS CHURCH GROUPS UNIONS, FRATERNAL OR ATHLETIC GROUPS, OR SCHOOL GROUPS?: NO
HOW OFTEN DO YOU GET TOGETHER WITH FRIENDS OR RELATIVES?: MORE THAN THREE TIMES A WEEK
HOW MANY DRINKS CONTAINING ALCOHOL DO YOU HAVE ON A TYPICAL DAY WHEN YOU ARE DRINKING: 1 OR 2
HOW HARD IS IT FOR YOU TO PAY FOR THE VERY BASICS LIKE FOOD, HOUSING, MEDICAL CARE, AND HEATING?: NOT HARD AT ALL
HOW OFTEN DO YOU HAVE A DRINK CONTAINING ALCOHOL: MONTHLY OR LESS
WITHIN THE PAST 12 MONTHS, YOU WORRIED THAT YOUR FOOD WOULD RUN OUT BEFORE YOU GOT THE MONEY TO BUY MORE: NEVER TRUE
IN A TYPICAL WEEK, HOW MANY TIMES DO YOU TALK ON THE PHONE WITH FAMILY, FRIENDS, OR NEIGHBORS?: MORE THAN THREE TIMES A WEEK
DO YOU BELONG TO ANY CLUBS OR ORGANIZATIONS SUCH AS CHURCH GROUPS UNIONS, FRATERNAL OR ATHLETIC GROUPS, OR SCHOOL GROUPS?: NO
HOW OFTEN DO YOU GET TOGETHER WITH FRIENDS OR RELATIVES?: MORE THAN THREE TIMES A WEEK
HOW OFTEN DO YOU ATTEND CHURCH OR RELIGIOUS SERVICES?: NEVER

## 2023-08-14 ASSESSMENT — LIFESTYLE VARIABLES
AUDIT-C TOTAL SCORE: 1
HOW OFTEN DO YOU HAVE A DRINK CONTAINING ALCOHOL: MONTHLY OR LESS
HOW MANY STANDARD DRINKS CONTAINING ALCOHOL DO YOU HAVE ON A TYPICAL DAY: 1 OR 2
SKIP TO QUESTIONS 9-10: 1
HOW OFTEN DO YOU HAVE SIX OR MORE DRINKS ON ONE OCCASION: NEVER

## 2023-08-17 ENCOUNTER — OFFICE VISIT (OUTPATIENT)
Dept: MEDICAL GROUP | Facility: MEDICAL CENTER | Age: 88
End: 2023-08-17
Payer: MEDICARE

## 2023-08-17 VITALS
OXYGEN SATURATION: 94 % | SYSTOLIC BLOOD PRESSURE: 100 MMHG | HEIGHT: 64 IN | WEIGHT: 152.8 LBS | HEART RATE: 57 BPM | TEMPERATURE: 98.7 F | BODY MASS INDEX: 26.09 KG/M2 | DIASTOLIC BLOOD PRESSURE: 62 MMHG

## 2023-08-17 DIAGNOSIS — G89.29 CHRONIC MIDLINE LOW BACK PAIN WITHOUT SCIATICA: ICD-10-CM

## 2023-08-17 DIAGNOSIS — M54.50 CHRONIC MIDLINE LOW BACK PAIN WITHOUT SCIATICA: ICD-10-CM

## 2023-08-17 DIAGNOSIS — G31.9 CEREBRAL ATROPHY (HCC): ICD-10-CM

## 2023-08-17 DIAGNOSIS — F03.90 DEMENTIA WITHOUT BEHAVIORAL DISTURBANCE (HCC): ICD-10-CM

## 2023-08-17 DIAGNOSIS — F32.4 MAJOR DEPRESSIVE DISORDER WITH SINGLE EPISODE, IN PARTIAL REMISSION (HCC): ICD-10-CM

## 2023-08-17 DIAGNOSIS — Z95.5 HISTORY OF CORONARY ARTERY STENT PLACEMENT: ICD-10-CM

## 2023-08-17 DIAGNOSIS — R30.0 DYSURIA: ICD-10-CM

## 2023-08-17 PROCEDURE — 99204 OFFICE O/P NEW MOD 45 MIN: CPT | Performed by: FAMILY MEDICINE

## 2023-08-17 PROCEDURE — 3074F SYST BP LT 130 MM HG: CPT | Performed by: FAMILY MEDICINE

## 2023-08-17 PROCEDURE — 3078F DIAST BP <80 MM HG: CPT | Performed by: FAMILY MEDICINE

## 2023-08-17 RX ORDER — OMEPRAZOLE 20 MG/1
CAPSULE, DELAYED RELEASE ORAL
COMMUNITY
Start: 2023-07-07 | End: 2023-09-07

## 2023-08-17 RX ORDER — BUPROPION HYDROCHLORIDE 75 MG/1
75 TABLET ORAL 2 TIMES DAILY
Qty: 60 TABLET | Refills: 2 | Status: SHIPPED | OUTPATIENT
Start: 2023-08-17 | End: 2023-09-07

## 2023-08-17 RX ORDER — BUPROPION HYDROCHLORIDE 150 MG/1
TABLET, EXTENDED RELEASE ORAL
COMMUNITY
Start: 2023-07-05 | End: 2023-08-17

## 2023-08-17 ASSESSMENT — FIBROSIS 4 INDEX: FIB4 SCORE: 1.97

## 2023-08-17 NOTE — PROGRESS NOTES
CC: New patient: Dementia, depression, chronic back pain, CAD/stent placement, hypertension, hyperlipidemia, dysuria    HPI:  Linda presents today to establish new PCP.    She has been having memory issues however she has been independent with some of her ADLs.  Son and granddaughter are concerns about progressive memory loss.  However she is currently following up with neurology.  The following chronic medical issues discussed today:    Dementia without behavioral disturbance (HCC)/ Cerebral atrophy (HCC)  Patient has been having slowly progressive memory loss, however she she has been physically active.  Currently follows up with neurology Dr. Stern.  Discussed with patient and family(granddaughter and son) complete memory evaluation next visit.    Major depressive disorder with single episode, 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HCC)  Patient's mood has been fluctuating which is probably associated with her memory loss.  Sometimes associated with behavior change.  She is currently on Lexapro 20 mg daily, and Wellbutrin  mg daily.  As per granddaughter patient's energy has gone down since she started the Wellbutrin and she has been sleeping all night and most of the day.     Chronic midline low back pain without sciatica  Patient is history of compression fracture, status post kyphoplasty.  Her pain has been controlled for now.  Consider treating for osteoporosis with calcium and vitamin D    History of coronary artery stent placement  Patient currently denies any chest pain or shortness of breath.  Last echocardiogram in June 2022 showed:   Normal left ventricular size and systolic function.  Ejection fraction 70%.  Normal right ventricular size and systolic function.   The left atrium is normal in size.  Mild mitral regurgitation.  Mild eccentric aortic insufficiency.  Mild tricuspid regurgitation.  Normal pericardium without effusion.    Denies any chest pain or shortness of breath.  She has been on carvedilol 3.125  mg twice a day, rosuvastatin 10 mg daily,    Dysuria  Patient has been having burning urination once in a while.  Denies any fever, chills, nausea, or vomiting.  Denies any abdominal pain or blood in the urine.  Grand daughter is concerned about UTI, requested urinalysis.      Essential hypertension  Blood pressure has been controlled on losartan 50 mg daily, and carvedilol 3.125 mg twice a day    Mixed hyperlipidemia  She has been tolerating the statin. Denies muscle pain LFTs has been normal.  History of CAD/coronary stent placement.  Patient has been on rosuvastatin 10 mg daily.    Patient Active Problem List    Diagnosis Date Noted    Continuous leakage of urine 03/13/2023    BMI 25.0-25.9,adult 05/03/2022    Hypertensive heart disease with chronic systolic congestive heart failure ([facility]) 05/03/2022    Chronic systolic congestive heart failure ([facility]) 05/03/2022    Hypertensive kidney disease with stage 3b chronic kidney disease ([facility]) 05/03/2022    Hypercoagulable state ([facility]) 05/03/2022    Dyslipidemia 05/03/2022    Major depressive disorder with single episode, 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[facility]) 05/03/2022    Cerebral atrophy ([facility]) 05/03/2022    Leukocytosis 04/20/2022    Dementia without behavioral disturbance ([facility]) 04/13/2022    Mixed stress and urge urinary incontinence 04/13/2022    Ischemic cardiomyopathy 04/08/2022    Premature atrial complex 04/08/2022    Debility 04/08/2022    Paroxysmal A-fib ([facility]) 04/04/2022    B12 deficiency 04/04/2022    Closed stable burst fracture of first lumbar vertebra ([facility]) 11/01/2021    Fall 09/24/2021    Closed fracture of left distal radius 06/24/2021    Other fatigue 03/02/2020    Trigger finger, right middle finger 05/29/2018    Cholecystolithiasis 05/23/2016    Tear of medial cartilage or meniscus of knee, current 03/11/2014    Osteopenia 09/14/2010    Anxiety 09/14/2010       Current Outpatient Medications   Medication Sig Dispense Refill    buPROPion SR (WELLBUTRIN-SR) 150 MG  TABLET SR 12 HR sustained-release tablet       omeprazole (PRILOSEC) 20 MG delayed-release capsule       losartan (COZAAR) 50 MG Tab Take 1 Tablet by mouth every evening. 100 Tablet 3    rosuvastatin (CRESTOR) 10 MG Tab Take 1 Tablet by mouth every evening. 100 Tablet 3    carvedilol (COREG) 3.125 MG Tab Take 1 Tablet by mouth 2 times a day. 200 Tablet 3    GOODSENSE ASPIRIN LOW DOSE 81 MG EC tablet TAKE 1 TABLET BY MOUTH EVERY DAY. 100 Tablet 3    escitalopram (LEXAPRO) 20 MG tablet TAKE 1 TABLET BY MOUTH EVERY DAY. 90 Tablet 1    meloxicam (MOBIC) 7.5 MG Tab Take 1 Tablet by mouth every day. (Patient not taking: Reported on 8/17/2023) 60 Tablet 0    HYDROcodone-acetaminophen (NORCO) 5-325 MG Tab per tablet Take 1 Tablet by mouth every four hours as needed (pain).      OMEPRAZOLE PO Take 20 mg by mouth every day.      Misc. Devices Misc Walker with seat. (Patient not taking: Reported on 8/17/2023) 1 Each 0    Misc. Devices Misc Adjustable medical bed with mattress (Patient not taking: Reported on 8/17/2023) 1 Each 0    Misc. Devices Misc Adjustable home hospital bed with mattress. (Patient not taking: Reported on 8/17/2023) 1 Each 0    Misc. Devices Misc Shower bench 1 Each 0    Misc. Devices Misc Adult pull ups please. (Patient not taking: Reported on 8/17/2023) 150 Each 5    Misc. Devices Misc Pure wick device for urinary incontinence with supplies for 6 months use. (Patient not taking: Reported on 8/17/2023) 1 Each 0    spironolactone (ALDACTONE) 25 MG Tab Take 1 Tablet by mouth every day. 90 Tablet 3     No current facility-administered medications for this visit.         Allergies as of 08/17/2023 - Reviewed 08/17/2023   Allergen Reaction Noted    Ampicillin Hives, Rash, and Swelling 05/18/2018    Bactrim ds Hives, Itching, and Swelling 05/24/2022    Ciprofloxacin Hives, Rash, and Swelling 05/27/2022        Social History     Socioeconomic History    Marital status:      Spouse name: Not on file     Number of children: Not on file    Years of education: Not on file    Highest education level: 12th grade   Occupational History    Not on file   Tobacco Use    Smoking status: Never     Passive exposure: Never    Smokeless tobacco: Never   Vaping Use    Vaping Use: Never used   Substance and Sexual Activity    Alcohol use: Not Currently     Comment: 1 time per month    Drug use: Never    Sexual activity: Yes     Partners: Male   Other Topics Concern    Not on file   Social History Narrative    ** Merged History Encounter **          Social Determinants of Health     Financial Resource Strain: Low Risk  (8/14/2023)    Overall Financial Resource Strain (CARDIA)     Difficulty of Paying Living Expenses: Not hard at all   Food Insecurity: No Food Insecurity (8/14/2023)    Hunger Vital Sign     Worried About Running Out of Food in the Last Year: Never true     Ran Out of Food in the Last Year: Never true   Transportation Needs: No Transportation Needs (8/14/2023)    PRAPARE - Transportation     Lack of Transportation (Medical): No     Lack of Transportation (Non-Medical): No   Physical Activity: Inactive (8/14/2023)    Exercise Vital Sign     Days of Exercise per Week: 0 days     Minutes of Exercise per Session: 0 min   Stress: Stress Concern Present (8/14/2023)    [employer] - Occupational Stress Questionnaire     Feeling of Stress : Very much   Social Connections: Socially Isolated (8/14/2023)    Social Connection and Isolation Panel [NHANES]     Frequency of Communication with Friends and Family: More than three times a week     Frequency of Social Gatherings with Friends and Family: More than three times a week     Attends Christianity Services: Never     Active Member of Clubs or Organizations: No     Attends Club or Organization Meetings: Never     Marital Status:    Intimate Partner Violence: Not on file   Housing Stability: Unknown (8/14/2023)    Housing Stability Vital Sign      "Unable to Pay for Housing in the Last Year: No     Number of Places Lived in the Last Year: Not on file     Unstable Housing in the Last Year: No       Family History   Problem Relation Age of Onset    No Known Problems Mother     No Known Problems Father        Past Surgical History:   Procedure Laterality Date    KYPHOPLASTY  6/15/2023    Procedure: L2 KYPHOPLASTY WITH BIOPSY;  Surgeon: Tim Barton M.D.;  Location: [facility];  Service: Orthopedics    KYPHOPLASTY N/A 11/06/2021    Procedure: KYPHOPLASTY - L1 AND BIOPSY;  Surgeon: Tim Barton M.D.;  Location: [facility];  Service: Orthopedics    TRIGGER FINGER RELEASE Left 05/29/2018    Procedure: TRIGGER FINGER RELEASE-  MIDDLE;  Surgeon: Frandy Liz M.D.;  Location: [facility];  Service: Orthopedics    JULIAN BY LAPAROSCOPY  05/23/2016    Procedure: JULIAN BY LAPAROSCOPY;  Surgeon: Adan Kearns M.D.;  Location: [facility];  Service:     KNEE ARTHROSCOPY  03/11/2014    Performed by Bonilla Valera M.D. at [facility]    BLADDER SUSPENSION  2001    HYSTERECTOMY, TOTAL ABDOMINAL  2000    APPENDECTOMY  1969    COLON RESECTION      partial; no CA    OTHER CARDIAC SURGERY  2022       ROS:  Denies any Headache, Blurred Vision, Confusion Chest pain,  Shortness of breath,  Abdominal pain, Changes of bowel or bladder, Lower ext edema, Fevers, Nights sweats, Weight Changes, Focal weakness or numbness.  All other systems are negative.    /62 (BP Location: Right arm, Patient Position: Sitting, BP Cuff Size: Adult)   Pulse (!) 57   Temp 37.1 °C (98.7 °F) (Temporal)   Ht 1.626 m (5' 4\")   Wt 69.3 kg (152 lb 12.8 oz)   SpO2 94%   BMI 26.23 kg/m²     Physical Exam:  Gen:         Alert and oriented, No apparent distress.  HEENT:   P[place], TM clear,  Oralpharynx no erythema or exudates.  Neck:       No Jugular venous distension, Lymphadenopathy, Thyromegaly, Bruits.  Lungs:     Clear to auscultation " bilaterally  CV:          Regular rate and rhythm. No murmurs, rubs or gallops.  Abd:         Soft non tender, non distended. Normal active bowel sounds. No                                        Hepatosplenomegaly, No pulsatile masses.  Ext:          No clubbing, cyanosis, edema.      Assessment and Plan.   89 y.o. female     1. Dementia without behavioral disturbance (HCC)  2. Cerebral atrophy (HCC)  Patient has been having slowly progressive memory loss, however she is physically active.  Currently follows up with neurology Dr. Stern  Continue follow-up with neurology    3. Major depressive disorder with single episode, 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HCC)  Patient's mood has been fluctuating which is probably associated with her memory loss.  Sometimes associated with behavior change.  Continue on Lexapro 20 mg daily,  Will change Wellbutrin from 150 mg long-acting once a day to short acting 75 mg twice a day    - buPROPion (WELLBUTRIN) 75 MG Tab; Take 1 Tablet by mouth 2 times a day.  Dispense: 60 Tablet; Refill: 2    4. Chronic midline low back pain without sciatica  Patient is history of compression fracture, status post kyphoplasty.  Her pain has been controlled for now.  Consider treating for osteoporosis with calcium and vitamin D    5. History of coronary artery stent placement  Stable.  Asymptomatic.  Last echocardiogram showed normal ejection fraction  Continue on carvedilol 3.125 mg twice a day, rosuvastatin 10 mg daily,    6. Dysuria  Probably due to vaginal dryness, urinalysis was negative for infection.    - POCT Urinalysis    7. Essential hypertension  Blood pressure has been controlled on losartan 50 mg daily, and carvedilol 3.125 mg twice a day    8. Mixed hyperlipidemia  She has been tolerating the statin. Denies muscle pain LFTs has been normal.  History of CAD/coronary stent placement.  Continue on rosuvastatin 10 mg daily.

## 2023-08-22 ENCOUNTER — HOSPITAL ENCOUNTER (OUTPATIENT)
Dept: RADIOLOGY | Facility: MEDICAL CENTER | Age: 88
End: 2023-08-22
Attending: NURSE PRACTITIONER
Payer: MEDICARE

## 2023-08-22 DIAGNOSIS — R90.82 WHITE MATTER DISEASE: ICD-10-CM

## 2023-08-22 DIAGNOSIS — G31.84 MILD COGNITIVE IMPAIRMENT: ICD-10-CM

## 2023-08-22 DIAGNOSIS — R53.1 WEAKNESS: ICD-10-CM

## 2023-08-22 DIAGNOSIS — H90.2 CONDUCTIVE HEARING LOSS, UNSPECIFIED LATERALITY: ICD-10-CM

## 2023-08-22 DIAGNOSIS — M62.81 MUSCLE WEAKNESS (GENERALIZED): ICD-10-CM

## 2023-08-22 DIAGNOSIS — R27.0 ATAXIA: ICD-10-CM

## 2023-08-22 PROCEDURE — 70551 MRI BRAIN STEM W/O DYE: CPT

## 2023-09-06 ENCOUNTER — TELEPHONE (OUTPATIENT)
Dept: MEDICAL GROUP | Facility: MEDICAL CENTER | Age: 88
End: 2023-09-06
Payer: MEDICARE

## 2023-09-06 NOTE — TELEPHONE ENCOUNTER
A message was left in regards to the patient. She was placed on wellbutrin for about a month ago. Its playing a number on her head. They  have taken her off that medication and will follow up with you for her appointment on the 9/7

## 2023-09-07 ENCOUNTER — OFFICE VISIT (OUTPATIENT)
Dept: MEDICAL GROUP | Facility: MEDICAL CENTER | Age: 88
End: 2023-09-07
Payer: MEDICARE

## 2023-09-07 VITALS
WEIGHT: 145 LBS | HEART RATE: 76 BPM | OXYGEN SATURATION: 93 % | RESPIRATION RATE: 16 BRPM | TEMPERATURE: 98.9 F | BODY MASS INDEX: 24.75 KG/M2 | DIASTOLIC BLOOD PRESSURE: 64 MMHG | SYSTOLIC BLOOD PRESSURE: 120 MMHG | HEIGHT: 64 IN

## 2023-09-07 DIAGNOSIS — F03.B3 MODERATE DEMENTIA WITH MOOD DISTURBANCE, UNSPECIFIED DEMENTIA TYPE (HCC): ICD-10-CM

## 2023-09-07 PROCEDURE — 3074F SYST BP LT 130 MM HG: CPT | Performed by: FAMILY MEDICINE

## 2023-09-07 PROCEDURE — 99214 OFFICE O/P EST MOD 30 MIN: CPT | Performed by: FAMILY MEDICINE

## 2023-09-07 PROCEDURE — 3078F DIAST BP <80 MM HG: CPT | Performed by: FAMILY MEDICINE

## 2023-09-07 RX ORDER — QUETIAPINE FUMARATE 25 MG/1
TABLET, FILM COATED ORAL
Qty: 90 TABLET | Refills: 2 | Status: ON HOLD | OUTPATIENT
Start: 2023-09-07 | End: 2023-11-09 | Stop reason: SDUPTHER

## 2023-09-07 RX ORDER — ESCITALOPRAM OXALATE 20 MG/1
20 TABLET ORAL DAILY
Qty: 90 TABLET | Refills: 0 | Status: ON HOLD | OUTPATIENT
Start: 2023-09-07 | End: 2023-11-09 | Stop reason: SDUPTHER

## 2023-09-07 ASSESSMENT — FIBROSIS 4 INDEX: FIB4 SCORE: 1.97

## 2023-09-07 NOTE — PROGRESS NOTES
CC: Dementia with combativeness, discussed medications    HPI:   Linda presents today brought by her son and daughter-in-law.  They are concerned because her mood especially at night.  She has become more combative at night.  However patient stated that she has been feeling sad because her son does not speak to her and she does not deserve that.  Patient's mood has been fluctuating, sometimes gets worse and affecting her sleep at night as per daughter-in-law and son.  Patient's mental health seems to be appropriate for age with no significant dementia.  Her condition could be because of her bad mood/depression.  She does not feel suicidal.      Patient Active Problem List    Diagnosis Date Noted    Continuous leakage of urine 03/13/2023    BMI 25.0-25.9,adult 05/03/2022    Hypertensive heart disease with chronic systolic congestive heart failure ([facility]) 05/03/2022    Chronic systolic congestive heart failure ([facility]) 05/03/2022    Hypertensive kidney disease with stage 3b chronic kidney disease ([facility]) 05/03/2022    Hypercoagulable state ([facility]) 05/03/2022    Dyslipidemia 05/03/2022    Major depressive disorder with single episode, 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[facility]) 05/03/2022    Cerebral atrophy ([facility]) 05/03/2022    Leukocytosis 04/20/2022    Dementia without behavioral disturbance ([facility]) 04/13/2022    Mixed stress and urge urinary incontinence 04/13/2022    Ischemic cardiomyopathy 04/08/2022    Premature atrial complex 04/08/2022    Debility 04/08/2022    Paroxysmal A-fib ([facility]) 04/04/2022    B12 deficiency 04/04/2022    Closed stable burst fracture of first lumbar vertebra ([facility]) 11/01/2021    Fall 09/24/2021    Closed fracture of left distal radius 06/24/2021    Other fatigue 03/02/2020    Trigger finger, right middle finger 05/29/2018    Cholecystolithiasis 05/23/2016    Tear of medial cartilage or meniscus of knee, current 03/11/2014    Osteopenia 09/14/2010    Anxiety 09/14/2010       Current Outpatient Medications  "  Medication Sig Dispense Refill    QUEtiapine (SEROQUEL) 25 MG Tab Take 25 mg at bet time 90 Tablet 2    losartan (COZAAR) 50 MG Tab Take 1 Tablet by mouth every evening. 100 Tablet 3    rosuvastatin (CRESTOR) 10 MG Tab Take 1 Tablet by mouth every evening. 100 Tablet 3    carvedilol (COREG) 3.125 MG Tab Take 1 Tablet by mouth 2 times a day. 200 Tablet 3    GOODSENSE ASPIRIN LOW DOSE 81 MG EC tablet TAKE 1 TABLET BY MOUTH EVERY DAY. 100 Tablet 3    escitalopram (LEXAPRO) 20 MG tablet TAKE 1 TABLET BY MOUTH EVERY DAY. 90 Tablet 1     No current facility-administered medications for this visit.         Allergies as of 09/07/2023 - Reviewed 09/07/2023   Allergen Reaction Noted    Ampicillin Hives, Rash, and Swelling 05/18/2018    Bactrim ds Hives, Itching, and Swelling 05/24/2022    Ciprofloxacin Hives, Rash, and Swelling 05/27/2022        ROS: Denies any chest pain, Shortness of breath, Changes bowel or bladder, Lower extremity edema.    Physical Exam:  /64 (BP Location: Right arm, Patient Position: Sitting, BP Cuff Size: Adult)   Pulse 76   Temp 37.2 °C (98.9 °F) (Temporal)   Resp 16   Ht 1.626 m (5' 4\")   Wt 65.8 kg (145 lb)   SpO2 93%   BMI 24.89 kg/m²   Gen.: Well-developed, well-nourished, no apparent distress,pleasant and cooperative with the examination  Psych: Alert and oriented x 3.Flat affect.  Mildly abnormal judgement,insight and memory.      Assessment and Plan.   89 y.o. female     1. Moderate dementia with mood disturbance, unspecified dementia type (HCC)  The patient's memory issue could be related to depression but mild psychotic features due to dementia cannot be excluded.  However she has been having sundowning symptoms (symptoms gets worse at night)  We will start patient on Seroquel 25 mg daily.  Patient's family think her condition gets worse when she started Wellbutrin so we will discontinue Wellbutrin and continue Lexapro.  RTC in 3 month or before that as needed for " reevaluation.    - QUEtiapine (SEROQUEL) 25 MG Tab; Take 25 mg at bet time  Dispense: 90 Tablet; Refill: 2

## 2023-10-03 ENCOUNTER — OFFICE VISIT (OUTPATIENT)
Dept: URGENT CARE | Facility: CLINIC | Age: 88
End: 2023-10-03
Payer: MEDICARE

## 2023-10-03 ENCOUNTER — APPOINTMENT (OUTPATIENT)
Dept: RADIOLOGY | Facility: IMAGING CENTER | Age: 88
End: 2023-10-03
Attending: FAMILY MEDICINE
Payer: MEDICARE

## 2023-10-03 VITALS
DIASTOLIC BLOOD PRESSURE: 82 MMHG | TEMPERATURE: 97.8 F | SYSTOLIC BLOOD PRESSURE: 142 MMHG | WEIGHT: 150.4 LBS | HEART RATE: 80 BPM | BODY MASS INDEX: 26.65 KG/M2 | OXYGEN SATURATION: 93 % | HEIGHT: 63 IN | RESPIRATION RATE: 18 BRPM

## 2023-10-03 DIAGNOSIS — R03.0 ELEVATED BLOOD PRESSURE READING: ICD-10-CM

## 2023-10-03 DIAGNOSIS — R06.02 SOB (SHORTNESS OF BREATH): ICD-10-CM

## 2023-10-03 DIAGNOSIS — S22.32XA CLOSED FRACTURE OF ONE RIB OF LEFT SIDE, INITIAL ENCOUNTER: ICD-10-CM

## 2023-10-03 DIAGNOSIS — R00.0 TACHYCARDIA: ICD-10-CM

## 2023-10-03 PROCEDURE — 71111 X-RAY EXAM RIBS/CHEST4/> VWS: CPT | Mod: TC | Performed by: FAMILY MEDICINE

## 2023-10-03 PROCEDURE — 99214 OFFICE O/P EST MOD 30 MIN: CPT | Performed by: FAMILY MEDICINE

## 2023-10-03 PROCEDURE — 3079F DIAST BP 80-89 MM HG: CPT | Performed by: FAMILY MEDICINE

## 2023-10-03 PROCEDURE — 3077F SYST BP >= 140 MM HG: CPT | Performed by: FAMILY MEDICINE

## 2023-10-03 ASSESSMENT — FIBROSIS 4 INDEX: FIB4 SCORE: 1.97

## 2023-10-03 NOTE — PROGRESS NOTES
"  Subjective:      89 y.o. female presents to urgent care for shortness of breath that has been present since Sunday. She was eating a fried egg sandwich Sunday around 7pm when she started choking. Her son was able to provide the heimlich maneuver. He gave her one thrust and the foot shot out of her mouth in one large wad. Her son was able to hear cracking when he did the thrust. Her shortness of breath is worse with laying down. She has associated chest pain that is described as throbbing.     Blood pressure is elevated today in urgent care. She has a history of hypertension for which she takes Losartan. She denies any chest pain or palpitations.     She denies any other questions or concerns at this time.    Current problem list, medication, and past medical/surgical history were reviewed in Epic.    ROS  See HPI     Objective:      BP (!) 142/82 (BP Location: Left arm, Patient Position: Sitting)   Pulse 80   Temp 36.6 °C (97.8 °F) (Temporal)   Resp 18   Ht 1.6 m (5' 3\")   Wt 68.2 kg (150 lb 6.4 oz)   SpO2 93%   BMI 26.64 kg/m²     Physical Exam  Constitutional:       General: She is not in acute distress.     Appearance: She is not diaphoretic.   Cardiovascular:      Rate and Rhythm: Normal rate and regular rhythm.      Heart sounds: Normal heart sounds.   Pulmonary:      Effort: Pulmonary effort is normal. No respiratory distress.      Breath sounds: Normal breath sounds.   Musculoskeletal:      Comments: No discolorations or deformities noted to inspection of chest wall.  She is tender to palpation of her left anterior chest wall.   Neurological:      Mental Status: She is alert.   Psychiatric:         Mood and Affect: Affect normal.         Judgment: Judgment normal.       Assessment/Plan:     1. Closed fracture of one rib of left side, initial encounter  2. SOB (shortness of breath)  XRAY showing no acute cardiopulmonary processes, acute minimally displaced anterolateral left eighth rib fracture.  She " was encouraged to use Tylenol, ibuprofen, and heat as needed for pain.  - [id number] (WITH 1-VIEW CXR); Future    3. Elevated blood pressure reading  Systemic symptoms seen through elevated blood pressure.  This is asymptomatic.  Continue with losartan as prescribed.  Follow with PCP.      Instructed to return to Urgent Care or nearest Emergency Department if symptoms fail to improve, for any change in condition, further concerns, or new concerning symptoms. Patient states understanding of the plan of care and discharge instructions.    Ary Montgomery M.D.

## 2023-10-17 ENCOUNTER — OFFICE VISIT (OUTPATIENT)
Dept: CARDIOLOGY | Facility: MEDICAL CENTER | Age: 88
End: 2023-10-17
Payer: MEDICARE

## 2023-10-17 VITALS
OXYGEN SATURATION: 92 % | RESPIRATION RATE: 14 BRPM | HEART RATE: 64 BPM | WEIGHT: 150 LBS | DIASTOLIC BLOOD PRESSURE: 72 MMHG | SYSTOLIC BLOOD PRESSURE: 110 MMHG | HEIGHT: 64 IN | BODY MASS INDEX: 25.61 KG/M2

## 2023-10-17 DIAGNOSIS — I25.5 ISCHEMIC CARDIOMYOPATHY: ICD-10-CM

## 2023-10-17 DIAGNOSIS — E78.5 DYSLIPIDEMIA: ICD-10-CM

## 2023-10-17 DIAGNOSIS — I11.0 HYPERTENSIVE HEART DISEASE WITH CHRONIC SYSTOLIC CONGESTIVE HEART FAILURE (HCC): ICD-10-CM

## 2023-10-17 DIAGNOSIS — I48.0 PAROXYSMAL A-FIB (HCC): ICD-10-CM

## 2023-10-17 DIAGNOSIS — I50.22 CHRONIC SYSTOLIC CONGESTIVE HEART FAILURE (HCC): ICD-10-CM

## 2023-10-17 DIAGNOSIS — I50.22 HYPERTENSIVE HEART DISEASE WITH CHRONIC SYSTOLIC CONGESTIVE HEART FAILURE (HCC): ICD-10-CM

## 2023-10-17 DIAGNOSIS — D68.59 HYPERCOAGULABLE STATE (HCC): ICD-10-CM

## 2023-10-17 PROCEDURE — 99214 OFFICE O/P EST MOD 30 MIN: CPT

## 2023-10-17 PROCEDURE — 3074F SYST BP LT 130 MM HG: CPT

## 2023-10-17 PROCEDURE — 3078F DIAST BP <80 MM HG: CPT

## 2023-10-17 PROCEDURE — 99212 OFFICE O/P EST SF 10 MIN: CPT

## 2023-10-17 ASSESSMENT — FIBROSIS 4 INDEX: FIB4 SCORE: 1.97

## 2023-10-17 ASSESSMENT — ENCOUNTER SYMPTOMS
GASTROINTESTINAL NEGATIVE: 1
CONSTITUTIONAL NEGATIVE: 1
PND: 0
NEUROLOGICAL NEGATIVE: 1
DEPRESSION: 0
NERVOUS/ANXIOUS: 0
ORTHOPNEA: 0
EYES NEGATIVE: 1
PALPITATIONS: 0
MUSCULOSKELETAL NEGATIVE: 1
SHORTNESS OF BREATH: 0

## 2023-10-17 NOTE — PROGRESS NOTES
No chief complaint on file.      Subjective     Dianne Casas is a 89 y.o. female who presents today for 6 month follow up.  She has a history of ischemic cardiomyopathy, hospitalized on 4/4/2022 for new onset heart failure status post PCI to LAD x2 and  of OM 3.  Additional history of hypertension, hyperlipidemia, back pain.      They are accompanied today by her son Efren.     Seen by myself on 4/27/2023, at that visit since their last visit they have been having some shortness of breath and fatigue.  I ordered an OPO for her    Unfortunately since her last visit with me she sustained a fall and fractured her L2 requiring surgery on 6/15/2023    Today 10/17/2023 she is doing well from cardiac perspective. Recovered from her back surgery     Activity: she [place].      No symptoms of chest pain, palpitations, or lower extremity edema.       Past Medical History:   Diagnosis Date    Anesthesia     Dtr-in-law states patient took a very long time to recover from anesthesia, states patient was incoherent and extremely weak    Anxiety 09/14/2010    Cataract     IOL    Congestive heart failure (HCC) 2022    High cholesterol     Hypertension     history of but no treatment    Myocardial infarct (HCC) May2022    OSTEOPOROSIS 09/14/2010    Pain 03/04/2014    6/10=L knee    Pain 11/04/2021    low back    Snoring     no sleep study    Urinary incontinence     only at times at night when sleeping     Past Surgical History:   Procedure Laterality Date    KYPHOPLASTY  6/15/2023    Procedure: L2 KYPHOPLASTY WITH BIOPSY;  Surgeon: Tim Barton M.D.;  Location: [facility];  Service: Orthopedics    KYPHOPLASTY N/A 11/06/2021    Procedure: KYPHOPLASTY - L1 AND BIOPSY;  Surgeon: Tim Barton M.D.;  Location: [facility];  Service: Orthopedics    TRIGGER FINGER RELEASE Left 05/29/2018    Procedure: TRIGGER FINGER RELEASE-  MIDDLE;  Surgeon: Frandy Liz M.D.;  Location:  SURGERY [facility];  Service: Orthopedics    JULIAN BY LAPAROSCOPY  05/23/2016    Procedure: JULIAN BY LAPAROSCOPY;  Surgeon: Adan Kearns M.D.;  Location: SURGERY [facility];  Service:     KNEE ARTHROSCOPY  03/11/2014    Performed by Bonilla Valera M.D. at SURGERY [facility]    BLADDER SUSPENSION  2001    HYSTERECTOMY, TOTAL ABDOMINAL  2000    APPENDECTOMY  1969    COLON RESECTION      partial; no CA    OTHER CARDIAC SURGERY  2022     Family History   Problem Relation Age of Onset    No Known Problems Mother     No Known Problems Father      Social History     Socioeconomic History    Marital status:      Spouse name: Not on file    Number of children: Not on file    Years of education: Not on file    Highest education level: 12th grade   Occupational History    Not on file   Tobacco Use    Smoking status: Never     Passive exposure: Never    Smokeless tobacco: Never   Vaping Use    Vaping Use: Never used   Substance and Sexual Activity    Alcohol use: Not Currently     Comment: 1 time per month    Drug use: Never    Sexual activity: Yes     Partners: Male   Other Topics Concern    Not on file   Social History Narrative    ** Merged History Encounter **          Social Determinants of Health     Financial Resource Strain: Low Risk  (8/14/2023)    Overall Financial Resource Strain (CARDIA)     Difficulty of Paying Living Expenses: Not hard at all   Food Insecurity: No Food Insecurity (8/14/2023)    Hunger Vital Sign     Worried About Running Out of Food in the Last Year: Never true     Ran Out of Food in the Last Year: Never true   Transportation Needs: No Transportation Needs (8/14/2023)    PRAPARE - Transportation     Lack of Transportation (Medical): No     Lack of Transportation (Non-Medical): No   Physical Activity: Inactive (8/14/2023)    Exercise Vital Sign     Days of Exercise per Week: 0 days     Minutes of Exercise per Session: 0 min   Stress: Stress Concern Present (8/14/2023)  "   [employer] - Occupational Stress Questionnaire     Feeling of Stress : Very much   Social Connections: Socially Isolated (8/14/2023)    Social Connection and Isolation Panel [NHANES]     Frequency of Communication with Friends and Family: More than three times a week     Frequency of Social Gatherings with Friends and Family: More than three times a week     Attends Quaker Services: Never     Active Member of Clubs or Organizations: No     Attends Club or Organization Meetings: Never     Marital Status:    Intimate Partner Violence: Not on file   Housing Stability: Unknown (8/14/2023)    Housing Stability Vital Sign     Unable to Pay for Housing in the Last Year: No     Number of Places Lived in the Last Year: Not on file     Unstable Housing in the Last Year: No     Allergies   Allergen Reactions    Ampicillin Hives, Rash and Swelling     Feels \"rotten\"     Bactrim Ds Hives, Itching and Swelling     Mouth, lip, tongue, eye swelling, pain, itching    Ciprofloxacin Hives, Rash and Swelling     .     Outpatient Encounter Medications as of 10/17/2023   Medication Sig Dispense Refill    QUEtiapine (SEROQUEL) 25 MG Tab Take 25 mg at bet time 90 Tablet 2    escitalopram (LEXAPRO) 20 MG tablet TAKE 1 TABLET BY MOUTH EVERY DAY. 90 Tablet 0    losartan (COZAAR) 50 MG Tab Take 1 Tablet by mouth every evening. 100 Tablet 3    rosuvastatin (CRESTOR) 10 MG Tab Take 1 Tablet by mouth every evening. 100 Tablet 3    carvedilol (COREG) 3.125 MG Tab Take 1 Tablet by mouth 2 times a day. 200 Tablet 3    GOODSENSE ASPIRIN LOW DOSE 81 MG EC tablet TAKE 1 TABLET BY MOUTH EVERY DAY. 100 Tablet 3     No facility-administered encounter medications on file as of 10/17/2023.     Review of Systems   Constitutional: Negative.    HENT: Negative.     Eyes: Negative.    Respiratory:  Negative for shortness of breath.    Cardiovascular:  Negative for chest pain, palpitations, orthopnea, leg swelling and " "PND.   Gastrointestinal: Negative.    Genitourinary: Negative.    Musculoskeletal: Negative.    Skin: Negative.    Neurological: Negative.    Endo/Heme/Allergies: Negative.    Psychiatric/Behavioral:  Negative for depression. The patient is not nervous/anxious.               Objective     /72 (BP Location: Left arm, Patient Position: Sitting, BP Cuff Size: Adult)   Pulse 64   Resp 14   Ht 1.626 m (5' 4\")   Wt 68 kg (150 lb)   SpO2 92%   BMI 25.75 kg/m²     Physical Exam  Constitutional:       Appearance: Normal appearance.   HENT:      Head: Normocephalic and atraumatic.   Neck:      Vascular: No JVD.   Cardiovascular:      Rate and Rhythm: Normal rate and regular rhythm.      Pulses: Normal pulses.      Heart sounds: Normal heart sounds. No murmur heard.     No friction rub.   Pulmonary:      Effort: Pulmonary effort is normal. No respiratory distress.      Breath sounds: Normal breath sounds.   Abdominal:      General: There is no distension.      Palpations: Abdomen is soft.   Musculoskeletal:      Right lower leg: No edema.      Left lower leg: No edema.   Skin:     General: Skin is warm and dry.   Neurological:      General: No focal deficit present.      Mental Status: She is alert and oriented to person, place, and time.   Psychiatric:         Mood and Affect: Mood normal.         Behavior: Behavior normal.            Lab Results   Component Value Date/Time    CHOLSTRLTOT 173 04/05/2022 01:11 AM    LDL 88 04/05/2022 01:11 AM    HDL 69 04/05/2022 01:11 AM    TRIGLYCERIDE 80 04/05/2022 01:11 AM       Lab Results   Component Value Date/Time    SODIUM 138 06/15/2023 06:06 AM    POTASSIUM 4.2 06/15/2023 06:06 AM    CHLORIDE 102 06/15/2023 06:06 AM    CO2 22 06/15/2023 06:06 AM    GLUCOSE 100 (H) 06/15/2023 06:06 AM    BUN 19 06/15/2023 06:06 AM    CREATININE 1.31 06/15/2023 06:06 AM    CREATININE 0.9 08/28/2006 11:45 AM     Lab Results   Component Value Date/Time    ALKPHOSPHAT 58 03/13/2023 09:33 AM "    ASTSGOT 17 03/13/2023 09:33 AM    ALTSGPT 10 03/13/2023 09:33 AM    TBILIRUBIN 0.5 03/13/2023 09:33 AM      Echocardiography  Moderately reduced left ventricular systolic function.   The left ventricular ejection fraction is visually estimated to be 35- 40% with [id number] variation.   Global hypokinesis with regional variation.  Moderate aortic insufficiency. Pressure half time is  313 msec.  Right ventricular systolic pressure is estimated to be 45  mmHg.  IVC not interpretable, going out of plane of US.     [facility] (4/7/2022):   Findings   Hemodynamics:   Aorta: 112/81 mmHg  LVEDP: 7 mmHg  No significant pullback gradient across the aortic valve     Coronary Anatomy              Left Main: Normal              LAD: Mid 40% stenosis, distal 80 and 90% stenosis- sequential lesions.              LCx: Ectasia in the mid AV groove.  The first OM has ostial 20% stenosis, the second arm has ostial 60% stenosis, the third OM is occluded and appears to be small              RCA: Dominant, 30% proximal stenosis.  The PDA has a mid 30% stenosis.  The PLB is normal     IMPRESSIONS:  1.  Ischemic cardiomyopathy with severe stenosis in the LAD and occlusion of the OM 3  2.  Successful PCI of the LAD using 2 overlapping drug-eluting stents, IVUS guidance  3.  Normal LVEDP  Recommendations:  Dual antiplatelet therapy for 12 months      Cardiac event monitor (5/18/2022): Underlying rhythm: Sinus   The average heart rate is 66 BPM, and ranges from  BPM   Atrial events: Occasional atrial ectopy with an burden of 3.5% and rare atrial fibrillation, lasting up to 2 hours and 20 minutes, with an average rate of 104 bpm   Ventricular events: Rare   Pauses, bradyarrhythmia or heart block: None significant   Patient events: one event was submitted for review, the event was not described. The corresponding rhythm was sinus with PACs.      Transthoracic echocardiogram (7/22/2022):  Normal left ventricular size and systolic  "function.  Normal right ventricular size and systolic function.  The left atrium is normal in size.  Mild mitral regurgitation.  Mild eccentric aortic insufficiency.  Mild tricuspid regurgitation.  Normal pericardium without effusion.     Prior echo on 04/04/2022, improved severity of aortic regurgitation and   improved left ventricular systolic function.      Chest x-ray 4/9/2022  IMPRESSION:  1.  No acute cardiopulmonary abnormality identified.  2.  Enlarged cardiac silhouette    OPO 5/18/2023  532 oxygen desaturation events  \"It appears this patient qualifies for nocturnal oxygen per Medicare guidelines; please inquire with the respiratory company for coverage guidelines for group 1.\"    Assessment & Plan     1. Paroxysmal A-fib (HCC)        2. Ischemic cardiomyopathy        3. Hypertensive heart disease with chronic systolic congestive heart failure (HCC)        4. Chronic systolic congestive heart failure (HCC)        5. Hypercoagulable state (HCC)        6. Dyslipidemia            Medical Decision Making: Today's Assessment/Status/Plan:        HFrecEF, Stage C, Class II, LVEF 60% (previously 35-40%): euvolemic  -Heart failure due to ischemic cardiomyopathy  -ACE-I/ARB/ARNI: Continue losartan 50 mg daily  -Evidence Based Beta-blocker: Continue Coreg 3.125 mg twice daily  -Aldosterone Antagonist: Continue spironolactone 25 mg daily  -SGLT2-I: Patient declines at this time.  -Diuretic: None needed at this time  -EF 60%  -Reinforced s/sx of worsening heart failure with patient and weight monitoring. Pt verbalizes understanding. Pt to call office if present.       CAD s/p DENIS/PCI to LADx2 (4/2022); HLD  -She was not on aspirin, however she is not wanting to take any additional medications at this time, this is reasonable given her age  -Continue rosuvastatin 10 mg daily  We addressed the management of atherosclerotic artery disease.  She is on proper antiplatelet, cholesterol management and beta-blockers as " appropriate.  We addressed the potential side effects and laboratory follow-up for these medications.     Hypertension  - good control  -Continue current regimen  - goal < 130/80  - check BP daily 2 hours after taking medication and keep log of measurements      PAF  -Regular rhythm and rate today  -Rare A. fib on cardiac event monitor.    -No OAC at this time.  -[id number]  -Has-bled score 2 moderate risk of major bleeding  -We had a long discussion about the risk of stroke versus the risk of bleeding on anticoagulation  -She is not interested in starting anticoagulation, this is reasonable given the relative rarity of A-fib on her monitor, and her age       Follow-up with Valeria GARCIA in 6 months    This note was dictated using Dragon speech recognition software.

## 2023-10-28 ENCOUNTER — APPOINTMENT (OUTPATIENT)
Dept: RADIOLOGY | Facility: MEDICAL CENTER | Age: 88
DRG: 481 | End: 2023-10-28
Attending: EMERGENCY MEDICINE
Payer: MEDICARE

## 2023-10-28 ENCOUNTER — APPOINTMENT (OUTPATIENT)
Dept: RADIOLOGY | Facility: MEDICAL CENTER | Age: 88
DRG: 481 | End: 2023-10-28
Attending: ORTHOPAEDIC SURGERY
Payer: MEDICARE

## 2023-10-28 ENCOUNTER — ANESTHESIA (OUTPATIENT)
Dept: SURGERY | Facility: MEDICAL CENTER | Age: 88
DRG: 481 | End: 2023-10-28
Payer: MEDICARE

## 2023-10-28 ENCOUNTER — ANESTHESIA EVENT (OUTPATIENT)
Dept: SURGERY | Facility: MEDICAL CENTER | Age: 88
DRG: 481 | End: 2023-10-28
Payer: MEDICARE

## 2023-10-28 ENCOUNTER — HOSPITAL ENCOUNTER (INPATIENT)
Facility: MEDICAL CENTER | Age: 88
LOS: 2 days | DRG: 481 | End: 2023-10-30
Attending: EMERGENCY MEDICINE | Admitting: STUDENT IN AN ORGANIZED HEALTH CARE EDUCATION/TRAINING PROGRAM
Payer: MEDICARE
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
## 2023-10-28 DIAGNOSIS — E83.39 HYPOPHOSPHATEMIA: ICD-10-CM

## 2023-10-28 DIAGNOSIS — S72.002A LEFT DISPLACED FEMORAL NECK FRACTURE (HCC): ICD-10-CM

## 2023-10-28 DIAGNOSIS — S72.002A CLOSED FRACTURE OF LEFT HIP, INITIAL ENCOUNTER (HCC): ICD-10-CM

## 2023-10-28 PROBLEM — I10 HYPERTENSION: Status: ACTIVE | Noted: 2022-04-13

## 2023-10-28 PROBLEM — Z71.89 ACP (ADVANCE CARE PLANNING): Status: ACTIVE | Noted: 2023-10-28

## 2023-10-28 PROBLEM — S72.001A HIP FRACTURE REQUIRING OPERATIVE REPAIR, RIGHT, CLOSED, INITIAL ENCOUNTER (HCC): Status: ACTIVE | Noted: 2023-10-28

## 2023-10-28 LAB
ABO + RH BLD: NORMAL
ABO GROUP BLD: NORMAL
ALBUMIN SERPL BCP-MCNC: 4.2 G/DL (ref 3.2–4.9)
ALBUMIN/GLOB SERPL: 1.6 G/DL
ALP SERPL-CCNC: 66 U/L (ref 30–99)
ALT SERPL-CCNC: 8 U/L (ref 2–50)
ANION GAP SERPL CALC-SCNC: 11 MMOL/L (ref 7–16)
AST SERPL-CCNC: 18 U/L (ref 12–45)
BASOPHILS # BLD AUTO: 0.4 % (ref 0–1.8)
BASOPHILS # BLD: 0.05 K/UL (ref 0–0.12)
BILIRUB SERPL-MCNC: 0.6 MG/DL (ref 0.1–1.5)
BLD GP AB SCN SERPL QL: NORMAL
BUN SERPL-MCNC: 23 MG/DL (ref 8–22)
CALCIUM ALBUM COR SERPL-MCNC: 9.1 MG/DL (ref 8.5–10.5)
CALCIUM SERPL-MCNC: 9.3 MG/DL (ref 8.5–10.5)
CHLORIDE SERPL-SCNC: 104 MMOL/L (ref 96–112)
CO2 SERPL-SCNC: 23 MMOL/L (ref 20–33)
CREAT SERPL-MCNC: 1.2 MG/DL (ref 0.5–1.4)
EKG IMPRESSION: NORMAL
EOSINOPHIL # BLD AUTO: 0.2 K/UL (ref 0–0.51)
EOSINOPHIL NFR BLD: 1.6 % (ref 0–6.9)
ERYTHROCYTE [DISTWIDTH] IN BLOOD BY AUTOMATED COUNT: 50.6 FL (ref 35.9–50)
GFR SERPLBLD CREATININE-BSD FMLA CKD-EPI: 43 ML/MIN/1.73 M 2
GLOBULIN SER CALC-MCNC: 2.7 G/DL (ref 1.9–3.5)
GLUCOSE SERPL-MCNC: 121 MG/DL (ref 65–99)
HCT VFR BLD AUTO: 42.8 % (ref 37–47)
HGB BLD-MCNC: 13.9 G/DL (ref 12–16)
IMM GRANULOCYTES # BLD AUTO: 0.08 K/UL (ref 0–0.11)
IMM GRANULOCYTES NFR BLD AUTO: 0.7 % (ref 0–0.9)
LYMPHOCYTES # BLD AUTO: 1.49 K/UL (ref 1–4.8)
LYMPHOCYTES NFR BLD: 12.2 % (ref 22–41)
MCH RBC QN AUTO: 31.7 PG (ref 27–33)
MCHC RBC AUTO-ENTMCNC: 32.5 G/DL (ref 32.2–35.5)
MCV RBC AUTO: 97.7 FL (ref 81.4–97.8)
MONOCYTES # BLD AUTO: 1.14 K/UL (ref 0–0.85)
MONOCYTES NFR BLD AUTO: 9.4 % (ref 0–13.4)
NEUTROPHILS # BLD AUTO: 9.21 K/UL (ref 1.82–7.42)
NEUTROPHILS NFR BLD: 75.7 % (ref 44–72)
NRBC # BLD AUTO: 0 K/UL
NRBC BLD-RTO: 0 /100 WBC (ref 0–0.2)
PLATELET # BLD AUTO: 239 K/UL (ref 164–446)
PMV BLD AUTO: 10.9 FL (ref 9–12.9)
POTASSIUM SERPL-SCNC: 3.8 MMOL/L (ref 3.6–5.5)
PROT SERPL-MCNC: 6.9 G/DL (ref 6–8.2)
RBC # BLD AUTO: 4.38 M/UL (ref 4.2–5.4)
RH BLD: NORMAL
SODIUM SERPL-SCNC: 138 MMOL/L (ref 135–145)
WBC # BLD AUTO: 12.2 K/UL (ref 4.8–10.8)

## 2023-10-28 PROCEDURE — 96374 THER/PROPH/DIAG INJ IV PUSH: CPT

## 2023-10-28 PROCEDURE — 36415 COLL VENOUS BLD VENIPUNCTURE: CPT

## 2023-10-28 PROCEDURE — 305948 HCHG GREEN TRAUMA ACT PRE-NOTIFY NO CC

## 2023-10-28 PROCEDURE — 99222 1ST HOSP IP/OBS MODERATE 55: CPT | Mod: 57 | Performed by: ORTHOPAEDIC SURGERY

## 2023-10-28 PROCEDURE — 502000 HCHG MISC OR IMPLANTS RC 0278: Performed by: ORTHOPAEDIC SURGERY

## 2023-10-28 PROCEDURE — 160029 HCHG SURGERY MINUTES - 1ST 30 MINS LEVEL 4: Performed by: ORTHOPAEDIC SURGERY

## 2023-10-28 PROCEDURE — 700111 HCHG RX REV CODE 636 W/ 250 OVERRIDE (IP): Performed by: ANESTHESIOLOGY

## 2023-10-28 PROCEDURE — A9270 NON-COVERED ITEM OR SERVICE: HCPCS | Performed by: STUDENT IN AN ORGANIZED HEALTH CARE EDUCATION/TRAINING PROGRAM

## 2023-10-28 PROCEDURE — 72170 X-RAY EXAM OF PELVIS: CPT

## 2023-10-28 PROCEDURE — 73502 X-RAY EXAM HIP UNI 2-3 VIEWS: CPT | Mod: LT

## 2023-10-28 PROCEDURE — 770006 HCHG ROOM/CARE - MED/SURG/GYN SEMI*

## 2023-10-28 PROCEDURE — 86850 RBC ANTIBODY SCREEN: CPT

## 2023-10-28 PROCEDURE — 160002 HCHG RECOVERY MINUTES (STAT): Performed by: ORTHOPAEDIC SURGERY

## 2023-10-28 PROCEDURE — 80053 COMPREHEN METABOLIC PANEL: CPT

## 2023-10-28 PROCEDURE — 160041 HCHG SURGERY MINUTES - EA ADDL 1 MIN LEVEL 4: Performed by: ORTHOPAEDIC SURGERY

## 2023-10-28 PROCEDURE — 502240 HCHG MISC OR SUPPLY RC 0272: Performed by: ORTHOPAEDIC SURGERY

## 2023-10-28 PROCEDURE — 93005 ELECTROCARDIOGRAM TRACING: CPT | Performed by: EMERGENCY MEDICINE

## 2023-10-28 PROCEDURE — 85025 COMPLETE CBC W/AUTO DIFF WBC: CPT

## 2023-10-28 PROCEDURE — 99497 ADVNCD CARE PLAN 30 MIN: CPT | Mod: 25 | Performed by: STUDENT IN AN ORGANIZED HEALTH CARE EDUCATION/TRAINING PROGRAM

## 2023-10-28 PROCEDURE — C1713 ANCHOR/SCREW BN/BN,TIS/BN: HCPCS | Performed by: ORTHOPAEDIC SURGERY

## 2023-10-28 PROCEDURE — 700102 HCHG RX REV CODE 250 W/ 637 OVERRIDE(OP): Performed by: STUDENT IN AN ORGANIZED HEALTH CARE EDUCATION/TRAINING PROGRAM

## 2023-10-28 PROCEDURE — 70450 CT HEAD/BRAIN W/O DYE: CPT

## 2023-10-28 PROCEDURE — 27245 TREAT THIGH FRACTURE: CPT | Mod: LT | Performed by: ORTHOPAEDIC SURGERY

## 2023-10-28 PROCEDURE — 700111 HCHG RX REV CODE 636 W/ 250 OVERRIDE (IP): Performed by: EMERGENCY MEDICINE

## 2023-10-28 PROCEDURE — 0QS706Z REPOSITION LEFT UPPER FEMUR WITH INTRAMEDULLARY INTERNAL FIXATION DEVICE, OPEN APPROACH: ICD-10-PCS | Performed by: ORTHOPAEDIC SURGERY

## 2023-10-28 PROCEDURE — 700101 HCHG RX REV CODE 250: Performed by: ANESTHESIOLOGY

## 2023-10-28 PROCEDURE — 160048 HCHG OR STATISTICAL LEVEL 1-5: Performed by: ORTHOPAEDIC SURGERY

## 2023-10-28 PROCEDURE — 99223 1ST HOSP IP/OBS HIGH 75: CPT | Mod: AI,25 | Performed by: STUDENT IN AN ORGANIZED HEALTH CARE EDUCATION/TRAINING PROGRAM

## 2023-10-28 PROCEDURE — 700105 HCHG RX REV CODE 258: Performed by: ANESTHESIOLOGY

## 2023-10-28 PROCEDURE — 700111 HCHG RX REV CODE 636 W/ 250 OVERRIDE (IP): Mod: JZ | Performed by: ORTHOPAEDIC SURGERY

## 2023-10-28 PROCEDURE — 160035 HCHG PACU - 1ST 60 MINS PHASE I: Performed by: ORTHOPAEDIC SURGERY

## 2023-10-28 PROCEDURE — 86901 BLOOD TYPING SEROLOGIC RH(D): CPT

## 2023-10-28 PROCEDURE — 73552 X-RAY EXAM OF FEMUR 2/>: CPT | Mod: LT

## 2023-10-28 PROCEDURE — 86900 BLOOD TYPING SEROLOGIC ABO: CPT

## 2023-10-28 PROCEDURE — 99285 EMERGENCY DEPT VISIT HI MDM: CPT

## 2023-10-28 PROCEDURE — 160009 HCHG ANES TIME/MIN: Performed by: ORTHOPAEDIC SURGERY

## 2023-10-28 PROCEDURE — 72131 CT LUMBAR SPINE W/O DYE: CPT

## 2023-10-28 PROCEDURE — 700105 HCHG RX REV CODE 258: Performed by: STUDENT IN AN ORGANIZED HEALTH CARE EDUCATION/TRAINING PROGRAM

## 2023-10-28 PROCEDURE — 71045 X-RAY EXAM CHEST 1 VIEW: CPT

## 2023-10-28 DEVICE — LOCKING SCREW DIA 5X40MM: Type: IMPLANTABLE DEVICE | Site: HIP | Status: FUNCTIONAL

## 2023-10-28 DEVICE — IMPLANTABLE DEVICE: Type: IMPLANTABLE DEVICE | Site: HIP | Status: FUNCTIONAL

## 2023-10-28 RX ORDER — OXYCODONE HCL 5 MG/5 ML
10 SOLUTION, ORAL ORAL
Status: DISCONTINUED | OUTPATIENT
Start: 2023-10-28 | End: 2023-10-28 | Stop reason: HOSPADM

## 2023-10-28 RX ORDER — ROSUVASTATIN CALCIUM 10 MG/1
10 TABLET, COATED ORAL EVERY EVENING
Status: DISCONTINUED | OUTPATIENT
Start: 2023-10-28 | End: 2023-10-30 | Stop reason: HOSPADM

## 2023-10-28 RX ORDER — SODIUM CHLORIDE, SODIUM LACTATE, POTASSIUM CHLORIDE, CALCIUM CHLORIDE 600; 310; 30; 20 MG/100ML; MG/100ML; MG/100ML; MG/100ML
INJECTION, SOLUTION INTRAVENOUS
Status: DISCONTINUED | OUTPATIENT
Start: 2023-10-28 | End: 2023-10-28 | Stop reason: SURG

## 2023-10-28 RX ORDER — OXYCODONE HCL 5 MG/5 ML
5 SOLUTION, ORAL ORAL
Status: DISCONTINUED | OUTPATIENT
Start: 2023-10-28 | End: 2023-10-28 | Stop reason: HOSPADM

## 2023-10-28 RX ORDER — ROPIVACAINE HYDROCHLORIDE 2 MG/ML
40 INJECTION, SOLUTION EPIDURAL; INFILTRATION; PERINEURAL ONCE
Status: COMPLETED | OUTPATIENT
Start: 2023-10-28 | End: 2023-10-28

## 2023-10-28 RX ORDER — ESCITALOPRAM OXALATE 10 MG/1
20 TABLET ORAL DAILY
Status: DISCONTINUED | OUTPATIENT
Start: 2023-10-28 | End: 2023-10-30 | Stop reason: HOSPADM

## 2023-10-28 RX ORDER — CARVEDILOL 3.12 MG/1
3.12 TABLET ORAL 2 TIMES DAILY
Status: DISCONTINUED | OUTPATIENT
Start: 2023-10-28 | End: 2023-10-30 | Stop reason: HOSPADM

## 2023-10-28 RX ORDER — OXYCODONE HYDROCHLORIDE 5 MG/1
2.5 TABLET ORAL
Status: DISCONTINUED | OUTPATIENT
Start: 2023-10-28 | End: 2023-10-30 | Stop reason: HOSPADM

## 2023-10-28 RX ORDER — SODIUM CHLORIDE, SODIUM LACTATE, POTASSIUM CHLORIDE, CALCIUM CHLORIDE 600; 310; 30; 20 MG/100ML; MG/100ML; MG/100ML; MG/100ML
INJECTION, SOLUTION INTRAVENOUS CONTINUOUS
Status: DISCONTINUED | OUTPATIENT
Start: 2023-10-28 | End: 2023-10-28 | Stop reason: HOSPADM

## 2023-10-28 RX ORDER — HYDROMORPHONE HYDROCHLORIDE 1 MG/ML
0.1 INJECTION, SOLUTION INTRAMUSCULAR; INTRAVENOUS; SUBCUTANEOUS
Status: DISCONTINUED | OUTPATIENT
Start: 2023-10-28 | End: 2023-10-28 | Stop reason: HOSPADM

## 2023-10-28 RX ORDER — LABETALOL HYDROCHLORIDE 5 MG/ML
5 INJECTION, SOLUTION INTRAVENOUS
Status: DISCONTINUED | OUTPATIENT
Start: 2023-10-28 | End: 2023-10-28 | Stop reason: HOSPADM

## 2023-10-28 RX ORDER — DEXAMETHASONE SODIUM PHOSPHATE 4 MG/ML
INJECTION, SOLUTION INTRA-ARTICULAR; INTRALESIONAL; INTRAMUSCULAR; INTRAVENOUS; SOFT TISSUE PRN
Status: DISCONTINUED | OUTPATIENT
Start: 2023-10-28 | End: 2023-10-28 | Stop reason: SURG

## 2023-10-28 RX ORDER — QUETIAPINE FUMARATE 25 MG/1
25 TABLET, FILM COATED ORAL NIGHTLY
Status: DISCONTINUED | OUTPATIENT
Start: 2023-10-28 | End: 2023-10-30 | Stop reason: HOSPADM

## 2023-10-28 RX ORDER — AMOXICILLIN 250 MG
2 CAPSULE ORAL 2 TIMES DAILY
Status: DISCONTINUED | OUTPATIENT
Start: 2023-10-28 | End: 2023-10-30 | Stop reason: HOSPADM

## 2023-10-28 RX ORDER — LIDOCAINE HYDROCHLORIDE 20 MG/ML
INJECTION, SOLUTION EPIDURAL; INFILTRATION; INTRACAUDAL; PERINEURAL PRN
Status: DISCONTINUED | OUTPATIENT
Start: 2023-10-28 | End: 2023-10-28 | Stop reason: SURG

## 2023-10-28 RX ORDER — MORPHINE SULFATE 4 MG/ML
4 INJECTION INTRAVENOUS ONCE
Status: COMPLETED | OUTPATIENT
Start: 2023-10-28 | End: 2023-10-28

## 2023-10-28 RX ORDER — HYDROMORPHONE HYDROCHLORIDE 1 MG/ML
0.4 INJECTION, SOLUTION INTRAMUSCULAR; INTRAVENOUS; SUBCUTANEOUS
Status: DISCONTINUED | OUTPATIENT
Start: 2023-10-28 | End: 2023-10-28 | Stop reason: HOSPADM

## 2023-10-28 RX ORDER — CEFAZOLIN SODIUM 1 G/50ML
1 INJECTION, SOLUTION INTRAVENOUS EVERY 12 HOURS
Status: COMPLETED | OUTPATIENT
Start: 2023-10-28 | End: 2023-10-29

## 2023-10-28 RX ORDER — OXYCODONE HYDROCHLORIDE 5 MG/1
5 TABLET ORAL
Status: DISCONTINUED | OUTPATIENT
Start: 2023-10-28 | End: 2023-10-30 | Stop reason: HOSPADM

## 2023-10-28 RX ORDER — MORPHINE SULFATE 4 MG/ML
1 INJECTION INTRAVENOUS
Status: DISCONTINUED | OUTPATIENT
Start: 2023-10-28 | End: 2023-10-29

## 2023-10-28 RX ORDER — HALOPERIDOL 5 MG/ML
1 INJECTION INTRAMUSCULAR
Status: DISCONTINUED | OUTPATIENT
Start: 2023-10-28 | End: 2023-10-28 | Stop reason: HOSPADM

## 2023-10-28 RX ORDER — ONDANSETRON 2 MG/ML
INJECTION INTRAMUSCULAR; INTRAVENOUS PRN
Status: DISCONTINUED | OUTPATIENT
Start: 2023-10-28 | End: 2023-10-28 | Stop reason: SURG

## 2023-10-28 RX ORDER — BISACODYL 10 MG
10 SUPPOSITORY, RECTAL RECTAL
Status: DISCONTINUED | OUTPATIENT
Start: 2023-10-28 | End: 2023-10-30 | Stop reason: HOSPADM

## 2023-10-28 RX ORDER — HYDRALAZINE HYDROCHLORIDE 20 MG/ML
5 INJECTION INTRAMUSCULAR; INTRAVENOUS
Status: DISCONTINUED | OUTPATIENT
Start: 2023-10-28 | End: 2023-10-28 | Stop reason: HOSPADM

## 2023-10-28 RX ORDER — HYDROMORPHONE HYDROCHLORIDE 1 MG/ML
0.2 INJECTION, SOLUTION INTRAMUSCULAR; INTRAVENOUS; SUBCUTANEOUS
Status: DISCONTINUED | OUTPATIENT
Start: 2023-10-28 | End: 2023-10-28 | Stop reason: HOSPADM

## 2023-10-28 RX ORDER — ONDANSETRON 4 MG/1
4 TABLET, ORALLY DISINTEGRATING ORAL EVERY 4 HOURS PRN
Status: DISCONTINUED | OUTPATIENT
Start: 2023-10-28 | End: 2023-10-30 | Stop reason: HOSPADM

## 2023-10-28 RX ORDER — POLYETHYLENE GLYCOL 3350 17 G/17G
1 POWDER, FOR SOLUTION ORAL
Status: DISCONTINUED | OUTPATIENT
Start: 2023-10-28 | End: 2023-10-30 | Stop reason: HOSPADM

## 2023-10-28 RX ORDER — ONDANSETRON 2 MG/ML
4 INJECTION INTRAMUSCULAR; INTRAVENOUS
Status: DISCONTINUED | OUTPATIENT
Start: 2023-10-28 | End: 2023-10-28 | Stop reason: HOSPADM

## 2023-10-28 RX ORDER — LOSARTAN POTASSIUM 50 MG/1
50 TABLET ORAL EVERY EVENING
Status: DISCONTINUED | OUTPATIENT
Start: 2023-10-28 | End: 2023-10-30 | Stop reason: HOSPADM

## 2023-10-28 RX ORDER — CEFAZOLIN SODIUM 1 G/3ML
INJECTION, POWDER, FOR SOLUTION INTRAMUSCULAR; INTRAVENOUS PRN
Status: DISCONTINUED | OUTPATIENT
Start: 2023-10-28 | End: 2023-10-28 | Stop reason: SURG

## 2023-10-28 RX ORDER — SODIUM CHLORIDE 9 MG/ML
INJECTION, SOLUTION INTRAVENOUS CONTINUOUS
Status: ACTIVE | OUTPATIENT
Start: 2023-10-28 | End: 2023-10-28

## 2023-10-28 RX ORDER — KETOROLAC TROMETHAMINE 30 MG/ML
15 INJECTION, SOLUTION INTRAMUSCULAR; INTRAVENOUS EVERY 6 HOURS PRN
Status: DISCONTINUED | OUTPATIENT
Start: 2023-10-28 | End: 2023-10-28

## 2023-10-28 RX ORDER — ACETAMINOPHEN 325 MG/1
650 TABLET ORAL EVERY 6 HOURS PRN
Status: DISCONTINUED | OUTPATIENT
Start: 2023-10-28 | End: 2023-10-30 | Stop reason: HOSPADM

## 2023-10-28 RX ORDER — IPRATROPIUM BROMIDE AND ALBUTEROL SULFATE 2.5; .5 MG/3ML; MG/3ML
3 SOLUTION RESPIRATORY (INHALATION)
Status: DISCONTINUED | OUTPATIENT
Start: 2023-10-28 | End: 2023-10-28 | Stop reason: HOSPADM

## 2023-10-28 RX ADMIN — SODIUM CHLORIDE: 9 INJECTION, SOLUTION INTRAVENOUS at 06:33

## 2023-10-28 RX ADMIN — SODIUM CHLORIDE 1000 ML: 9 INJECTION, SOLUTION INTRAVENOUS at 14:55

## 2023-10-28 RX ADMIN — MORPHINE SULFATE 4 MG: 4 INJECTION, SOLUTION INTRAMUSCULAR; INTRAVENOUS at 03:48

## 2023-10-28 RX ADMIN — FENTANYL CITRATE 50 MCG: 50 INJECTION, SOLUTION INTRAMUSCULAR; INTRAVENOUS at 10:58

## 2023-10-28 RX ADMIN — SODIUM CHLORIDE, POTASSIUM CHLORIDE, SODIUM LACTATE AND CALCIUM CHLORIDE: 600; 310; 30; 20 INJECTION, SOLUTION INTRAVENOUS at 10:58

## 2023-10-28 RX ADMIN — ONDANSETRON 4 MG: 2 INJECTION INTRAMUSCULAR; INTRAVENOUS at 10:58

## 2023-10-28 RX ADMIN — DEXAMETHASONE SODIUM PHOSPHATE 4 MG: 4 INJECTION INTRA-ARTICULAR; INTRALESIONAL; INTRAMUSCULAR; INTRAVENOUS; SOFT TISSUE at 10:58

## 2023-10-28 RX ADMIN — FENTANYL CITRATE 50 MCG: 50 INJECTION, SOLUTION INTRAMUSCULAR; INTRAVENOUS at 11:09

## 2023-10-28 RX ADMIN — ESCITALOPRAM OXALATE 20 MG: 10 TABLET ORAL at 06:32

## 2023-10-28 RX ADMIN — PROPOFOL 100 MG: 10 INJECTION, EMULSION INTRAVENOUS at 10:58

## 2023-10-28 RX ADMIN — CARVEDILOL 3.12 MG: 3.12 TABLET, FILM COATED ORAL at 06:32

## 2023-10-28 RX ADMIN — CEFAZOLIN 2 G: 1 INJECTION, POWDER, FOR SOLUTION INTRAMUSCULAR; INTRAVENOUS at 11:02

## 2023-10-28 RX ADMIN — LIDOCAINE HYDROCHLORIDE 100 MG: 20 INJECTION, SOLUTION EPIDURAL; INFILTRATION; INTRACAUDAL at 10:58

## 2023-10-28 RX ADMIN — ROPIVACAINE HYDROCHLORIDE 40 ML: 2 INJECTION, SOLUTION EPIDURAL; INFILTRATION at 04:40

## 2023-10-28 RX ADMIN — CEFAZOLIN SODIUM 1 G: 1 INJECTION, SOLUTION INTRAVENOUS at 23:16

## 2023-10-28 ASSESSMENT — PAIN SCALES - GENERAL: PAIN_LEVEL: 0

## 2023-10-28 ASSESSMENT — COGNITIVE AND FUNCTIONAL STATUS - GENERAL
DRESSING REGULAR LOWER BODY CLOTHING: A LITTLE
MOVING TO AND FROM BED TO CHAIR: A LOT
DRESSING REGULAR UPPER BODY CLOTHING: A LITTLE
SUGGESTED CMS G CODE MODIFIER MOBILITY: CL
HELP NEEDED FOR BATHING: A LITTLE
STANDING UP FROM CHAIR USING ARMS: A LOT
CLIMB 3 TO 5 STEPS WITH RAILING: TOTAL
TOILETING: A LOT
DAILY ACTIVITIY SCORE: 19
MOVING FROM LYING ON BACK TO SITTING ON SIDE OF FLAT BED: A LITTLE
TURNING FROM BACK TO SIDE WHILE IN FLAT BAD: A LITTLE
SUGGESTED CMS G CODE MODIFIER DAILY ACTIVITY: CK
MOBILITY SCORE: 13
WALKING IN HOSPITAL ROOM: A LOT

## 2023-10-28 ASSESSMENT — LIFESTYLE VARIABLES
HOW MANY TIMES IN THE PAST YEAR HAVE YOU HAD 5 OR MORE DRINKS IN A DAY: 0
TOTAL SCORE: 0
AVERAGE NUMBER OF DAYS PER WEEK YOU HAVE A DRINK CONTAINING ALCOHOL: 0
ALCOHOL_USE: NO
ON A TYPICAL DAY WHEN YOU DRINK ALCOHOL HOW MANY DRINKS DO YOU HAVE: 0
EVER HAD A DRINK FIRST THING IN THE MORNING TO STEADY YOUR NERVES TO GET RID OF A HANGOVER: NO
TOTAL SCORE: 0
EVER FELT BAD OR GUILTY ABOUT YOUR DRINKING: NO
TOTAL SCORE: 0
HAVE YOU EVER FELT YOU SHOULD CUT DOWN ON YOUR DRINKING: NO
HAVE PEOPLE ANNOYED YOU BY CRITICIZING YOUR DRINKING: NO
CONSUMPTION TOTAL: NEGATIVE
DOES PATIENT WANT TO STOP DRINKING: NO

## 2023-10-28 ASSESSMENT — ENCOUNTER SYMPTOMS
PSYCHIATRIC NEGATIVE: 1
EYES NEGATIVE: 1
CARDIOVASCULAR NEGATIVE: 1
RESPIRATORY NEGATIVE: 1
NEUROLOGICAL NEGATIVE: 1
GASTROINTESTINAL NEGATIVE: 1
FALLS: 1

## 2023-10-28 ASSESSMENT — PAIN SCALES - PAIN ASSESSMENT IN ADVANCED DEMENTIA (PAINAD)
TOTALSCORE: 0
CONSOLABILITY: NO NEED TO CONSOLE
BREATHING: NORMAL
FACIALEXPRESSION: SMILING OR INEXPRESSIVE
BODYLANGUAGE: RELAXED

## 2023-10-28 ASSESSMENT — FIBROSIS 4 INDEX
FIB4 SCORE: 1.97
FIB4 SCORE: 2.37

## 2023-10-28 ASSESSMENT — PAIN DESCRIPTION - PAIN TYPE
TYPE: SURGICAL PAIN
TYPE: ACUTE PAIN
TYPE: ACUTE PAIN
TYPE: SURGICAL PAIN

## 2023-10-28 ASSESSMENT — PATIENT HEALTH QUESTIONNAIRE - PHQ9
1. LITTLE INTEREST OR PLEASURE IN DOING THINGS: NOT AT ALL
2. FEELING DOWN, DEPRESSED, IRRITABLE, OR HOPELESS: NOT AT ALL
SUM OF ALL RESPONSES TO PHQ9 QUESTIONS 1 AND 2: 0

## 2023-10-28 NOTE — ED NOTES
Accompanied pt to CT scan  Sent pt to the assigned unit with the pt transport  All belongings sent

## 2023-10-28 NOTE — ANESTHESIA PREPROCEDURE EVALUATION
Case: [id number] Date/Time: 10/28/23 0847    Procedure: INSERTION, INTRAMEDULLARY THIERRY, FEMUR, PROXIMAL (Left: Hip)    Anesthesia type: General    Location: [place] [address] / SURGERY [facility]    Surgeons: Richard Mckenzie M.D.      Left femur fracture.     Complex medical history. CAD and CHF.     Confused and unable to obtain history from patient.     Spoke with POA/relative, 'slow to awaken' from anesthesia in the past.     Relevant Problems   CARDIAC   (positive) Chronic systolic congestive heart failure (HCC)   (positive) Hypertension   (positive) Hypertensive heart disease with chronic systolic congestive heart failure (HCC)   (positive) Paroxysmal A-fib (HCC)   (positive) Premature atrial complex         (positive) Hypertensive kidney disease with stage 3b chronic kidney disease (HCC)       Physical Exam    Airway   Mallampati: II  TM distance: >3 FB  Neck ROM: full       Cardiovascular - normal exam  Rhythm: regular  Rate: normal  (-) murmur     Dental - normal exam      Very poor dentition   Pulmonary - normal exam  Breath sounds clear to auscultation     Abdominal    Neurological - normal exam                 Anesthesia Plan    ASA 3- EMERGENT   ASA physical status 3 criteria: CAD/stents (> 3 months)ASA physical status emergent criteria: displaced fracture with possible neurovascular compromise    Plan - general       Airway plan will be LMA          Induction: intravenous    Postoperative Plan: Postoperative administration of opioids is intended.    Pertinent diagnostic labs and testing reviewed    Informed Consent:    Anesthetic plan and risks discussed with patient.    Use of blood products discussed with: patient whom consented to blood products.

## 2023-10-28 NOTE — ANESTHESIA POSTPROCEDURE EVALUATION
Patient: Linda Casas    Procedure Summary     Date: 10/28/23 Room / Location: [place] [address] / SURGERY [facility]    Anesthesia Start: 1058 Anesthesia Stop: 1142    Procedure: INSERTION, INTRAMEDULLARY THIERRY, FEMUR, PROXIMAL (Left: Hip) Diagnosis: (left intertrochanteric femur fracture )    Surgeons: Richard Mckenzie M.D. Responsible Provider: Kushal Ferreira M.D.    Anesthesia Type: general ASA Status: 3 - Emergent          Final Anesthesia Type: general  Last vitals  BP   Blood Pressure : 113/59    Temp   36.6 °C (97.8 °F)    Pulse   85   Resp   (!) 11    SpO2   96 %      Anesthesia Post Evaluation    Patient location during evaluation: PACU  Patient participation: complete - patient participated  Level of consciousness: sleepy but conscious  Pain score: 0    Airway patency: patent  Anesthetic complications: no  Cardiovascular status: hemodynamically stable  Respiratory status: acceptable  Hydration status: euvolemic    PONV: none          No notable events documented.

## 2023-10-28 NOTE — PROGRESS NOTES
Patient was admitted earlier today. Please refer H&P for details    89 y.o. female who presented 10/28/2023 with left hip pain.  Patient was walking today to the bathroom when she suddenly tripped and fell on her left hip.  Noticed immediate pain after and was unable to bear weight.     X-ray hip showing mildly comminuted, angulated, mildly displaced left femoral neck and intertrochanteric femur fracture.  Small butterfly fragment of the lesser trochanter medially.    S/p Open reduction internal fixation left intertrochanteric femur fracture with cephalomedullary implant 10/28    Plans  - Multimodal pain managements including po and iv narcotics prn. Monitoring respiratory status and sedation score  - PT/OT  - bowel regimens

## 2023-10-28 NOTE — ANESTHESIA PROCEDURE NOTES
Airway    Date/Time: 10/28/2023 11:01 AM    Performed by: Kushal Ferreira M.D.  Authorized by: Kushal Ferreira M.D.    Location:  OR  Urgency:  Elective  Indications for Airway Management:  Anesthesia      Spontaneous Ventilation: absent    Sedation Level:  Deep  Preoxygenated: Yes    Mask Difficulty Assessment:  0 - not attempted  Final Airway Type:  Supraglottic airway  Final Supraglottic Airway:  Standard LMA    SGA Size:  4  Number of Attempts at Approach:  1

## 2023-10-28 NOTE — ED NOTES
Noted saturation dropped to 82% on oxygen nasal cannula 5 LPM  Changed to oxy-mask at 6 LPM  Notified to the provider

## 2023-10-28 NOTE — PROGRESS NOTES
4 Eyes Skin Assessment Completed by ARIS Palencia and ARIS Lopez.    Head WDL  Ears WDL  Nose WDL  Mouth WDL  Neck WDL  Breast/Chest WDL  Shoulder Blades WDL  Spine WDL  (R) Arm/Elbow/Hand Bruising and Scab to elbow  (L) Arm/Elbow/Hand Bruising  Abdomen WDL  Groin WDL  Scrotum/Coccyx/Buttocks Redness and Blanching  (R) Leg Bruising and discoloration  (L) Leg Bruising and discoloration  (R) Heel/Foot/Toe Boggy  (L) Heel/Foot/Toe Boggy          Devices In Places Oxy Mask      Interventions In Place Pillows, Q2 Turns, Dri-Tarik Pads, Heels Loaded W/Pillows, and Pressure Redistribution Mattress    Possible Skin Injury No    Pictures Uploaded Into Epic N/A  Wound Consult Placed N/A  RN Wound Prevention Protocol Ordered Yes

## 2023-10-28 NOTE — ANESTHESIA TIME REPORT
Anesthesia Start and Stop Event Times     Date Time Event    10/28/2023 1050 Ready for Procedure     1058 Anesthesia Start     1142 Anesthesia Stop        Responsible Staff  10/28/23    Name Role Begin End    Kushal Ferreira M.D. Anesth 1058 1142        Overtime Reason:  no overtime (within assigned shift)    Comments:

## 2023-10-28 NOTE — H&P
Hospital Medicine History & Physical Note    Date of Service  10/28/2023    Primary Care Physician  Lakeshia Kuo M.D.    Consultants  Orthopedic surgery    Code Status  Full Code    Chief Complaint  Chief Complaint   Patient presents with    T-5000 GLF     Brought by EMS from home complaining of GLF - pt is trying to go to the bathroom but she trip down - she is supposed to use a walker but she is not using it   Pt is complaining of Left hip/ lower back pain  Denied hitting her head  Pt is using Aspirin    Alert and Oriented x 2 (Baseline?)    Given Fetanyl 50 mcg IV/ Zofran 4 mg IV en route  IV gauge 20 left FA       History of Presenting Illness  Linda Casas is a 89 y.o. female who presented 10/28/2023 with left hip pain.  Patient was walking today to the bathroom when she suddenly tripped and fell on her left hip.  Noticed immediate pain after and was unable to bear weight.  She denies head strike/loss of consciousness.  She then was brought to the ED for further evaluation.    In the ER, patient found to have elevated blood pressure.  Pertinent labs include neutrophil leukocytosis. Chest x-ray negative for acute cardiopulmonary abnormality.  X-ray hip showing mildly comminuted, angulated, mildly displaced left femoral neck and intertrochanteric femur fracture.  Small butterfly fragment of the lesser trochanter medially.    I discussed the plan of care with patient.    Review of Systems  Review of Systems   Constitutional:  Positive for malaise/fatigue.   HENT: Negative.     Eyes: Negative.    Respiratory: Negative.     Cardiovascular: Negative.    Gastrointestinal: Negative.    Genitourinary: Negative.    Musculoskeletal:  Positive for falls and joint pain.   Skin: Negative.    Neurological: Negative.    Endo/Heme/Allergies: Negative.    Psychiatric/Behavioral: Negative.         Past Medical History   has a past medical history of Anesthesia, Anxiety (09/14/2010), Cataract,  "Congestive heart failure (HCC) (2022), High cholesterol, Hypertension, Myocardial infarct (HCC) (May2022), OSTEOPOROSIS (09/14/2010), Pain (03/04/2014), Pain (11/04/2021), Snoring, and Urinary incontinence.    Surgical History   has a past surgical history that includes hysterectomy, total abdominal (2000); bladder suspension (2001); knee arthroscopy (03/11/2014); regla by laparoscopy (05/23/2016); trigger finger release (Left, 05/29/2018); colon resection; appendectomy (1969); kyphoplasty (N/A, 11/06/2021); other cardiac surgery (2022); and kyphoplasty (6/15/2023).     Family History  family history includes No Known Problems in her father and mother.   Family history reviewed with patient. There is no family history that is pertinent to the chief complaint.     Social History   reports that she has never smoked. She has never been exposed to tobacco smoke. She has never used smokeless tobacco. She reports that she does not currently use alcohol. She reports that she does not use drugs.    Allergies  Allergies   Allergen Reactions    Ampicillin Hives, Rash and Swelling     Feels \"rotten\"     Bactrim Ds Hives, Itching and Swelling     Mouth, lip, tongue, eye swelling, pain, itching    Ciprofloxacin Hives, Rash and Swelling     .       Medications  Prior to Admission Medications   Prescriptions Last Dose Informant Patient Reported? Taking?   GOODSENSE ASPIRIN LOW DOSE 81 MG EC tablet  Family Member No No   Sig: TAKE 1 TABLET BY MOUTH EVERY DAY.   QUEtiapine (SEROQUEL) 25 MG Tab   No No   Sig: Take 25 mg at bet time   carvedilol (COREG) 3.125 MG Tab   No No   Sig: Take 1 Tablet by mouth 2 times a day.   escitalopram (LEXAPRO) 20 MG tablet   No No   Sig: TAKE 1 TABLET BY MOUTH EVERY DAY.   losartan (COZAAR) 50 MG Tab   No No   Sig: Take 1 Tablet by mouth every evening.   rosuvastatin (CRESTOR) 10 MG Tab   No No   Sig: Take 1 Tablet by mouth every evening.      Facility-Administered Medications: None       Physical " "Exam  Temp:  [36.5 °C (97.7 °F)] 36.5 °C (97.7 °F)  Pulse:  [72-77] 74  Resp:  [16-20] 16  BP: (146-185)/(80-87) 146/80  SpO2:  [83 %-97 %] 97 %  Blood Pressure : (!) 146/80   Temperature: 36.5 °C (97.7 °F)   Pulse: 74   Respiration: 16   Pulse Oximetry: 97 %       Physical Exam  Constitutional:       Appearance: Normal appearance. She is normal weight.   HENT:      Head: Normocephalic.      Nose: Nose normal.      Mouth/Throat:      Mouth: Mucous membranes are moist.   Eyes:      Pupils: Pupils are equal, round, and reactive to light.   Cardiovascular:      Rate and Rhythm: Normal rate and regular rhythm.      Pulses: Normal pulses.   Pulmonary:      Effort: Pulmonary effort is normal.      Breath sounds: Normal breath sounds.   Abdominal:      General: Abdomen is flat. Bowel sounds are normal.      Palpations: Abdomen is soft.   Musculoskeletal:      Cervical back: Neck supple.      Comments: Left lower extremity range of motion limited due to pain  Shortened limb, dorsalis pedis 1+.  Foot warm to touch.   Skin:     General: Skin is warm.   Neurological:      Mental Status: She is alert. Mental status is at baseline.   Psychiatric:         Mood and Affect: Mood normal.         Behavior: Behavior normal.         Thought Content: Thought content normal.         Judgment: Judgment normal.         Laboratory:  Recent Labs     10/28/23  0304   WBC 12.2*   RBC 4.38   HEMOGLOBIN 13.9   HEMATOCRIT 42.8   MCV 97.7   MCH 31.7   MCHC 32.5   RDW 50.6*   PLATELETCT 239   MPV 10.9     Recent Labs     10/28/23  0304   SODIUM 138   POTASSIUM 3.8   CHLORIDE 104   CO2 23   GLUCOSE 121*   BUN 23*   CREATININE 1.20   CALCIUM 9.3     Recent Labs     10/28/23  0304   ALTSGPT 8   ASTSGOT 18   ALKPHOSPHAT 66   TBILIRUBIN 0.6   GLUCOSE 121*         No results for input(s): \"NTPROBNP\" in the last 72 hours.      No results for input(s): \"TROPONINT\" in the last 72 hours.    Imaging:  DX-PELVIS-1 OR 2 VIEWS   Final Result      Positive for " left hip fracture, as above.      DX-FEMUR-2+ LEFT   Final Result      Positive for left hip fracture, as above.      DX-CHEST-PORTABLE (1 VIEW)   Final Result      No acute process.      CT-HEAD W/O    (Results Pending)   CT-LSPINE W/O PLUS RECONS    (Results Pending)       X-Ray:  I have personally reviewed the images and compared with prior images.    Assessment/Plan:  Justification for Admission Status  I anticipate this patient will require at least two midnights for appropriate medical management, necessitating inpatient admission because patient has a left femoral neck fracture    Patient will need a Med/Surg bed on ORTHOPEDICS service .  The need is secondary to left femoral neck fracture    * Left displaced femoral neck fracture (HCC)  Assessment & Plan  Spoke to ERP.  Patient noted to have mechanical fall.  Patient takes aspirin and is not on any anticoagulant medication.  X-ray left hip showing mildly comminuted, angulated, mildly displaced left femoral neck and intertrochanteric femur fracture.  Orthopedic surgery consulted, patient will go for OR in a.m.  Overnight, she will be treated with fluids and Toradol as needed.  Monitor for ANGELINA from Toradol administration.    ACP (advance care planning)  Assessment & Plan  16 minutes was spent discussing goals of care with patient.  She was explained her diagnosis and treatment plan.  She states that she would like to be full code and have everything done, including chest compressions and intubation.    Hyperlipidemia  Assessment & Plan  Continue crestor    Hypertension  Assessment & Plan  Restart home medications as needed        VTE prophylaxis: pharmacologic prophylaxis contraindicated due to OR in AM

## 2023-10-28 NOTE — ED PROVIDER NOTES
ED Provider Note    Scribed for Dr. Henderson by Ariel Morrison. 10/28/2023,  3:19 AM.      CHIEF COMPLAINT  Chief Complaint   Patient presents with    T-5000 GLF     Brought by EMS from home complaining of GLF - pt is trying to go to the bathroom but she trip down - she is supposed to use a walker but she is not using it   Pt is complaining of Left hip/ lower back pain  Denied hitting her head  Pt is using Aspirin    Alert and Oriented x 2 (Baseline?)    Given Fetanyl 50 mcg IV/ Zofran 4 mg IV en route  IV gauge 20 left FA       EXTERNAL RECORDS REVIEWED  Outpatient Notes outpatient note 10/17/2023 with cardiology: History of paroxysmal atrial fibrillation, ischemic cardiomyopathy, CHF, dyslipidemia    HPI  LIMITATION TO HISTORY   Select: : None  OUTSIDE HISTORIAN(S):  None     Linda Casas is a 89 y.o. female who presents to the Emergency Department for evaluation of a ground level fall onset today. The patient reports her fall with associated symptoms of left hip and lower back pain. She denies head strike or loss of consciousness at the time of injury. The patient reports previous instances of ground level falls. She reports she was unable to walk after her fall. She denies taking any medication including blood thinners. She denies nausea, vomiting, chest pain, neck pain, shortness of breath, arm pain, or elbow pain. The patient notes she lives with her daughter in law. The patient denies any other medical conditions. The patient states she has never broken any bones. She denies being on  oxygen at home. The patient declined any medication for pain during initial evaluation.     REVIEW OF SYSTEMS  See HPI for further details. All other systems are negative.     PAST MEDICAL HISTORY     Past Medical History:   Diagnosis Date    Anesthesia     Dtr-in-law states patient took a very long time to recover from anesthesia, states patient was incoherent and extremely weak    Anxiety 09/14/2010    Cataract      IOL    Congestive heart failure ([facility]) 2022    High cholesterol     Hypertension     history of but no treatment    Myocardial infarct ([facility]) May2022    OSTEOPOROSIS 09/14/2010    Pain 03/04/2014    6/10=L knee    Pain 11/04/2021    low back    Snoring     no sleep study    Urinary incontinence     only at times at night when sleeping       SURGICAL HISTORY  Past Surgical History:   Procedure Laterality Date    KYPHOPLASTY  6/15/2023    Procedure: L2 KYPHOPLASTY WITH BIOPSY;  Surgeon: Tim Barton M.D.;  Location: SURGERY [facility];  Service: Orthopedics    KYPHOPLASTY N/A 11/06/2021    Procedure: KYPHOPLASTY - L1 AND BIOPSY;  Surgeon: Tim Barton M.D.;  Location: SURGERY [facility];  Service: Orthopedics    TRIGGER FINGER RELEASE Left 05/29/2018    Procedure: TRIGGER FINGER RELEASE-  MIDDLE;  Surgeon: Frandy Liz M.D.;  Location: [facility];  Service: Orthopedics    JULIAN BY LAPAROSCOPY  05/23/2016    Procedure: JULIAN BY LAPAROSCOPY;  Surgeon: Adan Kearns M.D.;  Location: [facility];  Service:     KNEE ARTHROSCOPY  03/11/2014    Performed by Bonilla Valera M.D. at [facility]    BLADDER SUSPENSION  2001    HYSTERECTOMY, TOTAL ABDOMINAL  2000    APPENDECTOMY  1969    COLON RESECTION      partial; no CA    OTHER CARDIAC SURGERY  2022       FAMILY HISTORY  Family History   Problem Relation Age of Onset    No Known Problems Mother     No Known Problems Father        SOCIAL HISTORY    reports that she has never smoked. She has never been exposed to tobacco smoke. She has never used smokeless tobacco. She reports that she does not currently use alcohol. She reports that she does not use drugs.    CURRENT MEDICATIONS  Home Medications       Reviewed by Aretha Calle R.N. (Registered Nurse) on 10/28/23 at 0313  Med List Status: Partial     Medication Last Dose Status   carvedilol (COREG) 3.125 MG Tab  Active   escitalopram (LEXAPRO) 20 MG tablet  Active  "  GOODSENSE ASPIRIN LOW DOSE 81 MG EC tablet  Active   losartan (COZAAR) 50 MG Tab  Active   QUEtiapine (SEROQUEL) 25 MG Tab  Active   rosuvastatin (CRESTOR) 10 MG Tab  Active                    ALLERGIES  Allergies   Allergen Reactions    Ampicillin Hives, Rash and Swelling     Feels \"rotten\"     Bactrim Ds Hives, Itching and Swelling     Mouth, lip, tongue, eye swelling, pain, itching    Ciprofloxacin Hives, Rash and Swelling     .       PHYSICAL EXAM  VITAL SIGNS: BP (!) 185/87   Pulse 72   Temp 36.5 °C (97.7 °F) (Temporal)   Resp 18   Ht 1.626 m (5' 4\")   Wt 68 kg (150 lb)   SpO2 91%   BMI 25.75 kg/m²   Gen: Alert, oriented  HENT: No racoon eyes, septal hematoma, facial instability  Eye: EOMI, no chemosis, PERRL  Neck: trachea midline, no tenderness  Resp: no respiratory distress,  no chest wall tenderness or crepitus  CV: No JVD, RRR.  + peripheral pulses  Abd: soft, non-distended, non-tender. No ecchymosis  Back: no spinal tenderness or deformities  Ext: Tenderness to left hip, pain with passive range of motion left hip.  Otherwise, no deformities, tenderness or edema  Psych: normal mood  Neuro: speech fluent, moves all extremities. GCS 15    DIAGNOSTIC STUDIES / PROCEDURES  EKG  I independently interpreted this EKG.  Results for orders placed or performed during the hospital encounter of 10/28/23   EKG (NOW)   Result Value Ref Range    Report       [facility] Emergency Dept.    Test Date:  2023-10-28  Pt Name:    AJMES SWAN           Department: ER  MRN:        [id number]                      Room:        01  Gender:     Female                       Technician: [id number]  :        [id number]                   Requested By:DAKOTAH HENDERSON  Order #:    [id number]                    Reading MD: Dakotah Henderson    Measurements  Intervals                                Axis  Rate:       77                           P:          46  WI:         194                          QRS:        " -72  QRSD:       137                          T:          58  QT:         428  QTc:        485    Interpretive Statements  Sinus rhythm  Atrial premature complexes in couplets  Nonspecific IVCD with LAD  Left ventricular hypertrophy  Compared to ECG 06/15/2023 06:24:11  Atrial premature complex(es) now present  Sinus arrhythmia no longer present  T-wave abnormality no longer present  Electronically Signed On 10- 04:35:15 PDT by Silas [email or web address]       LABS  Labs Reviewed   CBC WITH DIFFERENTIAL - Abnormal; Notable for the following components:       Result Value    WBC 12.2 (*)     RDW 50.6 (*)     Neutrophils-Polys 75.70 (*)     Lymphocytes 12.20 (*)     Neutrophils (Absolute) 9.21 (*)     Monos (Absolute) 1.14 (*)     All other components within normal limits   COMP METABOLIC PANEL - Abnormal; Notable for the following components:    Glucose 121 (*)     Bun 23 (*)     All other components within normal limits   ESTIMATED GFR - Abnormal; Notable for the following components:    GFR (CKD-EPI) 43 (*)     All other components within normal limits   COD (ADULT)   ABO RH CONFIRM     RADIOLOGY  I have independently interpreted the diagnostic imaging associated with this visit:  Intertrochanteric fracture of the left femur.     DX-PELVIS-1 OR 2 VIEWS   Final Result      Positive for left hip fracture, as above.      DX-FEMUR-2+ LEFT   Final Result      Positive for left hip fracture, as above.      DX-CHEST-PORTABLE (1 VIEW)   Final Result      No acute process.      CT-HEAD W/O    (Results Pending)   CT-LSPINE W/O PLUS RECONS    (Results Pending)     Femoral Nerve Block.   Indication: Hip pain  Informed consent was obtained including risks, benefits, and alternatives. The patient was placed in an appropriate position.  Left LE was marked prior to procedure. Pt was prepped with chlorhexadin. Using sterile technique under US guidance, the femoral nerve located. 40 ml of ropivacaine 0.2% injected after aspiration with  appropriate hydrodissection of the femoral nerve. Pre and post motor and sensory exams fully intact bilaterally.  Time and nerve block status marked post procedure. The patient tolerated the procedure well and there were no immediate complications. Patient noted improvement in pain.   EBL: 0mL  Images retained in haiku as below: Note needle entering from the right of the frame        COURSE & MEDICAL DECISION MAKING  Pertinent Labs & Imaging studies were reviewed. (See chart for details)    ED Observation Status? Yes  Patient admitted to ED observation at 3:20 AM on 10/28/2023    Observation plan: Monitor for symptom management and diagnostic results     After observation the patient has not improved and will be escalated to hospitalization  Patient discharged from ED Observation at 4:10 AM  on 10/28/2023       3:20 AM Patient seen and examined at bedside. Patient is a 89 year old female who presents today for evaluation of a ground level fall onset prior to arrival. They have been experiencing associated left hip and back pain, but denies any nausea, vomiting, chest pain, neck pain, shortness of breath,  arm pain,  or elbow pain. See HPI for further details.     3:36 AM - Patient was reevaluated at bedside. The patient reports pain medication administered in the ambulance worked at first, but have decreased in effectiveness. Ultrasound performed at bedside.     4:04 AM - Patient was reevaluated at bedside. Discussed radiology results with the patient and informed them of an intertrochanteric fracture of the left femur revealed via imaging. Discussed my plan for escalation to hospitalization. Ordered for additional imaging to further evaluate patient symptoms.      4:17 AM I discussed the patient's case and the above findings with Dr. Mckenzie (Orthopedics) who confirmed the patient is eligible for a femoral nerve block.       INITIAL ASSESSMENT AND PLAN  Medical Decision Making: Patient presents with what sounds  like a nonsyncopal fall.  She reports she only takes aspirin, however review her medical record she does have a slightly more complicated medical history.  Clinically high suspicion for left hip fracture, confirmed on my review of the x-ray.  Some of the pain radiates towards her back, will obtain CT to rule out lumbar fracture.  Given the advanced age, aspirin use, will obtain CT scan of the head.    Fracture discussed with orthopedics, who are agreeable with femoral nerve block, patient will be admitted to the hospitalist.    ADDITIONAL PROBLEM LIST AND DISPOSITION      I have discussed management of the patient with the following medical professionals:  Dr. Mckenzie (Orthopedics) and Dr. Sylvester  (Hospitalist)    Barriers to care at this time, including but not limited to:  None noted at this time .     DISPOSITION:  Patient will be hospitalized by Dr. Sylvester in guarded condition.     FINAL IMPRESSION  1. Fall, initial encounter    2. Closed fracture of left hip, initial encounter ([facility])         IAriel (Scribe), am scribing for, and in the presence of, Sandro Henderson M.D..    Electronically signed by: Ariel Morrison (Scribe), 10/28/2023    ISandro M.D. personally performed the services described in this documentation, as scribed by Ariel Morrison in my presence, and it is both accurate and complete.    The note accurately reflects work and decisions made by me.  Sandro Henderson M.D.  10/28/2023  5:19 AM      This dictation was created using voice recognition software. The accuracy of the dictation is limited to the abilities of the software. I expect there may be some errors of grammar and possibly content. The nursing notes were reviewed and certain aspects of this information were incorporated into this note.

## 2023-10-28 NOTE — ED NOTES
Admission report given to the assigned RN in  S524/02   for continuity of care and management.  Provided opportunity to asks questions.  All pt belongings at bedside

## 2023-10-28 NOTE — ED TRIAGE NOTES
Chief Complaint   Patient presents with    T-5000 GLF     Brought by EMS from home complaining of GLF - pt is trying to go to the bathroom but she trip down - she is supposed to use a walker but she is not using it   Pt is complaining of Left hip/ lower back pain  Denied hitting her head  Pt is using Aspirin    Alert and Oriented x 2 (Baseline?)    Given Fetanyl 50 mcg IV/ Zofran 4 mg IV en route  IV gauge 20 left FA         Noted oxygen saturation of 84% - applied oxygen inhalation at 2 LPM, EMS said she desaturated after Fentanyl  Pt is poor historian  Pain: 4/10  Power of : Samantha Hopkins: [phone]  Brought by EMS with the above complaints    Respirations are even and unlabored.    Vitals:    10/28/23 0300   BP: (!) 185/87   Pulse: 72   Resp: 18   Temp: 36.5 °C (97.7 °F)   SpO2: 91%     Past Medical History:   Diagnosis Date    Anesthesia     Dtr-in-law states patient took a very long time to recover from anesthesia, states patient was incoherent and extremely weak    Anxiety 09/14/2010    Cataract     IOL    Congestive heart failure (HCC) 2022    High cholesterol     Hypertension     history of but no treatment    Myocardial infarct (HCC) May2022    OSTEOPOROSIS 09/14/2010    Pain 03/04/2014    6/10=L knee    Pain 11/04/2021    low back    Snoring     no sleep study    Urinary incontinence     only at times at night when sleeping     Past Surgical History:   Procedure Laterality Date    KYPHOPLASTY  6/15/2023    Procedure: L2 KYPHOPLASTY WITH BIOPSY;  Surgeon: Tim Barton M.D.;  Location: SURGERY [facility];  Service: Orthopedics    KYPHOPLASTY N/A 11/06/2021    Procedure: KYPHOPLASTY - L1 AND BIOPSY;  Surgeon: Tim Barton M.D.;  Location: SURGERY [facility];  Service: Orthopedics    TRIGGER FINGER RELEASE Left 05/29/2018    Procedure: TRIGGER FINGER RELEASE-  MIDDLE;  Surgeon: Frandy Liz M.D.;  Location: [facility];  Service: Orthopedics    JULIAN BY LAPAROSCOPY  05/23/2016     Procedure: JULIAN BY LAPAROSCOPY;  Surgeon: Adan Kearns M.D.;  Location: SURGERY [facility];  Service:     KNEE ARTHROSCOPY  03/11/2014    Performed by Bonilla Valera M.D. at SURGERY [facility]    BLADDER SUSPENSION  2001    HYSTERECTOMY, TOTAL ABDOMINAL  2000    APPENDECTOMY  1969    COLON RESECTION      partial; no CA    OTHER CARDIAC SURGERY  2022

## 2023-10-28 NOTE — ED NOTES
Called CT scan regarding this pt, since she got room assignment already, and said there is still trauma green  Will call her back once there is transport

## 2023-10-28 NOTE — ASSESSMENT & PLAN NOTE
DNAR: I discussed code status with patient's son and daughter. Patient has history of dementia and does not have medical decision making capacity. Per patient's son and daughter in law, the patient wishes to be DNAR and under no circumstances would want life sustaining treatments in the event that he has a cardiac arrest. I also discussed intubation including temporary courses and patient does not wish to be placed on a ventilator under any circumstances, including for temporary and reversible causes.   I discussed advance care planning with the patient's family for 16 minutes, including diagnosis, prognosis, plan of care, risks and benefits of any therapies that could be offered, as well as alternatives including palliation and hospice, as appropriate.

## 2023-10-28 NOTE — OP REPORT
DATE OF OPERATION: 10/28/2023     PREOPERATIVE DIAGNOSIS:  left intertrochanteric femur fracture    POSTOPERATIVE DIAGNOSIS: Same    PROCEDURE PERFORMED:   Open reduction internal fixation left intertrochanteric femur fracture with cephalomedullary implant    SURGEON: Richard Mckenzie M.D.     ASSISTANT: Dima Awan MS3    ANESTHESIA: General    SPECIMEN: None    ESTIMATED BLOOD LOSS: 25 mL    IMPLANTS: Harsha 10x130 short CMN, 95 lag screw, single interlock    INDICATIONS: The patient is a 89 y.o. female who presented with a left intertrochanteric femur fracture.  I discussed the risks and benefits of the above procedure which include but are not limited to risks of infection, wound healing complication, neurovascular injury, pain, malunion, non-union, malrotation, and the medical risks of anesthesia including MI, stroke, and death.  Alternatives to surgery were also discussed, including non-operative management, which I did not recommend.  The patient and/or their POA was in agreement with the plan to proceed, and the informed consent was signed and documented.    DESCRIPTION OF PROCEDURE:  Patient was seen in the preoperative holding area on the day of surgery and marked on the operative site which was the left hip. They were transported to the operating room.  Anesthesia was induced and the patient was placed into bilateral well-padded fracture boots.  They were then positioned supine on the orthopedic table with a well padded perineal post.  Care was taken to pad any bony prominences and prominent neurovascular structures.  Closed reduction maneuver was then performed using the orthopedic table and checked under fluoroscopy.  The operative extremity was then prescrubbed with a CHG brush followed by an alcohol bath and then prepped and draped in the usual sterile fashion.  A time out was performed in which the correct patient, site, side, procedure, and surgeon's initials on extremity were confirmed by all  operating personnel.     We then turned our attention to the left hip.  A longitudinal incision was made proximal to the tip of the greater trochanter.  A 10 blade was used to incise through the skin, subcutaneous tissue, and fascia and a starting guidepin was placed through the incision and appropriate starting position at the tip of the greater trochanter were confirmed on AP and lateral views and it was advanced into the femur.  The opening reamer was then placed over the guidepin and the proximal femur was opened.  These were then both removed and our implant was selected which was a size 10x130 sathish short cephalomedullary nail.  It was assembled on the back table and inserted into the proximal femur under fluoroscopic guidance and advanced to final position using a mallet.  The trochar was then inserted into the aiming arm and through a separate lateral incision.  A guidepin was then inserted through the trochar into the head neck segment and appropriate center-center position was confirmed on fluoroscopy.  I then measured for, drilled for, and placed a lag screw into the head neck segment.  Tip apex distance was appropriate on AP and lateral views.  Traction was then let off and the set screw was tightened and backed off one quarter turn to allow controlled collapse.  The aiming arm was then used to place a single distal interlocking bolt through a separate stab incision.  The insertion handle was then removed and final fluoroscopic images were obtained which demonstrated restoration of length, alignment, and rotation as well as safe and appropriate position of our implants.  All wounds were then thoroughly irrigated with saline.  The skin was then closed in layers with 2-0 vicryl followed by staples.  Sterile dressings were then placed.  The patient was then taken off the orthopedic table and taken out of the fracture boots.  The patient's clinical rotation was then assessed and noted to be symmetric to  the contralateral side.  The patient was then awoken from anesthesia without immediate complication and transported to the PACU in stable condition.     POSTOPERATIVE PLAN:      Inpatient plan: PACU to floor. 24 hours of antibiotics.  Mobilize with PT/OT.  Weightbearing status:  WBAT LLE  DVT prophylaxis: SCDs and lovenox until mobilizing well, then aspirin 81mg BID for 4 weeks.  If the patient is on baseline anticoagulation then they may resume their medication 24 hours postoperatively.  Outpatient plan: The patient will follow up in clinic in 2 weeks for wound check, suture/staple removal, and xrays.  If the patient is at a facility at that time, wound check and suture/staple removal may be performed by nursing care and the patient should instead follow up at the 6 week post operative mei for repeat clinical check and xrays.      ____________________________________   Richard Mckenzie M.D.   DD: 10/28/2023  11:48 AM

## 2023-10-28 NOTE — CONSULTS
10/28/2023      HPI: Linda Casas is a 89 y.o. female who presents with a closed left intertrochanteric femur fracture after a presumed mechanical fall.  Patient is a poor historian at baseline secondary to dementia.  Complains only of left foot pain to me.  Per the family, was also complaining of right foot pain.    Past Medical History:   Diagnosis Date    Anesthesia     Dtr-in-law states patient took a very long time to recover from anesthesia, states patient was incoherent and extremely weak    Anxiety 09/14/2010    Cataract     IOL    Congestive heart failure (HCC) 2022    High cholesterol     Hypertension     history of but no treatment    Myocardial infarct ([facility]) May2022    OSTEOPOROSIS 09/14/2010    Pain 03/04/2014    6/10=L knee    Pain 11/04/2021    low back    Snoring     no sleep study    Urinary incontinence     only at times at night when sleeping       Past Surgical History:   Procedure Laterality Date    KYPHOPLASTY  6/15/2023    Procedure: L2 KYPHOPLASTY WITH BIOPSY;  Surgeon: Tim Barton M.D.;  Location: [facility];  Service: Orthopedics    KYPHOPLASTY N/A 11/06/2021    Procedure: KYPHOPLASTY - L1 AND BIOPSY;  Surgeon: Tim Barton M.D.;  Location: [facility];  Service: Orthopedics    TRIGGER FINGER RELEASE Left 05/29/2018    Procedure: TRIGGER FINGER RELEASE-  MIDDLE;  Surgeon: Frandy Liz M.D.;  Location: [facility];  Service: Orthopedics    JULIAN BY LAPAROSCOPY  05/23/2016    Procedure: JULIAN BY LAPAROSCOPY;  Surgeon: Adan Kearns M.D.;  Location: [facility];  Service:     KNEE ARTHROSCOPY  03/11/2014    Performed by Bonilla Valera M.D. at [facility]    BLADDER SUSPENSION  2001    HYSTERECTOMY, TOTAL ABDOMINAL  2000    APPENDECTOMY  1969    COLON RESECTION      partial; no CA    OTHER CARDIAC SURGERY  2022       Medications  No current facility-administered medications on file prior to encounter.      Current Outpatient Medications on File Prior to Encounter   Medication Sig Dispense Refill    QUEtiapine (SEROQUEL) 25 MG Tab Take 25 mg at bet time 90 Tablet 2    escitalopram (LEXAPRO) 20 MG tablet TAKE 1 TABLET BY MOUTH EVERY DAY. 90 Tablet 0    losartan (COZAAR) 50 MG Tab Take 1 Tablet by mouth every evening. 100 Tablet 3    rosuvastatin (CRESTOR) 10 MG Tab Take 1 Tablet by mouth every evening. 100 Tablet 3    carvedilol (COREG) 3.125 MG Tab Take 1 Tablet by mouth 2 times a day. 200 Tablet 3    GOODSENSE ASPIRIN LOW DOSE 81 MG EC tablet TAKE 1 TABLET BY MOUTH EVERY DAY. 100 Tablet 3       Allergies  Ampicillin, Bactrim ds, and Ciprofloxacin    ROS  Negative except as indicated in the HPI    Family History   Problem Relation Age of Onset    No Known Problems Mother     No Known Problems Father        Social History     Socioeconomic History    Marital status:     Highest education level: 12th grade   Tobacco Use    Smoking status: Never     Passive exposure: Never    Smokeless tobacco: Never   Vaping Use    Vaping Use: Never used   Substance and Sexual Activity    Alcohol use: Not Currently     Comment: 1 time per month    Drug use: Never    Sexual activity: Yes     Partners: Male   Social History Narrative    ** Merged History Encounter **          Social Determinants of Health     Financial Resource Strain: Low Risk  (8/14/2023)    Overall Financial Resource Strain (CARDIA)     Difficulty of Paying Living Expenses: Not hard at all   Food Insecurity: No Food Insecurity (8/14/2023)    Hunger Vital Sign     Worried About Running Out of Food in the Last Year: Never true     Ran Out of Food in the Last Year: Never true   Transportation Needs: No Transportation Needs (8/14/2023)    PRAPARE - Transportation     Lack of Transportation (Medical): No     Lack of Transportation (Non-Medical): No   Physical Activity: Inactive (8/14/2023)    Exercise Vital Sign     Days of Exercise per Week: 0 days     Minutes of  "Exercise per Session: 0 min   Stress: Stress Concern Present (8/14/2023)    [place] [employer] - Occupational Stress Questionnaire     Feeling of Stress : Very much   Social Connections: Socially Isolated (8/14/2023)    Social Connection and Isolation Panel [NHANES]     Frequency of Communication with Friends and Family: More than three times a week     Frequency of Social Gatherings with Friends and Family: More than three times a week     Attends [employer] Services: Never     Active Member of Clubs or Organizations: No     Attends Club or Organization Meetings: Never     Marital Status:    Housing Stability: Unknown (8/14/2023)    Housing Stability Vital Sign     Unable to Pay for Housing in the Last Year: No     Unstable Housing in the Last Year: No       Physical Exam  Vitals  BP (!) 165/86   Pulse 87   Temp 36.1 °C (97 °F) (Temporal)   Resp 20   Ht 1.626 m (5' 4\")   Wt 68 kg (150 lb)   SpO2 98%   General: Well Developed, Well Nourished, Age appropriate appearance  HEENT: Normocephalic, atraumatic  Psych: Normal mood and affect  Neck: Supple, nontender, no masses  Lungs: Breathing unlabored, No audible wheezing  Heart: Regular rate  Abdomen: ND  MSK:   On inspection of the left lower extremity it is short and externally rotated.  Pain with any motion at the hip including log roll. Patient unable to straight leg raise. Sensation intact to light touch all distributions distally.  Moves ankle and toes up/down.  Foot warm and well perfused with a 2+ DP pulse.      Radiographs:  AP pelvis and xrays of the left femur demonstrate a standard obliquity intertrochanteric femur fracture.    CT-LSPINE W/O PLUS RECONS   Final Result         1. No acute injury identified.   2. Stable L1 and L2 compression fractures.   3. Stable degenerative changes.      CT-HEAD W/O   Final Result      No acute process.         DX-PELVIS-1 OR 2 VIEWS   Final Result      Positive for left hip fracture, as " above.      DX-FEMUR-2+ LEFT   Final Result      Positive for left hip fracture, as above.      DX-CHEST-PORTABLE (1 VIEW)   Final Result      No acute process.      DX-PORTABLE FLUOROSCOPY < 1 HOUR    (Results Pending)   DX-HIP-UNILATERAL-W/O PELVIS-2/3 VIEWS LEFT    (Results Pending)       Laboratory Values  Recent Labs     10/28/23  0304   WBC 12.2*   RBC 4.38   HEMOGLOBIN 13.9   HEMATOCRIT 42.8   MCV 97.7   MCH 31.7   MCHC 32.5   RDW 50.6*   PLATELETCT 239   MPV 10.9     Recent Labs     10/28/23  0304   SODIUM 138   POTASSIUM 3.8   CHLORIDE 104   CO2 23   GLUCOSE 121*   BUN 23*             Assessment: 89 y.o. F with a left intertrochanteric femur fracture after a presumed mechanical fall.      Plan: We discussed the diagnosis and findings with the patient and their POA at length.  We reviewed possible non operative and operative interventions and the risks and benefits of each of these.  The patient and/or their POA had a chance to ask questions and all of these were answered to their satisfaction. The patient and/or their POA chose to proceed with  operative intervention to include surgical fixation of the left hip. Risks and benefits of surgery were discussed which include but are not limited to bleeding, infection, neurovascular damage, malunion, nonunion, instability, limb length discrepancy, DVT, PE, MI, Stroke and death. They understand these risks and wish to proceed.      NPO for OR today      Richard Mckenzie MD  Orthopedic Trauma

## 2023-10-28 NOTE — PROGRESS NOTES
Assumed care of pt from HS RN. Pt is in bed supine with eyes close. Oxymask is in place on 6L. Pt has eyes closed, respirations even. Call light in reach and bed low. Bed alarm on.

## 2023-10-28 NOTE — PROGRESS NOTES
Report received from ARIS Loving. Patient arrived to room 524-2 via gurney from emergency room. Patient oriented to room, use of call bell. Policies and procedures explained.No evidence of understanding. Patient alert and oriented to person, place. disoriented to time and situation. Assessment performed. Bed alarm on. All safety measures in place. Care plan ongoing.

## 2023-10-28 NOTE — DISCHARGE PLANNING
TCN following. HTH/SCP chart review completed. Pt currently in OR/PACU for surgery 2' to LLE IT fx after GLF when pt was attempting to ambulate without her walker. Would note that per review, pt has baseline cognitive deficits and appears communication this admission has often been through pt's PAYTON, Mary @[phone]. Pt is awaiting PT consult once medically stable post op. TCN will continue to monitor and assist with dc planning once pt returns to floor, appropriate consults performed for dc planning recs, etc.

## 2023-10-28 NOTE — ASSESSMENT & PLAN NOTE
X-ray hip showing mildly comminuted, angulated, mildly displaced left femoral neck and intertrochanteric femur fracture.  Small butterfly fragment of the lesser trochanter medially.     S/p Open reduction internal fixation left intertrochanteric femur fracture with cephalomedullary implant 10/28  WBAT LLE  PT/OT rec SNF. SNF and PMR referred   Multimodal pain managements including po and iv narcotics prn. Monitoring respiratory status and sedation score

## 2023-10-29 PROCEDURE — 700111 HCHG RX REV CODE 636 W/ 250 OVERRIDE (IP): Mod: JZ

## 2023-10-29 PROCEDURE — 99497 ADVNCD CARE PLAN 30 MIN: CPT | Performed by: STUDENT IN AN ORGANIZED HEALTH CARE EDUCATION/TRAINING PROGRAM

## 2023-10-29 PROCEDURE — 97535 SELF CARE MNGMENT TRAINING: CPT

## 2023-10-29 PROCEDURE — 99233 SBSQ HOSP IP/OBS HIGH 50: CPT | Mod: 25 | Performed by: STUDENT IN AN ORGANIZED HEALTH CARE EDUCATION/TRAINING PROGRAM

## 2023-10-29 PROCEDURE — 770006 HCHG ROOM/CARE - MED/SURG/GYN SEMI*

## 2023-10-29 PROCEDURE — 700111 HCHG RX REV CODE 636 W/ 250 OVERRIDE (IP): Mod: JZ | Performed by: STUDENT IN AN ORGANIZED HEALTH CARE EDUCATION/TRAINING PROGRAM

## 2023-10-29 PROCEDURE — 700111 HCHG RX REV CODE 636 W/ 250 OVERRIDE (IP): Mod: JZ | Performed by: ORTHOPAEDIC SURGERY

## 2023-10-29 PROCEDURE — 99223 1ST HOSP IP/OBS HIGH 75: CPT | Performed by: PHYSICAL MEDICINE & REHABILITATION

## 2023-10-29 PROCEDURE — 700102 HCHG RX REV CODE 250 W/ 637 OVERRIDE(OP): Performed by: STUDENT IN AN ORGANIZED HEALTH CARE EDUCATION/TRAINING PROGRAM

## 2023-10-29 PROCEDURE — 97166 OT EVAL MOD COMPLEX 45 MIN: CPT

## 2023-10-29 PROCEDURE — 97163 PT EVAL HIGH COMPLEX 45 MIN: CPT

## 2023-10-29 PROCEDURE — A9270 NON-COVERED ITEM OR SERVICE: HCPCS | Performed by: STUDENT IN AN ORGANIZED HEALTH CARE EDUCATION/TRAINING PROGRAM

## 2023-10-29 RX ORDER — ENOXAPARIN SODIUM 100 MG/ML
40 INJECTION SUBCUTANEOUS DAILY
Status: DISCONTINUED | OUTPATIENT
Start: 2023-10-29 | End: 2023-10-30 | Stop reason: HOSPADM

## 2023-10-29 RX ADMIN — ENOXAPARIN SODIUM 40 MG: 100 INJECTION SUBCUTANEOUS at 17:04

## 2023-10-29 RX ADMIN — QUETIAPINE FUMARATE 25 MG: 25 TABLET ORAL at 20:21

## 2023-10-29 RX ADMIN — ROSUVASTATIN 10 MG: 10 TABLET, FILM COATED ORAL at 17:04

## 2023-10-29 RX ADMIN — CEFAZOLIN SODIUM 1 G: 1 INJECTION, SOLUTION INTRAVENOUS at 10:23

## 2023-10-29 RX ADMIN — ACETAMINOPHEN 650 MG: 325 TABLET, FILM COATED ORAL at 11:48

## 2023-10-29 RX ADMIN — OXYCODONE HYDROCHLORIDE 2.5 MG: 5 TABLET ORAL at 17:05

## 2023-10-29 RX ADMIN — MORPHINE SULFATE 1 MG: 4 INJECTION, SOLUTION INTRAMUSCULAR; INTRAVENOUS at 02:34

## 2023-10-29 RX ADMIN — CARVEDILOL 3.12 MG: 3.12 TABLET, FILM COATED ORAL at 17:04

## 2023-10-29 RX ADMIN — LOSARTAN POTASSIUM 50 MG: 50 TABLET, FILM COATED ORAL at 17:04

## 2023-10-29 RX ADMIN — DOCUSATE SODIUM 50 MG AND SENNOSIDES 8.6 MG 2 TABLET: 8.6; 5 TABLET, FILM COATED ORAL at 17:04

## 2023-10-29 ASSESSMENT — PAIN DESCRIPTION - PAIN TYPE
TYPE: SURGICAL PAIN
TYPE: SURGICAL PAIN
TYPE: ACUTE PAIN
TYPE: SURGICAL PAIN

## 2023-10-29 ASSESSMENT — FIBROSIS 4 INDEX: FIB4 SCORE: 2.37

## 2023-10-29 ASSESSMENT — PAIN SCALES - PAIN ASSESSMENT IN ADVANCED DEMENTIA (PAINAD)
FACIALEXPRESSION: FACIAL GRIMACING
BODYLANGUAGE: RELAXED
CONSOLABILITY: NO NEED TO CONSOLE
NEGVOCALIZATION: OCCASIONAL MOAN/GROAN, LOW SPEECH, NEGATIVE/DISAPPROVING QUALITY
TOTALSCORE: 3
BREATHING: NORMAL

## 2023-10-29 ASSESSMENT — COGNITIVE AND FUNCTIONAL STATUS - GENERAL
PERSONAL GROOMING: A LITTLE
EATING MEALS: A LITTLE
DRESSING REGULAR UPPER BODY CLOTHING: A LOT
STANDING UP FROM CHAIR USING ARMS: A LOT
TURNING FROM BACK TO SIDE WHILE IN FLAT BAD: UNABLE
HELP NEEDED FOR BATHING: A LOT
MOBILITY SCORE: 8
CLIMB 3 TO 5 STEPS WITH RAILING: TOTAL
SUGGESTED CMS G CODE MODIFIER MOBILITY: CM
WALKING IN HOSPITAL ROOM: A LOT
DAILY ACTIVITIY SCORE: 14
MOVING FROM LYING ON BACK TO SITTING ON SIDE OF FLAT BED: UNABLE
MOVING TO AND FROM BED TO CHAIR: UNABLE
TOILETING: A LOT
DRESSING REGULAR LOWER BODY CLOTHING: A LOT
SUGGESTED CMS G CODE MODIFIER DAILY ACTIVITY: CK

## 2023-10-29 ASSESSMENT — PATIENT HEALTH QUESTIONNAIRE - PHQ9
SUM OF ALL RESPONSES TO PHQ9 QUESTIONS 1 AND 2: 0
1. LITTLE INTEREST OR PLEASURE IN DOING THINGS: NOT AT ALL
2. FEELING DOWN, DEPRESSED, IRRITABLE, OR HOPELESS: NOT AT ALL

## 2023-10-29 ASSESSMENT — ACTIVITIES OF DAILY LIVING (ADL): TOILETING: INDEPENDENT

## 2023-10-29 ASSESSMENT — GAIT ASSESSMENTS
DISTANCE (FEET): 1
DEVIATION: ANTALGIC;BRADYKINETIC;SHUFFLED GAIT
ASSISTIVE DEVICE: FRONT WHEEL WALKER
GAIT LEVEL OF ASSIST: MODERATE ASSIST

## 2023-10-29 NOTE — CONSULTS
Physical Medicine and Rehabilitation Consultation              Date of initial consultation: 10/29/2023  Requesting provider: ordered by Cesar Kraus M.D. at 10/30/23 0717  Consulting provider: Natali Ramos D.O.  Reason for consultation: assess for acute inpatient rehab appropriateness  LOS: 1 Day(s)    Chief complaint: Left hip pain after ground-level fall    HPI: The patient is a 89 y.o. 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  Per documentation, patient was at home while walking to the bathroom when she fell and tripped and landed on her left side.  Patient did not hit her head, patient was unable to bear weight.  Upon evaluation in the emergency department left femur x-ray showed minimally comminuted, angulated and mildly displaced left femoral neck and intertrochanteric femur fracture.  Orthopedic surgery was consulted, and patient was taken to the OR on 10/28 for ORIF of the left intertrochanteric femur fracture with cephalomedullary implant performed by Dr. Mckenzie.  Postop, patient is WBAT LLE.  Patient has postop leukocytosis.  Patient has required IV morphine for pain control.  She does have p.o. oxycodone available, however IV morphine was utilized today.    Patient seen and examined at bedside.  Patient oriented to person and place.  Patient is oriented to situation.  However patient does not know the month or the year.  Patient does not know what town she lives in.  Patient is able to explain the chills with her son and daughter-in-law who help her with most needs at home.  Patient reports left hip pain.  Although patient has memory impairment she follows 1 step commands easily and is very cooperative.  Denies headache, lightheadedness, chest pain or numbness tingling or weakness.  Continues to state that just her left hip  hurprecious.    Social Hx:  Patient lives with adult children in a one-story house with no stairs to enter  0 ELIANA  At prior level of function required assistance for IADLs but was independent with ADLs, mod I with FWW at home      Tobacco: Denies  Alcohol: Denies  Drugs: Denies    THERAPY:  Restrictions: Fall risk, WBAT LLE  PT: Functional mobility   10/29 mod a FWW x1 ft, mod assist sit to stand    OT: ADLs  10/29 Max assist lower body dressing, max assist toileting mod assist sit to stand    SLP:   None    IMAGING:  CT-LSPINE W/O PLUS RECONS  Narrative: 10/28/2023 5:16 AM    HISTORY/REASON FOR EXAM:  Trauma, ground level fall, back pain, hip pain.    TECHNIQUE/EXAM DESCRIPTION AND NUMBER OF VIEWS:  CT lumbar spine without contrast, with reconstructions.    The study was performed on a helical multidetector CT scanner. Thin-section helical scanning was performed from T12-L1 to the sacrum. Sagittal and coronal multiplanar reconstructions were generated from the axial images. Low dose optimization technique   was utilized for this CT exam including automated exposure control and adjustment of the mA and/or kV according to patient size.    COMPARISON: Lumbar MRI 6/5/2023 and additional    FINDINGS:  No acute fracture identified. Stable L1 and L2 compression fractures with previous vertebroplasty and mild bony retropulsion. Mild associated central canal stenosis. Stable anterolisthesis and severe degenerative spondylosis at L5-S1. Right foraminal   stenosis at L4-S1. Left foraminal stenosis at L4-5.  Impression: 1. No acute injury identified.  2. Stable L1 and L2 compression fractures.  3. Stable degenerative changes.  CT-HEAD W/O  Narrative: 10/28/2023 5:16 AM    HISTORY/REASON FOR EXAM:  Head trauma, minor (Age >= 65y); Head trauma >65 years old.    TECHNIQUE/EXAM DESCRIPTION AND NUMBER OF VIEWS:  CT of the head without contrast.    The study was performed on a helical multidetector CT scanner. Contiguous axial sections  were obtained from the skull base through the vertex. Up to date radiation dose reduction adjustments have been utilized to meet ALARA standards for radiation dose   reduction.    COMPARISON:  4/4/2022 head CT, 8/22/2023 brain MR.    FINDINGS:  Brain: Severe chronic ischemic white matter demyelination again noted. No intracranial mass, hydrocephalus, herniation, hemorrhage, or extra-axial fluid collection. Normal gray-white matter differentiation.    Paranasal sinuses: Visualized portions of the paranasal sinuses and mastoid air cells are well aerated.  Impression: No acute process.  DX-FEMUR-2+ LEFT  Narrative: 10/28/2023 4:21 AM    HISTORY/REASON FOR EXAM:  Pain/Deformity Following Trauma.  .    TECHNIQUE/EXAM DESCRIPTION AND NUMBER OF VIEWS:  2 views of the LEFT femur.    COMPARISON: None    FINDINGS:  There is a mildly comminuted, angulated, mildly displaced left femoral neck and intertrochanteric femur fracture. Small butterfly fragment of the lesser trochanter medially. No dislocation.  Impression: Positive for left hip fracture, as above.  DX-PELVIS-1 OR 2 VIEWS  Narrative: 10/28/2023 4:21 AM    HISTORY/REASON FOR EXAM:  Pelvic/Hip Pain Following Trauma; GLF.    TECHNIQUE/EXAM DESCRIPTION AND NUMBER OF VIEWS:  1 view(s) of the pelvis.    COMPARISON:  None.    FINDINGS:  There is a mildly comminuted, angulated, mildly displaced left femoral neck and intertrochanteric femur fracture. Small butterfly fragment of the lesser trochanter medially. No dislocation.  Impression: Positive for left hip fracture, as above.  DX-CHEST-PORTABLE (1 VIEW)  Narrative: 10/28/2023 3:59 AM    HISTORY/REASON FOR EXAM:  Shortness of Breath.    TECHNIQUE/EXAM DESCRIPTION AND NUMBER OF VIEWS:  Single portable view of the chest.    COMPARISON: 4/9/2022    FINDINGS:  Cardiac silhouette is normal in size. No airspace infiltrate, pleural effusion, or pneumothorax.  Impression: No acute process.        PROCEDURES:  10/28 Open reduction  internal fixation left intertrochanteric femur fracture with cephalomedullary performed by Dr. Mckenzie    PMH:  Past Medical History:   Diagnosis Date    Anesthesia     Dtr-in-law states patient took a very long time to recover from anesthesia, states patient was incoherent and extremely weak    Anxiety 09/14/2010    Cataract     IOL    Congestive heart failure (HCC) 2022    High cholesterol     Hypertension     history of but no treatment    Myocardial infarct (HCC) May2022    OSTEOPOROSIS 09/14/2010    Pain 03/04/2014 6/10=L knee    Pain 11/04/2021    low back    Snoring     no sleep study    Urinary incontinence     only at times at night when sleeping       PSH:  Past Surgical History:   Procedure Laterality Date    KYPHOPLASTY  6/15/2023    Procedure: L2 KYPHOPLASTY WITH BIOPSY;  Surgeon: Tim Barton M.D.;  Location: [facility];  Service: Orthopedics    KYPHOPLASTY N/A 11/06/2021    Procedure: KYPHOPLASTY - L1 AND BIOPSY;  Surgeon: Tim Barton M.D.;  Location: [facility];  Service: Orthopedics    TRIGGER FINGER RELEASE Left 05/29/2018    Procedure: TRIGGER FINGER RELEASE-  MIDDLE;  Surgeon: Frandy Liz M.D.;  Location: [facility];  Service: Orthopedics    JULIAN BY LAPAROSCOPY  05/23/2016    Procedure: JULIAN BY LAPAROSCOPY;  Surgeon: Adan Kearns M.D.;  Location: [facility];  Service:     KNEE ARTHROSCOPY  03/11/2014    Performed by Bonilla Valera M.D. at [facility]    BLADDER SUSPENSION  2001    HYSTERECTOMY, TOTAL ABDOMINAL  2000    APPENDECTOMY  1969    COLON RESECTION      partial; no CA    OTHER CARDIAC SURGERY  2022       FHX:  Family History   Problem Relation Age of Onset    No Known Problems Mother     No Known Problems Father        Medications:  Current Facility-Administered Medications   Medication Dose    enoxaparin (Lovenox) inj 40 mg  40 mg    carvedilol (Coreg) tablet 3.125 mg  3.125 mg    escitalopram (Lexapro) tablet  "20 mg  20 mg    losartan (Cozaar) tablet 50 mg  50 mg    QUEtiapine (SEROquel) tablet 25 mg  25 mg    rosuvastatin (Crestor) tablet 10 mg  10 mg    acetaminophen (Tylenol) tablet 650 mg  650 mg    ondansetron (Zofran ODT) dispertab 4 mg  4 mg    senna-docusate (Pericolace Or Senokot S) 8.6-50 MG per tablet 2 Tablet  2 Tablet    And    polyethylene glycol/lytes (Miralax) PACKET 1 Packet  1 Packet    And    magnesium hydroxide (Milk Of Magnesia) suspension 30 mL  30 mL    And    bisacodyl (Dulcolax) suppository 10 mg  10 mg    oxyCODONE immediate-release (Roxicodone) tablet 2.5 mg  2.5 mg    oxyCODONE immediate-release (Roxicodone) tablet 5 mg  5 mg    morphine 4 MG/ML injection 1 mg  1 mg       Allergies:  Allergies   Allergen Reactions    Ampicillin Hives, Rash and Swelling     Feels \"rotten\"     Bactrim Ds Hives, Itching and Swelling     Mouth, lip, tongue, eye swelling, pain, itching    Ciprofloxacin Hives, Rash and Swelling     .       Physical Exam:  Vitals: /70   Pulse (!) 102   Temp 36.4 °C (97.6 °F) (Temporal)   Resp 18   Ht 1.626 m (5' 4.02\")   Wt 65 kg (143 lb 4.8 oz)   SpO2 93%   Gen: NAD, laying comfortably in bed no family at bedside  Head:  NC/AT  Eyes/ Nose/ Mouth: PERRLA, moist mucous membranes  Cardio: RRR, good distal perfusion, warm extremities  Pulm: normal respiratory effort, no cyanosis, on room air  Abd: Soft NTND, negative borborygmi   Ext: No peripheral edema. No calf tenderness. No clubbing.    Mental status: answers questions appropriately follows commands, oriented to person and place.  Patient is oriented to situation.  Patient does not know the month the year of the town that she lives in  Speech: fluent, no aphasia or dysarthria    CRANIAL NERVES:  2,3: visual acuity grossly intact, PERRL  3,4,6: EOMI bilaterally, no nystagmus or diplopia  5: sensation intact to light touch bilaterally and symmetric  7: no facial asymmetry  8: hearing grossly intact      Motor:      Upper " Extremity  Myotome R L   Shoulder flexion C5 5/5 5/5   Elbow flexion C5 5/5 5/5   Wrist extension C6 5/5 5/5   Elbow extension C7 5/5 5/5   Finger flexion C8 5/5 5/5   Finger abduction T1 5/5 5/5     Lower Extremity Myotome R L   Hip flexion L2 5/5 3/5   Knee extension L3 5/5 3/5   Ankle dorsiflexion L4 5/5 5/5   Toe extension L5 5/5 5/5   Ankle plantarflexion S1 5/5 5/5       Sensory:   intact to light touch through out      DTRs: 2+ in bilateral  biceps  No clonus at bilateral ankles  Negative Tillman b/l     Tone: no spasticity noted, no cogwheeling noted    Coordination:   intact finger to nose bilaterally  intact fine motor with fingers bilaterally      Labs: Reviewed and significant for   Recent Labs     10/28/23  0304   RBC 4.38   HEMOGLOBIN 13.9   HEMATOCRIT 42.8   PLATELETCT 239     Recent Labs     10/28/23  0304   SODIUM 138   POTASSIUM 3.8   CHLORIDE 104   CO2 23   GLUCOSE 121*   BUN 23*   CREATININE 1.20   CALCIUM 9.3     No results found for this or any previous visit (from the past 24 hour(s)).      ASSESSMENT:  Patient is a 89 y.o. female admitted with left hip fracture    C Code / Diagnosis to Support: 0008.11 - Orthopaedic Disorders: Status Post Unilateral Hip Fracture    Rehabilitation: Impaired ADLs and mobility  Patient is a good candidate for inpatient rehab based on needs for PT, OT, only if patient's family can provide 24/7 and physical assistance at home.    Barriers to transfer include: Insurance authorization, TCCs to verify disposition, medical clearance and bed availability     Additional Recommendations:  Left hip fracture  - Sustained during a ground-level fall  - Patient landed on left side  - Images showed a left intertrochanteric femur fracture  - Ortho consulted  - 10/28 ORIF with cephalomedullary implant for left intertrochanteric femur fracture performed by Dr. Mckenzie  -WBAT LLE  - Continue with PT/OT, is limited by baseline memory impairment but easily follows one-step  commands, is pleasantly confused and cooperative    Hypertension  - SBP elevated up to 150  - Remains on home dose carvedilol, losartan    Leukocytosis  - Patient has postop leukocytosis likely stress reaction  - 10/29 WBC 12.2, remains afebrile  - Primary team monitoring CBC and monitoring for infection    Baseline memory impairment  - Patient lives with adult children, requires assistance with IADLs  - Patient is pleasant, cooperative, follows all one-step commands, at baseline has memory impairment that impairs IADLs.  Patient does not know the month or the year, however is able to follow all commands with therapy during my exam today.  - On Seroquel nightly for possible nighttime agitation    Dispo:  - patient is currently functioning below their level of baseline, recommend post acute rehab  - recommend IRF level therapy with 3hr of therapy 5 days per week ONLY IF patient has 24/7 supervision from family and family is able to provide physical assistance  - piror to acceptance to IRF, will need insurance auth from [facility]  - TCC to assist with insurance auth and DC support       Medical Complexity:  Left hip fracture  Hypertension  Leukocytosis  Baseline memory impairment  Impaired mobility and ADLs      DVT PPX: Lovenox      Thank you for allowing us to participate in the care of this patient.     Patient was seen for 81 minutes on unit/floor of which > 50% of time was spent on counseling and coordination of care regarding the above, including prognosis, risk reduction, benefits of treatment, and options for next stage of care.    Natali Ramos D.O.   Physical Medicine and Rehabilitation     Please note that this dictation was created using voice recognition software. I have made every reasonable attempt to correct obvious errors, but there may be errors of grammar and possibly content that I did not discover before finalizing the note.

## 2023-10-29 NOTE — ASSESSMENT & PLAN NOTE
Hx of dementia. Baseline confusion  Minimize risk of delirium such as avoiding day time napping and promote night time sleep, monitor for constipation, remove lines/tubing that is not needed, avoid early lab draws and vital checks, limit polypharmacy as able, and keep close to the window

## 2023-10-29 NOTE — CARE PLAN
The patient is Stable - Low risk of patient condition declining or worsening    Shift Goals  Clinical Goals: safety and comfort  Patient Goals: sleep and rest  Family Goals: Yeny    Progress made toward(s) clinical / shift goals:  Fall risk precaution in placed no fall for the whole shift, applied Bilateral soft wrist restraint to prevent pulling iv access and continue use oxygen to supplement respiration >92% the whole shift.          Problem: Knowledge Deficit - Standard  Goal: Patient and family/care givers will demonstrate understanding of plan of care, disease process/condition, diagnostic tests and medications  Outcome: Not Met   Pt confused and need reorienting but no signs of understanding at this moment.  Problem: Pain - Standard  Goal: Alleviation of pain or a reduction in pain to the patient’s comfort goal  Outcome: Progressing     Problem: Fall Risk  Goal: Patient will remain free from falls  Outcome: Progressing     Problem: Skin Integrity  Goal: Skin integrity is maintained or improved  Outcome: Progressing     Problem: Safety - Medical Restraint  Goal: Remains free of injury from restraints (Restraint for Interference with Medical Device)  Outcome: Progressing  Goal: Free from restraint(s) (Restraint for Interference with Medical Device)  Outcome: Progressing

## 2023-10-29 NOTE — PROGRESS NOTES
Assumed care of pt from HS RN. Awake and oriented x 1.pt gown was around her wrists and stated she did not want to get dressed. Replaced gown on pt. Does not follow commands. Bilateral wrist restraints in place. Skin checked underneath. ROM performed. Bed is dry and clean. Purwick not in place. Call light in reach and bed low with bed alarm on.

## 2023-10-29 NOTE — PROGRESS NOTES
"Received alert and oriented x 1. Doesn't follow simple command and request. Removing vital equipment like oxygen desaturation to 83% RA and pulling out iv access for iv abx and prn's. Informed on Call with order made and carried out. Restraint protocol applied. Check vitals sign and recorded accordingly and due med given per MAR. Monitor sign and symptoms of respiratory distress and treatment given accordingly per MAR.Medicated per MAR and reassessed every 2 hours per protocol. Call light within reach. Bed alarm in placed. Needs attended. Continue to monitor./88   Pulse 78   Temp 36.6 °C (97.8 °F) (Temporal)   Resp 18   Ht 1.626 m (5' 4\")   Wt 68 kg (149 lb 14.6 oz)   SpO2 93%   BMI 25.73 kg/m² .   "

## 2023-10-29 NOTE — PROGRESS NOTES
Hospital Medicine Daily Progress Note    Date of Service  10/29/2023    Chief Complaint  Linda Casas is a 89 y.o. female admitted 10/28/2023 with fall    Hospital Course  89 y.o. female with history of dementia, who presented 10/28/2023 with left hip pain.  Patient was walking today to the bathroom when she suddenly tripped and fell on her left hip.  Noticed immediate pain after and was unable to bear weight.      X-ray hip showing mildly comminuted, angulated, mildly displaced left femoral neck and intertrochanteric femur fracture.  Small butterfly fragment of the lesser trochanter medially.     S/p Open reduction internal fixation left intertrochanteric femur fracture with cephalomedullary implant 10/28    PT/OT rec SNF    Interval Problem Update  Patient was confused. Attempting to remove O2 and iv line. Bilateral wrist restrains were placed.   I have discussed family (son and daughter in law). Patient has dementia. Baseline confused.  Weaning O2  PT/OT  I have referred PMR and SNF  Multimodal pain control    I have discussed this patient's plan of care and discharge plan at IDT rounds today with Case Management, Nursing, Nursing leadership, and other members of the IDT team.    Consultants/Specialty  orthopedics    Code Status  DNAR/DNI    Disposition  The patient is not medically cleared for discharge to home or a post-acute facility.      I have placed the appropriate orders for post-discharge needs.    Review of Systems  Review of Systems   Unable to perform ROS: Mental acuity        Physical Exam  Temp:  [36.2 °C (97.2 °F)-36.6 °C (97.8 °F)] 36.2 °C (97.2 °F)  Pulse:  [72-98] 76  Resp:  [16-20] 17  BP: (122-150)/(76-90) 150/84  SpO2:  [93 %-97 %] 95 %    Physical Exam  Vitals and nursing note reviewed.   Constitutional:       Appearance: Normal appearance. She is ill-appearing.   HENT:      Head: Normocephalic and atraumatic.      Nose: Nose normal.      Mouth/Throat:      Pharynx:  Oropharynx is clear.   Eyes:      Extraocular Movements: Extraocular movements intact.      Conjunctiva/sclera: Conjunctivae normal.      Pupils: Pupils are equal, round, and reactive to light.   Cardiovascular:      Rate and Rhythm: Normal rate and regular rhythm.      Pulses: Normal pulses.      Heart sounds: Normal heart sounds.   Pulmonary:      Effort: Pulmonary effort is normal.      Breath sounds: Normal breath sounds.   Abdominal:      General: Abdomen is flat. Bowel sounds are normal.      Palpations: Abdomen is soft.   Musculoskeletal:         General: Tenderness present. Normal range of motion.      Cervical back: Normal range of motion and neck supple.      Comments: Limited ROM of LLE due to tenderness    Skin:     General: Skin is warm and dry.   Neurological:      General: No focal deficit present.      Mental Status: She is alert. Mental status is at baseline. She is disoriented.   Psychiatric:         Mood and Affect: Mood normal.      Comments: Anxious          Fluids  No intake or output data in the 24 hours ending 10/29/23 1444    Laboratory  Recent Labs     10/28/23  0304   WBC 12.2*   RBC 4.38   HEMOGLOBIN 13.9   HEMATOCRIT 42.8   MCV 97.7   MCH 31.7   MCHC 32.5   RDW 50.6*   PLATELETCT 239   MPV 10.9     Recent Labs     10/28/23  0304   SODIUM 138   POTASSIUM 3.8   CHLORIDE 104   CO2 23   GLUCOSE 121*   BUN 23*   CREATININE 1.20   CALCIUM 9.3                   Imaging  CT-LSPINE W/O PLUS RECONS   Final Result         1. No acute injury identified.   2. Stable L1 and L2 compression fractures.   3. Stable degenerative changes.      CT-HEAD W/O   Final Result      No acute process.         DX-PELVIS-1 OR 2 VIEWS   Final Result      Positive for left hip fracture, as above.      DX-FEMUR-2+ LEFT   Final Result      Positive for left hip fracture, as above.      DX-CHEST-PORTABLE (1 VIEW)   Final Result      No acute process.      DX-PORTABLE FLUOROSCOPY < 1 HOUR    (Results Pending)    DX-HIP-UNILATERAL-W/O PELVIS-2/3 VIEWS LEFT    (Results Pending)        Assessment/Plan  * Left displaced femoral neck fracture (HCC)  Assessment & Plan  X-ray hip showing mildly comminuted, angulated, mildly displaced left femoral neck and intertrochanteric femur fracture.  Small butterfly fragment of the lesser trochanter medially.     S/p Open reduction internal fixation left intertrochanteric femur fracture with cephalomedullary implant 10/28  WBAT LLE  PT/OT rec SNF. SNF and PMR referred   Multimodal pain managements including po and iv narcotics prn. Monitoring respiratory status and sedation score      ACP (advance care planning)  Assessment & Plan  DNAR: I discussed code status with patient's son and daughter. Patient has history of dementia and does not have medical decision making capacity. Per patient's son and daughter in law, the patient wishes to be DNAR and under no circumstances would want life sustaining treatments in the event that he has a cardiac arrest. I also discussed intubation including temporary courses and patient does not wish to be placed on a ventilator under any circumstances, including for temporary and reversible causes.   I discussed advance care planning with the patient's family for 16 minutes, including diagnosis, prognosis, plan of care, risks and benefits of any therapies that could be offered, as well as alternatives including palliation and hospice, as appropriate.        Hyperlipidemia  Assessment & Plan  Continue crestor    Hypertension  Assessment & Plan  Continue home med losartan and coreg     Dementia without behavioral disturbance (HCC)- (present on admission)  Assessment & Plan  Hx of dementia. Baseline confusion  Minimize risk of delirium such as avoiding day time napping and promote night time sleep, monitor for constipation, remove lines/tubing that is not needed, avoid early lab draws and vital checks, limit polypharmacy as able, and keep close to the  window      Anxiety- (present on admission)  Assessment & Plan  Continue home med Lexapro         VTE prophylaxis:    enoxaparin ppx      I have performed a physical exam and reviewed and updated ROS and Plan today (10/29/2023). In review of yesterday's note (10/28/2023), there are no changes except as documented above.    Patient is has a high medical complexity, complex decision making and is at high risk for complication, morbidity, and mortality.    My total time spent caring for the patient on the day of the encounter was 69  minutes.   This does not include time spent on separately billable procedures/tests.

## 2023-10-29 NOTE — PROGRESS NOTES
"      Orthopaedic Progress Note    Interval changes:  Patient doing well post op  LLE dressings are CDI  Cleared for DC to SNF by ortho pending medicine clearance    ROS - Patient denies any new issues.  Pain well controlled.    BP (!) 150/84   Pulse 76   Temp 36.2 °C (97.2 °F) (Temporal)   Resp 17   Ht 1.626 m (5' 4.02\")   Wt 65 kg (143 lb 4.8 oz)   SpO2 95%     Patient seen and examined  No acute distress  Breathing non labored  RRR  LLE surgical dressings are clean, dry, and intact. Patient clearly fires tibialis anterior, EHL, and gastrocnemius/soleus. Sensation is intact to light touch throughout superficial peroneal, deep peroneal, tibial, saphenous, and sural nerve distributions. Strong and palpable 2+ dorsalis pedis and posterior tibial pulses with capillary refill less than 2 seconds. No lower leg tenderness or discomfort.     Recent Labs     10/28/23  0304   WBC 12.2*   RBC 4.38   HEMOGLOBIN 13.9   HEMATOCRIT 42.8   MCV 97.7   MCH 31.7   MCHC 32.5   RDW 50.6*   PLATELETCT 239   MPV 10.9       Active Hospital Problems    Diagnosis     Hyperlipidemia [E78.5]      Priority: Medium    Hypertension [I10]      Priority: Medium    Hip fracture requiring operative repair, right, closed, initial encounter ([facility]) [S72.001A]     Left displaced femoral neck fracture ([facility]) [S72.002A]     ACP (advance care planning) [Z71.89]        Assessment/Plan:  Patient doing well post op  LLE dressings are CDI  Cleared for DC to SNF by ortho pending medicine clearance  POD#1 S/P Open reduction internal fixation left intertrochanteric femur fracture with cephalomedullary implant   Wt bearing status - WBAT  Wound care/Drains - Dressings to be changed every other day by nursing. Or PRN for saturation starting POD#2  Future Procedures - none planned   Lovenox: Start 10/29, Duration-until ambulatory > 150'  Sutures/Staples out- 14-21 days post operatively. Removal will completed by ortho mid levels " only.  PT/OT-initiated  Antibiotics: Perioperative completed  DVT Prophylaxis- TEDS/SCDs/Foot pumps  Diaz-not needed per ortho  Case Coordination for Discharge Planning - Disposition per therapy recs.

## 2023-10-29 NOTE — THERAPY
"Occupational Therapy   Initial Evaluation     Patient Name: Linda Casas  Age:  89 y.o., Sex:  female  Medical Record #: [id number]  Today's Date: 10/29/2023     Precautions: Fall Risk, Weight Bearing As Tolerated Left Lower Extremity  Comments: s/p IMN; dementia    Assessment  Patient is 89 y.o. female admitted after GLF. PMHx: HTN, hyperlipidemia, osteopenia, anxiety, falls, Afib, ischemic cardiomyopathy, dementia, MDD, and CHF. Chart indicates pt primarily able to perform ADL's and has family assist for IADL's.   This admission pt is dx w/left displaced femoral neck fx s/p ORIF w/cephalomedullary implant, and has had increased confusion in this setting.     Plan  Occupational Therapy Initial Treatment Plan   Treatment Interventions: Self Care / Activities of Daily Living, Adaptive Equipment, Manual Therapy Techniques, Neuro Re-Education / Balance, Therapeutic Exercises, Therapeutic Activity  Treatment Frequency: 5 Times per Week  Duration: Until Therapy Goals Met    DC Equipment Recommendations: Unable to determine at this time  Discharge Recommendations: Recommend post-acute placement for additional occupational therapy services prior to discharge home     Subjective  \" I want to get up and walk\"      Objective     10/29/23 1143   Charge Group   OT Evaluation OT Evaluation Mod   Total Time Spent   OT Time Spent Yes   OT Evaluation (Minutes) 24   OT Total Time Spent (Calculated) 24   Initial Contact Note    Initial Contact Note Order Received and Verified, Occupational Therapy Evaluation in Progress with Full Report to Follow.   Prior Living Situation   Prior Services Continuous (24 Hour) Care Giving Family   Housing / Facility 1 Story House   Steps Into Home 0   Steps In Home 0   Bathroom Set up Walk In Shower   Equipment Owned Front-Wheel Walker   Lives with - Patient's Self Care Capacity Adult Children   Comments Patient lives with her son and her DIL Osiel, who provides assist as needed. "   Prior Level of ADL Function   Self Feeding Independent   Grooming / Hygiene Independent   Bathing Independent   Dressing Independent   Toileting Independent   Comments need to confirm pt is not a reliable hx   Prior Level of IADL Function   Medication Management Requires Assist   Laundry Requires Assist   Kitchen Mobility Requires Assist   Finances Requires Assist   Home Management Requires Assist   Shopping Requires Assist   Prior Level Of Mobility Supervision With Device in Home   History of Falls   History of Falls Yes   Date of Last Fall   (reason for admit)   Precautions   Precautions Fall Risk;Weight Bearing As Tolerated Left Lower Extremity   Comments s/p IMN; dementia   Pain 0 - 10 Group   Location Hip   Location Orientation Left   Therapist Pain Assessment During Activity;Nurse Notified;9   Cognition    Cognition / Consciousness X   Orientation Level Not Oriented to Place   Level of Consciousness Alert   New Learning Impaired   Attention Impaired   Sequencing Impaired   Comments hx of dementia more confused this admit but was cooperative during this session AOx2-3   Passive ROM Upper Body   Passive ROM Upper Body WDL   Active ROM Upper Body   Active ROM Upper Body  WDL   Strength Upper Body   Upper Body Strength  WDL   Coordination Upper Body   Coordination WDL   Balance Assessment   Sitting Balance (Static) Fair -   Sitting Balance (Dynamic) Fair -   Standing Balance (Static) Poor -   Standing Balance (Dynamic) Trace +   Weight Shift Sitting Poor   Weight Shift Standing Poor   Comments w/fww   Bed Mobility    Supine to Sit Maximal Assist   Sit to Supine Maximal Assist   Scooting Maximal Assist   ADL Assessment   Grooming Contact Guard Assist;Seated   Upper Body Dressing Minimal Assist   Lower Body Dressing Maximal Assist   Toileting Maximal Assist   How much help from another person does the patient currently need...   6 Clicks Daily Activity Score 14   Functional Mobility   Sit to Stand Moderate Assist    Mobility EOB sit>Stand x2 BTB   Activity Tolerance   Comments poor   Patient / Family Goals   Patient / Family Goal #1 to walk   Short Term Goals   Short Term Goal # 1 pt will complete LB dressing min A   Short Term Goal # 2 pt will complete txf to BSC w/min A   Short Term Goal # 3 pt will complete grooming seated w/set up   Education Group   Role of Occupational Therapist Patient Response Patient;Acceptance;Verbal Demonstration;Reinforcement Needed   Occupational Therapy Initial Treatment Plan    Treatment Interventions Self Care / Activities of Daily Living;Adaptive Equipment;Manual Therapy Techniques;Neuro Re-Education / Balance;Therapeutic Exercises;Therapeutic Activity   Treatment Frequency 5 Times per Week   Duration Until Therapy Goals Met   Problem List   Problem List Decreased Active Daily Living Skills;Decreased Functional Mobility;Decreased Activity Tolerance;Safety Awareness Deficits / Cognition;Impaired Postural Control / Balance;Limited Knowledge of Post Op Precautions;Impaired Cognitive Function   Anticipated Discharge Equipment and Recommendations   DC Equipment Recommendations Unable to determine at this time   Discharge Recommendations Recommend post-acute placement for additional occupational therapy services prior to discharge home   Interdisciplinary Plan of Care Collaboration   IDT Collaboration with  Nursing;Physical Therapist   Patient Position at End of Therapy In Bed;Call Light within Reach;Tray Table within Reach;Phone within Reach   Collaboration Comments RN aware OT eval and pts efforts   Session Information   Date / Session Number  10/29 #1 (1/5, 11/4)

## 2023-10-29 NOTE — THERAPY
"Physical Therapy   Initial Evaluation     Patient Name: Linda Casas  Age:  89 y.o., Sex:  female  Medical Record #: [id number]  Today's Date: 10/29/2023     Precautions  Precautions: Fall Risk;Weight Bearing As Tolerated Left Lower Extremity  Comments: s/p IMN    Assessment    89 y.o. female who presented 10/28/2023 with left hip pain.  Patient was walking today to the bathroom when she suddenly tripped and fell on her left hip.  Noticed immediate pain after and was unable to bear weight. X-ray hip showing mildly comminuted, angulated, mildly displaced left femoral neck and intertrochanteric femur fracture. She is now S/p Open reduction internal fixation left intertrochanteric femur fracture with cephalomedullary implant 10/28.  Patient presents to PT juvenalal with pain, impaired strength, balance, and cognition. She is limited by pain but per RN has been refusing pain meds.  She has baseline dementia and poor motor-planning.  Patient needs MaxA to get to EOB and able to stand x2 but unable to ambulate at this time. She will benefit from placement for further therapy at this time. Will continue to follow.     Plan    Physical Therapy Initial Treatment Plan   Treatment Plan : Bed Mobility, Equipment, Gait Training, Neuro Re-Education / Balance, Self Care / Home Evaluation, Stair Training, Therapeutic Activities, Therapeutic Exercise  Treatment Frequency: 5 Times per Week  Duration: Until Therapy Goals Met    DC Equipment Recommendations: Unable to determine at this time  Discharge Recommendations: Recommend post-acute placement for additional physical therapy services prior to discharge home       Subjective    \"Why are you here\"     Objective       10/29/23 1130   Precautions   Precautions Fall Risk;Weight Bearing As Tolerated Left Lower Extremity   Comments s/p IMN   Pain 0 - 10 Group   Location Hip   Location Orientation Left   Therapist Pain Assessment During Activity;6;Nurse Notified   Prior Living " Situation   Prior Services Continuous (24 Hour) Care Giving Family   Housing / Facility 1 Story House   Steps Into Home 0   Steps In Home 0   Equipment Owned Front-Wheel Walker;Single Point Cane   Lives with - Patient's Self Care Capacity Adult Children   Comments Patient lives with her son and her DIL Osiel, who provides assist as needed.   Prior Level of Functional Mobility   Bed Mobility Independent   Transfer Status Independent   Ambulation Independent   Ambulation Distance household   Assistive Devices Used None   Comments Renuka is supposed to use her FWW, but she often doesnt. She has a friend who comes to assist with bathing and dressing.   History of Falls   History of Falls Yes   Date of Last Fall   (reason for admit)   Cognition    Cognition / Consciousness X   Orientation Level Not Oriented to Place   Level of Consciousness Alert   New Learning Impaired   Attention Impaired   Sequencing Impaired   Comments Patient with baseline dementia, poor insight and has been refusing pain meds   Active ROM Upper Body   Active ROM Upper Body  WDL   Strength Upper Body   Upper Body Strength  WDL   Sensation Upper Body   Upper Extremity Sensation  WDL   Active ROM Lower Body    Active ROM Lower Body  X   Comments L LE limited by pain   Strength Lower Body   Lower Body Strength  X   Comments L LE limited by pain, grossly 3-/5   Sensation Lower Body   Lower Extremity Sensation   WDL   Other Treatments   Other Treatments Provided Educated about DC recs, provided ice for hip and educated RN about mobility expectations s/p ortho surgery   Balance Assessment   Sitting Balance (Static) Fair -   Sitting Balance (Dynamic) Fair -   Standing Balance (Static) Poor -   Standing Balance (Dynamic) Trace +   Weight Shift Sitting Poor   Weight Shift Standing Poor   Comments w/FWW   Bed Mobility    Supine to Sit Maximal Assist   Sit to Supine Maximal Assist   Scooting Maximal Assist   Gait Analysis   Gait Level Of Assist Moderate  Assist   Assistive Device Front Wheel Walker   Distance (Feet) 1   # of Times Distance was Traveled 1   Deviation Antalgic;Bradykinetic;Shuffled Gait   Weight Bearing Status WBAT L LE   Comments able to take shuffle steps at EOB with poor endurance and minimal weight acceptance on L LE   Functional Mobility   Sit to Stand Moderate Assist   Bed, Chair, Wheelchair Transfer Unable to Participate   Mobility EOB and STSx2   How much difficulty does the patient currently have...   Turning over in bed (including adjusting bedclothes, sheets and blankets)? 1   Sitting down on and standing up from a chair with arms (e.g., wheelchair, bedside commode, etc.) 1   Moving from lying on back to sitting on the side of the bed? 1   How much help from another person does the patient currently need...   Moving to and from a bed to a chair (including a wheelchair)? 2   Need to walk in a hospital room? 2   Climbing 3-5 steps with a railing? 1   6 clicks Mobility Score 8   Activity Tolerance   Sitting Edge of Bed 12 min   Standing 30 sec   Edema / Skin Assessment   Comments L hip dressing C/D/I   Patient / Family Goals    Patient / Family Goal #1 to walk   Short Term Goals    Short Term Goal # 1 in 6 visits patient will demo all functional transfer with sup and LRAD for safe DC   Short Term Goal # 2 in 6 visits patient will ambualte 100' with Rober and LRAD for safe DC   Short Term Goal # 3 in 6 visits patient will demo all bed mobility indep for safe DC   Education Group   Education Provided Role of Physical Therapist;Use of Assistive Device   Role of Physical Therapist Patient Response Patient;Acceptance;Explanation;Demonstration;Verbal Demonstration;Action Demonstration   Use of Assistive Device Patient Response Patient;Acceptance;Demonstration;Explanation;Verbal Demonstration;Action Demonstration   Physical Therapy Initial Treatment Plan    Treatment Plan  Bed Mobility;Equipment;Gait Training;Neuro Re-Education / Balance;Self Care /  Home Evaluation;Stair Training;Therapeutic Activities;Therapeutic Exercise   Treatment Frequency 5 Times per Week   Duration Until Therapy Goals Met   Problem List    Problems Pain;Impaired Bed Mobility;Impaired Transfers;Impaired Ambulation;Functional Strength Deficit;Impaired Balance;Impaired Coordination;Safety Awareness Deficits / Cognition;Limited Knowledge of Post-Op Precautions;Decreased Activity Tolerance;Functional ROM Deficit   Anticipated Discharge Equipment and Recommendations   DC Equipment Recommendations Unable to determine at this time   Discharge Recommendations Recommend post-acute placement for additional physical therapy services prior to discharge home     Bernice Porter, PT, DPT, GCS

## 2023-10-30 ENCOUNTER — HOSPITAL ENCOUNTER (INPATIENT)
Facility: REHABILITATION | Age: 88
LOS: 11 days | DRG: 560 | End: 2023-11-10
Attending: PHYSICAL MEDICINE & REHABILITATION | Admitting: PHYSICAL MEDICINE & REHABILITATION
Payer: MEDICARE

## 2023-10-30 VITALS
BODY MASS INDEX: 24.46 KG/M2 | RESPIRATION RATE: 20 BRPM | TEMPERATURE: 98.2 F | DIASTOLIC BLOOD PRESSURE: 73 MMHG | WEIGHT: 143.3 LBS | OXYGEN SATURATION: 96 % | HEART RATE: 82 BPM | SYSTOLIC BLOOD PRESSURE: 127 MMHG | HEIGHT: 64 IN

## 2023-10-30 DIAGNOSIS — E78.5 DYSLIPIDEMIA: ICD-10-CM

## 2023-10-30 DIAGNOSIS — S72.002A CLOSED LEFT HIP FRACTURE, INITIAL ENCOUNTER (HCC): ICD-10-CM

## 2023-10-30 DIAGNOSIS — I10 HYPERTENSION, ESSENTIAL: ICD-10-CM

## 2023-10-30 DIAGNOSIS — F03.B3 MODERATE DEMENTIA WITH MOOD DISTURBANCE, UNSPECIFIED DEMENTIA TYPE (HCC): ICD-10-CM

## 2023-10-30 DIAGNOSIS — I50.22 HYPERTENSIVE HEART DISEASE WITH CHRONIC SYSTOLIC CONGESTIVE HEART FAILURE (HCC): ICD-10-CM

## 2023-10-30 DIAGNOSIS — B02.9 HERPES ZOSTER WITHOUT COMPLICATION: ICD-10-CM

## 2023-10-30 DIAGNOSIS — I11.0 HYPERTENSIVE HEART DISEASE WITH CHRONIC SYSTOLIC CONGESTIVE HEART FAILURE (HCC): ICD-10-CM

## 2023-10-30 DIAGNOSIS — F32.4 MAJOR DEPRESSIVE DISORDER WITH SINGLE EPISODE, IN PARTIAL REMISSION (HCC): ICD-10-CM

## 2023-10-30 LAB
ANION GAP SERPL CALC-SCNC: 9 MMOL/L (ref 7–16)
BUN SERPL-MCNC: 23 MG/DL (ref 8–22)
CALCIUM SERPL-MCNC: 8.7 MG/DL (ref 8.5–10.5)
CHLORIDE SERPL-SCNC: 104 MMOL/L (ref 96–112)
CO2 SERPL-SCNC: 24 MMOL/L (ref 20–33)
CREAT SERPL-MCNC: 1 MG/DL (ref 0.5–1.4)
ERYTHROCYTE [DISTWIDTH] IN BLOOD BY AUTOMATED COUNT: 49.4 FL (ref 35.9–50)
GFR SERPLBLD CREATININE-BSD FMLA CKD-EPI: 54 ML/MIN/1.73 M 2
GLUCOSE SERPL-MCNC: 118 MG/DL (ref 65–99)
HCT VFR BLD AUTO: 31.9 % (ref 37–47)
HGB BLD-MCNC: 10.3 G/DL (ref 12–16)
MAGNESIUM SERPL-MCNC: 2 MG/DL (ref 1.5–2.5)
MCH RBC QN AUTO: 31.4 PG (ref 27–33)
MCHC RBC AUTO-ENTMCNC: 32.3 G/DL (ref 32.2–35.5)
MCV RBC AUTO: 97.3 FL (ref 81.4–97.8)
PHOSPHATE SERPL-MCNC: 2.3 MG/DL (ref 2.5–4.5)
PLATELET # BLD AUTO: 195 K/UL (ref 164–446)
PMV BLD AUTO: 10.8 FL (ref 9–12.9)
POTASSIUM SERPL-SCNC: 3.9 MMOL/L (ref 3.6–5.5)
RBC # BLD AUTO: 3.28 M/UL (ref 4.2–5.4)
SODIUM SERPL-SCNC: 137 MMOL/L (ref 135–145)
WBC # BLD AUTO: 13.2 K/UL (ref 4.8–10.8)

## 2023-10-30 PROCEDURE — 83735 ASSAY OF MAGNESIUM: CPT

## 2023-10-30 PROCEDURE — 770010 HCHG ROOM/CARE - REHAB SEMI PRIVAT*

## 2023-10-30 PROCEDURE — 36415 COLL VENOUS BLD VENIPUNCTURE: CPT

## 2023-10-30 PROCEDURE — 99239 HOSP IP/OBS DSCHRG MGMT >30: CPT | Performed by: STUDENT IN AN ORGANIZED HEALTH CARE EDUCATION/TRAINING PROGRAM

## 2023-10-30 PROCEDURE — 700102 HCHG RX REV CODE 250 W/ 637 OVERRIDE(OP): Performed by: STUDENT IN AN ORGANIZED HEALTH CARE EDUCATION/TRAINING PROGRAM

## 2023-10-30 PROCEDURE — 80048 BASIC METABOLIC PNL TOTAL CA: CPT

## 2023-10-30 PROCEDURE — 84100 ASSAY OF PHOSPHORUS: CPT

## 2023-10-30 PROCEDURE — 85027 COMPLETE CBC AUTOMATED: CPT

## 2023-10-30 PROCEDURE — A9270 NON-COVERED ITEM OR SERVICE: HCPCS | Performed by: STUDENT IN AN ORGANIZED HEALTH CARE EDUCATION/TRAINING PROGRAM

## 2023-10-30 PROCEDURE — A9270 NON-COVERED ITEM OR SERVICE: HCPCS | Performed by: PHYSICAL MEDICINE & REHABILITATION

## 2023-10-30 PROCEDURE — 700111 HCHG RX REV CODE 636 W/ 250 OVERRIDE (IP): Mod: JZ | Performed by: PHYSICAL MEDICINE & REHABILITATION

## 2023-10-30 PROCEDURE — 94760 N-INVAS EAR/PLS OXIMETRY 1: CPT

## 2023-10-30 PROCEDURE — 99223 1ST HOSP IP/OBS HIGH 75: CPT | Performed by: PHYSICAL MEDICINE & REHABILITATION

## 2023-10-30 PROCEDURE — 700102 HCHG RX REV CODE 250 W/ 637 OVERRIDE(OP): Performed by: PHYSICAL MEDICINE & REHABILITATION

## 2023-10-30 RX ORDER — ALUMINA, MAGNESIA, AND SIMETHICONE 2400; 2400; 240 MG/30ML; MG/30ML; MG/30ML
20 SUSPENSION ORAL
Status: DISCONTINUED | OUTPATIENT
Start: 2023-10-30 | End: 2023-11-10 | Stop reason: HOSPADM

## 2023-10-30 RX ORDER — HYDRALAZINE HYDROCHLORIDE 25 MG/1
25 TABLET, FILM COATED ORAL EVERY 8 HOURS PRN
Status: DISCONTINUED | OUTPATIENT
Start: 2023-10-30 | End: 2023-11-10 | Stop reason: HOSPADM

## 2023-10-30 RX ORDER — ACETAMINOPHEN 325 MG/1
650 TABLET ORAL EVERY 6 HOURS PRN
Status: CANCELLED | OUTPATIENT
Start: 2023-10-30

## 2023-10-30 RX ORDER — QUETIAPINE FUMARATE 25 MG/1
25 TABLET, FILM COATED ORAL NIGHTLY
Status: DISCONTINUED | OUTPATIENT
Start: 2023-10-30 | End: 2023-11-10 | Stop reason: HOSPADM

## 2023-10-30 RX ORDER — ROSUVASTATIN CALCIUM 10 MG/1
10 TABLET, COATED ORAL EVERY EVENING
Status: DISCONTINUED | OUTPATIENT
Start: 2023-10-30 | End: 2023-11-10 | Stop reason: HOSPADM

## 2023-10-30 RX ORDER — ONDANSETRON 2 MG/ML
4 INJECTION INTRAMUSCULAR; INTRAVENOUS 4 TIMES DAILY PRN
Status: DISCONTINUED | OUTPATIENT
Start: 2023-10-30 | End: 2023-11-10 | Stop reason: HOSPADM

## 2023-10-30 RX ORDER — LOSARTAN POTASSIUM 50 MG/1
50 TABLET ORAL EVERY EVENING
Status: CANCELLED | OUTPATIENT
Start: 2023-10-30

## 2023-10-30 RX ORDER — ENOXAPARIN SODIUM 100 MG/ML
40 INJECTION SUBCUTANEOUS DAILY
Status: DISCONTINUED | OUTPATIENT
Start: 2023-10-30 | End: 2023-11-10 | Stop reason: HOSPADM

## 2023-10-30 RX ORDER — ACETAMINOPHEN 325 MG/1
650 TABLET ORAL EVERY 6 HOURS PRN
Status: DISCONTINUED | OUTPATIENT
Start: 2023-10-30 | End: 2023-11-10 | Stop reason: HOSPADM

## 2023-10-30 RX ORDER — TRAZODONE HYDROCHLORIDE 50 MG/1
50 TABLET ORAL
Status: DISCONTINUED | OUTPATIENT
Start: 2023-10-30 | End: 2023-11-10 | Stop reason: HOSPADM

## 2023-10-30 RX ORDER — ROSUVASTATIN CALCIUM 10 MG/1
10 TABLET, COATED ORAL EVERY EVENING
Status: CANCELLED | OUTPATIENT
Start: 2023-10-30

## 2023-10-30 RX ORDER — CARVEDILOL 3.12 MG/1
3.12 TABLET ORAL 2 TIMES DAILY
Status: CANCELLED | OUTPATIENT
Start: 2023-10-30

## 2023-10-30 RX ORDER — LOSARTAN POTASSIUM 50 MG/1
50 TABLET ORAL EVERY EVENING
Status: DISCONTINUED | OUTPATIENT
Start: 2023-10-30 | End: 2023-11-10 | Stop reason: HOSPADM

## 2023-10-30 RX ORDER — ESCITALOPRAM OXALATE 10 MG/1
20 TABLET ORAL DAILY
Status: CANCELLED | OUTPATIENT
Start: 2023-10-31

## 2023-10-30 RX ORDER — AMOXICILLIN 250 MG
2 CAPSULE ORAL 2 TIMES DAILY
Status: DISCONTINUED | OUTPATIENT
Start: 2023-10-30 | End: 2023-11-10 | Stop reason: HOSPADM

## 2023-10-30 RX ORDER — OXYCODONE HYDROCHLORIDE 5 MG/1
2.5 TABLET ORAL
Status: CANCELLED | OUTPATIENT
Start: 2023-10-30

## 2023-10-30 RX ORDER — ENOXAPARIN SODIUM 100 MG/ML
40 INJECTION SUBCUTANEOUS DAILY
Status: CANCELLED | OUTPATIENT
Start: 2023-10-30

## 2023-10-30 RX ORDER — OXYCODONE HYDROCHLORIDE 5 MG/1
5 TABLET ORAL
Status: DISCONTINUED | OUTPATIENT
Start: 2023-10-30 | End: 2023-11-09

## 2023-10-30 RX ORDER — ESCITALOPRAM OXALATE 10 MG/1
20 TABLET ORAL DAILY
Status: DISCONTINUED | OUTPATIENT
Start: 2023-10-31 | End: 2023-11-10 | Stop reason: HOSPADM

## 2023-10-30 RX ORDER — OMEPRAZOLE 20 MG/1
20 CAPSULE, DELAYED RELEASE ORAL DAILY
Status: DISCONTINUED | OUTPATIENT
Start: 2023-10-30 | End: 2023-11-10 | Stop reason: HOSPADM

## 2023-10-30 RX ORDER — OXYCODONE HYDROCHLORIDE 5 MG/1
5 TABLET ORAL
Status: CANCELLED | OUTPATIENT
Start: 2023-10-30

## 2023-10-30 RX ORDER — CARVEDILOL 3.12 MG/1
3.12 TABLET ORAL 2 TIMES DAILY
Status: DISCONTINUED | OUTPATIENT
Start: 2023-10-30 | End: 2023-11-10 | Stop reason: HOSPADM

## 2023-10-30 RX ORDER — BISACODYL 10 MG
10 SUPPOSITORY, RECTAL RECTAL
Status: DISCONTINUED | OUTPATIENT
Start: 2023-10-30 | End: 2023-11-10 | Stop reason: HOSPADM

## 2023-10-30 RX ORDER — OXYCODONE HYDROCHLORIDE 5 MG/1
2.5 TABLET ORAL
Status: DISCONTINUED | OUTPATIENT
Start: 2023-10-30 | End: 2023-11-09

## 2023-10-30 RX ORDER — POLYETHYLENE GLYCOL 3350 17 G/17G
1 POWDER, FOR SOLUTION ORAL
Status: CANCELLED | OUTPATIENT
Start: 2023-10-30

## 2023-10-30 RX ORDER — QUETIAPINE FUMARATE 25 MG/1
25 TABLET, FILM COATED ORAL NIGHTLY
Status: CANCELLED | OUTPATIENT
Start: 2023-10-30

## 2023-10-30 RX ORDER — CARBOXYMETHYLCELLULOSE SODIUM 5 MG/ML
1 SOLUTION/ DROPS OPHTHALMIC PRN
Status: DISCONTINUED | OUTPATIENT
Start: 2023-10-30 | End: 2023-11-10 | Stop reason: HOSPADM

## 2023-10-30 RX ORDER — ECHINACEA PURPUREA EXTRACT 125 MG
2 TABLET ORAL PRN
Status: DISCONTINUED | OUTPATIENT
Start: 2023-10-30 | End: 2023-11-10 | Stop reason: HOSPADM

## 2023-10-30 RX ORDER — BISACODYL 10 MG
10 SUPPOSITORY, RECTAL RECTAL
Status: CANCELLED | OUTPATIENT
Start: 2023-10-30

## 2023-10-30 RX ORDER — POLYETHYLENE GLYCOL 3350 17 G/17G
1 POWDER, FOR SOLUTION ORAL
Status: DISCONTINUED | OUTPATIENT
Start: 2023-10-30 | End: 2023-11-10 | Stop reason: HOSPADM

## 2023-10-30 RX ORDER — ONDANSETRON 4 MG/1
4 TABLET, ORALLY DISINTEGRATING ORAL 4 TIMES DAILY PRN
Status: DISCONTINUED | OUTPATIENT
Start: 2023-10-30 | End: 2023-11-10 | Stop reason: HOSPADM

## 2023-10-30 RX ORDER — AMOXICILLIN 250 MG
2 CAPSULE ORAL 2 TIMES DAILY
Status: CANCELLED | OUTPATIENT
Start: 2023-10-30

## 2023-10-30 RX ADMIN — CARVEDILOL 3.12 MG: 3.12 TABLET, FILM COATED ORAL at 06:13

## 2023-10-30 RX ADMIN — ESCITALOPRAM OXALATE 20 MG: 10 TABLET ORAL at 06:14

## 2023-10-30 RX ADMIN — CARVEDILOL 3.12 MG: 3.12 TABLET, FILM COATED ORAL at 21:20

## 2023-10-30 RX ADMIN — ENOXAPARIN SODIUM 40 MG: 100 INJECTION SUBCUTANEOUS at 17:35

## 2023-10-30 ASSESSMENT — PAIN SCALES - PAIN ASSESSMENT IN ADVANCED DEMENTIA (PAINAD)
TOTALSCORE: 0
FACIALEXPRESSION: SMILING OR INEXPRESSIVE
BREATHING: NORMAL
FACIALEXPRESSION: SMILING OR INEXPRESSIVE
BODYLANGUAGE: RELAXED
BREATHING: NORMAL
CONSOLABILITY: NO NEED TO CONSOLE
TOTALSCORE: 0
CONSOLABILITY: NO NEED TO CONSOLE
BODYLANGUAGE: RELAXED

## 2023-10-30 ASSESSMENT — LIFESTYLE VARIABLES
EVER HAD A DRINK FIRST THING IN THE MORNING TO STEADY YOUR NERVES TO GET RID OF A HANGOVER: NO
HAVE PEOPLE ANNOYED YOU BY CRITICIZING YOUR DRINKING: NO
TOTAL SCORE: 0
ON A TYPICAL DAY WHEN YOU DRINK ALCOHOL HOW MANY DRINKS DO YOU HAVE: 0
ALCOHOL_USE: NO
HAVE YOU EVER FELT YOU SHOULD CUT DOWN ON YOUR DRINKING: NO
AVERAGE NUMBER OF DAYS PER WEEK YOU HAVE A DRINK CONTAINING ALCOHOL: 0
TOTAL SCORE: 0
HOW MANY TIMES IN THE PAST YEAR HAVE YOU HAD 5 OR MORE DRINKS IN A DAY: 0
EVER FELT BAD OR GUILTY ABOUT YOUR DRINKING: NO
TOTAL SCORE: 0
CONSUMPTION TOTAL: NEGATIVE

## 2023-10-30 ASSESSMENT — PATIENT HEALTH QUESTIONNAIRE - PHQ9
1. LITTLE INTEREST OR PLEASURE IN DOING THINGS: NOT AT ALL
SUM OF ALL RESPONSES TO PHQ9 QUESTIONS 1 AND 2: 0
1. LITTLE INTEREST OR PLEASURE IN DOING THINGS: NOT AT ALL
SUM OF ALL RESPONSES TO PHQ9 QUESTIONS 1 AND 2: 0
1. LITTLE INTEREST OR PLEASURE IN DOING THINGS: NOT AT ALL
2. FEELING DOWN, DEPRESSED, IRRITABLE, OR HOPELESS: NOT AT ALL
2. FEELING DOWN, DEPRESSED, IRRITABLE, OR HOPELESS: NOT AT ALL
SUM OF ALL RESPONSES TO PHQ9 QUESTIONS 1 AND 2: 0

## 2023-10-30 ASSESSMENT — FIBROSIS 4 INDEX: FIB4 SCORE: 2.9

## 2023-10-30 ASSESSMENT — PAIN DESCRIPTION - PAIN TYPE
TYPE: ACUTE PAIN

## 2023-10-30 NOTE — PROGRESS NOTES
"      Orthopaedic Progress Note    Interval changes:  Patient doing well    LLE dressings are CDI  Cleared for DC to SNF by ortho pending medicine clearance    ROS - Patient denies any new issues.  Pain well controlled.    /73   Pulse 82   Temp 36.8 °C (98.2 °F) (Temporal)   Resp 20   Ht 1.626 m (5' 4.02\")   Wt 65 kg (143 lb 4.8 oz)   SpO2 96%     Patient seen and examined  No acute distress  Breathing non labored  RRR  LLE surgical dressings are clean, dry, and intact. Patient clearly fires tibialis anterior, EHL, and gastrocnemius/soleus. Sensation is intact to light touch throughout superficial peroneal, deep peroneal, tibial, saphenous, and sural nerve distributions. Strong and palpable 2+ dorsalis pedis and posterior tibial pulses with capillary refill less than 2 seconds. No lower leg tenderness or discomfort.     Recent Labs     10/28/23  0304 10/30/23  0106   WBC 12.2* 13.2*   RBC 4.38 3.28*   HEMOGLOBIN 13.9 10.3*   HEMATOCRIT 42.8 31.9*   MCV 97.7 97.3   MCH 31.7 31.4   MCHC 32.5 32.3   RDW 50.6* 49.4   PLATELETCT 239 195   MPV 10.9 10.8       Active Hospital Problems    Diagnosis     Hyperlipidemia [E78.5]      Priority: Medium    Hypertension [I10]      Priority: Medium    Dementia without behavioral disturbance ([facility]CC) [F03.90]      Priority: Low    Anxiety [F41.9]      Priority: Low    Hip fracture requiring operative repair, right, closed, initial encounter ([facility]) [S72.001A]     Left displaced femoral neck fracture ([facility]) [S72.002A]     ACP (advance care planning) [Z71.89]        Assessment/Plan:  Patient doing well    LLE dressings are CDI  Cleared for DC to SNF by ortho pending medicine clearance  POD#2 S/P Open reduction internal fixation left intertrochanteric femur fracture with cephalomedullary implant   Wt bearing status - WBAT  Wound care/Drains - Dressings to be changed every other day by nursing. Or PRN for saturation starting POD#2  Future Procedures - none planned   Lovenox: Start " 10/29, Duration-until ambulatory > 150'  Sutures/Staples out- 14-21 days post operatively. Removal will completed by ortho mid levels only.  PT/OT-initiated  Antibiotics: Perioperative completed  DVT Prophylaxis- TEDS/SCDs/Foot pumps  Diaz-not needed per ortho  Case Coordination for Discharge Planning - Disposition per therapy recs.     No

## 2023-10-30 NOTE — DISCHARGE PLANNING
HTH/SCP TCN chart review completed. Collaborated with DOLLY Benton prior to meeting with the pt. The most current review of medical record, knowledge of pt's PLOF and social support, LACE+ score of 69, 6 clicks scores of 14 ADL's and 8 mobility were considered.      Pt seen at bedside. Introduced TCN program. Provided education regarding post acute levels of care. Discussed HTH/SCP plan benefits (Meds to Beds, medical uber and [facility] transitional care). Pt verbalizes understanding.     Patient lives with her son and daughter-in-law (POA) Mary and was supervised/Modified independent with ADL's and mobility (no AD).  Family does all IADL's and supervises medication management.  She denies any concerns with food, housing or transportation.  Mary states patient is not at her baseline level of function.  She has a shower chair, grab bars, HH shower head, 4WW, and a SPC.  She did not use an AD at her baseline.      Choice proactively obtained for IRF & SNF, ( 1. IRF ([facility]), 2. [facility], 3. [facility]),  faxed to DPA and given to DOLLY.  Choice obtained from (POA) Mary Hopkins (daughter-in-law) at [phone].  TCN will continue to follow and collaborate with discharge planning team as additional post acute needs arise. Thank you.     Completed today:  PT recommends post-acute placement for 10/29/23 for additional physical therapy services prior to discharge home   OT with recommendations for post-acute placement on 10/29/23 for additional occupational therapy services prior to discharge home   Choice obtained: SNF & IRF ( 1. [facility]F ([facility] first choice, 2. [facility] second choice, 3) [facility] is 3rd choice.   SCP with [facility] PCP.

## 2023-10-30 NOTE — PROGRESS NOTES
1554 Pt arrived at [facility] from [facility] S5 via GMT. Dr. Samayoa to follow for diagnosis of closed left hip fx. Initial assessment initiated. Pt oriented to room and facility routine and safety measures; pt education binder provided and discussed. Pt A/O x 2-3, Continent/incontinent of bowel and bladder. Mod/max assist for transfers. All wounds photographed and documented; photos uploaded to . Pt reports  pain and discomfort with mobility, will medicate as ordered. Pt positioned for comfort in bed. Call light within reach, safety measures in place. Will continue to monitor.

## 2023-10-30 NOTE — DISCHARGE PLANNING
"[facility] Transitional Care Coordination    Referral from:  Dr. Cesar Kraus  Insurance Provider on Facesheet:  [facility]  Potential Rehab diagnosis:    Chart review indicates patient has ongoing medical management and therapy needs to possibly meet inpatient rehab facility criteria with the goal of returning to community.      D/C Support:    Physiatry to consult per protocol.  GLF - L hip fracture.  POD 2 surgical fixation left intertrochanteric femur fracture.       Last Covid test:    Thank you for the referral.          1026 - Telephone call to patients son Efren Shen to discuss IRF referral, clarify discharge support.  Efren requested TCC speak with his spouse Mary.  Explained specifics of inpatient rehab, to Mary, answered questions/concerns.  Discussed comprehensive medical management [facility] by Physiatry and Hospitalist Physician team.  Discussed nursing care and nurse to patient ratio.  Discussed plan for 3hrs therapy, 5 days per week.  Discussed PT/OT/SLP roles.  Discussed [facility] CM team to assist through discharge process.  Mary stated she and Efren will provide return to community support - SSH, 0 ELIANA.  Mary works 3 days/week - 4a-12noon.  Mary states during that time, Efren will assist as needed.  Mary tells me prior to admit, she provided SBA while Dianne bathed, assist patient drying off.  Dianne was able to dress herself, ambulate independently within the home.  Mary is hopeful patient will regain prior level of function.  Discussed Reviewed [facility] insurance to included anticipated copay for IRF admission, prior auth required for admission.  Provided TCC contact information.     1124  - Return call to Mary to confirm she/Efren able to provide 24/7 support/supervision to include physical assistance.  Mary stated \"yes\", she is hopeful for rehab admission, aware TCC to seek insurance auth from [facility].      Volate message to TCN team requesting consideration for IRF " level care.  Will follow for auth determination.

## 2023-10-30 NOTE — DISCHARGE SUMMARY
Discharge Summary    CHIEF COMPLAINT ON ADMISSION  Chief Complaint   Patient presents with    T-5000 GLF     Brought by EMS from home complaining of GLF - pt is trying to go to the bathroom but she trip down - she is supposed to use a walker but she is not using it   Pt is complaining of Left hip/ lower back pain  Denied hitting her head  Pt is using Aspirin    Alert and Oriented x 2 (Baseline?)    Given Fetanyl 50 mcg IV/ Zofran 4 mg IV en route  IV gauge 20 left FA       Reason for Admission  Hip fracture requiring operative r*    Admission Date  10/28/2023     CODE STATUS  DNAR/DNI    HPI & HOSPITAL COURSE  This is a 89 y.o. female with history of dementia confusion at baseline, who presented 10/28/2023 with left hip pain.  Patient was walking today to the bathroom when she suddenly tripped and fell on her left hip.  Noticed immediate pain after and was unable to bear weight.      X-ray hip showing mildly comminuted, angulated, mildly displaced left femoral neck and intertrochanteric femur fracture.  Small butterfly fragment of the lesser trochanter medially.     Orthopedics was consulted. She underwent Open reduction internal fixation left intertrochanteric femur fracture with cephalomedullary implant 10/28. Orthopedics recommends WBAT LLE and outpatient orthopedics follow-up. Patient was evaluated by PT and/OT who recommended postacute placement.      She is continued with home medications including losartan, Coreg, and Crestor for hypertension and hyperlipidemia.    Goal of care has been discussed with the patient's family, who confirmed DNR/DNI.     Therefore, she is discharged in fair and stable condition to an inpatient rehabilitation hospital.    The patient met 2-midnight criteria for an inpatient stay at the time of discharge.      FOLLOW UP ITEMS POST DISCHARGE  PCP  Orthopedics     DISCHARGE DIAGNOSES  Principal Problem:    Left displaced femoral neck fracture (HCC) (POA: Unknown)  Active Problems:     "Anxiety (Chronic) (POA: Yes)    Dementia without behavioral disturbance (HCC) (POA: Yes)    Hypertension (POA: Unknown)    Hyperlipidemia (POA: Unknown)    Hip fracture requiring operative repair, right, closed, initial encounter (HCC) (POA: Yes)    ACP (advance care planning) (POA: Unknown)  Resolved Problems:    * No resolved hospital problems. *      FOLLOW UP  Future Appointments   Date Time Provider Department Center   12/5/2023  8:20 AM Lakeshia Kuo M.D. [address]     Lakeshia Kuo M.D.  [address]  [facility] [address]  [place] [address]  [phone]    Follow up in 1 week(s)      Richard Mckenzie M.D.  [address] [facility] [id number] [phone]    Schedule an appointment as soon as possible for a visit in 4 week(s)        MEDICATIONS ON DISCHARGE     Medication List        START taking these medications        Instructions   phosphorus 250 MG tablet  Commonly known as: K-Phos-Neutral   Take 1 Tablet by mouth 2 times a day for 4 days.  Dose: 1 Tablet            CONTINUE taking these medications        Instructions   carvedilol 3.125 MG Tabs  Commonly known as: Coreg   Take 1 Tablet by mouth 2 times a day.  Dose: 3.125 mg     escitalopram 20 MG tablet  Commonly known as: Lexapro   TAKE 1 TABLET BY MOUTH EVERY DAY.  Dose: 20 mg     GoodSense Aspirin Low Dose 81 MG EC tablet  Generic drug: aspirin   TAKE 1 TABLET BY MOUTH EVERY DAY.     losartan 50 MG Tabs  Commonly known as: Cozaar   Take 1 Tablet by mouth every evening.  Dose: 50 mg     QUEtiapine 25 MG Tabs  Commonly known as: SEROquel   Take 25 mg at bet time     rosuvastatin 10 MG Tabs  Commonly known as: Crestor   Take 1 Tablet by mouth every evening.  Dose: 10 mg              Allergies  Allergies   Allergen Reactions    Ampicillin Hives, Rash and Swelling     Feels \"rotten\"     Bactrim Ds Hives, Itching and Swelling     Mouth, lip, tongue, eye swelling, pain, itching    Ciprofloxacin Hives, Rash and Swelling     . "       DIET  Orders Placed This Encounter   Procedures    Diet Order Diet: Regular     Standing Status:   Standing     Number of Occurrences:   1     Order Specific Question:   Diet:     Answer:   Regular [1]       ACTIVITY  As tolerated.  Weight bearing as tolerated    LINES, DRAINS, AND WOUNDS  This is an automated list. Peripheral IVs will be removed prior to discharge.  Peripheral IV 10/28/23 20 G Left Forearm (Active)   Site Assessment Intact;Dry 10/30/23 0800   Dressing Type Transparent 10/30/23 0800   Line Status Saline locked 10/30/23 0800   Dressing Status Clean;Dry;Intact 10/30/23 0800   Dressing Intervention N/A 10/30/23 0800   Dressing Change Due 11/04/23 10/30/23 0800   Date Primary Tubing Changed 10/28/23 10/28/23 2000   Date Secondary Tubing Changed 10/28/23 10/28/23 2000   NEXT Primary Tubing Change  11/04/23 10/28/23 2000   NEXT Secondary Tubing Change  10/29/23 10/28/23 2000   [facility] 0 10/30/23 0800   Phlebitis Scale ([facility] Only) 0 10/30/23 0800       Wound 06/15/23 Back Bilateral 2 dressing sites R and L of spine (Active)       Wound 06/15/23 Incision Back steri strips, 2x2s, tegaderm (Active)       Wound 10/28/23 Incision Hip Left xeroform, 4x4, tegaderm (Active)   Site Assessment DARYN 10/30/23 0800   Periwound Assessment DARYN 10/29/23 0719   Margins DARYN 10/28/23 2000   Closure None 10/28/23 1138   Dressing Status Clean;Dry;Intact 10/29/23 0719   Dressing Changed Observed 10/28/23 2000   Dressing Options Dry Gauze;Transparent Film 10/29/23 0719       Peripheral IV 10/28/23 20 G Left Forearm (Active)   Site Assessment Intact;Dry 10/30/23 0800   Dressing Type Transparent 10/30/23 0800   Line Status Saline locked 10/30/23 0800   Dressing Status Clean;Dry;Intact 10/30/23 0800   Dressing Intervention N/A 10/30/23 0800   Dressing Change Due 11/04/23 10/30/23 0800   Date Primary Tubing Changed 10/28/23 10/28/23 2000   Date Secondary Tubing Changed 10/28/23 10/28/23 2000   NEXT  Primary Tubing Change  11/04/23 10/28/23 2000   NEXT Secondary Tubing Change  10/29/23 10/28/23 2000   Infiltration Grading ([facility], [facility] [place] [place]) 0 10/30/23 0800   Phlebitis Scale ([facility] Only) 0 10/30/23 0800               MENTAL STATUS ON TRANSFER             CONSULTATIONS  orthopedics    PROCEDURES  S/p Open reduction internal fixation left intertrochanteric femur fracture with cephalomedullary implant 10/28    LABORATORY  Lab Results   Component Value Date    SODIUM 137 10/30/2023    POTASSIUM 3.9 10/30/2023    CHLORIDE 104 10/30/2023    CO2 24 10/30/2023    GLUCOSE 118 (H) 10/30/2023    BUN 23 (H) 10/30/2023    CREATININE 1.00 10/30/2023    CREATININE 0.9 08/28/2006        Lab Results   Component Value Date    WBC 13.2 (H) 10/30/2023    HEMOGLOBIN 10.3 (L) 10/30/2023    HEMATOCRIT 31.9 (L) 10/30/2023    PLATELETCT 195 10/30/2023      DX-PORTABLE FLUOROSCOPY < 1 HOUR   Final Result      Portable fluoroscopy utilized for 23 seconds.         INTERPRETING LOCATION: [address] [place] [address], [id number]      DX-HIP-UNILATERAL-W/O PELVIS-2/3 VIEWS LEFT   Final Result      Digitized intraoperative radiograph is submitted for review. This examination is not for diagnostic purpose but for guidance during a surgical procedure. Please see the patient's chart for full procedural details.         INTERPRETING LOCATION: [address] [place] [address], [id number]      CT-LSPINE W/O PLUS RECONS   Final Result         1. No acute injury identified.   2. Stable L1 and L2 compression fractures.   3. Stable degenerative changes.      CT-HEAD W/O   Final Result      No acute process.         DX-PELVIS-1 OR 2 VIEWS   Final Result      Positive for left hip fracture, as above.      DX-FEMUR-2+ LEFT   Final Result      Positive for left hip fracture, as above.      DX-CHEST-PORTABLE (1 VIEW)   Final Result      No acute process.            Total time of the discharge process 33 minutes.

## 2023-10-30 NOTE — PROGRESS NOTES
0700 pm Received pt at this time, pt is AO to self, confused in conversation, mildly agitated while being assessed, no restraints noted at this time, pt is redirectable, with pain complaints during turning. Dressing on incision site cdi.      00:00 hrs pt is in deep sleep but arousable, refuses pain meds. Turned.  0400 hrs toileting offered to pt but refused, denies any pain at this time except during turning

## 2023-10-30 NOTE — DISCHARGE PLANNING
PAS being reviewed by Dr. Samayoa for consideration for IRF with [facility]. Attending team aware. [facility]  auth#[id number]

## 2023-10-30 NOTE — H&P
"  Physical Medicine & Rehabilitation  History and Physical (H&P)  &     Post Admission Physician Evaluation (ERIN)       Date of Admission: 10/30/2023  Date of Service: 10/30/2023   Linda Casas  RH24/01    [facility] Code to Support Admission: 0008.11 - Orthopaedic Disorders: Status Post Unilateral Hip Fracture  Etiologic Diagnosis: Closed left hip fracture, initial encounter ([facility])    _______________________________________________    Chief Complaint: Hip Pain    History of Present Illness:  Adapted from the PM&R Consult by Dr. Ramos:   \"HPI: The patient is a 89 y.o. 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  Per documentation, patient was at home while walking to the bathroom when she fell and tripped and landed on her left side.  Patient did not hit her head, patient was unable to bear weight.  Upon evaluation in the emergency department left femur x-ray showed minimally comminuted, angulated and mildly displaced left femoral neck and intertrochanteric femur fracture.  Orthopedic surgery was consulted, and patient was taken to the OR on 10/28 for ORIF of the left intertrochanteric femur fracture with cephalomedullary implant performed by Dr. Mckenzie.  Postop, patient is WBAT LLE.  Patient has postop leukocytosis.  Patient has required IV morphine for pain control.  She does have p.o. oxycodone available, however IV morphine was utilized today.     Patient seen and examined at bedside.  Patient oriented to person and place.  Patient is oriented to situation.  However patient does not know the month or the year.  Patient does not know what town she lives in.  Patient is able to explain the chills with her son and daughter-in-law who help her with most needs at home.  Patient reports left hip pain.  Although patient has memory " "impairment she follows 1 step commands easily and is very cooperative. Denies headache, lightheadedness, chest pain or numbness tingling or weakness. Continues to state that just her left hip hurts.\"    On admission patient is confused and wants to go home.     Called [facility] and they have been taking care of her 24/7. She is [place] and speaks better Malawian than English. She does not have her hearing aids.     Review of Systems:     14 point ROS reviewed and negative except as stated above.      Past Medical History:  Past Medical History:   Diagnosis Date    Anesthesia     Dtr-in-law states patient took a very long time to recover from anesthesia, states patient was incoherent and extremely weak    Anxiety 09/14/2010    Cataract     IOL    Congestive heart failure ([facility]) 2022    High cholesterol     Hypertension     history of but no treatment    Myocardial infarct ([facility]) May2022    OSTEOPOROSIS 09/14/2010    Pain 03/04/2014    6/10=L knee    Pain 11/04/2021    low back    Snoring     no sleep study    Urinary incontinence     only at times at night when sleeping       Past Surgical History:  Past Surgical History:   Procedure Laterality Date    PB OPEN FIX INTER/SUBTROCH FX,IMPLNT Left 10/28/2023    Procedure: INSERTION, INTRAMEDULLARY THIERRY, FEMUR, PROXIMAL;  Surgeon: Richard Mckenzie M.D.;  Location: [facility];  Service: Trauma    KYPHOPLASTY  6/15/2023    Procedure: L2 KYPHOPLASTY WITH BIOPSY;  Surgeon: Tim Barton M.D.;  Location: [facility];  Service: Orthopedics    KYPHOPLASTY N/A 11/06/2021    Procedure: KYPHOPLASTY - L1 AND BIOPSY;  Surgeon: Tim Barton M.D.;  Location: [facility];  Service: Orthopedics    TRIGGER FINGER RELEASE Left 05/29/2018    Procedure: TRIGGER FINGER RELEASE-  MIDDLE;  Surgeon: Frandy Liz M.D.;  Location: [facility];  Service: Orthopedics    JULIAN BY LAPAROSCOPY  05/23/2016    Procedure: JULIAN BY LAPAROSCOPY;  Surgeon: Adan ORTIZ" ANGEL Kearns;  Location: SURGERY [facility];  Service:     KNEE ARTHROSCOPY  03/11/2014    Performed by Bonilla Valera M.D. at SURGERY [facility]    BLADDER SUSPENSION  2001    HYSTERECTOMY, TOTAL ABDOMINAL  2000    APPENDECTOMY  1969    COLON RESECTION      partial; no CA    OTHER CARDIAC SURGERY  2022       Family History:  Family History   Problem Relation Age of Onset    No Known Problems Mother     No Known Problems Father        Medications:  Current Facility-Administered Medications   Medication Dose    Respiratory Therapy Consult      Pharmacy Consult Request ...Pain Management Review 1 Each  1 Each    omeprazole (PriLOSEC) capsule 20 mg  20 mg    hydrALAZINE (Apresoline) tablet 25 mg  25 mg    carboxymethylcellulose (Refresh Tears) 0.5 % ophthalmic drops 1 Drop  1 Drop    benzocaine-menthol (Cepacol) lozenge 1 Lozenge  1 Lozenge    mag hydrox-al hydrox-simeth (Maalox Plus Es Or Mylanta Ds) suspension 20 mL  20 mL    ondansetron (Zofran ODT) dispertab 4 mg  4 mg    Or    ondansetron (Zofran) syringe/vial injection 4 mg  4 mg    traZODone (Desyrel) tablet 50 mg  50 mg    sodium chloride (Ocean) 0.65 % nasal spray 2 Spray  2 Spray    carvedilol (Coreg) tablet 3.125 mg  3.125 mg    enoxaparin (Lovenox) inj 40 mg  40 mg    [START ON 10/31/2023] escitalopram (Lexapro) tablet 20 mg  20 mg    losartan (Cozaar) tablet 50 mg  50 mg    QUEtiapine (SEROquel) tablet 25 mg  25 mg    rosuvastatin (Crestor) tablet 10 mg  10 mg    senna-docusate (Pericolace Or Senokot S) 8.6-50 MG per tablet 2 Tablet  2 Tablet    And    polyethylene glycol/lytes (Miralax) PACKET 1 Packet  1 Packet    And    magnesium hydroxide (Milk Of Magnesia) suspension 30 mL  30 mL    And    bisacodyl (Dulcolax) suppository 10 mg  10 mg    acetaminophen (Tylenol) tablet 650 mg  650 mg    oxyCODONE immediate-release (Roxicodone) tablet 2.5 mg  2.5 mg    oxyCODONE immediate-release (Roxicodone) tablet 5 mg  5 mg        Allergies:  Ampicillin, Bactrim ds, and Ciprofloxacin    Psychosocial History:  Patient lives with adult children in a one-story house with no stairs to enter  0 ELIANA  At prior level of function required assistance for IADLs but was independent with ADLs, mod I with FWW at home        Tobacco: Denies  Alcohol: Denies  Drugs: Denies       Level of Function Prior to Disability:  Housing / Facility: [address] [place] [address]  Steps Into Home: 0  Steps In Home: 0  Bathroom Set up: Walk In Shower  Equipment Owned: Front-Wheel Walker  Lives with - Patient's Self Care Capacity: Adult Children  Bed Mobility: Independent  Transfer Status: Independent  Ambulation: Independent  Assistive Devices Used: None    Self Feeding: Independent  Grooming / Hygiene: Independent  Bathing: Independent  Dressing: Independent  Toileting: Independent  Medication Management: Requires Assist  Laundry: Requires Assist  Kitchen Mobility: Requires Assist  Finances: Requires Assist  Home Management: Requires Assist  Shopping: Requires Assist  Prior Level Of Mobility: Supervision With Device in Home  Prior Services: Continuous (24 Hour) Care Giving Family  Housing / Facility: [address] [place] [address]    Level of Function Prior to Admission to [facility]:  Gait Level Of Assist: Moderate Assist  Assistive Device: Front Wheel Walker  Distance (Feet): 1  Deviation: Antalgic, Bradykinetic, Shuffled Gait  Weight Bearing Status: WBAT L LE  Supine to Sit: Maximal Assist  Sit to Supine: Maximal Assist  Scooting: Maximal Assist  Sit to Stand: Moderate Assist  Bed, Chair, Wheelchair Transfer: Unable to Participate  Sitting Edge of Bed: 12 min  Standin sec    Upper Body Dressing: Minimal Assist  Lower Body Dressing: Maximal Assist  Toileting: Maximal Assist    CURRENT LEVEL OF FUNCTION:   Same as level of function prior to admission to [facility]    Physical Examination:     VITAL SIGNS:   weight is 67.8 kg (149 lb 8 oz). Her oral  temperature is 36.6 °C (97.8 °F). Her blood pressure is 137/82 and her pulse is 92. Her respiration is 17.   GENERAL: No apparent distress  HEENT: Normocephalic/atraumatic, EOMI, and PERRL  CARDIAC: Regular rate and rhythm  LUNGS: Clear to auscultation   ABDOMINAL: bowel sounds present, soft, nontender, and nondistended    EXTREMITIES: no spasticity or no edema  NEURO:  Mental status: Disoriented. Doesn't know place or city. Doesn't know month or year    Motor Exam Upper Extremities   ? Myotome R L   Shoulder Abduction C5 5 5   Elbow flexion C5 5 5   Wrist extension C6 5 5   Elbow extension C7 5 5   Finger flexion C8 5 5   Finger abduction T1 5 5     Motor Exam Lower Extremities  ? Myotome R L   Hip flexion L2 5 1*   Knee extension L3 5 2*   Ankle dorsiflexion L4 5 5   Toe extension L5 5 5   Ankle plantarflexion S1 5 5     *Pain limiting    Radiology:  IMAGING:  CT-LSPINE W/O PLUS RECONS  Narrative: 10/28/2023 5:16 AM     HISTORY/REASON FOR EXAM:  Trauma, ground level fall, back pain, hip pain.     TECHNIQUE/EXAM DESCRIPTION AND NUMBER OF VIEWS:  CT lumbar spine without contrast, with reconstructions.     The study was performed on a helical multidetector CT scanner. Thin-section helical scanning was performed from T12-L1 to the sacrum. Sagittal and coronal multiplanar reconstructions were generated from the axial images. Low dose optimization technique   was utilized for this CT exam including automated exposure control and adjustment of the mA and/or kV according to patient size.     COMPARISON: Lumbar MRI 6/5/2023 and additional     FINDINGS:  No acute fracture identified. Stable L1 and L2 compression fractures with previous vertebroplasty and mild bony retropulsion. Mild associated central canal stenosis. Stable anterolisthesis and severe degenerative spondylosis at L5-S1. Right foraminal   stenosis at L4-S1. Left foraminal stenosis at L4-5.  Impression: 1. No acute injury identified.  2. Stable L1 and L2  compression fractures.  3. Stable degenerative changes.  CT-HEAD W/O  Narrative: 10/28/2023 5:16 AM     HISTORY/REASON FOR EXAM:  Head trauma, minor (Age >= 65y); Head trauma >65 years old.     TECHNIQUE/EXAM DESCRIPTION AND NUMBER OF VIEWS:  CT of the head without contrast.     The study was performed on a helical multidetector CT scanner. Contiguous axial sections were obtained from the skull base through the vertex. Up to date radiation dose reduction adjustments have been utilized to meet ALARA standards for radiation dose   reduction.     COMPARISON:  4/4/2022 head CT, 8/22/2023 brain MR.     FINDINGS:  Brain: Severe chronic ischemic white matter demyelination again noted. No intracranial mass, hydrocephalus, herniation, hemorrhage, or extra-axial fluid collection. Normal gray-white matter differentiation.     Paranasal sinuses: Visualized portions of the paranasal sinuses and mastoid air cells are well aerated.  Impression: No acute process.  DX-FEMUR-2+ LEFT  Narrative: 10/28/2023 4:21 AM     HISTORY/REASON FOR EXAM:  Pain/Deformity Following Trauma.  .     TECHNIQUE/EXAM DESCRIPTION AND NUMBER OF VIEWS:  2 views of the LEFT femur.     COMPARISON: None     FINDINGS:  There is a mildly comminuted, angulated, mildly displaced left femoral neck and intertrochanteric femur fracture. Small butterfly fragment of the lesser trochanter medially. No dislocation.  Impression: Positive for left hip fracture, as above.  DX-PELVIS-1 OR 2 VIEWS  Narrative: 10/28/2023 4:21 AM     HISTORY/REASON FOR EXAM:  Pelvic/Hip Pain Following Trauma; GLF.     TECHNIQUE/EXAM DESCRIPTION AND NUMBER OF VIEWS:  1 view(s) of the pelvis.     COMPARISON:  None.     FINDINGS:  There is a mildly comminuted, angulated, mildly displaced left femoral neck and intertrochanteric femur fracture. Small butterfly fragment of the lesser trochanter medially. No dislocation.  Impression: Positive for left hip fracture, as above.  DX-CHEST-PORTABLE (1  VIEW)  Narrative: 10/28/2023 3:59 AM     HISTORY/REASON FOR EXAM:  Shortness of Breath.     TECHNIQUE/EXAM DESCRIPTION AND NUMBER OF VIEWS:  Single portable view of the chest.     COMPARISON: 4/9/2022     FINDINGS:  Cardiac silhouette is normal in size. No airspace infiltrate, pleural effusion, or pneumothorax.  Impression: No acute process.    Laboratory Values:  Recent Labs     10/28/23  0304 10/30/23  0106   SODIUM 138 137   POTASSIUM 3.8 3.9   CHLORIDE 104 104   CO2 23 24   GLUCOSE 121* 118*   BUN 23* 23*   CREATININE 1.20 1.00   CALCIUM 9.3 8.7     Recent Labs     10/28/23  0304 10/30/23  0106   WBC 12.2* 13.2*   RBC 4.38 3.28*   HEMOGLOBIN 13.9 10.3*   HEMATOCRIT 42.8 31.9*   MCV 97.7 97.3   MCH 31.7 31.4   MCHC 32.5 32.3   RDW 50.6* 49.4   PLATELETCT 239 195   MPV 10.9 10.8           Primary Rehabilitation Diagnosis:    This patient is a 89 y.o. female admitted for acute inpatient rehabilitation with Closed left hip fracture, initial encounter ([facility]).    Impairments:   ADLs/IADLs  Mobility  Speech    Secondary Diagnosis/Medical Co-morbidities Affecting Function  Left hip fracture  Hypertension  Leukocytosis  Baseline memory impairment  Impaired mobility and ADLs    Relevant Changes Since Preadmission Evaluation:    Status unchanged    The patient's rehabilitation potential is Very Good  The patient's medical prognosis is good    Rehabilitation Plan:   Discussion and Recommendations:   1. The patient requires an acute inpatient rehabilitation program with a coordinated program of care at an intensity and frequency not available at a lower level of care. This recommendation is substantiated by the patient's medical physicians who recommend that the patient's intervention and assessment of medical issues needs to be done at an acute level of care for patient's safety and maximum outcome.   2. A coordinated program of care will be supplied by an interdisciplinary team of physical therapy, occupational therapy,  rehab physician, rehab nursing, and, if needed, speech therapy and rehab psychology. Rehab team presents a patient-specific rehabilitation and education program concentrating on prevention of future problems related to accessibility, mobility, skin, bowel, bladder, sexuality, and psychosocial and medical/surgical problems.   3. Need for Rehabilitation Physician: The rehab physician will be evaluating the patient on a multi-weekly basis to help coordinate the program of care. The rehab physician communicates between medical physicians, therapists, and nurses to maximize the patient's potential outcome. Specific areas in which the rehab physician will be providing daily assessment include the following:   A. Assessing the patient's heart rate and blood pressure response (vitals monitoring) to activity and making adjustments in medications or conservative measures as needed.   B. The rehab physician will be assessing the frequency at which the program can be increased to allow the patient to reach optimal functional outcome.   C. The rehab physician will also provide assessments in daily skin care, especially in light of patient's impairments in mobility.   D. The rehab physician will provide special expertise in understanding how to work with functional impairment and recommend appropriate interventions, compensatory techniques, and education that will facilitate the patient's outcome.   4. Rehab R.N.   The rehab RN will be working with patient to carry over in room mobility and activities of daily living when the patient is not in 3 hours of skilled therapy. Rehab nursing will be working in conjunction with rehab physician to address all the medical issues above and continue to assess laboratory work and discuss abnormalities with the treating physicians, assess vitals, and response to activity, and discuss and report abnormalities with the rehab physician. Rehab RN will also continue daily skin care, supervise  bladder/bowel program, instruct in medication administration, and ensure patient safety.   5. Rehab Therapy: Therapies to treat at intensity and frequency of (may change after completion of evaluation by all therapeutic disciplines):       PT:  Physical therapy to address mobility, transfer, gait training and evaluation for adaptive equipment needs 1 hour/day at least 5 days/week for the duration of the ELOS (see below)       OT:  Occupational therapy to address ADLs, self-care, home management training, functional mobility/transfers and assistive device evaluation, and community re-integration 1  hour/day at least 5 days/week for the duration of the ELOS (see below).        ST/Dysphagia:  Speech therapy to address speech, language, and cognitive deficits as well as swallowing difficulties with retraining/dysphagia management and community re-integration with comprehension, expression, cognitive training 1  hour/day at least 5 days/week for the duration of the ELOS (see below).     Medical management / Rehabilitation Issues/ Adverse Potential as part of rehabilitation plan     Rehabilitation Issues/Adverse Potential  1.  Hip Fracture: Patient demonstrates functional deficits in strength, balance, coordination, and ADL's. Patient is admitted to [facility] for comprehensive rehabilitation therapy as described below.   Rehabilitation nursing monitors bowel and bladder control, educates on medication administration, co-morbidities and monitors patient safety.    2.  Neurostimulants: None at this time but continue to assess daily for need to initiate should status change.    3.  DVT prophylaxis:  Patient is on Lovenox for anticoagulation upon transfer. Encourage OOB. Monitor daily for signs and symptoms of DVT including but not limited to swelling and pain to prevent the development of DVT that may interfere with therapies.    4.  GI prophylaxis:  On prilosec to prevent gastritis/dyspepsia which may  interfere with therapies.    5.  Pain: Controlled with Oxycodone    6.  Nutrition/Dysphagia: Dietician monitors nutrient intake, recommend supplements prn and provide nutrition education to pt/family to promote optimal nutrition for wound healing/recovery.     7.  Bladder/bowel:  Start bowel and bladder program as described below, to prevent constipation, urinary retention (which may lead to UTI), and urinary incontinence (which will impact upon pt's functional independence).   - TV Q3h while awake with post void bladder scans, I&O cath for PVRs >400  - up to commode after meal     8.  Skin/dermal ulcer prophylaxis: Monitor for new skin conditions with q.2 h. turns as required to prevent the development of skin breakdown.     9.  Cognition/Behavior: As needed psychologist provides adjustment counseling to illness and psychosocial barriers that may be potential barriers to rehabilitation.     10. Respiratory therapy: RT performs O2 management prn, breathing retraining, pulmonary hygiene and bronchospasm management prn to optimize participation in therapies.     Medical Co-Morbidities/Adverse Potential Affecting Function:    Left femoral neck and intertrochanteric femur fracture s/p ORIF with cephalomedullary implant 10/28/2023 Dr. Mckenzie  PT and OT for mobility and ADLs. Per guidelines, 15 hours per week between PT, OT and/or SLP.  Follow-up Ortho  Weightbearing as tolerated    Dementia  SLP consult, likely at baseline  Seroquel nightly -consider reducing if not home med    Leukocytosis  Likely reactive after surgery  UA, chest x-ray, blood cultures    Hypophosphatemia  Check phosphorus in the morning    Azotemia  Check labs in the morning    L2 compression fracture 6/2023 s/p kyphoplasty Dr. Barton  Monitor    Hyperlipidemia  Continue Crestor    Hypertension  Continue losartan and Coreg    Anxiety  Continue Lexapro    Pain  As needed oxycodone    Skin  Patient at risk for skin breakdown due to debility in areas  including sacrum, achilles, elbows and head in addition to other sites. Nursing to assess skin daily.     GI Ppx  Patient on Prilosec for GERD prophylaxis.     Bowel   Patient on Senna-docusate for constipation prophylaxis.     Bladder  Timed voids and PVR/BS    DVT PROPHYLAXIS: Lovenox 40 mg subcutaneous nightly    HOSPITALIST FOLLOWING: No    CODE STATUS: DNAR/DNI    DISPO: HEMA TBD.  Going home with adult children whom she lives with.     M2B ELIGIBLE: TBD    DISCHARGE FOLLOW UP: Orthopedics, PCP    I personally performed a complete drug regimen review and no potential clinically significant medication issues were identified.     Goals/Expected Level of Function Based on Current Medical and Functional Status:  (may change based on patient's medical status and rate of impairment recovery)  Transfers:   Modified Independent  Mobility/Gait:   Modified Independent  ADL's:   Modified Independent    DISPOSITION: Discharge to pre-morbid independent living setting with the supportive care of patient's family.    ELOS: 7-10 days  ____________________________________    Dr. Kimmie Samayoa DO, MS  [facility] - Physical Medicine & Rehabilitation   ____________________________________    Pt was seen today for 75 min, and entire time spent in face-to-face contact was >50% in counseling and coordination of care as detailed in A/P above.

## 2023-10-30 NOTE — PROGRESS NOTES
GMT at bedside to transfer patient to [facility]. PIV removed personal belongings gathered by Daughter-in-law.

## 2023-10-30 NOTE — DISCHARGE PLANNING
Received choice form @: 1735  Agency/Facility name: [facility]  Sent referral per choice form @: 174    Received choice form @: 1733  Agency/Facility name: [facility]  Sent referral per choice form @: 1746

## 2023-10-30 NOTE — DISCHARGE PLANNING
Case Management Discharge Planning    Admission Date: 10/28/2023  GMLOS: 4.5  ALOS: 2    6-Clicks ADL Score: 14  6-Clicks Mobility Score: 8  PT and/or OT Eval ordered: Yes  Post-acute Referrals Ordered: Yes  Post-acute Choice Obtained: Yes  Has referral(s) been sent to post-acute provider:  Yes      Anticipated Discharge Dispo: Discharge Disposition: D/T to IP rehab facility w/planned hosp IP readmit (90)    DME Needed: No    Action(s) Taken: DC Assessment Complete (See below)  PT has been accepted by [facility]. Transport set up at [phone] by [facility] via [facility]. COBRA packet completed. Daughter in law Mary updated.     Escalations Completed: None    Medically Clear: Yes    Next Steps: f/u with pt and medical team to discuss dc needs and barriers.      Barriers to Discharge: Pending Placement    Is the patient up for discharge tomorrow: No      Care Transition Team Assessment      RN CM spoke to Mary, Pt's daughter in law to obtain assessment, Pt is A&Ox2. Pt lives with her son and daughter in law Mary in a one story home in [place]. On baseline, Pt is ambulatory, with [facility] at home but never uses it. Pt was with [facility] 2 years ago after her heart surgery. Pt's son and daughter in law are good support for Pt. Mary and Pt's Son Waqar is agreeable for Post acute placement, prefers [facility]. [facility] considering per note and will follow auth determination.     Information Source  Orientation Level: Oriented to place, Oriented to person, Disoriented to time, Disoriented to situation  Information Given By: Other (Comments) (Daughter in law)  Informant's Name: Mary Ac  Who is responsible for making decisions for patient? : Adult child  Name(s) of Primary Decision Maker: Efren Shen    Readmission Evaluation  Is this a readmission?: No    Elopement Risk  Legal Hold: No  Ambulatory or Self Mobile in Wheelchair: No-Not an Elopement Risk  Elopement Risk: Not at Risk for Elopement    Interdisciplinary Discharge  Planning  Lives with - Patient's Self Care Capacity: Adult Children  Support Systems: (P) Family Member(s)  Housing / Facility: [address]  Prior Services: Continuous (24 Hour) Care Giving Family  Patient Prefers to be Discharged to:: (P) Son and daughter-in-law that is my caregiver  Durable Medical Equipment: (P) Not Applicable    Discharge Preparedness  What is your plan after discharge?: Skilled nursing facility  What are your discharge supports?: Child  Prior Functional Level: Ambulatory, Needs Assist with Medication Management, Independent with Activities of Daily Living  Difficulity with ADLs: None  Difficulity with IADLs: Driving, Keeping track of finances, Laundry, Managing medication, Using the telephone or computer    Functional Assesment  Prior Functional Level: Ambulatory, Needs Assist with Medication Management, Independent with Activities of Daily Living    Finances  Financial Barriers to Discharge: No  Prescription Coverage: Yes    Vision / Hearing Impairment  Vision Impairment : No  Hearing Impairment : No         Advance Directive  Advance Directive?: DPOA for Health Care  Durable Power of  Name and Contact : Efren Shen    Domestic Abuse  Have you ever been the victim of abuse or violence?: No  Physical Abuse or Sexual Abuse: No  Verbal Abuse or Emotional Abuse: No  Possible Abuse/Neglect Reported to:: Not Applicable    Psychological Assessment  History of Substance Abuse: None    Discharge Risks or Barriers  Discharge risks or barriers?: Complex medical needs  Patient risk factors: Complex medical needs    Anticipated Discharge Information  Discharge Disposition: D/T to IP rehab facility w/planned hosp IP readmit (90)

## 2023-10-30 NOTE — DISCHARGE PLANNING
[facility] Transitional Care Coordination    Telephone call to patients daughter-in-law Mary Shen to provide update on transport time.  Mary stated awareness of transfer as she was bedside with Tiffany.   Reviewed [facility] COVID testing and visitor policies.  Provided [facility] Nurses station number for updates to inform visitation.

## 2023-10-30 NOTE — CARE PLAN
The patient is Stable - Low risk of patient condition declining or worsening    Shift Goals  Clinical Goals: safety, oxygenation  Patient Goals: rest  Family Goals: Yeny    Progress made toward(s) clinical / shift goals:  pt tolerated turning to maintain skin integrity, able to sleep comfortably, oxygen 2lpm applied as pt desaturates to 76%, q2 turns done, pt remains confused and alert to self but cooperative.  Problem: Skin Integrity  Goal: Skin integrity is maintained or improved  Outcome: Progressing     Problem: Pain - Standard  Goal: Alleviation of pain or a reduction in pain to the patient’s comfort goal  Outcome: Progressing       Patient is not progressing towards the following goals:

## 2023-10-30 NOTE — PROGRESS NOTES
4 Eyes Skin Assessment Completed by ARIS Quintana and ARIS Rajput.    Head WDL  Ears WDL  Nose WDL  Mouth WDL  Neck WDL  Breast/Chest WDL  Shoulder Blades WDL  Spine WDL  (R) Arm/Elbow/Hand Bruising  (L) Arm/Elbow/Hand Bruising  Abdomen WDL  Groin WDL  Scrotum/Coccyx/Buttocks Redness and Blanching  (R) Leg Bruising  (L) Leg Bruising and Incision with staples  (R) Heel/Foot/Toe WDL  (L) Heel/Foot/Toe WDL          Devices In Places Nasal cannula      Interventions In Place Waffle Overlay, Pillows, and Q2 Turns    Possible Skin Injury No    Pictures Uploaded Into Epic Yes  Wound Consult Placed No  RN Wound Prevention Protocol Ordered Yes

## 2023-10-30 NOTE — PREADMISSION SCREENING NOTE
"  Pre-Admission Screening Form    Patient Information:   Name: Linda Casas     MRN: [id number]       : 1934      Age: 89 y.o.   Gender: female      Race: White [7]       Marital Status:  [5]  Family Contact: Efren Shen (Waqar)  Mary Shen Walter        Relationship: Son [15]  Daughter-in-law [28]  Brother [1]  Home Phone:     [phone]           Cell Phone: [phone]  Advanced Directives: Copy in Chart  Code Status:  DNAR/DNI  Current Attending Provider: Cesar Kraus M.D.  Referring Physician: Dr. Kraus       Physiatrist Consult: Dr. Ramos       Referral Date: 10/29/2023  Primary Payor Source:  [facility]  Secondary Payor Source:      Medical Information:   Date of Admission to Acute Care Setting:10/28/2023  Room Number: S524/02  Rehabilitation Diagnosis: 0008.11 - Orthopaedic Disorders: Status Post Unilateral Hip Fracture  Immunization History   Administered Date(s) Administered    INFLUENZA TIV (IM) 10/08/2012    Influenza (IM) Preservative Free - HISTORICAL DATA 10/19/2017    Influenza Vaccine Adult HD 10/29/2013, 10/04/2016, 2020, 10/29/2022    Influenza Vaccine Pediatric Split - Historical Data 10/19/2005, 10/17/2007    Influenza Vaccine Quad Inj (Pf) 2018, 10/16/2019    MODERNA SARS-COV-2 VACCINE (12+) 2021, 2021, 2022    Pneumococcal Conjugate Vaccine (Prevnar/PCV-13) 10/04/2016    Pneumococcal polysaccharide vaccine (PPSV-23) 10/19/2005, 10/08/2012    Tdap Vaccine 2013     Allergies   Allergen Reactions    Ampicillin Hives, Rash and Swelling     Feels \"rotten\"     Bactrim Ds Hives, Itching and Swelling     Mouth, lip, tongue, eye swelling, pain, itching    Ciprofloxacin Hives, Rash and Swelling     .     Past Medical History:   Diagnosis Date    Anesthesia     Dtr-in-law states patient took a very long time to recover from anesthesia, states patient was incoherent and extremely weak "    Anxiety 09/14/2010    Cataract     IOL    Congestive heart failure (HCC) 2022    High cholesterol     Hypertension     history of but no treatment    Myocardial infarct (HCC) May2022    OSTEOPOROSIS 09/14/2010    Pain 03/04/2014    6/10=L knee    Pain 11/04/2021    low back    Snoring     no sleep study    Urinary incontinence     only at times at night when sleeping     Past Surgical History:   Procedure Laterality Date    PB OPEN FIX INTER/SUBTROCH FX,IMPLNT Left 10/28/2023    Procedure: INSERTION, INTRAMEDULLARY THIERRY, FEMUR, PROXIMAL;  Surgeon: Richard Mckenzie M.D.;  Location: [facility];  Service: Trauma    KYPHOPLASTY  6/15/2023    Procedure: L2 KYPHOPLASTY WITH BIOPSY;  Surgeon: Tim Barton M.D.;  Location: [facility];  Service: Orthopedics    KYPHOPLASTY N/A 11/06/2021    Procedure: KYPHOPLASTY - L1 AND BIOPSY;  Surgeon: Tim Barton M.D.;  Location: [facility];  Service: Orthopedics    TRIGGER FINGER RELEASE Left 05/29/2018    Procedure: TRIGGER FINGER RELEASE-  MIDDLE;  Surgeon: Frandy Liz M.D.;  Location: [facility];  Service: Orthopedics    JULIAN BY LAPAROSCOPY  05/23/2016    Procedure: JULIAN BY LAPAROSCOPY;  Surgeon: Adan Kearns M.D.;  Location: [facility];  Service:     KNEE ARTHROSCOPY  03/11/2014    Performed by Bonilla Valera M.D. at [facility]    BLADDER SUSPENSION  2001    HYSTERECTOMY, TOTAL ABDOMINAL  2000    APPENDECTOMY  1969    COLON RESECTION      partial; no CA    OTHER CARDIAC SURGERY  2022       History Leading to Admission, Conditions that Caused the Need for Rehab (CMS):   Isidro Sylvester M.D.  Physician  [facility]  H&P     Signed  Date of Service:  10/28/2023  4:43 AM        Hospital Medicine History & Physical Note     Date of Service  10/28/2023     Primary Care Physician  Lakeshia Kuo M.D.     Consultants  Orthopedic surgery     Code Status  Full Code     Chief  Complaint    Chief Complaint  Patient presents with   T-5000 GLF      Brought by EMS from home complaining of GLF - pt is trying to go to the bathroom but she trip down - she is supposed to use a walker but she is not using it   Pt is complaining of Left hip/ lower back pain  Denied hitting her head  Pt is using Aspirin     Alert and Oriented x 2 (Baseline?)     Given Fetanyl 50 mcg IV/ Zofran 4 mg IV en route  IV gauge 20 left FA     Assessment/Plan:  Justification for Admission Status  I anticipate this patient will require at least two midnights for appropriate medical management, necessitating inpatient admission because patient has a left femoral neck fracture     Patient will need a Med/Surg bed on ORTHOPEDICS service .  The need is secondary to left femoral neck fracture     * Left displaced femoral neck fracture (HCC)  Assessment & Plan  Spoke to ERP.  Patient noted to have mechanical fall.  Patient takes aspirin and is not on any anticoagulant medication.  X-ray left hip showing mildly comminuted, angulated, mildly displaced left femoral neck and intertrochanteric femur fracture.  Orthopedic surgery consulted, patient will go for OR in a.m.  Overnight, she will be treated with fluids and Toradol as needed.  Monitor for ANGELINA from Toradol administration.     ACP (advance care planning)  Assessment & Plan  16 minutes was spent discussing goals of care with patient.  She was explained her diagnosis and treatment plan.  She states that she would like to be full code and have everything done, including chest compressions and intubation.     Hyperlipidemia  Assessment & Plan  Continue crestor     Hypertension  Assessment & Plan  Restart home medications as needed           VTE prophylaxis: pharmacologic prophylaxis contraindicated due to OR in AM     Physician  Surgery Orthopedic  OP Report     Signed  Date of Service:  10/28/2023 10:58 AM        DATE OF OPERATION: 10/28/2023     PREOPERATIVE DIAGNOSIS:  1. left  intertrochanteric femur fracture     POSTOPERATIVE DIAGNOSIS: Same     PROCEDURE PERFORMED:   1. Open reduction internal fixation left intertrochanteric femur fracture with cephalomedullary implant     SURGEON: Richard Mckenzie M.D.     POSTOPERATIVE PLAN:       1. Inpatient plan: PACU to floor. 24 hours of antibiotics.  Mobilize with PT/OT.  2. Weightbearing status:  WBAT LLE  3. DVT prophylaxis: SCDs and lovenox until mobilizing well, then aspirin 81mg BID for 4 weeks.  If the patient is on baseline anticoagulation then they may resume their medication 24 hours postoperatively.  4. Outpatient plan: The patient will follow up in clinic in 2 weeks for wound check, suture/staple removal, and xrays.  If the patient is at a facility at that time, wound check and suture/staple removal may be performed by nursing care and the patient should instead follow up at the 6 week post operative mei for repeat clinical check and xrays.        ____________________________________   Richard Mckenzie M.D.   DD: 10/28/2023  11:48 AM     Natali Ramos D.O.  Physician  Physical Medicine & Rehab  Consults     Addendum  Date of Service:  10/29/2023  4:48 PM  Consult Orders  IP Consult For Physiatry [[id number]] ordered by Cesar Kraus M.D. at 10/30/23 0717       Addendum      Expand All Collapse All                                                      Physical Medicine and Rehabilitation Consultation                                                                                  Date of initial consultation: 10/29/2023  Requesting provider: ordered by Cesar Kraus M.D. at 10/30/23 0717  Consulting provider: Natali Ramos D.O.  Reason for consultation: assess for acute inpatient rehab appropriateness  LOS: 1 Day(s)     Chief complaint: Left hip pain after ground-level fall     HPI: The patient is a 89 y.o. female with a past medical history of hypertension, prior MI in May 2022, hyperlipidemia, osteoporosis and history of  memory impairment;  who presented on 10/28/2023  2:56 AM with hip pain after ground-level fall.  Per documentation, patient was at home while walking to the bathroom when she fell and tripped and landed on her left side.  Patient did not hit her head, patient was unable to bear weight.  Upon evaluation in the emergency department left femur x-ray showed minimally comminuted, angulated and mildly displaced left femoral neck and intertrochanteric femur fracture.  Orthopedic surgery was consulted, and patient was taken to the OR on 10/28 for ORIF of the left intertrochanteric femur fracture with cephalomedullary implant performed by Dr. Mckenzie.  Postop, patient is WBAT LLE.  Patient has postop leukocytosis.  Patient has required IV morphine for pain control.  She does have p.o. oxycodone available, however IV morphine was utilized today.     Patient seen and examined at bedside.  Patient oriented to person and place.  Patient is oriented to situation.  However patient does not know the month or the year.  Patient does not know what town she lives in.  Patient is able to explain the chills with her son and daughter-in-law who help her with most needs at home.  Patient reports left hip pain.  Although patient has memory impairment she follows 1 step commands easily and is very cooperative.  Denies headache, lightheadedness, chest pain or numbness tingling or weakness.  Continues to state that just her left hip hurts.     Social Hx:  Patient lives with adult children in a one-story house with no stairs to enter  0 ELIANA  At prior level of function required assistance for IADLs but was independent with ADLs, mod I with FWW at home        Tobacco: Denies  Alcohol: Denies  Drugs: Denies     THERAPY:  Restrictions: Fall risk, WBAT LLE  PT: Functional mobility   10/29 mod a FWW x1 ft, mod assist sit to stand     OT: ADLs  10/29 Max assist lower body dressing, max assist toileting mod assist sit to stand     SLP:   None      IMAGING:  CT-LSPINE W/O PLUS RECONS  Narrative: 10/28/2023 5:16 AM     HISTORY/REASON FOR EXAM:  Trauma, ground level fall, back pain, hip pain.     TECHNIQUE/EXAM DESCRIPTION AND NUMBER OF VIEWS:  CT lumbar spine without contrast, with reconstructions.     The study was performed on a helical multidetector CT scanner. Thin-section helical scanning was performed from T12-L1 to the sacrum. Sagittal and coronal multiplanar reconstructions were generated from the axial images. Low dose optimization technique   was utilized for this CT exam including automated exposure control and adjustment of the mA and/or kV according to patient size.     COMPARISON: Lumbar MRI 6/5/2023 and additional     FINDINGS:  No acute fracture identified. Stable L1 and L2 compression fractures with previous vertebroplasty and mild bony retropulsion. Mild associated central canal stenosis. Stable anterolisthesis and severe degenerative spondylosis at L5-S1. Right foraminal   stenosis at L4-S1. Left foraminal stenosis at L4-5.  Impression: 1. No acute injury identified.  2. Stable L1 and L2 compression fractures.  3. Stable degenerative changes.  CT-HEAD W/O  Narrative: 10/28/2023 5:16 AM     HISTORY/REASON FOR EXAM:  Head trauma, minor (Age >= 65y); Head trauma >65 years old.     TECHNIQUE/EXAM DESCRIPTION AND NUMBER OF VIEWS:  CT of the head without contrast.     The study was performed on a helical multidetector CT scanner. Contiguous axial sections were obtained from the skull base through the vertex. Up to date radiation dose reduction adjustments have been utilized to meet ALARA standards for radiation dose   reduction.     COMPARISON:  4/4/2022 head CT, 8/22/2023 brain MR.     FINDINGS:  Brain: Severe chronic ischemic white matter demyelination again noted. No intracranial mass, hydrocephalus, herniation, hemorrhage, or extra-axial fluid collection. Normal gray-white matter differentiation.     Paranasal sinuses: Visualized portions of  the paranasal sinuses and mastoid air cells are well aerated.  Impression: No acute process.  DX-FEMUR-2+ LEFT  Narrative: 10/28/2023 4:21 AM     HISTORY/REASON FOR EXAM:  Pain/Deformity Following Trauma.  .     TECHNIQUE/EXAM DESCRIPTION AND NUMBER OF VIEWS:  2 views of the LEFT femur.     COMPARISON: None     FINDINGS:  There is a mildly comminuted, angulated, mildly displaced left femoral neck and intertrochanteric femur fracture. Small butterfly fragment of the lesser trochanter medially. No dislocation.  Impression: Positive for left hip fracture, as above.  DX-PELVIS-1 OR 2 VIEWS  Narrative: 10/28/2023 4:21 AM     HISTORY/REASON FOR EXAM:  Pelvic/Hip Pain Following Trauma; GLF.     TECHNIQUE/EXAM DESCRIPTION AND NUMBER OF VIEWS:  1 view(s) of the pelvis.     COMPARISON:  None.     FINDINGS:  There is a mildly comminuted, angulated, mildly displaced left femoral neck and intertrochanteric femur fracture. Small butterfly fragment of the lesser trochanter medially. No dislocation.  Impression: Positive for left hip fracture, as above.  DX-CHEST-PORTABLE (1 VIEW)  Narrative: 10/28/2023 3:59 AM     HISTORY/REASON FOR EXAM:  Shortness of Breath.     TECHNIQUE/EXAM DESCRIPTION AND NUMBER OF VIEWS:  Single portable view of the chest.     COMPARISON: 4/9/2022     FINDINGS:  Cardiac silhouette is normal in size. No airspace infiltrate, pleural effusion, or pneumothorax.  Impression: No acute process.           PROCEDURES:  10/28 Open reduction internal fixation left intertrochanteric femur fracture with cephalomedullary performed by Dr. Mckenzie     PMH:    Past Medical History  Past Medical History:  Diagnosis Date   Anesthesia      Dtr-in-law states patient took a very long time to recover from anesthesia, states patient was incoherent and extremely weak   Anxiety 09/14/2010   Cataract      IOL   Congestive heart failure (HCC) 2022   High cholesterol     Hypertension      history of but no treatment   Myocardial  infarct (HCC) May2022   OSTEOPOROSIS 09/14/2010   Pain 03/04/2014    6/10=L knee   Pain 11/04/2021    low back   Snoring      no sleep study   Urinary incontinence      only at times at night when sleeping          PSH:    Past Surgical History  Past Surgical History:  Procedure Laterality Date   KYPHOPLASTY   6/15/2023    Procedure: L2 KYPHOPLASTY WITH BIOPSY;  Surgeon: Tim Barton M.D.;  Location: [facility];  Service: Orthopedics   KYPHOPLASTY N/A 11/06/2021    Procedure: KYPHOPLASTY - L1 AND BIOPSY;  Surgeon: Tim Barton M.D.;  Location: SUR[facility]ERY [facility];  Service: Orthopedics   TRIGGER FINGER RELEASE Left 05/29/2018    Procedure: TRIGGER FINGER RELEASE-  MIDDLE;  Surgeon: Frandy Liz M.D.;  Location: SURGERY [facility];  Service: Orthopedics   JULIAN BY LAPAROSCOPY   05/23/2016    Procedure: JULIAN BY LAPAROSCOPY;  Surgeon: Adan Kearns M.D.;  Location: SURGERY [facility];  Service:    KNEE ARTHROSCOPY   03/11/2014    Performed by Bonilla Valera M.D. at SURGERY [place]   BLADDER SUSPENSION   2001   HYSTERECTOMY, TOTAL ABDOMINAL   2000   APPENDECTOMY   1969   COLON RESECTION        partial; no CA   OTHER CARDIAC SURGERY   2022          FHX:    Family History  Family History  Problem Relation Age of Onset   No Known Problems Mother     No Known Problems Father            Medications:    Current Facility-Administered Medications  Medication Dose   enoxaparin (Lovenox) inj 40 mg  40 mg   carvedilol (Coreg) tablet 3.125 mg  3.125 mg   escitalopram (Lexapro) tablet 20 mg  20 mg   losartan (Cozaar) tablet 50 mg  50 mg   QUEtiapine (SEROquel) tablet 25 mg  25 mg   rosuvastatin (Crestor) tablet 10 mg  10 mg   acetaminophen (Tylenol) tablet 650 mg  650 mg   ondansetron (Zofran ODT) dispertab 4 mg  4 mg   senna-docusate (Pericolace Or Senokot S) 8.6-50 MG per tablet 2 Tablet  2 Tablet    And   polyethylene glycol/lytes (Miralax) PACKET 1 Packet  1 Packet    And   magnesium  "hydroxide (Milk Of Magnesia) suspension 30 mL  30 mL    And   bisacodyl (Dulcolax) suppository 10 mg  10 mg   oxyCODONE immediate-release (Roxicodone) tablet 2.5 mg  2.5 mg   oxyCODONE immediate-release (Roxicodone) tablet 5 mg  5 mg   morphine 4 MG/ML injection 1 mg  1 mg        Allergies:    Allergies  Allergen Reactions   Ampicillin Hives, Rash and Swelling      Feels \"rotten\"    Bactrim Ds Hives, Itching and Swelling      Mouth, lip, tongue, eye swelling, pain, itching   Ciprofloxacin Hives, Rash and Swelling      .        Physical Exam:  Vitals: /70   Pulse (!) 102   Temp 36.4 °C (97.6 °F) (Temporal)   Resp 18   Ht 1.626 m (5' 4.02\")   Wt 65 kg (143 lb 4.8 oz)   SpO2 93%   Gen: NAD, laying comfortably in bed no family at bedside  Head:  NC/AT  Eyes/ Nose/ Mouth: PERRLA, moist mucous membranes  Cardio: RRR, good distal perfusion, warm extremities  Pulm: normal respiratory effort, no cyanosis, on room air  Abd: Soft NTND, negative borborygmi   Ext: No peripheral edema. No calf tenderness. No clubbing.     Mental status: answers questions appropriately follows commands, oriented to person and place.  Patient is oriented to situation.  Patient does not know the month the year of the town that she lives in  Speech: fluent, no aphasia or dysarthria     CRANIAL NERVES:  2,3: visual acuity grossly intact, PERRL  3,4,6: EOMI bilaterally, no nystagmus or diplopia  5: sensation intact to light touch bilaterally and symmetric  7: no facial asymmetry  8: hearing grossly intact        Motor:                              Upper Extremity  Myotome R L  Shoulder flexion C5 5/5 5/5  Elbow flexion C5 5/5 5/5  Wrist extension C6 5/5 5/5  Elbow extension C7 5/5 5/5  Finger flexion C8 5/5 5/5  Finger abduction T1 5/5 5/5       Lower Extremity Myotome R L  Hip flexion L2 5/5 3/5  Knee extension L3 5/5 3/5  Ankle dorsiflexion L4 5/5 5/5  Toe extension L5 5/5 5/5  Ankle plantarflexion S1 5/5 5/5        Sensory:   intact to " light touch through out        DTRs: 2+ in bilateral  biceps  No clonus at bilateral ankles  Negative Tillman b/l      Tone: no spasticity noted, no cogwheeling noted     Coordination:   intact finger to nose bilaterally  intact fine motor with fingers bilaterally        Labs: Reviewed and significant for     Recent Labs    10/28/23  0304  RBC 4.38  HEMOGLOBIN 13.9  HEMATOCRIT 42.8  PLATELETCT 239       Recent Labs    10/28/23  0304  SODIUM 138  POTASSIUM 3.8  CHLORIDE 104  CO2 23  GLUCOSE 121*  BUN 23*  CREATININE 1.20  CALCIUM 9.3       Recent Results  No results found for this or any previous visit (from the past 24 hour(s)).          ASSESSMENT:  Patient is a 89 y.o. female admitted with left hip fracture     [facility] Code / Diagnosis to Support: 0008.11 - Orthopaedic Disorders: Status Post Unilateral Hip Fracture     Rehabilitation: Impaired ADLs and mobility  Patient is a good candidate for inpatient rehab based on needs for PT, OT, only if patient's family can provide 24/7 and physical assistance at home.     Barriers to transfer include: Insurance authorization, TCCs to verify disposition, medical clearance and bed availability      Additional Recommendations:  Left hip fracture  - Sustained during a ground-level fall  - Patient landed on left side  - Images showed a left intertrochanteric femur fracture  - Ortho consulted  - 10/28 ORIF with cephalomedullary implant for left intertrochanteric femur fracture performed by Dr. Mckenzie  -WBAT LLE  - Continue with PT/OT, is limited by baseline memory impairment but easily follows one-step commands, is pleasantly confused and cooperative     Hypertension  - SBP elevated up to 150  - Remains on home dose carvedilol, losartan     Leukocytosis  - Patient has postop leukocytosis likely stress reaction  - 10/29 WBC 12.2, remains afebrile  - Primary team monitoring CBC and monitoring for infection     Baseline memory impairment  - Patient lives with adult children, requires  assistance with IADLs  - Patient is pleasant, cooperative, follows all one-step commands, at baseline has memory impairment that impairs IADLs.  Patient does not know the month or the year, however is able to follow all commands with therapy during my exam today.  - On Seroquel nightly for possible nighttime agitation     Dispo:  - patient is currently functioning below their level of baseline, recommend post acute rehab  - recommend IRF level therapy with 3hr of therapy 5 days per week ONLY IF patient has 24/7 supervision from family and family is able to provide physical assistance  - piror to acceptance to IRF, will need insurance auth from [facility]  - TCC to assist with insurance auth and DC support         Medical Complexity:  Left hip fracture  Hypertension  Leukocytosis  Baseline memory impairment  Impaired mobility and ADLs        DVT PPX: Lovenox        Thank you for allowing us to participate in the care of this patient.      Patient was seen for 81 minutes on unit/floor of which > 50% of time was spent on counseling and coordination of care regarding the above, including prognosis, risk reduction, benefits of treatment, and options for next stage of care.     Natali Ramos D.O.   Physical Medicine and Rehabilitation      Please note that this dictation was created using voice recognition software. I have made every reasonable attempt to correct obvious errors, but there may be errors of grammar and possibly content that I did not discover before finalizing the note.        Co-morbidities:  as listed above and below   Potential Risk - Complications: Cognitive Impairment, Contractures, Deep Vein Thrombosis, Incontinence, Malnutrition, Pain, Perceptual Impairment, Pneumonia, Pressure Ulcer, and Urinary Tract Infection  Level of Risk: High    Ongoing Medical Management Needed (Medical/Nursing Needs):   Patient Active Problem List    Diagnosis Date Noted    Hip fracture requiring operative repair,  "right, closed, initial encounter ([facility]) 10/28/2023    Left displaced femoral neck fracture ([facility]) 10/28/2023    ACP (advance care planning) 10/28/2023    Continuous leakage of urine 03/13/2023    BMI 25.0-25.9,adult 05/03/2022    Hypertensive heart disease with chronic systolic congestive heart failure ([facility]) 05/03/2022    Chronic systolic congestive heart failure ([facility]) 05/03/2022    Hypertensive kidney disease with stage 3b chronic kidney disease ([facility]) 05/03/2022    Hypercoagulable state ([facility]) 05/03/2022    Hyperlipidemia 05/03/2022    Major depressive disorder with single episode, 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[facility]) 05/03/2022    Cerebral atrophy ([facility]) 05/03/2022    Leukocytosis 04/20/2022    Dementia without behavioral disturbance ([facility]) 04/13/2022    Hypertension 04/13/2022    Mixed stress and urge urinary incontinence 04/13/2022    Ischemic cardiomyopathy 04/08/2022    Premature atrial complex 04/08/2022    Debility 04/08/2022    Paroxysmal A-fib ([facility]) 04/04/2022    B12 deficiency 04/04/2022    Closed stable burst fracture of first lumbar vertebra ([facility]) 11/01/2021    Fall 09/24/2021    Closed fracture of left distal radius 06/24/2021    Other fatigue 03/02/2020    Trigger finger, right middle finger 05/29/2018    Cholecystolithiasis 05/23/2016    Tear of medial cartilage or meniscus of knee, current 03/11/2014    Osteopenia 09/14/2010    Anxiety 09/14/2010       Current Vital Signs:   Temperature: 36.8 °C (98.2 °F) Pulse: 82 Respiration: 20 Blood Pressure : 127/73  Weight: 65 kg (143 lb 4.8 oz) Height: 162.6 cm (5' 4.02\")  Pulse Oximetry: 96 % O2 (LPM): 3      Completed Laboratory Reports:  Recent Labs     10/28/23  0304 10/30/23  0106   WBC 12.2* 13.2*   HEMOGLOBIN 13.9 10.3*   HEMATOCRIT 42.8 31.9*   PLATELETCT 239 195   SODIUM 138 137   POTASSIUM 3.8 3.9   BUN 23* 23*   CREATININE 1.20 1.00   ALBUMIN 4.2  --    GLUCOSE 121* 118*     Additional Labs: Not Applicable    Prior Living Situation:   Housing / Facility: 1 Story " House  Steps Into Home: 0  Steps In Home: 0  Lives with - Patient's Self Care Capacity: Adult Children  Equipment Owned: Front-Wheel Walker    Prior Level of Function / Living Situation:   Physical Therapy: Prior Services: Continuous (24 Hour) Care Giving Family  H[facility] / Facility[place] [address] [facility] [place] [place]  Steps Into Home: 0  Steps In Home: 0  Bathroom Set up: Walk In Shower  Equipment Owned: Front-Wheel Walker  Lives with - Patient's Self Care Capacity: Adult Children  Bed Mobility: Independent  Transfer Status: Independent  Ambulation: Independent  Assistive Devices Used: None  Current Level of Function:   Gait Level Of Assist: Moderate Assist  Assistive Device: Front Wheel Walker  Distance (Feet): 1  Deviation: Antalgic, Bradykinetic, Shuffled Gait  Weight Bearing Status: WBAT L LE  Supine to Sit: Maximal Assist  Sit to Supine: Maximal Assist  Scooting: Maximal Assist  Sit to Stand: Moderate Assist  Bed, Chair, Wheelchair Transfer: Unable to Participate  Sitting Edge of Bed: 12 min  Standin sec  Occupational Therapy:   Self Feeding: Independent  Grooming / Hygiene: Independent  Bathing: Independent  Dressing: Independent  Toileting: Independent  Medication Management: Requires Assist  Laundry: Requires Assist  Kitchen Mobility: Requires Assist  Finances: Requires Assist  Home Management: Requires Assist  Shopping: Requires Assist  Prior Level Of Mobility: Supervision With Device in Home  Prior Services: Continuous (24 Hour) Care Giving Family  Housing / Facility: [address] [facility] [address] [place]  Current Level of Function:   Upper Body Dressing: Minimal Assist  Lower Body Dressing: Maximal Assist  Toileting: Maximal Assist  Speech Language Pathology:      Rehabilitation Prognosis/Potential: Good  Estimated Length of Stay: 10-14 days    Nursing:      Continent    Scope/Intensity of Services Recommended:  Physical Therapy: 1.5 hr / day  5 days / week. Therapeutic Interventions Required: Maximize Endurance, Mobility, Strength, and  Safety  Occupational Therapy: 1.5 hr / day 5 days / week. Therapeutic Interventions Required: Maximize Self Care, ADLs, IADLs, and Energy Conservation  Rehabilitation Nursin. Therapeutic Interventions Required: Monitor Pain, Skin, Wound(s), Vital Signs, Intake and Output, Labs, Safety, and Family Training  Rehabilitation Physician: 3 - 5 days / week. Therapeutic Interventions Required: Medical Management  Respiratory Care: evaluate . Therapeutic Interventions Required: Pulmonary Toileting and O2 Weaning  Dietician: consult . Therapeutic Interventions Required: nutritional need     She requires 24-hour rehabilitation nursing to manage bowel and bladder function, skin care, surgical incision, nutrition and fluid intake, pulmonary hygiene, pain control, safety, medication management, and patient/family goals. In addition, rehabilitation nursing will reiterate and reinforce therapy skills and equipment use, including ADLs, as well as provide education to the patient and family. Linda Casas is willing to participate in and is able to tolerate the proposed plan of care.    Rehabilitation Goals and Plan (Expected frequency & duration of treatment in the IRF):   Return to the Community, Minimal Assist Level of Care, and Family Able to Provide 24 Assistance  Anticipated Date of Rehabilitation Admission: 10/30/2023  Patient/Family oriented IRF level of care/facility/plan: Yes  Patient/Family willing to participate in IRF care/facility/plan: Yes  Patient able to tolerate IRF level of care proposed: Yes  Patient has potential to benefit IRF level of care proposed: Yes  Comments: Not Applicable    Special Needs or Precautions - Medical Necessity:  Safety Concerns/Precautions:  Fall Risk / High Risk for Falls, Balance, and Cognition  Complex Wound Care: post surgical WBAT  Pain Management  Requires Oxygen  Diet:   DIET ORDERS (From admission to next 24h)       Start     Ordered    10/28/23 1148  Diet  Order Diet: Regular  ALL MEALS        Question:  Diet:  Answer:  Regular    10/28/23 1147                    Anticipated Discharge Destination / Patient/Family Goal:  Destination: Home with Assistance Support System: Spouse, Family , and Friends  Anticipated home health services: OT, PT, Nursing, and Aide  Previously used HH service/ provider: Not Applicable  Anticipated DME Needs: Oxygen and Walker  Outpatient Services: PT  Alternative resources to address additional identified needs:   Renetta Gibbons R.N.  Clinical Admissions Coordinator  Discharge Planning     Addendum  Date of Service:  10/30/2023 10:22 AM    [facility]     Referral from:  Dr. Cesar Kraus  Insurance Provider on Facesheet:  SCP  Potential Rehab diagnosis:     Chart review indicates patient has ongoing medical management and therapy needs to possibly meet inpatient rehab facility criteria with the goal of returning to community.       D/C Support:     Physiatry to consult per protocol.  GLF - L hip fracture.  POD 2 surgical fixation left intertrochanteric femur fracture.        Last Covid test:     Thank you for the referral.             1026 - Telephone call to patients son Efren Shen to discuss IRF referral, clarify discharge support.  Efren requested TCC speak with his spouse Mary.  Explained specifics of inpatient rehab, to Mary, answered questions/concerns.  Discussed comprehensive medical management [facility] by Physiatry and Hospitalist Physician team.  Discussed nursing care and nurse to patient ratio.  Discussed plan for 3hrs therapy, 5 days per week.  Discussed PT/OT/SLP roles.  Discussed [facility] CM team to assist through discharge process.  Mary stated she and Efren will provide return to community support - H, 0 ELIANA.  Mary works 3 days/week - 4a-12noon.  Mary states during that time, Efren will assist as needed.  Mary tells me prior to admit, she  "provided SBA while Dianne bathed, assist patient drying off.  Dianne was able to dress herself, ambulate independently within the home.  Mary is hopeful patient will regain prior level of function.  Discussed Reviewed SCP insurance to included anticipated copay for IRF admission, prior auth required for admission.  Provided TCC contact information.      1124  - Return call to Mary to confirm she/Efren able to provide 24/7 support/supervision to include physical assistance.  Mary stated \"yes\", she is hopeful for rehab admission, aware TCC to seek insurance auth from [facility].       Volate message to TCN team requesting consideration for IRF level care.  Will follow for auth determination.      Future Appointments   Date Time Provider Department Center   12/5/2023  8:20 AM Lakeshia Kuo M.D. [id number]                 Pre-Screen Completed: 10/30/2023 12:09 PM Dustin Longo  "

## 2023-10-30 NOTE — PROGRESS NOTES
Assumed patient care at 0700. Patient Aox1. Patient denies need for pain interventions at this time. Patient belongings are nearby, bed set in low position, locked, and call light within reach. Will continue to monitor patient during shift accordingly. Patient on 2 liters of O2 at present time.

## 2023-10-30 NOTE — DISCHARGE PLANNING
HTH/SCP TCN chart review completed. Voalte received from CM and TCC advising of need for auth for IRF level of care.     SCP auth complete with auth voalted to TCC and provided to CM. Collaborated with DOLLY Carrillo. No further acute TCN needs anticipated in patient discharge planning today, as patient is an anticipated discharge to IRF level of care and SCP auth complete.     Should any additional needs arise in discharge planning prior to patient anticipated discharge today, please voalte TCN as needed. Thank you.     Completed  PT recommends post-acute placement for 10/29/23 for additional physical therapy services prior to discharge home   OT with recommendations for post-acute placement on 10/29/23 for additional occupational therapy services prior to discharge home   Choice obtained: SNF & IRF ( 1. IRF ([facility] first choice, 2. [facility] second choice, 3) [facility] is 3rd choice. Per chart review and collaboration with CM, noted patient is an anticipated discharge to IRF level of care at [facility] today. SCP auth complete and provided to TCC and CM.   SCP with [facility].

## 2023-10-30 NOTE — DISCHARGE INSTRUCTIONS
Discharge Instructions per Cesar Kraus M.D.    Please follow-up with PCP as outpatient.  Follow up with orthopedics Dr. Mckenzie 6 week post operative     Return to ER in the event of new or worsening symptoms. Please note importance of compliance and the patient has agreed to proceed with all medical recommendations and follow up plan indicated above. All medications come with benefits and risks. Risks may include permanent injury or death and these risks can be minimized with close reassessment and monitoring. Please make it to your scheduled follow ups with **, M.D., and/or specialists **

## 2023-10-31 ENCOUNTER — APPOINTMENT (OUTPATIENT)
Dept: PHYSICAL THERAPY | Facility: REHABILITATION | Age: 88
DRG: 560 | End: 2023-10-31
Attending: PHYSICAL MEDICINE & REHABILITATION
Payer: MEDICARE

## 2023-10-31 ENCOUNTER — APPOINTMENT (OUTPATIENT)
Dept: RADIOLOGY | Facility: REHABILITATION | Age: 88
DRG: 560 | End: 2023-10-31
Attending: PHYSICAL MEDICINE & REHABILITATION
Payer: MEDICARE

## 2023-10-31 ENCOUNTER — APPOINTMENT (OUTPATIENT)
Dept: SPEECH THERAPY | Facility: REHABILITATION | Age: 88
DRG: 560 | End: 2023-10-31
Attending: PHYSICAL MEDICINE & REHABILITATION
Payer: MEDICARE

## 2023-10-31 ENCOUNTER — APPOINTMENT (OUTPATIENT)
Dept: OCCUPATIONAL THERAPY | Facility: REHABILITATION | Age: 88
DRG: 560 | End: 2023-10-31
Attending: PHYSICAL MEDICINE & REHABILITATION
Payer: MEDICARE

## 2023-10-31 LAB
ALBUMIN SERPL BCP-MCNC: 3.5 G/DL (ref 3.2–4.9)
ALBUMIN/GLOB SERPL: 1.3 G/DL
ALP SERPL-CCNC: 64 U/L (ref 30–99)
ALT SERPL-CCNC: 16 U/L (ref 2–50)
ANION GAP SERPL CALC-SCNC: 9 MMOL/L (ref 7–16)
AST SERPL-CCNC: 26 U/L (ref 12–45)
BASOPHILS # BLD AUTO: 0.4 % (ref 0–1.8)
BASOPHILS # BLD: 0.05 K/UL (ref 0–0.12)
BILIRUB SERPL-MCNC: 0.9 MG/DL (ref 0.1–1.5)
BUN SERPL-MCNC: 23 MG/DL (ref 8–22)
CALCIUM ALBUM COR SERPL-MCNC: 9.4 MG/DL (ref 8.5–10.5)
CALCIUM SERPL-MCNC: 9 MG/DL (ref 8.5–10.5)
CHLORIDE SERPL-SCNC: 101 MMOL/L (ref 96–112)
CO2 SERPL-SCNC: 26 MMOL/L (ref 20–33)
CREAT SERPL-MCNC: 0.97 MG/DL (ref 0.5–1.4)
EOSINOPHIL # BLD AUTO: 0.14 K/UL (ref 0–0.51)
EOSINOPHIL NFR BLD: 1.2 % (ref 0–6.9)
ERYTHROCYTE [DISTWIDTH] IN BLOOD BY AUTOMATED COUNT: 47.9 FL (ref 35.9–50)
GFR SERPLBLD CREATININE-BSD FMLA CKD-EPI: 56 ML/MIN/1.73 M 2
GLOBULIN SER CALC-MCNC: 2.8 G/DL (ref 1.9–3.5)
GLUCOSE SERPL-MCNC: 104 MG/DL (ref 65–99)
HCT VFR BLD AUTO: 34.8 % (ref 37–47)
HGB BLD-MCNC: 11.2 G/DL (ref 12–16)
IMM GRANULOCYTES # BLD AUTO: 0.06 K/UL (ref 0–0.11)
IMM GRANULOCYTES NFR BLD AUTO: 0.5 % (ref 0–0.9)
LYMPHOCYTES # BLD AUTO: 1.84 K/UL (ref 1–4.8)
LYMPHOCYTES NFR BLD: 15.2 % (ref 22–41)
MAGNESIUM SERPL-MCNC: 2 MG/DL (ref 1.5–2.5)
MCH RBC QN AUTO: 31.2 PG (ref 27–33)
MCHC RBC AUTO-ENTMCNC: 32.2 G/DL (ref 32.2–35.5)
MCV RBC AUTO: 96.9 FL (ref 81.4–97.8)
MONOCYTES # BLD AUTO: 1.52 K/UL (ref 0–0.85)
MONOCYTES NFR BLD AUTO: 12.6 % (ref 0–13.4)
NEUTROPHILS # BLD AUTO: 8.49 K/UL (ref 1.82–7.42)
NEUTROPHILS NFR BLD: 70.1 % (ref 44–72)
NRBC # BLD AUTO: 0 K/UL
NRBC BLD-RTO: 0 /100 WBC (ref 0–0.2)
PHOSPHATE SERPL-MCNC: 2.3 MG/DL (ref 2.5–4.5)
PLATELET # BLD AUTO: 239 K/UL (ref 164–446)
PMV BLD AUTO: 10.6 FL (ref 9–12.9)
POTASSIUM SERPL-SCNC: 3.9 MMOL/L (ref 3.6–5.5)
PROT SERPL-MCNC: 6.3 G/DL (ref 6–8.2)
RBC # BLD AUTO: 3.59 M/UL (ref 4.2–5.4)
SODIUM SERPL-SCNC: 136 MMOL/L (ref 135–145)
TSH SERPL DL<=0.005 MIU/L-ACNC: 4.21 UIU/ML (ref 0.38–5.33)
WBC # BLD AUTO: 12.1 K/UL (ref 4.8–10.8)

## 2023-10-31 PROCEDURE — 770010 HCHG ROOM/CARE - REHAB SEMI PRIVAT*

## 2023-10-31 PROCEDURE — 83735 ASSAY OF MAGNESIUM: CPT

## 2023-10-31 PROCEDURE — 84100 ASSAY OF PHOSPHORUS: CPT

## 2023-10-31 PROCEDURE — 99232 SBSQ HOSP IP/OBS MODERATE 35: CPT | Performed by: PHYSICAL MEDICINE & REHABILITATION

## 2023-10-31 PROCEDURE — A9270 NON-COVERED ITEM OR SERVICE: HCPCS | Performed by: PHYSICAL MEDICINE & REHABILITATION

## 2023-10-31 PROCEDURE — 97530 THERAPEUTIC ACTIVITIES: CPT

## 2023-10-31 PROCEDURE — 87040 BLOOD CULTURE FOR BACTERIA: CPT | Mod: 91

## 2023-10-31 PROCEDURE — 92523 SPEECH SOUND LANG COMPREHEN: CPT

## 2023-10-31 PROCEDURE — 84443 ASSAY THYROID STIM HORMONE: CPT

## 2023-10-31 PROCEDURE — 94760 N-INVAS EAR/PLS OXIMETRY 1: CPT

## 2023-10-31 PROCEDURE — 80053 COMPREHEN METABOLIC PANEL: CPT

## 2023-10-31 PROCEDURE — 97535 SELF CARE MNGMENT TRAINING: CPT

## 2023-10-31 PROCEDURE — 36415 COLL VENOUS BLD VENIPUNCTURE: CPT

## 2023-10-31 PROCEDURE — 71045 X-RAY EXAM CHEST 1 VIEW: CPT

## 2023-10-31 PROCEDURE — 700102 HCHG RX REV CODE 250 W/ 637 OVERRIDE(OP): Performed by: PHYSICAL MEDICINE & REHABILITATION

## 2023-10-31 PROCEDURE — 97162 PT EVAL MOD COMPLEX 30 MIN: CPT

## 2023-10-31 PROCEDURE — 85025 COMPLETE CBC W/AUTO DIFF WBC: CPT

## 2023-10-31 PROCEDURE — 97166 OT EVAL MOD COMPLEX 45 MIN: CPT

## 2023-10-31 PROCEDURE — 700111 HCHG RX REV CODE 636 W/ 250 OVERRIDE (IP): Mod: JZ | Performed by: PHYSICAL MEDICINE & REHABILITATION

## 2023-10-31 RX ADMIN — DIBASIC SODIUM PHOSPHATE, MONOBASIC POTASSIUM PHOSPHATE AND MONOBASIC SODIUM PHOSPHATE 250 MG: 852; 155; 130 TABLET ORAL at 17:25

## 2023-10-31 RX ADMIN — SENNOSIDES AND DOCUSATE SODIUM 2 TABLET: 8.6; 5 TABLET ORAL at 08:18

## 2023-10-31 RX ADMIN — ENOXAPARIN SODIUM 40 MG: 100 INJECTION SUBCUTANEOUS at 17:25

## 2023-10-31 RX ADMIN — DIBASIC SODIUM PHOSPHATE, MONOBASIC POTASSIUM PHOSPHATE AND MONOBASIC SODIUM PHOSPHATE 250 MG: 852; 155; 130 TABLET ORAL at 23:21

## 2023-10-31 RX ADMIN — ACETAMINOPHEN 650 MG: 325 TABLET ORAL at 15:13

## 2023-10-31 RX ADMIN — QUETIAPINE FUMARATE 25 MG: 25 TABLET ORAL at 20:20

## 2023-10-31 RX ADMIN — SENNOSIDES AND DOCUSATE SODIUM 2 TABLET: 8.6; 5 TABLET ORAL at 20:21

## 2023-10-31 RX ADMIN — ESCITALOPRAM OXALATE 20 MG: 10 TABLET ORAL at 08:18

## 2023-10-31 RX ADMIN — ROSUVASTATIN CALCIUM 10 MG: 10 TABLET, FILM COATED ORAL at 20:21

## 2023-10-31 RX ADMIN — OMEPRAZOLE 20 MG: 20 CAPSULE, DELAYED RELEASE ORAL at 08:18

## 2023-10-31 RX ADMIN — CARVEDILOL 3.12 MG: 3.12 TABLET, FILM COATED ORAL at 08:18

## 2023-10-31 ASSESSMENT — PATIENT HEALTH QUESTIONNAIRE - PHQ9
1. LITTLE INTEREST OR PLEASURE IN DOING THINGS: NOT AT ALL
SUM OF ALL RESPONSES TO PHQ9 QUESTIONS 1 AND 2: 0

## 2023-10-31 ASSESSMENT — GAIT ASSESSMENTS
ASSISTIVE DEVICE: PARALLEL BARS
DISTANCE (FEET): 1
DEVIATION: ANTALGIC;STEP TO;DECREASED BASE OF SUPPORT;BRADYKINETIC;DECREASED HEEL STRIKE;DECREASED TOE OFF
GAIT LEVEL OF ASSIST: MAXIMAL ASSIST

## 2023-10-31 ASSESSMENT — BRIEF INTERVIEW FOR MENTAL STATUS (BIMS)
ASKED TO RECALL SOCK: NO, COULD NOT RECALL
BIMS SUMMARY SCORE: 4
ASKED TO RECALL BLUE: NO, COULD NOT RECALL
WHAT DAY OF THE WEEK IS IT: INCORRECT
ASKED TO RECALL BED: NO, COULD NOT RECALL
WHAT YEAR IS IT: MISSED BY MORE THAN 5 YEARS
INITIAL REPETITION OF BED BLUE SOCK - FIRST ATTEMPT: 2
WHAT DAY OF THE WEEK IS IT: NO ANSWER
WHAT MONTH IS IT: MISSED BY MORE THAN 1 MONTH
ASKED TO RECALL BLUE: YES, NO CUE REQUIRED
WHAT YEAR IS IT: NONSENSICAL
ASKED TO RECALL SOCK: NO, COULD NOT RECALL
INITIAL REPETITION OF BED BLUE SOCK - FIRST ATTEMPT: 1
BIMS SUMMARY SCORE: 1
ASKED TO RECALL BED: NO, COULD NOT RECALL
WHAT MONTH IS IT: NONSENSICAL

## 2023-10-31 ASSESSMENT — ACTIVITIES OF DAILY LIVING (ADL)
TOILETING: INDEPENDENT
BED_CHAIR_WHEELCHAIR_TRANSFER_DESCRIPTION: ADAPTIVE EQUIPMENT;INCREASED TIME;SET-UP OF EQUIPMENT;SQUAT PIVOT TRANSFER TO WHEELCHAIR;SUPERVISION FOR SAFETY;VERBAL CUEING

## 2023-10-31 NOTE — THERAPY
Physical Therapy   Initial Evaluation     Patient Name: Linda Casas  Age:  89 y.o., Sex:  female  Medical Record #: [id number]  Today's Date: 10/31/2023     Subjective    Pt was seated in w/c upon arrival and agreeable to treatment.  Pt reported increased fatigue and pain.      Objective       10/31/23 1031   PT Charge Group   PT Therapeutic Activities (Units) 1   PT Evaluation PT Evaluation Mod   PT Total Time Spent   PT Individual Total Time Spent (Mins) 60   Prior Living Situation   Housing / Facility [address]   Steps Into Home 0   Steps In Home 0   Rail None   Lives with - Patient's Self Care Capacity Adult Children   Prior Level of Functional Mobility   Bed Mobility Independent   Transfer Status Independent   Ambulation Independent   Distance Ambulation (Feet)   (limited community)   Assistive Devices Used None   Stairs Unable To Determine At This Time   Prior Functioning: Everyday Activities   Self Care Needed some help   Indoor Mobility (Ambulation) Independent   Stairs Unknown   Functional Cognition Needed some help   Prior Device Use None of the given options   Passive ROM Lower Body   Passive ROM Lower Body WDL   Active ROM Lower Body    Active ROM Lower Body  X   Comments Limited secondary to pain   Strength Lower Body   Rt Hip Flexion Strength 2 (P)   Rt Knee Flexion Strength 4 (G)   Rt Knee Extension Strength 4 (G)   Rt Ankle Dorsiflexion Strength 5 (N)   Lt Hip Flexion Strength 4 (G)   Lt Knee Flexion Strength 4 (G)   Lt Knee Extension Strength 4 (G)   Lt Ankle Dorsiflexion Strength 5 (N)   Sensation Lower Body   Lower Extremity Sensation   WDL   Comments Intact light touch sensation   Bed Mobility    Supine to Sit Contact Guard Assist   Sit to Supine Moderate Assist   Sit to Stand Maximal Assist   Scooting Moderate Assist   Rolling Maximal Assist to Rt.   Roll Left and Right   Assistance Needed Physical assistance   Physical Assistance Level 76% or more   CARE Score - Roll Left and  Right 2   Roll Left and Right Discharge Goal   Discharge Goal 6   Sit to Lying   Assistance Needed Physical assistance   Physical Assistance Level 51%-75%   CARE Score - Sit to Lying 2   Sit to Lying Discharge Goal   Discharge Goal 6   Lying to Sitting on Side of Bed   Assistance Needed Incidental touching;Supervision;Adaptive equipment   CARE Score - Lying to Sitting on Side of Bed 4   Lying to Sitting on Side of Bed Discharge Goal   Discharge Goal 6   Sit to Stand   Assistance Needed Physical assistance   Physical Assistance Level 76% or more   CARE Score - Sit to Stand 2   Sit to Stand Discharge Goal   Discharge Goal 6   Chair/Bed-to-Chair Transfer   Assistance Needed Physical assistance   Physical Assistance Level 76% or more   CARE Score - Chair/Bed-to-Chair Transfer 2   Chair/Bed-to-Chair Transfer Discharge Goal   Discharge Goal 6   Car Transfer   Reason if not Attempted Safety concerns   CARE Score - Car Transfer 88   Car Transfer Discharge Goal   Discharge Goal 4   Walk 10 Feet   Reason if not Attempted Safety concerns   CARE Score - Walk 10 Feet 88   Walk 10 Feet Discharge Goal   Discharge Goal 4   Walk 50 Feet with Two Turns   Reason if not Attempted Safety concerns   CARE Score - Walk 50 Feet with Two Turns 88   Walk 50 Feet with Two Turns Discharge Goal   Discharge Goal 4   Walk 150 Feet   Reason if not Attempted Safety concerns   CARE Score - Walk 150 Feet 88   Walk 150 Feet Discharge Goal   Discharge Goal 4   Walking 10 Feet on Uneven Surfaces   Reason if not Attempted Safety concerns   CARE Score - Walking 10 Feet on Uneven Surfaces 88   Walking 10 Feet on Uneven Surfaces Discharge Goal   Discharge Goal 4   1 Step (Curb)   Reason if not Attempted Safety concerns   CARE Score - 1 Step (Curb) 88   1 Step (Curb) Discharge Goal   Discharge Goal 4   4 Steps   Reason if not Attempted Activity not applicable   CARE Score - 4 Steps 9   4 Steps Discharge Goal   Discharge Goal 9   12 Steps   Reason if not  Attempted Activity not applicable   CARE Score - 12 Steps 9   12 Steps Discharge Goal   Discharge Goal 9   Picking Up Object   Reason if not Attempted Safety concerns   CARE Score - Picking Up Object 88   Picking Up Object Discharge Goal   Discharge Goal 4   Wheel 50 Feet with Two Turns   Reason if not Attempted Activity not applicable   CARE Score - Wheel 50 Feet with Two Turns 9   Wheel 50 Feet with Two Turns Discharge Goal   Discharge Goal 9   Wheel 150 Feet   Reason if not Attempted Activity not applicable   CARE Score - Wheel 150 Feet 9   Wheel 150 Feet Discharge Goal   Discharge Goal 9   Gait Functional Level of Assist    Gait Level Of Assist Maximal Assist   Assistive Device Parallel Bars   Distance (Feet) 1   # of Times Distance was Traveled 1   Deviation Antalgic;Step To;Decreased Base Of Support;Bradykinetic;Decreased Heel Strike;Decreased Toe Off   Wheelchair Functional Level of Assist   Wheelchair Assist   (Unable d/t fatigue and pain)   Stairs Functional Level of Assist   Level of Assist with Stairs Unable to Participate   Transfer Functional Level of Assist   Bed, Chair, Wheelchair Transfer Maximal Assist   Bed Chair Wheelchair Transfer Description Adaptive equipment;Increased time;Set-up of equipment;Squat pivot transfer to wheelchair;Supervision for safety;Verbal cueing   Problem List    Problems Pain;Impaired Bed Mobility;Impaired Transfers;Impaired Ambulation;Functional Strength Deficit;Impaired Balance;Impaired Coordination;Decreased Activity Tolerance;Safety Awareness Deficits / Cognition   Precautions   Precautions Fall Risk;Weight Bearing As Tolerated Left Lower Extremity   Current Discharge Plan   Current Discharge Plan Return to Prior Living Situation   Interdisciplinary Plan of Care Collaboration   IDT Collaboration with  Nursing;Occupational Therapist;Speech Therapist   Patient Position at End of Therapy In Bed;Call Light within Reach;Tray Table within Reach;Phone within Reach    Collaboration Comments CLOF   PT DME Recommendations   Wheelchair   (TBD)   Assistive Device   (TBD, pt has FWW and SPC)   Physical Therapist Assigned   Assigned PT / Treatment Time / Comments 60/30   Benefit   Therapy Benefit Patient Would Benefit from Inpatient Rehabilitation Physical Therapy to Maximize Functional [place] with ADLs, IADLs and Mobility.   Strengths & Barriers   Strengths Independent prior level of function;Pleasant and cooperative;Supportive family;Willingly participates in therapeutic activities   Barriers Confused;Decreased endurance;Fatigue;Generalized weakness;Impaired activity tolerance;Impaired balance;Impaired functional cognition;Limited mobility;Pain;Pain poorly managed     Pt educated on PT role, PT POC, rehab orientation.     Assessment  Patient is 89 y.o. female with a diagnosis of L hip fx s/p ORIF d/t GLF.  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      Plan  Recommend Physical Therapy  minutes per day 5-7 days per week for 10-14 days for the following treatments:  PT Group Therapy, PT E Stim Attended, PT Gait Training, PT Therapeutic Exercises, PT Neuro Re-Ed/Balance, PT Therapeutic Activity, PT Manual Therapy, and PT Evaluation.    Passport items to be completed:  Get in/out of bed safely, in/out of a vehicle, safely use mobility device, walk or wheel around home/community, navigate up and down stairs, show how to get up/down from the ground, ensure home is accessible, demonstrate HEP, complete caregiver training    Goals:  Long term and short term goals have been discussed with patient and they are in agreement.    Physical Therapy Problems (Active)       Problem: Mobility       Dates: Start:  10/31/23         Goal: STG-Within one week, patient will  propel wheelchair community x 25 feet with SBA       Dates: Start:  10/31/23            Goal: STG-Within one week, patient will ambulate household distance x 25 feet with FWW and min A        Dates: Start:  10/31/23               Problem: Mobility Transfers       Dates: Start:  10/31/23         Goal: STG-Within one week, patient will transfer bed to chair with min A SQPT vs SPT with FWW       Dates: Start:  10/31/23               Problem: PT-Long Term Goals       Dates: Start:  10/31/23         Goal: LTG-By discharge, patient will ambulate x 150 feet with FWW and SBA       Dates: Start:  10/31/23            Goal: LTG-By discharge, patient will transfer one surface to another with FWW and SBA       Dates: Start:  10/31/23            Goal: LTG-By discharge, patient will transfer in/out of a car with FWW and SBA       Dates: Start:  10/31/23

## 2023-10-31 NOTE — THERAPY
"Speech Language Pathology   Initial Assessment     Patient Name: Linda Casas  AGE:  89 y.o., SEX:  female  Medical Record #: [id number]  Today's Date: 10/31/2023     Subjective    Per H&P \"\"HPI: The patient is a 89 y.o. 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  Per documentation, patient was at home while walking to the bathroom when she fell and tripped and landed on her left side.  Patient did not hit her head, patient was unable to bear weight.  Upon evaluation in the emergency department left femur x-ray showed minimally comminuted, angulated and mildly displaced left femoral neck and intertrochanteric femur fracture.  Orthopedic surgery was consulted, and patient was taken to the OR on 10/28 for ORIF of the left intertrochanteric femur fracture with cephalomedullary implant performed by Dr. Mckenzie.  Postop, patient is WBAT LLE.  Patient has postop leukocytosis.  Patient has required IV morphine for pain control.  She does have p.o. oxycodone available, however IV morphine was utilized today.     Patient seen and examined at bedside.  Patient oriented to person and place.  Patient is oriented to situation.  However patient does not know the month or the year.  Patient does not know what town she lives in.  Patient is able to explain the chills with her son and daughter-in-law who help her with most needs at home.  Patient reports left hip pain.  Although patient has memory impairment she follows 1 step commands easily and is very cooperative. Denies headache, lightheadedness, chest pain or numbness tingling or weakness. Continues to state that just her left hip hurts.\"     On admission patient is confused and wants to go home.      Called [facility] and they have been taking care of her 24/7. She is [place]oth[place] and speaks better " "Azeri than English. She does not have her hearing aids.\"     Objective       10/31/23 1231   Therapy Missed   Missed Therapy (Minutes) 30   Reason For Missed Therapy Medical - Patient on Hold from Therapy;Medical - Patient Agitated   Evaluation Charges   Charges Yes   SLP Speech Language Evaluation Speech Sound Language Comprehension   SLP Total Time Spent   SLP Individual Total Time Spent (Mins) 30   Precautions   Precautions Fall Risk;Weight Bearing As Tolerated Left Lower Extremity   Prior Living Situation   Housing / Facility 1 Story House   Lives with - Patient's Self Care Capacity Adult Children  (son and DIL)   Prior Level Of Function   Communication Unknown   Swallow Unknown   Hearing Unknown   Vision Wears Corrective Lenses;Reading   (per pt)   Occupation (Pre-Hospital Vocational) Retired Due To Age   Receptive Language / Auditory Comprehension   Receptive Language / Auditory Comprehension X   Answers Yes / No Personal Questions Moderate (3)   Follows One Unit Commands Moderate (3)   Expressive Language   Expressive Language (WDL) X   Verbalizes Wants / Needs Moderate (3)   Sustain Dialogue Within Given Topic Moderate (3)   Reading Comprehension    Reading Comprehension (WDL) X   Reading Words Supervision (5)   Written Language Expression   Written Language Expression (WDL)   (to be assessed)   Cognition   Cognitive-Linguistic (WDL) X   Simple Attention Severe (2)   Orientation  Severe (2)   Functional Memory Activities Severe (2)   ABS (Agitated Behavior Scale)   Agitated Behavior Scale Performed Yes   Short Attention Span, Easy Distractibility, Inability to Concentrate 3   Impulsive, Impatient, Low Tolerance for Pain or Frustration 3   Uncooperative, Resistant to Care, Demanding 3   Violent and/or Threatening Violence Toward People or Property 1   Explosive and/or Unpredictable Anger 1   Rocking, Rubbing, Moaning, Other Self-Stimulating Behavior 1   Pulling at Tubes, Restraints, etc. 1   Wandering from " "Treatment Area 1   Restlessness, Pacing, Excessive Movement 1   Repetitive Behaviors, Motor and/or Verbal 1   Rapid, Loud or Excessive Talking 1   Sudden Changes of Mood 2   Easily Initiated - Excessive Crying and/or Laughter 1   Self-Abusiveness, Physical and/or Verbal 1   Agitated Behavior Scale Total Score 21   Level of Severity No Agitation   Cognitive Pattern Assessment   Cognitive Pattern Assessment Used BIMS   Brief Interview for Mental Status (BIMS)   Repetition of Three Words (First Attempt) 1   Temporal Orientation: Year Nonsensical   Temporal Orientation: Month Missed by more than 1 month   Temporal Orientation: Day Incorrect   Recall: \"Sock\" No, could not recall   Recall: \"Blue\" No, could not recall   Recall: \"Bed\" No, could not recall   BIMS Summary Score 1   Social / Pragmatic Communication   Social / Pragmatic Communication X   Turn Taking Moderate (3)   Topic Maintenance Moderate (3)   Appropriate Language Moderate (3)   Functional Level of Assist   Comprehension Moderate Assist   Comprehension Description Verbal cues   Expression Minimal Assist   Expression Description Verbal cueing   Social Interaction Moderate Assist   Social Interaction Description Verbal cues;Low stimulation environment;Increased time   Problem Solving Maximal Assist   Problem Solving Description Verbal cueing;Therapy schedule;Seat belt;Increased time;Bed/chair alarm   Memory Maximal Assist   Memory Description Verbal cueing;Therapy schedule;Seat belt;Increased time;Bed/chair alarm   Problem List   Problem List Attention Deficit;Memory Deficit;Verbal Problem Solving Deficits;Impaired Safety;Impaired Judgement   Current Discharge Plan   Current Discharge Plan Return to Prior Living Situation   Benefit   Therapy Benefit Patient would benefit from Inpatient Rehab Speech-Language Pathology to address above identified deficits.   Interdisciplinary Plan of Care Collaboration   IDT Collaboration with  Physician   Patient Position at " "End of Therapy In Bed;Bed Alarm On;Call Light within Reach;Tray Table within Reach   Collaboration Comments Pt unable to participate in the second half of this session due to increasing agitation   Strengths & Barriers   Strengths Supportive family   Barriers Agitation;Confused;Difficulty following instructions;Fatigue;Impaired functional cognition;Impaired carryover of learning;Impaired insight/denial of deficits;Uncooperative;Lack of motivation   Speech Language Pathologist Assigned   Assigned SLP / Treatment Time / Comments CW 60       Assessment    Patient is 89 y.o. female with a diagnosis of left hip fracture after GLF.  Additional factors influencing patient status/progress (ie: cognitive factors, co-morbidities, social support, etc): confused, fatigue, agitation, family support, unclear cognitive baseline.      Pt was asleep in bed at the beginning of this ST session.  Discussion was had with pt's PT who reported that pt was very fatigued during her evaluation this morning.  Pt was initially very difficult to wake, only able to open her eyes for a few seconds and attempt to answer a question before closing her eyes again.  Pt was not oriented to her current location (even given a choice of 2), the month, the year, the day of the week, or the reason why she is in the hospital.  When given a choice of 2 for what part of her body she injured pt identified that her head was injured.  Pt was only able to maintain wakefulness for a few seconds at a time for the first 25 minutes of this session; however when she was able to remain alert pt became increasingly agitated (cussing, refusing to answer questions, repeating \"leave me alone, I want to rest\").  Pt also perseverated on being \"thrown out and left here\", despite education that pt broke her hip and is in the hospital to recover.  Per chart review pt lives in [place] with her son and daughter-in-law who provided 24/7 assistance at baseline.  Unsure if pt was " previously managing any IADL's.  Will re-attempt evaluation and administration of the [facility] tomorrow.      Plan  Recommend Speech Therapy 30-60 minutes per day 5-7 days per week for 2 weeks for the following treatments:  SLP Speech Language Treatment, SLP Self Care / ADL Training , SLP Cognitive Skill Development, and SLP Group Treatment.    Passport items to be completed:  Express basic needs, understand food/liquid recommendations, consistently follow swallow precautions, manage finances, manage medications, arrive to therapy appointments on time, complete daily memory log entries, solve problems related to safety situations, review education related to hospitalization, complete caregiver training     Goals:  Long term and short term goals have been discussed with patient; pt unable to participate in conversation about her POC due to fatigue and agitation.      Speech Therapy Problems (Active)       Problem: Problem Solving STGs       Dates: Start:  10/31/23         Goal: STG-Within one week, patient will       Dates: Start:  10/31/23               Problem: Speech/Swallowing LTGs       Dates: Start:  10/31/23         Goal: LTG-By discharge, patient will solve basic problems       Dates: Start:  10/31/23

## 2023-10-31 NOTE — DISCHARGE PLANNING
CASE MANAGEMENT INITIAL ASSESSMENT    Admit Date:  10/30/2023     I met with patient to discuss role of case management / discharge planning / team conference.   Patient is a  89 y.o. female transferred from [facility].    Diagnosis: Closed left hip fracture, initial encounter ([facility]) [S72.002A]    Co-morbidities:   Patient Active Problem List    Diagnosis Date Noted    Closed left hip fracture, initial encounter ([facility]) 10/30/2023    Hip fracture requiring operative repair, right, closed, initial encounter ([facility]) 10/28/2023    Left displaced femoral neck fracture ([facility]) 10/28/2023    ACP (advance care planning) 10/28/2023    Continuous leakage of urine 03/13/2023    BMI 25.0-25.9,adult 05/03/2022    Hypertensive heart disease with chronic systolic congestive heart failure ([facility]) 05/03/2022    Chronic systolic congestive heart failure ([facility]) 05/03/2022    Hypertensive kidney disease with stage 3b chronic kidney disease ([facility]) 05/03/2022    Hypercoagulable state ([facility]) 05/03/2022    Hyperlipidemia 05/03/2022    Major depressive disorder with single episode, 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[facility]) 05/03/2022    Cerebral atrophy ([facility]) 05/03/2022    Leukocytosis 04/20/2022    Dementia without behavioral disturbance ([facility]) 04/13/2022    Hypertension 04/13/2022    Mixed stress and urge urinary incontinence 04/13/2022    Ischemic cardiomyopathy 04/08/2022    Premature atrial complex 04/08/2022    Debility 04/08/2022    Paroxysmal A-fib ([facility]) 04/04/2022    B12 deficiency 04/04/2022    Closed stable burst fracture of first lumbar vertebra ([facility]) 11/01/2021    Fall 09/24/2021    Closed fracture of left distal radius 06/24/2021    Other fatigue 03/02/2020    Trigger finger, right middle finger 05/29/2018    Cholecystolithiasis 05/23/2016    Tear of medial cartilage or meniscus of knee, current 03/11/2014    Osteopenia 09/14/2010    Anxiety 09/14/2010     Prior Living Situation:  Housing / Facility: 1 Story House  Lives with - Patient's Self Care Capacity:  Adult Children (son and DIL)    Prior Level of Function:  Prior Level Of Mobility: Independent Without Device in Home    Support Systems:  Primary : Waqar Shen-son: [phone] or [phone] or Bola in law: [phone]         Previous Services Utilized:   Equipment Owned: Tub / Shower Seat, 4-Wheel Walker, Grab Bar(s) In Tub / Shower, Single Point Cane    Other Information:  Occupation (Pre-Hospital Vocational): Retired Due To Age     Primary Payor Source: [facility]  Primary Care Practitioner : Dr. Kuo    Patient / Family Goal:  Patient / Family Goal: Return home    Plan:  1. Continue to follow patient through hospitalization and provide discharge planning in collaboration with patient, family, physicians and ancillary services.     2. Utilize community resources to ensure a safe discharge.

## 2023-10-31 NOTE — PROGRESS NOTES
NURSING DAILY NOTE    Name: Linda Casas   Date of Admission: 10/30/2023   Admitting Diagnosis: Closed left hip fracture, initial encounter ([facility])  Attending Physician: Kimmie Samayoa D.o.  Allergies: Ampicillin, Bactrim ds, and Ciprofloxacin    Safety  Patient Assist     Patient Precautions     Precaution Comments     Bed Transfer Status     Toilet Transfer Status      Assistive Devices  Rails, Wheelchair  Oxygen  Nasal Cannula  Diet/Therapeutic Dining  Current Diet Order   Procedures    Diet Order Diet: Regular     Pill Administration  whole and one at a time   Agitated Behavioral Scale     ABS Level of Severity       Fall Risk  Has the patient had a fall this admission?      Cheryl De Jesus Fall Risk Scoring  26, HIGH RISK  Fall Risk Safety Measures  bed alarm, chair alarm, poor balance, and low vision/ hearing    Vitals  Temperature: 37.2 °C (98.9 °F)  Temp src: Oral  Pulse: (!) 101  Respiration: 18  Blood Pressure : 124/76  Blood Pressure MAP (Calculated): 92 MM HG  BP Location: Right, Upper Arm  Patient BP Position: Supine     Oxygen  Pulse Oximetry: 91 %  O2 (LPM): 2  O2 Delivery Device: Nasal Cannula    Bowel and Bladder  Last Bowel Movement  10/28/23  Stool Type     Bowel Device     Continent  Bladder: Stress incontinence   Bowel: Continent movement  Bladder Function  Urine Void (mL):  (large incontinent)  Urine Color: Yellow  Number of Times Incontinent of Urine: 1  Genitourinary Assessment   Bladder Assessment (WDL):  WDL Except  Urine Color: Yellow  Number of Times Incontinent of Urine: 1  Bladder Scan: Post Void  $ Bladder Scan Results (mL): 159    Skin  Harris Score   15  Sensory Interventions   Bed Types: Standard/Trauma Mattress  Moisture Interventions  Moisturizers/Barriers: Barrier Cream      Pain  Pain Rating Scale  0 - No Pain  Pain Location  Hip, Leg  Pain Location Orientation  Left  Pain Interventions    [place]    ADLs    Bathing      Linen Change      Personal Hygiene     Chlorhexidine Bath      Oral Care     Teeth/Dentures     Shave     Nutrition Percentage Eaten     Environmental Precautions     Patient Turns/Positioning  Patient Turns Self from Side to Side  Patient Turns Assistance/Tolerance     Bed Positions  Bed Controls On, Bed Locked  Head of Bed Elevated  Self regulated      Psychosocial/Neurologic Assessment  Psychosocial Assessment     Neurologic Assessment  Neuro (WDL): Exceptions to WDL  Level of Consciousness: Responds to pain  Orientation Level: Disoriented to situation, Oriented to person, Disoriented to time, Disoriented to person  Cognition: Memory Loss  Speech: Clear  Facial Symmetry:  (wdl)  Pupil Assesment: Yes  R Pupil Size (mm): 2  R Pupil Shape / Description: Round  R Pupil Reaction: Brisk  L Pupil Size (mm): 2  L Pupil Shape / Description: Round  L Pupil Reaction: Brisk  Motor Function/Sensation Assessment: Motor response  RUE Motor Response: Responds to commands  RUE Sensation: Decreased  LUE Motor Response: Responds to commands  LUE Sensation: No numbness  RLE Motor Response: Responds to commands  RLE Sensation: No pain  LLE Motor Response: Responds to commands  LLE Sensation: Pain  EENT (WDL):  Within Defined Limits    Cardio/Pulmonary Assessment  Edema      Respiratory Breath Sounds  RUL Breath Sounds: Clear  RML Breath Sounds: Diminished  RLL Breath Sounds: Diminished  SANTY Breath Sounds: Clear  LLL Breath Sounds: Diminished  Cardiac Assessment   Cardiac (WDL):  Within Defined Limits

## 2023-10-31 NOTE — PROGRESS NOTES
Physical Medicine & Rehabilitation Progress Note    Encounter Date: 10/31/2023    Chief Complaint: Confusion    Interval Events (Subjective):  VSS  BM 10/31  Voiding volitionally low PVRs    Seen and examined in her room. Is confused, but states she feels overall well. She doesn't know why she needs to be here and would like to go home. Misses her dog, Carolann.       ROS: 14 point ROS unable to be done to confusion    Objective:  VITAL SIGNS: /62   Pulse 69   Temp 36.5 °C (97.7 °F) (Oral)   Resp 18   Wt 67.8 kg (149 lb 8 oz)   SpO2 93%   BMI 25.65 kg/m²     GEN: No apparent distress  HEENT: Head normocephalic, atraumatic.  Sclera nonicteric bilaterally, no ocular discharge appreciated bilaterally.  CV: Extremities warm and well-perfused, no peripheral edema appreciated bilaterally.  PULMONARY: Breathing nonlabored on room air, no respiratory accessory muscle use.  Not requiring supplemental oxygen.  SKIN: No appreciable skin breakdown on exposed areas of skin.  PSYCH: Mood and affect within normal limits.  NEURO: Awake alert.  Confused.       Laboratory Values:  Recent Results (from the past 72 hour(s))   CBC WITHOUT DIFFERENTIAL    Collection Time: 10/30/23  1:06 AM   Result Value Ref Range    WBC 13.2 (H) 4.8 - 10.8 K/uL    RBC 3.28 (L) 4.20 - 5.40 M/uL    Hemoglobin 10.3 (L) 12.0 - 16.0 g/dL    Hematocrit 31.9 (L) 37.0 - 47.0 %    MCV 97.3 81.4 - 97.8 fL    MCH 31.4 27.0 - 33.0 pg    MCHC 32.3 32.2 - 35.5 g/dL    RDW 49.4 35.9 - 50.0 fL    Platelet Count 195 164 - 446 K/uL    MPV 10.8 9.0 - 12.9 fL   Basic Metabolic Panel    Collection Time: 10/30/23  1:06 AM   Result Value Ref Range    Sodium 137 135 - 145 mmol/L    Potassium 3.9 3.6 - 5.5 mmol/L    Chloride 104 96 - 112 mmol/L    Co2 24 20 - 33 mmol/L    Glucose 118 (H) 65 - 99 mg/dL    Bun 23 (H) 8 - 22 mg/dL    Creatinine 1.00 0.50 - 1.40 mg/dL    Calcium 8.7 8.5 - 10.5 mg/dL    Anion Gap 9.0 7.0 - 16.0   MAGNESIUM    Collection Time: 10/30/23   1:06 AM   Result Value Ref Range    Magnesium 2.0 1.5 - 2.5 mg/dL   PHOSPHORUS    Collection Time: 10/30/23  1:06 AM   Result Value Ref Range    Phosphorus 2.3 (L) 2.5 - 4.5 mg/dL   ESTIMATED GFR    Collection Time: 10/30/23  1:06 AM   Result Value Ref Range    GFR (CKD-EPI) 54 (A) >60 mL/min/1.73 m 2   CBC with Differential    Collection Time: 10/31/23  5:24 AM   Result Value Ref Range    WBC 12.1 (H) 4.8 - 10.8 K/uL    RBC 3.59 (L) 4.20 - 5.40 M/uL    Hemoglobin 11.2 (L) 12.0 - 16.0 g/dL    Hematocrit 34.8 (L) 37.0 - 47.0 %    MCV 96.9 81.4 - 97.8 fL    MCH 31.2 27.0 - 33.0 pg    MCHC 32.2 32.2 - 35.5 g/dL    RDW 47.9 35.9 - 50.0 fL    Platelet Count 239 164 - 446 K/uL    MPV 10.6 9.0 - 12.9 fL    Neutrophils-Polys 70.10 44.00 - 72.00 %    Lymphocytes 15.20 (L) 22.00 - 41.00 %    Monocytes 12.60 0.00 - 13.40 %    Eosinophils 1.20 0.00 - 6.90 %    Basophils 0.40 0.00 - 1.80 %    Immature Granulocytes 0.50 0.00 - 0.90 %    Nucleated RBC 0.00 0.00 - 0.20 /100 WBC    Neutrophils (Absolute) 8.49 (H) 1.82 - 7.42 K/uL    Lymphs (Absolute) 1.84 1.00 - 4.80 K/uL    Monos (Absolute) 1.52 (H) 0.00 - 0.85 K/uL    Eos (Absolute) 0.14 0.00 - 0.51 K/uL    Baso (Absolute) 0.05 0.00 - 0.12 K/uL    Immature Granulocytes (abs) 0.06 0.00 - 0.11 K/uL    NRBC (Absolute) 0.00 K/uL   Comp Metabolic Panel (CMP)    Collection Time: 10/31/23  5:24 AM   Result Value Ref Range    Sodium 136 135 - 145 mmol/L    Potassium 3.9 3.6 - 5.5 mmol/L    Chloride 101 96 - 112 mmol/L    Co2 26 20 - 33 mmol/L    Anion Gap 9.0 7.0 - 16.0    Glucose 104 (H) 65 - 99 mg/dL    Bun 23 (H) 8 - 22 mg/dL    Creatinine 0.97 0.50 - 1.40 mg/dL    Calcium 9.0 8.5 - 10.5 mg/dL    Correct Calcium 9.4 8.5 - 10.5 mg/dL    AST(SGOT) 26 12 - 45 U/L    ALT(SGPT) 16 2 - 50 U/L    Alkaline Phosphatase 64 30 - 99 U/L    Total Bilirubin 0.9 0.1 - 1.5 mg/dL    Albumin 3.5 3.2 - 4.9 g/dL    Total Protein 6.3 6.0 - 8.2 g/dL    Globulin 2.8 1.9 - 3.5 g/dL    A-G Ratio 1.3 g/dL    Magnesium    Collection Time: 10/31/23  5:24 AM   Result Value Ref Range    Magnesium 2.0 1.5 - 2.5 mg/dL   Phosphorus    Collection Time: 10/31/23  5:24 AM   Result Value Ref Range    Phosphorus 2.3 (L) 2.5 - 4.5 mg/dL   TSH with Reflex to FT4    Collection Time: 10/31/23  5:24 AM   Result Value Ref Range    TSH 4.210 0.380 - 5.330 uIU/mL   ESTIMATED GFR    Collection Time: 10/31/23  5:24 AM   Result Value Ref Range    GFR (CKD-EPI) 56 (A) >60 mL/min/1.73 m 2       Medications:  Scheduled Medications   Medication Dose Frequency    phosphorus  250 mg Q6HRS    Pharmacy Consult Request  1 Each PHARMACY TO DOSE    omeprazole  20 mg DAILY    carvedilol  3.125 mg BID    enoxaparin (LOVENOX) injection  40 mg DAILY AT 1800    escitalopram  20 mg DAILY    losartan  50 mg Q EVENING    QUEtiapine  25 mg Nightly    rosuvastatin  10 mg Q EVENING    senna-docusate  2 Tablet BID     PRN medications: Respiratory Therapy Consult, hydrALAZINE, carboxymethylcellulose, benzocaine-menthol, mag hydrox-al hydrox-simeth, ondansetron **OR** ondansetron, traZODone, sodium chloride, senna-docusate **AND** polyethylene glycol/lytes **AND** magnesium hydroxide **AND** bisacodyl, acetaminophen, oxyCODONE immediate-release, oxyCODONE immediate-release    Diet:  Current Diet Order   Procedures    Diet Order Diet: Regular       Medical Decision Making and Plan:  Left femoral neck and intertrochanteric femur fracture s/p ORIF with cephalomedullary implant 10/28/2023 Dr. Mckenzie  PT and OT for mobility and ADLs. Per guidelines, 15 hours per week between PT, OT and/or SLP.  Follow-up Ortho  Weightbearing as tolerated     Dementia  SLP consult, likely at baseline  Seroquel nightly -consider reducing if not home med     Leukocytosis  Likely reactive after surgery  UA, chest x-ray (negative), blood cultures   Slight reduction 10/31  CBC 11/1     Hypophosphatemia  Low - 3x supplement     Azotemia  Stable     L2 compression fracture 6/2023 s/p  kyphoplasty Dr. Barton  Monitor     Hyperlipidemia  Continue Crestor     Hypertension  Continue losartan and Coreg     Anxiety  Continue Lexapro     Pain  As needed oxycodone     Skin  Patient at risk for skin breakdown due to debility in areas including sacrum, achilles, elbows and head in addition to other sites. Nursing to assess skin daily.      GI Ppx  Patient on Prilosec for GERD prophylaxis.      Bowel   Patient on Senna-docusate for constipation prophylaxis.      Bladder  Timed voids and PVR/BS     DVT PROPHYLAXIS: Lovenox 40 mg subcutaneous nightly     HOSPITALIST FOLLOWING: No     CODE STATUS: DNAR/DNI     DISPO: HEMA TBD.  Going home with adult children whom she lives with.      M2B ELIGIBLE: TBD     DISCHARGE FOLLOW UP: Orthopedics, PCP  ____________________________________    Dr. Kimmie Samayoa DO, MS  [facility] - Physical Medicine & Rehabilitation   ____________________________________

## 2023-10-31 NOTE — THERAPY
Occupational Therapy   Initial Evaluation     Patient Name: Linda Casas  Age:  89 y.o., Sex:  female  Medical Record #: [id number]  Today's Date: 10/31/2023     Subjective    Patient in bed upon arrival, agreeable to participate in OT.      Objective     10/31/23 0831   OT Charge Group   Charges Yes   OT Self Care / ADL (Units) 1   OT Evaluation OT Evaluation Mod   OT Total Time Spent   OT Individual Total Time Spent (Mins) 60   Prior Living Situation   Housing / Facility 1 Story House  ([place])   Steps Into Home 0   Steps In Home 1  (1 step down to den)   Rail None   Bathroom Set up Walk In Shower;Shower Curtain;Grab Bars;Shower Chair   Equipment Owned Tub / Shower Seat;4-Wheel Walker;Grab Bar(s) In Tub / Shower;Single Point Cane   Lives with - Patient's Self Care Capacity Adult Children  (son and DIL)   Prior Level of ADL Function   Self Feeding Independent   Grooming / Hygiene Independent   Bathing Requires Assist  (DIL provided supervision)   Dressing Independent   Toileting Independent  (bladder incontinence at baseline, wore pull ups)   Prior Level of IADL Function   Prior Level Of Mobility Independent Without Device in Home   Occupation (Pre-Hospital Vocational) Retired Due To Age   Leisure Interests   (walking, knitting)   Comments Family assited w/ IADLs   Prior Functioning: Everyday Activities   Self Care Needed some help  (overall independent except DIL reported supervision for showers)   Indoor Mobility (Ambulation) Independent   Stairs Unknown   Functional Cognition Needed some help   Prior Device Use None of the given options   Vitals   O2 (LPM) 2   O2 Delivery Device Nasal Cannula   ABS (Agitated Behavior Scale)   Agitated Behavior Scale Performed Yes   Short Attention Span, Easy Distractibility, Inability to Concentrate 2   Impulsive, Impatient, Low Tolerance for Pain or Frustration 2   Uncooperative, Resistant to Care, Demanding 1   Violent and/or Threatening Violence Toward People or  "Property 1   Explosive and/or Unpredictable Anger 1   Rocking, Rubbing, Moaning, Other Self-Stimulating Behavior 1   Pulling at Tubes, Restraints, etc. 1   Wandering from Treatment Area 1   Restlessness, Pacing, Excessive Movement 1   Repetitive Behaviors, Motor and/or Verbal 1   Rapid, Loud or Excessive Talking 1   Sudden Changes of Mood 1   Easily Initiated - Excessive Crying and/or Laughter 1   Self-Abusiveness, Physical and/or Verbal 1   Agitated Behavior Scale Total Score 16   Level of Severity No Agitation   Cognitive Pattern Assessment   Cognitive Pattern Assessment Used BIMS   Brief Interview for Mental Status (BIMS)   Repetition of Three Words (First Attempt) 2   Temporal Orientation: Year Missed by more than 5 years   Temporal Orientation: Month Nonsensical   Temporal Orientation: Day No answer   Recall: \"Sock\" No, could not recall   Recall: \"Blue\" Yes, no cue required   Recall: \"Bed\" No, could not recall   BIMS Summary Score 4   Confusion Assessment Method (CAM)   Is there evidence of an acute change in mental status from the patient's baseline? Yes   Inattention Behavior present, fluctuates (comes and goes, changes in severity)   Disorganized thinking Behavior present, fluctuates (comes and goes, changes in severity)   Altered level of consciousness Behavior not present   Vision Screen   Vision Not tested   Passive ROM Upper Body   Passive ROM Upper Body WDL   Active ROM Upper Body   Comments BAROM appears grossly intact   Strength Upper Body   Comments generalized weakness based on functional observation   Sensation Upper Body   Upper Extremity Sensation  Not Tested   Upper Body Muscle Tone   Upper Body Muscle Tone  WDL   Bed Mobility    Supine to Sit Contact Guard Assist   Sit to Stand Moderate Assist   Scooting Moderate Assist   Coordination Upper Body   Coordination WDL   Eating   Assistance Needed Set-up / clean-up   CARE Score - Eating 5   Eating Discharge Goal   Discharge Goal 6   Oral Hygiene "   Assistance Needed Set-up / clean-up   CARE Score - Oral Hygiene 5   Oral Hygiene Discharge Goal   Discharge Goal 6   Shower/Bathe Self   Assistance Needed Physical assistance   Physical Assistance Level 25% or less   CARE Score - Shower/Bathe Self 3   Shower/Bathe Self Discharge Goal   Discharge Goal 4   Upper Body Dressing   Assistance Needed Physical assistance   Physical Assistance Level 26%-50%   CARE Score - Upper Body Dressing 3   Upper Body Dressing Discharge Goal   Discharge Goal 5   Lower Body Dressing   Assistance Needed Physical assistance   Physical Assistance Level Total assistance   CARE Score - Lower Body Dressing 1   Lower Body Dressing Discharge Goal   Discharge Goal 4   Putting On/Taking Off Footwear   Assistance Needed Physical assistance   Physical Assistance Level Total assistance   CARE Score - Putting On/Taking Off Footwear 1   Putting On/Taking Off Footwear Discharge Goal   Discharge Goal 5   Toileting Hygiene   Assistance Needed Physical assistance   Physical Assistance Level Total assistance   CARE Score - Toileting Hygiene 1   Toileting Hygiene Discharge Goal   Discharge Goal 4   Toilet Transfer   Assistance Needed Physical assistance   Physical Assistance Level 26%-50%   CARE Score - Toilet Transfer 3   Toilet Transfer Discharge Goal   Discharge Goal 4   Hearing, Speech, and Vision   Ability to Hear Adequate   Ability to See in Adequate Light Adequate   Expression of Ideas and Wants Some difficulty   Understanding Verbal and Non-Verbal Content Usually understands   Functional Level of Assist   Eating Stand by Assist   Grooming Standby Assist   Bathing Minimal Assist   Upper Body Dressing Moderate Assist  (long sleeve shirt)   Lower Body Dressing Total Assist  (limited by pain and impaired standing balance/tolerance)   Toileting Total Assist   Bed, Chair, Wheelchair Transfer Moderate Assist   Toilet Transfers Moderate Assist   Tub / Shower Transfers Moderate Assist   Problem List    Problem List Decreased Active Daily Living Skills;Decreased Homemaking Skills;Decreased Functional Mobility;Decreased Activity Tolerance;Safety Awareness Deficits / Cognition;Impaired Posture / Trunk Alignment;Impaired Cognitive Function;Impaired Postural Control / Balance   Precautions   Precautions Fall Risk;Weight Bearing As Tolerated Left Lower Extremity   Current Discharge Plan   Current Discharge Plan Return to Prior Living Situation   Interdisciplinary Plan of Care Collaboration   IDT Collaboration with  Certified Nursing Assistant   Patient Position at End of Therapy Seated   Collaboration Comments txfrd care to CNA due to time constraint   Strengths & Barriers   Strengths Supportive family;Pleasant and cooperative   Barriers Bladder incontinence;Decreased endurance;Dementia;Fatigue;Generalized weakness;Impaired activity tolerance;Impaired balance;Impaired functional cognition;Limited mobility;Pain   Occupational Therapist Assigned   Assigned OT / Treatment Time / Comments JS x 60 mins       Assessment  Patient is 89 y.o. female with a diagnosis of closed L hip fracture (s/p GLF). 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 Patient underwent ORIF of the left intertrochanteric femur fracture with cephalomedullary implant performed by Dr. Mckenzie. WBAT LLE.    At baseline, DIL reports patient was grossly independent with ADLs and mobility; DIL provided supervision for showers. Family assisted w/ IADLs. Patient resided w/ son and DIL in [facility] w/ no steps to negotiate entry. 1 step down to den. At time of OT eval, patient presents below functional  baseline w/ primary barriers being pain, impaired mobility, decreased balance, fatigue and decreased endurance. She will benefit from daily skilled OT to address above deficits to maximize functional safety/independence prior to DC home.      Plan  Recommend Occupational Therapy  minutes per day 5-7 days per week for 7-10 days for the following treatments:  OT Self Care/ADL, OT Cognitive Skill Dev, OT Community Reintegration, OT Manual Ther Technique, OT Neuro Re-Ed/Balance, OT Sensory Int Techniques, OT Therapeutic Activity, OT Evaluation, and OT Therapeutic Exercise.    Passport items to be completed:  Perform bathroom transfers, complete dressing, complete feeding, get ready for the day, prepare a simple meal, participate in household tasks, adapt home for safety needs, demonstrate home exercise program, complete caregiver training     Goals:  Long term and short term goals have been discussed with patient and they are in agreement.    Occupational Therapy Goals (Active)       Problem: Dressing       Dates: Start:  10/31/23         Goal: STG-Within one week, patient will dress UB w/ min A       Dates: Start:  10/31/23            Goal: STG-Within one week, patient will dress LB w/ consistent max A       Dates: Start:  10/31/23               Problem: Functional Transfers       Dates: Start:  10/31/23         Goal: STG-Within one week, patient will transfer to toilet w/ min A       Dates: Start:  10/31/23               Problem: OT Long Term Goals       Dates: Start:  10/31/23         Goal: LTG-By discharge, patient will complete basic self care tasks w/ CGA to supervision        Dates: Start:  10/31/23            Goal: LTG-By discharge, patient will perform bathroom transfers w/ CGA to supervision w/ LRAD       Dates: Start:  10/31/23

## 2023-10-31 NOTE — DISCHARGE INSTRUCTIONS
[facility] NURSING DISCHARGE INSTRUCTIONS    Blood Pressure : 135/66  Weight: 67.8 kg (149 lb 8 oz)  Nursing recommendations for Dianne Casas at time of discharge are as follows:  Client and Family Member verbalized understanding of all discharge instructions and prescriptions.     Review all your home medications and newly ordered medications with your doctor and/or pharmacist. Follow medication instructions as directed by your doctor and/or pharmacist.    Pain Management:   Discharge Pain Medication Instructions:  Comfort Goal: Comfort with Movement, Perform Activity, Stay Alert  Notify your primary care provider if pain is unrelieved with these measures, if the pain is new, or increased in intensity.    Discharge Skin Characteristics: Warm, Dry  Discharge Skin Exam: Bruise (Indicate Location In Comment) (left hip incision x3 with staples covered with foam dressings)  Wound 10/30/23 Left Left hip incision (Active)   Wound Image   11/07/23 0700   Site Assessment DARYN 11/09/23 2015   Periwound Assessment DARYN 11/09/23 2015   Margins DARYN 11/09/23 2015   Closure Staples 11/09/23 0730   Drainage Amount None 11/09/23 0730   Treatments Site care 11/03/23 2100   Wound Cleansing Approved Wound Cleanser 11/07/23 0700   Dressing Status Clean;Dry;Intact 11/09/23 0730   Dressing Changed Observed 11/09/23 0730   Dressing Options Silicone Adhesive Foam 11/09/23 2015   Dressing Change/Treatment Frequency As Needed 11/09/23 2015   NEXT Weekly Photo (Inpatient Only) 11/06/23 10/30/23 1626       Wound 10/30/23 Left Left thigh incision (Active)   Wound Image   11/07/23 0700   Site Assessment DARYN 11/09/23 2015   Periwound Assessment DARYN 11/09/23 2015   Margins DARYN 11/09/23 2015   Closure DARYN 11/09/23 2015   Drainage Amount None 11/09/23 0730   Wound Cleansing Approved Wound Cleanser 11/07/23 0700   Dressing Status Clean;Dry;Intact 11/09/23 0730   Dressing Changed Observed 11/09/23 0730   Dressing  Options Silicone Adhesive Foam 11/09/23 2015   Dressing Change/Treatment Frequency As Needed 11/09/23 2015   NEXT Dressing Change/Treatment Date 11/06/23 10/30/23 1626       Wound 11/08/23 Other (comment) Sacrum;Back Midline (Active)   Wound Image   11/08/23 1100   Site Assessment DARYN 11/09/23 2015   Periwound Assessment DARYN 11/09/23 2015   Margins DARYN 11/09/23 2015     Skin / Wound Care Instructions: Please contact your primary care physician for any change in skin integrity.     If You Have Surgical Incisions / Wounds:  Monitor surgical site(s) for signs of increased swelling, redness or symptoms of drainage from the site or fever as this could indicate signs and symptoms of infection. If these symptoms are noted, notifiy your primary care provider.      Discharge Safety Instructions: Should Not Be Left Alone In The House     Discharge Safety Concerns: History Of Falls, Balance Problems (Dizziness, Light Headedness), Unsteady Gait, Weakness, Impaired Judgement  The interdisciplinary team has made recommendation that you should have adult supervision in the house due to balance problem, impaired judgment, history of falls, weakness, and unsteady gait  Anti-embolic stockings are required during the day and off at night to increase circulation to the lower extremities.    Discharge Diet: Regular     Discharge Liquids: Thin  Discharge Bowel Function: Continent  Please contact your primary care physician for any changes in bowel habits.  Discharge Bowel Program:    Discharge Bladder Function: Continent  Discharge Urinary Devices: None      Nursing Discharge Plan:   Influenza Vaccine Indication: Not indicated: Previously immunized this influenza season and > 8 years of age    Case Management Discharge Instructions:   Discharge Location: Skilled Nursing Facility  Agency Name/Address/Phone:    Home Health:    Outpatient Services:    DME Provider/Phone:    Medical Equipment Ordered:    Prescription Faxed to:   "      Discharge Medication Instructions:  Below are the medications your physician expects you to take upon discharge:    Prevent Falls in Your Home    \"Falling once doubles your chance of falling again\"        -Center for Disease Control and Prevention    Falls in the home can lead to serious injury (fractures, brain injuries), hospitalizations, increased medical costs, and could even be fatal.  The good news is, there are many precautions you can take to avoid falls in your home and help keep you safe:     If prescribed an assistive device (walker, crutches), use as instructed by the healthcare provider\"   Remove any tripping hazards from your home, including loose cords, throw rugs and clutter  Keep a nightlight on in dark (hallways, bathrooms, etc)   Get up slowly, to make sure you feel okay before getting up  Be aware of any side effects of your medications: some medications may make you dizzy  Place a non-skid rubber mat in your shower or tub-consider a shower bench or chair if unsteady on your feet  Wear supportive shoes or non-skid socks when moving around  Start an exercise program once approved by your provider.  If you are feeling weak following a hospital stay, talk to your doctor about home health or outpatient therapy programs designed to help rebuild your strength and endurance    Hip Fracture Treated With ORIF, Care After  The following information offers guidance on how to care for yourself after your procedure. Your health care provider may also give you more specific instructions. If you have problems or questions, contact your health care provider.  What can I expect after the procedure?  After the procedure, it is common to have:  Pain. You will be given medicines to treat this.  Swelling.  Difficulty walking.  Some redness or bruising around the incision.  A small amount of fluid or blood from the incision.  Follow these instructions at home:  Medicines  Take over-the-counter and prescription " medicines only as told by your health care provider.  You may be given a blood thinner to take for up to 6 weeks. This will help reduce the risk of developing a blood clot. It is important to use this medicine exactly as directed.  Ask your health care provider if the medicine prescribed to you:  Requires you to avoid driving or using machinery.  Can cause constipation. You may need to take these actions to prevent or treat constipation:  Drink enough fluid to keep your urine pale yellow.  Take over-the-counter or prescription medicines.  Eat foods that are high in fiber, such as beans, whole grains, and fresh fruits and vegetables.  Limit foods that are high in fat and processed sugars, such as fried or sweet foods.  You may be given calcium and vitamin D supplements to strengthen your bones.  Bathing  Do not take baths, swim, or use a hot tub until your health care provider approves. Ask your health care provider if you may take showers. You may only be allowed to take sponge baths.  Keep your bandage (dressing) dry.  Incision care    Follow instructions from your health care provider about how to take care of your incision. Make sure you:  Wash your hands with soap and water for at least 20 seconds before and after you change your dressing. If soap and water are not available, use hand .  Change your dressing as told by your health care provider. You may be asked to leave the dressing in place until your clinic visit.  Leave stitches (sutures), staples, skin glue, or adhesive strips in place. These skin closures may need to stay in place for 2 weeks or longer. If adhesive strip edges start to loosen and curl up, you may trim the loose edges. Do not remove adhesive strips completely unless your health care provider tells you to do that.  Check your incision area every day for signs of infection. Check for:  More redness, swelling, or pain.  More fluid or blood.  Warmth.  Pus or a bad smell.  Managing  pain, stiffness, and swelling    If directed, put ice on the affected area. To do this:  Put ice in a plastic bag.  Place a towel between your skin and the bag.  Leave the ice on for 20 minutes, 2-3 times a day.  Remove the ice if your skin turns bright red. This is very important. If you cannot feel pain, heat, or cold, you have a greater risk of damage to the area.  Move your hips, knees, ankles, and toes often to reduce stiffness and swelling.  Raise (elevate) your leg above the level of your heart while you are lying down. To do this, try putting a few pillows under your leg.  Activity    Return to your normal activities as told by your health care provider. Ask your health care provider what activities are safe for you.  Do exercises as told by your health care provider or physical therapist. This will help make your hip stronger and help you recover more quickly.  Do not use your injured limb to support (bear) your body weight until your health care provider says that you can. Follow weight-bearing restrictions as told by your health care provider. Use crutches, a walker, or other devices (assistive devices) to help you move around as directed.  You may feel most comfortable using a raised surface when sitting on the toilet or in a chair.  Consider using a toilet seat riser over the toilet for comfort.  General instructions  Wear compression stockings as told by your health care provider. These stockings help to prevent blood clots and reduce swelling in your legs.  Do not use any products that contain nicotine or tobacco. These products include cigarettes, chewing tobacco, and vaping devices, such as e-cigarettes. These can delay bone healing and increase your risk of infection. If you need help quitting, ask your health care provider.  Keep all follow-up visits. This is important. This may include visits for:  Physical therapy.  Screening for osteoporosis. Osteoporosis is the thinning and loss of density in  your bones.  Contact a health care provider if:  You have a fever.  You have pain that is not helped with medicine.  You have more redness, swelling, or pain at your incision area.  You have more fluid or blood coming from your incision or leaking through your dressing.  You notice that your incision feels warm to the touch.  You have pus or a bad smell coming from your incision area.  Get help right away if:  You notice that the edges of your incision have come apart after the sutures or staples have been removed.  You have pain, warmth, or tenderness in the back of your lower leg (calf).  You have tingling or numbness in your leg.  You have a pale and cold leg.  You have trouble breathing.  You have chest pain.  These symptoms may be an emergency. Get help right away. Call 911.  Do not wait to see if the symptoms will go away.  Do not drive yourself to the hospital.  Summary  After the procedure, it is common to have some pain and swelling.  Take pain medicines as directed by your health care provider. Icing may also help with pain control.  Contact your health care provider if you have any signs of infection, such as more redness, swelling, or pain at your incision area, or more fluid or blood coming from your incision.  This information is not intended to replace advice given to you by your health care provider. Make sure you discuss any questions you have with your health care provider.  Document Revised: 11/03/2022 Document Reviewed: 11/03/2022  Elsevier Patient Education © 2023 [employer] Inc[employer]    Depression, Adult  Depression refers to feeling sad, low, down in the dumps, blue, gloomy, or empty. In general, there are two kinds of depression:  Normal sadness or normal grief. This kind of depression is one that we all feel from time to time after upsetting life experiences, such as the loss of a job or the ending of a relationship. This kind of depression is considered normal, is short lived, and resolves within  a few days to 2 weeks. Depression experienced after the loss of a loved one (bereavement) often lasts longer than 2 weeks but normally gets better with time.  Clinical depression. This kind of depression lasts longer than normal sadness or normal grief or interferes with your ability to function at home, at work, and in school. It also interferes with your personal relationships. It affects almost every aspect of your life. Clinical depression is an illness.  Symptoms of depression can also be caused by conditions other than those mentioned above, such as:  Physical illness. Some physical illnesses, including underactive thyroid gland (hypothyroidism), severe anemia, specific types of cancer, diabetes, uncontrolled seizures, heart and lung problems, strokes, and chronic pain are commonly associated with symptoms of depression.  Side effects of some prescription medicine. In some people, certain types of medicine can cause symptoms of depression.  Substance abuse. Abuse of alcohol and illicit drugs can cause symptoms of depression.  SYMPTOMS  Symptoms of normal sadness and normal grief include the following:  Feeling sad or crying for short periods of time.  Not caring about anything (apathy).  Difficulty sleeping or sleeping too much.  No longer able to enjoy the things you used to enjoy.  Desire to be by oneself all the time (social isolation).  Lack of energy or motivation.  Difficulty concentrating or remembering.  Change in appetite or weight.  Restlessness or agitation.  Symptoms of clinical depression include the same symptoms of normal sadness or normal grief and also the following symptoms:  Feeling sad or crying all the time.  Feelings of guilt or worthlessness.  Feelings of hopelessness or helplessness.  Thoughts of suicide or the desire to harm yourself (suicidal ideation).  Loss of touch with reality (psychotic symptoms). Seeing or hearing things that are not real (hallucinations) or having false beliefs  about your life or the people around you (delusions and paranoia).  DIAGNOSIS   The diagnosis of clinical depression is usually based on how bad the symptoms are and how long they have lasted. Your health care provider will also ask you questions about your medical history and substance use to find out if physical illness, use of prescription medicine, or substance abuse is causing your depression. Your health care provider may also order blood tests.  TREATMENT   Often, normal sadness and normal grief do not require treatment. However, sometimes antidepressant medicine is given for bereavement to ease the depressive symptoms until they resolve.  The treatment for clinical depression depends on how bad the symptoms are but often includes antidepressant medicine, counseling with a mental health professional, or both. Your health care provider will help to determine what treatment is best for you.  Depression caused by physical illness usually goes away with appropriate medical treatment of the illness. If prescription medicine is causing depression, talk with your health care provider about stopping the medicine, decreasing the dose, or changing to another medicine.  Depression caused by the abuse of alcohol or illicit drugs goes away when you stop using these substances. Some adults need professional help in order to stop drinking or using drugs.  SEEK IMMEDIATE MEDICAL CARE IF:  You have thoughts about hurting yourself or others.  You lose touch with reality (have psychotic symptoms).  You are taking medicine for depression and have a serious side effect.  FOR MORE INFORMATION  National [place] on Mental Illness: www.[place].org   National [place] of Mental Health: www.nimh.nih.gov      This information is not intended to replace advice given to you by your health care provider. Make sure you discuss any questions you have with your health care provider.     Document Released: 12/15/2001 Document Revised:  01/08/2016 Document Reviewed: 03/18/2013  Elsevier Interactive Patient Education ©2016 Elsevier Inc.

## 2023-10-31 NOTE — CARE PLAN
Problem: Pain - Standard  Goal: Alleviation of pain or a reduction in pain to the patient’s comfort goal  Outcome: Progressing     Problem: Fall Risk - Rehab  Goal: Patient will remain free from falls  Outcome: Progressing     Problem: Bladder / Voiding  Goal: Patient will establish and maintain regular urinary output  Outcome: Progressing  Pt is incontinent of bladder, unable to collect urine sample. Pt refused to be straight cath.    The patient is Stable - Low risk of patient condition declining or worsening

## 2023-10-31 NOTE — FLOWSHEET NOTE
10/30/23 1749   Protocol Assessment   Initial Assessment Yes   Patient History   Pulmonary Diagnosis None   Procedures Relevant to Respiratory Status None   Home O2 No   Nocturnal CPAP No   Home Treatments/Frequency No   Sleep Apnea Screening   Have you had a sleep study?   (pt refuses to answer questions appropriately.)   COPD Risk Screening   Do you have a history of COPD?   (pt refuses to answer questions appropriately.)   Protocol Pathways   Protocol Pathways None

## 2023-10-31 NOTE — CARE PLAN
"  Problem: Pain - Standard  Goal: Alleviation of pain or a reduction in pain to the patient’s comfort goal  Outcome: Progressing  Note: Pt assessed for pain and discomfort ,left hip incision line , c/o of pain when turned, but refusing to take pain meds . Able to repositioned to sides and diaper was changed for incontinence.     Problem: Fall Risk - Rehab  Goal: Patient will remain free from falls  Outcome: Progressing  Note: Cheryl De Jesus Fall risk Assessment Score: 26    High fall risk Interventions   - Alarming seatbelt  - Bed and strip alarm   - Yellow sign by the door   - Yellow wrist band \"Fall risk\"  - Room near to the nurse station  - Do not leave patient unattended in the bathroom  - Fall risk education provided       The patient is Stable - Low risk of patient condition declining or worsening      Progress made toward(s) clinical / shift goals:  Pt free from fall and injury.    "

## 2023-10-31 NOTE — FLOWSHEET NOTE
10/30/23 1808   Events/Summary/Plan   Events/Summary/Plan Pt placed on 2 lpm nasal cannula for intermittant desaturatioins.   Vital Signs   Pulse 84   Respiration 16   Pulse Oximetry 96 %   $ Pulse Oximetry (Spot Check) Yes   Oxygen   O2 (LPM) 2   Oxygen Equipment Initial Setup   O2 Delivery Device Nasal Cannula

## 2023-10-31 NOTE — PROGRESS NOTES
Pt confused, disoriented  and paranoid, refused to be moved iand repositioned in bed, refused to take meds and diaper to be changed. Pt stated '' are you gonna kill me? No I don't want to,  you'll kill me'. Reassurance provided, reoriented to time and place and plan of care. Pt remained to refused all care. Safety measures enforced, bed at lowest level, call light in reach, bed alarm and strip alarm on. Cont monitored.Kathryn HURST Supervisor made aware.

## 2023-10-31 NOTE — FLOWSHEET NOTE
10/30/23 1753   Events/Summary/Plan   Events/Summary/Plan RT Assessment   Vital Signs   Pulse 86   Respiration 16   Pulse Oximetry 90 %   $ Pulse Oximetry (Spot Check) Yes   Respiratory Assessment   Respiratory Pattern Within Normal Limits   Level of Consciousness Alert  (But not coherent)   Oxygen   O2 Delivery Device None - Room Air

## 2023-11-01 ENCOUNTER — APPOINTMENT (OUTPATIENT)
Dept: OCCUPATIONAL THERAPY | Facility: REHABILITATION | Age: 88
DRG: 560 | End: 2023-11-01
Attending: PHYSICAL MEDICINE & REHABILITATION
Payer: MEDICARE

## 2023-11-01 ENCOUNTER — APPOINTMENT (OUTPATIENT)
Dept: PHYSICAL THERAPY | Facility: REHABILITATION | Age: 88
DRG: 560 | End: 2023-11-01
Attending: PHYSICAL MEDICINE & REHABILITATION
Payer: MEDICARE

## 2023-11-01 ENCOUNTER — APPOINTMENT (OUTPATIENT)
Dept: SPEECH THERAPY | Facility: REHABILITATION | Age: 88
DRG: 560 | End: 2023-11-01
Attending: PHYSICAL MEDICINE & REHABILITATION
Payer: MEDICARE

## 2023-11-01 ENCOUNTER — HOSPITAL ENCOUNTER (OUTPATIENT)
Facility: MEDICAL CENTER | Age: 88
End: 2023-11-01
Attending: PHYSICAL MEDICINE & REHABILITATION
Payer: MEDICARE

## 2023-11-01 LAB
APPEARANCE UR: CLEAR
BACTERIA #/AREA URNS HPF: NEGATIVE /HPF
BILIRUB UR QL STRIP.AUTO: NEGATIVE
COLOR UR: YELLOW
EPI CELLS #/AREA URNS HPF: NEGATIVE /HPF
GLUCOSE UR STRIP.AUTO-MCNC: NEGATIVE MG/DL
HYALINE CASTS #/AREA URNS LPF: ABNORMAL /LPF
KETONES UR STRIP.AUTO-MCNC: NEGATIVE MG/DL
LEUKOCYTE ESTERASE UR QL STRIP.AUTO: ABNORMAL
MICRO URNS: ABNORMAL
NITRITE UR QL STRIP.AUTO: NEGATIVE
PH UR STRIP.AUTO: 5.5 [PH] (ref 5–8)
PROT UR QL STRIP: NEGATIVE MG/DL
RBC # URNS HPF: ABNORMAL /HPF
RBC UR QL AUTO: NEGATIVE
SP GR UR STRIP.AUTO: 1.02
UROBILINOGEN UR STRIP.AUTO-MCNC: 0.2 MG/DL
WBC #/AREA URNS HPF: ABNORMAL /HPF

## 2023-11-01 PROCEDURE — 97129 THER IVNTJ 1ST 15 MIN: CPT

## 2023-11-01 PROCEDURE — 97110 THERAPEUTIC EXERCISES: CPT

## 2023-11-01 PROCEDURE — 770010 HCHG ROOM/CARE - REHAB SEMI PRIVAT*

## 2023-11-01 PROCEDURE — 97116 GAIT TRAINING THERAPY: CPT

## 2023-11-01 PROCEDURE — 81001 URINALYSIS AUTO W/SCOPE: CPT

## 2023-11-01 PROCEDURE — 94760 N-INVAS EAR/PLS OXIMETRY 1: CPT

## 2023-11-01 PROCEDURE — 97130 THER IVNTJ EA ADDL 15 MIN: CPT

## 2023-11-01 PROCEDURE — 97530 THERAPEUTIC ACTIVITIES: CPT

## 2023-11-01 PROCEDURE — A9270 NON-COVERED ITEM OR SERVICE: HCPCS | Performed by: PHYSICAL MEDICINE & REHABILITATION

## 2023-11-01 PROCEDURE — 99233 SBSQ HOSP IP/OBS HIGH 50: CPT | Performed by: PHYSICAL MEDICINE & REHABILITATION

## 2023-11-01 PROCEDURE — 700102 HCHG RX REV CODE 250 W/ 637 OVERRIDE(OP): Performed by: PHYSICAL MEDICINE & REHABILITATION

## 2023-11-01 PROCEDURE — 97535 SELF CARE MNGMENT TRAINING: CPT

## 2023-11-01 RX ADMIN — SENNOSIDES AND DOCUSATE SODIUM 2 TABLET: 8.6; 5 TABLET ORAL at 20:03

## 2023-11-01 RX ADMIN — DIBASIC SODIUM PHOSPHATE, MONOBASIC POTASSIUM PHOSPHATE AND MONOBASIC SODIUM PHOSPHATE 250 MG: 852; 155; 130 TABLET ORAL at 11:45

## 2023-11-01 RX ADMIN — CARVEDILOL 3.12 MG: 3.12 TABLET, FILM COATED ORAL at 08:36

## 2023-11-01 RX ADMIN — DIBASIC SODIUM PHOSPHATE, MONOBASIC POTASSIUM PHOSPHATE AND MONOBASIC SODIUM PHOSPHATE 250 MG: 852; 155; 130 TABLET ORAL at 17:26

## 2023-11-01 RX ADMIN — ROSUVASTATIN CALCIUM 10 MG: 10 TABLET, FILM COATED ORAL at 20:03

## 2023-11-01 RX ADMIN — SENNOSIDES AND DOCUSATE SODIUM 2 TABLET: 8.6; 5 TABLET ORAL at 08:36

## 2023-11-01 RX ADMIN — DIBASIC SODIUM PHOSPHATE, MONOBASIC POTASSIUM PHOSPHATE AND MONOBASIC SODIUM PHOSPHATE 250 MG: 852; 155; 130 TABLET ORAL at 05:45

## 2023-11-01 RX ADMIN — QUETIAPINE FUMARATE 25 MG: 25 TABLET ORAL at 20:03

## 2023-11-01 RX ADMIN — OMEPRAZOLE 20 MG: 20 CAPSULE, DELAYED RELEASE ORAL at 08:36

## 2023-11-01 RX ADMIN — ESCITALOPRAM OXALATE 20 MG: 10 TABLET ORAL at 08:36

## 2023-11-01 ASSESSMENT — GAIT ASSESSMENTS
DEVIATION: ATAXIC;DECREASED HEEL STRIKE;DECREASED TOE OFF
ASSISTIVE DEVICE: PARALLEL BARS
GAIT LEVEL OF ASSIST: MODERATE ASSIST
DISTANCE (FEET): 10

## 2023-11-01 ASSESSMENT — ACTIVITIES OF DAILY LIVING (ADL)
BED_CHAIR_WHEELCHAIR_TRANSFER_DESCRIPTION: ADAPTIVE EQUIPMENT;INCREASED TIME;SET-UP OF EQUIPMENT;SUPERVISION FOR SAFETY;VERBAL CUEING;INITIAL PREPARATION FOR TASK
BED_CHAIR_WHEELCHAIR_TRANSFER_DESCRIPTION: ADAPTIVE EQUIPMENT;INCREASED TIME;REQUIRES LIFT;SET-UP OF EQUIPMENT;VERBAL CUEING

## 2023-11-01 NOTE — CARE PLAN
"The patient is Stable - Low risk of patient condition declining or worsening    Problem: Fall Risk - Rehab  Goal: Patient will remain free from falls  Outcome: Progressing  Note: Cheryl De Jesus Fall risk Assessment Score:26    High fall risk Interventions   - Alarming seatbelt  - Wander guard  - Bed and strip alarm   - Yellow sign by the door   - Yellow wrist band \"Fall risk\"  - Room near to the nurse station  - Do not leave patient unattended in the bathroom  - Fall risk education provided    Problem: Bladder / Voiding  Goal: Patient will establish and maintain regular urinary output  Outcome: Progressing  Note: Pt, unable to collect UA. Pt already voided in diaper incontinent/continent. Pt verbalize needs to go.       "

## 2023-11-01 NOTE — PROGRESS NOTES
NURSING DAILY NOTE    Name: Linda Casas   Date of Admission: 10/30/2023   Admitting Diagnosis: Closed left hip fracture, initial encounter ([facility])  Attending Physician: Kimmie Samayoa D.o.  Allergies: Ampicillin, Bactrim ds, and Ciprofloxacin    Safety  Patient Assist     Patient Precautions  Fall Risk, Weight Bearing As Tolerated Left Lower Extremity  Precaution Comments     Bed Transfer Status  Maximal Assist  Toilet Transfer Status   Moderate Assist  Assistive Devices  Rails, Wheelchair  Oxygen  Nasal Cannula  Diet/Therapeutic Dining  Current Diet Order   Procedures    Diet Order Diet: Regular     Pill Administration  whole  Agitated Behavioral Scale  21  ABS Level of Severity  No Agitation    Fall Risk  Has the patient had a fall this admission?      Cheryl De Jesus Fall Risk Scoring  26, HIGH RISK  Fall Risk Safety Measures  bed alarm, chair alarm, seatbelt alarm, and low vision/ hearing    Vitals  Temperature: 36.9 °C (98.4 °F)  Temp src: Temporal  Pulse: 61  Respiration: 17  Blood Pressure : 105/66  Blood Pressure MAP (Calculated): 79 MM HG  BP Location: Right, Upper Arm  Patient BP Position: Gonzales's Position     Oxygen  Pulse Oximetry: 96 %  O2 (LPM): 2  O2 Delivery Device: Nasal Cannula    Bowel and Bladder  Last Bowel Movement  10/31/23  Stool Type  Type 6: Fluffy pieces with ragged edges, a mushy stool  Bowel Device     Continent  Bladder: Stress incontinence   Bowel: Continent movement  Bladder Function  Urine Void (mL):  (incontinent in diaper)  Number of Times Voided: 1  Urine Color: Yellow  Number of Times Incontinent of Urine: 1  Genitourinary Assessment   Bladder Assessment (WDL):  WDL Except  Urine Color: Yellow  Number of Times Incontinent of Urine: 1  Bladder Scan: Post Void  $ Bladder Scan Results (mL): 105    Skin  Harris Score   15  Sensory Interventions   Bed Types: Standard/Trauma Mattress  Moisture  Interventions  Moisturizers/Barriers: Barrier Cream      Pain  Pain Rating Scale  0 - No Pain  Pain Location  Hip, Leg  Pain Location Orientation  Left  Pain Interventions    (sleeping)    ADLs    Bathing      Linen Change      Personal Hygiene     Chlorhexidine Bath      Oral Care     Teeth/Dentures     Shave     Nutrition Percentage Eaten  Lunch, Refused  Environmental Precautions     Patient Turns/Positioning  Patient Turns Self from Side to Side  Patient Turns Assistance/Tolerance     Bed Positions  Bed Controls On, Bed Locked  Head of Bed Elevated  Self regulated      Psychosocial/Neurologic Assessment  Psychosocial Assessment     Neurologic Assessment  Neuro (WDL): Exceptions to WDL  Level of Consciousness: Responds to pain  Orientation Level: Disoriented to situation, Oriented to person, Disoriented to time, Disoriented to person  Cognition: Memory Loss  Speech: Clear  Facial Symmetry:  (wdl)  Pupil Assesment: Yes  R Pupil Size (mm): 2  R Pupil Shape / Description: Round  R Pupil Reaction: Brisk  L Pupil Size (mm): 2  L Pupil Shape / Description: Round  L Pupil Reaction: Brisk  Motor Function/Sensation Assessment: Motor response  RUE Motor Response: Responds to commands  RUE Sensation: Decreased  LUE Motor Response: Responds to commands  LUE Sensation: No numbness  RLE Motor Response: Responds to commands  RLE Sensation: No pain  LLE Motor Response: Responds to commands  LLE Sensation: Pain  EENT (WDL):  Within Defined Limits    Cardio/Pulmonary Assessment  Edema      Respiratory Breath Sounds  RUL Breath Sounds: Clear  RML Breath Sounds: Diminished  RLL Breath Sounds: Diminished  SANTY Breath Sounds: Clear  LLL Breath Sounds: Diminished  Cardiac Assessment   Cardiac (WDL):  Within Defined Limits

## 2023-11-01 NOTE — CARE PLAN
Problem: Dressing  Goal: STG-Within one week, patient will dress UB w/ min A  Outcome: Not Met  Goal: STG-Within one week, patient will dress LB w/ consistent max A  Outcome: Not Met     Problem: Functional Transfers  Goal: STG-Within one week, patient will transfer to toilet w/ min A  Outcome: Not Met

## 2023-11-01 NOTE — THERAPY
Physical Therapy   Daily Treatment     Patient Name: Linda Casas  Age:  89 y.o., Sex:  female  Medical Record #: [id number]  Today's Date: 11/1/2023     Precautions  Precautions: Fall Risk, Weight Bearing As Tolerated Left Lower Extremity    Subjective    Pt up in wc, requires encouragement to participate     Objective       11/01/23 1031   PT Charge Group   PT Gait Training (Units) 2   PT Therapeutic Exercise (Units) 1   PT Therapeutic Activities (Units) 1   PT Total Time Spent   PT Individual Total Time Spent (Mins) 60   Gait Functional Level of Assist    Gait Level Of Assist Moderate Assist   Assistive Device Parallel Bars  (L foot slider)   Distance (Feet) 10   # of Times Distance was Traveled 4   Deviation Ataxic;Decreased Heel Strike;Decreased Toe Off  (impaired LLE terminal hip/ knee ext and WB tolerance)   Transfer Functional Level of Assist   Bed, Chair, Wheelchair Transfer Moderate Assist   Bed Chair Wheelchair Transfer Description Adaptive equipment;Increased time;Requires lift;Set-up of equipment;Verbal cueing  (FWW)   Toilet Transfers Moderate Assist   Toilet Transfer Description Adaptive equipment;Grab bar;Increased time;Requires lift;Verbal cueing   Sitting Lower Body Exercises   Sitting Lower Body Exercises Yes   Ankle Pumps 1 set of 10   Hip Flexion 1 set of 10   Hip Abduction 1 set of 10   Hip Adduction 1 set of 10   Long Arc Quad 1 set of 10   Comments AAROM   Bed Mobility    Sit to Supine Moderate Assist   Sit to Stand Moderate Assist         Assessment    Pt tolerated therapy well despite pain and fatigue, improved mobility since evaluation yesterday but requires assist for both LLE swing and stance stabilization due to pain  Strengths: Independent prior level of function, Pleasant and cooperative, Supportive family, Willingly participates in therapeutic activities  Barriers: Confused, Decreased endurance, Fatigue, Generalized weakness, Impaired activity tolerance, Impaired  balance, Impaired functional cognition, Limited mobility, Pain, Pain poorly managed    Plan    Progressive gait with // bars and progress to FWW as able.   Sit<>stand/ transfers with FWW, LE ROM/ strength training    DME  PT DME Recommendations  Wheelchair:  (TBD)  Assistive Device:  (TBD, pt has FWW and SPC)    Passport items to be completed:  Get in/out of bed safely, in/out of a vehicle, safely use mobility device, walk or wheel around home/community, navigate up and down stairs, show how to get up/down from the ground, ensure home is accessible, demonstrate HEP, complete caregiver training    Physical Therapy Problems (Active)       Problem: Mobility       Dates: Start:  10/31/23         Goal: STG-Within one week, patient will propel wheelchair community x 25 feet with SBA       Dates: Start:  10/31/23            Goal: STG-Within one week, patient will ambulate household distance x 25 feet with FWW and min A        Dates: Start:  10/31/23               Problem: Mobility Transfers       Dates: Start:  10/31/23         Goal: STG-Within one week, patient will transfer bed to chair with min A SQPT vs SPT with FWW       Dates: Start:  10/31/23               Problem: PT-Long Term Goals       Dates: Start:  10/31/23         Goal: LTG-By discharge, patient will ambulate x 150 feet with FWW and SBA       Dates: Start:  10/31/23            Goal: LTG-By discharge, patient will transfer one surface to another with FWW and SBA       Dates: Start:  10/31/23            Goal: LTG-By discharge, patient will transfer in/out of a car with FWW and SBA       Dates: Start:  10/31/23

## 2023-11-01 NOTE — CARE PLAN
Problem: Problem Solving STGs  Goal: STG-Within one week, patient will complete the SCCAN with additional goals to be added as appropriate.    Outcome: Met  Note: SCCAN completed

## 2023-11-01 NOTE — PROGRESS NOTES
Physical Medicine & Rehabilitation Progress Note    Encounter Date: 11/1/2023    Chief Complaint: Doing better    Interval Events (Subjective):  VSS  BM 10/31  Voiding volitionally low PVRs    Seen and examined in her room. Step son, Chilo, present. Was able to see her dog. Denies systemic complaints, some pain on left side.       ROS: 14 point ROS unable to be done to confusion    Objective:  VITAL SIGNS: /69   Pulse 66   Temp 36.4 °C (97.6 °F) (Oral)   Resp 16   Wt 67.8 kg (149 lb 8 oz)   SpO2 92%   BMI 25.65 kg/m²     GEN: No apparent distress  HEENT: Head normocephalic, atraumatic.  Sclera nonicteric bilaterally, no ocular discharge appreciated bilaterally.  CV: Extremities warm and well-perfused, no peripheral edema appreciated bilaterally.  PULMONARY: Breathing nonlabored on room air, no respiratory accessory muscle use.  Not requiring supplemental oxygen.  SKIN: No appreciable skin breakdown on exposed areas of skin.  PSYCH: Mood and affect within normal limits.  NEURO: Awake alert.  Confused.       Laboratory Values:  Recent Results (from the past 72 hour(s))   CBC WITHOUT DIFFERENTIAL    Collection Time: 10/30/23  1:06 AM   Result Value Ref Range    WBC 13.2 (H) 4.8 - 10.8 K/uL    RBC 3.28 (L) 4.20 - 5.40 M/uL    Hemoglobin 10.3 (L) 12.0 - 16.0 g/dL    Hematocrit 31.9 (L) 37.0 - 47.0 %    MCV 97.3 81.4 - 97.8 fL    MCH 31.4 27.0 - 33.0 pg    MCHC 32.3 32.2 - 35.5 g/dL    RDW 49.4 35.9 - 50.0 fL    Platelet Count 195 164 - 446 K/uL    MPV 10.8 9.0 - 12.9 fL   Basic Metabolic Panel    Collection Time: 10/30/23  1:06 AM   Result Value Ref Range    Sodium 137 135 - 145 mmol/L    Potassium 3.9 3.6 - 5.5 mmol/L    Chloride 104 96 - 112 mmol/L    Co2 24 20 - 33 mmol/L    Glucose 118 (H) 65 - 99 mg/dL    Bun 23 (H) 8 - 22 mg/dL    Creatinine 1.00 0.50 - 1.40 mg/dL    Calcium 8.7 8.5 - 10.5 mg/dL    Anion Gap 9.0 7.0 - 16.0   MAGNESIUM    Collection Time: 10/30/23  1:06 AM   Result Value Ref Range     Magnesium 2.0 1.5 - 2.5 mg/dL   PHOSPHORUS    Collection Time: 10/30/23  1:06 AM   Result Value Ref Range    Phosphorus 2.3 (L) 2.5 - 4.5 mg/dL   ESTIMATED GFR    Collection Time: 10/30/23  1:06 AM   Result Value Ref Range    GFR (CKD-EPI) 54 (A) >60 mL/min/1.73 m 2   BLOOD CULTURE    Collection Time: 10/31/23  5:23 AM    Specimen: Peripheral; Blood   Result Value Ref Range    Significant Indicator NEG     Source BLD     Site PERIPHERAL     Culture Result       No Growth  Note: Blood cultures are incubated for 5 days and  are monitored continuously.Positive blood cultures  are called to the RN and reported as soon as  they are identified.     CBC with Differential    Collection Time: 10/31/23  5:24 AM   Result Value Ref Range    WBC 12.1 (H) 4.8 - 10.8 K/uL    RBC 3.59 (L) 4.20 - 5.40 M/uL    Hemoglobin 11.2 (L) 12.0 - 16.0 g/dL    Hematocrit 34.8 (L) 37.0 - 47.0 %    MCV 96.9 81.4 - 97.8 fL    MCH 31.2 27.0 - 33.0 pg    MCHC 32.2 32.2 - 35.5 g/dL    RDW 47.9 35.9 - 50.0 fL    Platelet Count 239 164 - 446 K/uL    MPV 10.6 9.0 - 12.9 fL    Neutrophils-Polys 70.10 44.00 - 72.00 %    Lymphocytes 15.20 (L) 22.00 - 41.00 %    Monocytes 12.60 0.00 - 13.40 %    Eosinophils 1.20 0.00 - 6.90 %    Basophils 0.40 0.00 - 1.80 %    Immature Granulocytes 0.50 0.00 - 0.90 %    Nucleated RBC 0.00 0.00 - 0.20 /100 WBC    Neutrophils (Absolute) 8.49 (H) 1.82 - 7.42 K/uL    Lymphs (Absolute) 1.84 1.00 - 4.80 K/uL    Monos (Absolute) 1.52 (H) 0.00 - 0.85 K/uL    Eos (Absolute) 0.14 0.00 - 0.51 K/uL    Baso (Absolute) 0.05 0.00 - 0.12 K/uL    Immature Granulocytes (abs) 0.06 0.00 - 0.11 K/uL    NRBC (Absolute) 0.00 K/uL   Comp Metabolic Panel (CMP)    Collection Time: 10/31/23  5:24 AM   Result Value Ref Range    Sodium 136 135 - 145 mmol/L    Potassium 3.9 3.6 - 5.5 mmol/L    Chloride 101 96 - 112 mmol/L    Co2 26 20 - 33 mmol/L    Anion Gap 9.0 7.0 - 16.0    Glucose 104 (H) 65 - 99 mg/dL    Bun 23 (H) 8 - 22 mg/dL    Creatinine 0.97  0.50 - 1.40 mg/dL    Calcium 9.0 8.5 - 10.5 mg/dL    Correct Calcium 9.4 8.5 - 10.5 mg/dL    AST(SGOT) 26 12 - 45 U/L    ALT(SGPT) 16 2 - 50 U/L    Alkaline Phosphatase 64 30 - 99 U/L    Total Bilirubin 0.9 0.1 - 1.5 mg/dL    Albumin 3.5 3.2 - 4.9 g/dL    Total Protein 6.3 6.0 - 8.2 g/dL    Globulin 2.8 1.9 - 3.5 g/dL    A-G Ratio 1.3 g/dL   Magnesium    Collection Time: 10/31/23  5:24 AM   Result Value Ref Range    Magnesium 2.0 1.5 - 2.5 mg/dL   Phosphorus    Collection Time: 10/31/23  5:24 AM   Result Value Ref Range    Phosphorus 2.3 (L) 2.5 - 4.5 mg/dL   TSH with Reflex to FT4    Collection Time: 10/31/23  5:24 AM   Result Value Ref Range    TSH 4.210 0.380 - 5.330 uIU/mL   BLOOD CULTURE    Collection Time: 10/31/23  5:24 AM    Specimen: Peripheral; Blood   Result Value Ref Range    Significant Indicator NEG     Source BLD     Site PERIPHERAL     Culture Result       No Growth  Note: Blood cultures are incubated for 5 days and  are monitored continuously.Positive blood cultures  are called to the RN and reported as soon as  they are identified.     ESTIMATED GFR    Collection Time: 10/31/23  5:24 AM   Result Value Ref Range    GFR (CKD-EPI) 56 (A) >60 mL/min/1.73 m 2       Medications:  Scheduled Medications   Medication Dose Frequency    phosphorus  250 mg Q6HRS    Pharmacy Consult Request  1 Each PHARMACY TO DOSE    omeprazole  20 mg DAILY    carvedilol  3.125 mg BID    enoxaparin (LOVENOX) injection  40 mg DAILY AT 1800    escitalopram  20 mg DAILY    losartan  50 mg Q EVENING    QUEtiapine  25 mg Nightly    rosuvastatin  10 mg Q EVENING    senna-docusate  2 Tablet BID     PRN medications: Respiratory Therapy Consult, hydrALAZINE, carboxymethylcellulose, benzocaine-menthol, mag hydrox-al hydrox-simeth, ondansetron **OR** ondansetron, traZODone, sodium chloride, senna-docusate **AND** polyethylene glycol/lytes **AND** magnesium hydroxide **AND** bisacodyl, acetaminophen, oxyCODONE immediate-release, oxyCODONE  immediate-release    Diet:  Current Diet Order   Procedures    Diet Order Diet: Regular       Medical Decision Making and Plan:  Left femoral neck and intertrochanteric femur fracture s/p ORIF with cephalomedullary implant 10/28/2023 Dr. Mckenzie  PT and OT for mobility and ADLs. Per guidelines, 15 hours per week between PT, OT and/or SLP.  Follow-up Ortho  Weightbearing as tolerated     Dementia  SLP consult, likely at baseline  Seroquel nightly -consider reducing if not home med     Leukocytosis  Likely reactive after surgery  UA - PENDING, chest x-ray (negative), blood cultures - NGTD  Slight reduction 10/31  CBC 11/2     Hypophosphatemia  Low - 3x supplement     Azotemia  Stable     L2 compression fracture 6/2023 s/p kyphoplasty Dr. Barton  Monitor     Hyperlipidemia  Continue Crestor     Hypertension  Continue losartan and Coreg     Anxiety  Continue Lexapro     Pain  As needed oxycodone     Skin  Patient at risk for skin breakdown due to debility in areas including sacrum, achilles, elbows and head in addition to other sites. Nursing to assess skin daily.      GI Ppx  Patient on Prilosec for GERD prophylaxis.      Bowel   Patient on Senna-docusate for constipation prophylaxis.      Bladder  Timed voids and PVR/BS    Repeat CBC, BMP, Ph 11/2     DVT PROPHYLAXIS: Lovenox 40 mg subcutaneous nightly     HOSPITALIST FOLLOWING: No     CODE STATUS: DNAR/DNI     DISPO: HEMA TBD.  Going home with adult children whom she lives with.      M2B ELIGIBLE: TBD     DISCHARGE FOLLOW UP: Orthopedics, PCP  ____________________________________    Dr. Kimmie Samayoa DO, MS  [facility] - Physical Medicine & Rehabilitation   ____________________________________    _____________________________________  Interdisciplinary Team Conference   Most recent IDT on 11/1/2023    I, Dr. Kimmie Samayoa DO, MS, was present and led the interdisciplinary team conference on 11/1/2023.  I led the IDT conference and agree with the IDT conference  documentation and plan of care as noted below.     Nursing:  Diet Current Diet Order   Procedures    Diet Order Diet: Regular       Eating ADL Stand by Assist      % of Last Meal  Oral Nutrition: Breakfast, 0% Consumed   Sleep    Bowel Last BM: 10/31/23   Bladder    Doesn't want to take any pain medications  Incontinence occasionally.     Physical Therapy:  Bed Mobility    Transfers Moderate Assist  Adaptive equipment, Increased time, Requires lift, Set-up of equipment, Verbal cueing (FWW)   Mobility Moderate Assist   Stairs    No goals met for today.   Ambulated 10ft in parallel bars x4   Min to Mod A to get back into bed  18 inch wheelchair for home.    Occupational Therapy:  Grooming Standby Assist   Bathing Minimal Assist   UB Dressing Moderate Assist (long sleeve shirt)   LB Dressing Total Assist (limited by pain and impaired standing balance/tolerance)   Toileting Total Assist   Shower & Transfer    Not met goals - eval was yesterday.   Mod A for UB dressing. LB dressing limited by pain.   Has shower chair and grab bars    Speech-Language Pathology:  Comprehension:  Minimal Assist  Comprehension Description:  Verbal cues  Expression:  Supervision  Expression Description:  Verbal cueing  Social Interaction:  Minimal Assist  Social Interaction Description:  Increased time, Verbal cues  Problem Solving:  Maximal Assist  Problem Solving Description:  Increased time, Verbal cueing, Bed/chair alarm, Seat belt, Therapy schedule  Memory:  Maximal Assist  Memory Description:  Increased time, Verbal cueing, Bed/chair alarm, Therapy schedule, Seat belt  42/94 on cog scale. Severe memory and attention deficits.     Respiratory Therapy:  O2 (LPM): 2  O2 Delivery Device: Silicone Nasal Cannula    Case Management:  Continues to work on disposition and DME needs.     BARRIERS TO DISCHARGE HOME:   Function, needs wheelchair.     Discharge Date/Disposition:  11/10/23  _____________________________________  Total time:  55  minutes. Time spent included pre-rounding review of vitals and tests, unit/floor time, face-to-face time with the patient including physical examination, care coordination, counseling of patient and/or family, ordering medications/procedures/tests, discussion with CM, PT, OT, SLP and/or other healthcare providers, and documentation in the electronic medical record.

## 2023-11-01 NOTE — THERAPY
Speech Language Pathology  Daily Treatment     Patient Name: Linda Casas  Age:  89 y.o., Sex:  female  Medical Record #: [id number]  Today's Date: 11/1/2023     Precautions  Precautions: Fall Risk, Weight Bearing As Tolerated Left Lower Extremity    Subjective    Pt was willing to participate in this ST session.  Pt became frustrated easily when asked to complete challenging tasks and perseverated on being angry with her son/daughter-in-law for bringing her to the hospital.       Objective       11/01/23 0901   Treatment Charges   SLP Cognitive Skill Development First 15 Minutes 1   SLP Cognitive Skill Development Additional 15 Minutes 3   SLP Total Time Spent   SLP Individual Total Time Spent (Mins) 60   Outcome Measures   Outcome Measures Utilized SCCAN   SCCAN (Scales of Cognitive and Communicative Ability for Neurorehabilitation)   Oral Expression - Raw Score 13   Oral Expression - Scale Performance Score 68   Orientation - Raw Score 3   Orientation - Scale Performance Score 25   Memory - Raw Score 7   Memory - Scale Performance Score 37   Speech Comprehension - Raw Score 11   Speech Comprehension - Scale Performance Score 85   Reading Comprehension - Raw Score 5   Reading Comprehension - Scale Performance Score 42   Writing - Raw Score 4   Writing - Scale Performance Score 57   Attention - Raw Score 2   Attention - Scale Performance Score 13   Problem Solving - Raw Score 6   Problem Solving - Scale Performance Score 26   SCCAN Total Raw Score 41   SCCAN Degree of Severity Severe Impairment         Assessment    Completed administration of the SCCAN.  Initially attempted to complete this assessment with a video  in English; however pt reported increased confusion with the use of the  and requested to continue this session in English.  Pt achieved an overall score of 41/94 indicating severe cognitive deficits.  She demonstrated the most difficulty in the areas of attention (13%),  orientation (25%), problem solving (26%), and memory (37%).  Pt is [place] and does not wear hearing aids and required multiple repetitions during this session.  Pt was overall willing to participate in therapy, but voiced her desire to return home as soon as possible.  It would be beneficial to speak with pt's son and DIL to determine pt's baseline cognitive function and assistance she was receiving at home.      Strengths: Supportive family  Barriers: Agitation, Confused, Difficulty following instructions, Fatigue, Impaired functional cognition, Impaired carryover of learning, Impaired insight/denial of deficits, Uncooperative, Lack of motivation    Plan    Target simple orientation, memory, attention.  Determine pt's baseline function.         Speech Therapy Problems (Active)       Problem: Problem Solving STGs       Dates: Start:  10/31/23         Goal: STG-Within one week, patient will complete the SCCAN with additional goals to be added as appropriate.         Dates: Start:  10/31/23               Problem: Speech/Swallowing LTGs       Dates: Start:  10/31/23         Goal: LTG-By discharge, patient will solve basic problems with spv        Dates: Start:  10/31/23

## 2023-11-01 NOTE — CARE PLAN
Problem: Mobility  Goal: STG-Within one week, patient will propel wheelchair community x 25 feet with SBA  Outcome: Not Met  Goal: STG-Within one week, patient will ambulate household distance x 25 feet with FWW and min A   Outcome: Not Met     Problem: Mobility Transfers  Goal: STG-Within one week, patient will transfer bed to chair with min A SQPT vs SPT with FWW  Outcome: Not Met

## 2023-11-01 NOTE — THERAPY
"Occupational Therapy  Daily Treatment     Patient Name: Linda Casas  Age:  89 y.o., Sex:  female  Medical Record #: [id number]  Today's Date: 11/1/2023     Precautions  Precautions: (P) Fall Risk, Weight Bearing As Tolerated Left Lower Extremity         Subjective    Pt encountered supine in bed sleeping. Pt initially not agreeable to engage as it was \"too cold\" but upon assisting with donning a sweatshirt, pt became states, \"ok we can go.\"       Objective       11/01/23 1301   OT Charge Group   OT Self Care / ADL (Units) 2   OT Therapeutic Exercise (Units) 2   OT Total Time Spent   OT Individual Total Time Spent (Mins) 60   Precautions   Precautions Fall Risk;Weight Bearing As Tolerated Left Lower Extremity   Functional Level of Assist   Upper Body Dressing Moderate Assist   Upper Body Dressing Description Assist with closures;Assit with threading arms through sleeves;Increased time;Initial preparation for task  (zip-up sweatshirt)   Lower Body Dressing Maximal Assist   Lower Body Dressing Description Other (comment)  (Pt declined participating in LBD as it was \"too cold.\" Agreeable to engage in stimulated LBD by pulling up a belt from mid thigh to hip in standing using FWW. Completed 3x at maxA. Andrew for standing balance with one hand on FWW.)   Bed, Chair, Wheelchair Transfer Minimal Assist  (modA to Andrew)   Bed Chair Wheelchair Transfer Description Adaptive equipment;Increased time;Set-up of equipment;Supervision for safety;Verbal cueing;Initial preparation for task  (Andrew and extra time to STS from WC to FWW; x4. Moderate VC to sequence turning and backing into WC using FWW; x3. STS practice from therapy mat using FWW at Andrew; x3)   Interdisciplinary Plan of Care Collaboration   Patient Position at End of Therapy In Bed;Call Light within Reach;Tray Table within Reach;Phone within Reach;Bed Alarm On         Assessment    Overall, fair tolerance to today's activities focused on functional transfers " at the FWW walker level. Pt limited by low activity tolerance, mobility, and pain. Pt appeared highly motivated to return to PLOF. Would benefit from LBD practice using AE and FWW, next session.     Strengths: Supportive family, Pleasant and cooperative  Barriers: Bladder incontinence, Decreased endurance, Dementia, Fatigue, Generalized weakness, Impaired activity tolerance, Impaired balance, Impaired functional cognition, Limited mobility, Pain    Plan    Cont ADLs, functional transfers, and thera act/ex    DME       Passport items to be completed:  Perform bathroom transfers, complete dressing, complete feeding, get ready for the day, prepare a simple meal, participate in household tasks, adapt home for safety needs, demonstrate home exercise program, complete caregiver training     Occupational Therapy Goals (Active)       Problem: Dressing       Dates: Start:  10/31/23         Goal: STG-Within one week, patient will dress UB w/ min A       Dates: Start:  10/31/23            Goal: STG-Within one week, patient will dress LB w/ consistent max A       Dates: Start:  10/31/23               Problem: Functional Transfers       Dates: Start:  10/31/23         Goal: STG-Within one week, patient will transfer to toilet w/ min A       Dates: Start:  10/31/23               Problem: OT Long Term Goals       Dates: Start:  10/31/23         Goal: LTG-By discharge, patient will complete basic self care tasks w/ CGA to supervision        Dates: Start:  10/31/23            Goal: LTG-By discharge, patient will perform bathroom transfers w/ CGA to supervision w/ LRAD       Dates: Start:  10/31/23

## 2023-11-01 NOTE — CARE PLAN
Problem: Pain - Standard  Goal: Alleviation of pain or a reduction in pain to the patient’s comfort goal  Outcome: Progressing     Problem: Fall Risk - Rehab  Goal: Patient will remain free from falls  Outcome: Progressing     Problem: Bladder / Voiding  Goal: Patient will establish and maintain regular urinary output  Outcome: Progressing       The patient is Stable - Low risk of patient condition declining or worsening

## 2023-11-01 NOTE — PROGRESS NOTES
NURSING DAILY NOTE    Name: Linda Casas   Date of Admission: 10/30/2023   Admitting Diagnosis: Closed left hip fracture, initial encounter ([facility])  Attending Physician: Kimmie Samayoa D.o.  Allergies: Ampicillin, Bactrim ds, and Ciprofloxacin    Safety  Patient Assist     Patient Precautions  Fall Risk, Weight Bearing As Tolerated Left Lower Extremity  Precaution Comments     Bed Transfer Status  Maximal Assist  Toilet Transfer Status   Moderate Assist  Assistive Devices  Rails, Wheelchair  Oxygen  Nasal Cannula  Diet/Therapeutic Dining  Current Diet Order   Procedures    Diet Order Diet: Regular     Pill Administration  whole  Agitated Behavioral Scale  21  ABS Level of Severity  No Agitation    Fall Risk  Has the patient had a fall this admission?      Cheryl De Jesus Fall Risk Scoring  26, HIGH RISK  Fall Risk Safety Measures  bed alarm and chair alarm    Vitals  Temperature: 36.2 °C (97.2 °F)  Temp src: Oral  Pulse: 72  Respiration: 18  Blood Pressure : 122/80  Blood Pressure MAP (Calculated): 94 MM HG  BP Location: Left, Upper Arm  Patient BP Position: Supine     Oxygen  Pulse Oximetry: 95 %  O2 (LPM): 2  O2 Delivery Device: Nasal Cannula    Bowel and Bladder  Last Bowel Movement  10/31/23  Stool Type  Type 6: Fluffy pieces with ragged edges, a mushy stool  Bowel Device     Continent  Bladder: Stress incontinence   Bowel: Continent movement  Bladder Function  Urine Void (mL):  (large incontinent)  Urine Color: Yellow  Number of Times Incontinent of Urine: 1  Genitourinary Assessment   Bladder Assessment (WDL):  WDL Except  Urine Color: Yellow  Number of Times Incontinent of Urine: 1  Bladder Scan: Post Void  $ Bladder Scan Results (mL): 105    Skin  Harris Score   15  Sensory Interventions   Bed Types: Standard/Trauma Mattress  Moisture Interventions  Moisturizers/Barriers: Barrier Cream      Pain  Pain Rating Scale  0 - No Pain  Pain  Location  Hip, Leg  Pain Location Orientation  Left  Pain Interventions   Dazey    ADLs    Bathing      Linen Change      Personal Hygiene     Chlorhexidine Bath      Oral Care     Teeth/Dentures     Shave     Nutrition Percentage Eaten  Lunch, Refused  Environmental Precautions     Patient Turns/Positioning  Patient Turns Self from Side to Side  Patient Turns Assistance/Tolerance     Bed Positions  Bed Controls On, Bed Locked  Head of Bed Elevated  Self regulated      Psychosocial/Neurologic Assessment  Psychosocial Assessment     Neurologic Assessment  Neuro (WDL): Exceptions to WDL  Level of Consciousness: Responds to pain  Orientation Level: Disoriented to situation, Oriented to person, Disoriented to time, Disoriented to person  Cognition: Memory Loss  Speech: Clear  Facial Symmetry:  (wdl)  Pupil Assesment: Yes  R Pupil Size (mm): 2  R Pupil Shape / Description: Round  R Pupil Reaction: Brisk  L Pupil Size (mm): 2  L Pupil Shape / Description: Round  L Pupil Reaction: Brisk  Motor Function/Sensation Assessment: Motor response  RUE Motor Response: Responds to commands  RUE Sensation: Decreased  LUE Motor Response: Responds to commands  LUE Sensation: No numbness  RLE Motor Response: Responds to commands  RLE Sensation: No pain  LLE Motor Response: Responds to commands  LLE Sensation: Pain  EENT (WDL):  Within Defined Limits    Cardio/Pulmonary Assessment  Edema      Respiratory Breath Sounds  RUL Breath Sounds: Clear  RML Breath Sounds: Diminished  RLL Breath Sounds: Diminished  SANTY Breath Sounds: Clear  LLL Breath Sounds: Diminished  Cardiac Assessment   Cardiac (WDL):  Within Defined Limits

## 2023-11-01 NOTE — FLOWSHEET NOTE
11/01/23 0712   Events/Summary/Plan   Events/Summary/Plan 02 pulse ox check   Vital Signs   Pulse 66   Respiration 16   Pulse Oximetry 92 %   $ Pulse Oximetry (Spot Check) Yes   Respiratory Assessment   Level of Consciousness Responds to voice  (sleeping)   Chest Exam   Work Of Breathing / Effort Within Normal Limits   Oxygen   O2 (LPM) 2   O2 Delivery Device Silicone Nasal Cannula

## 2023-11-01 NOTE — FLOWSHEET NOTE
10/31/23 1843   Events/Summary/Plan   Events/Summary/Plan SpO2 check   Vital Signs   Pulse 60   Respiration 16   Pulse Oximetry 95 %   $ Pulse Oximetry (Spot Check) Yes   Oxygen   O2 (LPM) 2   O2 Delivery Device Nasal Cannula

## 2023-11-02 ENCOUNTER — APPOINTMENT (OUTPATIENT)
Dept: SPEECH THERAPY | Facility: REHABILITATION | Age: 88
DRG: 560 | End: 2023-11-02
Attending: PHYSICAL MEDICINE & REHABILITATION
Payer: MEDICARE

## 2023-11-02 ENCOUNTER — APPOINTMENT (OUTPATIENT)
Dept: OCCUPATIONAL THERAPY | Facility: REHABILITATION | Age: 88
DRG: 560 | End: 2023-11-02
Attending: PHYSICAL MEDICINE & REHABILITATION
Payer: MEDICARE

## 2023-11-02 ENCOUNTER — APPOINTMENT (OUTPATIENT)
Dept: PHYSICAL THERAPY | Facility: REHABILITATION | Age: 88
DRG: 560 | End: 2023-11-02
Attending: PHYSICAL MEDICINE & REHABILITATION
Payer: MEDICARE

## 2023-11-02 LAB
ANION GAP SERPL CALC-SCNC: 8 MMOL/L (ref 7–16)
BASOPHILS # BLD AUTO: 0.4 % (ref 0–1.8)
BASOPHILS # BLD: 0.04 K/UL (ref 0–0.12)
BUN SERPL-MCNC: 29 MG/DL (ref 8–22)
CALCIUM SERPL-MCNC: 8.5 MG/DL (ref 8.5–10.5)
CHLORIDE SERPL-SCNC: 103 MMOL/L (ref 96–112)
CO2 SERPL-SCNC: 29 MMOL/L (ref 20–33)
CREAT SERPL-MCNC: 0.92 MG/DL (ref 0.5–1.4)
EOSINOPHIL # BLD AUTO: 0.26 K/UL (ref 0–0.51)
EOSINOPHIL NFR BLD: 2.8 % (ref 0–6.9)
ERYTHROCYTE [DISTWIDTH] IN BLOOD BY AUTOMATED COUNT: 48.8 FL (ref 35.9–50)
GFR SERPLBLD CREATININE-BSD FMLA CKD-EPI: 59 ML/MIN/1.73 M 2
GLUCOSE SERPL-MCNC: 102 MG/DL (ref 65–99)
HCT VFR BLD AUTO: 31.2 % (ref 37–47)
HGB BLD-MCNC: 10.2 G/DL (ref 12–16)
IMM GRANULOCYTES # BLD AUTO: 0.08 K/UL (ref 0–0.11)
IMM GRANULOCYTES NFR BLD AUTO: 0.9 % (ref 0–0.9)
LYMPHOCYTES # BLD AUTO: 1.79 K/UL (ref 1–4.8)
LYMPHOCYTES NFR BLD: 19.1 % (ref 22–41)
MCH RBC QN AUTO: 31.7 PG (ref 27–33)
MCHC RBC AUTO-ENTMCNC: 32.7 G/DL (ref 32.2–35.5)
MCV RBC AUTO: 96.9 FL (ref 81.4–97.8)
MONOCYTES # BLD AUTO: 1.17 K/UL (ref 0–0.85)
MONOCYTES NFR BLD AUTO: 12.5 % (ref 0–13.4)
NEUTROPHILS # BLD AUTO: 6.01 K/UL (ref 1.82–7.42)
NEUTROPHILS NFR BLD: 64.3 % (ref 44–72)
NRBC # BLD AUTO: 0 K/UL
NRBC BLD-RTO: 0 /100 WBC (ref 0–0.2)
PHOSPHATE SERPL-MCNC: 3.5 MG/DL (ref 2.5–4.5)
PLATELET # BLD AUTO: 265 K/UL (ref 164–446)
PMV BLD AUTO: 10.4 FL (ref 9–12.9)
POTASSIUM SERPL-SCNC: 3.8 MMOL/L (ref 3.6–5.5)
RBC # BLD AUTO: 3.22 M/UL (ref 4.2–5.4)
SODIUM SERPL-SCNC: 140 MMOL/L (ref 135–145)
WBC # BLD AUTO: 9.4 K/UL (ref 4.8–10.8)

## 2023-11-02 PROCEDURE — 84100 ASSAY OF PHOSPHORUS: CPT

## 2023-11-02 PROCEDURE — 80048 BASIC METABOLIC PNL TOTAL CA: CPT

## 2023-11-02 PROCEDURE — 700111 HCHG RX REV CODE 636 W/ 250 OVERRIDE (IP): Mod: JZ | Performed by: PHYSICAL MEDICINE & REHABILITATION

## 2023-11-02 PROCEDURE — 94760 N-INVAS EAR/PLS OXIMETRY 1: CPT

## 2023-11-02 PROCEDURE — 97110 THERAPEUTIC EXERCISES: CPT

## 2023-11-02 PROCEDURE — A9270 NON-COVERED ITEM OR SERVICE: HCPCS | Performed by: PHYSICAL MEDICINE & REHABILITATION

## 2023-11-02 PROCEDURE — 770010 HCHG ROOM/CARE - REHAB SEMI PRIVAT*

## 2023-11-02 PROCEDURE — 700102 HCHG RX REV CODE 250 W/ 637 OVERRIDE(OP): Performed by: PHYSICAL MEDICINE & REHABILITATION

## 2023-11-02 PROCEDURE — 99232 SBSQ HOSP IP/OBS MODERATE 35: CPT | Performed by: PHYSICAL MEDICINE & REHABILITATION

## 2023-11-02 PROCEDURE — 97116 GAIT TRAINING THERAPY: CPT

## 2023-11-02 PROCEDURE — 97130 THER IVNTJ EA ADDL 15 MIN: CPT

## 2023-11-02 PROCEDURE — 97129 THER IVNTJ 1ST 15 MIN: CPT

## 2023-11-02 PROCEDURE — 85025 COMPLETE CBC W/AUTO DIFF WBC: CPT

## 2023-11-02 PROCEDURE — 36415 COLL VENOUS BLD VENIPUNCTURE: CPT

## 2023-11-02 RX ADMIN — DIBASIC SODIUM PHOSPHATE, MONOBASIC POTASSIUM PHOSPHATE AND MONOBASIC SODIUM PHOSPHATE 250 MG: 852; 155; 130 TABLET ORAL at 17:19

## 2023-11-02 RX ADMIN — TRAZODONE HYDROCHLORIDE 50 MG: 50 TABLET ORAL at 01:10

## 2023-11-02 RX ADMIN — ROSUVASTATIN CALCIUM 10 MG: 10 TABLET, FILM COATED ORAL at 20:41

## 2023-11-02 RX ADMIN — OMEPRAZOLE 20 MG: 20 CAPSULE, DELAYED RELEASE ORAL at 08:02

## 2023-11-02 RX ADMIN — SENNOSIDES AND DOCUSATE SODIUM 2 TABLET: 8.6; 5 TABLET ORAL at 20:40

## 2023-11-02 RX ADMIN — DIBASIC SODIUM PHOSPHATE, MONOBASIC POTASSIUM PHOSPHATE AND MONOBASIC SODIUM PHOSPHATE 250 MG: 852; 155; 130 TABLET ORAL at 11:38

## 2023-11-02 RX ADMIN — TRAZODONE HYDROCHLORIDE 50 MG: 50 TABLET ORAL at 20:57

## 2023-11-02 RX ADMIN — ESCITALOPRAM OXALATE 20 MG: 10 TABLET ORAL at 08:02

## 2023-11-02 RX ADMIN — QUETIAPINE FUMARATE 25 MG: 25 TABLET ORAL at 20:41

## 2023-11-02 RX ADMIN — ENOXAPARIN SODIUM 40 MG: 100 INJECTION SUBCUTANEOUS at 17:19

## 2023-11-02 RX ADMIN — DIBASIC SODIUM PHOSPHATE, MONOBASIC POTASSIUM PHOSPHATE AND MONOBASIC SODIUM PHOSPHATE 250 MG: 852; 155; 130 TABLET ORAL at 05:10

## 2023-11-02 RX ADMIN — SENNOSIDES AND DOCUSATE SODIUM 2 TABLET: 8.6; 5 TABLET ORAL at 08:03

## 2023-11-02 RX ADMIN — CARVEDILOL 3.12 MG: 3.12 TABLET, FILM COATED ORAL at 08:02

## 2023-11-02 ASSESSMENT — GAIT ASSESSMENTS
GAIT LEVEL OF ASSIST: MODERATE ASSIST
DEVIATION: ANTALGIC;BRADYKINETIC;DECREASED HEEL STRIKE;DECREASED TOE OFF
DISTANCE (FEET): 10
ASSISTIVE DEVICE: FRONT WHEEL WALKER;PARALLEL BARS

## 2023-11-02 ASSESSMENT — ACTIVITIES OF DAILY LIVING (ADL): BED_CHAIR_WHEELCHAIR_TRANSFER_DESCRIPTION: ADAPTIVE EQUIPMENT;INCREASED TIME;REQUIRES LIFT;SET-UP OF EQUIPMENT

## 2023-11-02 NOTE — PROGRESS NOTES
NURSING DAILY NOTE    Name: Linda Casas   Date of Admission: 10/30/2023   Admitting Diagnosis: Closed left hip fracture, initial encounter ([facility])  Attending Physician: Kimmie Samayoa D.o.  Allergies: Ampicillin, Bactrim ds, and Ciprofloxacin    Safety  Patient Assist     Patient Precautions  Fall Risk, Weight Bearing As Tolerated Left Lower Extremity  Precaution Comments     Bed Transfer Status  Minimal Assist (modA to Andrew)  Toilet Transfer Status   Moderate Assist  Assistive Devices  Rails, Wheelchair  Oxygen  Silicone Nasal Cannula  Diet/Therapeutic Dining  Current Diet Order   Procedures    Diet Order Diet: Regular     Pill Administration  whole  Agitated Behavioral Scale  21  ABS Level of Severity  No Agitation    Fall Risk  Has the patient had a fall this admission?      Cheryl De Jesus Fall Risk Scoring  26, HIGH RISK  Fall Risk Safety Measures  bed alarm and chair alarm    Vitals  Temperature: 36.9 °C (98.4 °F)  Temp src: Oral  Pulse: 84  Respiration: 18  Blood Pressure : 130/66  Blood Pressure MAP (Calculated): 87 MM HG  BP Location: Right, Upper Arm  Patient BP Position: Supine     Oxygen  Pulse Oximetry: 98 %  O2 (LPM): 2  O2 Delivery Device: Silicone Nasal Cannula    Bowel and Bladder  Last Bowel Movement  10/31/23  Stool Type  Type 6: Fluffy pieces with ragged edges, a mushy stool  Bowel Device     Continent  Bladder: Stress incontinence   Bowel: Continent movement  Bladder Function  Urine Void (mL):  (incontinent in diaper)  Number of Times Voided: 1  Urine Color: Yellow  Number of Times Incontinent of Urine: 1  Straight Catheter: 600 ml  Genitourinary Assessment   Bladder Assessment (WDL):  WDL Except  Urine Color: Yellow  Number of Times Incontinent of Urine: 1  Bladder Scan: Post Void  $ Bladder Scan Results (mL): 66    Skin  Harris Score   15  Sensory Interventions   Bed Types: Standard/Trauma Mattress  Moisture  Interventions  Moisturizers/Barriers: Barrier Cream      Pain  Pain Rating Scale  0 - No Pain  Pain Location  Hip, Leg  Pain Location Orientation  Left  Pain Interventions    (sleeping)    ADLs    Bathing      Linen Change      Personal Hygiene     Chlorhexidine Bath      Oral Care     Teeth/Dentures     Shave     Nutrition Percentage Eaten  Lunch, Between 25-50% Consumed  Environmental Precautions     Patient Turns/Positioning  Patient Turns Self from Side to Side  Patient Turns Assistance/Tolerance     Bed Positions  Bed Controls On, Bed Locked  Head of Bed Elevated  Self regulated      Psychosocial/Neurologic Assessment  Psychosocial Assessment     Neurologic Assessment  Neuro (WDL): Exceptions to WDL  Level of Consciousness: Responds to pain  Orientation Level: Disoriented to situation, Oriented to person, Disoriented to time, Disoriented to person  Cognition: Memory Loss  Speech: Clear  Facial Symmetry:  (wdl)  Pupil Assesment: Yes  R Pupil Size (mm): 2  R Pupil Shape / Description: Round  R Pupil Reaction: Brisk  L Pupil Size (mm): 2  L Pupil Shape / Description: Round  L Pupil Reaction: Brisk  Motor Function/Sensation Assessment: Motor response  RUE Motor Response: Responds to commands  RUE Sensation: Decreased  LUE Motor Response: Responds to commands  LUE Sensation: No numbness  RLE Motor Response: Responds to commands  RLE Sensation: No pain  LLE Motor Response: Responds to commands  LLE Sensation: Pain  EENT (WDL):  Within Defined Limits    Cardio/Pulmonary Assessment  Edema      Respiratory Breath Sounds  RUL Breath Sounds: Clear  RML Breath Sounds: Diminished  RLL Breath Sounds: Diminished  SANTY Breath Sounds: Clear  LLL Breath Sounds: Diminished  Cardiac Assessment   Cardiac (WDL):  Within Defined Limits

## 2023-11-02 NOTE — CARE PLAN
"  Problem: Pain - Standard  Goal: Alleviation of pain or a reduction in pain to the patient’s comfort goal  Outcome: Progressing  Note: Assessed for pain and discomfort  , left hip incision line with staples well approximated , dressing intact . Pain under control , pt encouraged to  request for pain med as needed.      Problem: Fall Risk - Rehab  Goal: Patient will remain free from falls  Outcome: Progressing  Note: Cheryl De Jesus Fall risk Assessment Score: 26    High fall risk Interventions   - Alarming seatbelt  - Bed and strip alarm   - Yellow sign by the door   - Yellow wrist band \"Fall risk\"  - Room near to the nurse station  - Do not leave patient unattended in the bathroom  - Fall risk education provided       The patient is Stable - Low risk of patient condition declining or worsening     Progress made toward(s) clinical / shift goals:  Pt free from fall and injury.      "

## 2023-11-02 NOTE — DIETARY
Nutrition services: Day 3 of admit.  Linda Casas is a 89 y.o. female with admitting DX of Closed left hip fracture    Possible inadequate PO noted on initial screen.      Assessment:  Weight: 67.8 kg (149 lb 8 oz)  Body mass index is 25.65 kg/m²., BMI classification: acceptable based on pt's age  Diet/Intake: regular/ PO 0 to % w/ avg of 33%    Evaluation:   Recorded PO intake at [facility] was also inadequate.  Pt noted to have memory impairment.  RD met w/ pt in her room. She was not interested in supplements such as Boost. She reports that she likes ice cream. She said that she just wants to sleep.    Chocolate ice cream added to lunch tray and Boost supplements added to breakfast and dinner for additional nutrition.   Pt did not appear malnourished. No report of poor PO or wt loss PTA. MST score on nutrition screen completed upon admit was 0.     Malnutrition Risk: ASPEN criteria for malnutrition not met.     Recommendations/Plan:  Add Boost Plus to meals BID and chocolate ice cream 1x a day.    Encourage intake of >/= 50%  Document intake of all meals and supplements as % taken in ADL's to provide interdisciplinary communication across all shifts.   Monitor weight.  Nutrition rep will continue to see patient for ongoing meal and snack preferences.     RD will follow

## 2023-11-02 NOTE — THERAPY
Physical Therapy   Daily Treatment     Patient Name: Linda Casas  Age:  89 y.o., Sex:  female  Medical Record #: [id number]  Today's Date: 11/2/2023     Precautions  Precautions: Fall Risk, Weight Bearing As Tolerated Left Lower Extremity    Subjective    Pt up in wc, willing to participate with encouragement     Objective       11/02/23 0901   PT Charge Group   PT Gait Training (Units) 2   PT Therapeutic Exercise (Units) 2   PT Total Time Spent   PT Individual Total Time Spent (Mins) 60   Vitals   Room Air Oximetry 92   O2 Delivery Device Room air w/o2 available   Gait Functional Level of Assist    Gait Level Of Assist Moderate Assist   Assistive Device Front Wheel Walker;Parallel Bars   Distance (Feet) 10   # of Times Distance was Traveled 4  (2 trials with // bars, 2 trials with FWW)   Deviation Antalgic;Bradykinetic;Decreased Heel Strike;Decreased Toe Off  (impaired LLE terminal hip/ knee ext and WB tolerance)   Transfer Functional Level of Assist   Bed, Chair, Wheelchair Transfer Moderate Assist   Bed Chair Wheelchair Transfer Description Adaptive equipment;Increased time;Requires lift;Set-up of equipment  (FWW)   Sitting Lower Body Exercises   Ankle Pumps 2 sets of 10   Hip Flexion 2 sets of 10   Hip Abduction 2 sets of 10   Hip Adduction 2 sets of 10   Long Arc Quad 2 sets of 10   Comments AAROM for L hip flexion   Bed Mobility    Sit to Stand Moderate Assist   Neuro-Muscular Treatments   Neuro-Muscular Treatments Weight Shift Right;Weight Shift Left;Biofeedback   Comments sit<>stand from wc<>FWW with mirror for visual feedback, pt completed lateral wt shifting 2 x 10 with manual cues for LLE terminal hip/knee ext during WB         Assessment    Pt tolerated PT session on room air trial with sats at 91-94 throughout. Pt remains limited by L hip pain/swelling and weakness as well as limited activity tolerance but able to progress gait to FWW today, continues to require mod A for LLE stance  support.     Strengths: Independent prior level of function, Pleasant and cooperative, Supportive family, Willingly participates in therapeutic activities  Barriers: Confused, Decreased endurance, Fatigue, Generalized weakness, Impaired activity tolerance, Impaired balance, Impaired functional cognition, Limited mobility, Pain, Pain poorly managed    Plan    Progressive gait with // bars vs FWW. Standing tolerance/ WB to LLE  Sit<>stand/ transfers with FWW, LE ROM/ strength training    DME  PT DME Recommendations  Wheelchair:  (TBD)  Assistive Device:  (TBD, pt has FWW and SPC)    Passport items to be completed:  Get in/out of bed safely, in/out of a vehicle, safely use mobility device, walk or wheel around home/community, navigate up and down stairs, show how to get up/down from the ground, ensure home is accessible, demonstrate HEP, complete caregiver training    Physical Therapy Problems (Active)       Problem: Mobility       Dates: Start:  10/31/23         Goal: STG-Within one week, patient will propel wheelchair community x 25 feet with SBA       Dates: Start:  10/31/23            Goal: STG-Within one week, patient will ambulate household distance x 25 feet with FWW and min A        Dates: Start:  10/31/23               Problem: Mobility Transfers       Dates: Start:  10/31/23         Goal: STG-Within one week, patient will transfer bed to chair with min A SQPT vs SPT with FWW       Dates: Start:  10/31/23               Problem: PT-Long Term Goals       Dates: Start:  10/31/23         Goal: LTG-By discharge, patient will ambulate x 150 feet with FWW and SBA       Dates: Start:  10/31/23            Goal: LTG-By discharge, patient will transfer one surface to another with FWW and SBA       Dates: Start:  10/31/23            Goal: LTG-By discharge, patient will transfer in/out of a car with FWW and SBA       Dates: Start:  10/31/23

## 2023-11-02 NOTE — FLOWSHEET NOTE
11/02/23 1004   Events/Summary/Plan   Events/Summary/Plan RA pulse ox done after working with therapy   Vital Signs   Pulse 82   Respiration 16   Pulse Oximetry 93 %   $ Pulse Oximetry (Spot Check) Yes   Respiratory Assessment   Level of Consciousness Alert   Chest Exam   Work Of Breathing / Effort Within Normal Limits   Oxygen   O2 Delivery Device Room air w/o2 available

## 2023-11-02 NOTE — FLOWSHEET NOTE
11/02/23 0712   Events/Summary/Plan   Events/Summary/Plan 02 pulse ox check   Vital Signs   Pulse 75   Respiration 18   Pulse Oximetry 95 %   $ Pulse Oximetry (Spot Check) Yes   Respiratory Assessment   Level of Consciousness Responds to voice  (sleeping)   Chest Exam   Work Of Breathing / Effort Within Normal Limits   Oxygen   O2 (LPM) 2   O2 Delivery Device Silicone Nasal Cannula

## 2023-11-02 NOTE — DISCHARGE PLANNING
CM spoke with patients daughter Mary in law to update on IDT and DC date of 11/11/23.  Patient does not have a FWW.  Mary will get one.  CM will continue to monitor for DC needs.

## 2023-11-02 NOTE — THERAPY
"Speech Language Pathology  Daily Treatment     Patient Name: Linda Casas  Age:  89 y.o., Sex:  female  Medical Record #: [id number]  Today's Date: 11/2/2023     Precautions  Precautions: Fall Risk, Weight Bearing As Tolerated Left Lower Extremity    Subjective    Pt requesting to get back into bed multiple times this session, but with encouragement was willing to participate and was also willing to go to the dining room at the end of this session to try to eat lunch.       Objective       11/02/23 1031   Treatment Charges   SLP Cognitive Skill Development First 15 Minutes 1   SLP Cognitive Skill Development Additional 15 Minutes 3   SLP Total Time Spent   SLP Individual Total Time Spent (Mins) 60         Assessment    Pt was able to recall orientation information reviewed in her earlier ST session.  She knew where she is, why she is here and stated \"I'm going home in a week\".  Pt completed a sustained attention task requiring her to sort \"Blink\" cards first by color and then by shape.  Pt was able to sort cards by their color with min A required for attention (pt occasionally made 2 stacks of the same color) to achieve 100% accuracy.  When the task was switched to sorting the cards by shape pt required consistent mod A to recall the change in directions and to correctly identify shapes (pt often confused triangles and diamonds).       Strengths: Supportive family  Barriers: Agitation, Confused, Difficulty following instructions, Fatigue, Impaired functional cognition, Impaired carryover of learning, Impaired insight/denial of deficits, Uncooperative, Lack of motivation    Plan    Reduce ST to 30 minutes as pt's cognitive impairment is pt's baseline.  ST to address recalling new information (likely using written reminders) from PT/OT sessions and basic orientation.        Speech Therapy Problems (Active)       Problem: Memory STGs       Dates: Start:  11/01/23         Goal: STG-Within one week, patient " will achieve a score of 25/30 or more on the O-log for 3 consecutive days.         Dates: Start:  11/01/23               Problem: Problem Solving STGs       Dates: Start:  11/01/23         Goal: STG-Within one week, patient will complete sustained attention tasks given mod A to achieve 75% accuracy.         Dates: Start:  11/01/23               Problem: Speech/Swallowing LTGs       Dates: Start:  10/31/23         Goal: LTG-By discharge, patient will solve basic problems with spv        Dates: Start:  10/31/23

## 2023-11-02 NOTE — PROGRESS NOTES
Physical Medicine & Rehabilitation Progress Note    Encounter Date: 11/2/2023    Chief Complaint: Would like some help    Interval Events (Subjective):  SUSANNAH BETANCOURT 11/2  Voiding volitionally    Seen and examined in her room in her bed. She would like to go home. No pain, she is working on keeping her legs level.     ROS: 14 point ROS unable to be done to confusion    Objective:  VITAL SIGNS: /75   Pulse 82   Temp 37 °C (98.6 °F) (Oral)   Resp 16   Wt 67.8 kg (149 lb 8 oz)   SpO2 93%   BMI 25.65 kg/m²     GEN: No apparent distress  HEENT: Head normocephalic, atraumatic.  Sclera nonicteric bilaterally, no ocular discharge appreciated bilaterally.  CV: Extremities warm and well-perfused, no peripheral edema appreciated bilaterally.  PULMONARY: Breathing nonlabored on room air, no respiratory accessory muscle use.  Not requiring supplemental oxygen.  SKIN: No appreciable skin breakdown on exposed areas of skin.  PSYCH: Mood and affect within normal limits.  NEURO: Awake alert.  Confused.       Laboratory Values:  Recent Results (from the past 72 hour(s))   BLOOD CULTURE    Collection Time: 10/31/23  5:23 AM    Specimen: Peripheral; Blood   Result Value Ref Range    Significant Indicator NEG     Source BLD     Site PERIPHERAL     Culture Result       No Growth  Note: Blood cultures are incubated for 5 days and  are monitored continuously.Positive blood cultures  are called to the RN and reported as soon as  they are identified.     CBC with Differential    Collection Time: 10/31/23  5:24 AM   Result Value Ref Range    WBC 12.1 (H) 4.8 - 10.8 K/uL    RBC 3.59 (L) 4.20 - 5.40 M/uL    Hemoglobin 11.2 (L) 12.0 - 16.0 g/dL    Hematocrit 34.8 (L) 37.0 - 47.0 %    MCV 96.9 81.4 - 97.8 fL    MCH 31.2 27.0 - 33.0 pg    MCHC 32.2 32.2 - 35.5 g/dL    RDW 47.9 35.9 - 50.0 fL    Platelet Count 239 164 - 446 K/uL    MPV 10.6 9.0 - 12.9 fL    Neutrophils-Polys 70.10 44.00 - 72.00 %    Lymphocytes 15.20 (L) 22.00 - 41.00 %     Monocytes 12.60 0.00 - 13.40 %    Eosinophils 1.20 0.00 - 6.90 %    Basophils 0.40 0.00 - 1.80 %    Immature Granulocytes 0.50 0.00 - 0.90 %    Nucleated RBC 0.00 0.00 - 0.20 /100 WBC    Neutrophils (Absolute) 8.49 (H) 1.82 - 7.42 K/uL    Lymphs (Absolute) 1.84 1.00 - 4.80 K/uL    Monos (Absolute) 1.52 (H) 0.00 - 0.85 K/uL    Eos (Absolute) 0.14 0.00 - 0.51 K/uL    Baso (Absolute) 0.05 0.00 - 0.12 K/uL    Immature Granulocytes (abs) 0.06 0.00 - 0.11 K/uL    NRBC (Absolute) 0.00 K/uL   Comp Metabolic Panel (CMP)    Collection Time: 10/31/23  5:24 AM   Result Value Ref Range    Sodium 136 135 - 145 mmol/L    Potassium 3.9 3.6 - 5.5 mmol/L    Chloride 101 96 - 112 mmol/L    Co2 26 20 - 33 mmol/L    Anion Gap 9.0 7.0 - 16.0    Glucose 104 (H) 65 - 99 mg/dL    Bun 23 (H) 8 - 22 mg/dL    Creatinine 0.97 0.50 - 1.40 mg/dL    Calcium 9.0 8.5 - 10.5 mg/dL    Correct Calcium 9.4 8.5 - 10.5 mg/dL    AST(SGOT) 26 12 - 45 U/L    ALT(SGPT) 16 2 - 50 U/L    Alkaline Phosphatase 64 30 - 99 U/L    Total Bilirubin 0.9 0.1 - 1.5 mg/dL    Albumin 3.5 3.2 - 4.9 g/dL    Total Protein 6.3 6.0 - 8.2 g/dL    Globulin 2.8 1.9 - 3.5 g/dL    A-G Ratio 1.3 g/dL   Magnesium    Collection Time: 10/31/23  5:24 AM   Result Value Ref Range    Magnesium 2.0 1.5 - 2.5 mg/dL   Phosphorus    Collection Time: 10/31/23  5:24 AM   Result Value Ref Range    Phosphorus 2.3 (L) 2.5 - 4.5 mg/dL   TSH with Reflex to FT4    Collection Time: 10/31/23  5:24 AM   Result Value Ref Range    TSH 4.210 0.380 - 5.330 uIU/mL   BLOOD CULTURE    Collection Time: 10/31/23  5:24 AM    Specimen: Peripheral; Blood   Result Value Ref Range    Significant Indicator NEG     Source BLD     Site PERIPHERAL     Culture Result       No Growth  Note: Blood cultures are incubated for 5 days and  are monitored continuously.Positive blood cultures  are called to the RN and reported as soon as  they are identified.     ESTIMATED GFR    Collection Time: 10/31/23  5:24 AM   Result Value Ref  Range    GFR (CKD-EPI) 56 (A) >60 mL/min/1.73 m 2   URINALYSIS    Collection Time: 11/01/23  7:40 AM    Specimen: Urine, Cath   Result Value Ref Range    Color Yellow     Character Clear     Specific Gravity 1.019 <1.035    Ph 5.5 5.0 - 8.0    Glucose Negative Negative mg/dL    Ketones Negative Negative mg/dL    Protein Negative Negative mg/dL    Bilirubin Negative Negative    Urobilinogen, Urine 0.2 Negative    Nitrite Negative Negative    Leukocyte Esterase Trace (A) Negative    Occult Blood Negative Negative    Micro Urine Req Microscopic    URINE MICROSCOPIC (W/UA)    Collection Time: 11/01/23  7:40 AM   Result Value Ref Range    WBC 0-2 /hpf    RBC 2-5 (A) /hpf    Bacteria Negative None /hpf    Epithelial Cells Negative /hpf    Hyaline Cast 3-5 (A) /lpf   CBC WITH DIFFERENTIAL    Collection Time: 11/02/23  6:41 AM   Result Value Ref Range    WBC 9.4 4.8 - 10.8 K/uL    RBC 3.22 (L) 4.20 - 5.40 M/uL    Hemoglobin 10.2 (L) 12.0 - 16.0 g/dL    Hematocrit 31.2 (L) 37.0 - 47.0 %    MCV 96.9 81.4 - 97.8 fL    MCH 31.7 27.0 - 33.0 pg    MCHC 32.7 32.2 - 35.5 g/dL    RDW 48.8 35.9 - 50.0 fL    Platelet Count 265 164 - 446 K/uL    MPV 10.4 9.0 - 12.9 fL    Neutrophils-Polys 64.30 44.00 - 72.00 %    Lymphocytes 19.10 (L) 22.00 - 41.00 %    Monocytes 12.50 0.00 - 13.40 %    Eosinophils 2.80 0.00 - 6.90 %    Basophils 0.40 0.00 - 1.80 %    Immature Granulocytes 0.90 0.00 - 0.90 %    Nucleated RBC 0.00 0.00 - 0.20 /100 WBC    Neutrophils (Absolute) 6.01 1.82 - 7.42 K/uL    Lymphs (Absolute) 1.79 1.00 - 4.80 K/uL    Monos (Absolute) 1.17 (H) 0.00 - 0.85 K/uL    Eos (Absolute) 0.26 0.00 - 0.51 K/uL    Baso (Absolute) 0.04 0.00 - 0.12 K/uL    Immature Granulocytes (abs) 0.08 0.00 - 0.11 K/uL    NRBC (Absolute) 0.00 K/uL   Basic Metabolic Panel    Collection Time: 11/02/23  6:41 AM   Result Value Ref Range    Sodium 140 135 - 145 mmol/L    Potassium 3.8 3.6 - 5.5 mmol/L    Chloride 103 96 - 112 mmol/L    Co2 29 20 - 33 mmol/L     Glucose 102 (H) 65 - 99 mg/dL    Bun 29 (H) 8 - 22 mg/dL    Creatinine 0.92 0.50 - 1.40 mg/dL    Calcium 8.5 8.5 - 10.5 mg/dL    Anion Gap 8.0 7.0 - 16.0   PHOSPHORUS    Collection Time: 11/02/23  6:41 AM   Result Value Ref Range    Phosphorus 3.5 2.5 - 4.5 mg/dL   ESTIMATED GFR    Collection Time: 11/02/23  6:41 AM   Result Value Ref Range    GFR (CKD-EPI) 59 (A) >60 mL/min/1.73 m 2       Medications:  Scheduled Medications   Medication Dose Frequency    phosphorus  250 mg Q6HRS    Pharmacy Consult Request  1 Each PHARMACY TO DOSE    omeprazole  20 mg DAILY    carvedilol  3.125 mg BID    enoxaparin (LOVENOX) injection  40 mg DAILY AT 1800    escitalopram  20 mg DAILY    losartan  50 mg Q EVENING    QUEtiapine  25 mg Nightly    rosuvastatin  10 mg Q EVENING    senna-docusate  2 Tablet BID     PRN medications: Respiratory Therapy Consult, hydrALAZINE, carboxymethylcellulose, benzocaine-menthol, mag hydrox-al hydrox-simeth, ondansetron **OR** ondansetron, traZODone, sodium chloride, senna-docusate **AND** polyethylene glycol/lytes **AND** magnesium hydroxide **AND** bisacodyl, acetaminophen, oxyCODONE immediate-release, oxyCODONE immediate-release    Diet:  Current Diet Order   Procedures    Diet Order Diet: Regular       Medical Decision Making and Plan:  Left femoral neck and intertrochanteric femur fracture s/p ORIF with cephalomedullary implant 10/28/2023 Dr. Mckenzie  PT and OT for mobility and ADLs. Per guidelines, 15 hours per week between PT, OT and/or SLP.  Follow-up Ortho  Weightbearing as tolerated     Dementia  SLP consult, likely at baseline  Seroquel nightly -consider reducing if not home med     Leukocytosis  Likely reactive after surgery  UA negative for bacteria, chest x-ray (negative), blood cultures - NGTD  Slight reduction 10/31  CBC 11/2 - Resolved WBC normal at 9.4    Anemia  Low but fairly stable     Hypophosphatemia  Low - 3x supplement  Normal on 11/2     Azotemia  Stable - mildly  elevated  Encourage fluids  BMP 11/3     L2 compression fracture 6/2023 s/p kyphoplasty Dr. Barton  Monitor     Hyperlipidemia  Continue Crestor     Hypertension  Continue losartan and Coreg     Anxiety  Continue Lexapro     Pain  As needed oxycodone     Skin  Patient at risk for skin breakdown due to debility in areas including sacrum, achilles, elbows and head in addition to other sites. Nursing to assess skin daily.      GI Ppx  Patient on Prilosec for GERD prophylaxis.      Bowel   Patient on Senna-docusate for constipation prophylaxis.      Bladder  Incontinence - UA negative for bacteria  Timed voids and PVR/BS     DVT PROPHYLAXIS: Lovenox 40 mg subcutaneous nightly     HOSPITALIST FOLLOWING: No     CODE STATUS: DNAR/DNI     DISPO: 11/10/23.  Going home with adult children whom she lives with.      M2B ELIGIBLE: TBD     DISCHARGE FOLLOW UP: Orthopedics, PCP  ____________________________________    Dr. Kimmie Samayoa DO, MS  [facility] - Physical Medicine & Rehabilitation   ____________________________________

## 2023-11-02 NOTE — THERAPY
"Occupational Therapy  Daily Treatment     Patient Name: Linda Casas  Age:  89 y.o., Sex:  female  Medical Record #: [id number]  Today's Date: 11/2/2023     Precautions  Precautions: (P) Fall Risk, Weight Bearing As Tolerated Left Lower Extremity         Subjective    Pt encountered for therapy requiring motivation to engage as she expressed , \"I just want to sleep.\"      Objective       11/02/23 1401   OT Charge Group   OT Therapeutic Exercise (Units) 2   OT Total Time Spent   OT Individual Total Time Spent (Mins) 30   Precautions   Precautions Fall Risk;Weight Bearing As Tolerated Left Lower Extremity   Functional Level of Assist   Bed, Chair, Wheelchair Transfer Minimal Assist   Bed Chair Wheelchair Transfer Description Increased time;Initial preparation for task;Requires lift;Set-up of equipment;Other (comment)  (raised hospital bed)   Standing Lower Body Exercises   Standing Lower Body Exercises Yes   Other Exercises STS from wc onto //. Functional mobility on //.   Comments Andrew for STS x4. Functional mobility at CGA with moderate VC for sequencing steps.   Interdisciplinary Plan of Care Collaboration   IDT Collaboration with  Family / Caregiver   Patient Position at End of Therapy Seated;Chair Alarm On;Call Light within Reach;Family / Friend in Room;Tray Table within Reach  (brother and sister present during session)   Collaboration Comments POC   Strengths & Barriers   Strengths Supportive family;Pleasant and cooperative   Barriers Bladder incontinence;Decreased endurance;Dementia;Fatigue;Generalized weakness;Impaired activity tolerance;Impaired balance;Impaired functional cognition;Limited mobility;Pain         Assessment    Overall steady progress during functional mobility tasks this session at pt demo at Andrew to CGA. Pt is highly motivated top return home, but requires motivation to engage in therapy. Family appears highly supportive.     Strengths: (P) Supportive family, Pleasant and " cooperative  Barriers: (P) Bladder incontinence, Decreased endurance, Dementia, Fatigue, Generalized weakness, Impaired activity tolerance, Impaired balance, Impaired functional cognition, Limited mobility, Pain    Plan    Cont ADLs, thera act/ex, and functional mobility.     DME       Passport items to be completed:  Perform bathroom transfers, complete dressing, complete feeding, get ready for the day, prepare a simple meal, participate in household tasks, adapt home for safety needs, demonstrate home exercise program, complete caregiver training     Occupational Therapy Goals (Active)       Problem: Dressing       Dates: Start:  10/31/23         Goal: STG-Within one week, patient will dress UB w/ min A       Dates: Start:  10/31/23            Goal: STG-Within one week, patient will dress LB w/ consistent max A       Dates: Start:  10/31/23               Problem: Functional Transfers       Dates: Start:  10/31/23         Goal: STG-Within one week, patient will transfer to toilet w/ min A       Dates: Start:  10/31/23               Problem: OT Long Term Goals       Dates: Start:  10/31/23         Goal: LTG-By discharge, patient will complete basic self care tasks w/ CGA to supervision        Dates: Start:  10/31/23            Goal: LTG-By discharge, patient will perform bathroom transfers w/ CGA to supervision w/ LRAD       Dates: Start:  10/31/23

## 2023-11-02 NOTE — CARE PLAN
Problem: Knowledge Deficit - Standard  Goal: Patient and family/care givers will demonstrate understanding of plan of care, disease process/condition, diagnostic tests and medications  Outcome: Progressing     Problem: Fall Risk - Rehab  Goal: Patient will remain free from falls  Outcome: Progressing       The patient is Stable - Low risk of patient condition declining or worsening

## 2023-11-02 NOTE — THERAPY
"Speech Language Pathology  Daily Treatment     Patient Name: Linda Casas  Age:  89 y.o., Sex:  female  Medical Record #: [id number]  Today's Date: 11/2/2023     Precautions  Precautions: Fall Risk, Weight Bearing As Tolerated Left Lower Extremity    Subjective    Pt was initially confused, asking why she was here.  Willing to participate in this ST session after being re-oriented.      Pt's DIL, Mary, was present at the end of this session.  Discussed pt's cognitive baseline, Mary stated that pt's cognition was very poor at baseline with sundowning and occasional combativeness at night.  She reported that pt is slightly more confused being in the hospital.       Objective       11/02/23 0731   Treatment Charges   SLP Cognitive Skill Development First 15 Minutes 1   SLP Cognitive Skill Development Additional 15 Minutes 1   SLP Total Time Spent   SLP Individual Total Time Spent (Mins) 30         Assessment    Pt was willing to transfer out of bed, but was initially confused about why she is in the hospital.  During therapy details of pt's current hospitalization were reviewed and written down with a calendar attached including her upcoming discharge date.  This seemed helpful to reassure pt that she will not be in the hospital \"forever\" as she occasionally perseverates on.  The reminder sheet and calendar were placed in pt's room for her to review throughout the day.      Strengths: Supportive family  Barriers: Agitation, Confused, Difficulty following instructions, Fatigue, Impaired functional cognition, Impaired carryover of learning, Impaired insight/denial of deficits, Uncooperative, Lack of motivation    Plan    Reduce ST to 30 minutes as pt's cognitive impairment is pt's baseline.  ST to address recalling new information (likely using written reminders) from PT/OT sessions and basic orientation.        Speech Therapy Problems (Active)       Problem: Memory STGs       Dates: Start:  11/01/23  "        Goal: STG-Within one week, patient will achieve a score of 25/30 or more on the O-log for 3 consecutive days.         Dates: Start:  11/01/23               Problem: Problem Solving STGs       Dates: Start:  11/01/23         Goal: STG-Within one week, patient will complete sustained attention tasks given mod A to achieve 75% accuracy.         Dates: Start:  11/01/23               Problem: Speech/Swallowing LTGs       Dates: Start:  10/31/23         Goal: LTG-By discharge, patient will solve basic problems with spv        Dates: Start:  10/31/23

## 2023-11-03 ENCOUNTER — APPOINTMENT (OUTPATIENT)
Dept: PHYSICAL THERAPY | Facility: REHABILITATION | Age: 88
DRG: 560 | End: 2023-11-03
Attending: PHYSICAL MEDICINE & REHABILITATION
Payer: MEDICARE

## 2023-11-03 ENCOUNTER — APPOINTMENT (OUTPATIENT)
Dept: SPEECH THERAPY | Facility: REHABILITATION | Age: 88
DRG: 560 | End: 2023-11-03
Attending: PHYSICAL MEDICINE & REHABILITATION
Payer: MEDICARE

## 2023-11-03 ENCOUNTER — APPOINTMENT (OUTPATIENT)
Dept: OCCUPATIONAL THERAPY | Facility: REHABILITATION | Age: 88
DRG: 560 | End: 2023-11-03
Attending: PHYSICAL MEDICINE & REHABILITATION
Payer: MEDICARE

## 2023-11-03 PROCEDURE — 99231 SBSQ HOSP IP/OBS SF/LOW 25: CPT | Performed by: PHYSICAL MEDICINE & REHABILITATION

## 2023-11-03 PROCEDURE — 97110 THERAPEUTIC EXERCISES: CPT

## 2023-11-03 PROCEDURE — 700111 HCHG RX REV CODE 636 W/ 250 OVERRIDE (IP): Mod: JZ | Performed by: PHYSICAL MEDICINE & REHABILITATION

## 2023-11-03 PROCEDURE — 97535 SELF CARE MNGMENT TRAINING: CPT

## 2023-11-03 PROCEDURE — 94760 N-INVAS EAR/PLS OXIMETRY 1: CPT

## 2023-11-03 PROCEDURE — A9270 NON-COVERED ITEM OR SERVICE: HCPCS | Performed by: PHYSICAL MEDICINE & REHABILITATION

## 2023-11-03 PROCEDURE — 700102 HCHG RX REV CODE 250 W/ 637 OVERRIDE(OP): Performed by: PHYSICAL MEDICINE & REHABILITATION

## 2023-11-03 PROCEDURE — 770010 HCHG ROOM/CARE - REHAB SEMI PRIVAT*

## 2023-11-03 PROCEDURE — 97116 GAIT TRAINING THERAPY: CPT

## 2023-11-03 PROCEDURE — 97130 THER IVNTJ EA ADDL 15 MIN: CPT

## 2023-11-03 PROCEDURE — 97129 THER IVNTJ 1ST 15 MIN: CPT

## 2023-11-03 RX ADMIN — QUETIAPINE FUMARATE 25 MG: 25 TABLET ORAL at 20:27

## 2023-11-03 RX ADMIN — ENOXAPARIN SODIUM 40 MG: 100 INJECTION SUBCUTANEOUS at 17:58

## 2023-11-03 RX ADMIN — OXYCODONE HYDROCHLORIDE 5 MG: 5 TABLET ORAL at 09:12

## 2023-11-03 RX ADMIN — CARVEDILOL 3.12 MG: 3.12 TABLET, FILM COATED ORAL at 08:07

## 2023-11-03 RX ADMIN — ROSUVASTATIN CALCIUM 10 MG: 10 TABLET, FILM COATED ORAL at 20:27

## 2023-11-03 RX ADMIN — SENNOSIDES AND DOCUSATE SODIUM 2 TABLET: 8.6; 5 TABLET ORAL at 08:08

## 2023-11-03 RX ADMIN — ESCITALOPRAM OXALATE 20 MG: 10 TABLET ORAL at 08:06

## 2023-11-03 RX ADMIN — DIBASIC SODIUM PHOSPHATE, MONOBASIC POTASSIUM PHOSPHATE AND MONOBASIC SODIUM PHOSPHATE 250 MG: 852; 155; 130 TABLET ORAL at 05:57

## 2023-11-03 RX ADMIN — OMEPRAZOLE 20 MG: 20 CAPSULE, DELAYED RELEASE ORAL at 08:06

## 2023-11-03 ASSESSMENT — GAIT ASSESSMENTS
GAIT LEVEL OF ASSIST: MINIMAL ASSIST
ASSISTIVE DEVICE: FRONT WHEEL WALKER
DEVIATION: ANTALGIC;STEP TO;BRADYKINETIC
DISTANCE (FEET): 20

## 2023-11-03 ASSESSMENT — ACTIVITIES OF DAILY LIVING (ADL): BED_CHAIR_WHEELCHAIR_TRANSFER_DESCRIPTION: ADAPTIVE EQUIPMENT;INCREASED TIME;REQUIRES LIFT

## 2023-11-03 ASSESSMENT — PATIENT HEALTH QUESTIONNAIRE - PHQ9
SUM OF ALL RESPONSES TO PHQ9 QUESTIONS 1 AND 2: 0
1. LITTLE INTEREST OR PLEASURE IN DOING THINGS: NOT AT ALL
1. LITTLE INTEREST OR PLEASURE IN DOING THINGS: NOT AT ALL
SUM OF ALL RESPONSES TO PHQ9 QUESTIONS 1 AND 2: 0

## 2023-11-03 ASSESSMENT — PAIN DESCRIPTION - PAIN TYPE: TYPE: ACUTE PAIN

## 2023-11-03 NOTE — PROGRESS NOTES
NURSING DAILY NOTE    Name: Linda Casas   Date of Admission: 10/30/2023   Admitting Diagnosis: Closed left hip fracture, initial encounter (Formerly [facility])  Attending Physician: Kimmie Samayoa D.o.  Allergies: Ampicillin, Bactrim ds, and Ciprofloxacin    Safety  Patient Assist     Patient Precautions  Fall Risk, Weight Bearing As Tolerated Left Lower Extremity  Precaution Comments     Bed Transfer Status  Minimal Assist  Toilet Transfer Status   Moderate Assist  Assistive Devices  Rails, Wheelchair  Oxygen  Room air w/o2 available  Diet/Therapeutic Dining  Current Diet Order   Procedures    Diet Order Diet: Regular     Pill Administration  whole  Agitated Behavioral Scale  21  ABS Level of Severity  No Agitation    Fall Risk  Has the patient had a fall this admission?      Cheryl De Jesus Fall Risk Scoring  26, HIGH RISK  Fall Risk Safety Measures  bed alarm, chair alarm, and poor balance    Vitals  Temperature: 36.9 °C (98.4 °F)  Temp src: Temporal  Pulse: 76  Respiration: 18  Blood Pressure : 110/75  Blood Pressure MAP (Calculated): 87 MM HG  BP Location: Right, Upper Arm  Patient BP Position: Supine     Oxygen  Pulse Oximetry: 91 %  O2 (LPM): 0  O2 Delivery Device: Room air w/o2 available    Bowel and Bladder  Last Bowel Movement  11/02/23  Stool Type  Type 6: Fluffy pieces with ragged edges, a mushy stool  Bowel Device     Continent  Bladder: Stress incontinence   Bowel: Continent movement  Bladder Function  Urine Void (mL):  (incontinent in diaper)  Number of Times Voided: 1  Urine Color: Yellow  Number of Times Incontinent of Urine: 1  Straight Catheter: 600 ml  Genitourinary Assessment   Bladder Assessment (WDL):  WDL Except  Urine Color: Yellow  Number of Times Incontinent of Urine: 1  Bladder Scan: Post Void  $ Bladder Scan Results (mL): 35    Skin  Harris Score   16  Sensory Interventions   Bed Types: Standard/Trauma Mattress  Moisture  Interventions  Moisturizers/Barriers: Barrier Cream      Pain  Pain Rating Scale  0 - No Pain  Pain Location  Hip, Leg  Pain Location Orientation  Left  Pain Interventions   [place]    ADLs    Bathing   Patient Refused Bathing  Linen Change      Personal Hygiene  Moist Rabia Wipes  Chlorhexidine Bath      Oral Care  Brushed Teeth  Teeth/Dentures     Shave     Nutrition Percentage Eaten  Lunch, Between 25-50% Consumed  Environmental Precautions  Treaded Slipper Socks on Patient, Personal Belongings, Wastebasket, Call Bell etc. in Easy Reach, Bed in Low Position  Patient Turns/Positioning  Patient Turns Self from Side to Side  Patient Turns Assistance/Tolerance     Bed Positions  Bed Controls On, Bed Locked  Head of Bed Elevated  Self regulated      Psychosocial/Neurologic Assessment  Psychosocial Assessment     Neurologic Assessment  Neuro (WDL): Exceptions to WDL  Level of Consciousness: Responds to pain  Orientation Level: Disoriented to situation, Oriented to person, Disoriented to time, Disoriented to person  Cognition: Memory Loss  Speech: Clear  Facial Symmetry:  (wdl)  Pupil Assesment: Yes  R Pupil Size (mm): 2  R Pupil Shape / Description: Round  R Pupil Reaction: Brisk  L Pupil Size (mm): 2  L Pupil Shape / Description: Round  L Pupil Reaction: Brisk  Motor Function/Sensation Assessment: Motor response  RUE Motor Response: Responds to commands  RUE Sensation: Decreased  LUE Motor Response: Responds to commands  LUE Sensation: No numbness  RLE Motor Response: Responds to commands  RLE Sensation: No pain  LLE Motor Response: Responds to commands  LLE Sensation: Pain  EENT (WDL):  Within Defined Limits    Cardio/Pulmonary Assessment  Edema      Respiratory Breath Sounds  RUL Breath Sounds: Clear  RML Breath Sounds: Diminished  RLL Breath Sounds: Diminished  SANTY Breath Sounds: Clear  LLL Breath Sounds: Diminished  Cardiac Assessment   Cardiac (WDL):  Within Defined Limits

## 2023-11-03 NOTE — CARE PLAN
Problem: Skin Integrity  Goal: Skin integrity is maintained or improved  Outcome: Progressing  Patient encouraged to turn to sides to prevent pressure areas.     Problem: Fall Risk - Rehab  Goal: Patient will remain free from falls  Outcome: Progressing  Patient reminded to use amando light for assist with needs/transfers   The patient is Stable - Low risk of patient condition declining or worsening

## 2023-11-03 NOTE — THERAPY
"Occupational Therapy  Daily Treatment     Patient Name: Linda Casas  Age:  89 y.o., Sex:  female  Medical Record #: [id number]  Today's Date: 11/3/2023     Precautions  Precautions: (P) Fall Risk, Weight Bearing As Tolerated Left Lower Extremity         Subjective    Pt encountered for OT sitting in chair. Pt not agreeable to engage in showering as, \"it is too cold,\" but agreeable to engage in U/LB strengthening.      Objective       11/03/23 1401   OT Charge Group   OT Self Care / ADL (Units) 1   OT Therapeutic Exercise (Units) 3   OT Total Time Spent   OT Individual Total Time Spent (Mins) 60   Precautions   Precautions Fall Risk;Weight Bearing As Tolerated Left Lower Extremity   Functional Level of Assist   Bed, Chair, Wheelchair Transfer Moderate Assist   Bed Chair Wheelchair Transfer Description Adaptive equipment;Increased time;Initial preparation for task;Requires lift;Set-up of equipment;Supervision for safety;Verbal cueing;Other (comment)  (using FWW. moderate VC for foot placement when backing in bed/chair)   Sitting Upper Body Exercises   Sitting Upper Body Exercises Yes   Chest Press 2 sets of 10;Bilateral;Weight (See Comments for lbs)   Bicep Curls 2 sets of 10;Bilateral;Weight (See Comments for lbs)   Comments 3#   Sitting Lower Body Exercises   Nustep Resistance Level 3  (13 minutes; SPM 29; required moderate VC to maintain engagement (easily distractable by others))   Standing Lower Body Exercises   Standing Lower Body Exercises Yes   Other Exercises walking forward and backwads on // bars; Moderate VC for proper foots placement and to sequencing backing into chair. Overall, Andrew for balance   Interdisciplinary Plan of Care Collaboration   Patient Position at End of Therapy In Bed;Bed Alarm On;Call Light within Reach;Tray Table within Reach;Phone within Reach   Strengths & Barriers   Strengths Supportive family;Pleasant and cooperative   Barriers Bladder incontinence;Decreased " endurance;Dementia;Fatigue;Generalized weakness;Impaired activity tolerance;Impaired balance;Impaired functional cognition;Limited mobility;Pain     Frequent rest breaks needed on the // bars secondary to pain. Pt only hammad to complete 2x and up to 6 steps each.     Assessment    Overall, fluctuating progress as pt presented with reduced activity tolerance this day. Pt's session was switch from early this morning to later this afternoon as she wanted to cont to sleep. Pt cont to require motivation to engage in therapy tasks. Low tolerance to functional mobility tasks secondary to L hip pain and limited mobility; however, good tolerance to nustep.     Strengths: (P) Supportive family, Pleasant and cooperative  Barriers: (P) Bladder incontinence, Decreased endurance, Dementia, Fatigue, Generalized weakness, Impaired activity tolerance, Impaired balance, Impaired functional cognition, Limited mobility, Pain    Plan    Cont functional transfers, thera act/ex, and ADLs.    DME       Passport items to be completed:  Perform bathroom transfers, complete dressing, complete feeding, get ready for the day, prepare a simple meal, participate in household tasks, adapt home for safety needs, demonstrate home exercise program, complete caregiver training     Occupational Therapy Goals (Active)       Problem: Dressing       Dates: Start:  10/31/23         Goal: STG-Within one week, patient will dress UB w/ min A       Dates: Start:  10/31/23            Goal: STG-Within one week, patient will dress LB w/ consistent max A       Dates: Start:  10/31/23               Problem: Functional Transfers       Dates: Start:  10/31/23         Goal: STG-Within one week, patient will transfer to toilet w/ min A       Dates: Start:  10/31/23               Problem: OT Long Term Goals       Dates: Start:  10/31/23         Goal: LTG-By discharge, patient will complete basic self care tasks w/ CGA to supervision        Dates: Start:  10/31/23             Goal: LTG-By discharge, patient will perform bathroom transfers w/ CGA to supervision w/ LRAD       Dates: Start:  10/31/23

## 2023-11-03 NOTE — CARE PLAN
"  Problem: Pain - Standard  Goal: Alleviation of pain or a reduction in pain to the patient’s comfort goal  Outcome: Progressing  Note: Assessed for pain and discomfort , pain under control, medicated with trazodone for sleep.Pt able to sleep.Pt's left incision line with stapes well approximated, dressing in place.     Problem: Fall Risk - Rehab  Goal: Patient will remain free from falls  Outcome: Progressing  Note: Cheryl De Jesus Fall risk Assessment Score: 26    High fall risk Interventions   - Bed and strip alarm   - Yellow sign by the door   - Yellow wrist band \"Fall risk\"  - Room near to the nurse station  - Do not leave patient unattended in the bathroom  - Fall risk education provided       The patient is Stable - Low risk of patient condition declining or worsening     Progress made toward(s) clinical / shift goals:  Pt free from fall and injury.    "

## 2023-11-03 NOTE — PROGRESS NOTES
NURSING DAILY NOTE    Name: Linda Casas   Date of Admission: 10/30/2023   Admitting Diagnosis: Closed left hip fracture, initial encounter ([facility])  Attending Physician: Kimmie Samayoa D.o.  Allergies: Ampicillin, Bactrim ds, and Ciprofloxacin    Safety  Patient Assist     Patient Precautions  Fall Risk, Weight Bearing As Tolerated Left Lower Extremity  Precaution Comments     Bed Transfer Status  Moderate Assist  Toilet Transfer Status   Moderate Assist  Assistive Devices  Wheelchair push  Oxygen  None - Room Air  Diet/Therapeutic Dining  Current Diet Order   Procedures    Diet Order Diet: Regular     Pill Administration  whole  Agitated Behavioral Scale  21  ABS Level of Severity  No Agitation    Fall Risk  Has the patient had a fall this admission?      Cheryl De Jesus Fall Risk Scoring  26, HIGH RISK  Fall Risk Safety Measures  bed alarm, chair alarm, and seatbelt alarm    Vitals  Temperature: 37.2 °C (99 °F)  Temp src: Axillary  Pulse: 71  Respiration: 16  Blood Pressure : 125/67  Blood Pressure MAP (Calculated): 86 MM HG  BP Location: Right, Upper Arm  Patient BP Position: Supine     Oxygen  Pulse Oximetry: 95 %  O2 (LPM): 0  O2 Delivery Device: None - Room Air    Bowel and Bladder  Last Bowel Movement  11/02/23  Stool Type  Type 6: Fluffy pieces with ragged edges, a mushy stool  Bowel Device     Continent  Bladder: Stress incontinence   Bowel: Continent movement  Bladder Function  Urine Void (mL):  (incontinent in diaper)  Number of Times Voided: 1  Urine Color: Yellow  Number of Times Incontinent of Urine: 1  Straight Catheter: 600 ml  Genitourinary Assessment   Bladder Assessment (WDL):  WDL Except  Diaz Care: Given with Soap and Water  Urine Color: Yellow  Number of Times Incontinent of Urine: 1  Bladder Scan: Post Void  $ Bladder Scan Results (mL): 35    Skin  Harris Score   16  Sensory Interventions   Bed Types: Standard/Trauma  Mattress  Skin Preventative Measures: Pillows in Use for Support / Positioning  Moisture Interventions  Moisturizers/Barriers: Barrier Cream      Pain  Pain Rating Scale  6 - Hard to ignore, avoid usual activities  Pain Location  Hip, Leg  Pain Location Orientation  Left  Pain Interventions   Medication (see MAR)    ADLs    Bathing   Staff, Self Care, No Assistance Required  Linen Change   Complete  Personal Hygiene  Moist Rabia Wipes  Chlorhexidine Bath      Oral Care  Brushed Teeth  Teeth/Dentures     Shave     Nutrition Percentage Eaten  Breakfast, Less than 25% Consumed  Environmental Precautions  Treaded Slipper Socks on Patient, Personal Belongings, Wastebasket, Call Bell etc. in Easy Reach, Bed in Low Position  Patient Turns/Positioning  Patient Turns Self from Side to Side  Patient Turns Assistance/Tolerance  Assistance of One  Bed Positions  Bed Controls On, Bed Locked  Head of Bed Elevated  Self regulated      Psychosocial/Neurologic Assessment  Psychosocial Assessment  Psychosocial (WDL):  Within Defined Limits  Neurologic Assessment  Neuro (WDL): Exceptions to WDL  Level of Consciousness: Responds to pain  Orientation Level: Oriented to situation  Cognition: Memory Loss  Speech: Clear  Facial Symmetry:  (wdl)  Pupil Assesment: Yes  R Pupil Size (mm): 2  R Pupil Shape / Description: Round  R Pupil Reaction: Brisk  L Pupil Size (mm): 2  L Pupil Shape / Description: Round  L Pupil Reaction: Brisk  Motor Function/Sensation Assessment: Motor strength  RUE Motor Response: Responds to commands  RUE Sensation: Decreased  LUE Motor Response: Responds to commands  LUE Sensation: No numbness  RLE Motor Response: Responds to commands  RLE Sensation: No pain  LLE Motor Response: Responds to commands  LLE Sensation: Pain  EENT (WDL):  Within Defined Limits    Cardio/Pulmonary Assessment  Edema      Respiratory Breath Sounds  RUL Breath Sounds: Clear  RML Breath Sounds: Diminished  RLL Breath Sounds: Diminished  SANTY  Breath Sounds: Clear  LLL Breath Sounds: Diminished  Cardiac Assessment   Cardiac (WDL):  Within Defined Limits

## 2023-11-03 NOTE — THERAPY
"Physical Therapy   Daily Treatment     Patient Name: Linda Casas  Age:  89 y.o., Sex:  female  Medical Record #: [id number]  Today's Date: 11/3/2023     Precautions  Precautions: Fall Risk, Weight Bearing As Tolerated Left Lower Extremity    Subjective    Pt up in wc, \"I've been sitting here since you left\". Willing to participate despite fatigue     Objective       11/03/23 1101   PT Charge Group   PT Therapeutic Exercise (Units) 2   PT Total Time Spent   PT Individual Total Time Spent (Mins) 30   Transfer Functional Level of Assist   Bed, Chair, Wheelchair Transfer Moderate Assist   Bed Chair Wheelchair Transfer Description Adaptive equipment;Increased time;Requires lift  (FWW)   Supine Lower Body Exercise   Supine Lower Body Exercises Yes   Bridges Two Legged;3 sets of 10   Hip Abduction Hook Lying;3 sets of 10   Straight Leg Raises 3 sets of 10;Left  (assisted)   Short Arc Quad 3 sets of 10;Left  (assisted for TKE)   Ankle Pumps 3 sets of 10   Bed Mobility    Supine to Sit Minimal Assist   Sit to Supine Minimal Assist   Sit to Stand Moderate Assist         Assessment    Pt required increased assist for sit<>stand/ transfers 2* pain/ weakness and fatigue from prolonged sitting, completed exercises without significant report of pain, requires manual cues and AAROM for LLE as noted.     Strengths: Independent prior level of function, Pleasant and cooperative, Supportive family, Willingly participates in therapeutic activities  Barriers: Confused, Decreased endurance, Fatigue, Generalized weakness, Impaired activity tolerance, Impaired balance, Impaired functional cognition, Limited mobility, Pain, Pain poorly managed    Plan    Progressive gait with // bars vs FWW. Standing tolerance/ WB to LLE  Sit<>stand/ transfers with FWW, LE ROM/ strength training    DME  PT DME Recommendations  Wheelchair:  (TBD)  Assistive Device:  (TBD, pt has FWW and SPC)    Passport items to be completed:  Get in/out of bed " safely, in/out of a vehicle, safely use mobility device, walk or wheel around home/community, navigate up and down stairs, show how to get up/down from the ground, ensure home is accessible, demonstrate HEP, complete caregiver training    Physical Therapy Problems (Active)       Problem: Mobility       Dates: Start:  10/31/23         Goal: STG-Within one week, patient will propel wheelchair community x 25 feet with SBA       Dates: Start:  10/31/23            Goal: STG-Within one week, patient will ambulate household distance x 25 feet with FWW and min A        Dates: Start:  10/31/23               Problem: Mobility Transfers       Dates: Start:  10/31/23         Goal: STG-Within one week, patient will transfer bed to chair with min A SQPT vs SPT with FWW       Dates: Start:  10/31/23               Problem: PT-Long Term Goals       Dates: Start:  10/31/23         Goal: LTG-By discharge, patient will ambulate x 150 feet with FWW and SBA       Dates: Start:  10/31/23            Goal: LTG-By discharge, patient will transfer one surface to another with FWW and SBA       Dates: Start:  10/31/23            Goal: LTG-By discharge, patient will transfer in/out of a car with FWW and SBA       Dates: Start:  10/31/23

## 2023-11-03 NOTE — THERAPY
"Speech Language Pathology  Daily Treatment     Patient Name: Linda Casas  Age:  89 y.o., Sex:  female  Medical Record #: [id number]  Today's Date: 11/3/2023     Precautions  Precautions: Fall Risk, Weight Bearing As Tolerated Left Lower Extremity    Subjective    \"I'm so cold\".  Pt was in bed, initially refused to participate in therapy; however with encouragement pt was willing to transfer into her wheelchair for this session.       Objective       11/03/23 1331   Treatment Charges   SLP Cognitive Skill Development First 15 Minutes 1   SLP Cognitive Skill Development Additional 15 Minutes 1   SLP Total Time Spent   SLP Individual Total Time Spent (Mins) 30         Assessment    Pt was able to transfer from the bed to the wheelchair with extra time required and min A.  Once in the chair pt was given warm blankets and a heating pack for her hands, she reported she felt much better and was more motivated to participate.  Pt completed a safety awareness task requiring her to identify if scenarios are \"safe\" or \"unsafe\".  Pt completed this task with 100% accuracy.  Pt also reported frustration with her roommate talking and being loud, pt was given earplugs to try to see if those would be helpful.  Pt is much less confused than on her initial evaluation.  She reported that she was initially upset with her DIL because she was not sure why she was brought to the hospital, but now she understands that she needs to get stronger before she goes home.      Strengths: Supportive family  Barriers: Agitation, Confused, Difficulty following instructions, Fatigue, Impaired functional cognition, Impaired carryover of learning, Impaired insight/denial of deficits, Uncooperative, Lack of motivation    Plan    Continue targeting functional memory, Initiate O-log, safety awareness       Speech Therapy Problems (Active)       Problem: Memory STGs       Dates: Start:  11/01/23         Goal: STG-Within one week, patient will " achieve a score of 25/30 or more on the O-log for 3 consecutive days.         Dates: Start:  11/01/23               Problem: Problem Solving STGs       Dates: Start:  11/01/23         Goal: STG-Within one week, patient will complete sustained attention tasks given mod A to achieve 75% accuracy.         Dates: Start:  11/01/23               Problem: Speech/Swallowing LTGs       Dates: Start:  10/31/23         Goal: LTG-By discharge, patient will solve basic problems with spv        Dates: Start:  10/31/23

## 2023-11-03 NOTE — THERAPY
Physical Therapy   Daily Treatment     Patient Name: Linda Casas  Age:  89 y.o., Sex:  female  Medical Record #: [id number]  Today's Date: 11/3/2023     Precautions  Precautions: Fall Risk, Weight Bearing As Tolerated Left Lower Extremity    Subjective    Pt up in wc, finishing in bathroom and ready for PT.     Objective       11/03/23 0831   PT Charge Group   PT Gait Training (Units) 2   PT Therapeutic Exercise (Units) 2   PT Total Time Spent   PT Individual Total Time Spent (Mins) 60   Gait Functional Level of Assist    Gait Level Of Assist Minimal Assist  (for LLE stance stabilization)   Assistive Device Front Wheel Walker   Distance (Feet) 20  (in addition to 10 ft x 2 warm up in // bars)   # of Times Distance was Traveled 3   Deviation Antalgic;Step To;Bradykinetic  (impaired LLE terminal hip/ knee ext and WB tolerance)   Sitting Lower Body Exercises   Ankle Pumps 2 sets of 10   Hip Flexion 2 sets of 10   Hip Abduction 2 sets of 10   Hip Adduction 2 sets of 10   Long Arc Quad 2 sets of 10         Assessment    Pt improving gait distance and LLE strength/ WB tolerance but remains antalgic and requires manual/ verbal cues to L terminal hip/ knee ext. Requires encouragement at times but overall making nice progress toward goals.  Strengths: Independent prior level of function, Pleasant and cooperative, Supportive family, Willingly participates in therapeutic activities  Barriers: Confused, Decreased endurance, Fatigue, Generalized weakness, Impaired activity tolerance, Impaired balance, Impaired functional cognition, Limited mobility, Pain, Pain poorly managed    Plan    Progressive gait with // bars vs FWW. Standing tolerance/ WB to LLE  Sit<>stand/ transfers with FWW, LE ROM/ strength training    DME  PT DME Recommendations  Wheelchair:  (TBD)  Assistive Device:  (TBD, pt has FWW and SPC)    Passport items to be completed:  Get in/out of bed safely, in/out of a vehicle, safely use mobility device,  walk or wheel around home/community, navigate up and down stairs, show how to get up/down from the ground, ensure home is accessible, demonstrate HEP, complete caregiver training    Physical Therapy Problems (Active)       Problem: Mobility       Dates: Start:  10/31/23         Goal: STG-Within one week, patient will propel wheelchair community x 25 feet with SBA       Dates: Start:  10/31/23            Goal: STG-Within one week, patient will ambulate household distance x 25 feet with FWW and min A        Dates: Start:  10/31/23               Problem: Mobility Transfers       Dates: Start:  10/31/23         Goal: STG-Within one week, patient will transfer bed to chair with min A SQPT vs SPT with FWW       Dates: Start:  10/31/23               Problem: PT-Long Term Goals       Dates: Start:  10/31/23         Goal: LTG-By discharge, patient will ambulate x 150 feet with FWW and SBA       Dates: Start:  10/31/23            Goal: LTG-By discharge, patient will transfer one surface to another with FWW and SBA       Dates: Start:  10/31/23            Goal: LTG-By discharge, patient will transfer in/out of a car with FWW and SBA       Dates: Start:  10/31/23

## 2023-11-03 NOTE — PROGRESS NOTES
NURSING DAILY NOTE    Name: Linda Casas   Date of Admission: 10/30/2023   Admitting Diagnosis: Closed left hip fracture, initial encounter ([facility])  Attending Physician: Kimmie Samayoa D.o.  Allergies: Ampicillin, Bactrim ds, and Ciprofloxacin    Safety  Patient Assist     Patient Precautions  Fall Risk, Weight Bearing As Tolerated Left Lower Extremity  Precaution Comments     Bed Transfer Status  Minimal Assist  Toilet Transfer Status   Moderate Assist  Assistive Devices  Rails, Wheelchair  Oxygen  Room air w/o2 available  Diet/Therapeutic Dining  Current Diet Order   Procedures    Diet Order Diet: Regular     Pill Administration  whole  Agitated Behavioral Scale  21  ABS Level of Severity  No Agitation    Fall Risk  Has the patient had a fall this admission?      Cheryl De Jesus Fall Risk Scoring  26, HIGH RISK  Fall Risk Safety Measures  bed alarm and chair alarm    Vitals  Temperature: 36.7 °C (98 °F)  Temp src: Oral  Pulse: 91  Respiration: 16  Blood Pressure : 118/61  Blood Pressure MAP (Calculated): 80 MM HG  BP Location: Right, Upper Arm  Patient BP Position: Supine     Oxygen  Pulse Oximetry: 92 %  O2 (LPM): 2  O2 Delivery Device: Room air w/o2 available    Bowel and Bladder  Last Bowel Movement  11/02/23  Stool Type  Type 6: Fluffy pieces with ragged edges, a mushy stool  Bowel Device     Continent  Bladder: Stress incontinence   Bowel: Continent movement  Bladder Function  Urine Void (mL):  (incontinent in diaper)  Number of Times Voided: 1  Urine Color: Yellow  Number of Times Incontinent of Urine: 1  Straight Catheter: 600 ml  Genitourinary Assessment   Bladder Assessment (WDL):  WDL Except  Urine Color: Yellow  Number of Times Incontinent of Urine: 1  Bladder Scan: Post Void  $ Bladder Scan Results (mL): 35    Skin  Harris Score   16  Sensory Interventions   Bed Types: Standard/Trauma Mattress  Moisture  Interventions  Moisturizers/Barriers: Barrier Cream      Pain  Pain Rating Scale  0 - No Pain  Pain Location  Hip, Leg  Pain Location Orientation  Left  Pain Interventions   Declines    ADLs    Bathing   Patient Refused Bathing  Linen Change      Personal Hygiene  Moist Rabia Wipes  Chlorhexidine Bath      Oral Care  Brushed Teeth  Teeth/Dentures     Shave     Nutrition Percentage Eaten  Lunch, Between 25-50% Consumed  Environmental Precautions     Patient Turns/Positioning  Patient Turns Self from Side to Side  Patient Turns Assistance/Tolerance     Bed Positions  Bed Controls On, Bed Locked  Head of Bed Elevated  Self regulated      Psychosocial/Neurologic Assessment  Psychosocial Assessment     Neurologic Assessment  Neuro (WDL): Exceptions to WDL  Level of Consciousness: Alert  Orientation Level: Disoriented to situation, Oriented to person, Disoriented to time, Disoriented to person  Cognition: Memory Loss  Speech: Clear  Facial Symmetry:  (wdl)  Pupil Assesment: Yes  R Pupil Size (mm): 2  R Pupil Shape / Description: Round  R Pupil Reaction: Brisk  L Pupil Size (mm): 2  L Pupil Shape / Description: Round  L Pupil Reaction: Brisk  Motor Function/Sensation Assessment: Motor response  RUE Motor Response: Responds to commands  RUE Sensation: Decreased  LUE Motor Response: Responds to commands  LUE Sensation: No numbness  RLE Motor Response: Responds to commands  RLE Sensation: No pain  LLE Motor Response: Responds to commands  LLE Sensation: Pain  EENT (WDL):  Within Defined Limits    Cardio/Pulmonary Assessment  Edema      Respiratory Breath Sounds  RUL Breath Sounds: Clear  RML Breath Sounds: Diminished  RLL Breath Sounds: Diminished  SANTY Breath Sounds: Clear  LLL Breath Sounds: Diminished  Cardiac Assessment   Cardiac (WDL):  Within Defined Limits

## 2023-11-03 NOTE — PROGRESS NOTES
Physical Medicine & Rehabilitation Progress Note    Encounter Date: 11/3/2023    Chief Complaint: Doing ok    Interval Events (Subjective):  VSS  11/2/23  Voiding    Seen and examined in her room. Family at bedside. Confused but denies systemic complaints.    ROS: 14 point ROS unable to be done to confusion    Objective:  VITAL SIGNS: /66   Pulse 81   Temp 37 °C (98.6 °F)   Resp 16   Wt 67.8 kg (149 lb 8 oz)   SpO2 91%   BMI 25.65 kg/m²     GEN: No apparent distress  HEENT: Head normocephalic, atraumatic.  Sclera nonicteric bilaterally, no ocular discharge appreciated bilaterally.  CV: Extremities warm and well-perfused, no peripheral edema appreciated bilaterally.  PULMONARY: Breathing nonlabored on room air, no respiratory accessory muscle use.  Not requiring supplemental oxygen.  SKIN: No appreciable skin breakdown on exposed areas of skin.  PSYCH: Mood and affect within normal limits.  NEURO: Awake alert.  Confused.       Laboratory Values:  Recent Results (from the past 72 hour(s))   URINALYSIS    Collection Time: 11/01/23  7:40 AM    Specimen: Urine, Cath   Result Value Ref Range    Color Yellow     Character Clear     Specific Gravity 1.019 <1.035    Ph 5.5 5.0 - 8.0    Glucose Negative Negative mg/dL    Ketones Negative Negative mg/dL    Protein Negative Negative mg/dL    Bilirubin Negative Negative    Urobilinogen, Urine 0.2 Negative    Nitrite Negative Negative    Leukocyte Esterase Trace (A) Negative    Occult Blood Negative Negative    Micro Urine Req Microscopic    URINE MICROSCOPIC (W/UA)    Collection Time: 11/01/23  7:40 AM   Result Value Ref Range    WBC 0-2 /hpf    RBC 2-5 (A) /hpf    Bacteria Negative None /hpf    Epithelial Cells Negative /hpf    Hyaline Cast 3-5 (A) /lpf   CBC WITH DIFFERENTIAL    Collection Time: 11/02/23  6:41 AM   Result Value Ref Range    WBC 9.4 4.8 - 10.8 K/uL    RBC 3.22 (L) 4.20 - 5.40 M/uL    Hemoglobin 10.2 (L) 12.0 - 16.0 g/dL    Hematocrit 31.2 (L) 37.0  - 47.0 %    MCV 96.9 81.4 - 97.8 fL    MCH 31.7 27.0 - 33.0 pg    MCHC 32.7 32.2 - 35.5 g/dL    RDW 48.8 35.9 - 50.0 fL    Platelet Count 265 164 - 446 K/uL    MPV 10.4 9.0 - 12.9 fL    Neutrophils-Polys 64.30 44.00 - 72.00 %    Lymphocytes 19.10 (L) 22.00 - 41.00 %    Monocytes 12.50 0.00 - 13.40 %    Eosinophils 2.80 0.00 - 6.90 %    Basophils 0.40 0.00 - 1.80 %    Immature Granulocytes 0.90 0.00 - 0.90 %    Nucleated RBC 0.00 0.00 - 0.20 /100 WBC    Neutrophils (Absolute) 6.01 1.82 - 7.42 K/uL    Lymphs (Absolute) 1.79 1.00 - 4.80 K/uL    Monos (Absolute) 1.17 (H) 0.00 - 0.85 K/uL    Eos (Absolute) 0.26 0.00 - 0.51 K/uL    Baso (Absolute) 0.04 0.00 - 0.12 K/uL    Immature Granulocytes (abs) 0.08 0.00 - 0.11 K/uL    NRBC (Absolute) 0.00 K/uL   Basic Metabolic Panel    Collection Time: 11/02/23  6:41 AM   Result Value Ref Range    Sodium 140 135 - 145 mmol/L    Potassium 3.8 3.6 - 5.5 mmol/L    Chloride 103 96 - 112 mmol/L    Co2 29 20 - 33 mmol/L    Glucose 102 (H) 65 - 99 mg/dL    Bun 29 (H) 8 - 22 mg/dL    Creatinine 0.92 0.50 - 1.40 mg/dL    Calcium 8.5 8.5 - 10.5 mg/dL    Anion Gap 8.0 7.0 - 16.0   PHOSPHORUS    Collection Time: 11/02/23  6:41 AM   Result Value Ref Range    Phosphorus 3.5 2.5 - 4.5 mg/dL   ESTIMATED GFR    Collection Time: 11/02/23  6:41 AM   Result Value Ref Range    GFR (CKD-EPI) 59 (A) >60 mL/min/1.73 m 2       Medications:  Scheduled Medications   Medication Dose Frequency    Pharmacy Consult Request  1 Each PHARMACY TO DOSE    omeprazole  20 mg DAILY    carvedilol  3.125 mg BID    enoxaparin (LOVENOX) injection  40 mg DAILY AT 1800    escitalopram  20 mg DAILY    losartan  50 mg Q EVENING    QUEtiapine  25 mg Nightly    rosuvastatin  10 mg Q EVENING    senna-docusate  2 Tablet BID     PRN medications: Respiratory Therapy Consult, hydrALAZINE, carboxymethylcellulose, benzocaine-menthol, mag hydrox-al hydrox-simeth, ondansetron **OR** ondansetron, traZODone, sodium chloride, senna-docusate  **AND** polyethylene glycol/lytes **AND** magnesium hydroxide **AND** bisacodyl, acetaminophen, oxyCODONE immediate-release, oxyCODONE immediate-release    Diet:  Current Diet Order   Procedures    Diet Order Diet: Regular       Medical Decision Making and Plan:  Left femoral neck and intertrochanteric femur fracture s/p ORIF with cephalomedullary implant 10/28/2023 Dr. Mckenzie  PT and OT for mobility and ADLs. Per guidelines, 15 hours per week between PT, OT and/or SLP.  Follow-up Ortho  Weightbearing as tolerated     Dementia  SLP consult, likely at baseline  Seroquel nightly -consider reducing if not home med     Leukocytosis  Likely reactive after surgery  UA negative for bacteria, chest x-ray (negative), blood cultures - NGTD  Slight reduction 10/31  CBC 11/2 - Resolved WBC normal at 9.4    Anemia  Low but fairly stable     Hypophosphatemia  Low - 3x supplement  Normal on 11/2     Azotemia  Stable - mildly elevated  Encourage fluids  BMP 11/3     L2 compression fracture 6/2023 s/p kyphoplasty Dr. Barton  Monitor     Hyperlipidemia  Continue Crestor     Hypertension  Continue losartan and Coreg     Anxiety  Continue Lexapro     Pain  As needed oxycodone     Skin  Patient at risk for skin breakdown due to debility in areas including sacrum, achilles, elbows and head in addition to other sites. Nursing to assess skin daily.      GI Ppx  Patient on Prilosec for GERD prophylaxis.      Bowel   Patient on Senna-docusate for constipation prophylaxis.      Bladder  Incontinence - UA negative for bacteria  Timed voids and PVR/BS     DVT PROPHYLAXIS: Lovenox 40 mg subcutaneous nightly     HOSPITALIST FOLLOWING: No     CODE STATUS: DNAR/DNI     DISPO: 11/10/23.  Going home with adult children whom she lives with.      M2B ELIGIBLE: TBD     DISCHARGE FOLLOW UP: Orthopedics, PCP  ____________________________________    Dr. Kimmie Samayoa DO, MS  [facility] - Physical Medicine & Rehabilitation    ____________________________________

## 2023-11-04 ENCOUNTER — APPOINTMENT (OUTPATIENT)
Dept: OCCUPATIONAL THERAPY | Facility: REHABILITATION | Age: 88
DRG: 560 | End: 2023-11-04
Attending: PHYSICAL MEDICINE & REHABILITATION
Payer: MEDICARE

## 2023-11-04 ENCOUNTER — APPOINTMENT (OUTPATIENT)
Dept: PHYSICAL THERAPY | Facility: REHABILITATION | Age: 88
DRG: 560 | End: 2023-11-04
Attending: PHYSICAL MEDICINE & REHABILITATION
Payer: MEDICARE

## 2023-11-04 ENCOUNTER — APPOINTMENT (OUTPATIENT)
Dept: SPEECH THERAPY | Facility: REHABILITATION | Age: 88
DRG: 560 | End: 2023-11-04
Attending: PHYSICAL MEDICINE & REHABILITATION
Payer: MEDICARE

## 2023-11-04 PROCEDURE — 97129 THER IVNTJ 1ST 15 MIN: CPT

## 2023-11-04 PROCEDURE — 97535 SELF CARE MNGMENT TRAINING: CPT | Mod: CO

## 2023-11-04 PROCEDURE — 94760 N-INVAS EAR/PLS OXIMETRY 1: CPT

## 2023-11-04 PROCEDURE — 97116 GAIT TRAINING THERAPY: CPT

## 2023-11-04 PROCEDURE — A9270 NON-COVERED ITEM OR SERVICE: HCPCS | Performed by: PHYSICAL MEDICINE & REHABILITATION

## 2023-11-04 PROCEDURE — 97530 THERAPEUTIC ACTIVITIES: CPT

## 2023-11-04 PROCEDURE — 97130 THER IVNTJ EA ADDL 15 MIN: CPT

## 2023-11-04 PROCEDURE — 770010 HCHG ROOM/CARE - REHAB SEMI PRIVAT*

## 2023-11-04 PROCEDURE — 700111 HCHG RX REV CODE 636 W/ 250 OVERRIDE (IP): Mod: JZ | Performed by: PHYSICAL MEDICINE & REHABILITATION

## 2023-11-04 PROCEDURE — 97110 THERAPEUTIC EXERCISES: CPT | Mod: CO

## 2023-11-04 PROCEDURE — 700102 HCHG RX REV CODE 250 W/ 637 OVERRIDE(OP): Performed by: PHYSICAL MEDICINE & REHABILITATION

## 2023-11-04 RX ADMIN — LOSARTAN POTASSIUM 50 MG: 50 TABLET, FILM COATED ORAL at 20:28

## 2023-11-04 RX ADMIN — OMEPRAZOLE 20 MG: 20 CAPSULE, DELAYED RELEASE ORAL at 10:38

## 2023-11-04 RX ADMIN — CARVEDILOL 3.12 MG: 3.12 TABLET, FILM COATED ORAL at 10:38

## 2023-11-04 RX ADMIN — ROSUVASTATIN CALCIUM 10 MG: 10 TABLET, FILM COATED ORAL at 20:28

## 2023-11-04 RX ADMIN — CARVEDILOL 3.12 MG: 3.12 TABLET, FILM COATED ORAL at 20:28

## 2023-11-04 RX ADMIN — ESCITALOPRAM OXALATE 20 MG: 10 TABLET ORAL at 10:38

## 2023-11-04 RX ADMIN — ENOXAPARIN SODIUM 40 MG: 100 INJECTION SUBCUTANEOUS at 18:01

## 2023-11-04 RX ADMIN — QUETIAPINE FUMARATE 25 MG: 25 TABLET ORAL at 20:29

## 2023-11-04 RX ADMIN — TRAZODONE HYDROCHLORIDE 50 MG: 50 TABLET ORAL at 20:43

## 2023-11-04 RX ADMIN — SENNOSIDES AND DOCUSATE SODIUM 2 TABLET: 8.6; 5 TABLET ORAL at 10:38

## 2023-11-04 ASSESSMENT — ACTIVITIES OF DAILY LIVING (ADL)
TOILET_TRANSFER_DESCRIPTION: GRAB BAR;INCREASED TIME;VERBAL CUEING
BED_CHAIR_WHEELCHAIR_TRANSFER_DESCRIPTION: ADAPTIVE EQUIPMENT;INCREASED TIME;SET-UP OF EQUIPMENT;SUPERVISION FOR SAFETY;VERBAL CUEING
BED_CHAIR_WHEELCHAIR_TRANSFER_DESCRIPTION: ADAPTIVE EQUIPMENT;INCREASED TIME;SET-UP OF EQUIPMENT;SUPERVISION FOR SAFETY;VERBAL CUEING

## 2023-11-04 ASSESSMENT — PAIN DESCRIPTION - PAIN TYPE: TYPE: ACUTE PAIN

## 2023-11-04 ASSESSMENT — GAIT ASSESSMENTS
DISTANCE (FEET): 15
DEVIATION: ANTALGIC;STEP TO;BRADYKINETIC
DEVIATION: ANTALGIC;STEP TO;BRADYKINETIC
DISTANCE (FEET): 20
ASSISTIVE DEVICE: FRONT WHEEL WALKER
ASSISTIVE DEVICE: FRONT WHEEL WALKER
GAIT LEVEL OF ASSIST: MINIMAL ASSIST
GAIT LEVEL OF ASSIST: MINIMAL ASSIST

## 2023-11-04 NOTE — CARE PLAN
The patient is Stable - Low risk of patient condition declining or worsening    Shift Goals  Clinical Goals: safety  Patient Goals: sleep well    Progress made toward(s) clinical / shift goals:    Problem: Pain - Standard  Goal: Alleviation of pain or a reduction in pain to the patient’s comfort goal  Outcome: Progressing. Patient denies having pain over shift. Patient stated she had no pain at start of shift. Patient understands she has tylenol and oxycodone available for pain as needed.      Problem: Fall Risk - Rehab  Goal: Patient will remain free from falls  Outcome: Progressing. Patient bed in lowest locked position with bed alarm on and call light within reach. Patient has called appropriately over shift with call light for needs if any. Patient has not attempted to self transfer over shift.

## 2023-11-04 NOTE — CARE PLAN
The patient is Stable - Low risk of patient condition declining or worsening    Shift Goals  Clinical Goals: patient will get oob for all meals and remain free from falls through end of shift  Patient Goals: rest  Family Goals: updated on poc    Progress made toward(s) clinical / shift goals:  Fall precautions in place. OOB all meals.Incontinence care. Denies any pain - continue prn tylenol & oxy.    Patient is not progressing towards the following goals:

## 2023-11-04 NOTE — THERAPY
"Speech Language Pathology  Daily Treatment     Patient Name: Linda Casas  Age:  89 y.o., Sex:  female  Medical Record #: [id number]  Today's Date: 11/4/2023     Precautions  Precautions: Fall Risk, Weight Bearing As Tolerated Left Lower Extremity    Subjective    Pt received sitting upright to the session drinking coffee. Pt originally stated, \"aw, just leave me here.\" Pt forgot who clinician was on the way to session and asked \"who are you, what are you doing.\"  Pt appeared slightly agitated with stay here stating \"rena\" is the one who \"stuck me in here.\" Pt with difficulty explaining who rena is, stating \"she is my niece, but I guess she is my aunt now since she  my stupid brother.\"     Objective       11/04/23 1001   Treatment Charges   SLP Cognitive Skill Development First 15 Minutes 1   SLP Cognitive Skill Development Additional 15 Minutes 1   SLP Total Time Spent   SLP Individual Total Time Spent (Mins) 30   Cognition   Orientation  Severe (2)   Interdisciplinary Plan of Care Collaboration   IDT Collaboration with  Nursing;Certified Nursing Assistant   Patient Position at End of Therapy In Bed;Bed Alarm On;Other (Comments)  (Nursing in room)   Collaboration Comments CNA assisted in transitioning pt back to bed at end of session. RN present at bedside upon leaving.         Assessment    Pt initiated to O-log. She received an overall 15/25. She was able to provide time of day, etiology, and pathology deficits independently. Pt able to provide the date and day of the week provided logical cueing and city and kind of place given a choice of two. Pt unable to provide name of hospital, month, or year despite level of cueing provided. Pt stating \"I know it, it just leaves.\" She appeared frustrated with herself as noted by loud explicit language when presented with a question she could not answer.     Strengths: Supportive family  Barriers: Agitation, Confused, Difficulty following " instructions, Fatigue, Impaired functional cognition, Impaired carryover of learning, Impaired insight/denial of deficits, Uncooperative, Lack of motivation    Plan    O-log, functional memory, safety awareness    Passport items to be completed:  Express basic needs, understand food/liquid recommendations, consistently follow swallow precautions, manage finances, manage medications, arrive to therapy appointments on time, complete daily memory log entries, solve problems related to safety situations, review education related to hospitalization, complete caregiver training     Speech Therapy Problems (Active)       Problem: Memory STGs       Dates: Start:  11/01/23         Goal: STG-Within one week, patient will achieve a score of 25/30 or more on the O-log for 3 consecutive days.         Dates: Start:  11/01/23               Problem: Problem Solving STGs       Dates: Start:  11/01/23         Goal: STG-Within one week, patient will complete sustained attention tasks given mod A to achieve 75% accuracy.         Dates: Start:  11/01/23               Problem: Speech/Swallowing LTGs       Dates: Start:  10/31/23         Goal: LTG-By discharge, patient will solve basic problems with spv        Dates: Start:  10/31/23

## 2023-11-04 NOTE — THERAPY
Physical Therapy   Daily Treatment     Patient Name: Linda Casas  Age:  89 y.o., Sex:  female  Medical Record #: [id number]  Today's Date: 11/4/2023     Precautions  Precautions: Fall Risk, Weight Bearing As Tolerated Left Lower Extremity    Subjective    Patient pleasant and agreeable to participate.      Objective       11/04/23 1501   PT Charge Group   PT Gait Training (Units) 1   PT Therapeutic Activities (Units) 1   PT Total Time Spent   PT Individual Total Time Spent (Mins) 30   Gait Functional Level of Assist    Gait Level Of Assist Minimal Assist   Assistive Device Front Wheel Walker   Distance (Feet) 20   # of Times Distance was Traveled 2   Deviation Antalgic;Step To;Bradykinetic  (occasional step-through; R knee flexed throughout)   Transfer Functional Level of Assist   Bed, Chair, Wheelchair Transfer Minimal Assist   Bed Chair Wheelchair Transfer Description Adaptive equipment;Increased time;Set-up of equipment;Supervision for safety;Verbal cueing   Sitting Lower Body Exercises   Hip Abduction 2 sets of 10;Bilateral  (isometric against gait belt)   Hip Adduction 2 sets of 10;Bilateral  (isometric ball squeeze)   Long Arc Quad 2 sets of 10   Bed Mobility    Sit to Supine Minimal Assist  (min A for L LE management)   Sit to Stand Minimal Assist   Interdisciplinary Plan of Care Collaboration   IDT Collaboration with  Nursing;Certified Nursing Assistant   Patient Position at End of Therapy In Bed;Bed Alarm On;Call Light within Reach;Tray Table within Reach;Phone within Reach   Collaboration Comments Collaborated with CNA and RN re: patient no longer requiring O2 (per RN)         Assessment    Patient with improvement in activity tolerance compared to this morning. She was able to perform step-through pattern during ambulation, but continues to require heavy UE support and demonstrates significantly impaired weight-bearing tolerance.     Strengths: Independent prior level of function, Pleasant  "and cooperative, Supportive family, Willingly participates in therapeutic activities  Barriers: Confused, Decreased endurance, Fatigue, Generalized weakness, Impaired activity tolerance, Impaired balance, Impaired functional cognition, Limited mobility, Pain, Pain poorly managed    Plan    Progressive gait with // bars vs FWW. Standing tolerance/ WB to LLE  Sit<>stand/ transfers with FWW, LE ROM/ strength training    DME  PT DME Recommendations  Wheelchair: 18\" Width, Lightweight, Standard Leg Rests  Cushion: Standard  Assistive Device: Front Wheeled Walker    Passport items to be completed:  Get in/out of bed safely, in/out of a vehicle, safely use mobility device, walk or wheel around home/community, navigate up and down stairs, show how to get up/down from the ground, ensure home is accessible, demonstrate HEP, complete caregiver training    Physical Therapy Problems (Active)       Problem: Mobility       Dates: Start:  10/31/23         Goal: STG-Within one week, patient will propel wheelchair community x 25 feet with SBA       Dates: Start:  10/31/23            Goal: STG-Within one week, patient will ambulate household distance x 25 feet with FWW and min A        Dates: Start:  10/31/23               Problem: Mobility Transfers       Dates: Start:  10/31/23         Goal: STG-Within one week, patient will transfer bed to chair with min A SQPT vs SPT with FWW       Dates: Start:  10/31/23               Problem: PT-Long Term Goals       Dates: Start:  10/31/23         Goal: LTG-By discharge, patient will ambulate x 150 feet with FWW and SBA       Dates: Start:  10/31/23            Goal: LTG-By discharge, patient will transfer one surface to another with FWW and SBA       Dates: Start:  10/31/23            Goal: LTG-By discharge, patient will transfer in/out of a car with FWW and SBA       Dates: Start:  10/31/23              "

## 2023-11-04 NOTE — FLOWSHEET NOTE
11/04/23 0824   Events/Summary/Plan   Events/Summary/Plan 02 pulse ox check   Vital Signs   Pulse 66   Respiration 16   Pulse Oximetry 94 %   $ Pulse Oximetry (Spot Check) Yes   Respiratory Assessment   Level of Consciousness Responds to voice  (sleeping)   Chest Exam   Work Of Breathing / Effort Within Normal Limits   Oxygen   O2 (LPM) 1   O2 Delivery Device Silicone Nasal Cannula

## 2023-11-04 NOTE — PROGRESS NOTES
NURSING DAILY NOTE    Name: Linda Casas  Date of Admission: 10/30/2023  Admitting Diagnosis: Closed left hip fracture, initial encounter ([facility])  Attending Physician: Kimmie Samayoa D.o.  Allergies: Ampicillin, Bactrim ds, and Ciprofloxacin    Safety  Patient Assist  o   Patient Precautions  oFall Risk, Weight Bearing As Tolerated Left Lower Extremity  Precaution Comments  o   Bed Transfer Status  oModerate Assist  Toilet Transfer Status  oModerate Assist  Assistive Devices  oRails, Wheelchair  Oxygen  oNasal Cannula  Diet/Therapeutic Dining  o  Current Diet Order   Procedures    Diet Order Diet: Regular     Pill Administration  owhole  Agitated Behavioral Scale  o21  ABS Level of Severity  Jai Agitation    Fall Risk  Has the patient had a fall this admission?  Jai  Cheryl De Jesus Fall Risk Scoring  o26, HIGH RISK  Fall Risk Safety Measures  junior alarm and chair alarm    Vitals  Temperature: 36.9 °C (98.5 °F)  Temp src: Oral  Pulse: 86  Respiration: 18  Blood Pressure : 114/53  Blood Pressure MAP (Calculated): 73 MM HG  BP Location: Right, Upper Arm  Patient BP Position: Supine    Oxygen  Pulse Oximetry: 91 %  O2 (LPM): 2  O2 Delivery Device: Nasal Cannula    Bowel and Bladder  Last Bowel Movement  o11/02/23  Stool Type  oType 6: Fluffy pieces with ragged edges, a mushy stool  Bowel Device  o   Continent  oBladder: Stress incontinence  oBowel: Continent movement  Bladder Function  oUrine Void (mL):  (incontinent in diaper)  Number of Times Voided: 1  Urine Color: Yellow  Number of Times Incontinent of Urine: 1  Straight Catheter: 600 ml  Genitourinary Assessment  oBladder Assessment (WDL):  WDL Except  Diaz Catheter: Not Applicable  Diaz Care: Given with Soap and Water  Urine Color: Yellow  Number of Times Incontinent of Urine: 1  Bladder Scan: Post Void  $ Bladder Scan Results (mL): 35    Skin  Harris Score  o 16  Sensory Interventions  o Bed Types: Standard/Trauma Mattress  Skin Preventative  Measures: Pillows in Use for Support / Positioning  Moisture Interventions  oMoisturizers/Barriers: Barrier Cream, Barrier Wipes      Pain  Pain Rating Scale  o2 - Notice Pain, does not interfere with activities  Pain Location  oHip, Leg  Pain Location Orientation  oLeft  Pain Interventions  oMedication (see MAR)    ADLs    Bathing  oStaff  Linen Change  oComplete  Personal Hygiene  oMoist Rabia Wipes  Chlorhexidine Bath  o   Oral Care  oRefused  Teeth/Dentures  o   Shave  o   Nutrition Percentage Eaten  oDinner, 0% Consumed, Refused  Environmental Precautions  oTreaded Slipper Socks on Patient, Personal Belongings, Wastebasket, Call Bell etc. in Easy Reach, Transferred to Stronger Side, Bed in Low Position  Patient Turns/Positioning  oPatient Turns Self from Side to Side  Patient Turns Assistance/Tolerance  oAssistance of One  Bed Positions  Antwan Controls On, Bed Locked  Head of Bed Elevated  oSelf regulated      Psychosocial/Neurologic Assessment  Psychosocial Assessment  oPsychosocial (WDL):  WDL Except  Neurologic Assessment  oNeuro (WDL): Exceptions to WDL  Level of Consciousness: Responds to pain  Orientation Level: Oriented to person, Oriented to situation, Oriented to place, Disoriented to time  Cognition: Memory Loss  Speech: Clear  Facial Symmetry:  (wdl)  Pupil Assesment: Yes  R Pupil Size (mm): 2  R Pupil Shape / Description: Round  R Pupil Reaction: Brisk  L Pupil Size (mm): 2  L Pupil Shape / Description: Round  L Pupil Reaction: Brisk  Motor Function/Sensation Assessment: Motor strength  RUE Motor Response: Responds to commands  RUE Sensation: Decreased  LUE Motor Response: Responds to commands  LUE Sensation: No numbness  RLE Motor Response: Responds to commands  RLE Sensation: No pain  LLE Motor Response: Responds to commands  LLE Sensation: Pain  oEENT (WDL):  Within Defined Limits    Cardio/Pulmonary Assessment  Edema  o   Respiratory Breath Sounds  oRUL Breath Sounds: Clear  RML Breath Sounds:  Diminished  RLL Breath Sounds: Diminished  SANTY Breath Sounds: Clear  LLL Breath Sounds: Diminished  Cardiac Assessment  oCardiac (WDL):  Within Defined Limits

## 2023-11-04 NOTE — THERAPY
Physical Therapy   Daily Treatment     Patient Name: Linda Casas  Age:  89 y.o., Sex:  female  Medical Record #: [id number]  Today's Date: 11/4/2023     Precautions  Precautions: Fall Risk, Weight Bearing As Tolerated Left Lower Extremity    Subjective    Patient asleep upon arrival for therapy, required increased time to mobilize this morning.      Objective       11/04/23 0901   PT Charge Group   PT Gait Training (Units) 2   PT Therapeutic Activities (Units) 2   PT Total Time Spent   PT Individual Total Time Spent (Mins) 60   Gait Functional Level of Assist    Gait Level Of Assist Minimal Assist   Assistive Device Front Wheel Walker   Distance (Feet) 15  (+10' in // bars as warm-up)   # of Times Distance was Traveled 4   Deviation Antalgic;Step To;Bradykinetic  (R knee flexed in all phases of giat)   Transfer Functional Level of Assist   Bed, Chair, Wheelchair Transfer Moderate Assist  (min-mod A)   Bed Chair Wheelchair Transfer Description Adaptive equipment;Increased time;Set-up of equipment;Supervision for safety;Verbal cueing  (x1 repetition with FWW and mod A; x1 repetition with stand pivot and min A (towards L, using WC arm rests))   Standing Lower Body Exercises   Comments Sit <> stand 3 x 3 from WC up to FWW; lateral weight-shifting with B UE support on FWW and assist to maintain R knee extension in weight-bearing   Bed Mobility    Supine to Sit Minimal Assist  (extra time)   Sit to Stand Moderate Assist  (min-mod A)   Interdisciplinary Plan of Care Collaboration   IDT Collaboration with  Certified Nursing Assistant   Patient Position at End of Therapy Seated;Chair Alarm On;Self Releasing Lap Belt Applied;Call Light within Reach;Tray Table within Reach;Phone within Reach   Collaboration Comments CNA assisted with brief change       Static standing balance (with B UE support on FWW) for brief change and lower body dressing (dependent for clothing/brief management in standing).  "    Assessment    Patient required increased time to complete all tasks this morning, particularly supine to sit transition, due to fatigue and L LE pain. She continues to have impaired weight-bearing tolerance at the L LE during standing and ambulation. She demonstrated improvement in sit <> stand from WC after repetition.     Strengths: Independent prior level of function, Pleasant and cooperative, Supportive family, Willingly participates in therapeutic activities  Barriers: Confused, Decreased endurance, Fatigue, Generalized weakness, Impaired activity tolerance, Impaired balance, Impaired functional cognition, Limited mobility, Pain, Pain poorly managed    Plan    Progressive gait with // bars vs FWW. Standing tolerance/ WB to LLE  Sit<>stand/ transfers with FWW, LE ROM/ strength training    DME  PT DME Recommendations  Wheelchair: 18\" Width, Lightweight, Standard Leg Rests  Cushion: Standard  Assistive Device: Front Wheeled Walker    Passport items to be completed:  Get in/out of bed safely, in/out of a vehicle, safely use mobility device, walk or wheel around home/community, navigate up and down stairs, show how to get up/down from the ground, ensure home is accessible, demonstrate HEP, complete caregiver training    Physical Therapy Problems (Active)       Problem: Mobility       Dates: Start:  10/31/23         Goal: STG-Within one week, patient will propel wheelchair community x 25 feet with SBA       Dates: Start:  10/31/23            Goal: STG-Within one week, patient will ambulate household distance x 25 feet with FWW and min A        Dates: Start:  10/31/23               Problem: Mobility Transfers       Dates: Start:  10/31/23         Goal: STG-Within one week, patient will transfer bed to chair with min A SQPT vs SPT with FWW       Dates: Start:  10/31/23               Problem: PT-Long Term Goals       Dates: Start:  10/31/23         Goal: LTG-By discharge, patient will ambulate x 150 feet with FWW " and SBA       Dates: Start:  10/31/23            Goal: LTG-By discharge, patient will transfer one surface to another with FWW and SBA       Dates: Start:  10/31/23            Goal: LTG-By discharge, patient will transfer in/out of a car with FWW and SBA       Dates: Start:  10/31/23

## 2023-11-04 NOTE — FLOWSHEET NOTE
11/04/23 0741   Events/Summary/Plan   Events/Summary/Plan 02 pulse ox check   Vital Signs   Pulse 61   Respiration 18   Pulse Oximetry 98 %   $ Pulse Oximetry (Spot Check) Yes   Respiratory Assessment   Level of Consciousness Responds to voice  (sleeping)   Chest Exam   Work Of Breathing / Effort Within Normal Limits   Oxygen   O2 (LPM) 2  (titrated to 1L)   O2 Delivery Device Silicone Nasal Cannula

## 2023-11-04 NOTE — THERAPY
Occupational Therapy  Daily Treatment     Patient Name: Linda Casas  Age:  89 y.o., Sex:  female  Medical Record #: [id number]  Today's Date: 11/4/2023     Precautions  Precautions: (P) Fall Risk, Weight Bearing As Tolerated Left Lower Extremity         Subjective    Pt agreeable for tx session with encouragement     Objective       11/04/23 1301 11/04/23 1401   OT Charge Group   OT Self Care / ADL (Units)  --  2   OT Therapeutic Exercise (Units) 2  --    OT Total Time Spent   OT Individual Total Time Spent (Mins) 30 30   Precautions   Precautions Fall Risk;Weight Bearing As Tolerated Left Lower Extremity Fall Risk;Weight Bearing As Tolerated Left Lower Extremity   Functional Level of Assist   Grooming  --  Standby Assist   Grooming Description  --  Seated in wheelchair at sink;Increased time   Toileting  --  Maximal Assist   Toileting Description  --  Grab bar;Increased time;Verbal cueing;Assist to pull pants up;Assist to pull pants down   Bed, Chair, Wheelchair Transfer Minimal Assist Minimal Assist   Bed Chair Wheelchair Transfer Description Squat pivot transfer to wheelchair;Increased time;Verbal cueing Squat pivot transfer to wheelchair;Verbal cueing;Increased time   Toilet Transfers  --  Minimal Assist   Toilet Transfer Description  --  Grab bar;Increased time;Verbal cueing   Sitting Upper Body Exercises   Chest Press 2 sets of 10;Weight (See Comments for lbs);Bilateral  (3Ib dumbbell)  --    Shoulder Press 2 sets of 10;Bilateral;Weight (See Comments for lbs)  (3Ib dumbbell)  --    Bicep Curls 2 sets of 10;Bilateral;Weight (See Comments for lbs)  (3Ib dumbbell)  --    Comments Pt completed BUE therex with VCs for form and pace to improve strength and endurance needed for functional transfers  --    Bed Mobility    Sit to Supine Minimal Assist Minimal Assist   Interdisciplinary Plan of Care Collaboration   Patient Position at End of Therapy In Bed;Bed Alarm On;Call Light within Reach;Tray Table  "within Reach;Phone within Reach Seated;Chair Alarm On;Call Light within Reach;Tray Table within Reach;Phone within Reach     Second tx session: Pt completed x2 toilet transfers and toileting d/t loose/frequent BM.    Assessment    Pt requiring increased time to complete transfers d/t difficulty weightbearing into LLE, pain, and weakness. Pt progressing toward goals requiring Min A for transfer and VC's for sequencing and safety.   Strengths: Supportive family, Pleasant and cooperative  Barriers: Bladder incontinence, Decreased endurance, Dementia, Fatigue, Generalized weakness, Impaired activity tolerance, Impaired balance, Impaired functional cognition, Limited mobility, Pain    Plan  Progressive gait with // bars vs FWW. Standing tolerance/ WB to LLE  Sit<>stand/ transfers with FWW, LE ROM/ strength training     DME  PT DME Recommendations  Wheelchair: 18\" Width, Lightweight, Standard Leg Rests  Cushion: Standard  Assistive Device: Front Wheeled Walker     Passport items to be completed:  Get in/out of bed safely, in/out of a vehicle, safely use mobility device, walk or wheel around home/community, navigate up and down stairs, show how to get up/down from the ground, ensure home is accessible, demonstrate HEP, complete caregiver training    Occupational Therapy Goals (Active)       Problem: Dressing       Dates: Start:  10/31/23         Goal: STG-Within one week, patient will dress UB w/ min A       Dates: Start:  10/31/23            Goal: STG-Within one week, patient will dress LB w/ consistent max A       Dates: Start:  10/31/23               Problem: Functional Transfers       Dates: Start:  10/31/23         Goal: STG-Within one week, patient will transfer to toilet w/ min A       Dates: Start:  10/31/23               Problem: OT Long Term Goals       Dates: Start:  10/31/23         Goal: LTG-By discharge, patient will complete basic self care tasks w/ CGA to supervision        Dates: Start:  10/31/23       "      Goal: LTG-By discharge, patient will perform bathroom transfers w/ CGA to supervision w/ LRAD       Dates: Start:  10/31/23

## 2023-11-04 NOTE — FLOWSHEET NOTE
11/03/23 1743   Events/Summary/Plan   Events/Summary/Plan SpO2 check   Vital Signs   Pulse 80   Respiration 16   Pulse Oximetry 93 %   $ Pulse Oximetry (Spot Check) Yes   Oxygen   O2 (LPM) 2   O2 Delivery Device Nasal Cannula   Room Air Challenge Fail  (Room air SpO2=87%)

## 2023-11-05 ENCOUNTER — APPOINTMENT (OUTPATIENT)
Dept: SPEECH THERAPY | Facility: REHABILITATION | Age: 88
DRG: 560 | End: 2023-11-05
Attending: PHYSICAL MEDICINE & REHABILITATION
Payer: MEDICARE

## 2023-11-05 LAB
BACTERIA BLD CULT: NORMAL
BACTERIA BLD CULT: NORMAL
SIGNIFICANT IND 70042: NORMAL
SIGNIFICANT IND 70042: NORMAL
SITE SITE: NORMAL
SITE SITE: NORMAL
SOURCE SOURCE: NORMAL
SOURCE SOURCE: NORMAL

## 2023-11-05 PROCEDURE — 700102 HCHG RX REV CODE 250 W/ 637 OVERRIDE(OP): Performed by: PHYSICAL MEDICINE & REHABILITATION

## 2023-11-05 PROCEDURE — 97130 THER IVNTJ EA ADDL 15 MIN: CPT

## 2023-11-05 PROCEDURE — 94760 N-INVAS EAR/PLS OXIMETRY 1: CPT

## 2023-11-05 PROCEDURE — 700111 HCHG RX REV CODE 636 W/ 250 OVERRIDE (IP): Mod: JZ | Performed by: PHYSICAL MEDICINE & REHABILITATION

## 2023-11-05 PROCEDURE — A9270 NON-COVERED ITEM OR SERVICE: HCPCS | Performed by: PHYSICAL MEDICINE & REHABILITATION

## 2023-11-05 PROCEDURE — 99231 SBSQ HOSP IP/OBS SF/LOW 25: CPT | Performed by: PHYSICAL MEDICINE & REHABILITATION

## 2023-11-05 PROCEDURE — 770010 HCHG ROOM/CARE - REHAB SEMI PRIVAT*

## 2023-11-05 PROCEDURE — 97129 THER IVNTJ 1ST 15 MIN: CPT

## 2023-11-05 RX ADMIN — CARVEDILOL 3.12 MG: 3.12 TABLET, FILM COATED ORAL at 09:42

## 2023-11-05 RX ADMIN — QUETIAPINE FUMARATE 25 MG: 25 TABLET ORAL at 21:32

## 2023-11-05 RX ADMIN — SENNOSIDES AND DOCUSATE SODIUM 2 TABLET: 8.6; 5 TABLET ORAL at 21:32

## 2023-11-05 RX ADMIN — SENNOSIDES AND DOCUSATE SODIUM 2 TABLET: 8.6; 5 TABLET ORAL at 09:42

## 2023-11-05 RX ADMIN — ESCITALOPRAM OXALATE 20 MG: 10 TABLET ORAL at 09:42

## 2023-11-05 RX ADMIN — CARVEDILOL 3.12 MG: 3.12 TABLET, FILM COATED ORAL at 21:32

## 2023-11-05 RX ADMIN — LOSARTAN POTASSIUM 50 MG: 50 TABLET, FILM COATED ORAL at 21:32

## 2023-11-05 RX ADMIN — ROSUVASTATIN CALCIUM 10 MG: 10 TABLET, FILM COATED ORAL at 21:32

## 2023-11-05 RX ADMIN — OMEPRAZOLE 20 MG: 20 CAPSULE, DELAYED RELEASE ORAL at 09:42

## 2023-11-05 RX ADMIN — ENOXAPARIN SODIUM 40 MG: 100 INJECTION SUBCUTANEOUS at 18:00

## 2023-11-05 ASSESSMENT — PAIN DESCRIPTION - PAIN TYPE
TYPE: SURGICAL PAIN
TYPE: SURGICAL PAIN

## 2023-11-05 NOTE — CARE PLAN
"  Problem: Pain - Standard  Goal: Alleviation of pain or a reduction in pain to the patient’s comfort goal  Outcome: Progressing  Note: Assessed for pain and discomfort , left hip incision line with staples well approximated, pain under control. Medicated with trazodone for sleep. Needs anticipated and attended.     Problem: Fall Risk - Rehab  Goal: Patient will remain free from falls  Outcome: Progressing  Note: Cheryl De Jesus Fall risk Assessment Score: 27    High fall risk Interventions   - Bed and strip alarm   - Yellow sign by the door   - Yellow wrist band \"Fall risk\"  - Room near to the nurse station  - Do not leave patient unattended in the bathroom  - Fall risk education provided       The patient is Stable - Low risk of patient condition declining or worsening    Shift Goals  Clinical Goals: patient will get oob for all meals and remain free from falls through end of shift  Patient Goals: rest  Family Goals: updated on poc    Progress made toward(s) clinical / shift goals:  Pt free from fall and injury.    "

## 2023-11-05 NOTE — THERAPY
Speech Language Pathology  Daily Treatment     Patient Name: Linda Casas  Age:  89 y.o., Sex:  female  Medical Record #: [id number]  Today's Date: 11/5/2023     Precautions  Precautions: Fall Risk, Weight Bearing As Tolerated Left Lower Extremity    Subjective    Patient was willing to participate bedside.      Objective       11/05/23 1302   Treatment Charges   SLP Cognitive Skill Development First 15 Minutes 1   SLP Cognitive Skill Development Additional 15 Minutes 1   SLP Total Time Spent   SLP Individual Total Time Spent (Mins) 30   Cognition   Orientation  Severe (2)   Interdisciplinary Plan of Care Collaboration   Patient Position at End of Therapy In Bed;Bed Alarm On;Call Light within Reach;Tray Table within Reach         Assessment    Completed o-log with a final score of 15/30.   Max cues needed to answer basic questions related to calendar in room (month, d/c date, etc.)   Towards end of session, patient requiring frequent cues to maintain alertness.     Strengths: Supportive family  Barriers: Agitation, Confused, Difficulty following instructions, Fatigue, Impaired functional cognition, Impaired carryover of learning, Impaired insight/denial of deficits, Uncooperative, Lack of motivation    Plan    Orientation, basic attention      Speech Therapy Problems (Active)       Problem: Memory STGs       Dates: Start:  11/01/23         Goal: STG-Within one week, patient will achieve a score of 25/30 or more on the O-log for 3 consecutive days.         Dates: Start:  11/01/23               Problem: Problem Solving STGs       Dates: Start:  11/01/23         Goal: STG-Within one week, patient will complete sustained attention tasks given mod A to achieve 75% accuracy.         Dates: Start:  11/01/23               Problem: Speech/Swallowing LTGs       Dates: Start:  10/31/23         Goal: LTG-By discharge, patient will solve basic problems with spv        Dates: Start:  10/31/23

## 2023-11-05 NOTE — CARE PLAN
The patient is Stable - Low risk of patient condition declining or worsening    Shift Goals  Clinical Goals: OOB for all meals & safety  Patient Goals: rest  Family Goals: updated on poc    Progress made toward(s) clinical / shift goals:      Problem: Fall Risk - Rehab  Goal: Patient will remain free from falls  Outcome: Progressing   --remains free from falls but is a high fall risk.    Patient is not progressing towards the following goals:      Problem: Knowledge Deficit - Standard  Goal: Patient and family/care givers will demonstrate understanding of plan of care, disease process/condition, diagnostic tests and medications  Outcome: Not Met   --pt needs teaching and reinforcement with safety and fall risk--she on 3 occassions threw herself backward before she was close to the wheelchair or bed -after attempting to educate the pt on fall risk and interventions she closed her eyes ignoring RN.  Needs reinforced education on safety.

## 2023-11-05 NOTE — PROGRESS NOTES
NURSING DAILY NOTE    Name: Linda Casas   Date of Admission: 10/30/2023   Admitting Diagnosis: Closed left hip fracture, initial encounter ([facility])  Attending Physician: Kimmie Samayoa D.o.  Allergies: Ampicillin, Bactrim ds, and Ciprofloxacin    Safety  Patient Assist     Patient Precautions  Fall Risk, Weight Bearing As Tolerated Left Lower Extremity  Precaution Comments     Bed Transfer Status  Minimal Assist  Toilet Transfer Status   Minimal Assist  Assistive Devices  Rails, Wheelchair  Oxygen  None - Room Air  Diet/Therapeutic Dining  Current Diet Order   Procedures    Diet Order Diet: Regular     Pill Administration  whole and one at a time   Agitated Behavioral Scale  21  ABS Level of Severity  No Agitation    Fall Risk  Has the patient had a fall this admission?   No  Cheryl De Jesus Fall Risk Scoring  27, HIGH RISK  Fall Risk Safety Measures  bed alarm, chair alarm, poor balance, and low vision/ hearing    Vitals  Temperature: 37.3 °C (99.1 °F)  Temp src: Oral  Pulse: 77  Respiration: 18  Blood Pressure : 120/63  Blood Pressure MAP (Calculated): 82 MM HG  BP Location: Right, Upper Arm  Patient BP Position: Supine     Oxygen  Pulse Oximetry: 91 %  O2 (LPM): 1  O2 Delivery Device: None - Room Air    Bowel and Bladder  Last Bowel Movement  11/04/23  Stool Type  Type 5: Soft blob with clear cut edges (passed easily)  Bowel Device     Continent  Bladder: Stress incontinence   Bowel: Continent movement  Bladder Function  Urine Void (mL):  (incontinent in diaper)  Number of Times Voided: 1  Urine Color: Yellow  Number of Times Incontinent of Urine: 2  Straight Catheter: 600 ml  Genitourinary Assessment   Bladder Assessment (WDL):  WDL Except  Diaz Catheter: Not Applicable  Diaz Care: Given with Soap and Water  Urine Color: Yellow  Number of Times Incontinent of Urine: 2  Bladder Scan: Post Void  $ Bladder Scan Results (mL):  35    Skin  Harris Score   17  Sensory Interventions   Bed Types: Standard/Trauma Mattress  Skin Preventative Measures: Pillows in Use for Support / Positioning  Moisture Interventions  Moisturizers/Barriers: Antifungal Paste, Barrier Wipes      Pain  Pain Rating Scale  0 - No Pain  Pain Location  Hip, Leg  Pain Location Orientation  Left  Pain Interventions   Declines    ADLs    Bathing   Partial Bed Bath  Linen Change   Partial  Personal Hygiene  Change Rabia Pads, Perineal Care  Chlorhexidine Bath      Oral Care  Mouth Swabbed  Teeth/Dentures  Intact  Shave     Nutrition Percentage Eaten  Less than 25% Consumed, Dinner  Environmental Precautions  Treaded Slipper Socks on Patient, Personal Belongings, Wastebasket, Call Bell etc. in Easy Reach, Bed in Low Position  Patient Turns/Positioning  Patient Turns Self from Side to Side  Patient Turns Assistance/Tolerance  Assistance of One  Bed Positions  Bed Controls On, Bed Locked  Head of Bed Elevated  Self regulated      Psychosocial/Neurologic Assessment  Psychosocial Assessment  Psychosocial (WDL):  WDL Except  Patient Behaviors: Drowsy  Neurologic Assessment  Neuro (WDL): Exceptions to WDL  Level of Consciousness: Responds to voice  Orientation Level: Oriented to person, Oriented to situation, Oriented to place, Disoriented to time  Cognition: Memory Loss  Speech: Clear  Facial Symmetry:  (wdl)  Pupil Assesment: Yes  R Pupil Size (mm): 2  R Pupil Shape / Description: Round  R Pupil Reaction: Brisk  L Pupil Size (mm): 2  L Pupil Shape / Description: Round  L Pupil Reaction: Brisk  Motor Function/Sensation Assessment: Motor strength  RUE Motor Response: Responds to commands  RUE Sensation: Full sensation  Muscle Strength Right Arm: Normal Strength Against Gravity and Full Resistance  LUE Motor Response: Responds to commands  LUE Sensation: Full sensation  Muscle Strength Left Arm: Normal Strength Against Gravity and Full Resistance  RLE Motor Response: Responds to  commands  RLE Sensation: Full sensation  Muscle Strength Right Leg: Good Strength Against Gravity and Moderate Resistance  LLE Motor Response: Responds to commands  LLE Sensation: Pain  Muscle Strength Left Leg: Normal Strength Against Gravity and Full Resistance  EENT (WDL):  Within Defined Limits    Cardio/Pulmonary Assessment  Edema      Respiratory Breath Sounds  RUL Breath Sounds: Clear  RML Breath Sounds: Diminished  RLL Breath Sounds: Diminished  SANTY Breath Sounds: Clear  LLL Breath Sounds: Diminished  Cardiac Assessment   Cardiac (WDL):  Within Defined Limits

## 2023-11-05 NOTE — FLOWSHEET NOTE
11/05/23 1151   Events/Summary/Plan   Events/Summary/Plan spot check done pt remains on room air doing well at this time   Vital Signs   Pulse 91   Respiration 16   Pulse Oximetry 93 %   $ Pulse Oximetry (Spot Check) Yes   Respiratory Assessment   Respiratory Pattern Within Normal Limits   Level of Consciousness   (sleeping)   Chest Exam   Work Of Breathing / Effort Within Normal Limits   Oxygen   O2 (LPM) 0   FiO2% 21 %   O2 Delivery Device Room air w/o2 available

## 2023-11-05 NOTE — PROGRESS NOTES
Physical Medicine & Rehabilitation Progress Note    Encounter Date: 11/5/2023    Chief Complaint: Sleepy    Interval Events (Subjective):  VSS  11/4/23  Voiding    Seen and examined in her room. Tired today but doing well. Has thrush per nursing    ROS: 14 point ROS unable to be done to confusion    Objective:  VITAL SIGNS: BP (!) 142/74 Comment: RN notified   Pulse 91   Temp 37.2 °C (98.9 °F) (Oral)   Resp 16   Wt 67.8 kg (149 lb 8 oz)   SpO2 93%   BMI 25.65 kg/m²     GEN: No apparent distress  HEENT: Head normocephalic, atraumatic.  Sclera nonicteric bilaterally, no ocular discharge appreciated bilaterally. White thrush on tongue.   CV: Extremities warm and well-perfused, no peripheral edema appreciated bilaterally.  PULMONARY: Breathing nonlabored on room air, no respiratory accessory muscle use.  Not requiring supplemental oxygen.  SKIN: No appreciable skin breakdown on exposed areas of skin.  PSYCH: Mood and affect within normal limits.  NEURO: Awake alert.  Confused.       Laboratory Values:  No results found for this or any previous visit (from the past 72 hour(s)).      Medications:  Scheduled Medications   Medication Dose Frequency    Pharmacy Consult Request  1 Each PHARMACY TO DOSE    omeprazole  20 mg DAILY    carvedilol  3.125 mg BID    enoxaparin (LOVENOX) injection  40 mg DAILY AT 1800    escitalopram  20 mg DAILY    losartan  50 mg Q EVENING    QUEtiapine  25 mg Nightly    rosuvastatin  10 mg Q EVENING    senna-docusate  2 Tablet BID     PRN medications: Respiratory Therapy Consult, hydrALAZINE, carboxymethylcellulose, benzocaine-menthol, mag hydrox-al hydrox-simeth, ondansetron **OR** ondansetron, traZODone, sodium chloride, senna-docusate **AND** polyethylene glycol/lytes **AND** magnesium hydroxide **AND** bisacodyl, acetaminophen, oxyCODONE immediate-release, oxyCODONE immediate-release    Diet:  Current Diet Order   Procedures    Diet Order Diet: Regular       Medical Decision Making and  Plan:  Left femoral neck and intertrochanteric femur fracture s/p ORIF with cephalomedullary implant 10/28/2023 Dr. Mckenzie  PT and OT for mobility and ADLs. Per guidelines, 15 hours per week between PT, OT and/or SLP.  Follow-up Ortho  Weightbearing as tolerated     Dementia  SLP consult, likely at baseline  Seroquel nightly -consider reducing if not home med     Leukocytosis  Likely reactive after surgery  UA negative for bacteria, chest x-ray (negative), blood cultures - NGTD  Slight reduction 10/31  CBC 11/2 - Resolved WBC normal at 9.4    Anemia  Low but fairly stable     Hypophosphatemia  Low - 3x supplement  Normal on 11/2     Azotemia  Stable - mildly elevated  Encourage fluids  BMP 11/3     L2 compression fracture 6/2023 s/p kyphoplasty Dr. Barton  Monitor     Hyperlipidemia  Continue Crestor     Hypertension  Continue losartan and Coreg     Anxiety  Continue Lexapro    Thrush  Mouth rinse     Pain  As needed oxycodone     Skin  Patient at risk for skin breakdown due to debility in areas including sacrum, achilles, elbows and head in addition to other sites. Nursing to assess skin daily.      GI Ppx  Patient on Prilosec for GERD prophylaxis.      Bowel   Patient on Senna-docusate for constipation prophylaxis.      Bladder  Incontinence - UA negative for bacteria  Timed voids and PVR/BS     DVT PROPHYLAXIS: Lovenox 40 mg subcutaneous nightly     HOSPITALIST FOLLOWING: No     CODE STATUS: DNAR/DNI     DISPO: 11/10/23.  Going home with adult children whom she lives with.      M2B ELIGIBLE: TBD     DISCHARGE FOLLOW UP: Orthopedics, PCP  ____________________________________    Dr. Kimmie Samayoa DO, MS  [facility] - Physical Medicine & Rehabilitation   ____________________________________

## 2023-11-06 ENCOUNTER — APPOINTMENT (OUTPATIENT)
Dept: OCCUPATIONAL THERAPY | Facility: REHABILITATION | Age: 88
DRG: 560 | End: 2023-11-06
Attending: PHYSICAL MEDICINE & REHABILITATION
Payer: MEDICARE

## 2023-11-06 ENCOUNTER — APPOINTMENT (OUTPATIENT)
Dept: SPEECH THERAPY | Facility: REHABILITATION | Age: 88
DRG: 560 | End: 2023-11-06
Attending: PHYSICAL MEDICINE & REHABILITATION
Payer: MEDICARE

## 2023-11-06 ENCOUNTER — APPOINTMENT (OUTPATIENT)
Dept: RADIOLOGY | Facility: REHABILITATION | Age: 88
DRG: 560 | End: 2023-11-06
Attending: PHYSICAL MEDICINE & REHABILITATION
Payer: MEDICARE

## 2023-11-06 ENCOUNTER — APPOINTMENT (OUTPATIENT)
Dept: PHYSICAL THERAPY | Facility: REHABILITATION | Age: 88
DRG: 560 | End: 2023-11-06
Attending: PHYSICAL MEDICINE & REHABILITATION
Payer: MEDICARE

## 2023-11-06 LAB
ANION GAP SERPL CALC-SCNC: 10 MMOL/L (ref 7–16)
BASOPHILS # BLD AUTO: 0.5 % (ref 0–1.8)
BASOPHILS # BLD: 0.06 K/UL (ref 0–0.12)
BUN SERPL-MCNC: 16 MG/DL (ref 8–22)
CALCIUM SERPL-MCNC: 9.1 MG/DL (ref 8.5–10.5)
CHLORIDE SERPL-SCNC: 101 MMOL/L (ref 96–112)
CO2 SERPL-SCNC: 27 MMOL/L (ref 20–33)
CREAT SERPL-MCNC: 0.98 MG/DL (ref 0.5–1.4)
EOSINOPHIL # BLD AUTO: 0.23 K/UL (ref 0–0.51)
EOSINOPHIL NFR BLD: 1.8 % (ref 0–6.9)
ERYTHROCYTE [DISTWIDTH] IN BLOOD BY AUTOMATED COUNT: 50.8 FL (ref 35.9–50)
GFR SERPLBLD CREATININE-BSD FMLA CKD-EPI: 55 ML/MIN/1.73 M 2
GLUCOSE SERPL-MCNC: 109 MG/DL (ref 65–99)
HCT VFR BLD AUTO: 33.4 % (ref 37–47)
HGB BLD-MCNC: 10.4 G/DL (ref 12–16)
IMM GRANULOCYTES # BLD AUTO: 0.12 K/UL (ref 0–0.11)
IMM GRANULOCYTES NFR BLD AUTO: 0.9 % (ref 0–0.9)
LYMPHOCYTES # BLD AUTO: 2.04 K/UL (ref 1–4.8)
LYMPHOCYTES NFR BLD: 15.6 % (ref 22–41)
MCH RBC QN AUTO: 30.8 PG (ref 27–33)
MCHC RBC AUTO-ENTMCNC: 31.1 G/DL (ref 32.2–35.5)
MCV RBC AUTO: 98.8 FL (ref 81.4–97.8)
MONOCYTES # BLD AUTO: 1.28 K/UL (ref 0–0.85)
MONOCYTES NFR BLD AUTO: 9.8 % (ref 0–13.4)
NEUTROPHILS # BLD AUTO: 9.32 K/UL (ref 1.82–7.42)
NEUTROPHILS NFR BLD: 71.4 % (ref 44–72)
NRBC # BLD AUTO: 0 K/UL
NRBC BLD-RTO: 0 /100 WBC (ref 0–0.2)
PLATELET # BLD AUTO: 335 K/UL (ref 164–446)
PMV BLD AUTO: 10.2 FL (ref 9–12.9)
POTASSIUM SERPL-SCNC: 3.9 MMOL/L (ref 3.6–5.5)
RBC # BLD AUTO: 3.38 M/UL (ref 4.2–5.4)
SODIUM SERPL-SCNC: 138 MMOL/L (ref 135–145)
WBC # BLD AUTO: 13.1 K/UL (ref 4.8–10.8)

## 2023-11-06 PROCEDURE — 85025 COMPLETE CBC W/AUTO DIFF WBC: CPT

## 2023-11-06 PROCEDURE — 770010 HCHG ROOM/CARE - REHAB SEMI PRIVAT*

## 2023-11-06 PROCEDURE — 97530 THERAPEUTIC ACTIVITIES: CPT

## 2023-11-06 PROCEDURE — 700111 HCHG RX REV CODE 636 W/ 250 OVERRIDE (IP): Mod: JZ | Performed by: PHYSICAL MEDICINE & REHABILITATION

## 2023-11-06 PROCEDURE — 97110 THERAPEUTIC EXERCISES: CPT

## 2023-11-06 PROCEDURE — 97130 THER IVNTJ EA ADDL 15 MIN: CPT

## 2023-11-06 PROCEDURE — 700102 HCHG RX REV CODE 250 W/ 637 OVERRIDE(OP): Performed by: PHYSICAL MEDICINE & REHABILITATION

## 2023-11-06 PROCEDURE — 36415 COLL VENOUS BLD VENIPUNCTURE: CPT

## 2023-11-06 PROCEDURE — 80048 BASIC METABOLIC PNL TOTAL CA: CPT

## 2023-11-06 PROCEDURE — 99232 SBSQ HOSP IP/OBS MODERATE 35: CPT | Performed by: PHYSICAL MEDICINE & REHABILITATION

## 2023-11-06 PROCEDURE — 97116 GAIT TRAINING THERAPY: CPT

## 2023-11-06 PROCEDURE — 97535 SELF CARE MNGMENT TRAINING: CPT

## 2023-11-06 PROCEDURE — 94760 N-INVAS EAR/PLS OXIMETRY 1: CPT

## 2023-11-06 PROCEDURE — 71045 X-RAY EXAM CHEST 1 VIEW: CPT

## 2023-11-06 PROCEDURE — 97129 THER IVNTJ 1ST 15 MIN: CPT

## 2023-11-06 PROCEDURE — A9270 NON-COVERED ITEM OR SERVICE: HCPCS | Performed by: PHYSICAL MEDICINE & REHABILITATION

## 2023-11-06 RX ADMIN — QUETIAPINE FUMARATE 25 MG: 25 TABLET ORAL at 20:06

## 2023-11-06 RX ADMIN — OMEPRAZOLE 20 MG: 20 CAPSULE, DELAYED RELEASE ORAL at 09:13

## 2023-11-06 RX ADMIN — NYSTATIN 500000 UNITS: 100000 SUSPENSION ORAL at 17:56

## 2023-11-06 RX ADMIN — CARVEDILOL 3.12 MG: 3.12 TABLET, FILM COATED ORAL at 09:13

## 2023-11-06 RX ADMIN — NYSTATIN 500000 UNITS: 100000 SUSPENSION ORAL at 20:10

## 2023-11-06 RX ADMIN — ENOXAPARIN SODIUM 40 MG: 100 INJECTION SUBCUTANEOUS at 17:56

## 2023-11-06 RX ADMIN — SENNOSIDES AND DOCUSATE SODIUM 2 TABLET: 8.6; 5 TABLET ORAL at 09:13

## 2023-11-06 RX ADMIN — OXYCODONE HYDROCHLORIDE 5 MG: 5 TABLET ORAL at 09:46

## 2023-11-06 RX ADMIN — ESCITALOPRAM OXALATE 20 MG: 10 TABLET ORAL at 09:13

## 2023-11-06 RX ADMIN — ROSUVASTATIN CALCIUM 10 MG: 10 TABLET, FILM COATED ORAL at 20:06

## 2023-11-06 ASSESSMENT — GAIT ASSESSMENTS
ASSISTIVE DEVICE: FRONT WHEEL WALKER
DEVIATION: ANTALGIC;STEP TO;BRADYKINETIC
GAIT LEVEL OF ASSIST: MINIMAL ASSIST
DISTANCE (FEET): 20

## 2023-11-06 ASSESSMENT — ACTIVITIES OF DAILY LIVING (ADL): BED_CHAIR_WHEELCHAIR_TRANSFER_DESCRIPTION: ADAPTIVE EQUIPMENT;INCREASED TIME;SET-UP OF EQUIPMENT;VERBAL CUEING

## 2023-11-06 ASSESSMENT — PAIN DESCRIPTION - PAIN TYPE: TYPE: SURGICAL PAIN

## 2023-11-06 NOTE — PROGRESS NOTES
NURSING DAILY NOTE    Name: Linda Casas   Date of Admission: 10/30/2023   Admitting Diagnosis: Closed left hip fracture, initial encounter ([facility])  Attending Physician: Kimmie Samayoa D.o.  Allergies: Ampicillin, Bactrim ds, and Ciprofloxacin    Safety  Patient Assist     Patient Precautions  Fall Risk, Weight Bearing As Tolerated Left Lower Extremity  Precaution Comments     Bed Transfer Status  Minimal Assist  Toilet Transfer Status   Minimal Assist  Assistive Devices  Rails, Wheelchair  Oxygen  None - Room Air  Diet/Therapeutic Dining  Current Diet Order   Procedures    Diet Order Diet: Regular     Pill Administration  whole  Agitated Behavioral Scale  21  ABS Level of Severity  No Agitation    Fall Risk  Has the patient had a fall this admission?   No  Cheryl De Jesus Fall Risk Scoring  26, HIGH RISK  Fall Risk Safety Measures  bed alarm, chair alarm, seatbelt alarm, and poor balance    Vitals  Temperature: 36.8 °C (98.2 °F)  Temp src: Oral  Pulse: 72  Respiration: 16  Blood Pressure : 112/60  Blood Pressure MAP (Calculated): 77 MM HG  BP Location: Right, Upper Arm  Patient BP Position: Supine     Oxygen  Pulse Oximetry: 92 %  O2 (LPM): 0  FiO2%: 21 %  O2 Delivery Device: None - Room Air    Bowel and Bladder  Last Bowel Movement  11/04/23  Stool Type  Type 5: Soft blob with clear cut edges (passed easily)  Bowel Device     Continent  Bladder: Stress incontinence   Bowel: Continent movement  Bladder Function  Urine Void (mL):  (incontinent in diaper)  Number of Times Voided: 1  Urine Color: Yellow  Number of Times Incontinent of Urine: 2  Straight Catheter: 600 ml  Genitourinary Assessment   Bladder Assessment (WDL):  WDL Except  Diaz Catheter: Not Applicable  Diaz Care: Given with Soap and Water  Urine Color: Yellow  Number of Times Incontinent of Urine: 2  Bladder Scan: Post Void  $ Bladder Scan Results (mL): 35    Skin  Harris Score    17  Sensory Interventions   Bed Types: Standard/Trauma Mattress with Overlay  Skin Preventative Measures: Pillows in Use for Support / Positioning  Moisture Interventions  Moisturizers/Barriers: Barrier Wipes, Antifungal Paste      Pain  Pain Rating Scale  3 - Sometimes distracts me  Pain Location  Hip, Leg  Pain Location Orientation  Left  Pain Interventions   Distraction, Rest    ADLs    Bathing   Partial Bed Bath  Linen Change   Partial  Personal Hygiene  Change Rabia Pads, Perineal Care  Chlorhexidine Bath      Oral Care  Brushed Teeth  Teeth/Dentures  Intact  Shave     Nutrition Percentage Eaten  Breakfast, Between % Consumed  Environmental Precautions  Treaded Slipper Socks on Patient, Personal Belongings, Wastebasket, Call Bell etc. in Easy Reach, Bed in Low Position  Patient Turns/Positioning  Patient Turns Self from Side to Side  Patient Turns Assistance/Tolerance  Assistance of One  Bed Positions  Bed Controls On, Bed Locked  Head of Bed Elevated  Self regulated      Psychosocial/Neurologic Assessment  Psychosocial Assessment  Psychosocial (WDL):  WDL Except  Patient Behaviors: Drowsy  Neurologic Assessment  Neuro (WDL): Exceptions to WDL  Level of Consciousness:  (sleeping)  Orientation Level: Oriented to person, Oriented to situation, Oriented to place, Disoriented to time  Cognition: Memory Loss  Speech: Clear  Facial Symmetry:  (wdl)  Pupil Assesment: Yes  R Pupil Size (mm): 2  R Pupil Shape / Description: Round  R Pupil Reaction: Brisk  L Pupil Size (mm): 2  L Pupil Shape / Description: Round  L Pupil Reaction: Brisk  Motor Function/Sensation Assessment: Motor strength  RUE Motor Response: Responds to commands  RUE Sensation: Full sensation  Muscle Strength Right Arm: Normal Strength Against Gravity and Full Resistance  LUE Motor Response: Responds to commands  LUE Sensation: Full sensation  Muscle Strength Left Arm: Normal Strength Against Gravity and Full Resistance  RLE Motor Response:  Responds to commands  RLE Sensation: Full sensation  Muscle Strength Right Leg: Good Strength Against Gravity and Moderate Resistance  LLE Motor Response: Responds to commands  LLE Sensation: Pain  Muscle Strength Left Leg: Normal Strength Against Gravity and Full Resistance  EENT (WDL):  Within Defined Limits    Cardio/Pulmonary Assessment  Edema      Respiratory Breath Sounds  RUL Breath Sounds: Clear  RML Breath Sounds: Diminished  RLL Breath Sounds: Diminished  SANTY Breath Sounds: Clear  LLL Breath Sounds: Diminished  Cardiac Assessment   Cardiac (WDL):  Within Defined Limits

## 2023-11-06 NOTE — DIETARY
Nutrition Update:    Day 7 of admit.  Linda Casas is a 89 y.o. female with admitting DX of Closed left hip fracture, initial encounter ([facility]) [S72.002A].  Patient being followed to optimize nutrition.  RD attempted to visit pt at bedside, pt currently working with other providers.     Current Diet: Regular with Boost Plus BID  PO intake: % x1 meal, <25% x3 meals, 50-75% x1 meal, 0% x1 meal, 25-50% x3 meals     Problem: Nutritional:  Goal: Achieve adequate nutritional intake  Description: Patient will consume >/= 50% of meals  Outcome: Progressing     Plan / Recommendations:   Continue current diet, Regular  Continue Boost Plus BID and chocolate ice cream 1x a day.    Encourage intake of >/= 50%  Document intake of all meals and supplements as % taken in ADL's to provide interdisciplinary communication across all shifts.   Monitor weight.  Nutrition rep will continue to see patient for ongoing meal and snack preferences.     RD following

## 2023-11-06 NOTE — FLOWSHEET NOTE
11/06/23 1149   Events/Summary/Plan   Events/Summary/Plan Room air SpO2 check, DC oxygen per protocol   Vital Signs   Pulse 84   Respiration 16   Pulse Oximetry 91 %   $ Pulse Oximetry (Spot Check) Yes   Respiratory Assessment   Respiratory Pattern Within Normal Limits   Level of Consciousness Alert   Chest Exam   Work Of Breathing / Effort Within Normal Limits   Oxygen   O2 Delivery Device None - Room Air

## 2023-11-06 NOTE — PROGRESS NOTES
Physical Medicine & Rehabilitation Progress Note    Encounter Date: 11/6/2023    Chief Complaint: Tired    Interval Events (Subjective):  SUSANNAH BETANCOURT 11/6  Voiding    Seen and examined in her room. Therapy notes she is not herself today. She has elevated WBC count. Step son, Chilo, is in the room. States he does not want his mom to go to a SNF. He is going to talk to Waqar and Sherly later today.     ROS: 14 point ROS unable to be done to confusion    Objective:  VITAL SIGNS: /55   Pulse 80   Temp 36.7 °C (98.1 °F) (Oral)   Resp 16   Wt 67.8 kg (149 lb 8 oz)   SpO2 90%   BMI 25.65 kg/m²     GEN: No apparent distress  HEENT: Head normocephalic, atraumatic.  Sclera nonicteric bilaterally, no ocular discharge appreciated bilaterally. White thrush on tongue.   CV: Extremities warm and well-perfused, no peripheral edema appreciated bilaterally.  PULMONARY: Breathing nonlabored on room air, no respiratory accessory muscle use.  Not requiring supplemental oxygen.  SKIN: No appreciable skin breakdown on exposed areas of skin.  PSYCH: Mood and affect within normal limits.  NEURO: Awake alert.  Confused.       Laboratory Values:  Recent Results (from the past 72 hour(s))   CBC WITH DIFFERENTIAL    Collection Time: 11/06/23  5:44 AM   Result Value Ref Range    WBC 13.1 (H) 4.8 - 10.8 K/uL    RBC 3.38 (L) 4.20 - 5.40 M/uL    Hemoglobin 10.4 (L) 12.0 - 16.0 g/dL    Hematocrit 33.4 (L) 37.0 - 47.0 %    MCV 98.8 (H) 81.4 - 97.8 fL    MCH 30.8 27.0 - 33.0 pg    MCHC 31.1 (L) 32.2 - 35.5 g/dL    RDW 50.8 (H) 35.9 - 50.0 fL    Platelet Count 335 164 - 446 K/uL    MPV 10.2 9.0 - 12.9 fL    Neutrophils-Polys 71.40 44.00 - 72.00 %    Lymphocytes 15.60 (L) 22.00 - 41.00 %    Monocytes 9.80 0.00 - 13.40 %    Eosinophils 1.80 0.00 - 6.90 %    Basophils 0.50 0.00 - 1.80 %    Immature Granulocytes 0.90 0.00 - 0.90 %    Nucleated RBC 0.00 0.00 - 0.20 /100 WBC    Neutrophils (Absolute) 9.32 (H) 1.82 - 7.42 K/uL    Lymphs (Absolute)  2.04 1.00 - 4.80 K/uL    Monos (Absolute) 1.28 (H) 0.00 - 0.85 K/uL    Eos (Absolute) 0.23 0.00 - 0.51 K/uL    Baso (Absolute) 0.06 0.00 - 0.12 K/uL    Immature Granulocytes (abs) 0.12 (H) 0.00 - 0.11 K/uL    NRBC (Absolute) 0.00 K/uL   Basic Metabolic Panel    Collection Time: 11/06/23  5:44 AM   Result Value Ref Range    Sodium 138 135 - 145 mmol/L    Potassium 3.9 3.6 - 5.5 mmol/L    Chloride 101 96 - 112 mmol/L    Co2 27 20 - 33 mmol/L    Glucose 109 (H) 65 - 99 mg/dL    Bun 16 8 - 22 mg/dL    Creatinine 0.98 0.50 - 1.40 mg/dL    Calcium 9.1 8.5 - 10.5 mg/dL    Anion Gap 10.0 7.0 - 16.0   ESTIMATED GFR    Collection Time: 11/06/23  5:44 AM   Result Value Ref Range    GFR (CKD-EPI) 55 (A) >60 mL/min/1.73 m 2         Medications:  Scheduled Medications   Medication Dose Frequency    Pharmacy Consult Request  1 Each PHARMACY TO DOSE    omeprazole  20 mg DAILY    carvedilol  3.125 mg BID    enoxaparin (LOVENOX) injection  40 mg DAILY AT 1800    escitalopram  20 mg DAILY    losartan  50 mg Q EVENING    QUEtiapine  25 mg Nightly    rosuvastatin  10 mg Q EVENING    senna-docusate  2 Tablet BID     PRN medications: Respiratory Therapy Consult, hydrALAZINE, carboxymethylcellulose, benzocaine-menthol, mag hydrox-al hydrox-simeth, ondansetron **OR** ondansetron, traZODone, sodium chloride, senna-docusate **AND** polyethylene glycol/lytes **AND** magnesium hydroxide **AND** bisacodyl, acetaminophen, oxyCODONE immediate-release, oxyCODONE immediate-release    Diet:  Current Diet Order   Procedures    Diet Order Diet: Regular       Medical Decision Making and Plan:  Left femoral neck and intertrochanteric femur fracture s/p ORIF with cephalomedullary implant 10/28/2023 Dr. Mckenzie  PT and OT for mobility and ADLs. Per guidelines, 15 hours per week between PT, OT and/or SLP.  Follow-up Ortho  Weightbearing as tolerated     Dementia  SLP consult, likely at baseline  Seroquel nightly -consider reducing if not home med      Leukocytosis  Likely reactive after surgery  UA negative for bacteria, chest x-ray (negative), blood cultures - NGTD  Slight reduction 10/31  CBC 11/2 - Resolved WBC normal at 9.4  11/6/2023 increased again at 13.1  Recheck UA, check CXR, Check incision  CBC in AM  CMP in AM    Anemia  Stable 11/6/2023      Hypophosphatemia  Low - 3x supplement  Normal on 11/2     Azotemia  Stable - mildly elevated  Encourage fluids  Resolved 11/6/2023      L2 compression fracture 6/2023 s/p kyphoplasty Dr. Barton  Monitor     Hyperlipidemia  Continue Crestor     Hypertension  Continue losartan and Coreg     Anxiety  Continue Lexapro    Thrush  Mouth rinse     Pain  As needed oxycodone     Skin  Patient at risk for skin breakdown due to debility in areas including sacrum, achilles, elbows and head in addition to other sites. Nursing to assess skin daily.      GI Ppx  Patient on Prilosec for GERD prophylaxis.      Bowel   Patient on Senna-docusate for constipation prophylaxis.      Bladder  Incontinence - UA negative for bacteria  Timed voids and PVR/BS     DVT PROPHYLAXIS: Lovenox 40 mg subcutaneous nightly     HOSPITALIST FOLLOWING: No     CODE STATUS: DNAR/DNI     DISPO: 11/10/23.  Going home with adult children whom she lives with.      M2B ELIGIBLE: TBD     DISCHARGE FOLLOW UP: Orthopedics, PCP  ____________________________________    Dr. Kimmie Samayoa DO, MS  [facility] - Physical Medicine & Rehabilitation   ____________________________________

## 2023-11-06 NOTE — THERAPY
Occupational Therapy  Daily Treatment     Patient Name: Linda Casas  Age:  89 y.o., Sex:  female  Medical Record #: [id number]  Today's Date: 11/6/2023     Precautions  Precautions: (P) Fall Risk, Weight Bearing As Tolerated Left Lower Extremity    Subjective    Patient in bed upon arrival, agreeable to participate in OT w/ encouragement.      Objective     11/06/23 0701   OT Charge Group   OT Self Care / ADL (Units) 3   OT Therapy Activity (Units) 1   OT Total Time Spent   OT Individual Total Time Spent (Mins) 60   Precautions   Precautions Fall Risk;Weight Bearing As Tolerated Left Lower Extremity   Vitals   Room Air Oximetry 94   O2 Delivery Device Room air w/o2 available   Sleep/Wake Cycle   Sleep Observations Alert upon awakening   Functional Level of Assist   Grooming Standby Assist  (set-up, w/c level)   Lower Body Dressing Maximal Assist  (to don and doff non skid socks, w/c level, w/ use of AE (sock aid and dressing stick))   Toileting Maximal Assist  (pt able to complete hygiene pursuit following BM (seated on toilet) however required total assist for clothing management pre/post voiding)   Bed, Chair, Wheelchair Transfer Moderate Assist   Toilet Transfers Moderate Assist  (w/ heavy cues for foot placement, motor planning, functional sequencing)   Interdisciplinary Plan of Care Collaboration   Patient Position at End of Therapy Seated;Self Releasing Lap Belt Applied  (in dining room for breakfast)     W/c mobility 5-10' w/ max encouragement and increased time     Tolerated standing in kitchen ~45 seconds and reached for/retrieved to items in upper cabinet w/ min-CGA, returned to seated due to c/o fatigue     Assessment    Patient tolerated OT session fair w/ focus on ADLs and standing balance/endurance. Despite use of AE, patient cont to require heavy cues for functional sequencing, attention to task and motor planning during LB dressing. Standing tolerance significantly limited by fatigue  and LE weakness. Cont to benefit from encouragement to maximize participation.   Strengths: Supportive family, Pleasant and cooperative  Barriers: Bladder incontinence, Decreased endurance, Dementia, Fatigue, Generalized weakness, Impaired activity tolerance, Impaired balance, Impaired functional cognition, Limited mobility, Pain    Plan  Progress functional mobility to FWW level, Standing tolerance/ WB to LLE,, Sit<>stand/ transfers with FWW, Cont functional transfers, thera act/ex, and ADLs    DME     Passport items to be completed:  Perform bathroom transfers, complete dressing, complete feeding, get ready for the day, prepare a simple meal, participate in household tasks, adapt home for safety needs, demonstrate home exercise program, complete caregiver training     Occupational Therapy Goals (Active)       Problem: Dressing       Dates: Start:  10/31/23         Goal: STG-Within one week, patient will dress UB w/ min A       Dates: Start:  10/31/23            Goal: STG-Within one week, patient will dress LB w/ consistent max A       Dates: Start:  10/31/23               Problem: Functional Transfers       Dates: Start:  10/31/23         Goal: STG-Within one week, patient will transfer to toilet w/ min A       Dates: Start:  10/31/23               Problem: OT Long Term Goals       Dates: Start:  10/31/23         Goal: LTG-By discharge, patient will complete basic self care tasks w/ CGA to supervision        Dates: Start:  10/31/23            Goal: LTG-By discharge, patient will perform bathroom transfers w/ CGA to supervision w/ LRAD       Dates: Start:  10/31/23

## 2023-11-06 NOTE — THERAPY
Physical Therapy   Daily Treatment     Patient Name: Linda Casas  Age:  89 y.o., Sex:  female  Medical Record #: [id number]  Today's Date: 11/6/2023     Precautions  Precautions: Fall Risk, Weight Bearing As Tolerated Left Lower Extremity    Subjective    Pt up in wc, willing to participate with encouragement     Objective       11/06/23 0931   PT Charge Group   PT Gait Training (Units) 2   PT Therapeutic Exercise (Units) 2   PT Total Time Spent   PT Individual Total Time Spent (Mins) 60   Gait Functional Level of Assist    Gait Level Of Assist Minimal Assist   Assistive Device Front Wheel Walker   Distance (Feet) 20  (in addition to 10 ft x 2 with // bars for warm-up)   # of Times Distance was Traveled 3   Deviation Antalgic;Step To;Bradykinetic  (manual cues for LLE stance stabilization/ increased terminal hip/knee ext.)   Transfer Functional Level of Assist   Bed, Chair, Wheelchair Transfer Minimal Assist   Bed Chair Wheelchair Transfer Description Adaptive equipment;Increased time;Set-up of equipment;Verbal cueing   Supine Lower Body Exercise   Bridges Two Legged;2 sets of 10   Hip Abduction Hook Lying;2 sets of 10   Short Arc Quad 2 sets of 10   Heel Slide 2 sets of 10   Ankle Pumps 2 sets of 10   Bed Mobility    Sit to Supine Minimal Assist   Sit to Stand Minimal Assist     Wc propulsion with mi nA to maintain forward momentum and mod/max A for steering 20 ft x 2    Assessment    Pt remains limited by LLE pain/ impaired weight bearing tolerance and limited terminal hip/ knee ext with stance. Slow progress due to pain and advanced age.     Strengths: Independent prior level of function, Pleasant and cooperative, Supportive family, Willingly participates in therapeutic activities  Barriers: Confused, Decreased endurance, Fatigue, Generalized weakness, Impaired activity tolerance, Impaired balance, Impaired functional cognition, Limited mobility, Pain, Pain poorly managed    Plan    Progressive  "gait with // bars vs FWW. Standing tolerance/ WB to LLE  Sit<>stand/ transfers with FWW, LE ROM/ strength training    DME  PT DME Recommendations  Wheelchair: 18\" Width, Lightweight, Standard Leg Rests  Cushion: Standard  Assistive Device: Front Wheeled Walker    Passport items to be completed:  Get in/out of bed safely, in/out of a vehicle, safely use mobility device, walk or wheel around home/community, navigate up and down stairs, show how to get up/down from the ground, ensure home is accessible, demonstrate HEP, complete caregiver training    Physical Therapy Problems (Active)       Problem: Mobility       Dates: Start:  10/31/23         Goal: STG-Within one week, patient will propel wheelchair community x 25 feet with SBA       Dates: Start:  10/31/23            Goal: STG-Within one week, patient will ambulate household distance x 25 feet with FWW and min A        Dates: Start:  10/31/23               Problem: Mobility Transfers       Dates: Start:  10/31/23         Goal: STG-Within one week, patient will transfer bed to chair with min A SQPT vs SPT with FWW       Dates: Start:  10/31/23               Problem: PT-Long Term Goals       Dates: Start:  10/31/23         Goal: LTG-By discharge, patient will ambulate x 150 feet with FWW and SBA       Dates: Start:  10/31/23            Goal: LTG-By discharge, patient will transfer one surface to another with FWW and SBA       Dates: Start:  10/31/23            Goal: LTG-By discharge, patient will transfer in/out of a car with FWW and SBA       Dates: Start:  10/31/23              "

## 2023-11-06 NOTE — THERAPY
Occupational Therapy  Daily Treatment     Patient Name: Linda Casas  Age:  89 y.o., Sex:  female  Medical Record #: [id number]  Today's Date: 11/6/2023     Precautions  Precautions: (P) Fall Risk, Weight Bearing As Tolerated Left Lower Extremity    Subjective    Patient in bed upon arrival, required max encouragement to participate in OOB activity.      Objective     11/06/23 1101   OT Charge Group   OT Self Care / ADL (Units) 1   OT Therapy Activity (Units) 1   OT Total Time Spent   OT Individual Total Time Spent (Mins) 30   Precautions   Precautions Fall Risk;Weight Bearing As Tolerated Left Lower Extremity   Vitals   O2 Delivery Device Room air w/o2 available   Functional Level of Assist   Bed, Chair, Wheelchair Transfer Moderate Assist  (min-mod A (bed > w/c w/ FWW))   Interdisciplinary Plan of Care Collaboration   Patient Position at End of Therapy Seated;Self Releasing Lap Belt Applied;Call Light within Reach     STS x 3 EOB > FWW, min A     ~8 reciprocal steps in standing (in // bars) w/ mod A    Attempted to have patient actively participate w/ w/c mobility @ end of session, however unable due to fatigue and impaired motor planning.     Assessment    Patient w/ poor activity tolerance this session, requiring heavy cues/encouragement to maximize participation w/ OOB and standing activities. Cont to require max cues for motor planning/sequencing functional tasks and redirection to task (due to sustained attention).   Strengths: Supportive family, Pleasant and cooperative  Barriers: Bladder incontinence, Decreased endurance, Dementia, Fatigue, Generalized weakness, Impaired activity tolerance, Impaired balance, Impaired functional cognition, Limited mobility, Pain    Plan    Progress functional mobility to FWW level, Standing tolerance/ WB to LLE,, Sit<>stand/ transfers with FWW, Cont functional transfers, thera act/ex, and ADLs    DME    Passport items to be completed:  Perform bathroom  transfers, complete dressing, complete feeding, get ready for the day, prepare a simple meal, participate in household tasks, adapt home for safety needs, demonstrate home exercise program, complete caregiver training     Occupational Therapy Goals (Active)       Problem: Dressing       Dates: Start:  10/31/23         Goal: STG-Within one week, patient will dress UB w/ min A       Dates: Start:  10/31/23            Goal: STG-Within one week, patient will dress LB w/ consistent max A       Dates: Start:  10/31/23               Problem: Functional Transfers       Dates: Start:  10/31/23         Goal: STG-Within one week, patient will transfer to toilet w/ min A       Dates: Start:  10/31/23               Problem: OT Long Term Goals       Dates: Start:  10/31/23         Goal: LTG-By discharge, patient will complete basic self care tasks w/ CGA to supervision        Dates: Start:  10/31/23            Goal: LTG-By discharge, patient will perform bathroom transfers w/ CGA to supervision w/ LRAD       Dates: Start:  10/31/23

## 2023-11-06 NOTE — THERAPY
Speech Language Pathology  Daily Treatment     Patient Name: Linda Casas  Age:  89 y.o., Sex:  female  Medical Record #: [id number]  Today's Date: 11/6/2023     Precautions  Precautions: Fall Risk, Weight Bearing As Tolerated Left Lower Extremity    Subjective    Pt was asleep in her wheelchair at the beginning of this session.  She was able to maintain alertness throughout this session, but was significantly more confused than she was last week with difficulty responding to yes/no questions at times. RN and MD made aware.        Objective       11/06/23 1301   Treatment Charges   SLP Cognitive Skill Development First 15 Minutes 1   SLP Cognitive Skill Development Additional 15 Minutes 1   SLP Total Time Spent   SLP Individual Total Time Spent (Mins) 30         Assessment    Attempted to complete the O-log; however due to increased confusion/fatigue it was not able to be completed this session.  Pt was only able to respond to one open ended question this entire session (able to state her name when asked); other than this pt required a choice of 2 options or yes/no questions and was only able to appropriately respond to these questions 50% of the time.       Strengths: Supportive family  Barriers: Agitation, Confused, Difficulty following instructions, Fatigue, Impaired functional cognition, Impaired carryover of learning, Impaired insight/denial of deficits, Uncooperative, Lack of motivation    Plan    Continue O-log, targeting simple attention and safety awareness       Speech Therapy Problems (Active)       Problem: Memory STGs       Dates: Start:  11/01/23         Goal: STG-Within one week, patient will achieve a score of 25/30 or more on the O-log for 3 consecutive days.         Dates: Start:  11/01/23               Problem: Problem Solving STGs       Dates: Start:  11/01/23         Goal: STG-Within one week, patient will complete sustained attention tasks given mod A to achieve 75% accuracy.          Dates: Start:  11/01/23               Problem: Speech/Swallowing LTGs       Dates: Start:  10/31/23         Goal: LTG-By discharge, patient will solve basic problems with spv        Dates: Start:  10/31/23

## 2023-11-06 NOTE — THERAPY
Occupational Therapy  Daily Treatment     Patient Name: Linda Casas  Age:  89 y.o., Sex:  female  Medical Record #: [id number]  Today's Date: 11/6/2023     Precautions  Precautions: Fall Risk, Weight Bearing As Tolerated Left Lower Extremity         Subjective    Pt seated in dining room upon arrival, pleasant and cooperative, agreeable to therapy. Family initially present during session     Objective       11/06/23 0831   OT Charge Group   OT Self Care / ADL (Units) 2   OT Total Time Spent   OT Individual Total Time Spent (Mins) 30   Functional Level of Assist   Grooming Standby Assist;Seated   Toileting Maximal Assist   Toilet Transfers Maximal Assist  (stand pivot w/c<>commode over the toilet with GB)   Interdisciplinary Plan of Care Collaboration   Patient Position at End of Therapy Seated;Tray Table within Reach;Call Light within Reach;Chair Alarm On;Self Releasing Lap Belt Applied     Functional mobility at w/c: room<>bathroom Total A    Assessment    Pt required significant physical assist for basic toileting task and SBA with Max cues for set-up and initiation of oral care seated     Strengths: Supportive family, Pleasant and cooperative  Barriers: Bladder incontinence, Decreased endurance, Dementia, Fatigue, Generalized weakness, Impaired activity tolerance, Impaired balance, Impaired functional cognition, Limited mobility, Pain    Plan    Refer to Primary OT POC/goals     Occupational Therapy Goals (Active)       Problem: Dressing       Dates: Start:  10/31/23         Goal: STG-Within one week, patient will dress UB w/ min A       Dates: Start:  10/31/23            Goal: STG-Within one week, patient will dress LB w/ consistent max A       Dates: Start:  10/31/23               Problem: Functional Transfers       Dates: Start:  10/31/23         Goal: STG-Within one week, patient will transfer to toilet w/ min A       Dates: Start:  10/31/23               Problem: OT Long Term Goals        Dates: Start:  10/31/23         Goal: LTG-By discharge, patient will complete basic self care tasks w/ CGA to supervision        Dates: Start:  10/31/23            Goal: LTG-By discharge, patient will perform bathroom transfers w/ CGA to supervision w/ LRAD       Dates: Start:  10/31/23

## 2023-11-06 NOTE — CARE PLAN
"  Problem: Skin Integrity  Goal: Skin integrity is maintained or improved  Note:   Hunter Score  17  Pt's skin remains Intact and free from new or accidental injury this shift, with no s/s of infection.  RN wound protocol checked.  Encouraged hydration and educated about the importance of adequate nutrition to maintain skin integrity.  Will continue to monitor.      Problem: Fall Risk - Rehab  Goal: Patient will remain free from falls  Note: Cheryl De Jesus Fall risk Assessment Score: 26    High fall risk Interventions  - Alarming seatbelt  - Wander guard  - Bed and strip alarm  - Yellow sign by the door  - Yellow wrist band \"Fall risk\"  - Room near to the nurse station  - Do not leave patient unattended in the bathroom  - Fall risk education provided      The patient is Stable - Low risk of patient condition declining or worsening    Shift Goals  Clinical Goals: Safety  Patient Goals: Sleep well  Family Goals: updated on poc    Progress made toward(s) clinical / shift goals:      Patient is not progressing towards the following goals:      "

## 2023-11-06 NOTE — CARE PLAN
The patient is Stable - Low risk of patient condition declining or worsening    Problem: Knowledge Deficit - Standard  Goal: Patient and family/care givers will demonstrate understanding of plan of care, disease process/condition, diagnostic tests and medications  Outcome: Progressing. Reviewed POC, all questions answered.        Problem: Fall Risk - Rehab  Goal: Patient will remain free from falls  Outcome: Progressing. Call light within reach, pt educated to use for assistance for safe transferring.      Shift Goals  Clinical Goals: Safety  Patient Goals: Participate in therapy, pain control  Family Goals: updated on poc

## 2023-11-07 ENCOUNTER — APPOINTMENT (OUTPATIENT)
Dept: PHYSICAL THERAPY | Facility: REHABILITATION | Age: 88
DRG: 560 | End: 2023-11-07
Attending: PHYSICAL MEDICINE & REHABILITATION
Payer: MEDICARE

## 2023-11-07 ENCOUNTER — APPOINTMENT (OUTPATIENT)
Dept: SPEECH THERAPY | Facility: REHABILITATION | Age: 88
DRG: 560 | End: 2023-11-07
Attending: PHYSICAL MEDICINE & REHABILITATION
Payer: MEDICARE

## 2023-11-07 ENCOUNTER — APPOINTMENT (OUTPATIENT)
Dept: OCCUPATIONAL THERAPY | Facility: REHABILITATION | Age: 88
DRG: 560 | End: 2023-11-07
Attending: PHYSICAL MEDICINE & REHABILITATION
Payer: MEDICARE

## 2023-11-07 LAB
ALBUMIN SERPL BCP-MCNC: 3.7 G/DL (ref 3.2–4.9)
ALBUMIN/GLOB SERPL: 1.6 G/DL
ALP SERPL-CCNC: 62 U/L (ref 30–99)
ALT SERPL-CCNC: 8 U/L (ref 2–50)
ANION GAP SERPL CALC-SCNC: 8 MMOL/L (ref 7–16)
APPEARANCE UR: ABNORMAL
AST SERPL-CCNC: 16 U/L (ref 12–45)
BACTERIA #/AREA URNS HPF: NEGATIVE /HPF
BASOPHILS # BLD AUTO: 0.5 % (ref 0–1.8)
BASOPHILS # BLD: 0.06 K/UL (ref 0–0.12)
BILIRUB SERPL-MCNC: 1.1 MG/DL (ref 0.1–1.5)
BILIRUB UR QL STRIP.AUTO: NEGATIVE
BUN SERPL-MCNC: 22 MG/DL (ref 8–22)
CALCIUM ALBUM COR SERPL-MCNC: 9.1 MG/DL (ref 8.5–10.5)
CALCIUM SERPL-MCNC: 8.9 MG/DL (ref 8.5–10.5)
CHLORIDE SERPL-SCNC: 101 MMOL/L (ref 96–112)
CO2 SERPL-SCNC: 28 MMOL/L (ref 20–33)
COLOR UR: YELLOW
CREAT SERPL-MCNC: 1.08 MG/DL (ref 0.5–1.4)
EOSINOPHIL # BLD AUTO: 0.16 K/UL (ref 0–0.51)
EOSINOPHIL NFR BLD: 1.4 % (ref 0–6.9)
EPI CELLS #/AREA URNS HPF: NEGATIVE /HPF
ERYTHROCYTE [DISTWIDTH] IN BLOOD BY AUTOMATED COUNT: 51.8 FL (ref 35.9–50)
GFR SERPLBLD CREATININE-BSD FMLA CKD-EPI: 49 ML/MIN/1.73 M 2
GLOBULIN SER CALC-MCNC: 2.3 G/DL (ref 1.9–3.5)
GLUCOSE SERPL-MCNC: 103 MG/DL (ref 65–99)
GLUCOSE UR STRIP.AUTO-MCNC: NEGATIVE MG/DL
HCT VFR BLD AUTO: 31.7 % (ref 37–47)
HGB BLD-MCNC: 9.9 G/DL (ref 12–16)
HYALINE CASTS #/AREA URNS LPF: ABNORMAL /LPF
IMM GRANULOCYTES # BLD AUTO: 0.11 K/UL (ref 0–0.11)
IMM GRANULOCYTES NFR BLD AUTO: 0.9 % (ref 0–0.9)
KETONES UR STRIP.AUTO-MCNC: NEGATIVE MG/DL
LEUKOCYTE ESTERASE UR QL STRIP.AUTO: ABNORMAL
LYMPHOCYTES # BLD AUTO: 1.44 K/UL (ref 1–4.8)
LYMPHOCYTES NFR BLD: 12.3 % (ref 22–41)
MCH RBC QN AUTO: 30.7 PG (ref 27–33)
MCHC RBC AUTO-ENTMCNC: 31.2 G/DL (ref 32.2–35.5)
MCV RBC AUTO: 98.4 FL (ref 81.4–97.8)
MICRO URNS: ABNORMAL
MONOCYTES # BLD AUTO: 1.42 K/UL (ref 0–0.85)
MONOCYTES NFR BLD AUTO: 12.1 % (ref 0–13.4)
NEUTROPHILS # BLD AUTO: 8.55 K/UL (ref 1.82–7.42)
NEUTROPHILS NFR BLD: 72.8 % (ref 44–72)
NITRITE UR QL STRIP.AUTO: POSITIVE
NRBC # BLD AUTO: 0 K/UL
NRBC BLD-RTO: 0 /100 WBC (ref 0–0.2)
PH UR STRIP.AUTO: 7.5 [PH] (ref 5–8)
PLATELET # BLD AUTO: 303 K/UL (ref 164–446)
PMV BLD AUTO: 10.2 FL (ref 9–12.9)
POTASSIUM SERPL-SCNC: 4.4 MMOL/L (ref 3.6–5.5)
PROT SERPL-MCNC: 6 G/DL (ref 6–8.2)
PROT UR QL STRIP: 100 MG/DL
RBC # BLD AUTO: 3.22 M/UL (ref 4.2–5.4)
RBC # URNS HPF: ABNORMAL /HPF
RBC UR QL AUTO: ABNORMAL
SODIUM SERPL-SCNC: 137 MMOL/L (ref 135–145)
SP GR UR STRIP.AUTO: 1.02
UROBILINOGEN UR STRIP.AUTO-MCNC: 1 MG/DL
WBC # BLD AUTO: 11.7 K/UL (ref 4.8–10.8)
WBC #/AREA URNS HPF: ABNORMAL /HPF

## 2023-11-07 PROCEDURE — 36415 COLL VENOUS BLD VENIPUNCTURE: CPT

## 2023-11-07 PROCEDURE — 97530 THERAPEUTIC ACTIVITIES: CPT

## 2023-11-07 PROCEDURE — 700102 HCHG RX REV CODE 250 W/ 637 OVERRIDE(OP): Performed by: PHYSICAL MEDICINE & REHABILITATION

## 2023-11-07 PROCEDURE — 85025 COMPLETE CBC W/AUTO DIFF WBC: CPT

## 2023-11-07 PROCEDURE — 97112 NEUROMUSCULAR REEDUCATION: CPT

## 2023-11-07 PROCEDURE — 97130 THER IVNTJ EA ADDL 15 MIN: CPT

## 2023-11-07 PROCEDURE — 81001 URINALYSIS AUTO W/SCOPE: CPT

## 2023-11-07 PROCEDURE — 99232 SBSQ HOSP IP/OBS MODERATE 35: CPT | Performed by: PHYSICAL MEDICINE & REHABILITATION

## 2023-11-07 PROCEDURE — 700111 HCHG RX REV CODE 636 W/ 250 OVERRIDE (IP): Mod: JZ | Performed by: PHYSICAL MEDICINE & REHABILITATION

## 2023-11-07 PROCEDURE — A9270 NON-COVERED ITEM OR SERVICE: HCPCS | Performed by: PHYSICAL MEDICINE & REHABILITATION

## 2023-11-07 PROCEDURE — 770010 HCHG ROOM/CARE - REHAB SEMI PRIVAT*

## 2023-11-07 PROCEDURE — 80053 COMPREHEN METABOLIC PANEL: CPT

## 2023-11-07 PROCEDURE — 97110 THERAPEUTIC EXERCISES: CPT

## 2023-11-07 PROCEDURE — 97129 THER IVNTJ 1ST 15 MIN: CPT

## 2023-11-07 PROCEDURE — 97116 GAIT TRAINING THERAPY: CPT

## 2023-11-07 RX ADMIN — NYSTATIN 500000 UNITS: 100000 SUSPENSION ORAL at 14:45

## 2023-11-07 RX ADMIN — ENOXAPARIN SODIUM 40 MG: 100 INJECTION SUBCUTANEOUS at 17:38

## 2023-11-07 RX ADMIN — NYSTATIN 500000 UNITS: 100000 SUSPENSION ORAL at 08:29

## 2023-11-07 RX ADMIN — NYSTATIN 500000 UNITS: 100000 SUSPENSION ORAL at 19:41

## 2023-11-07 RX ADMIN — ROSUVASTATIN CALCIUM 10 MG: 10 TABLET, FILM COATED ORAL at 19:42

## 2023-11-07 RX ADMIN — SENNOSIDES AND DOCUSATE SODIUM 2 TABLET: 8.6; 5 TABLET ORAL at 08:25

## 2023-11-07 RX ADMIN — OMEPRAZOLE 20 MG: 20 CAPSULE, DELAYED RELEASE ORAL at 08:27

## 2023-11-07 RX ADMIN — ACETAMINOPHEN 650 MG: 325 TABLET ORAL at 08:26

## 2023-11-07 RX ADMIN — QUETIAPINE FUMARATE 25 MG: 25 TABLET ORAL at 19:42

## 2023-11-07 RX ADMIN — NYSTATIN 500000 UNITS: 100000 SUSPENSION ORAL at 17:38

## 2023-11-07 RX ADMIN — ESCITALOPRAM OXALATE 20 MG: 10 TABLET ORAL at 08:26

## 2023-11-07 RX ADMIN — CARVEDILOL 3.12 MG: 3.12 TABLET, FILM COATED ORAL at 19:41

## 2023-11-07 RX ADMIN — LOSARTAN POTASSIUM 50 MG: 50 TABLET, FILM COATED ORAL at 19:42

## 2023-11-07 RX ADMIN — CARVEDILOL 3.12 MG: 3.12 TABLET, FILM COATED ORAL at 08:26

## 2023-11-07 ASSESSMENT — GAIT ASSESSMENTS
DISTANCE (FEET): 20
GAIT LEVEL OF ASSIST: MINIMAL ASSIST
DEVIATION: ANTALGIC;BRADYKINETIC
GAIT LEVEL OF ASSIST: CONTACT GUARD ASSIST
ASSISTIVE DEVICE: PARALLEL BARS
DISTANCE (FEET): 20
ASSISTIVE DEVICE: FRONT WHEEL WALKER
DEVIATION: ANTALGIC;BRADYKINETIC

## 2023-11-07 ASSESSMENT — ACTIVITIES OF DAILY LIVING (ADL)
TOILET_TRANSFER_DESCRIPTION: ADAPTIVE EQUIPMENT;GRAB BAR;INCREASED TIME;SET-UP OF EQUIPMENT;VERBAL CUEING
BED_CHAIR_WHEELCHAIR_TRANSFER_DESCRIPTION: ADAPTIVE EQUIPMENT;INCREASED TIME;SET-UP OF EQUIPMENT;VERBAL CUEING
BED_CHAIR_WHEELCHAIR_TRANSFER_DESCRIPTION: ADAPTIVE EQUIPMENT;INCREASED TIME;SET-UP OF EQUIPMENT;VERBAL CUEING

## 2023-11-07 ASSESSMENT — PAIN DESCRIPTION - PAIN TYPE: TYPE: SURGICAL PAIN

## 2023-11-07 NOTE — THERAPY
"Speech Language Pathology  Daily Treatment     Patient Name: Linda Casas  Age:  89 y.o., Sex:  female  Medical Record #: [id number]  Today's Date: 11/7/2023     Precautions  Precautions: Fall Risk, Weight Bearing As Tolerated Left Lower Extremity    Subjective    Pt was sitting up in her wheelchair finishing breakfast, willing to participate in this session.       Objective       11/07/23 0901   Treatment Charges   SLP Cognitive Skill Development First 15 Minutes 1   SLP Cognitive Skill Development Additional 15 Minutes 1   SLP Total Time Spent   SLP Individual Total Time Spent (Mins) 30         Assessment    Pt was able to remember working with PT this morning; however demonstrated difficulty recalling details from this session other than \"I walked with the walker\".  Pt requested to use the bathroom and required overall mod cues for safety and sequencing to transfer from the wheelchair to the toilet.  Cues were required to lock her wheelchair brakes, for safe foot placement, for hand placement during the transfer (holding onto the grab bar) and for sequencing how/where to move her feet so the toilet was close enough to her to sit down (pt attempted to sit down with the toilet approx. A foot away on two occasions).  Min cues were required when transferring to the wheelchair from the toilet for sequencing.      Strengths: Supportive family  Barriers: Agitation, Confused, Difficulty following instructions, Fatigue, Impaired functional cognition, Impaired carryover of learning, Impaired insight/denial of deficits, Uncooperative, Lack of motivation    Plan    Continue targeting functional memory and safety awareness, O-log       Speech Therapy Problems (Active)       Problem: Memory STGs       Dates: Start:  11/01/23         Goal: STG-Within one week, patient will achieve a score of 25/30 or more on the O-log for 3 consecutive days.         Dates: Start:  11/01/23               Problem: Problem Solving " STGs       Dates: Start:  11/01/23         Goal: STG-Within one week, patient will complete sustained attention tasks given mod A to achieve 75% accuracy.         Dates: Start:  11/01/23               Problem: Speech/Swallowing LTGs       Dates: Start:  10/31/23         Goal: LTG-By discharge, patient will solve basic problems with spv        Dates: Start:  10/31/23

## 2023-11-07 NOTE — THERAPY
Physical Therapy   Daily Treatment     Patient Name: Linda Casas  Age:  89 y.o., Sex:  female  Medical Record #: [id number]  Today's Date: 11/7/2023     Precautions  Precautions: Fall Risk, Weight Bearing As Tolerated Left Lower Extremity    Subjective    Pt resting in bed, sleepy but willing to participate as tolerated     Objective       11/07/23 1301   PT Charge Group   PT Gait Training (Units) 2   PT Therapeutic Exercise (Units) 2   PT Total Time Spent   PT Individual Total Time Spent (Mins) 60   Gait Functional Level of Assist    Gait Level Of Assist Contact Guard Assist   Assistive Device Front Wheel Walker   Distance (Feet) 20  (in addition to 15 ft x 1 bathroom>bed)   # of Times Distance was Traveled 3   Deviation Antalgic;Bradykinetic   Transfer Functional Level of Assist   Bed, Chair, Wheelchair Transfer Minimal Assist   Bed Chair Wheelchair Transfer Description Adaptive equipment;Increased time;Set-up of equipment;Verbal cueing   Toilet Transfers Minimal Assist   Toilet Transfer Description Adaptive equipment;Grab bar;Increased time;Set-up of equipment;Verbal cueing   Sitting Lower Body Exercises   Ankle Pumps 2 sets of 10   Hip Flexion 2 sets of 10   Hip Abduction 2 sets of 10   Hip Adduction 2 sets of 10   Long Arc Quad 2 sets of 10   Nustep Resistance Level 1  (10 minutes for AAROM x 4 extremities)   Bed Mobility    Supine to Sit Minimal Assist   Sit to Supine Minimal Assist   Sit to Stand Contact Guard Assist   Interdisciplinary Plan of Care Collaboration   Patient Position at End of Therapy In Bed;Bed Alarm On;Call Light within Reach;Tray Table within Reach;Phone within Reach         Assessment    Pt with improved gait tolerance this pm, tends to present with more pain and stiffness early in the day. Remains antalgic and with limited L terminal hip/ knee ext but overall completed this afternoon PT session well.   Strengths: Independent prior level of function, Pleasant and cooperative,  "Supportive family, Willingly participates in therapeutic activities  Barriers: Confused, Decreased endurance, Fatigue, Generalized weakness, Impaired activity tolerance, Impaired balance, Impaired functional cognition, Limited mobility, Pain, Pain poorly managed    Plan    Progressive gait with FWW. Standing tolerance/ WB to LLE  Sit<>stand/ transfers with FWW, LE ROM/ strength training, family conference/ family training?        DME  PT DME Recommendations  Wheelchair: 18\" Width, Lightweight, Standard Leg Rests  Cushion: Standard  Assistive Device: Front Wheeled Walker    Passport items to be completed:  Get in/out of bed safely, in/out of a vehicle, safely use mobility device, walk or wheel around home/community, navigate up and down stairs, show how to get up/down from the ground, ensure home is accessible, demonstrate HEP, complete caregiver training    Physical Therapy Problems (Active)       Problem: Mobility       Dates: Start:  10/31/23         Goal: STG-Within one week, patient will propel wheelchair community x 25 feet with SBA       Dates: Start:  10/31/23            Goal: STG-Within one week, patient will ambulate household distance x 25 feet with FWW and min A        Dates: Start:  10/31/23               Problem: Mobility Transfers       Dates: Start:  10/31/23         Goal: STG-Within one week, patient will transfer bed to chair with min A SQPT vs SPT with FWW       Dates: Start:  10/31/23               Problem: PT-Long Term Goals       Dates: Start:  10/31/23         Goal: LTG-By discharge, patient will ambulate x 150 feet with FWW and SBA       Dates: Start:  10/31/23            Goal: LTG-By discharge, patient will transfer one surface to another with FWW and SBA       Dates: Start:  10/31/23            Goal: LTG-By discharge, patient will transfer in/out of a car with FWW and SBA       Dates: Start:  10/31/23              "

## 2023-11-07 NOTE — THERAPY
"Occupational Therapy  Daily Treatment     Patient Name: Linda Casas  Age:  89 y.o., Sex:  female  Medical Record #: [id number]  Today's Date: 11/7/2023     Precautions  Precautions: (P) Fall Risk, Weight Bearing As Tolerated Left Lower Extremity    Subjective    \"You woke me up,\" patient stated upon arrival/seated in w/c . Declined to take shower however agreeable to participate in therapeutic activities/exercises w/ encouragement.      Objective     11/07/23 1001   OT Charge Group   OT Neuromuscular Re-education / Balance (Units) 2   OT Therapy Activity (Units) 2   OT Total Time Spent   OT Individual Total Time Spent (Mins) 60   Precautions   Precautions Fall Risk;Weight Bearing As Tolerated Left Lower Extremity   Vitals   O2 Delivery Device None - Room Air   Cognition    Level of Consciousness Alert   Sleep/Wake Cycle   Sleep Observations Alert upon awakening   Sitting Upper Body Exercises   Upper Extremity Bike Level 1 Resistance  (Fluidobike (seated in w/c) x 6 mins w/ mod-max encouragment)   Interdisciplinary Plan of Care Collaboration   Patient Position at End of Therapy Seated;Self Releasing Lap Belt Applied;Call Light within Reach;Tray Table within Reach     FWW mobility x 10' and x 20' w/ increased time; min A for initial walking bouts, CGA for second walking bout (step-to/antalgic pattern observed)    Tolerated knitting activity in standing 2 mins x 2, completed remainder of task @ w/c level due to fatigue.     Assessment    Patient w/ improved activity tolerance this session vs yesterday. Cont to require mod-max encouragement to maximize therapeutic participation, however improved standing tolerance/balance and attn to task noted.   Strengths: Supportive family, Pleasant and cooperative  Barriers: Bladder incontinence, Decreased endurance, Dementia, Fatigue, Generalized weakness, Impaired activity tolerance, Impaired balance, Impaired functional cognition, Limited mobility, " Pain    Plan    Re-attempt shower tomorrow    Progress functional mobility to FWW level, Standing tolerance/ WB to LLE, Sit<>stand/ transfers with FWW, thera act/ex, ADLs    DME    Passport items to be completed:  Perform bathroom transfers, complete dressing, complete feeding, get ready for the day, prepare a simple meal, participate in household tasks, adapt home for safety needs, demonstrate home exercise program, complete caregiver training     Occupational Therapy Goals (Active)       Problem: Dressing       Dates: Start:  10/31/23         Goal: STG-Within one week, patient will dress UB w/ min A       Dates: Start:  10/31/23            Goal: STG-Within one week, patient will dress LB w/ consistent max A       Dates: Start:  10/31/23               Problem: Functional Transfers       Dates: Start:  10/31/23         Goal: STG-Within one week, patient will transfer to toilet w/ min A       Dates: Start:  10/31/23               Problem: OT Long Term Goals       Dates: Start:  10/31/23         Goal: LTG-By discharge, patient will complete basic self care tasks w/ CGA to supervision        Dates: Start:  10/31/23            Goal: LTG-By discharge, patient will perform bathroom transfers w/ CGA to supervision w/ LRAD       Dates: Start:  10/31/23

## 2023-11-07 NOTE — THERAPY
"Physical Therapy   Daily Treatment     Patient Name: Linda Casas  Age:  89 y.o., Sex:  female  Medical Record #: [id number]  Today's Date: 11/7/2023     Precautions  Precautions: Fall Risk, Weight Bearing As Tolerated Left Lower Extremity    Subjective    Pt in bed, requires encouragement to get OOB and eat some breakfast.     Objective       11/07/23 0831   PT Charge Group   PT Gait Training (Units) 1   PT Therapeutic Activities (Units) 1   PT Total Time Spent   PT Individual Total Time Spent (Mins) 30   Gait Functional Level of Assist    Gait Level Of Assist Minimal Assist   Assistive Device Parallel Bars   Distance (Feet) 20   # of Times Distance was Traveled 2   Deviation Antalgic;Bradykinetic  (limited terminal hip/ knee ext LLE)   Transfer Functional Level of Assist   Bed, Chair, Wheelchair Transfer Minimal Assist   Bed Chair Wheelchair Transfer Description Adaptive equipment;Increased time;Set-up of equipment;Verbal cueing   Sitting Lower Body Exercises   Long Arc Quad 2 sets of 10  (AAROM for LLE)   Bed Mobility    Supine to Sit Minimal Assist   Sit to Stand Contact Guard Assist     Pt encouraged to finish Boost and orange juice throughout tx but declined any \"solid food\" from breakfast tray.     Assessment    Pt remains with limited tolerance for mobility due to LLE pain but completed gait and mobility tasks as noted. Requires increased time and cues for sit<>stand/ transfers with FWW.    Strengths: Independent prior level of function, Pleasant and cooperative, Supportive family, Willingly participates in therapeutic activities  Barriers: Confused, Decreased endurance, Fatigue, Generalized weakness, Impaired activity tolerance, Impaired balance, Impaired functional cognition, Limited mobility, Pain, Pain poorly managed    Plan    Progressive gait with // bars vs FWW. Standing tolerance/ WB to LLE  Sit<>stand/ transfers with FWW, LE ROM/ strength training    DME  PT DME " "Recommendations  Wheelchair: 18\" Width, Lightweight, Standard Leg Rests  Cushion: Standard  Assistive Device: Front Wheeled Walker    Passport items to be completed:  Get in/out of bed safely, in/out of a vehicle, safely use mobility device, walk or wheel around home/community, navigate up and down stairs, show how to get up/down from the ground, ensure home is accessible, demonstrate HEP, complete caregiver training    Physical Therapy Problems (Active)       Problem: Mobility       Dates: Start:  10/31/23         Goal: STG-Within one week, patient will propel wheelchair community x 25 feet with SBA       Dates: Start:  10/31/23            Goal: STG-Within one week, patient will ambulate household distance x 25 feet with FWW and min A        Dates: Start:  10/31/23               Problem: Mobility Transfers       Dates: Start:  10/31/23         Goal: STG-Within one week, patient will transfer bed to chair with min A SQPT vs SPT with FWW       Dates: Start:  10/31/23               Problem: PT-Long Term Goals       Dates: Start:  10/31/23         Goal: LTG-By discharge, patient will ambulate x 150 feet with FWW and SBA       Dates: Start:  10/31/23            Goal: LTG-By discharge, patient will transfer one surface to another with FWW and SBA       Dates: Start:  10/31/23            Goal: LTG-By discharge, patient will transfer in/out of a car with FWW and SBA       Dates: Start:  10/31/23              "

## 2023-11-07 NOTE — CARE PLAN
"The patient is Watcher - Medium risk of patient condition declining or worsening    Shift Goals  Clinical Goals: Safety  Patient Goals: Participate in therapy, pain control  Family Goals: updated on poc    Patient is not progressing towards the following goals:    Problem: Fall Risk - Rehab  Goal: Patient will remain free from falls  Outcome: Not Met  Note: Cheryl De Jesus Fall risk Assessment Score: 18    High fall risk Interventions   - Alarming seatbelt  - Bed and strip alarm   - Yellow sign by the door   - Yellow wrist band \"Fall risk\"  - Room near to the nurse station  - Do not leave patient unattended in the bathroom  - Fall risk education provided    Problem: Infection  Goal: Patient will remain free from infection  Outcome: Not Progressing  Note: Patient time voided for UA this AM. Specimen contaminated with fecal matter.      "

## 2023-11-07 NOTE — DISCHARGE PLANNING
"CM rec'd call from patients son Waqar.  He and his wife visited patient this am and stated \"in her condition, there is no way she can come home, I don't think she wants to come home and I don't thinks she'll ever come home\".  Waqar stated he and Sherly were told over at the [facility] hospital patient could gain prior level of function (get to and from the bathroom) and just need a little help bathing and dressing.  Waqar stated patient is not at all near prior level of function, which they could help with but not now.  He stated he's having neck surgery soon as well.  CM updated Team and SCP CM and a family conference is scheduled for this Thursday at 9am.  CM will continue to monitor for DC needs.    "

## 2023-11-07 NOTE — PROGRESS NOTES
NURSING DAILY NOTE    Name: Linda Casas   Date of Admission: 10/30/2023   Admitting Diagnosis: Closed left hip fracture, initial encounter ([facility])  Attending Physician: Kimmie Samayoa D.o.  Allergies: Ampicillin, Bactrim ds, and Ciprofloxacin    Safety  Patient Assist     Patient Precautions  Fall Risk, Weight Bearing As Tolerated Left Lower Extremity  Precaution Comments     Bed Transfer Status  Moderate Assist (min-mod A (bed > w/c w/ FWW))  Toilet Transfer Status   Maximal Assist (stand pivot w/c<>commode over the toilet with GB)  Assistive Devices  Rails, Wheelchair, Walker - front wheel  Oxygen  None - Room Air  Diet/Therapeutic Dining  Current Diet Order   Procedures    Diet Order Diet: Regular     Pill Administration  whole  Agitated Behavioral Scale  21  ABS Level of Severity  No Agitation    Fall Risk  Has the patient had a fall this admission?   No  Cheryl De Jesus Fall Risk Scoring  26, HIGH RISK  Fall Risk Safety Measures  bed alarm, chair alarm, poor balance, and low vision/ hearing    Vitals  Temperature: 36.7 °C (98.1 °F)  Temp src: Oral  Pulse: 80  Respiration: 16  Blood Pressure : 106/55  Blood Pressure MAP (Calculated): 72 MM HG  BP Location: Right, Upper Arm  Patient BP Position: Supine     Oxygen  Pulse Oximetry: 90 %  O2 (LPM): 0  FiO2%: 21 %  O2 Delivery Device: None - Room Air    Bowel and Bladder  Last Bowel Movement  11/06/23  Stool Type  Not observed (c OT)  Bowel Device     Continent  Bladder: Stress incontinence   Bowel: Continent movement  Bladder Function  Urine Void (mL):  (large continent, incontinent)  Number of Times Voided: 1  Urine Color: Unable To Evaluate  Number of Times Incontinent of Urine: 2  Straight Catheter: 600 ml  Genitourinary Assessment   Bladder Assessment (WDL):  WDL Except  Diaz Catheter: Not Applicable  Diaz Care: Given with Soap and Water  Urine Color: Unable To Evaluate  Number of Times  Incontinent of Urine: 2  Bladder Scan: Post Void  $ Bladder Scan Results (mL): 35    Skin  Harris Score   17  Sensory Interventions   Bed Types: Standard/Trauma Mattress  Skin Preventative Measures: Pillows in Use for Support / Positioning  Moisture Interventions  Moisturizers/Barriers: Barrier Wipes      Pain  Pain Rating Scale  10 - As bad as it could be, nothing else matters  Pain Location  Hip, Leg  Pain Location Orientation  Left  Pain Interventions   Medication (see MAR)    ADLs    Bathing   Partial Bed Bath  Linen Change   Partial  Personal Hygiene  Change Rabia Pads, Moist Rabia Wipes  Chlorhexidine Bath      Oral Care  Brushed Teeth  Teeth/Dentures  Intact  Shave     Nutrition Percentage Eaten  *  * Meal *  *, Lunch, Between 50-75% Consumed  Environmental Precautions  Treaded Slipper Socks on Patient, Personal Belongings, Wastebasket, Call Bell etc. in Easy Reach, Bed in Low Position  Patient Turns/Positioning  Patient Turns Self from Side to Side  Patient Turns Assistance/Tolerance  Assistance of One  Bed Positions  Bed Controls On, Bed Locked  Head of Bed Elevated  Self regulated      Psychosocial/Neurologic Assessment  Psychosocial Assessment  Psychosocial (WDL):  Within Defined Limits  Patient Behaviors: Drowsy, Fatigue  Neurologic Assessment  Neuro (WDL): Exceptions to WDL  Level of Consciousness: Alert  Orientation Level: Oriented to person, Oriented to situation, Oriented to place, Disoriented to time  Cognition: Memory Loss  Speech: Clear  Facial Symmetry:  (wdl)  Pupil Assesment: Yes  R Pupil Size (mm): 2  R Pupil Shape / Description: Round  R Pupil Reaction: Brisk  L Pupil Size (mm): 2  L Pupil Shape / Description: Round  L Pupil Reaction: Brisk  Motor Function/Sensation Assessment: Motor strength  RUE Motor Response: Responds to commands  RUE Sensation: Full sensation  Muscle Strength Right Arm: Normal Strength Against Gravity and Full Resistance  LUE Motor Response: Responds to commands  LUE  Sensation: Full sensation  Muscle Strength Left Arm: Normal Strength Against Gravity and Full Resistance  RLE Motor Response: Responds to commands  RLE Sensation: Full sensation  Muscle Strength Right Leg: Good Strength Against Gravity and Moderate Resistance  LLE Motor Response: Responds to commands  LLE Sensation: Pain  Muscle Strength Left Leg: Normal Strength Against Gravity and Full Resistance  EENT (WDL):  Within Defined Limits    Cardio/Pulmonary Assessment  Edema      Respiratory Breath Sounds  RUL Breath Sounds: Clear  RML Breath Sounds: Diminished  RLL Breath Sounds: Diminished  SANTY Breath Sounds: Clear  LLL Breath Sounds: Diminished  Cardiac Assessment   Cardiac (WDL):  Within Defined Limits

## 2023-11-07 NOTE — PROGRESS NOTES
..                                                         NURSING DAILY NOTE    Name: Linda Casas   Date of Admission: 10/30/2023   Admitting Diagnosis: Closed left hip fracture, initial encounter ([facility])  Attending Physician: Kimmie Samayoa D.o.  Allergies: Ampicillin, Bactrim ds, and Ciprofloxacin    Safety  Patient Assist     Patient Precautions  Fall Risk, Weight Bearing As Tolerated Left Lower Extremity  Precaution Comments     Bed Transfer Status  Moderate Assist (min-mod A (bed > w/c w/ FWW))  Toilet Transfer Status   Maximal Assist (stand pivot w/c<>commode over the toilet with GB)  Assistive Devices  Rails, Wheelchair, Walker - front wheel  Oxygen  None - Room Air  Diet/Therapeutic Dining  Current Diet Order   Procedures    Diet Order Diet: Regular     Pill Administration  whole  Agitated Behavioral Scale  21  ABS Level of Severity  No Agitation    Fall Risk  Has the patient had a fall this admission?   No  Cheryl De Jesus Fall Risk Scoring  18, HIGH RISK  Fall Risk Safety Measures  bed alarm, chair alarm, seatbelt alarm, poor balance, and low vision/ hearing    Vitals  Temperature: 36.7 °C (98.1 °F)  Temp src: Oral  Pulse: 91  Respiration: 18  Blood Pressure : 99/64  Blood Pressure MAP (Calculated): 76 MM HG  BP Location: Left, Upper Arm  Patient BP Position: Sitting     Oxygen  Pulse Oximetry: 90 %  O2 (LPM): 0  FiO2%: 21 %  O2 Delivery Device: None - Room Air    Bowel and Bladder  Last Bowel Movement  11/07/23  Stool Type  Type 6: Fluffy pieces with ragged edges, a mushy stool  Bowel Device  Diaper  Continent  Bladder: Stress incontinence   Bowel: Continent movement  Bladder Function  Urine Void (mL): 350 ml  Number of Times Voided: 2  Urine Color: Orange  Number of Times Incontinent of Urine: 1  Straight Catheter: 600 ml  Wet Diaper Count: 1  Genitourinary Assessment   Bladder Assessment (WDL):  WDL Except  Diaz Catheter: Not Applicable  Diaz Care: Given with Soap and Water  Urinary  Elimination: Incontinence  Urine Color: Orange  Number of Bladder Accidents: 0  Total Number of Bladder of Accidents in Last 7 Days: 0  Number of Times Incontinent of Urine: 1  Bladder Device: Diaper  Bladder Scan: Post Void  $ Bladder Scan Results (mL): 35    Skin  Harris Score   18  Sensory Interventions   Bed Types: Standard/Trauma Mattress  Skin Preventative Measures: Pillows in Use for Support / Positioning  Moisture Interventions  Moisturizers/Barriers: Barrier Wipes      Pain  Pain Rating Scale  0 - No Pain  Pain Location  Hip, Leg  Pain Location Orientation  Left  Pain Interventions   Declines    ADLs    Bathing   Partial Bed Bath  Linen Change   Partial  Personal Hygiene  Change Rabia Pads, Moist Rabia Wipes  Chlorhexidine Bath      Oral Care  Brushed Teeth  Teeth/Dentures  Intact  Shave     Nutrition Percentage Eaten  *  * Meal *  *, Dinner, Less than 25% Consumed  Environmental Precautions  Treaded Slipper Socks on Patient, Personal Belongings, Wastebasket, Call Bell etc. in Easy Reach, Bed in Low Position  Patient Turns/Positioning  Patient Turns Self from Side to Side  Patient Turns Assistance/Tolerance  Assistance of One  Bed Positions  Bed Controls On, Bed Locked  Head of Bed Elevated  Self regulated      Psychosocial/Neurologic Assessment  Psychosocial Assessment  Psychosocial (WDL):  Within Defined Limits  Patient Behaviors: Drowsy, Fatigue  Neurologic Assessment  Neuro (WDL): Exceptions to WDL  Level of Consciousness: Alert  Orientation Level: Disoriented to time  Cognition: Memory Loss  Speech: Clear  Facial Symmetry:  (wdl)  Pupil Assesment: Yes  R Pupil Size (mm): 2  R Pupil Shape / Description: Round  R Pupil Reaction: Brisk  L Pupil Size (mm): 2  L Pupil Shape / Description: Round  L Pupil Reaction: Brisk  Motor Function/Sensation Assessment: Motor strength  RUE Motor Response: Responds to commands  RUE Sensation: Full sensation  Muscle Strength Right Arm: Normal Strength Against Gravity and  Full Resistance  LUE Motor Response: Responds to commands  LUE Sensation: Full sensation  Muscle Strength Left Arm: Normal Strength Against Gravity and Full Resistance  RLE Motor Response: Responds to commands  RLE Sensation: Full sensation  Muscle Strength Right Leg: Good Strength Against Gravity and Moderate Resistance  LLE Motor Response: Responds to commands  LLE Sensation: Pain  Muscle Strength Left Leg: Normal Strength Against Gravity and Full Resistance  EENT (WDL):  Within Defined Limits    Cardio/Pulmonary Assessment  Edema      Respiratory Breath Sounds  RUL Breath Sounds: Clear  RML Breath Sounds: Diminished  RLL Breath Sounds: Diminished  SANTY Breath Sounds: Clear  LLL Breath Sounds: Diminished  Cardiac Assessment   Cardiac (WDL):  Within Defined Limits

## 2023-11-07 NOTE — PROGRESS NOTES
Physical Medicine & Rehabilitation Progress Note    Encounter Date: 11/7/2023    Chief Complaint: Wants to go home    Interval Events (Subjective):  SUSANNAH  BM 11/7  Incontinence of bladder    Seen and examined in her room. Family has already left. States she wants to go home. Discussed that may not be possible.     ROS: 14 point ROS unable to be done to confusion    Objective:  VITAL SIGNS: /75   Pulse 68   Temp 36.8 °C (98.2 °F) (Oral)   Resp 17   Wt 67.8 kg (149 lb 8 oz)   SpO2 91%   BMI 25.65 kg/m²     GEN: No apparent distress  HEENT: Head normocephalic, atraumatic.  Sclera nonicteric bilaterally, no ocular discharge appreciated bilaterally. White thrush on tongue.   CV: Extremities warm and well-perfused, no peripheral edema appreciated bilaterally.  PULMONARY: Breathing nonlabored on room air, no respiratory accessory muscle use.  Not requiring supplemental oxygen.  SKIN: No appreciable skin breakdown on exposed areas of skin.  PSYCH: Mood and affect within normal limits.  NEURO: Awake alert.  Confused.               Laboratory Values:  Recent Results (from the past 72 hour(s))   CBC WITH DIFFERENTIAL    Collection Time: 11/06/23  5:44 AM   Result Value Ref Range    WBC 13.1 (H) 4.8 - 10.8 K/uL    RBC 3.38 (L) 4.20 - 5.40 M/uL    Hemoglobin 10.4 (L) 12.0 - 16.0 g/dL    Hematocrit 33.4 (L) 37.0 - 47.0 %    MCV 98.8 (H) 81.4 - 97.8 fL    MCH 30.8 27.0 - 33.0 pg    MCHC 31.1 (L) 32.2 - 35.5 g/dL    RDW 50.8 (H) 35.9 - 50.0 fL    Platelet Count 335 164 - 446 K/uL    MPV 10.2 9.0 - 12.9 fL    Neutrophils-Polys 71.40 44.00 - 72.00 %    Lymphocytes 15.60 (L) 22.00 - 41.00 %    Monocytes 9.80 0.00 - 13.40 %    Eosinophils 1.80 0.00 - 6.90 %    Basophils 0.50 0.00 - 1.80 %    Immature Granulocytes 0.90 0.00 - 0.90 %    Nucleated RBC 0.00 0.00 - 0.20 /100 WBC    Neutrophils (Absolute) 9.32 (H) 1.82 - 7.42 K/uL    Lymphs (Absolute) 2.04 1.00 - 4.80 K/uL    Monos (Absolute) 1.28 (H) 0.00 - 0.85 K/uL    Eos  (Absolute) 0.23 0.00 - 0.51 K/uL    Baso (Absolute) 0.06 0.00 - 0.12 K/uL    Immature Granulocytes (abs) 0.12 (H) 0.00 - 0.11 K/uL    NRBC (Absolute) 0.00 K/uL   Basic Metabolic Panel    Collection Time: 11/06/23  5:44 AM   Result Value Ref Range    Sodium 138 135 - 145 mmol/L    Potassium 3.9 3.6 - 5.5 mmol/L    Chloride 101 96 - 112 mmol/L    Co2 27 20 - 33 mmol/L    Glucose 109 (H) 65 - 99 mg/dL    Bun 16 8 - 22 mg/dL    Creatinine 0.98 0.50 - 1.40 mg/dL    Calcium 9.1 8.5 - 10.5 mg/dL    Anion Gap 10.0 7.0 - 16.0   ESTIMATED GFR    Collection Time: 11/06/23  5:44 AM   Result Value Ref Range    GFR (CKD-EPI) 55 (A) >60 mL/min/1.73 m 2   CBC WITH DIFFERENTIAL    Collection Time: 11/07/23  6:32 AM   Result Value Ref Range    WBC 11.7 (H) 4.8 - 10.8 K/uL    RBC 3.22 (L) 4.20 - 5.40 M/uL    Hemoglobin 9.9 (L) 12.0 - 16.0 g/dL    Hematocrit 31.7 (L) 37.0 - 47.0 %    MCV 98.4 (H) 81.4 - 97.8 fL    MCH 30.7 27.0 - 33.0 pg    MCHC 31.2 (L) 32.2 - 35.5 g/dL    RDW 51.8 (H) 35.9 - 50.0 fL    Platelet Count 303 164 - 446 K/uL    MPV 10.2 9.0 - 12.9 fL    Neutrophils-Polys 72.80 (H) 44.00 - 72.00 %    Lymphocytes 12.30 (L) 22.00 - 41.00 %    Monocytes 12.10 0.00 - 13.40 %    Eosinophils 1.40 0.00 - 6.90 %    Basophils 0.50 0.00 - 1.80 %    Immature Granulocytes 0.90 0.00 - 0.90 %    Nucleated RBC 0.00 0.00 - 0.20 /100 WBC    Neutrophils (Absolute) 8.55 (H) 1.82 - 7.42 K/uL    Lymphs (Absolute) 1.44 1.00 - 4.80 K/uL    Monos (Absolute) 1.42 (H) 0.00 - 0.85 K/uL    Eos (Absolute) 0.16 0.00 - 0.51 K/uL    Baso (Absolute) 0.06 0.00 - 0.12 K/uL    Immature Granulocytes (abs) 0.11 0.00 - 0.11 K/uL    NRBC (Absolute) 0.00 K/uL   Comp Metabolic Panel    Collection Time: 11/07/23  6:32 AM   Result Value Ref Range    Sodium 137 135 - 145 mmol/L    Potassium 4.4 3.6 - 5.5 mmol/L    Chloride 101 96 - 112 mmol/L    Co2 28 20 - 33 mmol/L    Anion Gap 8.0 7.0 - 16.0    Glucose 103 (H) 65 - 99 mg/dL    Bun 22 8 - 22 mg/dL    Creatinine 1.08  0.50 - 1.40 mg/dL    Calcium 8.9 8.5 - 10.5 mg/dL    Correct Calcium 9.1 8.5 - 10.5 mg/dL    AST(SGOT) 16 12 - 45 U/L    ALT(SGPT) 8 2 - 50 U/L    Alkaline Phosphatase 62 30 - 99 U/L    Total Bilirubin 1.1 0.1 - 1.5 mg/dL    Albumin 3.7 3.2 - 4.9 g/dL    Total Protein 6.0 6.0 - 8.2 g/dL    Globulin 2.3 1.9 - 3.5 g/dL    A-G Ratio 1.6 g/dL   ESTIMATED GFR    Collection Time: 11/07/23  6:32 AM   Result Value Ref Range    GFR (CKD-EPI) 49 (A) >60 mL/min/1.73 m 2         Medications:  Scheduled Medications   Medication Dose Frequency    nystatin  5 mL 4X/DAY    Pharmacy Consult Request  1 Each PHARMACY TO DOSE    omeprazole  20 mg DAILY    carvedilol  3.125 mg BID    enoxaparin (LOVENOX) injection  40 mg DAILY AT 1800    escitalopram  20 mg DAILY    losartan  50 mg Q EVENING    QUEtiapine  25 mg Nightly    rosuvastatin  10 mg Q EVENING    senna-docusate  2 Tablet BID     PRN medications: Respiratory Therapy Consult, hydrALAZINE, carboxymethylcellulose, benzocaine-menthol, mag hydrox-al hydrox-simeth, ondansetron **OR** ondansetron, traZODone, sodium chloride, senna-docusate **AND** polyethylene glycol/lytes **AND** magnesium hydroxide **AND** bisacodyl, acetaminophen, oxyCODONE immediate-release, oxyCODONE immediate-release    Diet:  Current Diet Order   Procedures    Diet Order Diet: Regular       Medical Decision Making and Plan:  Left femoral neck and intertrochanteric femur fracture s/p ORIF with cephalomedullary implant 10/28/2023 Dr. Mckenzie  PT and OT for mobility and ADLs. Per guidelines, 15 hours per week between PT, OT and/or SLP.  Follow-up Ortho  Weightbearing as tolerated     Dementia  SLP consult, likely at baseline  Seroquel nightly -consider reducing if not home med     Leukocytosis  Likely reactive after surgery  UA negative for bacteria, chest x-ray (negative), blood cultures - NGTD  Slight reduction 10/31  CBC 11/2 - Resolved WBC normal at 9.4  11/6/2023 increased again at 13.1  Recheck UA, check  CXR, Check incision  CBC in AM - WBC slightly better at 11.7  CMP in AM - Normal     Anemia  Stable slightly lower 11/7 at 9.9     Hypophosphatemia  Low - 3x supplement  Normal on 11/2     Azotemia  Stable - mildly elevated  Encourage fluids  Resolved 11/6/2023      L2 compression fracture 6/2023 s/p kyphoplasty Dr. Barton  Monitor     Hyperlipidemia  Continue Crestor     Hypertension  Continue losartan and Coreg     Anxiety  Continue Lexapro    Thrush  Mouth rinse     Pain  As needed oxycodone     Skin  Patient at risk for skin breakdown due to debility in areas including sacrum, achilles, elbows and head in addition to other sites. Nursing to assess skin daily.      GI Ppx  Patient on Prilosec for GERD prophylaxis.      Bowel   Patient on Senna-docusate for constipation prophylaxis.      Bladder  Incontinence - UA negative for bacteria  Timed voids and PVR/BS     DVT PROPHYLAXIS: Lovenox 40 mg subcutaneous nightly     HOSPITALIST FOLLOWING: No     CODE STATUS: DNAR/DNI     DISPO: 11/10/23. Plan was to go home with adult children whom she lives with, they are now declining that they can care for her. Likely will need family meeting.      FAMILY MEETING 11/9 @ 9 AM     M2B ELIGIBLE: TBD     DISCHARGE FOLLOW UP: Orthopedics, PCP  ____________________________________    Dr. Kimmie Samayoa DO, MS  [facility] - Physical Medicine & Rehabilitation   ____________________________________

## 2023-11-07 NOTE — CARE PLAN
The patient is Stable - Low risk of patient condition declining or worsening    Problem: Knowledge Deficit - Standard  Goal: Patient and family/care givers will demonstrate understanding of plan of care, disease process/condition, diagnostic tests and medications  Outcome: Progressing. Reviewed POC, all questions answered.        Problem: Skin Integrity  Goal: Skin integrity is maintained or improved  Outcome: Progressing. Pt's skin remains free from new or accidental injury.        Shift Goals  Clinical Goals: Safety  Patient Goals: Participate in therapy, pain control  Family Goals: updated on poc

## 2023-11-08 ENCOUNTER — APPOINTMENT (OUTPATIENT)
Dept: OCCUPATIONAL THERAPY | Facility: REHABILITATION | Age: 88
DRG: 560 | End: 2023-11-08
Attending: PHYSICAL MEDICINE & REHABILITATION
Payer: MEDICARE

## 2023-11-08 ENCOUNTER — APPOINTMENT (OUTPATIENT)
Dept: PHYSICAL THERAPY | Facility: REHABILITATION | Age: 88
DRG: 560 | End: 2023-11-08
Attending: PHYSICAL MEDICINE & REHABILITATION
Payer: MEDICARE

## 2023-11-08 ENCOUNTER — APPOINTMENT (OUTPATIENT)
Dept: SPEECH THERAPY | Facility: REHABILITATION | Age: 88
DRG: 560 | End: 2023-11-08
Attending: PHYSICAL MEDICINE & REHABILITATION
Payer: MEDICARE

## 2023-11-08 LAB
BASOPHILS # BLD AUTO: 0.4 % (ref 0–1.8)
BASOPHILS # BLD: 0.04 K/UL (ref 0–0.12)
EOSINOPHIL # BLD AUTO: 0.21 K/UL (ref 0–0.51)
EOSINOPHIL NFR BLD: 2 % (ref 0–6.9)
ERYTHROCYTE [DISTWIDTH] IN BLOOD BY AUTOMATED COUNT: 50.7 FL (ref 35.9–50)
HCT VFR BLD AUTO: 30.4 % (ref 37–47)
HGB BLD-MCNC: 9.6 G/DL (ref 12–16)
IMM GRANULOCYTES # BLD AUTO: 0.09 K/UL (ref 0–0.11)
IMM GRANULOCYTES NFR BLD AUTO: 0.9 % (ref 0–0.9)
LYMPHOCYTES # BLD AUTO: 1.46 K/UL (ref 1–4.8)
LYMPHOCYTES NFR BLD: 13.9 % (ref 22–41)
MCH RBC QN AUTO: 30.8 PG (ref 27–33)
MCHC RBC AUTO-ENTMCNC: 31.6 G/DL (ref 32.2–35.5)
MCV RBC AUTO: 97.4 FL (ref 81.4–97.8)
MONOCYTES # BLD AUTO: 1.28 K/UL (ref 0–0.85)
MONOCYTES NFR BLD AUTO: 12.2 % (ref 0–13.4)
NEUTROPHILS # BLD AUTO: 7.41 K/UL (ref 1.82–7.42)
NEUTROPHILS NFR BLD: 70.6 % (ref 44–72)
NRBC # BLD AUTO: 0 K/UL
NRBC BLD-RTO: 0 /100 WBC (ref 0–0.2)
PLATELET # BLD AUTO: 319 K/UL (ref 164–446)
PMV BLD AUTO: 10.1 FL (ref 9–12.9)
RBC # BLD AUTO: 3.12 M/UL (ref 4.2–5.4)
WBC # BLD AUTO: 10.5 K/UL (ref 4.8–10.8)

## 2023-11-08 PROCEDURE — 99232 SBSQ HOSP IP/OBS MODERATE 35: CPT | Performed by: PHYSICAL MEDICINE & REHABILITATION

## 2023-11-08 PROCEDURE — 87186 SC STD MICRODIL/AGAR DIL: CPT

## 2023-11-08 PROCEDURE — 97129 THER IVNTJ 1ST 15 MIN: CPT

## 2023-11-08 PROCEDURE — 97110 THERAPEUTIC EXERCISES: CPT

## 2023-11-08 PROCEDURE — 97130 THER IVNTJ EA ADDL 15 MIN: CPT

## 2023-11-08 PROCEDURE — 85025 COMPLETE CBC W/AUTO DIFF WBC: CPT

## 2023-11-08 PROCEDURE — 700102 HCHG RX REV CODE 250 W/ 637 OVERRIDE(OP): Performed by: PHYSICAL MEDICINE & REHABILITATION

## 2023-11-08 PROCEDURE — 97116 GAIT TRAINING THERAPY: CPT

## 2023-11-08 PROCEDURE — 700111 HCHG RX REV CODE 636 W/ 250 OVERRIDE (IP): Mod: JZ | Performed by: PHYSICAL MEDICINE & REHABILITATION

## 2023-11-08 PROCEDURE — 87077 CULTURE AEROBIC IDENTIFY: CPT | Mod: 91

## 2023-11-08 PROCEDURE — 97535 SELF CARE MNGMENT TRAINING: CPT | Mod: CO

## 2023-11-08 PROCEDURE — 770010 HCHG ROOM/CARE - REHAB SEMI PRIVAT*

## 2023-11-08 PROCEDURE — 87086 URINE CULTURE/COLONY COUNT: CPT

## 2023-11-08 PROCEDURE — A9270 NON-COVERED ITEM OR SERVICE: HCPCS | Performed by: PHYSICAL MEDICINE & REHABILITATION

## 2023-11-08 PROCEDURE — 36415 COLL VENOUS BLD VENIPUNCTURE: CPT

## 2023-11-08 RX ORDER — DIAPER,BRIEF,INFANT-TODD,DISP
EACH MISCELLANEOUS 2 TIMES DAILY
Status: DISCONTINUED | OUTPATIENT
Start: 2023-11-08 | End: 2023-11-09

## 2023-11-08 RX ORDER — NITROFURANTOIN 25; 75 MG/1; MG/1
100 CAPSULE ORAL 2 TIMES DAILY WITH MEALS
Status: COMPLETED | OUTPATIENT
Start: 2023-11-08 | End: 2023-11-10

## 2023-11-08 RX ADMIN — ROSUVASTATIN CALCIUM 10 MG: 10 TABLET, FILM COATED ORAL at 20:40

## 2023-11-08 RX ADMIN — NYSTATIN 500000 UNITS: 100000 SUSPENSION ORAL at 20:43

## 2023-11-08 RX ADMIN — ENOXAPARIN SODIUM 40 MG: 100 INJECTION SUBCUTANEOUS at 16:49

## 2023-11-08 RX ADMIN — NYSTATIN 500000 UNITS: 100000 SUSPENSION ORAL at 08:31

## 2023-11-08 RX ADMIN — LOSARTAN POTASSIUM 50 MG: 50 TABLET, FILM COATED ORAL at 20:40

## 2023-11-08 RX ADMIN — HYDROCORTISONE: 1 OINTMENT TOPICAL at 13:00

## 2023-11-08 RX ADMIN — CARVEDILOL 3.12 MG: 3.12 TABLET, FILM COATED ORAL at 20:40

## 2023-11-08 RX ADMIN — OMEPRAZOLE 20 MG: 20 CAPSULE, DELAYED RELEASE ORAL at 08:30

## 2023-11-08 RX ADMIN — QUETIAPINE FUMARATE 25 MG: 25 TABLET ORAL at 20:40

## 2023-11-08 RX ADMIN — NYSTATIN 500000 UNITS: 100000 SUSPENSION ORAL at 16:49

## 2023-11-08 RX ADMIN — NITROFURANTOIN MONOHYDRATE/MACROCRYSTALS 100 MG: 75; 25 CAPSULE ORAL at 09:59

## 2023-11-08 RX ADMIN — NITROFURANTOIN MONOHYDRATE/MACROCRYSTALS 100 MG: 75; 25 CAPSULE ORAL at 16:49

## 2023-11-08 RX ADMIN — CARVEDILOL 3.12 MG: 3.12 TABLET, FILM COATED ORAL at 08:30

## 2023-11-08 RX ADMIN — SENNOSIDES AND DOCUSATE SODIUM 2 TABLET: 8.6; 5 TABLET ORAL at 08:30

## 2023-11-08 RX ADMIN — ESCITALOPRAM OXALATE 20 MG: 10 TABLET ORAL at 08:30

## 2023-11-08 RX ADMIN — NYSTATIN 500000 UNITS: 100000 SUSPENSION ORAL at 13:00

## 2023-11-08 ASSESSMENT — ACTIVITIES OF DAILY LIVING (ADL)
TOILET_TRANSFER_DESCRIPTION: GRAB BAR;INCREASED TIME;SET-UP OF EQUIPMENT;VERBAL CUEING
TOILET_TRANSFER_DESCRIPTION: ADAPTIVE EQUIPMENT;GRAB BAR;VERBAL CUEING
TOILET_TRANSFER_DESCRIPTION: GRAB BAR
TUB_SHOWER_TRANSFER_DESCRIPTION: INCREASED TIME;GRAB BAR;SHOWER BENCH
BED_CHAIR_WHEELCHAIR_TRANSFER_DESCRIPTION: ADAPTIVE EQUIPMENT;INCREASED TIME;SET-UP OF EQUIPMENT;VERBAL CUEING

## 2023-11-08 ASSESSMENT — GAIT ASSESSMENTS
DEVIATION: ANTALGIC;STEP TO;BRADYKINETIC
DISTANCE (FEET): 25
DISTANCE (FEET): 20
GAIT LEVEL OF ASSIST: CONTACT GUARD ASSIST
DEVIATION: ANTALGIC;STEP TO;BRADYKINETIC
ASSISTIVE DEVICE: FRONT WHEEL WALKER
ASSISTIVE DEVICE: FRONT WHEEL WALKER
GAIT LEVEL OF ASSIST: CONTACT GUARD ASSIST

## 2023-11-08 NOTE — PROGRESS NOTES
..                                                         NURSING DAILY NOTE    Name: Linda Casas   Date of Admission: 10/30/2023   Admitting Diagnosis: Closed left hip fracture, initial encounter ([facility])  Attending Physician: Kimmie Samayoa D.o.  Allergies: Ampicillin, Bactrim ds, and Ciprofloxacin    Safety  Patient Assist  max assist  Patient Precautions  Fall Risk, Weight Bearing As Tolerated Left Lower Extremity  Precaution Comments     Bed Transfer Status  Minimal Assist  Toilet Transfer Status   Minimal Assist  Assistive Devices  Walker - front wheel  Oxygen  None - Room Air  Diet/Therapeutic Dining  Current Diet Order   Procedures    Diet Order Diet: Regular     Pill Administration  whole  Agitated Behavioral Scale  21  ABS Level of Severity  No Agitation    Fall Risk  Has the patient had a fall this admission?   No  Cheryl De Jesus Fall Risk Scoring  18, HIGH RISK  Fall Risk Safety Measures  bed alarm, chair alarm, seatbelt alarm, and poor balance    Vitals  Temperature: 36.9 °C (98.5 °F)  Temp src: Oral  Pulse: 72  Respiration: 18  Blood Pressure : 122/78  Blood Pressure MAP (Calculated): 93 MM HG  BP Location: Left, Upper Arm  Patient BP Position: Supine     Oxygen  Pulse Oximetry: 95 %  O2 (LPM): 0  FiO2%: 21 %  O2 Delivery Device: None - Room Air    Bowel and Bladder  Last Bowel Movement  11/07/23  Stool Type  Not observed  Bowel Device  Diaper  Continent  Bladder: Stress incontinence   Bowel: Continent movement  Bladder Function  Urine Void (mL): 350 ml  Number of Times Voided: 2  Urine Color: Unable To Evaluate  Number of Times Incontinent of Urine: 2  Straight Catheter: 600 ml  Wet Diaper Count: 1  Genitourinary Assessment   Bladder Assessment (WDL):  WDL Except  Diaz Catheter: Not Applicable  Diaz Care: Given with Soap and Water  Urinary Elimination: Incontinence  Urine Color: Unable To Evaluate  Number of Bladder Accidents: 0  Total Number of Bladder of Accidents in Last 7 Days:  0  Number of Times Incontinent of Urine: 2  Bladder Device: Diaper  Bladder Scan: Post Void  $ Bladder Scan Results (mL): 35    Skin  Harris Score   18  Sensory Interventions   Bed Types: Standard/Trauma Mattress  Skin Preventative Measures: Pillows in Use for Support / Positioning  Moisture Interventions  Moisturizers/Barriers: Barrier Wipes      Pain  Pain Rating Scale  0 - No Pain  Pain Location  Hip, Leg  Pain Location Orientation  Left  Pain Interventions   Declines    ADLs    Bathing   Partial Bed Bath  Linen Change   Partial  Personal Hygiene  Moist Rabia Wipes, Change Rabia Pads  Chlorhexidine Bath      Oral Care  Brushed Teeth  Teeth/Dentures  Intact  Shave     Nutrition Percentage Eaten  Lunch, 0% Consumed  Environmental Precautions  Treaded Slipper Socks on Patient, Personal Belongings, Wastebasket, Call Bell etc. in Easy Reach, Bed in Low Position  Patient Turns/Positioning  Patient Turns Self from Side to Side  Patient Turns Assistance/Tolerance  Assistance of One  Bed Positions  Bed Controls On, Bed Locked  Head of Bed Elevated  Self regulated      Psychosocial/Neurologic Assessment  Psychosocial Assessment  Psychosocial (WDL):  Within Defined Limits  Patient Behaviors: Drowsy, Fatigue  Neurologic Assessment  Neuro (WDL): Exceptions to WDL  Level of Consciousness: Alert  Orientation Level: Disoriented to time  Cognition: Memory Loss  Speech: Clear  Facial Symmetry:  (wdl)  Pupil Assesment: Yes  R Pupil Size (mm): 2  R Pupil Shape / Description: Round  R Pupil Reaction: Brisk  L Pupil Size (mm): 2  L Pupil Shape / Description: Round  L Pupil Reaction: Brisk  Motor Function/Sensation Assessment: Motor strength  RUE Motor Response: Responds to commands  RUE Sensation: Full sensation  Muscle Strength Right Arm: Normal Strength Against Gravity and Full Resistance  LUE Motor Response: Responds to commands  LUE Sensation: Full sensation  Muscle Strength Left Arm: Normal Strength Against Gravity and Full  Resistance  RLE Motor Response: Responds to commands  RLE Sensation: Full sensation  Muscle Strength Right Leg: Good Strength Against Gravity and Moderate Resistance  LLE Motor Response: Responds to commands  LLE Sensation: Pain  Muscle Strength Left Leg: Normal Strength Against Gravity and Full Resistance  EENT (WDL):  Within Defined Limits    Cardio/Pulmonary Assessment  Edema      Respiratory Breath Sounds  RUL Breath Sounds: Clear  RML Breath Sounds: Diminished  RLL Breath Sounds: Diminished  SANTY Breath Sounds: Clear  LLL Breath Sounds: Diminished  Cardiac Assessment   Cardiac (WDL):  Within Defined Limits

## 2023-11-08 NOTE — PROGRESS NOTES
Physical Medicine & Rehabilitation Progress Note    Encounter Date: 11/8/2023    Chief Complaint: Feels well this AM    Interval Events (Subjective):  VSS  BM 11/7  Voiding, but incontinent    Seen and examined in the gym. She is doing well, walking with walker with Min to CG-A. States she feels well. No systemic complaints. Discussed having family meeting tomorrow at 9.      ROS: 14 point ROS unable to be done to confusion    Objective:  VITAL SIGNS: /80   Pulse 72   Temp 36.5 °C (97.7 °F) (Temporal)   Resp 18   Wt 67.8 kg (149 lb 8 oz)   SpO2 94%   BMI 25.65 kg/m²     GEN: No apparent distress  HEENT: Head normocephalic, atraumatic.  Sclera nonicteric bilaterally, no ocular discharge appreciated bilaterally. White thrush on tongue.   CV: Extremities warm and well-perfused, no peripheral edema appreciated bilaterally.  PULMONARY: Breathing nonlabored on room air, no respiratory accessory muscle use.  Not requiring supplemental oxygen.  SKIN: No appreciable skin breakdown on exposed areas of skin.  PSYCH: Mood and affect within normal limits.  NEURO: Awake alert.  Confused. Answers questions appropriately. Asks appropriate questions.              Laboratory Values:  Recent Results (from the past 72 hour(s))   CBC WITH DIFFERENTIAL    Collection Time: 11/06/23  5:44 AM   Result Value Ref Range    WBC 13.1 (H) 4.8 - 10.8 K/uL    RBC 3.38 (L) 4.20 - 5.40 M/uL    Hemoglobin 10.4 (L) 12.0 - 16.0 g/dL    Hematocrit 33.4 (L) 37.0 - 47.0 %    MCV 98.8 (H) 81.4 - 97.8 fL    MCH 30.8 27.0 - 33.0 pg    MCHC 31.1 (L) 32.2 - 35.5 g/dL    RDW 50.8 (H) 35.9 - 50.0 fL    Platelet Count 335 164 - 446 K/uL    MPV 10.2 9.0 - 12.9 fL    Neutrophils-Polys 71.40 44.00 - 72.00 %    Lymphocytes 15.60 (L) 22.00 - 41.00 %    Monocytes 9.80 0.00 - 13.40 %    Eosinophils 1.80 0.00 - 6.90 %    Basophils 0.50 0.00 - 1.80 %    Immature Granulocytes 0.90 0.00 - 0.90 %    Nucleated RBC 0.00 0.00 - 0.20 /100 WBC    Neutrophils  (Absolute) 9.32 (H) 1.82 - 7.42 K/uL    Lymphs (Absolute) 2.04 1.00 - 4.80 K/uL    Monos (Absolute) 1.28 (H) 0.00 - 0.85 K/uL    Eos (Absolute) 0.23 0.00 - 0.51 K/uL    Baso (Absolute) 0.06 0.00 - 0.12 K/uL    Immature Granulocytes (abs) 0.12 (H) 0.00 - 0.11 K/uL    NRBC (Absolute) 0.00 K/uL   Basic Metabolic Panel    Collection Time: 11/06/23  5:44 AM   Result Value Ref Range    Sodium 138 135 - 145 mmol/L    Potassium 3.9 3.6 - 5.5 mmol/L    Chloride 101 96 - 112 mmol/L    Co2 27 20 - 33 mmol/L    Glucose 109 (H) 65 - 99 mg/dL    Bun 16 8 - 22 mg/dL    Creatinine 0.98 0.50 - 1.40 mg/dL    Calcium 9.1 8.5 - 10.5 mg/dL    Anion Gap 10.0 7.0 - 16.0   ESTIMATED GFR    Collection Time: 11/06/23  5:44 AM   Result Value Ref Range    GFR (CKD-EPI) 55 (A) >60 mL/min/1.73 m 2   CBC WITH DIFFERENTIAL    Collection Time: 11/07/23  6:32 AM   Result Value Ref Range    WBC 11.7 (H) 4.8 - 10.8 K/uL    RBC 3.22 (L) 4.20 - 5.40 M/uL    Hemoglobin 9.9 (L) 12.0 - 16.0 g/dL    Hematocrit 31.7 (L) 37.0 - 47.0 %    MCV 98.4 (H) 81.4 - 97.8 fL    MCH 30.7 27.0 - 33.0 pg    MCHC 31.2 (L) 32.2 - 35.5 g/dL    RDW 51.8 (H) 35.9 - 50.0 fL    Platelet Count 303 164 - 446 K/uL    MPV 10.2 9.0 - 12.9 fL    Neutrophils-Polys 72.80 (H) 44.00 - 72.00 %    Lymphocytes 12.30 (L) 22.00 - 41.00 %    Monocytes 12.10 0.00 - 13.40 %    Eosinophils 1.40 0.00 - 6.90 %    Basophils 0.50 0.00 - 1.80 %    Immature Granulocytes 0.90 0.00 - 0.90 %    Nucleated RBC 0.00 0.00 - 0.20 /100 WBC    Neutrophils (Absolute) 8.55 (H) 1.82 - 7.42 K/uL    Lymphs (Absolute) 1.44 1.00 - 4.80 K/uL    Monos (Absolute) 1.42 (H) 0.00 - 0.85 K/uL    Eos (Absolute) 0.16 0.00 - 0.51 K/uL    Baso (Absolute) 0.06 0.00 - 0.12 K/uL    Immature Granulocytes (abs) 0.11 0.00 - 0.11 K/uL    NRBC (Absolute) 0.00 K/uL   Comp Metabolic Panel    Collection Time: 11/07/23  6:32 AM   Result Value Ref Range    Sodium 137 135 - 145 mmol/L    Potassium 4.4 3.6 - 5.5 mmol/L    Chloride 101 96 - 112  mmol/L    Co2 28 20 - 33 mmol/L    Anion Gap 8.0 7.0 - 16.0    Glucose 103 (H) 65 - 99 mg/dL    Bun 22 8 - 22 mg/dL    Creatinine 1.08 0.50 - 1.40 mg/dL    Calcium 8.9 8.5 - 10.5 mg/dL    Correct Calcium 9.1 8.5 - 10.5 mg/dL    AST(SGOT) 16 12 - 45 U/L    ALT(SGPT) 8 2 - 50 U/L    Alkaline Phosphatase 62 30 - 99 U/L    Total Bilirubin 1.1 0.1 - 1.5 mg/dL    Albumin 3.7 3.2 - 4.9 g/dL    Total Protein 6.0 6.0 - 8.2 g/dL    Globulin 2.3 1.9 - 3.5 g/dL    A-G Ratio 1.6 g/dL   ESTIMATED GFR    Collection Time: 11/07/23  6:32 AM   Result Value Ref Range    GFR (CKD-EPI) 49 (A) >60 mL/min/1.73 m 2   URINALYSIS    Collection Time: 11/07/23  9:07 AM    Specimen: Urine, Clean Catch   Result Value Ref Range    Color Yellow     Character Cloudy (A)     Specific Gravity 1.018 <1.035    Ph 7.5 5.0 - 8.0    Glucose Negative Negative mg/dL    Ketones Negative Negative mg/dL    Protein 100 (A) Negative mg/dL    Bilirubin Negative Negative    Urobilinogen, Urine 1.0 Negative    Nitrite Positive (A) Negative    Leukocyte Esterase Large (A) Negative    Occult Blood Small (A) Negative    Micro Urine Req Microscopic    URINE MICROSCOPIC (W/UA)    Collection Time: 11/07/23  9:07 AM   Result Value Ref Range    WBC Packed (A) /hpf    RBC 5-10 (A) /hpf    Bacteria Negative None /hpf    Epithelial Cells Negative /hpf    Hyaline Cast 3-5 (A) /lpf   CBC WITH DIFFERENTIAL    Collection Time: 11/08/23  5:57 AM   Result Value Ref Range    WBC 10.5 4.8 - 10.8 K/uL    RBC 3.12 (L) 4.20 - 5.40 M/uL    Hemoglobin 9.6 (L) 12.0 - 16.0 g/dL    Hematocrit 30.4 (L) 37.0 - 47.0 %    MCV 97.4 81.4 - 97.8 fL    MCH 30.8 27.0 - 33.0 pg    MCHC 31.6 (L) 32.2 - 35.5 g/dL    RDW 50.7 (H) 35.9 - 50.0 fL    Platelet Count 319 164 - 446 K/uL    MPV 10.1 9.0 - 12.9 fL    Neutrophils-Polys 70.60 44.00 - 72.00 %    Lymphocytes 13.90 (L) 22.00 - 41.00 %    Monocytes 12.20 0.00 - 13.40 %    Eosinophils 2.00 0.00 - 6.90 %    Basophils 0.40 0.00 - 1.80 %    Immature  Granulocytes 0.90 0.00 - 0.90 %    Nucleated RBC 0.00 0.00 - 0.20 /100 WBC    Neutrophils (Absolute) 7.41 1.82 - 7.42 K/uL    Lymphs (Absolute) 1.46 1.00 - 4.80 K/uL    Monos (Absolute) 1.28 (H) 0.00 - 0.85 K/uL    Eos (Absolute) 0.21 0.00 - 0.51 K/uL    Baso (Absolute) 0.04 0.00 - 0.12 K/uL    Immature Granulocytes (abs) 0.09 0.00 - 0.11 K/uL    NRBC (Absolute) 0.00 K/uL         Medications:  Scheduled Medications   Medication Dose Frequency    nitrofurantoin  100 mg BID WITH MEALS    hydrocortisone   BID    nystatin  5 mL 4X/DAY    Pharmacy Consult Request  1 Each PHARMACY TO DOSE    omeprazole  20 mg DAILY    carvedilol  3.125 mg BID    enoxaparin (LOVENOX) injection  40 mg DAILY AT 1800    escitalopram  20 mg DAILY    losartan  50 mg Q EVENING    QUEtiapine  25 mg Nightly    rosuvastatin  10 mg Q EVENING    senna-docusate  2 Tablet BID     PRN medications: Respiratory Therapy Consult, hydrALAZINE, carboxymethylcellulose, benzocaine-menthol, mag hydrox-al hydrox-simeth, ondansetron **OR** ondansetron, traZODone, sodium chloride, senna-docusate **AND** polyethylene glycol/lytes **AND** magnesium hydroxide **AND** bisacodyl, acetaminophen, oxyCODONE immediate-release, oxyCODONE immediate-release    Diet:  Current Diet Order   Procedures    Diet Order Diet: Regular       Medical Decision Making and Plan:  Left femoral neck and intertrochanteric femur fracture s/p ORIF with cephalomedullary implant 10/28/2023 Dr. Mckenzie  PT and OT for mobility and ADLs. Per guidelines, 15 hours per week between PT, OT and/or SLP.  Follow-up Ortho  Weightbearing as tolerated     Dementia  SLP consult, likely at baseline  Seroquel nightly -consider reducing if not home med     Leukocytosis  Likely reactive after surgery  UA negative for bacteria, chest x-ray (negative), blood cultures - NGTD  Slight reduction 10/31  CBC 11/2 - Resolved WBC normal at 9.4  11/6/2023 increased again at 13.1  Recheck UA, check CXR, Check incision - CXR  and incision benign. UA with packed WBC. Cx PENDING  CBC in AM - WBC slightly better at 11.7  CMP in AM - Normal   Start Macrobid 11/8/2023 await urine culture.     Anemia  Stable slightly lower 11/7 at 9.9     Hypophosphatemia  Low - 3x supplement  Normal on 11/2     Azotemia  Stable - mildly elevated  Encourage fluids  Resolved 11/6/2023      L2 compression fracture 6/2023 s/p kyphoplasty Dr. Barton  Monitor     Hyperlipidemia  Continue Crestor     Hypertension  Continue losartan and Coreg     Anxiety  Continue Lexapro    Thrush  Mouth rinse     Pain  As needed oxycodone     Skin  Patient at risk for skin breakdown due to debility in areas including sacrum, achilles, elbows and head in addition to other sites. Nursing to assess skin daily.      GI Ppx  Patient on Prilosec for GERD prophylaxis.      Bowel   Patient on Senna-docusate for constipation prophylaxis.      Bladder  Incontinence - UA negative for bacteria  Timed voids and PVR/BS     DVT PROPHYLAXIS: Lovenox 40 mg subcutaneous nightly     HOSPITALIST FOLLOWING: No     CODE STATUS: DNAR/DNI     DISPO: 11/10/23. Plan was to go home with adult children whom she lives with, they are now declining that they can care for her. Likely will need family meeting.      FAMILY MEETING 11/9 @ 9 AM     M2B ELIGIBLE: TBD     DISCHARGE FOLLOW UP: Orthopedics, PCP  ____________________________________    Dr. Kimmie Samayoa DO, MS  [facility] - Physical Medicine & Rehabilitation   ____________________________________    _____________________________________  Interdisciplinary Team Conference   Most recent IDT on 11/8/2023    I, Dr. Kimmie Samayoa DO, MS, was present and led the interdisciplinary team conference on 11/8/2023.  I led the IDT conference and agree with the IDT conference documentation and plan of care as noted below.     Nursing:  Diet Current Diet Order   Procedures    Diet Order Diet: Regular       Eating ADL Stand by Assist      % of Last Meal  Oral  Nutrition: Lunch, 0% Consumed   Sleep    Bowel Last BM: 11/07/23   Bladder        Physical Therapy:  Bed Mobility    Transfers Minimal Assist  Adaptive equipment, Increased time, Set-up of equipment, Verbal cueing   Mobility Contact Guard Assist   Stairs    Met 3/3 STG and progressing to LTG.   At most Min A to get leg on and off of the bed.   CGA to SBA ambulation 25 ft.   Equipment: Wheelchair and walker    Occupational Therapy:  Grooming Standby Assist, Seated   Bathing Minimal Assist   UB Dressing Moderate Assist   LB Dressing Maximal Assist (to don and doff non skid socks, w/c level, w/ use of AE (sock aid and dressing stick))   Toileting Maximal Assist   Shower & Transfer    Requires encouragement.   Walked 20 ft and additional 10 ft  Needs most assist with LB dressing    Speech-Language Pathology:  Comprehension:  Minimal Assist  Comprehension Description:  Verbal cues  Expression:  Supervision  Expression Description:  Verbal cueing  Social Interaction:  Minimal Assist  Social Interaction Description:  Increased time, Verbal cues  Problem Solving:  Maximal Assist  Problem Solving Description:  Increased time, Verbal cueing, Bed/chair alarm, Seat belt, Therapy schedule  Memory:  Maximal Assist  Memory Description:  Increased time, Verbal cueing, Bed/chair alarm, Therapy schedule, Seat belt    Not far off from her baseline.     Respiratory Therapy:  O2 (LPM): 0  O2 Delivery Device: None - Room Air    Case Management:  Continues to work on disposition and DME needs.     BARRIERS TO DISCHARGE HOME:  Family assistance at home.      Discharge Date/Disposition:  11/10/23  _____________________________________

## 2023-11-08 NOTE — THERAPY
Occupational Therapy  Daily Treatment     Patient Name: Linda Casas  Age:  89 y.o., Sex:  female  Medical Record #: [id number]  Today's Date: 11/8/2023     Precautions  Precautions: (P) Fall Risk, Weight Bearing As Tolerated Left Lower Extremity         Subjective    Pt seated on toilet upon arrival with RN. Pt agreeable for shower.      Objective       11/08/23 1001   OT Charge Group   OT Self Care / ADL (Units) 4   OT Total Time Spent   OT Individual Total Time Spent (Mins) 60   Precautions   Precautions Fall Risk;Weight Bearing As Tolerated Left Lower Extremity   Functional Level of Assist   Grooming Standby Assist   Grooming Description Seated in wheelchair at sink;Increased time   Bathing Minimal Assist   Bathing Description Grab bar;Hand held shower;Assit with back;Tub bench;Increased time;Initial preparation for task;Verbal cueing;Long handled bath tool   Upper Body Dressing Stand by Assist   Upper Body Dressing Description Increased time;Verbal cueing   Lower Body Dressing Maximal Assist   Lower Body Dressing Description Sock aid;Assistive devices;Assist with threading into pant leg;Increased time  (With use of AE and Max VC's for sequencing. Pt Max A to thread pants though LLE and to pull up/down to waist d/t decreased standing balance.)   Toilet Transfers Minimal Assist   Toilet Transfer Description Grab bar   Tub / Shower Transfers Minimal Assist   Tub Shower Transfer Description Increased time;Grab bar;Shower bench  (Pt requiring Min A, VC's and tactile cues for sequencing d/t decreased safety awareness)   Interdisciplinary Plan of Care Collaboration   IDT Collaboration with  Nursing;Occupational Therapist   Patient Position at End of Therapy Chair Alarm On;Seated;Self Releasing Lap Belt Applied;Call Light within Reach;Tray Table within Reach;Phone within Reach   Collaboration Comments Communication with OT regarding POC. Handoff with RN upon arrival     Pt completed ADL's as detailed  above. Pt completed x4 STS>FWW CGA and VC's for safe hand placement.    Assessment    Pt requiring CGA-Min A for transfers. Pt demo difficulty sequencing transfers WC<>tub bench requiring Vcs for safety. Pt demo decreased carryover using AE for LB dressing requiring Max A. Pt demo improved UB dressing requiring SBA and improved activity tolerance. Pt reports decreased pain and able to weightbear into LLE.   Strengths: Supportive family, Pleasant and cooperative  Barriers: Bladder incontinence, Decreased endurance, Dementia, Fatigue, Generalized weakness, Impaired activity tolerance, Impaired balance, Impaired functional cognition, Limited mobility, Pain    Plan    Progress functional mobility to FWW level, Standing tolerance/ WB to LLE, Sit<>stand/ transfers with FWW, thera act/ex, ADLs     DME     Passport items to be completed:  Perform bathroom transfers, complete dressing, complete feeding, get ready for the day, prepare a simple meal, participate in household tasks, adapt home for safety needs, demonstrate home exercise program, complete caregiver training     Occupational Therapy Goals (Active)       Problem: Dressing       Dates: Start:  10/31/23         Goal: STG-Within one week, patient will dress UB w/ min A       Dates: Start:  10/31/23            Goal: STG-Within one week, patient will dress LB w/ consistent max A       Dates: Start:  10/31/23               Problem: Functional Transfers       Dates: Start:  10/31/23         Goal: STG-Within one week, patient will transfer to toilet w/ min A       Dates: Start:  10/31/23               Problem: OT Long Term Goals       Dates: Start:  10/31/23         Goal: LTG-By discharge, patient will complete basic self care tasks w/ CGA to supervision        Dates: Start:  10/31/23            Goal: LTG-By discharge, patient will perform bathroom transfers w/ CGA to supervision w/ LRAD       Dates: Start:  10/31/23

## 2023-11-08 NOTE — PROGRESS NOTES
NURSING DAILY NOTE    Name: Linda Casas   Date of Admission: 10/30/2023   Admitting Diagnosis: Closed left hip fracture, initial encounter (Formerly [facility])  Attending Physician: Kimmie Samayoa D.o.  Allergies: Ampicillin, Bactrim ds, and Ciprofloxacin    Safety  Patient Assist     Patient Precautions  Fall Risk, Weight Bearing As Tolerated Left Lower Extremity  Precaution Comments     Bed Transfer Status  Minimal Assist  Toilet Transfer Status   Minimal Assist  Assistive Devices  Rails, Wheelchair, Walker - front wheel  Oxygen  None - Room Air  Diet/Therapeutic Dining  Current Diet Order   Procedures    Diet Order Diet: Regular     Pill Administration  whole  Agitated Behavioral Scale  21  ABS Level of Severity  No Agitation    Fall Risk  Has the patient had a fall this admission?   No  Cheryl De Jesus Fall Risk Scoring  18, HIGH RISK  Fall Risk Safety Measures  bed alarm, chair alarm, poor balance, and low vision/ hearing    Vitals  Temperature: 36.8 °C (98.2 °F)  Temp src: Oral  Pulse: 65  Respiration: 18  Blood Pressure : 106/71  Blood Pressure MAP (Calculated): 83 MM HG  BP Location: Right, Upper Arm  Patient BP Position: Supine     Oxygen  Pulse Oximetry: 94 %  O2 (LPM): 0  FiO2%: 21 %  O2 Delivery Device: None - Room Air    Bowel and Bladder  Last Bowel Movement  11/07/23  Stool Type  Not observed  Bowel Device  Diaper  Continent  Bladder: Stress incontinence   Bowel: Continent movement  Bladder Function  Urine Void (mL): 350 ml  Number of Times Voided: 2  Urine Color: Unable To Evaluate  Number of Times Incontinent of Urine: 2  Straight Catheter: 600 ml  Wet Diaper Count: 1  Genitourinary Assessment   Bladder Assessment (WDL):  WDL Except  Diaz Catheter: Not Applicable  Diaz Care: Given with Soap and Water  Urinary Elimination: Incontinence  Urine Color: Unable To Evaluate  Number of Bladder Accidents: 0  Total Number of Bladder of Accidents  in Last 7 Days: 0  Number of Times Incontinent of Urine: 2  Bladder Device: Diaper  Bladder Scan: Post Void  $ Bladder Scan Results (mL): 35    Skin  Harris Score   18  Sensory Interventions   Bed Types: Standard/Trauma Mattress  Skin Preventative Measures: Pillows in Use for Support / Positioning  Moisture Interventions  Moisturizers/Barriers: Barrier Wipes      Pain  Pain Rating Scale  5 - Interrupts some activities  Pain Location  Hip, Leg  Pain Location Orientation  Left  Pain Interventions   Medication (see MAR)    ADLs    Bathing   Partial Bed Bath  Linen Change   Partial  Personal Hygiene  Change Rabia Pads, Moist Rabia Wipes  Chlorhexidine Bath      Oral Care  Brushed Teeth  Teeth/Dentures  Intact  Shave     Nutrition Percentage Eaten  Lunch, 0% Consumed  Environmental Precautions  Treaded Slipper Socks on Patient, Personal Belongings, Wastebasket, Call Bell etc. in Easy Reach, Bed in Low Position  Patient Turns/Positioning  Patient Turns Self from Side to Side  Patient Turns Assistance/Tolerance  Assistance of One  Bed Positions  Bed Controls On, Bed Locked  Head of Bed Elevated  Self regulated      Psychosocial/Neurologic Assessment  Psychosocial Assessment  Psychosocial (WDL):  Within Defined Limits  Patient Behaviors: Drowsy, Fatigue  Neurologic Assessment  Neuro (WDL): Exceptions to WDL  Level of Consciousness: Alert  Orientation Level: Oriented to person, Oriented to situation, Oriented to place, Disoriented to time  Cognition: Memory Loss  Speech: Clear  Facial Symmetry:  (wdl)  Pupil Assesment: Yes  R Pupil Size (mm): 2  R Pupil Shape / Description: Round  R Pupil Reaction: Brisk  L Pupil Size (mm): 2  L Pupil Shape / Description: Round  L Pupil Reaction: Brisk  Motor Function/Sensation Assessment: Motor strength  RUE Motor Response: Responds to commands  RUE Sensation: Full sensation  Muscle Strength Right Arm: Normal Strength Against Gravity and Full Resistance  LUE Motor Response: Responds to  commands  LUE Sensation: Full sensation  Muscle Strength Left Arm: Normal Strength Against Gravity and Full Resistance  RLE Motor Response: Responds to commands  RLE Sensation: Full sensation  Muscle Strength Right Leg: Good Strength Against Gravity and Moderate Resistance  LLE Motor Response: Responds to commands  LLE Sensation: Pain  Muscle Strength Left Leg: Normal Strength Against Gravity and Full Resistance  EENT (WDL):  Within Defined Limits    Cardio/Pulmonary Assessment  Edema      Respiratory Breath Sounds  RUL Breath Sounds: Clear  RML Breath Sounds: Diminished  RLL Breath Sounds: Diminished  SANTY Breath Sounds: Clear  LLL Breath Sounds: Diminished  Cardiac Assessment   Cardiac (WDL):  Within Defined Limits

## 2023-11-08 NOTE — CARE PLAN
"The patient is Stable - Low risk of patient condition declining or worsening    Shift Goals  Clinical Goals: Safety  Patient Goals: Participate in therapy, pain control  Family Goals: updated on poc    Patient is not progressing towards the following goals:    Problem: Fall Risk - Rehab  Goal: Patient will remain free from falls  Outcome: Not Met  Note: Cheryl De Jesus Fall risk Assessment Score: 18    High fall risk Interventions   - Alarming seatbelt  - Bed and strip alarm   - Yellow sign by the door   - Yellow wrist band \"Fall risk\"  - Do not leave patient unattended in the bathroom  - Fall risk education provided     Problem: Infection  Goal: Patient will remain free from infection  Outcome: Not Progressing  Note: UA results are negative for bacteria growth and positive for large leukocyte estrace     "

## 2023-11-08 NOTE — CARE PLAN
Problem: Mobility  Goal: STG-Within one week, patient will propel wheelchair community x 25 feet with SBA  Outcome: Met  Goal: STG-Within one week, patient will ambulate household distance x 25 feet with FWW and min A   Outcome: Met     Problem: Mobility Transfers  Goal: STG-Within one week, patient will transfer bed to chair with min A SQPT vs SPT with FWW  Outcome: Met

## 2023-11-08 NOTE — CARE PLAN
The patient is Stable - Low risk of patient condition declining or worsening    Problem: Knowledge Deficit - Standard  Goal: Patient and family/care givers will demonstrate understanding of plan of care, disease process/condition, diagnostic tests and medications  Outcome: Progressing. Reviewed POC, all questions answered.        Problem: Fall Risk - Rehab  Goal: Patient will remain free from falls  Outcome: Progressing. Call light within reach, pt educated to use for assistance for safe transferring.      Shift Goals  Clinical Goals: Safety  Patient Goals: Participate in therapy  Family Goals: updated on poc

## 2023-11-08 NOTE — THERAPY
"Physical Therapy   Daily Treatment     Patient Name: Linda Casas  Age:  89 y.o., Sex:  female  Medical Record #: [id number]  Today's Date: 11/8/2023     Precautions  Precautions: Fall Risk, Weight Bearing As Tolerated Left Lower Extremity    Subjective    Pt up in wc, willing to participate     Objective       11/08/23 1101   PT Charge Group   PT Gait Training (Units) 1   PT Therapeutic Exercise (Units) 1   PT Total Time Spent   PT Individual Total Time Spent (Mins) 30   Gait Functional Level of Assist    Gait Level Of Assist Contact Guard Assist   Assistive Device Front Wheel Walker   Distance (Feet) 20   # of Times Distance was Traveled 2   Deviation Antalgic;Step To;Bradykinetic   Transfer Functional Level of Assist   Toilet Transfers Minimal Assist   Toilet Transfer Description Adaptive equipment;Grab bar;Verbal cueing   Sitting Lower Body Exercises   Nustep Resistance Level 1  (10 minutes for AAROM x 4 extremities)         Assessment    Continues to progress with basic household mobility with FWW, requires encouragement at times.   Strengths: Independent prior level of function, Pleasant and cooperative, Supportive family, Willingly participates in therapeutic activities  Barriers: Confused, Decreased endurance, Fatigue, Generalized weakness, Impaired activity tolerance, Impaired balance, Impaired functional cognition, Limited mobility, Pain, Pain poorly managed    Plan    Progressive gait with FWW. Standing tolerance/ WB to LLE  Sit<>stand/ transfers with FWW, LE ROM/ strength training, family conference/ family training?     DME  PT DME Recommendations  Wheelchair: 18\" Width, Lightweight, Standard Leg Rests  Cushion: Standard  Assistive Device: Front Wheeled Walker    Passport items to be completed:  Get in/out of bed safely, in/out of a vehicle, safely use mobility device, walk or wheel around home/community, navigate up and down stairs, show how to get up/down from the ground, ensure home is " accessible, demonstrate HEP, complete caregiver training    Physical Therapy Problems (Active)       Problem: PT-Long Term Goals       Dates: Start:  10/31/23         Goal: LTG-By discharge, patient will ambulate x 150 feet with FWW and SBA       Dates: Start:  10/31/23            Goal: LTG-By discharge, patient will transfer one surface to another with FWW and SBA       Dates: Start:  10/31/23            Goal: LTG-By discharge, patient will transfer in/out of a car with FWW and SBA       Dates: Start:  10/31/23

## 2023-11-08 NOTE — THERAPY
Physical Therapy   Daily Treatment     Patient Name: Linda Casas  Age:  89 y.o., Sex:  female  Medical Record #: [id number]  Today's Date: 11/8/2023     Precautions  Precautions: Fall Risk, Weight Bearing As Tolerated Left Lower Extremity    Subjective    Pt received in bed, finishing with breakfast and willing to participate.     Objective       11/08/23 0831   PT Charge Group   PT Gait Training (Units) 2   PT Therapeutic Exercise (Units) 2   PT Total Time Spent   PT Individual Total Time Spent (Mins) 60   Gait Functional Level of Assist    Gait Level Of Assist Contact Guard Assist   Assistive Device Front Wheel Walker   Distance (Feet) 25  (in addition to 15 ft x 2 bed<>bathroom)   # of Times Distance was Traveled 5   Deviation Antalgic;Step To;Bradykinetic   Transfer Functional Level of Assist   Bed, Chair, Wheelchair Transfer Contact Guard Assist   Bed Chair Wheelchair Transfer Description Adaptive equipment;Increased time;Set-up of equipment;Verbal cueing   Toilet Transfers Contact Guard Assist   Toilet Transfer Description Grab bar;Increased time;Set-up of equipment;Verbal cueing   Sitting Lower Body Exercises   Ankle Pumps 2 sets of 10   Hip Flexion 2 sets of 10   Hip Abduction 2 sets of 10   Hip Adduction 2 sets of 10   Long Arc Quad 2 sets of 10   Bed Mobility    Supine to Sit Minimal Assist   Sit to Stand Contact Guard Assist         Assessment    Pt tolerated therapy well this am, improved gait tolerance for more reps with less c/o pain but remains with LLE weakness and antalgia. Progressing well.     Strengths: Independent prior level of function, Pleasant and cooperative, Supportive family, Willingly participates in therapeutic activities  Barriers: Confused, Decreased endurance, Fatigue, Generalized weakness, Impaired activity tolerance, Impaired balance, Impaired functional cognition, Limited mobility, Pain, Pain poorly managed    Plan    Progressive gait with FWW. Standing tolerance/  "WB to LLE  Sit<>stand/ transfers with FWW, LE ROM/ strength training, family conference/ family training?     DME  PT DME Recommendations  Wheelchair: 18\" Width, Lightweight, Standard Leg Rests  Cushion: Standard  Assistive Device: Front Wheeled Walker    Passport items to be completed:  Get in/out of bed safely, in/out of a vehicle, safely use mobility device, walk or wheel around home/community, navigate up and down stairs, show how to get up/down from the ground, ensure home is accessible, demonstrate HEP, complete caregiver training    Physical Therapy Problems (Active)       Problem: Mobility       Dates: Start:  10/31/23         Goal: STG-Within one week, patient will propel wheelchair community x 25 feet with SBA       Dates: Start:  10/31/23            Goal: STG-Within one week, patient will ambulate household distance x 25 feet with FWW and min A        Dates: Start:  10/31/23               Problem: Mobility Transfers       Dates: Start:  10/31/23         Goal: STG-Within one week, patient will transfer bed to chair with min A SQPT vs SPT with FWW       Dates: Start:  10/31/23               Problem: PT-Long Term Goals       Dates: Start:  10/31/23         Goal: LTG-By discharge, patient will ambulate x 150 feet with FWW and SBA       Dates: Start:  10/31/23            Goal: LTG-By discharge, patient will transfer one surface to another with FWW and SBA       Dates: Start:  10/31/23            Goal: LTG-By discharge, patient will transfer in/out of a car with FWW and SBA       Dates: Start:  10/31/23              "

## 2023-11-08 NOTE — CARE PLAN
Problem: Dressing  Goal: STG-Within one week, patient will dress UB w/ min A  Outcome: Met  Goal: STG-Within one week, patient will dress LB w/ consistent max A  Outcome: Met     Problem: Functional Transfers  Goal: STG-Within one week, patient will transfer to toilet w/ min A  Outcome: Met

## 2023-11-08 NOTE — CARE PLAN
Problem: Problem Solving STGs  Goal: STG-Within one week, patient will complete sustained attention tasks given mod A to achieve 75% accuracy.    Outcome: Not Met  Note: Variable, min-max A required due to variable alertness and confusion      Problem: Memory STGs  Goal: STG-Within one week, patient will achieve a score of 25/30 or more on the O-log for 3 consecutive days.    Outcome: Not Met  Note: Highest O-log score to date 15/30

## 2023-11-08 NOTE — THERAPY
"Speech Language Pathology  Daily Treatment     Patient Name: Linda Casas  Age:  89 y.o., Sex:  female  Medical Record #: [id number]  Today's Date: 11/8/2023     Precautions  Precautions: Fall Risk, Weight Bearing As Tolerated Left Lower Extremity    Subjective    Pt was in bed at the beginning of this session.  She was not willing to transfer out of bed even with encouragement, but was willing to participate in therapy at bedside.  Pt's brother arrived during this session and was supportive throughout.       Objective       11/08/23 1415   Treatment Charges   SLP Cognitive Skill Development First 15 Minutes 1   SLP Cognitive Skill Development Additional 15 Minutes 1   SLP Total Time Spent   SLP Individual Total Time Spent (Mins) 30         Assessment    O-log was completed, pt scored a 14/30 (1 point lower from previous O-log score).  Pt benefited from mod cues to locate orientation information around her room (roomboard, schedule).  Pt was willing to participate in a discussion about the importance of getting out of bed for therapy and staying out of bed during the day.  She was receptive to the information, but stated it's difficult to stay out of bed stating \"I just feel so tired all day\".      Strengths: Supportive family  Barriers: Agitation, Confused, Difficulty following instructions, Fatigue, Impaired functional cognition, Impaired carryover of learning, Impaired insight/denial of deficits, Uncooperative, Lack of motivation    Plan    Family meeting scheduled for tomorrow, continue reinforcing therapy out of bed, O-log, functional sequencing       Speech Therapy Problems (Active)       Problem: Memory STGs       Dates: Start:  11/01/23         Goal: STG-Within one week, patient will achieve a score of 25/30 or more on the O-log for 3 consecutive days.         Dates: Start:  11/01/23         Goal Note filed on 11/08/23 2273 by Joseph Barragan MS,CCC-SLP       Highest O-log score to date 15/30    "               Problem: Problem Solving STGs       Dates: Start:  11/01/23         Goal: STG-Within one week, patient will complete sustained attention tasks given mod A to achieve 75% accuracy.         Dates: Start:  11/01/23         Goal Note filed on 11/08/23 9215 by Joseph Barragan MS,CCC-SLP       Variable, min-max A required due to variable alertness and confusion                  Problem: Speech/Swallowing LTGs       Dates: Start:  10/31/23         Goal: LTG-By discharge, patient will solve basic problems with spv        Dates: Start:  10/31/23

## 2023-11-09 ENCOUNTER — APPOINTMENT (OUTPATIENT)
Dept: PHYSICAL THERAPY | Facility: REHABILITATION | Age: 88
DRG: 560 | End: 2023-11-09
Attending: PHYSICAL MEDICINE & REHABILITATION
Payer: MEDICARE

## 2023-11-09 ENCOUNTER — HOME HEALTH ADMISSION (OUTPATIENT)
Dept: HOME HEALTH SERVICES | Facility: HOME HEALTHCARE | Age: 88
End: 2023-11-09
Payer: MEDICARE

## 2023-11-09 ENCOUNTER — APPOINTMENT (OUTPATIENT)
Dept: OCCUPATIONAL THERAPY | Facility: REHABILITATION | Age: 88
DRG: 560 | End: 2023-11-09
Attending: PHYSICAL MEDICINE & REHABILITATION
Payer: MEDICARE

## 2023-11-09 ENCOUNTER — APPOINTMENT (OUTPATIENT)
Dept: SPEECH THERAPY | Facility: REHABILITATION | Age: 88
DRG: 560 | End: 2023-11-09
Attending: PHYSICAL MEDICINE & REHABILITATION
Payer: MEDICARE

## 2023-11-09 PROBLEM — B02.9 SHINGLES: Status: ACTIVE | Noted: 2023-11-09

## 2023-11-09 PROCEDURE — 700111 HCHG RX REV CODE 636 W/ 250 OVERRIDE (IP): Mod: JZ | Performed by: PHYSICAL MEDICINE & REHABILITATION

## 2023-11-09 PROCEDURE — 97129 THER IVNTJ 1ST 15 MIN: CPT

## 2023-11-09 PROCEDURE — 97110 THERAPEUTIC EXERCISES: CPT

## 2023-11-09 PROCEDURE — 97130 THER IVNTJ EA ADDL 15 MIN: CPT

## 2023-11-09 PROCEDURE — 700102 HCHG RX REV CODE 250 W/ 637 OVERRIDE(OP): Performed by: PHYSICAL MEDICINE & REHABILITATION

## 2023-11-09 PROCEDURE — A9270 NON-COVERED ITEM OR SERVICE: HCPCS | Performed by: PHYSICAL MEDICINE & REHABILITATION

## 2023-11-09 PROCEDURE — 97535 SELF CARE MNGMENT TRAINING: CPT

## 2023-11-09 PROCEDURE — 97116 GAIT TRAINING THERAPY: CPT

## 2023-11-09 PROCEDURE — 99233 SBSQ HOSP IP/OBS HIGH 50: CPT | Performed by: PHYSICAL MEDICINE & REHABILITATION

## 2023-11-09 PROCEDURE — 770010 HCHG ROOM/CARE - REHAB SEMI PRIVAT*

## 2023-11-09 RX ORDER — CARVEDILOL 3.12 MG/1
3.12 TABLET ORAL 2 TIMES DAILY
Qty: 60 TABLET | Refills: 2 | Status: SHIPPED | OUTPATIENT
Start: 2023-11-09 | End: 2023-11-09 | Stop reason: SDUPTHER

## 2023-11-09 RX ORDER — ROSUVASTATIN CALCIUM 10 MG/1
10 TABLET, COATED ORAL EVERY EVENING
Qty: 30 TABLET | Refills: 2 | Status: SHIPPED | OUTPATIENT
Start: 2023-11-09 | End: 2023-11-09 | Stop reason: SDUPTHER

## 2023-11-09 RX ORDER — ROSUVASTATIN CALCIUM 10 MG/1
10 TABLET, COATED ORAL EVERY EVENING
Qty: 30 TABLET | Refills: 2
Start: 2023-11-09 | End: 2024-02-29 | Stop reason: SDUPTHER

## 2023-11-09 RX ORDER — ESCITALOPRAM OXALATE 20 MG/1
20 TABLET ORAL DAILY
Qty: 30 TABLET | Refills: 2 | Status: SHIPPED | OUTPATIENT
Start: 2023-11-09 | End: 2023-11-09 | Stop reason: SDUPTHER

## 2023-11-09 RX ORDER — CARVEDILOL 3.12 MG/1
3.12 TABLET ORAL 2 TIMES DAILY
Qty: 60 TABLET | Refills: 2
Start: 2023-11-09 | End: 2024-02-29 | Stop reason: SDUPTHER

## 2023-11-09 RX ORDER — VALACYCLOVIR HYDROCHLORIDE 500 MG/1
500 TABLET, FILM COATED ORAL 2 TIMES DAILY
Status: DISCONTINUED | OUTPATIENT
Start: 2023-11-09 | End: 2023-11-10 | Stop reason: HOSPADM

## 2023-11-09 RX ORDER — QUETIAPINE FUMARATE 25 MG/1
TABLET, FILM COATED ORAL
Qty: 30 TABLET | Refills: 2
Start: 2023-11-09

## 2023-11-09 RX ORDER — VALACYCLOVIR HYDROCHLORIDE 500 MG/1
500 TABLET, FILM COATED ORAL 2 TIMES DAILY
Qty: 11 TABLET | Refills: 0 | Status: ACTIVE | OUTPATIENT
Start: 2023-11-09 | End: 2023-11-09 | Stop reason: SDUPTHER

## 2023-11-09 RX ORDER — LOSARTAN POTASSIUM 50 MG/1
50 TABLET ORAL EVERY EVENING
Qty: 30 TABLET | Refills: 2 | Status: SHIPPED | OUTPATIENT
Start: 2023-11-09 | End: 2023-11-09 | Stop reason: SDUPTHER

## 2023-11-09 RX ORDER — LOSARTAN POTASSIUM 50 MG/1
50 TABLET ORAL EVERY EVENING
Qty: 30 TABLET | Refills: 2
Start: 2023-11-09 | End: 2024-02-29 | Stop reason: SDUPTHER

## 2023-11-09 RX ORDER — VALACYCLOVIR HYDROCHLORIDE 500 MG/1
500 TABLET, FILM COATED ORAL 2 TIMES DAILY
Qty: 11 TABLET | Refills: 0 | Status: ACTIVE
Start: 2023-11-09

## 2023-11-09 RX ORDER — ESCITALOPRAM OXALATE 20 MG/1
20 TABLET ORAL DAILY
Qty: 30 TABLET | Refills: 2
Start: 2023-11-09 | End: 2024-02-22 | Stop reason: SDUPTHER

## 2023-11-09 RX ORDER — QUETIAPINE FUMARATE 25 MG/1
TABLET, FILM COATED ORAL
Qty: 30 TABLET | Refills: 2 | Status: SHIPPED | OUTPATIENT
Start: 2023-11-09 | End: 2023-11-09 | Stop reason: SDUPTHER

## 2023-11-09 RX ADMIN — SENNOSIDES AND DOCUSATE SODIUM 2 TABLET: 8.6; 5 TABLET ORAL at 08:22

## 2023-11-09 RX ADMIN — VALACYCLOVIR HYDROCHLORIDE 500 MG: 500 TABLET, FILM COATED ORAL at 11:26

## 2023-11-09 RX ADMIN — NYSTATIN 500000 UNITS: 100000 SUSPENSION ORAL at 20:18

## 2023-11-09 RX ADMIN — ROSUVASTATIN CALCIUM 10 MG: 10 TABLET, FILM COATED ORAL at 20:17

## 2023-11-09 RX ADMIN — ENOXAPARIN SODIUM 40 MG: 100 INJECTION SUBCUTANEOUS at 17:35

## 2023-11-09 RX ADMIN — NYSTATIN 500000 UNITS: 100000 SUSPENSION ORAL at 08:25

## 2023-11-09 RX ADMIN — ESCITALOPRAM OXALATE 20 MG: 10 TABLET ORAL at 08:22

## 2023-11-09 RX ADMIN — NITROFURANTOIN MONOHYDRATE/MACROCRYSTALS 100 MG: 75; 25 CAPSULE ORAL at 17:35

## 2023-11-09 RX ADMIN — NYSTATIN 500000 UNITS: 100000 SUSPENSION ORAL at 11:26

## 2023-11-09 RX ADMIN — OMEPRAZOLE 20 MG: 20 CAPSULE, DELAYED RELEASE ORAL at 08:23

## 2023-11-09 RX ADMIN — QUETIAPINE FUMARATE 25 MG: 25 TABLET ORAL at 20:17

## 2023-11-09 RX ADMIN — NYSTATIN 500000 UNITS: 100000 SUSPENSION ORAL at 17:35

## 2023-11-09 RX ADMIN — CARVEDILOL 3.12 MG: 3.12 TABLET, FILM COATED ORAL at 08:23

## 2023-11-09 RX ADMIN — VALACYCLOVIR HYDROCHLORIDE 500 MG: 500 TABLET, FILM COATED ORAL at 20:17

## 2023-11-09 RX ADMIN — LOSARTAN POTASSIUM 50 MG: 50 TABLET, FILM COATED ORAL at 20:17

## 2023-11-09 RX ADMIN — NITROFURANTOIN MONOHYDRATE/MACROCRYSTALS 100 MG: 75; 25 CAPSULE ORAL at 08:22

## 2023-11-09 RX ADMIN — CARVEDILOL 3.12 MG: 3.12 TABLET, FILM COATED ORAL at 20:17

## 2023-11-09 ASSESSMENT — GAIT ASSESSMENTS
DEVIATION: ANTALGIC;STEP TO;BRADYKINETIC
ASSISTIVE DEVICE: FRONT WHEEL WALKER
GAIT LEVEL OF ASSIST: CONTACT GUARD ASSIST
DISTANCE (FEET): 50
DEVIATION: ANTALGIC;STEP TO;BRADYKINETIC
ASSISTIVE DEVICE: FRONT WHEEL WALKER
DISTANCE (FEET): 50
GAIT LEVEL OF ASSIST: CONTACT GUARD ASSIST

## 2023-11-09 ASSESSMENT — ACTIVITIES OF DAILY LIVING (ADL)
BED_CHAIR_WHEELCHAIR_TRANSFER_DESCRIPTION: ADAPTIVE EQUIPMENT;INCREASED TIME;SET-UP OF EQUIPMENT;VERBAL CUEING
BED_CHAIR_WHEELCHAIR_TRANSFER_DESCRIPTION: ADAPTIVE EQUIPMENT;INCREASED TIME;SET-UP OF EQUIPMENT;VERBAL CUEING

## 2023-11-09 NOTE — DISCHARGE PLANNING
Family meeting completed.  Dr. Samayoa, PT/OT/ST/CM, son Waqar, daughter in law Mary and patient all in attendance.  Waqar and Mary  family training.  CM spoke with Mary after.  She was in tears stating she felt intimidated and pressured to take patient home and unable to tell the therapy staff and doctor that.  She stated patient was independent before she fell and broke her hip.  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  She stated she is unable to provide the hands on, 24/7 assistance.  She and Waqar are requesting SNF.  DOLLY updated attending and also insurance co.  ([employer]).  Referrals sent.

## 2023-11-09 NOTE — PROGRESS NOTES
Physical Medicine & Rehabilitation Progress Note    Encounter Date: 11/9/2023    Chief Complaint: Would like to go home.     Interval Events (Subjective):  VSS  BM 11/8  Voiding, some incontinence    Seen and examined in the conference room. Family conference occurred today. Dianne is feeling well overall and would like to go home.    ROS: 14 point ROS unable to be done to confusion    Objective:  VITAL SIGNS: BP (!) 140/73   Pulse 68   Temp 36.8 °C (98.2 °F) (Oral)   Resp 18   Wt 67.8 kg (149 lb 8 oz)   SpO2 91%   BMI 25.65 kg/m²     GEN: No apparent distress  HEENT: Head normocephalic, atraumatic.  Sclera nonicteric bilaterally, no ocular discharge appreciated bilaterally.  CV: Extremities warm and well-perfused, no peripheral edema appreciated bilaterally.  PULMONARY: Breathing nonlabored on room air, no respiratory accessory muscle use.  Not requiring supplemental oxygen.  SKIN: No appreciable skin breakdown on exposed areas of skin. Pustules low back.   PSYCH: Mood and affect within normal limits.  NEURO: Awake alert.  Conversational. Baseline memory deficit.       Laboratory Values:  Recent Results (from the past 72 hour(s))   CBC WITH DIFFERENTIAL    Collection Time: 11/07/23  6:32 AM   Result Value Ref Range    WBC 11.7 (H) 4.8 - 10.8 K/uL    RBC 3.22 (L) 4.20 - 5.40 M/uL    Hemoglobin 9.9 (L) 12.0 - 16.0 g/dL    Hematocrit 31.7 (L) 37.0 - 47.0 %    MCV 98.4 (H) 81.4 - 97.8 fL    MCH 30.7 27.0 - 33.0 pg    MCHC 31.2 (L) 32.2 - 35.5 g/dL    RDW 51.8 (H) 35.9 - 50.0 fL    Platelet Count 303 164 - 446 K/uL    MPV 10.2 9.0 - 12.9 fL    Neutrophils-Polys 72.80 (H) 44.00 - 72.00 %    Lymphocytes 12.30 (L) 22.00 - 41.00 %    Monocytes 12.10 0.00 - 13.40 %    Eosinophils 1.40 0.00 - 6.90 %    Basophils 0.50 0.00 - 1.80 %    Immature Granulocytes 0.90 0.00 - 0.90 %    Nucleated RBC 0.00 0.00 - 0.20 /100 WBC    Neutrophils (Absolute) 8.55 (H) 1.82 - 7.42 K/uL    Lymphs (Absolute) 1.44 1.00 - 4.80 K/uL    Monos  (Absolute) 1.42 (H) 0.00 - 0.85 K/uL    Eos (Absolute) 0.16 0.00 - 0.51 K/uL    Baso (Absolute) 0.06 0.00 - 0.12 K/uL    Immature Granulocytes (abs) 0.11 0.00 - 0.11 K/uL    NRBC (Absolute) 0.00 K/uL   Comp Metabolic Panel    Collection Time: 11/07/23  6:32 AM   Result Value Ref Range    Sodium 137 135 - 145 mmol/L    Potassium 4.4 3.6 - 5.5 mmol/L    Chloride 101 96 - 112 mmol/L    Co2 28 20 - 33 mmol/L    Anion Gap 8.0 7.0 - 16.0    Glucose 103 (H) 65 - 99 mg/dL    Bun 22 8 - 22 mg/dL    Creatinine 1.08 0.50 - 1.40 mg/dL    Calcium 8.9 8.5 - 10.5 mg/dL    Correct Calcium 9.1 8.5 - 10.5 mg/dL    AST(SGOT) 16 12 - 45 U/L    ALT(SGPT) 8 2 - 50 U/L    Alkaline Phosphatase 62 30 - 99 U/L    Total Bilirubin 1.1 0.1 - 1.5 mg/dL    Albumin 3.7 3.2 - 4.9 g/dL    Total Protein 6.0 6.0 - 8.2 g/dL    Globulin 2.3 1.9 - 3.5 g/dL    A-G Ratio 1.6 g/dL   ESTIMATED GFR    Collection Time: 11/07/23  6:32 AM   Result Value Ref Range    GFR (CKD-EPI) 49 (A) >60 mL/min/1.73 m 2   URINALYSIS    Collection Time: 11/07/23  9:07 AM    Specimen: Urine, Clean Catch   Result Value Ref Range    Color Yellow     Character Cloudy (A)     Specific Gravity 1.018 <1.035    Ph 7.5 5.0 - 8.0    Glucose Negative Negative mg/dL    Ketones Negative Negative mg/dL    Protein 100 (A) Negative mg/dL    Bilirubin Negative Negative    Urobilinogen, Urine 1.0 Negative    Nitrite Positive (A) Negative    Leukocyte Esterase Large (A) Negative    Occult Blood Small (A) Negative    Micro Urine Req Microscopic    URINE MICROSCOPIC (W/UA)    Collection Time: 11/07/23  9:07 AM   Result Value Ref Range    WBC Packed (A) /hpf    RBC 5-10 (A) /hpf    Bacteria Negative None /hpf    Epithelial Cells Negative /hpf    Hyaline Cast 3-5 (A) /lpf   CBC WITH DIFFERENTIAL    Collection Time: 11/08/23  5:57 AM   Result Value Ref Range    WBC 10.5 4.8 - 10.8 K/uL    RBC 3.12 (L) 4.20 - 5.40 M/uL    Hemoglobin 9.6 (L) 12.0 - 16.0 g/dL    Hematocrit 30.4 (L) 37.0 - 47.0 %    MCV  97.4 81.4 - 97.8 fL    MCH 30.8 27.0 - 33.0 pg    MCHC 31.6 (L) 32.2 - 35.5 g/dL    RDW 50.7 (H) 35.9 - 50.0 fL    Platelet Count 319 164 - 446 K/uL    MPV 10.1 9.0 - 12.9 fL    Neutrophils-Polys 70.60 44.00 - 72.00 %    Lymphocytes 13.90 (L) 22.00 - 41.00 %    Monocytes 12.20 0.00 - 13.40 %    Eosinophils 2.00 0.00 - 6.90 %    Basophils 0.40 0.00 - 1.80 %    Immature Granulocytes 0.90 0.00 - 0.90 %    Nucleated RBC 0.00 0.00 - 0.20 /100 WBC    Neutrophils (Absolute) 7.41 1.82 - 7.42 K/uL    Lymphs (Absolute) 1.46 1.00 - 4.80 K/uL    Monos (Absolute) 1.28 (H) 0.00 - 0.85 K/uL    Eos (Absolute) 0.21 0.00 - 0.51 K/uL    Baso (Absolute) 0.04 0.00 - 0.12 K/uL    Immature Granulocytes (abs) 0.09 0.00 - 0.11 K/uL    NRBC (Absolute) 0.00 K/uL         Medications:  Scheduled Medications   Medication Dose Frequency    valACYclovir  500 mg BID    nitrofurantoin  100 mg BID WITH MEALS    nystatin  5 mL 4X/DAY    Pharmacy Consult Request  1 Each PHARMACY TO DOSE    omeprazole  20 mg DAILY    carvedilol  3.125 mg BID    enoxaparin (LOVENOX) injection  40 mg DAILY AT 1800    escitalopram  20 mg DAILY    losartan  50 mg Q EVENING    QUEtiapine  25 mg Nightly    rosuvastatin  10 mg Q EVENING    senna-docusate  2 Tablet BID     PRN medications: Respiratory Therapy Consult, hydrALAZINE, carboxymethylcellulose, benzocaine-menthol, mag hydrox-al hydrox-simeth, ondansetron **OR** ondansetron, traZODone, sodium chloride, senna-docusate **AND** polyethylene glycol/lytes **AND** magnesium hydroxide **AND** bisacodyl, acetaminophen, oxyCODONE immediate-release, oxyCODONE immediate-release    Diet:  Current Diet Order   Procedures    Diet Order Diet: Regular       Medical Decision Making and Plan:  Left femoral neck and intertrochanteric femur fracture s/p ORIF with cephalomedullary implant 10/28/2023 Dr. Mckenzie  PT and OT for mobility and ADLs. Per guidelines, 15 hours per week between PT, OT and/or SLP.  Follow-up Ortho  Weightbearing as  tolerated     Dementia  SLP consult, likely at baseline  Seroquel nightly -consider reducing if not home med     Leukocytosis  Likely reactive after surgery  UA negative for bacteria, chest x-ray (negative), blood cultures - NGTD  Slight reduction 10/31  CBC 11/2 - Resolved WBC normal at 9.4  11/6/2023 increased again at 13.1  Recheck UA, check CXR, Check incision - CXR and incision benign. UA with packed WBC. Cx PENDING  CBC in AM - WBC slightly better at 11.7  CMP in AM - Normal   Start Macrobid 11/8/2023 await urine culture.   Culture PENDING. Complete antibiotics  Possible shingles low back - start Valtrex 11/9/2023     Anemia  Stable slightly lower 11/7 at 9.9     Hypophosphatemia  Low - 3x supplement  Normal on 11/2     Azotemia  Stable - mildly elevated  Encourage fluids  Resolved 11/6/2023      L2 compression fracture 6/2023 s/p kyphoplasty Dr. Barton  Monitor     Hyperlipidemia  Continue Crestor     Hypertension  Continue losartan and Coreg     Anxiety  Continue Lexapro    Thrush  S/p Mouth rinse     Pain  As needed oxycodone     Skin  Patient at risk for skin breakdown due to debility in areas including sacrum, achilles, elbows and head in addition to other sites. Nursing to assess skin daily.      GI Ppx  Patient on Prilosec for GERD prophylaxis.      Bowel   Patient on Senna-docusate for constipation prophylaxis.      Bladder  Incontinence - UA negative for bacteria  Timed voids and PVR/BS     DVT PROPHYLAXIS: Lovenox 40 mg subcutaneous nightly     HOSPITALIST FOLLOWING: No     CODE STATUS: DNAR/DNI     DISPO: 11/10/23. Plan was to go home with adult children whom she lives with, they are now declining that they can care for her. Likely will need family meeting.      FAMILY MEETING 11/9 @ 9 AM     M2B ELIGIBLE: TBD     DISCHARGE FOLLOW UP: Orthopedics, PCP  ____________________________________    Dr. Kimmie Samayoa DO, MS  [facility] - Physical Medicine & Rehabilitation    ____________________________________    Total time:  52 minutes. Time spent included pre-rounding review of vitals and tests, unit/floor time, face-to-face time with the patient including physical examination, care coordination, counseling of patient and/or family, ordering medications/procedures/tests, discussion with CM, PT, OT, SLP and/or other healthcare providers, and documentation in the electronic medical record. Topics discussed included discharge plan. Family conference occurred 11/9/2023 from 0900 to 0938.

## 2023-11-09 NOTE — THERAPY
Physical Therapy   Discharge Summary     Patient Name: Linda Casas  Age:  89 y.o., Sex:  female  Medical Record #: [id number]  Today's Date: 11/9/2023     Precautions  Precautions: Fall Risk, Weight Bearing As Tolerated Left Lower Extremity    Subjective    Pt resting in bed, willing to participate     Objective       11/09/23 1331   PT Charge Group   PT Gait Training (Units) 2   PT Therapeutic Exercise (Units) 2   PT Total Time Spent   PT Individual Total Time Spent (Mins) 60   Gait Functional Level of Assist    Gait Level Of Assist Contact Guard Assist   Assistive Device Front Wheel Walker   Distance (Feet) 50  (in addition to 25 ft x 1)   # of Times Distance was Traveled 2   Deviation Antalgic;Step To;Bradykinetic   Wheelchair Functional Level of Assist   Wheelchair Assist Minimal Assist   Distance Wheelchair (Feet or Distance) 50   Wheelchair Description Assistance with steering;Extra time;Limited by fatigue;Verbal cueing   Stairs Functional Level of Assist   Level of Assist with Stairs Minimal Assist   # of Stairs Climbed 1   Stairs Description Extra time;Hand rails;Verbal cueing   Transfer Functional Level of Assist   Bed, Chair, Wheelchair Transfer Contact Guard Assist   Bed Chair Wheelchair Transfer Description Adaptive equipment;Increased time;Set-up of equipment;Verbal cueing   Sitting Lower Body Exercises   Ankle Pumps 2 sets of 10   Hip Flexion 2 sets of 10   Hip Abduction 2 sets of 10   Hip Adduction 2 sets of 10   Long Arc Quad 2 sets of 10   Hamstring Curl 2 sets of 10   Comments 1.5# wts/ pink TB   Bed Mobility    Supine to Sit Minimal Assist   Sit to Supine Minimal Assist   Sit to Stand Contact Guard Assist   Scooting Contact Guard Assist   Rolling Minimal Assist to Rt.;Minimum Assist to Lt.   Roll Left and Right   Assistance Needed Physical assistance   Physical Assistance Level 25% or less   CARE Score - Roll Left and Right 3   Sit to Lying   Assistance Needed Physical assistance    Physical Assistance Level 25% or less   CARE Score - Sit to Lying 3   Lying to Sitting on Side of Bed   Assistance Needed Physical assistance   Physical Assistance Level 25% or less   CARE Score - Lying to Sitting on Side of Bed 3   Sit to Stand   Assistance Needed Adaptive equipment;Incidental touching   CARE Score - Sit to Stand 4   Chair/Bed-to-Chair Transfer   Assistance Needed Adaptive equipment;Incidental touching   CARE Score - Chair/Bed-to-Chair Transfer 4   Car Transfer   Assistance Needed Adaptive equipment;Physical assistance   Physical Assistance Level 26%-50%   CARE Score - Car Transfer 3   Walk 10 Feet   Assistance Needed Adaptive equipment;Incidental touching   CARE Score - Walk 10 Feet 4   Walk 50 Feet with Two Turns   Assistance Needed Adaptive equipment;Incidental touching   CARE Score - Walk 50 Feet with Two Turns 4   Walk 150 Feet   Reason if not Attempted Safety concerns   CARE Score - Walk 150 Feet 88   Walking 10 Feet on Uneven Surfaces   Reason if not Attempted Safety concerns   CARE Score - Walking 10 Feet on Uneven Surfaces 88   1 Step (Curb)   Assistance Needed Adaptive equipment;Physical assistance   Physical Assistance Level 25% or less   CARE Score - 1 Step (Curb) 3   4 Steps   Reason if not Attempted Activity not applicable   CARE Score - 4 Steps 9   12 Steps   Reason if not Attempted Activity not applicable   CARE Score - 12 Steps 9   Picking Up Object   Reason if not Attempted Safety concerns   CARE Score - Picking Up Object 88   Wheel 50 Feet with Two Turns   Reason if not Attempted Activity not applicable   CARE Score - Wheel 50 Feet with Two Turns 9   Wheel 150 Feet   Reason if not Attempted Activity not applicable   CARE Score - Wheel 150 Feet 9       Assessment    Pt continues to improve functional mobility/ gait tolerance with FWW and overall activity tolerance. Continues to vary with ability to complete sit<>supine from SBA to min A for LLE management due to fatigue/  weakness/ pain. Pt should continue to benefit from skilled PT services via home health vs. SNF.    Strengths: Independent prior level of function, Pleasant and cooperative, Supportive family, Willingly participates in therapeutic activities  Barriers: Confused, Decreased endurance, Fatigue, Generalized weakness, Impaired activity tolerance, Impaired balance, Impaired functional cognition, Limited mobility, Pain, Pain poorly managed    Plan    D/c tomorrow to SNF vs home?       Physical Therapy Problems (Active)       Problem: PT-Long Term Goals       Dates: Start:  10/31/23         Goal: LTG-By discharge, patient will ambulate x 150 feet with FWW and SBA       Dates: Start:  10/31/23            Goal: LTG-By discharge, patient will transfer one surface to another with FWW and SBA       Dates: Start:  10/31/23            Goal: LTG-By discharge, patient will transfer in/out of a car with FWW and SBA       Dates: Start:  10/31/23

## 2023-11-09 NOTE — THERAPY
Occupational Therapy  Daily Treatment     Patient Name: Linda Casas  Age:  89 y.o., Sex:  female  Medical Record #: [id number]  Today's Date: 11/9/2023     Precautions  Precautions: Fall Risk, Weight Bearing As Tolerated Left Lower Extremity    Subjective    Patient agreeable to participate in OT w/ son and DIL present. DIL present for entire OT session (hands on training), son left after ~15 mins.      Objective     11/09/23 0931   OT Charge Group   OT Self Care / ADL (Units) 4   OT Total Time Spent   OT Individual Total Time Spent (Mins) 60   Sleep/Wake Cycle   Sleep & Rest Awake   Functional Level of Assist   Grooming Standby Assist;Standing  (to wash hands while standing @ sink)   Lower Body Dressing Moderate Assist  (to don disposable briefs, pants and non skid socks w/ AE while incorporating sitting on shower bench and standing w/ UE support)   Toileting Minimal Assist  (min-CGA)   Bed, Chair, Wheelchair Transfer Contact Guard Assist  (standard bed, no leg  used)   Toilet Transfers Contact Guard Assist  (FWW level)   Interdisciplinary Plan of Care Collaboration   Patient Position at End of Therapy Seated;Self Releasing Lap Belt Applied;Family / Friend in Room     Family training completed w/ DIL (Mary); reviewed safety considerations, adaptive techniques and DME recommendations to maximize ADL/functional mobility independence. Mary verbalized understanding re: all recommendations.     Assessment    Patient demo's good functional progress w/ ADL independence. DIL receptive to all DC recommendations however expressed some concern re: being able to manage assisting patient and her jobs.   Strengths: Supportive family, Pleasant and cooperative  Barriers: Bladder incontinence, Decreased endurance, Dementia, Fatigue, Generalized weakness, Impaired activity tolerance, Impaired balance, Impaired functional cognition, Limited mobility, Pain    Plan    Anticipate DC tomorrow.     DME      Passport items to be completed:  Perform bathroom transfers, complete dressing, complete feeding, get ready for the day, prepare a simple meal, participate in household tasks, adapt home for safety needs, demonstrate home exercise program, complete caregiver training     Occupational Therapy Goals (Active)       Problem: OT Long Term Goals       Dates: Start:  10/31/23         Goal: LTG-By discharge, patient will complete basic self care tasks w/ CGA to supervision        Dates: Start:  10/31/23            Goal: LTG-By discharge, patient will perform bathroom transfers w/ CGA to supervision w/ LRAD       Dates: Start:  10/31/23

## 2023-11-09 NOTE — DISCHARGE PLANNING
Per note entered by Kimmie Samayoa Plan was to go home with adult children whom she lives with, they are now declining that they can care for her. Please advise what DC plan will be.     Thank you

## 2023-11-09 NOTE — DISCHARGE SUMMARY
"  Physical Medicine & Rehabilitation Discharge Summary    Admission Date: 10/30/2023    Discharge Date: 11/10/2023    Attending Provider: Natali Ramos DO for Kimmie Samayoa DO, MS    Admission Diagnosis:   Active Hospital Problems    Diagnosis     *Closed left hip fracture, initial encounter (HCC)     Shingles     Hyperlipidemia     Leukocytosis     Dementia without behavioral disturbance (HCC)     Hypertension     Debility     Anxiety        Discharge Diagnosis:  Active Hospital Problems    Diagnosis     *Closed left hip fracture, initial encounter (HCC)     Shingles     Hyperlipidemia     Leukocytosis     Dementia without behavioral disturbance (HCC)     Hypertension     Debility     Anxiety        HPI per Admission History & Physical:  Adapted from the PM&R Consult by Dr. Ramos:   \"HPI: The patient is a 89 y.o. 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  Per documentation, patient was at home while walking to the bathroom when she fell and tripped and landed on her left side.  Patient did not hit her head, patient was unable to bear weight.  Upon evaluation in the emergency department left femur x-ray showed minimally comminuted, angulated and mildly displaced left femoral neck and intertrochanteric femur fracture.  Orthopedic surgery was consulted, and patient was taken to the OR on 10/28 for ORIF of the left intertrochanteric femur fracture with cephalomedullary implant performed by Dr. Mckenzie.  Postop, patient is WBAT LLE.  Patient has postop leukocytosis.  Patient has required IV morphine for pain control.  She does have p.o. oxycodone available, however IV morphine was utilized today.     Patient seen and examined at bedside.  Patient oriented to person and place.  Patient is oriented to situation.  However patient does not know " "the month or the year.  Patient does not know what town she lives in.  Patient is able to explain the chills with her son and daughter-in-law who help her with most needs at home.  Patient reports left hip pain.  Although patient has memory impairment she follows 1 step commands easily and is very cooperative. Denies headache, lightheadedness, chest pain or numbness tingling or weakness. Continues to state that just her left hip hurts.\"     On admission patient is confused and wants to go home.      Called [facility] and they have been taking care of her 24/7. She is Albanian and speaks better Albanian than English. She does not have her hearing aids.     Patient was admitted to [facility] on 10/30/2023.     Hospital Course by Problem List:  Left femoral neck and intertrochanteric femur fracture s/p ORIF with cephalomedullary implant 10/28/2023 Dr. Mckenzie  PT and OT for mobility and ADLs. Per guidelines, 15 hours per week between PT, OT and/or SLP.  Follow-up Ortho  Weightbearing as tolerated     Dementia  SLP consult, likely at baseline  Seroquel nightly -consider reducing if not home med     Leukocytosis  Likely reactive after surgery  UA negative for bacteria, chest x-ray (negative), blood cultures - NGTD  Slight reduction 10/31  CBC 11/2 - Resolved WBC normal at 9.4  11/6/2023 increased again at 13.1  Recheck UA, check CXR, Check incision - CXR and incision benign. UA with packed WBC. Cx PENDING  CBC in AM - WBC slightly better at 11.7  CMP in AM - Normal   Start Macrobid 11/8/2023 await urine culture.   Culture PENDING. Complete antibiotics  Possible shingles low back - start Valtrex 11/9/2023      Anemia  Stable slightly lower 11/7 at 9.9     Hypophosphatemia  Low - 3x supplement  Normal on 11/2     Azotemia  Stable - mildly elevated  Encourage fluids  Resolved 11/6/2023      L2 compression fracture 6/2023 s/p kyphoplasty Dr. Barton  Monitor     Hyperlipidemia  Continue Crestor   "   Hypertension  Continue losartan and Coreg     Anxiety  Continue Lexapro     Thrush  S/p Mouth rinse     Pain  As needed oxycodone     Skin  Patient at risk for skin breakdown due to debility in areas including sacrum, achilles, elbows and head in addition to other sites. Nursing to assess skin daily.      GI Ppx  Patient on Prilosec for GERD prophylaxis.      Bowel   Patient on Senna-docusate for constipation prophylaxis.      Bladder  Incontinence - UA negative for bacteria  Timed voids and PVR/BS     DVT PROPHYLAXIS: Lovenox 40 mg subcutaneous nightly     HOSPITALIST FOLLOWING: No     CODE STATUS: DNAR/DNI     DISPO: 11/10/23. Plan was to go home with adult children whom she lived with.     FAMILY MEETING 11/9 @ 9 AM - Plan to d/c home     M2B ELIGIBLE: TBD     DISCHARGE FOLLOW UP: Orthopedics, PCP - Referral to Ortho placed 11/9/2023     Functional Status at Discharge  Eating:  Stand by Assist  Eating Description:     Grooming:  Standby Assist  Grooming Description:  Seated in wheelchair at sink, Increased time  Bathing:  Minimal Assist  Bathing Description:  Grab bar, Hand held shower, Assit with back, Tub bench, Increased time, Initial preparation for task, Verbal cueing, Long handled bath tool  Upper Body Dressing:  Stand by Assist  Upper Body Dressing Description:  Increased time, Verbal cueing  Lower Body Dressing:  Maximal Assist  Lower Body Dressing Description:  Sock aid, Assistive devices, Assist with threading into pant leg, Increased time (With use of AE and Max VC's for sequencing. Pt Max A to thread pants though LLE and to pull up/down to waist d/t decreased standing balance.)     Walk:  Contact Guard Assist  Distance Walked:  50  Number of Times Distance Was Traveled:  1  Assistive Device:  Front Wheel Walker  Gait Deviation:  Antalgic, Step To, Bradykinetic  Wheelchair:   (Unable d/t fatigue and pain)  Distance Propelled:      Wheelchair Description:     Stairs Unable to Participate  Stairs  Description       Comprehension:  Minimal Assist  Comprehension Description:  Verbal cues  Expression:  Supervision  Expression Description:  Verbal cueing  Social Interaction:  Minimal Assist  Social Interaction Description:  Increased time, Schedule, Verbal cues  Problem Solving:  Moderate Assist  Problem Solving Description:  Increased time, Verbal cueing, Bed/chair alarm, Seat belt  Memory:  Moderate Assist  Memory Description:  Increased time, Verbal cueing, Bed/chair alarm, Seat belt, Therapy schedule        Kimmie Samayoa D.O., personally performed a complete drug regimen review and no potential clinically significant medication issues were identified, I reviewed Dr. Samayoa' completed DC medications.   Discharge Medication:     Medication List        START taking these medications        Instructions   valACYclovir 500 MG Tabs  Commonly known as: Valtrex   Take 1 Tablet by mouth 2 times a day.  Dose: 500 mg            CONTINUE taking these medications        Instructions   carvedilol 3.125 MG Tabs  Commonly known as: Coreg   Take 1 Tablet by mouth 2 times a day.  Dose: 3.125 mg     escitalopram 20 MG tablet  Commonly known as: Lexapro   Take 1 Tablet by mouth every day.  Dose: 20 mg     GoodSense Aspirin Low Dose 81 MG EC tablet  Generic drug: aspirin   TAKE 1 TABLET BY MOUTH EVERY DAY.     losartan 50 MG Tabs  Commonly known as: Cozaar   Take 1 Tablet by mouth every evening.  Dose: 50 mg     QUEtiapine 25 MG Tabs  Commonly known as: SEROquel   Take 25 mg at bet time     rosuvastatin 10 MG Tabs  Commonly known as: Crestor   Take 1 Tablet by mouth every evening.  Dose: 10 mg            STOP taking these medications      phosphorus 250 MG tablet  Commonly known as: K-Phos-Neutral              Discharge Diet:  Current Diet Order   Procedures    Diet Order Diet: Regular       Discharge Activity:  Per PT/OT    Disposition:  Patient to discharge to Skilled Nursing Facility for additional services.      Equipment:  Per PT/OT    Follow-up & Discharge Instructions:  Follow up with your primary care provider (PCP) within 7-10 days of discharge to review your medications and take over your care.     If you develop chest pain, fever, chills, change in neurologic function (weakness, sensation changes, vision changes), or other concerning sxs, seek immediate medical attention or call 911.      Future Appointments   Date Time Provider Department Center   11/9/2023  1:30 PM [facility]   12/5/2023  8:20 AM Lakeshia Kuo M.D. [address]       Condition on Discharge:  Good    More than 35 minutes was spent on discharging this patient, including face-to-face time, prescription management, and the dictation of this note.    Natali Ramos D.O.  Department of Physical Medicine & Rehabilitation    Date of Service: 11/10/2023

## 2023-11-09 NOTE — PROGRESS NOTES
..                                                         NURSING DAILY NOTE    Name: Linda Casas   Date of Admission: 10/30/2023   Admitting Diagnosis: Closed left hip fracture, initial encounter (Former[facility])  Attending Physician: Kimmie Samayoa D.o.  Allergies: Ampicillin, Bactrim ds, and Ciprofloxacin    Safety  Patient Assist  max assist  Patient Precautions  Fall Risk, Weight Bearing As Tolerated Left Lower Extremity  Precaution Comments     Bed Transfer Status  Contact Guard Assist  Toilet Transfer Status   Minimal Assist  Assistive Devices  Rails, Wheelchair, Walker - front wheel  Oxygen  None - Room Air  Diet/Therapeutic Dining  Current Diet Order   Procedures    Diet Order Diet: Regular     Pill Administration  whole  Agitated Behavioral Scale  21  ABS Level of Severity  No Agitation    Fall Risk  Has the patient had a fall this admission?   No  Cheryl De Jesus Fall Risk Scoring  18, HIGH RISK  Fall Risk Safety Measures  bed alarm, chair alarm, seatbelt alarm, poor balance, and low vision/ hearing    Vitals  Temperature: 36.8 °C (98.2 °F)  Temp src: Oral  Pulse: 68  Respiration: 18  Blood Pressure : (!) 140/73  Blood Pressure MAP (Calculated): 95 MM HG  BP Location: Left, Upper Arm  Patient BP Position: Supine     Oxygen  Pulse Oximetry: 91 %  O2 (LPM): 0  FiO2%: 21 %  O2 Delivery Device: None - Room Air    Bowel and Bladder  Last Bowel Movement  11/08/23  Stool Type  Type 6: Fluffy pieces with ragged edges, a mushy stool  Bowel Device  Diaper  Continent  Bladder: Stress incontinence   Bowel: Continent movement  Bladder Function  Urine Void (mL): 350 ml  Number of Times Voided: 1  Urine Color: Yellow  Number of Times Incontinent of Urine: 2  Straight Catheter: 600 ml  Wet Diaper Count: 1  Genitourinary Assessment   Bladder Assessment (WDL):  WDL Except  Diaz Catheter: Not Applicable  Diaz Care: Given with Soap and Water  Urinary Elimination: Incontinence  Urine Color: Yellow  Number of  Bladder Accidents: 0  Total Number of Bladder of Accidents in Last 7 Days: 0  Number of Times Incontinent of Urine: 2  Bladder Device: Diaper  Bladder Scan: Post Void  $ Bladder Scan Results (mL): 35    Skin  Harris Score   18  Sensory Interventions   Bed Types: Standard/Trauma Mattress  Skin Preventative Measures: Pillows in Use for Support / Positioning  Moisture Interventions  Moisturizers/Barriers: Barrier Cream      Pain  Pain Rating Scale  0 - No Pain  Pain Location  Hip, Leg  Pain Location Orientation  Left  Pain Interventions   Declines    ADLs    Bathing   Partial Bed Bath  Linen Change   Partial  Personal Hygiene   (clean, dry & dressed)  Chlorhexidine Bath      Oral Care  Brushed Teeth  Teeth/Dentures  Intact  Shave     Nutrition Percentage Eaten  Lunch, Between 50-75% Consumed  Environmental Precautions  Treaded Slipper Socks on Patient, Personal Belongings, Wastebasket, Call Bell etc. in Easy Reach, Bed in Low Position  Patient Turns/Positioning  Patient Turns Self from Side to Side  Patient Turns Assistance/Tolerance  Assistance of One  Bed Positions  Bed Controls On, Bed Locked  Head of Bed Elevated  Self regulated      Psychosocial/Neurologic Assessment  Psychosocial Assessment  Psychosocial (WDL):  Within Defined Limits  Patient Behaviors: Drowsy, Fatigue  Neurologic Assessment  Neuro (WDL): Exceptions to WDL  Level of Consciousness: Alert  Orientation Level: Disoriented to time  Cognition: Memory Loss  Speech: Clear  Facial Symmetry:  (wdl)  Pupil Assesment: Yes  R Pupil Size (mm): 2  R Pupil Shape / Description: Round  R Pupil Reaction: Brisk  L Pupil Size (mm): 2  L Pupil Shape / Description: Round  L Pupil Reaction: Brisk  Motor Function/Sensation Assessment: Motor strength  RUE Motor Response: Responds to commands  RUE Sensation: Full sensation  Muscle Strength Right Arm: Normal Strength Against Gravity and Full Resistance  LUE Motor Response: Responds to commands  LUE Sensation: Full  sensation  Muscle Strength Left Arm: Normal Strength Against Gravity and Full Resistance  RLE Motor Response: Responds to commands  RLE Sensation: Full sensation  Muscle Strength Right Leg: Good Strength Against Gravity and Moderate Resistance  LLE Motor Response: Responds to commands  LLE Sensation: Pain  Muscle Strength Left Leg: Normal Strength Against Gravity and Full Resistance  EENT (WDL):  Within Defined Limits    Cardio/Pulmonary Assessment  Edema      Respiratory Breath Sounds  RUL Breath Sounds: Clear  RML Breath Sounds: Diminished  RLL Breath Sounds: Diminished  SANTY Breath Sounds: Clear  LLL Breath Sounds: Diminished  Cardiac Assessment   Cardiac (WDL):  Within Defined Limits

## 2023-11-09 NOTE — PROGRESS NOTES
NURSING DAILY NOTE    Name: Linda Casas   Date of Admission: 10/30/2023   Admitting Diagnosis: Closed left hip fracture, initial encounter (Formerly [facility])  Attending Physician: Kimmie Samayoa D.o.  Allergies: Ampicillin, Bactrim ds, and Ciprofloxacin    Safety  Patient Assist  max assist  Patient Precautions  Fall Risk, Weight Bearing As Tolerated Left Lower Extremity  Precaution Comments     Bed Transfer Status  Contact Guard Assist  Toilet Transfer Status   Minimal Assist  Assistive Devices  Rails, Wheelchair, Walker - front wheel  Oxygen  None - Room Air  Diet/Therapeutic Dining  Current Diet Order   Procedures    Diet Order Diet: Regular     Pill Administration  whole  Agitated Behavioral Scale  21  ABS Level of Severity  No Agitation    Fall Risk  Has the patient had a fall this admission?   No  Cheryl De Jesus Fall Risk Scoring  18, HIGH RISK  Fall Risk Safety Measures  bed alarm, chair alarm, and low vision/ hearing    Vitals  Temperature: 36.6 °C (97.9 °F)  Temp src: Temporal  Pulse: 66  Respiration: 18  Blood Pressure : 112/78  Blood Pressure MAP (Calculated): 89 MM HG  BP Location: Upper Arm, Left  Patient BP Position: Supine     Oxygen  Pulse Oximetry: 93 %  O2 (LPM): 0  FiO2%: 21 %  O2 Delivery Device: None - Room Air    Bowel and Bladder  Last Bowel Movement  11/08/23  Stool Type  Type 6: Fluffy pieces with ragged edges, a mushy stool  Bowel Device  Diaper  Continent  Bladder: Stress incontinence   Bowel: Continent movement  Bladder Function  Urine Void (mL): 350 ml  Number of Times Voided: 1  Urine Color: Yellow  Number of Times Incontinent of Urine: 2  Straight Catheter: 600 ml  Wet Diaper Count: 1  Genitourinary Assessment   Bladder Assessment (WDL):  WDL Except  Diaz Catheter: Not Applicable  Diaz Care: Given with Soap and Water  Urinary Elimination: Incontinence  Urine Color: Yellow  Number of Bladder Accidents: 0  Total Number of  Bladder of Accidents in Last 7 Days: 0  Number of Times Incontinent of Urine: 2  Bladder Device: Diaper  Bladder Scan: Post Void  $ Bladder Scan Results (mL): 35    Skin  Harris Score   18  Sensory Interventions   Bed Types: Standard/Trauma Mattress  Skin Preventative Measures: Pillows in Use for Support / Positioning  Moisture Interventions  Moisturizers/Barriers: Barrier Cream      Pain  Pain Rating Scale  0 - No Pain  Pain Location  Hip, Leg  Pain Location Orientation  Left  Pain Interventions   Declines    ADLs    Bathing   Partial Bed Bath  Linen Change   Partial  Personal Hygiene   (clean, dry & dressed)  Chlorhexidine Bath      Oral Care  Brushed Teeth  Teeth/Dentures  Intact  Shave     Nutrition Percentage Eaten  Lunch, Between 50-75% Consumed  Environmental Precautions  Treaded Slipper Socks on Patient, Personal Belongings, Wastebasket, Call Bell etc. in Easy Reach, Bed in Low Position  Patient Turns/Positioning  Patient Turns Self from Side to Side  Patient Turns Assistance/Tolerance  Assistance of One  Bed Positions  Bed Controls On, Bed Locked  Head of Bed Elevated  Self regulated      Psychosocial/Neurologic Assessment  Psychosocial Assessment  Psychosocial (WDL):  Within Defined Limits  Patient Behaviors: Drowsy, Fatigue  Neurologic Assessment  Neuro (WDL): Exceptions to WDL  Level of Consciousness: Responds to voice  Orientation Level: Oriented to person, Oriented to situation, Oriented to place, Disoriented to time  Cognition: Memory Loss  Speech: Clear  Facial Symmetry:  (wdl)  Pupil Assesment: Yes  R Pupil Size (mm): 2  R Pupil Shape / Description: Round  R Pupil Reaction: Brisk  L Pupil Size (mm): 2  L Pupil Shape / Description: Round  L Pupil Reaction: Brisk  Motor Function/Sensation Assessment: Motor strength  RUE Motor Response: Responds to commands  RUE Sensation: Full sensation  Muscle Strength Right Arm: Normal Strength Against Gravity and Full Resistance  LUE Motor Response: Responds to  commands  LUE Sensation: Full sensation  Muscle Strength Left Arm: Normal Strength Against Gravity and Full Resistance  RLE Motor Response: Responds to commands  RLE Sensation: Full sensation  Muscle Strength Right Leg: Good Strength Against Gravity and Moderate Resistance  LLE Motor Response: Responds to commands  LLE Sensation: Pain  Muscle Strength Left Leg: Normal Strength Against Gravity and Full Resistance  EENT (WDL):  Within Defined Limits    Cardio/Pulmonary Assessment  Edema      Respiratory Breath Sounds  RUL Breath Sounds: Clear  RML Breath Sounds: Diminished  RLL Breath Sounds: Diminished  SANTY Breath Sounds: Clear  LLL Breath Sounds: Diminished  Cardiac Assessment   Cardiac (WDL):  Within Defined Limits

## 2023-11-09 NOTE — CARE PLAN
"The patient is Stable - Low risk of patient condition declining or worsening    Shift Goals  Clinical Goals: Safety  Patient Goals: Participate in therapy    Patient is not progressing towards the following goals:    Problem: Fall Risk - Rehab  Goal: Patient will remain free from falls  Outcome: Not Met  Note: Cheryl De Jesus Fall risk Assessment Score: 18    High fall risk Interventions   - Alarming seatbelt  - Bed and strip alarm   - Yellow sign by the door   - Yellow wrist band \"Fall risk\"  - Do not leave patient unattended in the bathroom  - Fall risk education provided     "

## 2023-11-09 NOTE — DISCHARGE PLANNING
Thank you for alerting us to your request for HH. At this time we will need a valid HH in order to process.     Thank you

## 2023-11-09 NOTE — THERAPY
"Physical Therapy   Daily Treatment     Patient Name: Linda Casas  Age:  89 y.o., Sex:  female  Medical Record #: [id number]  Today's Date: 11/9/2023     Precautions  Precautions: Fall Risk, Weight Bearing As Tolerated Left Lower Extremity    Subjective    Pt up in wc, daughter in law present but leaving for work.     Objective       11/09/23 1031   PT Charge Group   PT Gait Training (Units) 1   PT Therapeutic Exercise (Units) 1   PT Total Time Spent   PT Individual Total Time Spent (Mins) 30   Gait Functional Level of Assist    Gait Level Of Assist Contact Guard Assist   Assistive Device Front Wheel Walker   Distance (Feet) 50   # of Times Distance was Traveled 1   Deviation Antalgic;Step To;Bradykinetic   Transfer Functional Level of Assist   Bed, Chair, Wheelchair Transfer Contact Guard Assist   Bed Chair Wheelchair Transfer Description Adaptive equipment;Increased time;Set-up of equipment;Verbal cueing   Sitting Lower Body Exercises   Ankle Pumps 2 sets of 10   Hip Flexion 2 sets of 10   Hip Abduction 2 sets of 10   Hip Adduction 2 sets of 10   Long Arc Quad 2 sets of 10   Hamstring Curl 2 sets of 10   Comments 1.5# wts/ pink TB   Bed Mobility    Sit to Supine Contact Guard Assist   Sit to Stand Contact Guard Assist     Pt's daughter in law observed pt ambulate x 50 ft with FWW and CGA with encouragement, then had to leave for work.  Family meeting completed with IDT to discuss pt's improvement since admission to require only CGA/ min A for mobility tasks with FWW, likelihood of continued progress with home health PT, but on-going need for hands on assist for fall prevention at this time and SPV for safety.     Assessment    Pt continues to improve overall gait tolerance but c/o L knee pain \"clicking\" intermittently when walking. Continues to require encouragement for all activity.     Strengths: Independent prior level of function, Pleasant and cooperative, Supportive family, Willingly " "participates in therapeutic activities  Barriers: Confused, Decreased endurance, Fatigue, Generalized weakness, Impaired activity tolerance, Impaired balance, Impaired functional cognition, Limited mobility, Pain, Pain poorly managed    Plan    Progressive gait with FWW. Standing tolerance/ WB to LLE  Sit<>stand/ transfers with FWW, LE ROM/ strength training, complete d/c IRF-HERB's    DME  PT DME Recommendations  Wheelchair: 18\" Width, Lightweight, Standard Leg Rests  Cushion: Standard  Assistive Device: Front Wheeled Walker    Passport items to be completed:  Get in/out of bed safely, in/out of a vehicle, safely use mobility device, walk or wheel around home/community, navigate up and down stairs, show how to get up/down from the ground, ensure home is accessible, demonstrate HEP, complete caregiver training    Physical Therapy Problems (Active)       Problem: PT-Long Term Goals       Dates: Start:  10/31/23         Goal: LTG-By discharge, patient will ambulate x 150 feet with FWW and SBA       Dates: Start:  10/31/23            Goal: LTG-By discharge, patient will transfer one surface to another with FWW and SBA       Dates: Start:  10/31/23            Goal: LTG-By discharge, patient will transfer in/out of a car with FWW and SBA       Dates: Start:  10/31/23              "

## 2023-11-10 VITALS
OXYGEN SATURATION: 91 % | SYSTOLIC BLOOD PRESSURE: 140 MMHG | BODY MASS INDEX: 25.65 KG/M2 | HEART RATE: 76 BPM | WEIGHT: 149.5 LBS | TEMPERATURE: 98.5 F | DIASTOLIC BLOOD PRESSURE: 72 MMHG | RESPIRATION RATE: 16 BRPM

## 2023-11-10 PROCEDURE — 99239 HOSP IP/OBS DSCHRG MGMT >30: CPT | Performed by: PHYSICAL MEDICINE & REHABILITATION

## 2023-11-10 PROCEDURE — A9270 NON-COVERED ITEM OR SERVICE: HCPCS | Performed by: PHYSICAL MEDICINE & REHABILITATION

## 2023-11-10 PROCEDURE — 700102 HCHG RX REV CODE 250 W/ 637 OVERRIDE(OP): Performed by: PHYSICAL MEDICINE & REHABILITATION

## 2023-11-10 RX ADMIN — CARVEDILOL 3.12 MG: 3.12 TABLET, FILM COATED ORAL at 09:22

## 2023-11-10 RX ADMIN — OMEPRAZOLE 20 MG: 20 CAPSULE, DELAYED RELEASE ORAL at 09:22

## 2023-11-10 RX ADMIN — NYSTATIN 500000 UNITS: 100000 SUSPENSION ORAL at 09:21

## 2023-11-10 RX ADMIN — SENNOSIDES AND DOCUSATE SODIUM 2 TABLET: 8.6; 5 TABLET ORAL at 09:22

## 2023-11-10 RX ADMIN — VALACYCLOVIR HYDROCHLORIDE 500 MG: 500 TABLET, FILM COATED ORAL at 09:21

## 2023-11-10 RX ADMIN — NITROFURANTOIN MONOHYDRATE/MACROCRYSTALS 100 MG: 75; 25 CAPSULE ORAL at 09:22

## 2023-11-10 RX ADMIN — ESCITALOPRAM OXALATE 20 MG: 10 TABLET ORAL at 09:22

## 2023-11-10 ASSESSMENT — PATIENT HEALTH QUESTIONNAIRE - PHQ9
1. LITTLE INTEREST OR PLEASURE IN DOING THINGS: NOT AT ALL
SUM OF ALL RESPONSES TO PHQ9 QUESTIONS 1 AND 2: 0
2. FEELING DOWN, DEPRESSED, IRRITABLE, OR HOPELESS: NOT AT ALL

## 2023-11-10 NOTE — CARE PLAN
"The patient is Stable - Low risk of patient condition declining or worsening    Shift Goals  Clinical Goals: Safety  Patient Goals: Participate in therapy  Family Goals: updated on poc    Progress made toward(s) clinical / shift goals:      Problem: Pain - Standard  Goal: Alleviation of pain or a reduction in pain to the patient’s comfort goal  Outcome: Progressing  Note: Pt denies pain or discomfort tonight. Sleeps good. Will continue to monitor.     Problem: Fall Risk - Rehab  Goal: Patient will remain free from falls  Outcome: Progressing  Note: Cheryl De Jesus Fall risk Assessment Score: 18    High fall risk Interventions   - Alarming seatbelt  - Bed and strip alarm   - Yellow sign by the door   - Yellow wrist band \"Fall risk\"  - Room near to the nurse station  - Do not leave patient unattended in the bathroom  - Fall risk education provided    Pt calls appropriately when in need of assistance.       Patient is not progressing towards the following goals:      "

## 2023-11-10 NOTE — CARE PLAN
The patient is Stable - Low risk of patient condition declining or worsening    Problem: Pain - Standard  Goal: Alleviation of pain or a reduction in pain to the patient’s comfort goal  Outcome: Progressing  Note: Pt able to participate in therapies and activities this shift.Patient able to verbalize pain level and verbalize an acceptable level of pain.

## 2023-11-10 NOTE — THERAPY
Speech Language Pathology  Daily Treatment     Patient Name: Linda Casas  Age:  89 y.o., Sex:  female  Medical Record #: [id number]  Today's Date: 11/9/2023     Precautions  Precautions: Fall Risk, Weight Bearing As Tolerated Left Lower Extremity    Subjective    Patient in bed, finishing breakfast, brief on but no pants.  Patient's DIL sitting at bedside.  Patient reported that she needed to use the bathroom to urinate, once she returned to her chair she reported that she needed to have a bowel movement.  Notified patient's CNA to help patient dress and with transfer off the toilet.  Daughter in law noticed rash, suspecting shingles,  on her back and nurse notified who did visualize and cover with dressing.      Objective       11/09/23 0831   Treatment Charges   SLP Cognitive Skill Development First 15 Minutes 1   SLP Cognitive Skill Development Additional 15 Minutes 1   SLP Total Time Spent   SLP Individual Total Time Spent (Mins) 30   Cognition   Simple Attention Severe (2)   Orientation  Severe (2)   Functional Memory Activities Severe (2)         Assessment    Patient provided min-mod A  and mod verbal cuing to assist from bed to wheel chair in order to get her to bathroom.  Patient is slow to initiate movement.  Patient needed mod verbal cues for safety sequencing and was provided with  CTG-min A to transfer onto toilet using grab bar.  Patient displayed need for min A to return to the toilet with presence of both CNA and therapist with her in bathroom which she appeared to find confusing as there was some discrepancy between instruction from therapist vs. Instruction from CNA.  Notified patient's daughter that this was this therapist's first meeting of patient and that I may have given more help than she typically needs,  educated  on strategies for cuing patient for safe transfer sequences.  DIL reported feeling overwhelmed, and did not feel she could care for patient, as her  is  scheduled to have neck surgery soon.   Patient needs direct supervision for safety.  Recommend 24 hours spv.    Strengths: Supportive family  Barriers: Agitation, Confused, Difficulty following instructions, Fatigue, Impaired functional cognition, Impaired carryover of learning, Impaired insight/denial of deficits, Uncooperative, Lack of motivation    Plan    Target orientation, attention, safety sequencing.    Passport items to be completed:  Express basic needs, understand food/liquid recommendations, consistently follow swallow precautions, manage finances, manage medications, arrive to therapy appointments on time, complete daily memory log entries, solve problems related to safety situations, review education related to hospitalization, complete caregiver training     Speech Therapy Problems (Active)       Problem: Memory STGs       Dates: Start:  11/01/23         Goal: STG-Within one week, patient will achieve a score of 25/30 or more on the O-log for 3 consecutive days.         Dates: Start:  11/01/23         Goal Note filed on 11/08/23 1335 by Joseph Barragan MS,CCC-SLP       Highest O-log score to date 15/30                  Problem: Problem Solving STGs       Dates: Start:  11/01/23         Goal: STG-Within one week, patient will complete sustained attention tasks given mod A to achieve 75% accuracy.         Dates: Start:  11/01/23         Goal Note filed on 11/08/23 1335 by Joseph Barragan MS,CCC-SLP       Variable, min-max A required due to variable alertness and confusion                  Problem: Speech/Swallowing LTGs       Dates: Start:  10/31/23         Goal: LTG-By discharge, patient will solve basic problems with spv        Dates: Start:  10/31/23

## 2023-11-10 NOTE — DISCHARGE PLANNING
CM met with patient to discuss SNF.  She feels after a couple of weeks she'll be more independent and will go home.  CM offered support and encouragement.      CM spoke with patients daughter in law Mary to update on DC time of noon today.  Gave her the number for SNF ([facility]).  CM available as needs arise.

## 2023-11-10 NOTE — CARE PLAN
The patient is Stable - Low risk of patient condition declining or worsening    Problem: Pain - Standard  Goal: Alleviation of pain or a reduction in pain to the patient’s comfort goal  11/9/2023 1647 by Arlene Bone R.N.  Outcome: Progressing  Note: Pt able to participate in therapies and activities this shift.Patient able to verbalize pain level and verbalize an acceptable level of pain.  11/9/2023 1630 by Arlene Bone R.N.  Outcome: Progressing  Note: Pt able to participate in therapies and activities this shift.Patient able to verbalize pain level and verbalize an acceptable level of pain.     Problem: Bladder / Voiding  Goal: Patient will establish and maintain regular urinary output  Outcome: Progressing  Note: Patient is continent of bladder this shift.Patient voiding adequate amounts of clear, yellow urine. Denies dysuria and flank pain: afebrile.

## 2023-11-10 NOTE — DISCHARGE PLANNING
Case management  Reviewed signed copy of IMM and answered all questions.    Dc date /disposition: 11/10/23 to SNF

## 2023-11-10 NOTE — DISCHARGE PLANNING
ATTN: Case Management  RE: Referral for Home Health    Reason for referral denial: NON ADMIT PATIENT TRANSFERRED TO [facility]              Unfortunately, we are not able to accept this referral for the reason listed above. If further clarity is needed, our Transitional Care Specialists are available to discuss any barriers to service at [phone].      We look forward to collaborating with you in the future,  [facility] Team

## 2023-11-10 NOTE — DIETARY
Nutrition Update:    Day 11 of admit.  Linda Casas is a 89 y.o. female with admitting DX of Closed left hip fracture, initial encounter ([facility]) [S72.002A].  Patient being followed to optimize nutrition.    Current Diet: regular w/ Boost Plus BID and ice cream 1x daily.     Recorded PO intake is variable between 0 and % w/ avg of 35%. No documentation of intake of supplements.     Pt w/ possible D/C today. Plan is for D/C to SNF.     Problem: Nutritional:  Goal: Achieve adequate nutritional intake  Description: Patient will consume >/=50% of meals  Outcome: progressing slower than expected.      If D/C does not take place, RD will continue to follow.

## 2023-11-10 NOTE — PROGRESS NOTES
NURSING DAILY NOTE    Name: Linda Casas   Date of Admission: 10/30/2023   Admitting Diagnosis: Closed left hip fracture, initial encounter ([facility])  Attending Physician: Kimmie Samayoa D.o.  Allergies: Ampicillin, Bactrim ds, and Ciprofloxacin    Safety  Patient Assist  MOD  Patient Precautions  Fall Risk, Weight Bearing As Tolerated Left Lower Extremity  Precaution Comments     Bed Transfer Status  Contact Guard Assist  Toilet Transfer Status   Contact Guard Assist (FWW level)  Assistive Devices  Rails, Wheelchair  Oxygen  None - Room Air  Diet/Therapeutic Dining  Current Diet Order   Procedures    Diet Order Diet: Regular     Pill Administration  whole  Agitated Behavioral Scale  21  ABS Level of Severity  No Agitation    Fall Risk  Has the patient had a fall this admission?   No  Cheryl De Jesus Fall Risk Scoring  18, HIGH RISK  Fall Risk Safety Measures  bed alarm, chair alarm, seatbelt alarm, poor balance, and low vision/ hearing    Vitals  Temperature: 36.3 °C (97.4 °F)  Temp src: Oral  Pulse: 76  Respiration: 18  Blood Pressure : 132/73  Blood Pressure MAP (Calculated): 93 MM HG  BP Location: Right, Upper Arm  Patient BP Position: Sitting     Oxygen  Pulse Oximetry: 94 %  O2 (LPM): 0  FiO2%: 21 %  O2 Delivery Device: None - Room Air    Bowel and Bladder  Last Bowel Movement  11/09/23  Stool Type  Type 6: Fluffy pieces with ragged edges, a mushy stool  Bowel Device  Bathroom, Diaper  Continent  Bladder: Stress incontinence   Bowel: Continent movement  Bladder Function  Urine Void (mL): 350 ml  Number of Times Voided: 1  Urine Color: Yellow  Number of Times Incontinent of Urine: 2  Straight Catheter: 600 ml  Wet Diaper Count: 1  Genitourinary Assessment   Bladder Assessment (WDL):  WDL Except  Diaz Catheter: Not Applicable  Diaz Care: Given with Soap and Water  Urinary Elimination: Incontinence  Urine Color: Yellow  Number of Bladder  Accidents: 0  Total Number of Bladder of Accidents in Last 7 Days: 0  Number of Times Incontinent of Urine: 2  Bladder Device: Bathroom, Diaper  Bladder Scan: Post Void  $ Bladder Scan Results (mL): 35    Skin  Harris Score   17  Sensory Interventions   Bed Types: Standard/Trauma Mattress  Skin Preventative Measures: Pillows in Use for Support / Positioning, Heel Float Boots in Use , Seat Cushion in Use on Chair when Out of Bed  Moisture Interventions  Moisturizers/Barriers: Barrier Cream      Pain  Pain Rating Scale  0 - No Pain  Pain Location  Hip, Leg  Pain Location Orientation  Left  Pain Interventions   Declines    ADLs    Bathing   Partial Bed Bath  Linen Change   Partial  Personal Hygiene   (clean, dry & dressed)  Chlorhexidine Bath      Oral Care  Brushed Teeth  Teeth/Dentures  Intact  Shave     Nutrition Percentage Eaten  Lunch, Between 50-75% Consumed  Environmental Precautions  Treaded Slipper Socks on Patient, Personal Belongings, Wastebasket, [place] etc. in Easy Reach, Bed in Low Position  Patient Turns/Positioning  Patient Turns Self from Side to Side  Patient Turns Assistance/Tolerance  Assistance of One, Tolerates Well, General Weakness  Bed Positions  Bed Controls On, Bed Locked  Head of Bed Elevated  Self regulated      Psychosocial/Neurologic Assessment  Psychosocial Assessment  Psychosocial (WDL):  Within Defined Limits  Patient Behaviors: Drowsy, Fatigue  Neurologic Assessment  Neuro (WDL): Exceptions to WDL  Level of Consciousness: Responds to voice  Orientation Level: Oriented to person, Oriented to situation, Oriented to place, Disoriented to time  Cognition: Memory Loss  Speech: Clear  Facial Symmetry:  (wdl)  Pupil Assesment: Yes  R Pupil Size (mm): 3  R Pupil Shape / Description: Round  R Pupil Reaction: Brisk  L Pupil Size (mm): 3  L Pupil Shape / Description: Round  L Pupil Reaction: Brisk  Motor Function/Sensation Assessment: Motor strength  RUE Motor Response: Responds to  commands  RUE Sensation: Full sensation  Muscle Strength Right Arm: Normal Strength Against Gravity and Full Resistance  LUE Motor Response: Responds to commands  LUE Sensation: Full sensation  Muscle Strength Left Arm: Normal Strength Against Gravity and Full Resistance  RLE Motor Response: Responds to commands  RLE Sensation: Full sensation  Muscle Strength Right Leg: Good Strength Against Gravity and Moderate Resistance  LLE Motor Response: Responds to commands  LLE Sensation: Pain  Muscle Strength Left Leg: Normal Strength Against Gravity and Full Resistance  EENT (WDL):  Within Defined Limits    Cardio/Pulmonary Assessment  Edema      Respiratory Breath Sounds  RUL Breath Sounds: Clear  RML Breath Sounds: Diminished  RLL Breath Sounds: Diminished  SANTY Breath Sounds: Clear  LLL Breath Sounds: Diminished  Cardiac Assessment   Cardiac (WDL):  Within Defined Limits

## 2023-11-10 NOTE — PROGRESS NOTES
Disharged to [facility] with all personal belongings and dc paperwork. Report given to Atiya GARNER.

## 2023-11-10 NOTE — PROGRESS NOTES
NURSING DAILY NOTE    Name: Linda Casas   Date of Admission: 10/30/2023   Admitting Diagnosis: Closed left hip fracture, initial encounter ([facility])  Attending Physician: Natali Ramos D.o.  Allergies: Ampicillin, Bactrim ds, and Ciprofloxacin    Safety  Patient Assist  cga  Patient Precautions  Fall Risk, Weight Bearing As Tolerated Left Lower Extremity  Precaution Comments     Bed Transfer Status  Contact Guard Assist  Toilet Transfer Status   Contact Guard Assist (FWW level)  Assistive Devices  Rails, Wheelchair  Oxygen  None - Room Air  Diet/Therapeutic Dining  Current Diet Order   Procedures    Diet Order Diet: Regular     Pill Administration  whole  Agitated Behavioral Scale  21  ABS Level of Severity  No Agitation    Fall Risk  Has the patient had a fall this admission?   No  Cheryl De Jesus Fall Risk Scoring  18, HIGH RISK  Fall Risk Safety Measures  bed alarm, chair alarm, seatbelt alarm, poor balance, and low vision/ hearing    Vitals  Temperature: 36.8 °C (98.3 °F)  Temp src: Oral  Pulse: 77  Respiration: 18  Blood Pressure : 135/66  Blood Pressure MAP (Calculated): 89 MM HG  BP Location: Right, Upper Arm  Patient BP Position: Supine     Oxygen  Pulse Oximetry: 92 %  O2 (LPM): 0  FiO2%: 21 %  O2 Delivery Device: None - Room Air    Bowel and Bladder  Last Bowel Movement  11/09/23  Stool Type  Type 6: Fluffy pieces with ragged edges, a mushy stool  Bowel Device  Bathroom, Diaper  Continent  Bladder: Continent void   Bowel: Continent movement  Bladder Function  Urine Void (mL):  (moderate)  Number of Times Voided: 1  Urine Color: Yellow  Number of Times Incontinent of Urine: 2  Straight Catheter: 600 ml  Wet Diaper Count: 1  Genitourinary Assessment   Bladder Assessment (WDL):  WDL Except  Diaz Catheter: Not Applicable  Diaz Care: Given with Soap and Water  Urinary Elimination: Incontinence  Urine Color: Yellow  Number of Bladder  Accidents: 0  Total Number of Bladder of Accidents in Last 7 Days: 0  Number of Times Incontinent of Urine: 2  Bladder Device: Bathroom, Diaper  Bladder Scan: Post Void  $ Bladder Scan Results (mL): 35    Skin  Harris Score   17  Sensory Interventions   Bed Types: Standard/Trauma Mattress  Skin Preventative Measures: Pillows in Use for Support / Positioning  Moisture Interventions  Moisturizers/Barriers: Barrier Paste, Barrier Wipes      Pain  Pain Rating Scale  0 - No Pain  Pain Location  Hip, Leg  Pain Location Orientation  Left  Pain Interventions   Declines    ADLs    Bathing   Partial Bed Bath  Linen Change   Partial  Personal Hygiene  Moist Rabia Wipes, Perineal Care  Chlorhexidine Bath      Oral Care  Brushed Teeth  Teeth/Dentures  Intact  Shave     Nutrition Percentage Eaten  Lunch, Between 50-75% Consumed  Environmental Precautions  Treaded Slipper Socks on Patient, Bed in Low Position  Patient Turns/Positioning  Patient Turns Self from Side to Side  Patient Turns Assistance/Tolerance  Assistance of One  Bed Positions  Bed Controls On  Head of Bed Elevated  Self regulated      Psychosocial/Neurologic Assessment  Psychosocial Assessment  Psychosocial (WDL):  Within Defined Limits  Patient Behaviors: Fatigue  Neurologic Assessment  Neuro (WDL): Exceptions to WDL  Level of Consciousness: Responds to voice  Orientation Level: Oriented to person, Oriented to situation, Oriented to place, Disoriented to time  Cognition: Memory Loss  Speech: Clear  Facial Symmetry:  (wdl)  Pupil Assesment: Yes  R Pupil Size (mm): 3  R Pupil Shape / Description: Round  R Pupil Reaction: Brisk  L Pupil Size (mm): 3  L Pupil Shape / Description: Round  L Pupil Reaction: Brisk  Motor Function/Sensation Assessment: Motor strength  RUE Motor Response: Responds to commands  RUE Sensation: Full sensation  Muscle Strength Right Arm: Normal Strength Against Gravity and Full Resistance  LUE Motor Response: Responds to commands  LUE Sensation:  Full sensation  Muscle Strength Left Arm: Normal Strength Against Gravity and Full Resistance  RLE Motor Response: Responds to commands  RLE Sensation: Full sensation  Muscle Strength Right Leg: Good Strength Against Gravity and Moderate Resistance  LLE Motor Response: Responds to commands  LLE Sensation: Pain  Muscle Strength Left Leg: Normal Strength Against Gravity and Full Resistance  EENT (WDL):  Within Defined Limits    Cardio/Pulmonary Assessment  Edema      Respiratory Breath Sounds  RUL Breath Sounds: Clear  RML Breath Sounds: Diminished  RLL Breath Sounds: Diminished  SANTY Breath Sounds: Clear  LLL Breath Sounds: Diminished  Cardiac Assessment   Cardiac (WDL):  Within Defined Limits

## 2023-11-13 ENCOUNTER — HOME HEALTH ADMISSION (OUTPATIENT)
Dept: HOME HEALTH SERVICES | Facility: HOME HEALTHCARE | Age: 88
End: 2023-11-13
Payer: MEDICARE

## 2023-11-13 NOTE — DOCUMENTATION QUERY
DOCUMENTATION QUERY    PROVIDERS: Please select “Cosign w/ note”to reply to query.    To better represent the severity of illness of your patient, please review the following information and exercise your independent professional judgment in responding to this query.     CHF is documented in the Progress Notes. Based upon the clinical findings, risk factors, and treatment, can this diagnosis be further specified?    Acute Systolic heart failure   Chronic Systolic heart failure  Acute on Chronic Systolic heart failure  Acute Diastolic heart failure   Chronic Diastolic heart failure  Acute on Chronic Diastolic heart failure  Acute Systolic and Diastolic heart failure  Chronic Systolic and Diastolic heart failure  Acute on Chronic Systolic and diastolic heart failure  Other explanation of clinical findings  Unable to determine         The medical record reflects the following:   Clinical Findings  H&P:     Congestive heart failure (HCC) 2022     Per Screening Forms:  Hypertensive heart disease with chronic systolic congestive heart failure- date noted 5/03/2022     Treatment rosuvastatin (CRESTOR) 10 MG Tab    Risk Factors  Hypertension   Location within medical record  Progress Notes, screening forms     Thank you,   [email or web address]

## 2023-11-15 PROBLEM — S72.002D CLOSED LEFT HIP FRACTURE, WITH ROUTINE HEALING, SUBSEQUENT ENCOUNTER: Status: ACTIVE | Noted: 2023-10-30

## 2023-11-15 PROBLEM — E83.39 HYPOPHOSPHATEMIA: Status: ACTIVE | Noted: 2023-11-15

## 2023-11-15 PROBLEM — D64.9 ANEMIA: Status: ACTIVE | Noted: 2023-11-15

## 2023-11-16 ENCOUNTER — HOME CARE VISIT (OUTPATIENT)
Dept: HOME HEALTH SERVICES | Facility: HOME HEALTHCARE | Age: 88
End: 2023-11-16
Payer: MEDICARE

## 2023-11-16 PROCEDURE — 665001 SOC-HOME HEALTH

## 2023-11-16 PROCEDURE — G0299 HHS/HOSPICE OF RN EA 15 MIN: HCPCS

## 2023-11-16 ASSESSMENT — ENCOUNTER SYMPTOMS
LAST BOWEL MOVEMENT: 66794
DENIES PAIN: 1
PERSON REPORTING PAIN: PATIENT
VOMITING: DENIES
STOOL FREQUENCY: LESS THAN DAILY
NAUSEA: DENIES

## 2023-11-16 NOTE — CASE COMMUNICATION
Primary dx/Skilled need: 88 yo female admitted to  s/p Left hip fx and repair due to a fall.  PMH: anemia, shingles, urinary incontinence, MDD, anxiety, CKD, CHF, HTN, and dementia.  SN frequency: 1w1, 2w4  Zip code: [address]  Disciplines ordered: SN, PT, OT  Insurance and authorization: [facility]  Certification period: 11/16/23 - 1/14/24  Special considerations: NONE

## 2023-11-16 NOTE — Clinical Note
Noted.  ----- Message -----  From: Hermelinda Amaral R.N.  Sent: 11/16/2023   3:51 PM PST  To: Paula Sharp R.N.; Atiya Alves, OT; *      Primary dx/Skilled need: 89 yo female admitted to  s/p Left hip fx and repair due to a fall.  PMH: anemia, shingles, urinary incontinence, MDD, anxiety, CKD, CHF, HTN, and dementia.  SN frequency: 1w1, 2w4  Zip code: [id number]  Disciplines ordered: SN, PT, OT  Insurance and authorization: [facility]  Certification period: 11/16/23 - 1/14/24  Special considerations: NONE

## 2023-11-17 ENCOUNTER — HOME CARE VISIT (OUTPATIENT)
Dept: HOME HEALTH SERVICES | Facility: HOME HEALTHCARE | Age: 88
End: 2023-11-17
Payer: MEDICARE

## 2023-11-17 NOTE — CASE COMMUNICATION
Drug-Drug: GoodSense Aspirin Low Dose and escitalopram   The risk of upper gastrointestinal bleeding may be increased with concurrent administration of GoodSense Aspirin Low Dose and escitalopram.   Details Moderate   GOODSENSE ASPIRIN LOW DOSE 81 MG EC tablet     escitalopram (LEXAPRO) 20 MG tablet   Drug-Drug  <jscript:void(0)>  Drug-Drug: escitalopram and QUEtiapine   Additive QT interval prolongation may occur during coadministratio n of moderate-risk QT-prolonging agents, escitalopram and QUEtiapine.   Details Moderate   escitalopram (LEXAPRO) 20 MG tablet     QUEtiapine (SEROQUEL) 25 MG Tab   Drug-Drug  <jscript:void(0)>  Drug-Drug: GoodSense Aspirin Low Dose and carvedilol   By decreasing renal prostaglandin synthesis, GoodSense Aspirin Low Dose may impair the antihypertensive effect of carvedilol. However, this has not been clearly established.   Details Minor    GOODSENSE ASPIRIN LOW DOSE 81 MG EC tablet     carvedilol (COREG) 3.125 MG Tab   Drug-Food  <jscript:void(0)>  Drug-Food: GoodSense Aspirin Low Dose   Analgesic and antipyretic effects of GoodSense Aspirin Low Dose may be delayed and reduced by food.   Details Minor   GOODSENSE ASPIRIN LOW DOSE 81 MG EC tablet   Drug-Alcohol  <jscript:void(0)>  Drug-Alcohol: QUEtiapine   The CNS depressant effects of QUEtiapine and ethanol may be incr eased. Excessive sedation and impaired psychomotor function may occur. In addition, co-ingestion of alcohol with oral extended release dosage forms of morphine, oxymorphone, and hydromorphone may result in increased plasma concentrations of the narcotic and the potential of fatal overdose situations.   Details Moderate   QUEtiapine (SEROQUEL) 25 MG Tab   Drug-Alcohol  <jscript:void(0)>  Drug-Alcohol: GoodSense Aspirin Low Dose   Gastroi ntestinal blood loss due to GoodSense Aspirin Low Dose may be increased by ethanol. GoodSense Aspirin Low Dose may increase the blood concentration of alcohol consumed in the fed state.    Details Minor   GOODSENSE ASPIRIN LOW DOSE 81 MG EC tablet

## 2023-11-18 VITALS
HEART RATE: 63 BPM | DIASTOLIC BLOOD PRESSURE: 68 MMHG | RESPIRATION RATE: 16 BRPM | TEMPERATURE: 98.6 F | SYSTOLIC BLOOD PRESSURE: 110 MMHG | OXYGEN SATURATION: 94 %

## 2023-11-18 SDOH — ECONOMIC STABILITY: HOUSING INSECURITY: OPEN FLAME PRESENT: 1

## 2023-11-18 ASSESSMENT — ENCOUNTER SYMPTOMS
LOWEST PAIN SEVERITY IN PAST 24 HOURS: 0/10
DEPRESSED MOOD: 1
HIGHEST PAIN SEVERITY IN PAST 24 HOURS: 0/10
PAIN SEVERITY GOAL: 0/10

## 2023-11-19 ASSESSMENT — ACTIVITIES OF DAILY LIVING (ADL)
CURRENT_FUNCTION: STAND BY ASSIST
TOILETING: STAND BY ASSIST
DRESSING_LB_CURRENT_FUNCTION: MODERATE ASSIST
GROOMING ASSESSED: 1
LIGHT HOUSEKEEPING: DEPENDENT
CURRENT_FUNCTION: CONTACT GUARD ASSIST
FEEDING ASSESSED: 1
AMBULATION ASSISTANCE: 1
DRESSING_UB_CURRENT_FUNCTION: MINIMUM ASSIST
TOILETING: ONE PERSON
CURRENT_FUNCTION: ONE PERSON
CURRENT_FUNCTION: SUPERVISION
AMBULATION ASSISTANCE: ONE PERSON
FEEDING: SUPERVISION
BATHING_CURRENT_FUNCTION: MINIMUM ASSIST
SHOPPING ASSESSED: 1
BATHING_CURRENT_FUNCTION: ONE PERSON
GROOMING_CURRENT_FUNCTION: SUPERVISION
TRANSPORTATION: DEPENDENT
TOILETING: 1
AMBULATION ASSISTANCE: CONTACT GUARD ASSIST
USING THE TELPHONE: NEEDS ASSISTANCE
TRANSPORTATION ASSESSED: 1
PHYSICAL TRANSFERS ASSESSED: 1
LAUNDRY ASSESSED: 1
TELEPHONE USE ASSESSED: 1
ORAL_CARE_ASSESSED: 1
HOUSEKEEPING ASSESSED: 1
BATHING ASSESSED: 1
AMBULATION ASSISTANCE: STAND BY ASSIST
ORAL_CARE_CURRENT_FUNCTION: INDEPENDENT
LAUNDRY: DEPENDENT
SHOPPING: DEPENDENT
AMBULATION ASSISTANCE: SUPERVISION
TOILETING: SUPERVISION
GROOMING_CURRENT_FUNCTION: STAND BY ASSIST
PREPARING MEALS: DEPENDENT
TOILETING: CONTACT GUARD ASSIST

## 2023-11-20 ENCOUNTER — DOCUMENTATION (OUTPATIENT)
Dept: MEDICAL GROUP | Facility: PHYSICIAN GROUP | Age: 88
End: 2023-11-20

## 2023-11-20 PROBLEM — N30.00 ACUTE CYSTITIS WITHOUT HEMATURIA: Status: ACTIVE | Noted: 2023-11-20

## 2023-11-20 PROCEDURE — G0180 MD CERTIFICATION HHA PATIENT: HCPCS | Performed by: FAMILY MEDICINE

## 2023-11-20 NOTE — PROGRESS NOTES
"Medication chart review for [facility] services    Received referral from [facility].   Medications reviewed  compared with discharge summary if available.  Discharge summary date, if applicable:   11/10/23    Current medication list per [facility]     Medication list one, patient is currently taking    Current Outpatient Medications:     carvedilol, 3.125 mg, Oral, BID    escitalopram, 20 mg, Oral, DAILY    losartan, 50 mg, Oral, Q EVENING    QUEtiapine, Take 25 mg at bet time    rosuvastatin, 10 mg, Oral, Q EVENING    valACYclovir, 500 mg, Oral, BID    GoodSense Aspirin Low Dose, TAKE 1 TABLET BY MOUTH EVERY DAY.      Medication list two, drugs that the patient has been prescribed or recommended to take by their healthcare provider on discharge summary     START taking these medications         Instructions   valACYclovir 500 MG Tabs  Commonly known as: Valtrex    Take 1 Tablet by mouth 2 times a day.  Dose: 500 mg                CONTINUE taking these medications         Instructions   carvedilol 3.125 MG Tabs  Commonly known as: Coreg    Take 1 Tablet by mouth 2 times a day.  Dose: 3.125 mg      escitalopram 20 MG tablet  Commonly known as: Lexapro    Take 1 Tablet by mouth every day.  Dose: 20 mg      GoodSense Aspirin Low Dose 81 MG EC tablet  Generic drug: aspirin    TAKE 1 TABLET BY MOUTH EVERY DAY.      losartan 50 MG Tabs  Commonly known as: Cozaar    Take 1 Tablet by mouth every evening.  Dose: 50 mg      QUEtiapine 25 MG Tabs  Commonly known as: SEROquel    Take 25 mg at bet time      rosuvastatin 10 MG Tabs  Commonly known as: Crestor    Take 1 Tablet by mouth every evening.  Dose: 10 mg                STOP taking these medications       phosphorus 250 MG tablet  Commonly known as: K-Phos-Neutral                 Allergies   Allergen Reactions    Ampicillin Hives, Rash and Swelling     Feels \"rotten\"     Bactrim Ds Hives, Itching and Swelling     Mouth, lip, tongue, eye swelling, pain, itching "    Ciprofloxacin Hives, Rash and Swelling     .       Labs     Lab Results   Component Value Date/Time    SODIUM 137 11/07/2023 06:32 AM    POTASSIUM 4.4 11/07/2023 06:32 AM    CHLORIDE 101 11/07/2023 06:32 AM    CO2 28 11/07/2023 06:32 AM    GLUCOSE 103 (H) 11/07/2023 06:32 AM    BUN 22 11/07/2023 06:32 AM    CREATININE 1.08 11/07/2023 06:32 AM    CREATININE 0.9 08/28/2006 11:45 AM     Lab Results   Component Value Date/Time    ALKPHOSPHAT 62 11/07/2023 06:32 AM    ASTSGOT 16 11/07/2023 06:32 AM    ALTSGPT 8 11/07/2023 06:32 AM    TBILIRUBIN 1.1 11/07/2023 06:32 AM    INR 1.10 06/15/2023 06:06 AM    ALBUMIN 3.7 11/07/2023 06:32 AM        Assessment for clinically significant drug interactions, drug omissions/additions, duplicative therapies.            CC   Lakeshia Kuo M.D.  [address] [place] [id number]  Fax: [phone]    [facility]  Phone [phone] fax [phone]    This note was created using voice recognition software (Dragon). The accuracy of the dictation is limited by the abilities of the software. I have reviewed the note prior to signing, however some errors in grammar and context are still possible. If you have any questions related to this note please do not hesitate to contact our office.

## 2023-11-21 ENCOUNTER — HOME CARE VISIT (OUTPATIENT)
Dept: HOME HEALTH SERVICES | Facility: HOME HEALTHCARE | Age: 88
End: 2023-11-21
Payer: MEDICARE

## 2023-11-21 ENCOUNTER — HOSPITAL ENCOUNTER (OUTPATIENT)
Facility: MEDICAL CENTER | Age: 88
End: 2023-11-21
Attending: PHYSICIAN ASSISTANT
Payer: MEDICARE

## 2023-11-21 VITALS
SYSTOLIC BLOOD PRESSURE: 120 MMHG | HEART RATE: 62 BPM | DIASTOLIC BLOOD PRESSURE: 70 MMHG | RESPIRATION RATE: 16 BRPM | TEMPERATURE: 98.4 F | OXYGEN SATURATION: 97 %

## 2023-11-21 VITALS
OXYGEN SATURATION: 97 % | DIASTOLIC BLOOD PRESSURE: 70 MMHG | RESPIRATION RATE: 16 BRPM | HEART RATE: 62 BPM | TEMPERATURE: 98.4 F | SYSTOLIC BLOOD PRESSURE: 120 MMHG

## 2023-11-21 DIAGNOSIS — I12.9 HYPERTENSIVE KIDNEY DISEASE WITH STAGE 3B CHRONIC KIDNEY DISEASE: ICD-10-CM

## 2023-11-21 DIAGNOSIS — N18.32 HYPERTENSIVE KIDNEY DISEASE WITH STAGE 3B CHRONIC KIDNEY DISEASE: ICD-10-CM

## 2023-11-21 DIAGNOSIS — E78.5 HYPERLIPIDEMIA, UNSPECIFIED HYPERLIPIDEMIA TYPE: ICD-10-CM

## 2023-11-21 DIAGNOSIS — D72.829 LEUKOCYTOSIS, UNSPECIFIED TYPE: ICD-10-CM

## 2023-11-21 DIAGNOSIS — D64.9 ANEMIA, UNSPECIFIED TYPE: ICD-10-CM

## 2023-11-21 LAB
ALBUMIN SERPL BCP-MCNC: 4.1 G/DL (ref 3.2–4.9)
ALBUMIN/GLOB SERPL: 1.6 G/DL
ALP SERPL-CCNC: 109 U/L (ref 30–99)
ALT SERPL-CCNC: 6 U/L (ref 2–50)
ANION GAP SERPL CALC-SCNC: 9 MMOL/L (ref 7–16)
APPEARANCE UR: ABNORMAL
AST SERPL-CCNC: 14 U/L (ref 12–45)
BACTERIA #/AREA URNS HPF: ABNORMAL /HPF
BILIRUB SERPL-MCNC: 0.5 MG/DL (ref 0.1–1.5)
BILIRUB UR QL STRIP.AUTO: ABNORMAL
BUN SERPL-MCNC: 15 MG/DL (ref 8–22)
CALCIUM ALBUM COR SERPL-MCNC: 9.1 MG/DL (ref 8.5–10.5)
CALCIUM SERPL-MCNC: 9.2 MG/DL (ref 8.5–10.5)
CAOX CRY #/AREA URNS HPF: ABNORMAL /HPF
CHLORIDE SERPL-SCNC: 105 MMOL/L (ref 96–112)
CO2 SERPL-SCNC: 26 MMOL/L (ref 20–33)
COLOR UR: ABNORMAL
CREAT SERPL-MCNC: 1.13 MG/DL (ref 0.5–1.4)
EPI CELLS #/AREA URNS HPF: NEGATIVE /HPF
ERYTHROCYTE [DISTWIDTH] IN BLOOD BY AUTOMATED COUNT: 58 FL (ref 35.9–50)
GFR SERPLBLD CREATININE-BSD FMLA CKD-EPI: 46 ML/MIN/1.73 M 2
GLOBULIN SER CALC-MCNC: 2.5 G/DL (ref 1.9–3.5)
GLUCOSE SERPL-MCNC: 106 MG/DL (ref 65–99)
GLUCOSE UR STRIP.AUTO-MCNC: NEGATIVE MG/DL
HCT VFR BLD AUTO: 38.6 % (ref 37–47)
HGB BLD-MCNC: 12 G/DL (ref 12–16)
HYALINE CASTS #/AREA URNS LPF: ABNORMAL /LPF
KETONES UR STRIP.AUTO-MCNC: ABNORMAL MG/DL
LEUKOCYTE ESTERASE UR QL STRIP.AUTO: ABNORMAL
MCH RBC QN AUTO: 31.9 PG (ref 27–33)
MCHC RBC AUTO-ENTMCNC: 31.1 G/DL (ref 32.2–35.5)
MCV RBC AUTO: 102.7 FL (ref 81.4–97.8)
MICRO URNS: ABNORMAL
NITRITE UR QL STRIP.AUTO: POSITIVE
PH UR STRIP.AUTO: 6 [PH] (ref 5–8)
PHOSPHATE SERPL-MCNC: 2.8 MG/DL (ref 2.5–4.5)
PLATELET # BLD AUTO: 302 K/UL (ref 164–446)
PMV BLD AUTO: 10.6 FL (ref 9–12.9)
POTASSIUM SERPL-SCNC: 4 MMOL/L (ref 3.6–5.5)
PROT SERPL-MCNC: 6.6 G/DL (ref 6–8.2)
PROT UR QL STRIP: 100 MG/DL
RBC # BLD AUTO: 3.76 M/UL (ref 4.2–5.4)
RBC # URNS HPF: ABNORMAL /HPF
RBC UR QL AUTO: ABNORMAL
SODIUM SERPL-SCNC: 140 MMOL/L (ref 135–145)
SP GR UR STRIP.AUTO: >=1.03
UROBILINOGEN UR STRIP.AUTO-MCNC: 1 MG/DL
WBC # BLD AUTO: 9.8 K/UL (ref 4.8–10.8)
WBC #/AREA URNS HPF: ABNORMAL /HPF

## 2023-11-21 PROCEDURE — 85027 COMPLETE CBC AUTOMATED: CPT

## 2023-11-21 PROCEDURE — 81001 URINALYSIS AUTO W/SCOPE: CPT

## 2023-11-21 PROCEDURE — G0151 HHCP-SERV OF PT,EA 15 MIN: HCPCS

## 2023-11-21 PROCEDURE — 84100 ASSAY OF PHOSPHORUS: CPT

## 2023-11-21 PROCEDURE — 87077 CULTURE AEROBIC IDENTIFY: CPT

## 2023-11-21 PROCEDURE — 87086 URINE CULTURE/COLONY COUNT: CPT

## 2023-11-21 PROCEDURE — 87186 SC STD MICRODIL/AGAR DIL: CPT

## 2023-11-21 PROCEDURE — G0299 HHS/HOSPICE OF RN EA 15 MIN: HCPCS

## 2023-11-21 PROCEDURE — 80053 COMPREHEN METABOLIC PANEL: CPT

## 2023-11-21 ASSESSMENT — ENCOUNTER SYMPTOMS
PAIN: 1
HIGHEST PAIN SEVERITY IN PAST 24 HOURS: 2/10
NAUSEA: DENIES
STOOL FREQUENCY: LESS THAN DAILY
LAST BOWEL MOVEMENT: 66798
SUBJECTIVE PAIN PROGRESSION: UNCHANGED
LOWEST PAIN SEVERITY IN PAST 24 HOURS: 2/10
PAIN SEVERITY GOAL: 0/10
MUSCLE WEAKNESS: 1
BOWEL PATTERN NORMAL: 1
VOMITING: DENIES

## 2023-11-21 NOTE — DOCUMENTATION QUERY
DOCUMENTATION QUERY    PROVIDERS: Please select “Cosign w/ note”to reply to query.    To better represent the severity of illness of your patient, please review the following information and exercise your independent professional judgment in responding to this query.     Left femoral neck and intertrochanteric femur fracture, osteoporosis, and traumatic fall are documented in the Discharge Summary. Please confirm if the fracture and osteoporosis are related to one another.     Fracture due to osteoporosis   Fracture due to trauma  Other explanation of clinical findings  Unable to determine      The medical record reflects the following:   Clinical Findings  past medical history of:   osteoporosis who presented on 10/28/2023  2:56 AM with hip pain after ground-level fall.    Per documentation, patient was at home while walking to the bathroom when she fell and tripped and landed on her left side.  Patient did not hit her head, patient was unable to bear weight.       left femur x-ray:   showed minimally comminuted, angulated and mildly displaced left femoral neck and intertrochanteric femur fracture.     Treatment  Orthopedic surgery was consulted, and patient was taken to the OR on 10/28 for ORIF of the left intertrochanteric femur fracture with cephalomedullary implant performed by Dr. Mckenzie.     PT and OT for mobility and ADLs. Per guidelines, 15 hours per week between PT, OT and/or SLP.  Follow-up Ortho  Weightbearing as tolerated   Risk Factors     Location within medical record  Discharge Summary     Thank you,   [email or web address]

## 2023-11-23 SDOH — ECONOMIC STABILITY: HOUSING INSECURITY: HOME SAFETY: PTS DTR IS LIVING WITH HER AND SHE HELPS IN ANYWAY

## 2023-11-23 ASSESSMENT — BALANCE ASSESSMENTS
STANDING BALANCE: 1 - STEADY BUT WIDE STANCE AND USES CANE OR OTHER SUPPORT
ARISES: 1 - ABLE, USES ARMS TO HELP
BALANCE SCORE: 9
TURNING 360 DEGREES STEPS: 0 - DISCONTINUOUS STEPS
SITTING BALANCE: 1 - STEADY, SAFE
NUDGED: 2 - STEADY
ARISING SCORE: 1
NUDGED SCORE: 2
EYES CLOSED AT MAXIMUM POSITION NUDGED: 0 - UNSTEADY
ATTEMPTS TO ARISE: 1 - ABLE, REQUIRES MORE THAN ONE ATTEMPT
SITTING DOWN: 1 - USES ARMS OR NOT SMOOTH MOTION
IMMEDIATE STANDING BALANCE FIRST 5 SECONDS: 1 - STEADY BUT USES WALKER OR OTHER SUPPORT

## 2023-11-23 ASSESSMENT — ENCOUNTER SYMPTOMS
ARTHRALGIAS: 1
MUSCLE WEAKNESS: 1

## 2023-11-23 ASSESSMENT — GAIT ASSESSMENTS
TRUNK SCORE: 0
PATH: 1 - MILD/MODERATE DEVIATION OR USES WALKING AID
WALKING STANCE: 0 - HEELS APART
STEP CONTINUITY: 0 - STOPPING OR DISCONTINUITY BETWEEN STEPS
STEP SYMMETRY: 1 - RIGHT AND LEFT STEP LENGTH APPEAR EQUAL
GAIT SCORE: 7
BALANCE AND GAIT SCORE: 16
TRUNK: 0 - MARKED SWAY OR USES WALKING AID
PATH SCORE: 1
INITIATION OF GAIT IMMEDIATELY AFTER GO: 1 - NO HESITANCY

## 2023-11-23 ASSESSMENT — ACTIVITIES OF DAILY LIVING (ADL)
AMBULATION ASSISTANCE: 1
ADLS_COMMENTS: SEE OT EVAL FOR MORE DETAILS
PHYSICAL TRANSFERS ASSESSED: 1
BATHING_COMMENTS: SEE OT EVAL FOR MORE DETAILS
CURRENT_FUNCTION: SUPERVISION
AMBULATION ASSISTANCE: STAND BY ASSIST
AMBULATION ASSISTANCE ON FLAT SURFACES: 1

## 2023-11-24 ENCOUNTER — HOME CARE VISIT (OUTPATIENT)
Dept: HOME HEALTH SERVICES | Facility: HOME HEALTHCARE | Age: 88
End: 2023-11-24
Payer: MEDICARE

## 2023-11-24 NOTE — Clinical Note
MISSED VISIT TODAY,   requested for nursing, patient daughter in law states they would like PT OT but do not feel they need a RN visit today as patient on Abx for UTI and labs have been drawn.

## 2023-11-25 NOTE — CASE COMMUNICATION
noted  ----- Message -----  From: Mariam Charles R.N.  Sent: 11/24/2023   9:40 AM PST  To: Katy De La O; Paula Sharp R.N.; *      MISSED VISIT TODAY,   requested for nursing, patient daughter in law states they would like PT OT but do not feel they need a RN visit today as patient on Abx for UTI and labs have been drawn.

## 2023-11-27 ENCOUNTER — HOME CARE VISIT (OUTPATIENT)
Dept: HOME HEALTH SERVICES | Facility: HOME HEALTHCARE | Age: 88
End: 2023-11-27
Payer: MEDICARE

## 2023-11-27 ASSESSMENT — ACTIVITIES OF DAILY LIVING (ADL): OASIS_M1830: 03

## 2023-11-28 ENCOUNTER — HOME CARE VISIT (OUTPATIENT)
Dept: HOME HEALTH SERVICES | Facility: HOME HEALTHCARE | Age: 88
End: 2023-11-28
Payer: MEDICARE

## 2023-11-28 VITALS
SYSTOLIC BLOOD PRESSURE: 115 MMHG | OXYGEN SATURATION: 95 % | RESPIRATION RATE: 16 BRPM | HEART RATE: 72 BPM | TEMPERATURE: 99.2 F | DIASTOLIC BLOOD PRESSURE: 70 MMHG

## 2023-11-28 PROCEDURE — G0493 RN CARE EA 15 MIN HH/HOSPICE: HCPCS

## 2023-11-28 ASSESSMENT — ENCOUNTER SYMPTOMS
STOOL FREQUENCY: LESS THAN DAILY
VOMITING: DENIES
PAIN SEVERITY GOAL: 0/10
HIGHEST PAIN SEVERITY IN PAST 24 HOURS: 0/10
SUBJECTIVE PAIN PROGRESSION: UNCHANGED
PERSON REPORTING PAIN: PATIENT
NAUSEA: DENIES
LOWEST PAIN SEVERITY IN PAST 24 HOURS: 0/10
DENIES PAIN: 1
MUSCLE WEAKNESS: 1
LAST BOWEL MOVEMENT: 66806
BOWEL PATTERN NORMAL: 1

## 2023-11-28 NOTE — CASE COMMUNICATION
Quality Review for 11.16.23 SOC OASIS performed on by CATHY Maya RN on 11.27.2023:        Edits completed by CATHY Maya RN:  1.  and  dx coding updated per chart review.   2. Changed  to 11.16.23 per LSOC order  3. Changed  to 5 per EPIC  4. Changed  and  to 3 per orientation tab charting. Changed  to 2 and  to 4 per SNV on 11.21.23. Changed  to 11.21.23 per the OAS[employer] collaboration convention   5. Changed  to 2 per wound documentation   6. Changed  to 1, pt is being treated for UTI  7. Changed  to 2 per ADL tab reporting pt needs supervision. Changed  to 2 per ADL tab pt needs min assist. Changed  to 3 per ADL tab reporting min assist. Changed  to 2 per ADL reporting supervision. Changed  to 2 and  to 3, PG1637 D-G, I-K to 3 per narrative pt needs min assist with ambulation and transfers  and uses DME. Changed  to 2 per [id number] A response of 4  8. Added A to  per seroquel on the MAR  9. Filled out functional limitations per narrative  10. Added cardiac diet to nutritional requirements per dx of HF  11. Changed  to 9

## 2023-11-29 ENCOUNTER — HOME CARE VISIT (OUTPATIENT)
Dept: HOME HEALTH SERVICES | Facility: HOME HEALTHCARE | Age: 88
End: 2023-11-29
Payer: MEDICARE

## 2023-11-29 VITALS
TEMPERATURE: 99.4 F | SYSTOLIC BLOOD PRESSURE: 132 MMHG | DIASTOLIC BLOOD PRESSURE: 86 MMHG | OXYGEN SATURATION: 94 % | RESPIRATION RATE: 18 BRPM | HEART RATE: 66 BPM

## 2023-11-29 PROCEDURE — G0151 HHCP-SERV OF PT,EA 15 MIN: HCPCS

## 2023-11-29 NOTE — CASE COMMUNICATION
Reviewed and approved of edits.  ----- Message -----  From: Aure Maya R.N.  Sent: 11/27/2023   6:25 PM PST  To: Hermelinda Amaral R.N.      Quality Review for 11.16.23 SOC OASIS performed on by CATHY Maya RN on 11.27.2023:        Edits completed by CATHY Maya RN:  1.  and  dx coding updated per chart review.   2. Changed  to 11.16.23 per LSOC order  3. Changed  to 5 per EPIC  4. Changed  and  to 3 per  orientation tab charting. Changed  to 2 and  to 4 per SNV on 11.21.23. Changed  to 11.21.23 per the OASIS collaboration convention  5. Changed  to 2 per wound documentation   6. Changed  to 1, pt is being treated for UTI  7. Changed  to 2 per ADL tab reporting pt needs supervision. Changed  to 2 per ADL tab pt needs min assist. Changed  to 3 per ADL tab reporting min assist. Changed  to 2 per  ADL reporting supervision. Changed  to 2 and  to 3, [id number] D-G, I-K to 3 per narrative pt needs min assist with ambulation and transfers and uses DME. Changed  to 2 per [id number] A response of 4  8. Added A to  per seroquel on the MAR  9. Filled out functional limitations per narrative  10. Added cardiac diet to nutritional requirements per dx of HF  11. Changed  to 9

## 2023-12-01 ENCOUNTER — HOME CARE VISIT (OUTPATIENT)
Dept: HOME HEALTH SERVICES | Facility: HOME HEALTHCARE | Age: 88
End: 2023-12-01
Payer: MEDICARE

## 2023-12-01 VITALS
DIASTOLIC BLOOD PRESSURE: 78 MMHG | RESPIRATION RATE: 18 BRPM | TEMPERATURE: 99.1 F | HEART RATE: 56 BPM | SYSTOLIC BLOOD PRESSURE: 134 MMHG | OXYGEN SATURATION: 95 %

## 2023-12-01 PROCEDURE — G0151 HHCP-SERV OF PT,EA 15 MIN: HCPCS

## 2023-12-05 ENCOUNTER — HOME CARE VISIT (OUTPATIENT)
Dept: HOME HEALTH SERVICES | Facility: HOME HEALTHCARE | Age: 88
End: 2023-12-05
Payer: MEDICARE

## 2023-12-05 VITALS
RESPIRATION RATE: 18 BRPM | TEMPERATURE: 99.1 F | OXYGEN SATURATION: 93 % | HEART RATE: 78 BPM | DIASTOLIC BLOOD PRESSURE: 88 MMHG | SYSTOLIC BLOOD PRESSURE: 142 MMHG

## 2023-12-05 VITALS
SYSTOLIC BLOOD PRESSURE: 142 MMHG | TEMPERATURE: 99.1 F | DIASTOLIC BLOOD PRESSURE: 88 MMHG | HEART RATE: 78 BPM | OXYGEN SATURATION: 93 % | RESPIRATION RATE: 18 BRPM

## 2023-12-05 PROCEDURE — G0151 HHCP-SERV OF PT,EA 15 MIN: HCPCS

## 2023-12-05 PROCEDURE — G0493 RN CARE EA 15 MIN HH/HOSPICE: HCPCS

## 2023-12-05 ASSESSMENT — ENCOUNTER SYMPTOMS
STOOL FREQUENCY: DAILY
LOWEST PAIN SEVERITY IN PAST 24 HOURS: 0/10
HIGHEST PAIN SEVERITY IN PAST 24 HOURS: 0/10
LAST BOWEL MOVEMENT: 66812
DENIES PAIN: 1
PAIN SEVERITY GOAL: 0/10
SUBJECTIVE PAIN PROGRESSION: UNCHANGED
BOWEL PATTERN NORMAL: 1

## 2023-12-05 ASSESSMENT — ACTIVITIES OF DAILY LIVING (ADL)
BATHING_CURRENT_FUNCTION: SUPERVISION
LAUNDRY: DEPENDENT
TELEPHONE USE ASSESSED: 1
PREPARING MEALS: DEPENDENT
GROOMING_CURRENT_FUNCTION: SUPERVISION
HOUSEKEEPING ASSESSED: 1
CURRENT_FUNCTION: SUPERVISION
LAUNDRY ASSESSED: 1
PHYSICAL TRANSFERS ASSESSED: 1
DRESSING_UB_CURRENT_FUNCTION: SUPERVISION
TRANSPORTATION: DEPENDENT
FEEDING: INDEPENDENT
ORAL_CARE_CURRENT_FUNCTION: NEEDS ASSISTANCE
AMBULATION ASSISTANCE: SUPERVISION
AMBULATION ASSISTANCE: 1
TRANSPORTATION ASSESSED: 1
LIGHT HOUSEKEEPING: DEPENDENT
ORAL_CARE_ASSESSED: 1
SHOPPING ASSESSED: 1
GROOMING ASSESSED: 1
USING THE TELPHONE: DEPENDENT
BATHING_WITHIN_DEFINED_LIMITS: 1
TOILETING: 1
BATHING ASSESSED: 1
FEEDING_WITHIN_DEFINED_LIMITS: 1
TOILETING: SUPERVISION
DRESSING_LB_CURRENT_FUNCTION: SUPERVISION
SHOPPING: DEPENDENT
FEEDING ASSESSED: 1
GROOMING_WITHIN_DEFINED_LIMITS: 1

## 2023-12-06 ENCOUNTER — HOME CARE VISIT (OUTPATIENT)
Dept: HOME HEALTH SERVICES | Facility: HOME HEALTHCARE | Age: 88
End: 2023-12-06
Payer: MEDICARE

## 2023-12-07 ENCOUNTER — HOME CARE VISIT (OUTPATIENT)
Dept: HOME HEALTH SERVICES | Facility: HOME HEALTHCARE | Age: 88
End: 2023-12-07
Payer: MEDICARE

## 2023-12-07 VITALS
RESPIRATION RATE: 18 BRPM | HEART RATE: 78 BPM | DIASTOLIC BLOOD PRESSURE: 78 MMHG | TEMPERATURE: 99.7 F | OXYGEN SATURATION: 96 % | SYSTOLIC BLOOD PRESSURE: 104 MMHG

## 2023-12-07 PROCEDURE — G0151 HHCP-SERV OF PT,EA 15 MIN: HCPCS

## 2023-12-12 ENCOUNTER — HOME CARE VISIT (OUTPATIENT)
Dept: HOME HEALTH SERVICES | Facility: HOME HEALTHCARE | Age: 88
End: 2023-12-12
Payer: MEDICARE

## 2023-12-14 ENCOUNTER — HOME CARE VISIT (OUTPATIENT)
Dept: HOME HEALTH SERVICES | Facility: HOME HEALTHCARE | Age: 88
End: 2023-12-14
Payer: MEDICARE

## 2023-12-14 VITALS
HEART RATE: 77 BPM | SYSTOLIC BLOOD PRESSURE: 126 MMHG | RESPIRATION RATE: 18 BRPM | TEMPERATURE: 99.3 F | OXYGEN SATURATION: 99 % | DIASTOLIC BLOOD PRESSURE: 78 MMHG

## 2023-12-14 PROCEDURE — G0151 HHCP-SERV OF PT,EA 15 MIN: HCPCS

## 2023-12-16 ENCOUNTER — APPOINTMENT (OUTPATIENT)
Dept: RADIOLOGY | Facility: MEDICAL CENTER | Age: 88
DRG: 871 | End: 2023-12-16
Attending: EMERGENCY MEDICINE
Payer: MEDICARE

## 2023-12-16 ENCOUNTER — HOSPITAL ENCOUNTER (INPATIENT)
Facility: MEDICAL CENTER | Age: 88
LOS: 1 days | DRG: 871 | End: 2023-12-18
Attending: EMERGENCY MEDICINE | Admitting: STUDENT IN AN ORGANIZED HEALTH CARE EDUCATION/TRAINING PROGRAM
Payer: MEDICARE

## 2023-12-16 DIAGNOSIS — U07.1 COVID: ICD-10-CM

## 2023-12-16 DIAGNOSIS — U07.1 ACUTE HYPOXEMIC RESPIRATORY FAILURE DUE TO COVID-19 (HCC): ICD-10-CM

## 2023-12-16 DIAGNOSIS — R09.02 HYPOXIA: ICD-10-CM

## 2023-12-16 DIAGNOSIS — J96.01 ACUTE HYPOXEMIC RESPIRATORY FAILURE DUE TO COVID-19 (HCC): ICD-10-CM

## 2023-12-16 DIAGNOSIS — R41.0 CONFUSION: ICD-10-CM

## 2023-12-16 LAB
ALBUMIN SERPL BCP-MCNC: 4.3 G/DL (ref 3.2–4.9)
ALP SERPL-CCNC: 72 U/L (ref 30–99)
ALT SERPL-CCNC: 8 U/L (ref 2–50)
ANION GAP SERPL CALC-SCNC: 13 MMOL/L (ref 7–16)
APPEARANCE UR: CLEAR
AST SERPL-CCNC: 20 U/L (ref 12–45)
BACTERIA #/AREA URNS HPF: NEGATIVE /HPF
BASOPHILS # BLD AUTO: 0.3 % (ref 0–1.8)
BASOPHILS # BLD: 0.02 K/UL (ref 0–0.12)
BILIRUB CONJ SERPL-MCNC: 0.3 MG/DL (ref 0.1–0.5)
BILIRUB INDIRECT SERPL-MCNC: 0.4 MG/DL (ref 0–1)
BILIRUB SERPL-MCNC: 0.7 MG/DL (ref 0.1–1.5)
BILIRUB UR QL STRIP.AUTO: NEGATIVE
BUN SERPL-MCNC: 18 MG/DL (ref 8–22)
CALCIUM SERPL-MCNC: 9.1 MG/DL (ref 8.5–10.5)
CHLORIDE SERPL-SCNC: 100 MMOL/L (ref 96–112)
CO2 SERPL-SCNC: 22 MMOL/L (ref 20–33)
COLOR UR: YELLOW
CREAT SERPL-MCNC: 1.02 MG/DL (ref 0.5–1.4)
EKG IMPRESSION: NORMAL
EOSINOPHIL # BLD AUTO: 0 K/UL (ref 0–0.51)
EOSINOPHIL NFR BLD: 0 % (ref 0–6.9)
EPI CELLS #/AREA URNS HPF: NEGATIVE /HPF
ERYTHROCYTE [DISTWIDTH] IN BLOOD BY AUTOMATED COUNT: 54.1 FL (ref 35.9–50)
FLUAV RNA SPEC QL NAA+PROBE: NEGATIVE
FLUBV RNA SPEC QL NAA+PROBE: NEGATIVE
GFR SERPLBLD CREATININE-BSD FMLA CKD-EPI: 52 ML/MIN/1.73 M 2
GLUCOSE SERPL-MCNC: 108 MG/DL (ref 65–99)
GLUCOSE UR STRIP.AUTO-MCNC: NEGATIVE MG/DL
HCT VFR BLD AUTO: 39.2 % (ref 37–47)
HGB BLD-MCNC: 12.8 G/DL (ref 12–16)
HYALINE CASTS #/AREA URNS LPF: ABNORMAL /LPF
IMM GRANULOCYTES # BLD AUTO: 0.04 K/UL (ref 0–0.11)
IMM GRANULOCYTES NFR BLD AUTO: 0.5 % (ref 0–0.9)
KETONES UR STRIP.AUTO-MCNC: 15 MG/DL
LEUKOCYTE ESTERASE UR QL STRIP.AUTO: NEGATIVE
LYMPHOCYTES # BLD AUTO: 0.67 K/UL (ref 1–4.8)
LYMPHOCYTES NFR BLD: 9.1 % (ref 22–41)
MCH RBC QN AUTO: 31.5 PG (ref 27–33)
MCHC RBC AUTO-ENTMCNC: 32.7 G/DL (ref 32.2–35.5)
MCV RBC AUTO: 96.6 FL (ref 81.4–97.8)
MICRO URNS: ABNORMAL
MONOCYTES # BLD AUTO: 0.71 K/UL (ref 0–0.85)
MONOCYTES NFR BLD AUTO: 9.7 % (ref 0–13.4)
NEUTROPHILS # BLD AUTO: 5.9 K/UL (ref 1.82–7.42)
NEUTROPHILS NFR BLD: 80.4 % (ref 44–72)
NITRITE UR QL STRIP.AUTO: NEGATIVE
NRBC # BLD AUTO: 0 K/UL
NRBC BLD-RTO: 0 /100 WBC (ref 0–0.2)
PH UR STRIP.AUTO: 6 [PH] (ref 5–8)
PLATELET # BLD AUTO: 159 K/UL (ref 164–446)
PMV BLD AUTO: 10.3 FL (ref 9–12.9)
POTASSIUM SERPL-SCNC: 3.6 MMOL/L (ref 3.6–5.5)
PROT SERPL-MCNC: 7 G/DL (ref 6–8.2)
PROT UR QL STRIP: NEGATIVE MG/DL
RBC # BLD AUTO: 4.06 M/UL (ref 4.2–5.4)
RBC # URNS HPF: ABNORMAL /HPF
RBC UR QL AUTO: ABNORMAL
RSV RNA SPEC QL NAA+PROBE: NEGATIVE
SARS-COV-2 RNA RESP QL NAA+PROBE: DETECTED
SODIUM SERPL-SCNC: 135 MMOL/L (ref 135–145)
SP GR UR STRIP.AUTO: 1.02
TROPONIN T SERPL-MCNC: 18 NG/L (ref 6–19)
UROBILINOGEN UR STRIP.AUTO-MCNC: 1 MG/DL
WBC # BLD AUTO: 7.3 K/UL (ref 4.8–10.8)
WBC #/AREA URNS HPF: ABNORMAL /HPF

## 2023-12-16 PROCEDURE — 81001 URINALYSIS AUTO W/SCOPE: CPT

## 2023-12-16 PROCEDURE — 36415 COLL VENOUS BLD VENIPUNCTURE: CPT

## 2023-12-16 PROCEDURE — 80048 BASIC METABOLIC PNL TOTAL CA: CPT

## 2023-12-16 PROCEDURE — 71045 X-RAY EXAM CHEST 1 VIEW: CPT

## 2023-12-16 PROCEDURE — A9270 NON-COVERED ITEM OR SERVICE: HCPCS | Performed by: EMERGENCY MEDICINE

## 2023-12-16 PROCEDURE — 80076 HEPATIC FUNCTION PANEL: CPT

## 2023-12-16 PROCEDURE — 0241U HCHG SARS-COV-2 COVID-19 NFCT DS RESP RNA 4 TRGT ED POC: CPT

## 2023-12-16 PROCEDURE — 84484 ASSAY OF TROPONIN QUANT: CPT

## 2023-12-16 PROCEDURE — 93005 ELECTROCARDIOGRAM TRACING: CPT | Performed by: EMERGENCY MEDICINE

## 2023-12-16 PROCEDURE — 99285 EMERGENCY DEPT VISIT HI MDM: CPT

## 2023-12-16 PROCEDURE — C9803 HOPD COVID-19 SPEC COLLECT: HCPCS

## 2023-12-16 PROCEDURE — 85025 COMPLETE CBC W/AUTO DIFF WBC: CPT

## 2023-12-16 PROCEDURE — 700102 HCHG RX REV CODE 250 W/ 637 OVERRIDE(OP): Performed by: EMERGENCY MEDICINE

## 2023-12-16 RX ORDER — ACETAMINOPHEN 325 MG/1
650 TABLET ORAL ONCE
Status: COMPLETED | OUTPATIENT
Start: 2023-12-16 | End: 2023-12-16

## 2023-12-16 RX ADMIN — ACETAMINOPHEN 650 MG: 325 TABLET, FILM COATED ORAL at 21:39

## 2023-12-16 ASSESSMENT — PAIN DESCRIPTION - PAIN TYPE: TYPE: ACUTE PAIN

## 2023-12-16 ASSESSMENT — FIBROSIS 4 INDEX: FIB4 SCORE: 1.68

## 2023-12-17 ENCOUNTER — HOME CARE VISIT (OUTPATIENT)
Dept: HOME HEALTH SERVICES | Facility: HOME HEALTHCARE | Age: 88
End: 2023-12-17
Payer: MEDICARE

## 2023-12-17 ENCOUNTER — APPOINTMENT (OUTPATIENT)
Dept: RADIOLOGY | Facility: MEDICAL CENTER | Age: 88
DRG: 871 | End: 2023-12-17
Payer: MEDICARE

## 2023-12-17 PROBLEM — G93.41 SEPTIC ENCEPHALOPATHY: Status: ACTIVE | Noted: 2023-12-17

## 2023-12-17 PROBLEM — A41.9 SEPSIS (HCC): Status: ACTIVE | Noted: 2023-12-17

## 2023-12-17 PROBLEM — J96.01 ACUTE HYPOXEMIC RESPIRATORY FAILURE DUE TO COVID-19 (HCC): Status: ACTIVE | Noted: 2023-12-17

## 2023-12-17 PROBLEM — U07.1 ACUTE HYPOXEMIC RESPIRATORY FAILURE DUE TO COVID-19 (HCC): Status: ACTIVE | Noted: 2023-12-17

## 2023-12-17 PROBLEM — I35.0 AORTIC STENOSIS: Chronic | Status: ACTIVE | Noted: 2023-12-17

## 2023-12-17 LAB
ALBUMIN SERPL BCP-MCNC: 4 G/DL (ref 3.2–4.9)
ALBUMIN/GLOB SERPL: 1.7 G/DL
ALP SERPL-CCNC: 63 U/L (ref 30–99)
ALT SERPL-CCNC: 7 U/L (ref 2–50)
ANION GAP SERPL CALC-SCNC: 12 MMOL/L (ref 7–16)
AST SERPL-CCNC: 19 U/L (ref 12–45)
BASOPHILS # BLD AUTO: 0.3 % (ref 0–1.8)
BASOPHILS # BLD: 0.02 K/UL (ref 0–0.12)
BILIRUB SERPL-MCNC: 0.6 MG/DL (ref 0.1–1.5)
BUN SERPL-MCNC: 16 MG/DL (ref 8–22)
CALCIUM ALBUM COR SERPL-MCNC: 8.5 MG/DL (ref 8.5–10.5)
CALCIUM SERPL-MCNC: 8.5 MG/DL (ref 8.5–10.5)
CHLORIDE SERPL-SCNC: 103 MMOL/L (ref 96–112)
CO2 SERPL-SCNC: 23 MMOL/L (ref 20–33)
CREAT SERPL-MCNC: 1 MG/DL (ref 0.5–1.4)
EOSINOPHIL # BLD AUTO: 0 K/UL (ref 0–0.51)
EOSINOPHIL NFR BLD: 0 % (ref 0–6.9)
ERYTHROCYTE [DISTWIDTH] IN BLOOD BY AUTOMATED COUNT: 55.1 FL (ref 35.9–50)
GFR SERPLBLD CREATININE-BSD FMLA CKD-EPI: 54 ML/MIN/1.73 M 2
GLOBULIN SER CALC-MCNC: 2.4 G/DL (ref 1.9–3.5)
GLUCOSE SERPL-MCNC: 97 MG/DL (ref 65–99)
HCT VFR BLD AUTO: 39.7 % (ref 37–47)
HGB BLD-MCNC: 12.8 G/DL (ref 12–16)
IMM GRANULOCYTES # BLD AUTO: 0.01 K/UL (ref 0–0.11)
IMM GRANULOCYTES NFR BLD AUTO: 0.2 % (ref 0–0.9)
LACTATE SERPL-SCNC: 0.9 MMOL/L (ref 0.5–2)
LACTATE SERPL-SCNC: 1 MMOL/L (ref 0.5–2)
LACTATE SERPL-SCNC: 1.1 MMOL/L (ref 0.5–2)
LYMPHOCYTES # BLD AUTO: 1.24 K/UL (ref 1–4.8)
LYMPHOCYTES NFR BLD: 21.3 % (ref 22–41)
MAGNESIUM SERPL-MCNC: 1.9 MG/DL (ref 1.5–2.5)
MCH RBC QN AUTO: 31.7 PG (ref 27–33)
MCHC RBC AUTO-ENTMCNC: 32.2 G/DL (ref 32.2–35.5)
MCV RBC AUTO: 98.3 FL (ref 81.4–97.8)
MONOCYTES # BLD AUTO: 0.87 K/UL (ref 0–0.85)
MONOCYTES NFR BLD AUTO: 14.9 % (ref 0–13.4)
NEUTROPHILS # BLD AUTO: 3.68 K/UL (ref 1.82–7.42)
NEUTROPHILS NFR BLD: 63.3 % (ref 44–72)
NRBC # BLD AUTO: 0 K/UL
NRBC BLD-RTO: 0 /100 WBC (ref 0–0.2)
NT-PROBNP SERPL IA-MCNC: 3196 PG/ML (ref 0–125)
PLATELET # BLD AUTO: 156 K/UL (ref 164–446)
PMV BLD AUTO: 10.5 FL (ref 9–12.9)
POTASSIUM SERPL-SCNC: 3.8 MMOL/L (ref 3.6–5.5)
PROCALCITONIN SERPL-MCNC: 0.09 NG/ML
PROT SERPL-MCNC: 6.4 G/DL (ref 6–8.2)
RBC # BLD AUTO: 4.04 M/UL (ref 4.2–5.4)
SODIUM SERPL-SCNC: 138 MMOL/L (ref 135–145)
WBC # BLD AUTO: 5.8 K/UL (ref 4.8–10.8)

## 2023-12-17 PROCEDURE — 770001 HCHG ROOM/CARE - MED/SURG/GYN PRIV*

## 2023-12-17 PROCEDURE — A9270 NON-COVERED ITEM OR SERVICE: HCPCS | Performed by: INTERNAL MEDICINE

## 2023-12-17 PROCEDURE — 70450 CT HEAD/BRAIN W/O DYE: CPT

## 2023-12-17 PROCEDURE — 700102 HCHG RX REV CODE 250 W/ 637 OVERRIDE(OP): Performed by: INTERNAL MEDICINE

## 2023-12-17 PROCEDURE — 700111 HCHG RX REV CODE 636 W/ 250 OVERRIDE (IP): Mod: JZ

## 2023-12-17 PROCEDURE — 84145 PROCALCITONIN (PCT): CPT

## 2023-12-17 PROCEDURE — 700102 HCHG RX REV CODE 250 W/ 637 OVERRIDE(OP)

## 2023-12-17 PROCEDURE — 87040 BLOOD CULTURE FOR BACTERIA: CPT

## 2023-12-17 PROCEDURE — 36415 COLL VENOUS BLD VENIPUNCTURE: CPT

## 2023-12-17 PROCEDURE — A9270 NON-COVERED ITEM OR SERVICE: HCPCS

## 2023-12-17 PROCEDURE — 83880 ASSAY OF NATRIURETIC PEPTIDE: CPT

## 2023-12-17 PROCEDURE — 83735 ASSAY OF MAGNESIUM: CPT

## 2023-12-17 PROCEDURE — 83605 ASSAY OF LACTIC ACID: CPT | Mod: 91

## 2023-12-17 PROCEDURE — 85025 COMPLETE CBC W/AUTO DIFF WBC: CPT

## 2023-12-17 PROCEDURE — 80053 COMPREHEN METABOLIC PANEL: CPT

## 2023-12-17 PROCEDURE — 73502 X-RAY EXAM HIP UNI 2-3 VIEWS: CPT | Mod: LT

## 2023-12-17 PROCEDURE — 99223 1ST HOSP IP/OBS HIGH 75: CPT | Mod: AI,GC | Performed by: STUDENT IN AN ORGANIZED HEALTH CARE EDUCATION/TRAINING PROGRAM

## 2023-12-17 PROCEDURE — 700111 HCHG RX REV CODE 636 W/ 250 OVERRIDE (IP): Mod: JZ | Performed by: INTERNAL MEDICINE

## 2023-12-17 RX ORDER — ALBUTEROL SULFATE 90 UG/1
2 AEROSOL, METERED RESPIRATORY (INHALATION)
Status: DISCONTINUED | OUTPATIENT
Start: 2023-12-17 | End: 2023-12-18 | Stop reason: HOSPADM

## 2023-12-17 RX ORDER — BISACODYL 10 MG
10 SUPPOSITORY, RECTAL RECTAL
Status: DISCONTINUED | OUTPATIENT
Start: 2023-12-17 | End: 2023-12-18 | Stop reason: HOSPADM

## 2023-12-17 RX ORDER — POLYETHYLENE GLYCOL 3350 17 G/17G
1 POWDER, FOR SOLUTION ORAL
Status: DISCONTINUED | OUTPATIENT
Start: 2023-12-17 | End: 2023-12-18 | Stop reason: HOSPADM

## 2023-12-17 RX ORDER — ROSUVASTATIN CALCIUM 10 MG/1
10 TABLET, COATED ORAL EVERY EVENING
Status: DISCONTINUED | OUTPATIENT
Start: 2023-12-17 | End: 2023-12-18 | Stop reason: HOSPADM

## 2023-12-17 RX ORDER — FUROSEMIDE 10 MG/ML
40 INJECTION INTRAMUSCULAR; INTRAVENOUS
Status: DISCONTINUED | OUTPATIENT
Start: 2023-12-17 | End: 2023-12-18 | Stop reason: HOSPADM

## 2023-12-17 RX ORDER — ENOXAPARIN SODIUM 100 MG/ML
40 INJECTION SUBCUTANEOUS DAILY
Status: DISCONTINUED | OUTPATIENT
Start: 2023-12-17 | End: 2023-12-17

## 2023-12-17 RX ORDER — HALOPERIDOL 5 MG/ML
1 INJECTION INTRAMUSCULAR EVERY 4 HOURS PRN
Status: DISCONTINUED | OUTPATIENT
Start: 2023-12-17 | End: 2023-12-17

## 2023-12-17 RX ORDER — ASPIRIN 81 MG/1
81 TABLET ORAL DAILY
Status: DISCONTINUED | OUTPATIENT
Start: 2023-12-17 | End: 2023-12-18 | Stop reason: HOSPADM

## 2023-12-17 RX ORDER — CARVEDILOL 3.12 MG/1
3.12 TABLET ORAL 2 TIMES DAILY
Status: DISCONTINUED | OUTPATIENT
Start: 2023-12-17 | End: 2023-12-18 | Stop reason: HOSPADM

## 2023-12-17 RX ORDER — NYSTATIN 100000 [USP'U]/G
POWDER TOPICAL 2 TIMES DAILY
Status: DISCONTINUED | OUTPATIENT
Start: 2023-12-17 | End: 2023-12-18 | Stop reason: HOSPADM

## 2023-12-17 RX ORDER — LOSARTAN POTASSIUM 50 MG/1
50 TABLET ORAL EVERY EVENING
Status: DISCONTINUED | OUTPATIENT
Start: 2023-12-18 | End: 2023-12-18 | Stop reason: HOSPADM

## 2023-12-17 RX ORDER — ESCITALOPRAM OXALATE 10 MG/1
20 TABLET ORAL DAILY
Status: DISCONTINUED | OUTPATIENT
Start: 2023-12-17 | End: 2023-12-18 | Stop reason: HOSPADM

## 2023-12-17 RX ORDER — DEXAMETHASONE 4 MG/1
6 TABLET ORAL DAILY
Status: DISCONTINUED | OUTPATIENT
Start: 2023-12-17 | End: 2023-12-18 | Stop reason: HOSPADM

## 2023-12-17 RX ORDER — CHOLECALCIFEROL (VITAMIN D3) 125 MCG
5 CAPSULE ORAL NIGHTLY PRN
Status: DISCONTINUED | OUTPATIENT
Start: 2023-12-17 | End: 2023-12-18 | Stop reason: HOSPADM

## 2023-12-17 RX ORDER — HALOPERIDOL 5 MG/ML
5 INJECTION INTRAMUSCULAR EVERY 4 HOURS PRN
Status: DISCONTINUED | OUTPATIENT
Start: 2023-12-17 | End: 2023-12-18 | Stop reason: HOSPADM

## 2023-12-17 RX ORDER — AMOXICILLIN 250 MG
2 CAPSULE ORAL 2 TIMES DAILY
Status: DISCONTINUED | OUTPATIENT
Start: 2023-12-17 | End: 2023-12-18 | Stop reason: HOSPADM

## 2023-12-17 RX ORDER — ACETAMINOPHEN 650 MG/1
650 SUPPOSITORY RECTAL EVERY 4 HOURS PRN
Status: DISCONTINUED | OUTPATIENT
Start: 2023-12-17 | End: 2023-12-18 | Stop reason: HOSPADM

## 2023-12-17 RX ORDER — ACETAMINOPHEN 325 MG/1
650 TABLET ORAL EVERY 4 HOURS PRN
Status: DISCONTINUED | OUTPATIENT
Start: 2023-12-17 | End: 2023-12-18 | Stop reason: HOSPADM

## 2023-12-17 RX ADMIN — HALOPERIDOL LACTATE 4 MG: 5 INJECTION, SOLUTION INTRAMUSCULAR at 23:01

## 2023-12-17 RX ADMIN — Medication 5 MG: at 21:20

## 2023-12-17 RX ADMIN — CARVEDILOL 3.12 MG: 3.12 TABLET, FILM COATED ORAL at 05:18

## 2023-12-17 RX ADMIN — ALBUTEROL SULFATE 2 PUFF: 90 AEROSOL, METERED RESPIRATORY (INHALATION) at 19:00

## 2023-12-17 RX ADMIN — FUROSEMIDE 40 MG: 10 INJECTION, SOLUTION INTRAMUSCULAR; INTRAVENOUS at 15:35

## 2023-12-17 RX ADMIN — DEXAMETHASONE 6 MG: 4 TABLET ORAL at 05:17

## 2023-12-17 RX ADMIN — HALOPERIDOL LACTATE 1 MG: 5 INJECTION, SOLUTION INTRAMUSCULAR at 22:13

## 2023-12-17 RX ADMIN — NYSTATIN: 100000 POWDER TOPICAL at 09:03

## 2023-12-17 RX ADMIN — RIVAROXABAN 10 MG: 10 TABLET, FILM COATED ORAL at 17:21

## 2023-12-17 RX ADMIN — ROSUVASTATIN 10 MG: 10 TABLET, FILM COATED ORAL at 17:20

## 2023-12-17 RX ADMIN — ASPIRIN 81 MG: 81 TABLET, COATED ORAL at 05:17

## 2023-12-17 RX ADMIN — DOCUSATE SODIUM 50 MG AND SENNOSIDES 8.6 MG 2 TABLET: 8.6; 5 TABLET, FILM COATED ORAL at 05:17

## 2023-12-17 RX ADMIN — NYSTATIN: 100000 POWDER TOPICAL at 17:20

## 2023-12-17 RX ADMIN — DOCUSATE SODIUM 50 MG AND SENNOSIDES 8.6 MG 2 TABLET: 8.6; 5 TABLET, FILM COATED ORAL at 17:21

## 2023-12-17 RX ADMIN — ESCITALOPRAM OXALATE 20 MG: 10 TABLET ORAL at 05:17

## 2023-12-17 RX ADMIN — CARVEDILOL 3.12 MG: 3.12 TABLET, FILM COATED ORAL at 17:21

## 2023-12-17 ASSESSMENT — ENCOUNTER SYMPTOMS
PALPITATIONS: 0
FALLS: 1
BACK PAIN: 0
SHORTNESS OF BREATH: 1
SHORTNESS OF BREATH: 0
DIARRHEA: 0
COUGH: 1
COUGH: 0
HEADACHES: 0
NAUSEA: 0
HALLUCINATIONS: 0
SORE THROAT: 0
FALLS: 0
FEVER: 0
ABDOMINAL PAIN: 0
SEIZURES: 0
BLURRED VISION: 0
NERVOUS/ANXIOUS: 0
DOUBLE VISION: 0
CHILLS: 0
VOMITING: 0

## 2023-12-17 ASSESSMENT — COGNITIVE AND FUNCTIONAL STATUS - GENERAL
SUGGESTED CMS G CODE MODIFIER DAILY ACTIVITY: CL
WALKING IN HOSPITAL ROOM: TOTAL
DRESSING REGULAR UPPER BODY CLOTHING: TOTAL
STANDING UP FROM CHAIR USING ARMS: A LOT
MOVING FROM LYING ON BACK TO SITTING ON SIDE OF FLAT BED: A LOT
DAILY ACTIVITIY SCORE: 9
MOBILITY SCORE: 11
TOILETING: TOTAL
PERSONAL GROOMING: A LOT
EATING MEALS: A LITTLE
CLIMB 3 TO 5 STEPS WITH RAILING: TOTAL
SUGGESTED CMS G CODE MODIFIER MOBILITY: CL
TURNING FROM BACK TO SIDE WHILE IN FLAT BAD: A LITTLE
MOVING TO AND FROM BED TO CHAIR: A LOT
HELP NEEDED FOR BATHING: TOTAL
DRESSING REGULAR LOWER BODY CLOTHING: TOTAL

## 2023-12-17 ASSESSMENT — FIBROSIS 4 INDEX: FIB4 SCORE: 3.96

## 2023-12-17 ASSESSMENT — LIFESTYLE VARIABLES: SUBSTANCE_ABUSE: 0

## 2023-12-17 NOTE — ASSESSMENT & PLAN NOTE
Symptoms reportedly began 1 day ago, though patient denies respiratory distress (is unreliable historian). She says she is vaccinated against COVID-19, unsure if boosted.   - Admitted; placed on telemetry with continuous pulse ox.   - RT/OT protocol. IS provided.   - Started on Dexamethasone 6mg daily (12/17-12/26/2023).   - Will defer starting Remdesevir to primary as timeline uncertain.   - Procal ordered, though low suspicion for secondary bacterial infection.

## 2023-12-17 NOTE — PROGRESS NOTES
Hospital Medicine Daily Progress Note    Date of Service  12/17/2023    Chief Complaint  Linda Casas is a 89 y.o. female admitted 12/16/2023 with covid pneumonia    Hospital Course  No notes on file    Interval Problem Update  Patient was seen and examined at bedside.  No acute events overnight. Patient is resting comfortably in bed and in no acute distress.     IV decadron  IV lasix  Albuterol inhaler  Elevated BNP noted    I have discussed this patient's plan of care and discharge plan at IDT rounds today with Case Management, Nursing, Nursing leadership, and other members of the IDT team.      Code Status  DNAR/DNI    Disposition  The patient is not medically cleared for discharge to home or a post-acute facility.      I have placed the appropriate orders for post-discharge needs.    Review of Systems  Review of Systems   Constitutional:  Positive for malaise/fatigue. Negative for chills and fever.   HENT:  Negative for congestion and sore throat.    Eyes:  Negative for blurred vision and double vision.   Respiratory:  Positive for cough and shortness of breath.    Cardiovascular:  Negative for chest pain and palpitations.   Gastrointestinal:  Negative for abdominal pain, nausea and vomiting.   Genitourinary:  Negative for dysuria and urgency.   Musculoskeletal:  Negative for back pain and falls.   Neurological:  Negative for seizures and headaches.   Psychiatric/Behavioral:  Negative for hallucinations and substance abuse.         Physical Exam  Temp:  [36.5 °C (97.7 °F)-38.3 °C (100.9 °F)] 36.5 °C (97.7 °F)  Pulse:  [61-97] 74  Resp:  [15-28] 18  BP: (133-179)/() 143/78  SpO2:  [91 %-96 %] 96 %    Physical Exam  Vitals and nursing note reviewed.   HENT:      Head: Normocephalic.      Right Ear: External ear normal.      Left Ear: External ear normal.      Nose: No congestion.      Mouth/Throat:      Pharynx: No oropharyngeal exudate.   Eyes:      General: No scleral icterus.     Pupils:  Pupils are equal, round, and reactive to light.   Cardiovascular:      Rate and Rhythm: Normal rate and regular rhythm.      Heart sounds: No murmur heard.  Pulmonary:      Breath sounds: No wheezing.   Abdominal:      Palpations: Abdomen is soft.      Tenderness: There is no abdominal tenderness. There is no guarding or rebound.   Musculoskeletal:         General: No swelling or deformity.   Skin:     Capillary Refill: Capillary refill takes less than 2 seconds.      Coloration: Skin is not jaundiced.      Findings: No bruising.   Neurological:      Mental Status: She is alert.      Motor: No weakness.      Comments: Some confusion but reorients with prompting   Psychiatric:         Mood and Affect: Mood normal.         Behavior: Behavior normal.         Fluids    Intake/Output Summary (Last 24 hours) at 12/17/2023 1338  Last data filed at 12/17/2023 1112  Gross per 24 hour   Intake 240 ml   Output 400 ml   Net -160 ml       Laboratory  Recent Labs     12/16/23 2036 12/17/23  0435   WBC 7.3 5.8   RBC 4.06* 4.04*   HEMOGLOBIN 12.8 12.8   HEMATOCRIT 39.2 39.7   MCV 96.6 98.3*   MCH 31.5 31.7   MCHC 32.7 32.2   RDW 54.1* 55.1*   PLATELETCT 159* 156*   MPV 10.3 10.5     Recent Labs     12/16/23 2036 12/17/23  0435   SODIUM 135 138   POTASSIUM 3.6 3.8   CHLORIDE 100 103   CO2 22 23   GLUCOSE 108* 97   BUN 18 16   CREATININE 1.02 1.00   CALCIUM 9.1 8.5                   Imaging  DX-HIP-COMPLETE - UNILATERAL 2+ LEFT   Final Result      1.  Near-anatomic alignment of left hip status post ORIF.      CT-HEAD W/O   Final Result      1.  Cerebral atrophy.      2.  White matter lucencies most consistent with small vessel ischemic change versus demyelination or gliosis.      3.  Otherwise, Head CT without contrast with no acute findings. No evidence of acute cerebral infarction, hemorrhage or mass lesion.         DX-CHEST-PORTABLE (1 VIEW)   Final Result      No acute cardiac or pulmonary abnormalities are identified.            Assessment/Plan  * Acute hypoxemic respiratory failure due to COVID-19 (HCC)- (present on admission)  Assessment & Plan  Symptoms reportedly began 1 day ago, though patient denies respiratory distress (is unreliable historian). She says she is vaccinated against COVID-19, unsure if boosted.  - Admitted; placed on telemetry with continuous pulse ox.   - RT/OT protocol. IS provided.   - Started on Dexamethasone 6mg daily (12/17-12/26/2023).   No indication for remdesivir or baricitinib given oxygen requirements <6L  No leukocytosis, negative procal - no indication for antibiotics      Aortic stenosis- (present on admission)  Assessment & Plan  History of. Most recent TTE (7/2022) shows improved eccentric aortic stenosis, LVEF 70%.       Hyperlipidemia- (present on admission)  Assessment & Plan  History of.   - Continue home Rosuvastatin.     Debility- (present on admission)  Assessment & Plan  Present on admission.   - PT/OT consults placed. May benefit from SW consult as well if family requires assistance with caretaking responsibilities.    Septic encephalopathy  Assessment & Plan  This is Sepsis Present on admission      Major depressive disorder with single episode, 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HCC)- (present on admission)  Assessment & Plan  History of.   - Continue home Lexapro.     Fall- (present on admission)  Assessment & Plan  Reported by patient, who points to left hip.   - Hip XR ordered. Tylenol 650mg q4hrs PRN started for pain.    Anxiety- (present on admission)  Assessment & Plan  History of.   - Continue home meds.          VTE prophylaxis:    Xarelto 10mg daily as prophylaxis      I have performed a physical exam and reviewed and updated ROS and Plan today (12/17/2023). In review of yesterday's note (12/16/2023), there are no changes except as documented above.

## 2023-12-17 NOTE — ASSESSMENT & PLAN NOTE
06/22/21 1148   Plan   Final Discharge Disposition Code 03 - skilled nursing facility (SNF)   Final Note Patient will discharge to [facility] today.      History of.   - Continue home Rosuvastatin.

## 2023-12-17 NOTE — ED NOTES
Pt transferred off unit by tele RN and tech. All personal belongings taken w pt. NAD noted at time of transfer

## 2023-12-17 NOTE — CARE PLAN
The patient is Stable - Low risk of patient condition declining or worsening         Progress made toward(s) clinical / shift goals:  yes      Problem: Hemodynamics  Goal: Patient's hemodynamics, fluid balance and neurologic status will be stable or improve  Description: Target End Date:  Prior to discharge or change in level of care    Document on Assessment and I/O flowsheet templates    1.  Monitor vital signs, pulse oximetry and cardiac monitor per provider order and/or policy  2.  Maintain blood pressure per provider order  3.  Hemodynamic monitoring per provider order  4.  Manage IV fluids and IV infusions  5.  Monitor intake and output  6.  Daily weights per unit policy or provider order  7.  Assess peripheral pulses and capillary refill  8.  Assess color and body temperature  9.  Position patient for maximum circulation/cardiac output  10. Monitor for signs/symptoms of excessive bleeding  11. Assess mental status, restlessness and changes in level of consciousness  12. Monitor temperature and report fever or hypothermia to provider immediately. Consideration of targeted temperature management.  Outcome: Progressing     Problem: Respiratory  Goal: Patient will achieve/maintain optimum respiratory ventilation and gas exchange  Description: Target End Date:  Prior to discharge or change in level of care    Document on Assessment flowsheet    1.  Assess and monitor rate, rhythm, depth and effort of respiration  2.  Breath sounds assessed qshift and/or as needed  3.  Assess O2 saturation, administer/titrate oxygen as ordered  4.  Position patient for maximum ventilatory efficiency  5.  Turn, cough, and deep breath with splinting to improve effectiveness  6.  Collaborate with RT to administer medication/treatments per order  7.  Encourage use of incentive spirometer and encourage patient to cough after use and utilize splinting techniques if applicable  8.  Airway suctioning  9.  Monitor sputum production for  changes in color, consistency and frequency  10. Perform frequent oral hygiene  11. Alternate physical activity with rest periods  Outcome: Progressing  Flowsheets (Taken 12/17/2023 0420)  O2 Delivery Device: Silicone Nasal Cannula     Problem: Infection - Standard  Goal: Patient will remain free from infection  Description: Target End Date:  Prior to discharge or change in level of care    1.  Utilize Standard Precautions at all times to reduce the risk of transmission of microorganisms from both recognized and  unrecognized sources of infection  2.  Infection prevention handouts provided (general/device/diagnosis specific) and documented in Patient Education  3.  Educate patient and family/caregiver on isolation precautions if applicable  Outcome: Progressing  Flowsheets (Taken 12/17/2023 0423)  Standard Infection Interventions:   Assessed for signs and symptoms of infection   Instructed patient/family on signs and symptoms of infection   Provided education on proper hand hygiene and infection prevention measures  Note: Enhanced droplet precautions     Patient is not progressing towards the following goals:

## 2023-12-17 NOTE — ASSESSMENT & PLAN NOTE
Reported by patient, who points to left hip.   - Hip XR ordered. Tylenol 650mg q4hrs PRN started for pain.

## 2023-12-17 NOTE — ED NOTES
Admitting MD at bedside    PROBLEM VISIT     Date of service: 2020    Lorenzo Lundborg  Is a 28 y.o.  female    PT's PCP is: DOT Joshua - CNP     : 1984                                             Subjective:       No LMP recorded (lmp unknown). OB History    Para Term  AB Living   6 5 3 2 1 5   SAB TAB Ectopic Molar Multiple Live Births           0 5      # Outcome Date GA Lbr Arnav/2nd Weight Sex Delivery Anes PTL Lv   6 Term 10/09/19 39w0d / 00:14 7 lb 2 oz (3.232 kg) M Vag-Spont EPI N CARLOS ALBERTO   5 Term 17 38w3d 01:17 / 00:12 7 lb 11.4 oz (3.498 kg) M Vag-Spont EPI N CARLOS ALBERTO      Birth Comments: Knot in umbilical cord    4 Term 10/28/10 39w0d  6 lb 13 oz (3.09 kg) F Vag-Spont EPI N CARLOS ALBERTO      Birth Comments: Progesterone injections with this pregnancy    3 AB 09 4w0d    SAB      2  06 35w0d  5 lb 13 oz (2.637 kg) M Vag-Spont EPI Y CARLOS ALBERTO   1  02 36w0d  5 lb 3 oz (2.353 kg) F Vag-Spont EPI Y CARLOS ALBERTO        Social History     Tobacco Use   Smoking Status Former Smoker   Smokeless Tobacco Never Used   Tobacco Comment    3+ years        Social History     Substance and Sexual Activity   Alcohol Use No       Social History     Substance and Sexual Activity   Sexual Activity Yes    Partners: Male    Birth control/protection: I.U.D. Allergies: Bactrim [sulfamethoxazole-trimethoprim]    Chief Complaint   Patient presents with    Abdominal Pain     follow up in house ultrasound. H/O RLQ pain . Last Yearly:  2020    Last pap: 2020    Last HPV: 2020    PE:  Vital Signs  Blood pressure 114/78, height 5' 3\" (1.6 m), weight 139 lb (63 kg), not currently breastfeeding.          NURSE: Kevin MCCOY    HPI: here to f/u irregular bleeding and yellow vaginal discharge, sono and cultures negative, IUD in normal position      PT denies fever, chills, nausea and vomiting       Objective: No acute distress  Excellent communications  Well-nourished    Results reviewed today:  Sono:  UTERUS: heterogenous appearing anteverted uterus, WNL     ENDO: measures 3 mm, IUD noted in good position     RT. OVARY: WNL     LT. OVARY: WNL     No free fluid  No results found for this visit on 07/22/20. Assessment and Plan          Diagnosis Orders   1. Irregular menses     2. Vaginal discharge       Patient desires permanent sterilization, will call for consult herself. Interval continue with mirena        I am having Danielle NATALIIA Ceronon maintain her busPIRone, Prenatal MV-Min-Fe Fum-FA-DHA (PRENATAL 1 PO), acetaminophen, buPROPion, sertraline, progesterone, Norethin Ace-Eth Estrad-FE, and (Levonorgestrel (MIRENA, 52 MG, IU)). No follow-ups on file. to have colposcopy on 8/17/20 with Dr. Bee Triplett    There are no Patient Instructions on file for this visit. Over 50% of time spent on counseling and care coordination on: see assessment and plan,  She was also counseled on her preventative health maintenance recommendations and follow-up.         FF time: 15 min      Nilo Burrows,7/24/2020 6:12 PM

## 2023-12-17 NOTE — ASSESSMENT & PLAN NOTE
Present on admission.   - PT/OT consults placed. May benefit from SW consult as well if family requires assistance with caretaking responsibilities.

## 2023-12-17 NOTE — ASSESSMENT & PLAN NOTE
Symptoms reportedly began 1 day ago, though patient denies respiratory distress (is unreliable historian). She says she is vaccinated against COVID-19, unsure if boosted.  - Admitted; placed on telemetry with continuous pulse ox.   - RT/OT protocol. IS provided.   - Started on Dexamethasone 6mg daily (12/17-12/26/2023).   No indication for remdesivir or baricitinib given oxygen requirements <6L  No leukocytosis, negative procal - no indication for antibiotics

## 2023-12-17 NOTE — ED NOTES
Med rec complete per patients home pharmacy  Patient unable to participate in interivew  Allergies reviewed.    Anticoagulants taken in the last 14 days? None per pharmacy    Patients preferred pharmacy: [facility]    Vikki Barker CPhT

## 2023-12-17 NOTE — ED PROVIDER NOTES
ER Provider Note    Scribed for Dr. Michael Zapata M.D. by Magnolia Tapia. 12/16/2023  8:26 PM    Primary Care Provider: Lakeshia Kuo M.D.    CHIEF COMPLAINT  Chief Complaint   Patient presents with    Mental Status Change     BIBA from home for AMS. Per EMS family reports pt LKW 1100. Per EMS family called pt PCP at 1100 to report pt was SOB. Family at home +covid.  Pt woke up at 1845 altered and weak. On scene VSS, GCS 14, -NIH.        EXTERNAL RECORDS REVIEWED  Outpatient Notes Office note reviewed from November of this year shows the patient has a history of dementia without behavioral disturbance, hypertension, AFIB, anemia, and chronic kidney disease.     HPI/ROS  LIMITATION TO HISTORY   Select: Altered mental status / Confusion    OUTSIDE HISTORIAN(S):  EMS reports most of the entire history to nursing, who then reported to me.     Linda Casas is a 89 y.o. female who presents to the ED for altered mental status onset 6:45 PM, about 2 hours ago. EMS reported to nursing that the patient was called in from her home. The patient's family reports that many of her family members are recently diagnosed with COVID. Patient was to see Primary Care Provider as today around 11 AM she felt short of breath. They are unsure if the patient has COVID. The patient then took a nap, however when she woke up around 6:45 PM, she had an altered mental status and was weak. EMS reports on scene the patient had a GCS of 14. Patient notes she is coughing. She denies abdominal pain. There are no known alleviating or exacerbating factors.      PAST MEDICAL HISTORY  Past Medical History:   Diagnosis Date    Anesthesia     Dtr-in-law states patient took a very long time to recover from anesthesia, states patient was incoherent and extremely weak    Anxiety 09/14/2010    Cataract     IOL    Congestive heart failure (HCC) 2022    High cholesterol     Hypertension     history of but no treatment    Myocardial  infarct (HCC) May2022    OSTEOPOROSIS 09/14/2010    Pain 03/04/2014 6/10=L knee    Pain 11/04/2021    low back    Shingles 11/9/2023    Snoring     no sleep study    Urinary incontinence     only at times at night when sleeping       SURGICAL HISTORY  Past Surgical History:   Procedure Laterality Date    PB OPEN FIX INTER/SUBTROCH FX,IMPLNT Left 10/28/2023    Procedure: INSERTION, INTRAMEDULLARY THIERRY, FEMUR, PROXIMAL;  Surgeon: Richard Mckenzie M.D.;  Location: SURGERY [facility];  Service: Trauma    KYPHOPLASTY  6/15/2023    Procedure: L2 KYPHOPLASTY WITH BIOPSY;  Surgeon: Tim Barton M.D.;  Location: SURGERY [facility];  Service: Orthopedics    KYPHOPLASTY N/A 11/06/2021    Procedure: KYPHOPLASTY - L1 AND BIOPSY;  Surgeon: Tim Barton M.D.;  Location: [facility];  Service: Orthopedics    TRIGGER FINGER RELEASE Left 05/29/2018    Procedure: TRIGGER FINGER RELEASE-  MIDDLE;  Surgeon: Frandy Liz M.D.;  Location: [facility];  Service: Orthopedics    JULIAN BY LAPAROSCOPY  05/23/2016    Procedure: JULIAN BY LAPAROSCOPY;  Surgeon: Adan Kearns M.D.;  Location: [facility];  Service:     KNEE ARTHROSCOPY  03/11/2014    Performed by Bonilla Valera M.D. at [facility]    BLADDER SUSPENSION  2001    HYSTERECTOMY, TOTAL ABDOMINAL  2000    APPENDECTOMY  1969    COLON RESECTION      partial; no CA    OTHER CARDIAC SURGERY  2022       FAMILY HISTORY  Family History   Problem Relation Age of Onset    No Known Problems Mother     No Known Problems Father        SOCIAL HISTORY   reports that she has never smoked. She has never been exposed to tobacco smoke. She has never used smokeless tobacco. She reports that she does not currently use alcohol. She reports that she does not use drugs.    CURRENT MEDICATIONS  Previous Medications    CARVEDILOL (COREG) 3.125 MG TAB    Take 1 Tablet by mouth 2 times a day.    CEFDINIR (OMNICEF) 300 MG CAP    Take 1 Capsule by  "mouth 2 times a day.    ESCITALOPRAM (LEXAPRO) 20 MG TABLET    Take 1 Tablet by mouth every day.    GOODSENSE ASPIRIN LOW DOSE 81 MG EC TABLET    TAKE 1 TABLET BY MOUTH EVERY DAY.    LOSARTAN (COZAAR) 50 MG TAB    Take 1 Tablet by mouth every evening.    QUETIAPINE (SEROQUEL) 25 MG TAB    Take 25 mg at bet time    ROSUVASTATIN (CRESTOR) 10 MG TAB    Take 1 Tablet by mouth every evening.    VALACYCLOVIR (VALTREX) 500 MG TAB    Take 1 Tablet by mouth 2 times a day.       ALLERGIES  Ampicillin, Bactrim ds, and Ciprofloxacin    PHYSICAL EXAM  BP (!) 143/102   Pulse 91   Temp 37.8 °C (100.1 °F) (Temporal)   Resp 16   Ht 1.626 m (5' 4\")   Wt 67.6 kg (149 lb)   SpO2 91%   BMI 25.58 kg/m²   Constitutional: Alert in no apparent distress.Confused, no slurring of words, follows commands  HENT: No signs of trauma, Bilateral external ears normal, Nose normal.   Eyes: Pupils are equal and reactive, Conjunctiva normal, Non-icteric.   Neck: Normal range of motion, No tenderness, Supple, No stridor.   Lymphatic: No lymphadenopathy noted.   Cardiovascular: Regular rate and rhythm, no murmurs.   Thorax & Lungs: Normal breath sounds, No respiratory distress, No wheezing, No chest tenderness.   Abdomen: Bowel sounds normal, Soft, No tenderness, No masses, No pulsatile masses. No peritoneal signs.  Skin: Warm, Dry, No erythema, No rash.   Back: No bony tenderness, No CVA tenderness.   Extremities: Intact distal pulses, No edema, No tenderness, No cyanosis.  Musculoskeletal: Good range of motion in all major joints. No tenderness to palpation or major deformities noted.   Neurologic: Alert , Normal motor function, Normal sensory function, No focal deficits noted. Confused, no slurring of words, follows commands  Psychiatric: Affect normal, Mood normal. Confused, no slurring of words, follows commands      DIAGNOSTIC STUDIES & PROCEDURES    Labs:   Labs Reviewed   CBC WITH DIFFERENTIAL - Abnormal; Notable for the following " components:       Result Value    RBC 4.06 (*)     RDW 54.1 (*)     Platelet Count 159 (*)     Neutrophils-Polys 80.40 (*)     Lymphocytes 9.10 (*)     Lymphs (Absolute) 0.67 (*)     All other components within normal limits    Narrative:     Biotin intake of greater than 5 mg per day may interfere with  troponin levels, causing false low values.   BASIC METABOLIC PANEL - Abnormal; Notable for the following components:    Glucose 108 (*)     All other components within normal limits    Narrative:     Biotin intake of greater than 5 mg per day may interfere with  troponin levels, causing false low values.   URINALYSIS,CULTURE IF INDICATED - Abnormal; Notable for the following components:    Ketones 15 (*)     Occult Blood Small (*)     All other components within normal limits    Narrative:     Indication for culture:->Patient WITHOUT an indwelling Diaz  catheter in place with new onset of Dysuria, Frequency,  Urgency, and/or Suprapubic pain   ESTIMATED GFR - Abnormal; Notable for the following components:    GFR (CKD-EPI) 52 (*)     All other components within normal limits    Narrative:     Biotin intake of greater than 5 mg per day may interfere with  troponin levels, causing false low values.   URINE MICROSCOPIC (W/UA) - Abnormal; Notable for the following components:    RBC 10-20 (*)     All other components within normal limits    Narrative:     Indication for culture:->Patient WITHOUT an indwelling Diaz  catheter in place with new onset of Dysuria, Frequency,  Urgency, and/or Suprapubic pain   POC COV-2, FLU A/B, RSV BY PCR - Abnormal; Notable for the following components:    POC SARS-CoV-2, PCR DETECTED (*)     All other components within normal limits   TROPONIN    Narrative:     Biotin intake of greater than 5 mg per day may interfere with  troponin levels, causing false low values.   HEPATIC FUNCTION PANEL    Narrative:     Biotin intake of greater than 5 mg per day may interfere with  troponin levels,  causing false low values.   MAGNESIUM   PROCALCITONIN   POCT COV-2, FLU A/B, RSV BY PCR      All labs reviewed by me.    EKG Interpretation:  Interpreted by me  Sinus 93  12 Lead EKG interpreted by me to show:  Normal sinus rhythm  Rate 93  Axis: Normal  Intervals: Normal  Normal T waves, no inversions  Normal ST segments, no elevation or depression  Conduction: Left bundle branch block with same morphology as compared to prior.  My impression of this EKG: Sinus 93    Radiology:   The attending Emergency Physician has independently interpreted the diagnostic imaging associated with this visit and is awaiting the final reading from the radiologist, which will be displayed below.    Preliminary interpretation is a follows: CXR with no acute abnormalities   Radiologist interpretation:    CT-HEAD W/O   Final Result      1.  Cerebral atrophy.      2.  White matter lucencies most consistent with small vessel ischemic change versus demyelination or gliosis.      3.  Otherwise, Head CT without contrast with no acute findings. No evidence of acute cerebral infarction, hemorrhage or mass lesion.         DX-CHEST-PORTABLE (1 VIEW)   Final Result      No acute cardiac or pulmonary abnormalities are identified.      DX-HIP-COMPLETE - UNILATERAL 2+ LEFT    (Results Pending)        COURSE & MEDICAL DECISION MAKING    ED Observation Status? No; Patient does not meet criteria for ED Observation.     INITIAL ASSESSMENT AND PLAN  Care Narrative:       8:32 PM - Patient was evaluated at bedside; Patient is a 89 y.o. female who presents for evaluation of altered mental status following waking up from a nap two hours ago, associated with tactile fever, shortness of breath, and known COVID contacts. Exam reveals Confused, no slurring of words, follows commands. Stating at 89/88% the lowest. Temperature 100.1 in room. Discussed with patient and/or family that labs, imaging, and EKG are ordered to further evaluate. DX-chest, CT-Head  without,  UA, CBC w/ diff, BMP, Troponin, and EKG ordered. Also noted plan to order COVID, Flu, RSV PCR to further evaluate.  Patient and/or family agrees to the plan of care.     9:26 PM - Patient medicated with Tylenol 650 mg for their symptoms.     1:07 AM I discussed the patient's case and the above findings with Dr. Yoon (Internal Medicine) who will admit the patient given positive COVID result, hypoxia, and altered mental status. Patient advised on this plan of care and she agrees.      ADDITIONAL PROBLEM LIST AND DISPOSITION  Patient with alteration in mental status.  I will evaluate for urinary tract infection.  Evaluate for viral syndrome.  The patient does have hypoxia.  Will get a chest x-ray to evaluate for pneumonia.  Will reassess after this.    Given alteration in mental status the patient had a CT scan of the head performed.  This was negative.  The patient has COVID.  The patient is no infection in the urine.  The patient has a nonischemic EKG.  The patient will be admitted to the hospital in guarded condition.               DISPOSITION AND DISCUSSIONS  I have discussed management of the patient with the following physicians and THALIA's: Dr. Yoon (Internal Medicine)    Discussion of management with other [facility] or appropriate source(s): None     Barriers to care at this time, including but not limited to:  None .     Decision tools and prescription drugs considered including, but not limited to: Pain Medications Tylenol .    DISPOSITION:  Patient will be hospitalized by Dr. Yoon (Internal Medicine) in guarded condition.     FINAL IMPRESSION   1. Confusion    2. COVID    3. Hypoxia         Magnolia TOWNSEND (Didi), am scribing for, and in the presence of, Michael Zapata M.D..    Electronically signed by: Magnolia Tapia (Didi), 12/16/2023    Michael TOWNSEND M.D. personally performed the services described in this documentation, as scribed by Magnolia Tapia in my presence, and it is both accurate and  complete.    The note accurately reflects work and decisions made by me.  Michael Zapata M.D.  12/17/2023  2:38 AM

## 2023-12-17 NOTE — ASSESSMENT & PLAN NOTE
This is Sepsis Present on admission  SIRS criteria identified on my evaluation include: Fever, with temperature greater than 100.9 deg F, Tachycardia, with heart rate greater than 90 BPM, Tachypnea, with respirations greater than 20 per minute, and Leukocytosis, with WBC greater than 12,000  Clinical indicators of end organ dysfunction include Toxic Metabolic Encephalopathy and GCS < 15  Source is likely pulmonary  Sepsis protocol initiated  Crystalloid Fluid Administration: Fluid resuscitation not ordered per standard protocol - 30 mL/kg per current or ideal body weight (given concurrent HF)  IV antibiotics not indicated as source is viral rather than bacterial.   Reassessment: I have reassessed the patient's hemodynamic status    Plan:   - Blood cultures x2, lactic acid panel ordered. Procal pending.   - Started on Dexamethasone 6mg daily (12/17-12/26/2023) for COVID-19.   - See A/P for acute hypoxic respiratory failure due to COVID-19.

## 2023-12-17 NOTE — H&P
R Internal Medicine History & Physical Note    Date of Service  12/17/2023    R Team: CATHERINE   Attending: Miguel Yoon M.d.  Senior Resident: Randal Dewey DO  Contact Number: [phone]    Primary Care Physician  Lakeshia Kuo M.D.    Consultants  N/A    Code Status  DNAR/DNI    Chief Complaint  Chief Complaint   Patient presents with    Mental Status Change     BIBA from home for AMS. Per EMS family reports pt LKW 1100. Per EMS family called pt PCP at 1100 to report pt was SOB. Family at home +covid.  Pt woke up at 1845 altered and weak. On scene VSS, GCS 14, -NIH.      History of Presenting Illness (HPI):   Linda Casas is a 89yoF with prior MI (5/2022), HFrEF (TTE, 2022), hypertension, hyperlipidemia, and anxiety who presented 12/16/2023 with 1-day history of confusion and hypoxia in the setting of known COVID-19 exposure.     Exam notable for fever (100.8 F), hypertension (SBP 170s), hypoxia (requiring 2L NC; baseline: RA), diffusely reduced lung sounds, 3/6 systolic murmur (loudest at mitral region; non-radiating), and thin appearance with temporal wasting. She is AO x1 (to self only; says it is 1999, unsure where she is and for what reason). Labs show normal WBCs (with left shift), normal trops, and non-inflammatory urine, though viral panel is positive for COVID-19.    CT head (12/16/2023) shows cerebral atrophy and suspected small vessel ischemic disease with no acute abnormalities. CXR (12/16/2023) shows no infiltrates or pulmonary vascular congestion.     She is an unreliable historian, denying shortness of breath or confusion. She says she is vaccinated, unsure if boosted. She also says she fell but cannot recall when, points to left hip (normal in appearance).     I discussed the plan of care with patient and Dr. Miguel Yoon MD .    Review of Systems  Review of Systems   Constitutional:  Negative for chills and fever.   Eyes:  Negative for double vision.  "  Respiratory:  Negative for cough and shortness of breath.    Cardiovascular:  Negative for chest pain.   Gastrointestinal:  Negative for abdominal pain, diarrhea, nausea and vomiting.   Genitourinary:  Negative for dysuria.   Musculoskeletal:  Positive for falls (unsure of timing however).   Neurological:  Negative for headaches.   Psychiatric/Behavioral:  Negative for substance abuse. The patient is not nervous/anxious.      Past Medical History   has a past medical history of Anesthesia, Anxiety (09/14/2010), Cataract, Congestive heart failure (HCC) (2022), High cholesterol, Hypertension, Myocardial infarct (HCC) (May2022), OSTEOPOROSIS (09/14/2010), Pain (03/04/2014), Pain (11/04/2021), Shingles (11/9/2023), Snoring, and Urinary incontinence.    Surgical History   has a past surgical history that includes hysterectomy, total abdominal (2000); bladder suspension (2001); knee arthroscopy (03/11/2014); regla by laparoscopy (05/23/2016); trigger finger release (Left, 05/29/2018); colon resection; appendectomy (1969); kyphoplasty (N/A, 11/06/2021); other cardiac surgery (2022); kyphoplasty (6/15/2023); and pr open fix inter/subtroch fx,implnt (Left, 10/28/2023).     Family History  family history includes No Known Problems in her father and mother.   Family history reviewed with patient.     Social History  Tobacco: Denied.   Alcohol: Discloses once monthly indulgence in scotch.   Recreational drugs (illegal or prescription): Denied.   Employment: Retired.   Living Situation: Lives with family.  Recent Travel: Denied.   Primary Care Provider: Reviewed Dr. Kuo  Other (stressors, spirituality, exposures): N/A    Allergies  Allergies   Allergen Reactions    Ampicillin Hives, Rash and Swelling     Feels \"rotten\"     Bactrim Ds Hives, Itching and Swelling     Mouth, lip, tongue, eye swelling, pain, itching    Ciprofloxacin Hives, Rash and Swelling     .       Medications  Prior to Admission Medications "   Prescriptions Last Dose Informant Patient Reported? Taking?   GOODSENSE ASPIRIN LOW DOSE 81 MG EC tablet  Family Member No No   Sig: TAKE 1 TABLET BY MOUTH EVERY DAY.   QUEtiapine (SEROQUEL) 25 MG Tab   No No   Sig: Take 25 mg at bet time   carvedilol (COREG) 3.125 MG Tab   No No   Sig: Take 1 Tablet by mouth 2 times a day.   cefdinir (OMNICEF) 300 MG Cap   No No   Sig: Take 1 Capsule by mouth 2 times a day.   escitalopram (LEXAPRO) 20 MG tablet   No No   Sig: Take 1 Tablet by mouth every day.   losartan (COZAAR) 50 MG Tab   No No   Sig: Take 1 Tablet by mouth every evening.   rosuvastatin (CRESTOR) 10 MG Tab   No No   Sig: Take 1 Tablet by mouth every evening.   valACYclovir (VALTREX) 500 MG Tab   No No   Sig: Take 1 Tablet by mouth 2 times a day.      Facility-Administered Medications: None       Physical Exam  Temp:  [37.1 °C (98.7 °F)-38.3 °C (100.9 °F)] 37.1 °C (98.7 °F)  Pulse:  [67-97] 67  Resp:  [15-28] 19  BP: (143-178)/() 155/83  SpO2:  [91 %-96 %] 96 %  Blood Pressure : (!) 161/83   Temperature: 37.1 °C (98.7 °F)   Pulse: 79   Respiration: 19   Pulse Oximetry: 94 %       Physical Exam  Constitutional:       General: She is not in acute distress.     Appearance: She is ill-appearing.      Comments: Thin appearance with temporal wasting, prominent ribs, squared shoulders.    HENT:      Mouth/Throat:      Mouth: Mucous membranes are moist.      Pharynx: Oropharynx is clear.   Eyes:      Extraocular Movements: Extraocular movements intact.      Conjunctiva/sclera: Conjunctivae normal.      Pupils: Pupils are equal, round, and reactive to light.   Cardiovascular:      Rate and Rhythm: Normal rate and regular rhythm.      Pulses: Normal pulses.      Heart sounds: Murmur (3/6 systolic murmur, loudest at mitral region) heard.   Pulmonary:      Effort: Pulmonary effort is normal. No respiratory distress.      Breath sounds: No wheezing or rales.      Comments: Diffusely reduced lung sounds. On 2L  "NC.  Abdominal:      General: Abdomen is flat. Bowel sounds are normal. There is no distension.      Palpations: Abdomen is soft.      Tenderness: There is no abdominal tenderness.   Musculoskeletal:         General: No swelling. Normal range of motion.      Cervical back: Normal range of motion.   Skin:     General: Skin is warm and dry.      Capillary Refill: Capillary refill takes less than 2 seconds.      Findings: Bruising (present on both wrists, calves) present.   Neurological:      Mental Status: She is alert. She is disoriented.      Motor: No weakness.         Laboratory:  Recent Labs     12/16/23 2036   WBC 7.3   RBC 4.06*   HEMOGLOBIN 12.8   HEMATOCRIT 39.2   MCV 96.6   MCH 31.5   MCHC 32.7   RDW 54.1*   PLATELETCT 159*   MPV 10.3     Recent Labs     12/16/23 2036   SODIUM 135   POTASSIUM 3.6   CHLORIDE 100   CO2 22   GLUCOSE 108*   BUN 18   CREATININE 1.02   CALCIUM 9.1     Recent Labs     12/16/23 2036   ALTSGPT 8   ASTSGOT 20   ALKPHOSPHAT 72   TBILIRUBIN 0.7   DBILIRUBIN 0.3   GLUCOSE 108*         No results for input(s): \"NTPROBNP\" in the last 72 hours.      Recent Labs     12/16/23 2036   TROPONINT 18       Imaging:  CT-HEAD W/O   Final Result      1.  Cerebral atrophy.      2.  White matter lucencies most consistent with small vessel ischemic change versus demyelination or gliosis.      3.  Otherwise, Head CT without contrast with no acute findings. No evidence of acute cerebral infarction, hemorrhage or mass lesion.         DX-CHEST-PORTABLE (1 VIEW)   Final Result      No acute cardiac or pulmonary abnormalities are identified.      DX-HIP-COMPLETE - UNILATERAL 2+ LEFT    (Results Pending)     X-Ray:  I have personally reviewed the images and compared with prior images.  EKG:  I have personally reviewed the images and compared with prior images.    Scheduled Medications   Medication Dose Frequency    senna-docusate  2 Tablet BID    enoxaparin (LOVENOX) injection  30 mg Q12HRS    " dexamethasone  6 mg DAILY    escitalopram  20 mg DAILY    rosuvastatin  10 mg Q EVENING    [START ON 12/18/2023] losartan  50 mg Q EVENING    carvedilol  3.125 mg BID    aspirin  81 mg DAILY      Assessment/Plan:  Problem Representation: 89yoF with prior MI (5/2022), HFrEF (TTE, 2022), hypertension, hyperlipidemia, and anxiety who presented 12/16/2023 with 1-day history of confusion and hypoxia in the setting of known COVID-19 exposure.    I anticipate this patient will require at least two midnights for appropriate medical management, necessitating inpatient admission because of acute hypoxic respiratory failure secondary to COVID-19 pneumonia.    Patient will need a Telemetry bed on MEDICINE service. The need is secondary to hypoxia.    * Acute hypoxemic respiratory failure due to COVID-19 (HCC)- (present on admission)  Assessment & Plan  Symptoms reportedly began 1 day ago, though patient denies respiratory distress (is unreliable historian). She says she is vaccinated against COVID-19, unsure if boosted.  - Admitted; placed on telemetry with continuous pulse ox.   - RT/OT protocol. IS provided.   - Started on Dexamethasone 6mg daily (12/17-12/26/2023).   - Will defer starting Remdesevir to primary as timeline uncertain.   - Will defer starting empiric antibiotics as low suspicion for secondary bacterial infection.   - TTE ordered to follow up on aortic stenosis, last surveilled approx 1 year ago.     Septic encephalopathy  Assessment & Plan  This is Sepsis Present on admission  SIRS criteria identified on my evaluation include: Fever, with temperature greater than 100.9 deg F, Tachycardia, with heart rate greater than 90 BPM, Tachypnea, with respirations greater than 20 per minute, and Leukocytosis, with WBC greater than 12,000  Clinical indicators of end organ dysfunction include Toxic Metabolic Encephalopathy and GCS < 15  Source is likely viral pneumonia.  Sepsis protocol initiated  Crystalloid Fluid  Administration: Resuscitation volume of 0mL ordered. Reason that resuscitation volume of less than 30ml/kg was ordered concern for causing harm given CHF  IV antibiotics as appropriate for source of sepsis  Reassessment: I have reassessed the patient's hemodynamic status    Labs show normal WBCs, though neutrophilic shift is present. CXR shows no infiltrates. U/A noninflammatory.   - Blood cultures x2, lactic acid panel ordered. Procal pending.   - Started on Dexamethasone 6mg daily (12/17-12/26/2023) for COVID-19.   - Delirium, high-risk fall precautions ordered.   - See A/P for acute hypoxic respiratory failure due to COVID-19.    Aortic stenosis- (present on admission)  Assessment & Plan  History of. Most recent TTE (7/2022) shows improved eccentric aortic stenosis, LVEF 70%.   - Cont home Carvedilol, Losartan with holding parameters.   - Repeat TTE ordered.     Debility- (present on admission)  Assessment & Plan  Present on admission.   - PT/OT consults placed. May benefit from SW consult as well if family requires assistance with caretaking responsibilities.    Fall- (present on admission)  Assessment & Plan  Reported by patient, who points to left hip.   - Hip XR ordered. Tylenol 650mg q4hrs PRN started for pain.    Major depressive disorder with single episode, 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HCC)- (present on admission)  Assessment & Plan  History of.   - Continue home Lexapro.     Hyperlipidemia- (present on admission)  Assessment & Plan  History of.   - Continue home Rosuvastatin.     Anxiety- (present on admission)  Assessment & Plan  History of.   - Continue home meds.         VTE prophylaxis: enoxaparin ppx

## 2023-12-18 ENCOUNTER — PHARMACY VISIT (OUTPATIENT)
Dept: PHARMACY | Facility: MEDICAL CENTER | Age: 88
End: 2023-12-18
Payer: COMMERCIAL

## 2023-12-18 ENCOUNTER — HOME CARE VISIT (OUTPATIENT)
Dept: HOME HEALTH SERVICES | Facility: HOME HEALTHCARE | Age: 88
End: 2023-12-18
Payer: MEDICARE

## 2023-12-18 VITALS
WEIGHT: 142.42 LBS | OXYGEN SATURATION: 94 % | HEIGHT: 64 IN | DIASTOLIC BLOOD PRESSURE: 77 MMHG | BODY MASS INDEX: 24.31 KG/M2 | RESPIRATION RATE: 17 BRPM | HEART RATE: 85 BPM | SYSTOLIC BLOOD PRESSURE: 158 MMHG | TEMPERATURE: 97.3 F

## 2023-12-18 LAB
ALBUMIN SERPL BCP-MCNC: 4.3 G/DL (ref 3.2–4.9)
ALBUMIN/GLOB SERPL: 1.5 G/DL
ALP SERPL-CCNC: 62 U/L (ref 30–99)
ALT SERPL-CCNC: 11 U/L (ref 2–50)
ANION GAP SERPL CALC-SCNC: 14 MMOL/L (ref 7–16)
AST SERPL-CCNC: 19 U/L (ref 12–45)
BASOPHILS # BLD AUTO: 0 % (ref 0–1.8)
BASOPHILS # BLD: 0 K/UL (ref 0–0.12)
BILIRUB SERPL-MCNC: 0.5 MG/DL (ref 0.1–1.5)
BUN SERPL-MCNC: 23 MG/DL (ref 8–22)
CALCIUM ALBUM COR SERPL-MCNC: 8.6 MG/DL (ref 8.5–10.5)
CALCIUM SERPL-MCNC: 8.8 MG/DL (ref 8.5–10.5)
CHLORIDE SERPL-SCNC: 99 MMOL/L (ref 96–112)
CO2 SERPL-SCNC: 23 MMOL/L (ref 20–33)
CREAT SERPL-MCNC: 1.18 MG/DL (ref 0.5–1.4)
EOSINOPHIL # BLD AUTO: 0 K/UL (ref 0–0.51)
EOSINOPHIL NFR BLD: 0 % (ref 0–6.9)
ERYTHROCYTE [DISTWIDTH] IN BLOOD BY AUTOMATED COUNT: 53.3 FL (ref 35.9–50)
GFR SERPLBLD CREATININE-BSD FMLA CKD-EPI: 44 ML/MIN/1.73 M 2
GLOBULIN SER CALC-MCNC: 2.8 G/DL (ref 1.9–3.5)
GLUCOSE SERPL-MCNC: 215 MG/DL (ref 65–99)
HCT VFR BLD AUTO: 42.1 % (ref 37–47)
HGB BLD-MCNC: 13.5 G/DL (ref 12–16)
IMM GRANULOCYTES # BLD AUTO: 0.02 K/UL (ref 0–0.11)
IMM GRANULOCYTES NFR BLD AUTO: 0.4 % (ref 0–0.9)
LYMPHOCYTES # BLD AUTO: 0.81 K/UL (ref 1–4.8)
LYMPHOCYTES NFR BLD: 16.2 % (ref 22–41)
MAGNESIUM SERPL-MCNC: 1.9 MG/DL (ref 1.5–2.5)
MCH RBC QN AUTO: 30.8 PG (ref 27–33)
MCHC RBC AUTO-ENTMCNC: 32.1 G/DL (ref 32.2–35.5)
MCV RBC AUTO: 96.1 FL (ref 81.4–97.8)
MONOCYTES # BLD AUTO: 0.12 K/UL (ref 0–0.85)
MONOCYTES NFR BLD AUTO: 2.4 % (ref 0–13.4)
NEUTROPHILS # BLD AUTO: 4.04 K/UL (ref 1.82–7.42)
NEUTROPHILS NFR BLD: 81 % (ref 44–72)
NRBC # BLD AUTO: 0 K/UL
NRBC BLD-RTO: 0 /100 WBC (ref 0–0.2)
PHOSPHATE SERPL-MCNC: 3.6 MG/DL (ref 2.5–4.5)
PLATELET # BLD AUTO: 188 K/UL (ref 164–446)
PMV BLD AUTO: 10.4 FL (ref 9–12.9)
POTASSIUM SERPL-SCNC: 3.6 MMOL/L (ref 3.6–5.5)
PROT SERPL-MCNC: 7.1 G/DL (ref 6–8.2)
RBC # BLD AUTO: 4.38 M/UL (ref 4.2–5.4)
SODIUM SERPL-SCNC: 136 MMOL/L (ref 135–145)
WBC # BLD AUTO: 5 K/UL (ref 4.8–10.8)

## 2023-12-18 PROCEDURE — 36415 COLL VENOUS BLD VENIPUNCTURE: CPT

## 2023-12-18 PROCEDURE — 97162 PT EVAL MOD COMPLEX 30 MIN: CPT

## 2023-12-18 PROCEDURE — 99239 HOSP IP/OBS DSCHRG MGMT >30: CPT | Performed by: INTERNAL MEDICINE

## 2023-12-18 PROCEDURE — 84100 ASSAY OF PHOSPHORUS: CPT

## 2023-12-18 PROCEDURE — 83735 ASSAY OF MAGNESIUM: CPT

## 2023-12-18 PROCEDURE — 700102 HCHG RX REV CODE 250 W/ 637 OVERRIDE(OP)

## 2023-12-18 PROCEDURE — 85025 COMPLETE CBC W/AUTO DIFF WBC: CPT

## 2023-12-18 PROCEDURE — A9270 NON-COVERED ITEM OR SERVICE: HCPCS

## 2023-12-18 PROCEDURE — 700111 HCHG RX REV CODE 636 W/ 250 OVERRIDE (IP): Mod: JZ

## 2023-12-18 PROCEDURE — 700111 HCHG RX REV CODE 636 W/ 250 OVERRIDE (IP): Mod: JZ | Performed by: INTERNAL MEDICINE

## 2023-12-18 PROCEDURE — 97535 SELF CARE MNGMENT TRAINING: CPT

## 2023-12-18 PROCEDURE — 92610 EVALUATE SWALLOWING FUNCTION: CPT

## 2023-12-18 PROCEDURE — 80053 COMPREHEN METABOLIC PANEL: CPT

## 2023-12-18 PROCEDURE — RXMED WILLOW AMBULATORY MEDICATION CHARGE: Performed by: INTERNAL MEDICINE

## 2023-12-18 RX ORDER — DEXAMETHASONE 6 MG/1
6 TABLET ORAL DAILY
Qty: 8 TABLET | Refills: 0 | Status: SHIPPED | OUTPATIENT
Start: 2023-12-19 | End: 2023-12-27

## 2023-12-18 RX ORDER — ALBUTEROL SULFATE 90 UG/1
2 AEROSOL, METERED RESPIRATORY (INHALATION) EVERY 4 HOURS
Qty: 8.5 G | Refills: 0 | Status: SHIPPED | OUTPATIENT
Start: 2023-12-18 | End: 2024-02-29 | Stop reason: SDUPTHER

## 2023-12-18 RX ADMIN — DEXAMETHASONE 6 MG: 4 TABLET ORAL at 04:47

## 2023-12-18 RX ADMIN — CARVEDILOL 3.12 MG: 3.12 TABLET, FILM COATED ORAL at 10:57

## 2023-12-18 RX ADMIN — ESCITALOPRAM OXALATE 20 MG: 10 TABLET ORAL at 04:47

## 2023-12-18 RX ADMIN — NYSTATIN: 100000 POWDER TOPICAL at 04:47

## 2023-12-18 RX ADMIN — DOCUSATE SODIUM 50 MG AND SENNOSIDES 8.6 MG 2 TABLET: 8.6; 5 TABLET, FILM COATED ORAL at 04:47

## 2023-12-18 RX ADMIN — FUROSEMIDE 40 MG: 10 INJECTION, SOLUTION INTRAMUSCULAR; INTRAVENOUS at 04:48

## 2023-12-18 RX ADMIN — HALOPERIDOL LACTATE 5 MG: 5 INJECTION, SOLUTION INTRAMUSCULAR at 08:18

## 2023-12-18 RX ADMIN — ASPIRIN 81 MG: 81 TABLET, COATED ORAL at 04:47

## 2023-12-18 ASSESSMENT — COGNITIVE AND FUNCTIONAL STATUS - GENERAL
MOBILITY SCORE: 20
STANDING UP FROM CHAIR USING ARMS: A LITTLE
CLIMB 3 TO 5 STEPS WITH RAILING: A LITTLE
SUGGESTED CMS G CODE MODIFIER MOBILITY: CJ
WALKING IN HOSPITAL ROOM: A LITTLE
MOVING FROM LYING ON BACK TO SITTING ON SIDE OF FLAT BED: A LITTLE

## 2023-12-18 ASSESSMENT — FIBROSIS 4 INDEX: FIB4 SCORE: 4.1

## 2023-12-18 ASSESSMENT — GAIT ASSESSMENTS
DISTANCE (FEET): 40
ASSISTIVE DEVICE: FRONT WHEEL WALKER
GAIT LEVEL OF ASSIST: CONTACT GUARD ASSIST

## 2023-12-18 NOTE — CARE PLAN
The patient is Stable - Low risk of patient condition declining or worsening    Shift Goals  Clinical Goals: hemodynamic stability    Progress made toward(s) clinical / shift goals:  yes    Problem: Respiratory  Goal: Patient will achieve/maintain optimum respiratory ventilation and gas exchange  Description: Target End Date:  Prior to discharge or change in level of care    Document on Assessment flowsheet    1.  Assess and monitor rate, rhythm, depth and effort of respiration  2.  Breath sounds assessed qshift and/or as needed  3.  Assess O2 saturation, administer/titrate oxygen as ordered  4.  Position patient for maximum ventilatory efficiency  5.  Turn, cough, and deep breath with splinting to improve effectiveness  6.  Collaborate with RT to administer medication/treatments per order  7.  Encourage use of incentive spirometer and encourage patient to cough after use and utilize splinting techniques if applicable  8.  Airway suctioning  9.  Monitor sputum production for changes in color, consistency and frequency  10. Perform frequent oral hygiene  11. Alternate physical activity with rest periods  Outcome: Progressing     Problem: Fall Risk  Goal: Patient will remain free from falls  Description: Target End Date:  Prior to discharge or change in level of care    Document interventions on the [place] Fall Risk Assessment    1.  Assess for fall risk factors  2.  Implement fall precautions  Outcome: Progressing       Patient is not progressing towards the following goals:

## 2023-12-18 NOTE — THERAPY
Physical Therapy   Initial Evaluation     Patient Name: Linda Casas  Age:  89 y.o., Sex:  female  Medical Record #: [id number]  Today's Date: 12/18/2023     Precautions  Precautions: Fall Risk (COVID)  Comments: baseline dementia    Assessment  Patient is 89 y.o. female with a diagnosis of Covid-19, h/o prior MI (5/2022), HFrEF (TTE, 2022), hypertension, hyperlipidemia, and anxiety who presented 12/16/2023 with 1-day history of confusion and hypoxia in the setting of known COVID-19 exposure. .  Additional factors influencing patient status / progress : today, pt needs education for safe use of FWW, but not retaining education. Pt is supervised to come to EOB, cg for transfers and ambulation with FWW, but pt is pushing walker away, will trial SPC next. Pt lives with supportive family who provides assist as needed. If pt is to dc home on O2, family will need to be very attentive as she presents a fall risk with O2 tubing..      Plan    Physical Therapy Initial Treatment Plan   Treatment Plan : Bed Mobility, Gait Training, Neuro Re-Education / Balance, Therapeutic Activities  Treatment Frequency: 3 Times per Week  Duration: Until Therapy Goals Met    DC Equipment Recommendations:  (nsg to assess O2 needs.)  Discharge Recommendations: Recommend home health for continued physical therapy services          Objective       12/18/23 0942   Charge Group   PT Evaluation PT Evaluation Mod   PT Self Care / Home Evaluation (Units) 1   Total Time Spent   PT Total Time Yes   PT Self Care/Home Evaluation Time Spent (Mins) 8   PT Total Time Spent (Calculated) 8   Initial Contact Note    Initial Contact Note Order Received and Verified, Physical Therapy Evaluation in Progress with Full Report to Follow.   Precautions   Precautions Fall Risk  (COVID)   Comments baseline dementia   Vitals   Pulse 85   Pulse Oximetry 94 %   O2 (LPM) 3   O2 Delivery Device Silicone Nasal Cannula   Pain 0 - 10 Group   Therapist Pain  Assessment During Activity;Nurse Notified;0   Prior Living Situation   Prior Services Skilled Home Health Services   Housing / Facility 1 Story House   Steps Into Home 0   Steps In Home 0   Equipment Owned Front-Wheel Walker;4-Wheel Walker;Single Point Cane   Lives with - Patient's Self Care Capacity Other (Comments)  (Son, DIL, and gr daughter lives close by, always someone home.)   Prior Level of Functional Mobility   Bed Mobility Independent   Transfer Status Independent   Ambulation Independent   Ambulation Distance household   Assistive Devices Used Single Point Cane  (varies from walker to SPC)   History of Falls   History of Falls   (10/28/23 fall, hip fracture, s/p ORIF)   Cognition    Cognition / Consciousness X   Comments confusion, struggles to follow cues for safe FWW use and poor insight. Pt was not aware that she needed to urinate (purewik) before OOB. With sit to stand, pt urinated on floor.   Active ROM Lower Body    Active ROM Lower Body  WDL   Strength Lower Body   Lower Body Strength  WDL   Comments functional for transfers, ambulation.   Balance Assessment   Sitting Balance (Static) Fair   Sitting Balance (Dynamic) Fair   Standing Balance (Static) Fair -   Standing Balance (Dynamic) Fair -   Weight Shift Sitting Fair   Weight Shift Standing Fair   Comments with FWW   Bed Mobility    Supine to Sit Supervised   Sit to Supine   (up chair)   Scooting Supervised   Rolling Supervised   Comments flat bed, no rail.   Gait Analysis   Gait Level Of Assist Contact Guard Assist   Assistive Device Front Wheel Walker  (pt pushing walker away, trial of ambulation with no AD was unsteady, pt reaching for furniture, will need SPC at least.)   Distance (Feet) 40   # of Times Distance was Traveled 1   Deviation   (unsteady with no AD, reaching for furniture)   Weight Bearing Status no restrictions   Functional Mobility   Sit to Stand Contact Guard Assist   Bed, Chair, Wheelchair Transfer Contact Guard Assist    Toilet Transfers Contact Guard Assist  (pivot to toilet, pt incontinent of urine with first sit to stand.)   Transfer Method Stand Pivot   How much difficulty does the patient currently have...   Turning over in bed (including adjusting bedclothes, sheets and blankets)? 4   Sitting down on and standing up from a chair with arms (e.g., wheelchair, bedside commode, etc.) 3   Moving from lying on back to sitting on the side of the bed? 4   How much help from another person does the patient currently need...   Moving to and from a bed to a chair (including a wheelchair)? 3   Need to walk in a hospital room? 3   Climbing 3-5 steps with a railing? 3   6 clicks Mobility Score 20   Activity Tolerance   Sitting in Chair 20+   Short Term Goals    Short Term Goal # 1 Pt will ambulate x 125 feet using SPC with supervision in 6 visits to improve functional indep.   Education Group   Education Provided Role of Physical Therapist   Role of Physical Therapist Patient Response Patient;Acceptance;Explanation;Verbal Demonstration   Additional Comments ed done re safe transfers and safe use of FWW but pt  not able to follow, still reaching up to pull on FWW.. Ed done re safe use of O2 at home-- family will need to assist as pt presents a fall risk with O2 tubing.   Physical Therapy Initial Treatment Plan    Treatment Plan  Bed Mobility;Gait Training;Neuro Re-Education / Balance;Therapeutic Activities   Treatment Frequency 3 Times per Week   Duration Until Therapy Goals Met   Problem List    Problems Impaired Balance;Decreased Activity Tolerance;Safety Awareness Deficits / Cognition   Anticipated Discharge Equipment and Recommendations   DC Equipment Recommendations   (nsg to assess O2 needs.)   Discharge Recommendations Recommend home health for continued physical therapy services   Interdisciplinary Plan of Care Collaboration   IDT Collaboration with  Nursing   Patient Position at End of Therapy Seated;Call Light within Reach;Tray  Table within Reach;Phone within Reach;Chair Alarm On   Collaboration Comments CNA in to change sheets, nsg updated   Session Information   Date / Session Number  12/18-1 (1/3, 12/24)

## 2023-12-18 NOTE — DISCHARGE PLANNING
[id number]  Received Choice form at 122  Agency/Facility Name: [facility]   Referral sent per Choice form @ [phone]

## 2023-12-18 NOTE — CARE PLAN
Problem: Knowledge Deficit - Standard  Goal: Patient and family/care givers will demonstrate understanding of plan of care, disease process/condition, diagnostic tests and medications  Outcome: Met     Problem: Communication  Goal: The ability to communicate needs accurately and effectively will improve  Outcome: Met     Problem: Hemodynamics  Goal: Patient's hemodynamics, fluid balance and neurologic status will be stable or improve  Outcome: Met     Problem: Respiratory  Goal: Patient will achieve/maintain optimum respiratory ventilation and gas exchange  Outcome: Met     Problem: Infection - Standard  Goal: Patient will remain free from infection  Outcome: Met     Problem: Skin Integrity  Goal: Skin integrity is maintained or improved  Outcome: Met     Problem: Fall Risk  Goal: Patient will remain free from falls  Outcome: Met   The patient is Stable - Low risk of patient condition declining or worsening    Shift Goals  Clinical Goals: VSS, Safety  Patient Goals: Disposition  Family Goals: Updates    Progress made toward(s) clinical / shift goals:  VSS    Patient is not progressing towards the following goals:

## 2023-12-18 NOTE — DISCHARGE PLANNING
Patient was discharged before [facility] received referral. This referral has been escalated to a Clinical Supervisor for review in order to determine Home Health appropriateness.  This issue will be resolved as quickly as possible, but for any questions feel free to call us at [phone].  Thank you!

## 2023-12-18 NOTE — CARE PLAN
The patient is Stable - Low risk of patient condition declining or worsening    Shift Goals  Clinical Goals: hemodynamic stability    Progress made toward(s) clinical / shift goals:    Problem: Respiratory  Goal: Patient will achieve/maintain optimum respiratory ventilation and gas exchange  Outcome: Progressing     Problem: Infection - Standard  Goal: Patient will remain free from infection  Outcome: Progressing

## 2023-12-18 NOTE — PROGRESS NOTES
Assumed care of PT A&O 1. Pt resting in bed with no signs of labored breathing. On 4L. Tele monitor in place, cardiac rhythm being monitored. Call light within reach, bed in lowest position, upper bed rails up. Pt was updated on plan of care for the day.

## 2023-12-18 NOTE — DISCHARGE PLANNING
Case Management Discharge Planning    Admission Date: 12/16/2023  GMLOS: 5.1  ALOS: 1    6-Clicks ADL Score: 9  6-Clicks Mobility Score: 20  PT and/or OT Eval ordered: Yes  Post-acute Referrals Ordered: Yes  Post-acute Choice Obtained: Yes  Has referral(s) been sent to post-acute provider:  Yes      Anticipated Discharge Dispo: Discharge Disposition: Discharged to home/self care (01)    DME Needed: No    Action(s) Taken: Updated Provider/Nurse on Discharge Plan, DC Assessment Complete (See below), Choice obtained, and Referral(s) sent    1028, Walking O2 eval done by ARIS Sullivan and Pt will not need Home O2.    1230, ELIAS Gold obtained choice for Home Health and was fax to [facility]. ELIAS Gold also informed this RN CM that family will provide transport back home.      Escalations Completed: None    Medically Clear: Yes    Next Steps: CM will continue to assist Pt with discharge needs.      Barriers to Discharge: None    Care Transition Team Assessment  RN CM called and spoke to daughter in law Mary to obtain assessment informations. Demographics verified. RN CM introduced self and purpose of visit.   Pt lives with son and daughter in law in a 1 story house with no steps to main entrance.  Pt has  son and daughter in law for support.  Pt has KEVYN SUAREZ for PCP  Pt was independent with ADLs prior to hospitalization.  Pt owns and uses FWW.        Information Source  Orientation Level: Oriented to person  Information Given By: Other (Comments) (Daughter in law)  Informant's Name: Mary         Elopement Risk  Legal Hold: No  Ambulatory or Self Mobile in Wheelchair: Yes  Elopement Risk: Not at Risk for Elopement    Interdisciplinary Discharge Planning  Primary Care Physician: KEVYN SUAREZ  Lives with - Patient's Self Care Capacity: Adult Children  Support Systems: Children  Housing / Facility: 1 Story House (no steps to main entrance)  Prior Services: Skilled Home Health Services    Discharge  Preparedness  What is your plan after discharge?: Home with help  What are your discharge supports?: Child       Finances  Financial Barriers to Discharge: No  Prescription Coverage: Yes        Psychological Assessment  History of Substance Abuse: None  History of Psychiatric Problems: No         Anticipated Discharge Information  Discharge Disposition: Discharged to home/self care (01)

## 2023-12-18 NOTE — DISCHARGE PLANNING
ATTN: Case Management  RE: Referral for Home Health    As of 12.18.23, we have accepted the Home Health referral for the patient listed above.    A [facility] clinician will be out to see the patient within 48 hours. If you have any questions or concerns regarding the patient’s transition to Home Health, please do not hesitate to contact us at [phone].      We look forward to collaborating with you,  [facility] Health Team

## 2023-12-18 NOTE — THERAPY
"Speech Language Pathology   Clinical Swallow Evaluation     Patient Name: Linda Casas  AGE:  89 y.o., SEX:  female  Medical Record #: [id number]  Date of Service: 12/18/2023      History of Present Illness  88 y/o female presented 12/16 with confusion and hypoxia. Positive for COVID-19.     Followed for cognition at [facility] in 11/2023.    CMHx: Acute respiratory failure, COVID-19, fall, debility, HLD, MDD, septic encephalopathy, aortic stenosis    PMHx: Dementia, Osteopenia, afib, MI (5/2022), HFrEF, HTN, anxiety    CXR 12/16:   \"No acute cardiac or pulmonary abnormalities are identified.\"      General Information:  Vitals  O2 (LPM): 3  O2 Delivery Device: Silicone Nasal Cannula  Level of Consciousness: Alert, Awake  Orientation: Self, General place  Follows Directives: Yes      Prior Living Situation & Level of Function:  Prior Services: Skilled Home Health Services  Housing / Facility: [address] [place] [address] House  Lives with - Patient's Self Care Capacity:  (Son, DIL, and gr daughter lives close by, always someone home.)  Communication: WFL  Swallowing: WFL       Oral Mechanism Evaluation:  Dentition: Fair, Natural dentition, Multiple carries   Facial Symmetry: Equal  Labial Observations: WFL   Lingual Observations: Midline  Motor Speech: WFL            Laryngeal Function:  Secretion Management: Adequate  Voice Quality: WFL  Cough: Perceptually WNL       Subjective   RN cleared patient for clinical swallow evaluation. Pt received awake, alert, sitting upright in chair. Pt denied any history of difficulty swallowing. Agreeable to PO trials.       Assessment  Current Method of Nutrition: Oral diet (Regular solids/thin liquids)  Positioning: Sitting [facility]  Bolus Administration: Patient  O2 (LPM): 3 O2 Delivery Device: Silicone Nasal Cannula  Factor(s) Affecting Performance: None  Tracheostomy : No        Swallowing Trials:  Swallowing Trials  Thin Liquid (TN0): WFL  Liquidised (LQ3): WFL  Soft & Bite Sized (SB6): " "WFL  Regular (RG7): WFL      Comments: Pt able to self-feed with appropriate rate and volume of intake. Exhibited adequate oral bolus acceptance and containment. Timely mastication of solids. No oral bolus residue appreciated across all consistencies. No overt s/sx of airway invasion across all trials. Vocal quality remained clear throughout oral intake. Pt denied any globus sensation. RN in room providing medications; pt tolerated pills whole with liquid without difficulty. Provided education regarding aspiration precautions and signs, pt stated understanding.        Clinical Impressions  Patient presents with a functional oropharyngeal swallow. Recommend continuation of oral diet. Given acute hypoxemic respiratory failure and COVID, service to follow to ensure tolerance to diet and monitor for changes.       Recommendations  Diet Consistency: Regular solids/thin liquids  Instrumentation: None indicated at this time  Medication: As tolerated  Supervision: Independent  Positioning: Fully upright and midline during oral intake, Meals sitting upright in a chair, as tolerated  Risk Management : Small bites/sips, Slow rate of intake  Oral Care: BID      SLP Treatment Plan  Treatment Plan: Dysphagia Treatment  SLP Frequency: 3x Per Week  Estimated Duration: Until Therapy Goals Met      Anticipated Discharge Needs  Discharge Recommendations: Anticipate that the patient will have no further speech therapy needs after discharge from the hospital   Therapy Recommendations Upon DC: Patient / Family / Caregiver Education        Patient / Family Goals  Patient / Family Goal #1: \"I've been waiting for food\"  Short Term Goals  Short Term Goal # 1: Pt will consume a diet of regular solids/thin liquids without overt s/sx of aspiration or decline in respiratory status      Linda Bernal, SLP   "

## 2023-12-19 ENCOUNTER — PATIENT OUTREACH (OUTPATIENT)
Dept: MEDICAL GROUP | Facility: MEDICAL CENTER | Age: 88
End: 2023-12-19
Payer: MEDICARE

## 2023-12-19 NOTE — PROGRESS NOTES
Transitional Care Management  TCM Outreach Date and Time: Filed (12/19/2023 11:35 AM)    Discharge Questions  Actual Discharge Date: 12/18/23  Now that you are home, how are you feeling?: Good  Did you receive any new prescriptions?: Yes (albuterol inhaler and DEXA)  Were you able to get them filled?: Yes  Meds to Bed or Pharmacy filled?: Meds to Bed  Do you have any questions about your current medications or new medications (Review Med Rec)?: No  Did you have any durable medical equipment ordered?: Yes (walker)  Did you receive it?: Yes  Do you have a follow up appointment scheduled with your PCP?: Yes  Appointment Date: 12/26/23  Appointment Time: 1000  Any issues or paperwork you wish to discuss with your PCP?: No  Are you (patient) able to get to the appointment?: Yes (caregiver)  If Home Health was ordered, have they contacted you (Patient): Yes ([facility] ) resume of care scheduled 12/20/23 Mariam RN reminded to please bring scale  Did you have enough support after your last discharge?: Yes  Does this patient qualify for the CCM program?: Yes (identified by Alla GRIFFIN)    Transitional Care  Number of attempts made to contact patient: 1  Current or previous attempts competed within two business days of discharge? : Yes  Provided education regarding treatment plan, medications, self-management, ADLs?: Yes (albuterol and dexa)  Has patient completed an Advanced Directive?: Yes  Is the patient's advanced directive on file?: Yes  Has the Care Manager's phone number provided?: No  Is there anything else I can help you with?: No    Discharge Summary  Chief Complaint: AMS  Admitting Diagnosis: COVID+  Discharge Diagnosis: acute hypoxemic resp failure d/t COVID+

## 2023-12-19 NOTE — DISCHARGE PLANNING
HTH/SCP TCN chart review completed. Collaborated with DOLLY Lutz prior to meeting with the pt. The most current review of medical record, knowledge of pt's PLOF and social support, LACE+ score of 74, 6 clicks scores of 9 ADL's and 11  mobility were considered.      Pt seen at bedside. Introduced TCN program. Provided education regarding post acute levels of care. Discussed HTH/SCP plan benefits. Pt verbalizes understanding.     Patient seen at bedside with daughter-in-law Mary Shen present.  Patient is A & O X 1 and lives with son and daughter-in-law in a 1 story home and was independent with ADL's and W/C mobility at her baseline.  She is on RA.  Family provides 24/7 supervision and assistance and will provide transportation home at discharge.  She is not at her baseline level of function.  Patient has a discharge order and is currently on service with [facility].      Choice proactively obtained for HH ([facility] ), faxed to JASON and given to DOLLY.  TCN will continue to follow and collaborate with discharge planning team as additional post acute needs arise. Thank you.     Completed today:  Awaiting PT/OT/ST recommendations.    Choice obtained:  HH (Resumption of [facility]) on 12/18/23.    SCP with [facility].     Addendum:  2119- Per chart review, [facility] has accepted.

## 2023-12-19 NOTE — HOSPITAL COURSE
Linda Casas is a 89yoF with prior MI (5/2022), HFrEF (TTE, 2022), hypertension, hyperlipidemia, and anxiety who presented 12/16/2023 with 1-day history of confusion and hypoxia in the setting of known COVID-19 exposure. Exam notable for fever (100.8 F),  positive for COVID-19. No leukocytosis, afebrile, negative procalcitonin. CT head (12/16/2023) shows cerebral atrophy and suspected small vessel ischemic disease with no acute abnormalities. CXR (12/16/2023) shows no infiltrates or pulmonary vascular congestion. Patient was started on decadron and supportive care and improved clinically. Home oxygen evaluation did not find patient to require oxygen on discharge. Patient was determined satisfactory for discharge with appropriate follow up.

## 2023-12-19 NOTE — DISCHARGE SUMMARY
Discharge Summary    CHIEF COMPLAINT ON ADMISSION  Chief Complaint   Patient presents with    Mental Status Change     BIBA from home for AMS. Per EMS family reports pt LKW 1100. Per EMS family called pt PCP at 1100 to report pt was SOB. Family at home +covid.  Pt woke up at 1845 altered and weak. On scene VSS, GCS 14, -NIH.        Reason for Admission  EMS     Admission Date  12/16/2023    CODE STATUS  Prior    HPI & HOSPITAL COURSE  Linda Casas is a 89yoF with prior MI (5/2022), HFrEF (TTE, 2022), hypertension, hyperlipidemia, and anxiety who presented 12/16/2023 with 1-day history of confusion and hypoxia in the setting of known COVID-19 exposure. Exam notable for fever (100.8 F),  positive for COVID-19. No leukocytosis, afebrile, negative procalcitonin. CT head (12/16/2023) shows cerebral atrophy and suspected small vessel ischemic disease with no acute abnormalities. CXR (12/16/2023) shows no infiltrates or pulmonary vascular congestion. Patient was started on decadron and supportive care and improved clinically. Home oxygen evaluation did not find patient to require oxygen on discharge. Patient was determined satisfactory for discharge with appropriate follow up.    Therefore, she is discharged in fair and stable condition to home with close outpatient follow-up.    The patient recovered much more quickly than anticipated on admission.    Discharge Date  12/18/2023    FOLLOW UP ITEMS POST DISCHARGE  Please follow up with PCP in 3-5 days for post hospitalization follow up and medication reconciliation.     DISCHARGE DIAGNOSES  Principal Problem:    Acute hypoxemic respiratory failure due to COVID-19 (HCC) (POA: Yes)  Active Problems:    Aortic stenosis (Chronic) (POA: Yes)    Debility (POA: Yes)    Hyperlipidemia (POA: Yes)    Anxiety (Chronic) (POA: Yes)    Fall (POA: Yes)    Major depressive disorder with single episode, 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HCC) (POA: Yes)    Septic encephalopathy (POA:  "Unknown)  Resolved Problems:    * No resolved hospital problems. *      FOLLOW UP  No future appointments.  No follow-up provider specified.    MEDICATIONS ON DISCHARGE     Medication List        START taking these medications        Instructions   albuterol 108 (90 Base) MCG/ACT Aers inhalation aerosol   Inhale 2 Puffs every 4 hours.  Dose: 2 Puff     dexamethasone 6 MG Tabs  Start taking on: December 19, 2023  Commonly known as: Decadron   Take 1 Tablet by mouth every day for 8 days.  Dose: 6 mg            CHANGE how you take these medications        Instructions   GoodSense Aspirin Low Dose 81 MG EC tablet  What changed:   how much to take  when to take this  Generic drug: aspirin   TAKE 1 TABLET BY MOUTH EVERY DAY.     QUEtiapine 25 MG Tabs  What changed:   how much to take  how to take this  when to take this  Commonly known as: SEROquel   Take 25 mg at bet time            CONTINUE taking these medications        Instructions   carvedilol 3.125 MG Tabs  Commonly known as: Coreg   Take 1 Tablet by mouth 2 times a day.  Dose: 3.125 mg     cefdinir 300 MG Caps  Commonly known as: Omnicef   Doctor's comments: Pt tolerated cephalosporin ok in the past  Take 1 Capsule by mouth 2 times a day.  Dose: 300 mg     escitalopram 20 MG tablet  Commonly known as: Lexapro   Take 1 Tablet by mouth every day.  Dose: 20 mg     losartan 50 MG Tabs  Commonly known as: Cozaar   Take 1 Tablet by mouth every evening.  Dose: 50 mg     rosuvastatin 10 MG Tabs  Commonly known as: Crestor   Take 1 Tablet by mouth every evening.  Dose: 10 mg     valACYclovir 500 MG Tabs  Commonly known as: Valtrex   Take 1 Tablet by mouth 2 times a day.  Dose: 500 mg              Allergies  Allergies   Allergen Reactions    Ampicillin Hives, Rash and Swelling     Feels \"rotten\"     Bactrim Ds Hives, Itching and Swelling     Mouth, lip, tongue, eye swelling, pain, itching    Ciprofloxacin Hives, Rash and Swelling     .       DIET  No orders of the defined " types were placed in this encounter.      ACTIVITY  As tolerated.  Weight bearing as tolerated      LABORATORY  Lab Results   Component Value Date    SODIUM 136 12/18/2023    POTASSIUM 3.6 12/18/2023    CHLORIDE 99 12/18/2023    CO2 23 12/18/2023    GLUCOSE 215 (H) 12/18/2023    BUN 23 (H) 12/18/2023    CREATININE 1.18 12/18/2023    CREATININE 0.9 08/28/2006        Lab Results   Component Value Date    WBC 5.0 12/18/2023    HEMOGLOBIN 13.5 12/18/2023    HEMATOCRIT 42.1 12/18/2023    PLATELETCT 188 12/18/2023        Total time of the discharge process exceeds 33 minutes.

## 2023-12-20 ENCOUNTER — HOME CARE VISIT (OUTPATIENT)
Dept: HOME HEALTH SERVICES | Facility: HOME HEALTHCARE | Age: 88
End: 2023-12-20
Payer: MEDICARE

## 2023-12-20 VITALS
TEMPERATURE: 98.6 F | OXYGEN SATURATION: 94 % | HEIGHT: 64 IN | RESPIRATION RATE: 18 BRPM | BODY MASS INDEX: 25.44 KG/M2 | DIASTOLIC BLOOD PRESSURE: 70 MMHG | SYSTOLIC BLOOD PRESSURE: 110 MMHG | HEART RATE: 62 BPM | WEIGHT: 149 LBS

## 2023-12-20 PROCEDURE — G0493 RN CARE EA 15 MIN HH/HOSPICE: HCPCS

## 2023-12-20 PROCEDURE — 665001 SOC-HOME HEALTH

## 2023-12-20 ASSESSMENT — ENCOUNTER SYMPTOMS
STOOL FREQUENCY: LESS THAN DAILY
BOWEL PATTERN NORMAL: 1
LAST BOWEL MOVEMENT: 66826
DENIES PAIN: 1
PERSON REPORTING PAIN: PATIENT
HIGHEST PAIN SEVERITY IN PAST 24 HOURS: 0/10
LOWEST PAIN SEVERITY IN PAST 24 HOURS: 0/10
SUBJECTIVE PAIN PROGRESSION: UNCHANGED
VOMITING: DENIES
NAUSEA: DENIES
PAIN SEVERITY GOAL: 0/10

## 2023-12-20 ASSESSMENT — ACTIVITIES OF DAILY LIVING (ADL)
LAUNDRY: DEPENDENT
ORAL_CARE_CURRENT_FUNCTION: NEEDS ASSISTANCE
GROOMING ASSESSED: 1
SHOPPING: DEPENDENT
PHYSICAL TRANSFERS ASSESSED: 1
TOILETING: 1
TOILETING: SUPERVISION
ORAL_CARE_ASSESSED: 1
TELEPHONE USE ASSESSED: 1
AMBULATION ASSISTANCE: 1
HOUSEKEEPING ASSESSED: 1
AMBULATION ASSISTANCE: SUPERVISION
LAUNDRY ASSESSED: 1
FEEDING: SUPERVISION
LIGHT HOUSEKEEPING: DEPENDENT
DRESSING_UB_CURRENT_FUNCTION: SUPERVISION
TRANSPORTATION ASSESSED: 1
PREPARING MEALS: DEPENDENT
TRANSPORTATION: DEPENDENT
BATHING ASSESSED: 1
DRESSING_LB_CURRENT_FUNCTION: SUPERVISION
BATHING_CURRENT_FUNCTION: TWO PERSON
SHOPPING ASSESSED: 1
CURRENT_FUNCTION: SUPERVISION
FEEDING ASSESSED: 1
USING THE TELPHONE: DEPENDENT
GROOMING_CURRENT_FUNCTION: SUPERVISION

## 2023-12-20 ASSESSMENT — FIBROSIS 4 INDEX: FIB4 SCORE: 2.71

## 2023-12-21 ENCOUNTER — DOCUMENTATION (OUTPATIENT)
Dept: MEDICAL GROUP | Facility: PHYSICIAN GROUP | Age: 88
End: 2023-12-21
Payer: MEDICARE

## 2023-12-21 PROCEDURE — G0180 MD CERTIFICATION HHA PATIENT: HCPCS | Performed by: FAMILY MEDICINE

## 2023-12-21 NOTE — PROGRESS NOTES
Medication chart review for [facility] services    Received referral from [facility].   Medications reviewed  compared with discharge summary if available.  Discharge summary date, if applicable:   12/18/23    Current medication list per [facility]     Medication list one, patient is currently taking    Current Outpatient Medications:     dexamethasone, 6 mg, Oral, DAILY    albuterol, 2 Puff, Inhalation, Q4HRS    cefdinir, 300 mg, Oral, BID (Patient not taking: Reported on 12/19/2023)    carvedilol, 3.125 mg, Oral, BID    escitalopram, 20 mg, Oral, DAILY    losartan, 50 mg, Oral, Q EVENING    QUEtiapine, Take 25 mg at bet time (Patient taking differently: 25 mg, Oral, EVERY BEDTIME, Take 25 mg at bet time, Indications: Dementia, Anxiety)    rosuvastatin, 10 mg, Oral, Q EVENING    valACYclovir, 500 mg, Oral, BID    GoodSense Aspirin Low Dose, TAKE 1 TABLET BY MOUTH EVERY DAY. (Patient taking differently: 81 mg, Oral, DAILY, Indications: Preventative postop)      Medication list two, drugs that the patient has been prescribed or recommended to take by their healthcare provider on discharge summary  START taking these medications         Instructions   albuterol 108 (90 Base) MCG/ACT Aers inhalation aerosol    Inhale 2 Puffs every 4 hours.  Dose: 2 Puff      dexamethasone 6 MG Tabs  Start taking on: December 19, 2023  Commonly known as: Decadron    Take 1 Tablet by mouth every day for 8 days.  Dose: 6 mg                CHANGE how you take these medications         Instructions   GoodSense Aspirin Low Dose 81 MG EC tablet  What changed:   how much to take  when to take this  Generic drug: aspirin    TAKE 1 TABLET BY MOUTH EVERY DAY.      QUEtiapine 25 MG Tabs  What changed:   how much to take  how to take this  when to take this  Commonly known as: SEROquel    Take 25 mg at bet time                CONTINUE taking these medications         Instructions   carvedilol 3.125 MG Tabs  Commonly known as: Coreg     "Take 1 Tablet by mouth 2 times a day.  Dose: 3.125 mg      cefdinir 300 MG Caps  Commonly known as: Omnicef    Doctor's comments: Pt tolerated cephalosporin ok in the past  Take 1 Capsule by mouth 2 times a day.  Dose: 300 mg      escitalopram 20 MG tablet  Commonly known as: Lexapro    Take 1 Tablet by mouth every day.  Dose: 20 mg      losartan 50 MG Tabs  Commonly known as: Cozaar    Take 1 Tablet by mouth every evening.  Dose: 50 mg      rosuvastatin 10 MG Tabs  Commonly known as: Crestor    Take 1 Tablet by mouth every evening.  Dose: 10 mg      valACYclovir 500 MG Tabs  Commonly known as: Valtrex    Take 1 Tablet by mouth 2 times a day.  Dose: 500 mg          Allergies   Allergen Reactions    Ampicillin Hives, Rash and Swelling     Feels \"rotten\"     Bactrim Ds Hives, Itching and Swelling     Mouth, lip, tongue, eye swelling, pain, itching    Ciprofloxacin Hives, Rash and Swelling     .       Labs     Lab Results   Component Value Date/Time    SODIUM 136 12/18/2023 09:46 AM    POTASSIUM 3.6 12/18/2023 09:46 AM    CHLORIDE 99 12/18/2023 09:46 AM    CO2 23 12/18/2023 09:46 AM    GLUCOSE 215 (H) 12/18/2023 09:46 AM    BUN 23 (H) 12/18/2023 09:46 AM    CREATININE 1.18 12/18/2023 09:46 AM    CREATININE 0.9 08/28/2006 11:45 AM     Lab Results   Component Value Date/Time    ALKPHOSPHAT 62 12/18/2023 09:46 AM    ASTSGOT 19 12/18/2023 09:46 AM    ALTSGPT 11 12/18/2023 09:46 AM    TBILIRUBIN 0.5 12/18/2023 09:46 AM    INR 1.10 06/15/2023 06:06 AM    ALBUMIN 4.3 12/18/2023 09:46 AM        Assessment for clinically significant drug interactions, drug omissions/additions, duplicative therapies.            CC   Lakeshia Kuo M.D.  [address] [place] [address] [address]  [place] [address]  Fax: [phone]    [facility]  Phone [phone] fax [phone]    This note was created using voice recognition software (Dragon). The accuracy of the dictation is limited by the abilities of the " software. I have reviewed the note prior to signing, however some errors in grammar and context are still possible. If you have any questions related to this note please do not hesitate to contact our office.

## 2023-12-22 ENCOUNTER — HOME CARE VISIT (OUTPATIENT)
Dept: HOME HEALTH SERVICES | Facility: HOME HEALTHCARE | Age: 88
End: 2023-12-22
Payer: MEDICARE

## 2023-12-22 VITALS
RESPIRATION RATE: 20 BRPM | DIASTOLIC BLOOD PRESSURE: 62 MMHG | HEART RATE: 63 BPM | OXYGEN SATURATION: 94 % | TEMPERATURE: 98 F | SYSTOLIC BLOOD PRESSURE: 128 MMHG

## 2023-12-22 PROCEDURE — G0151 HHCP-SERV OF PT,EA 15 MIN: HCPCS

## 2023-12-22 SDOH — ECONOMIC STABILITY: HOUSING INSECURITY: HOME SAFETY: HAS SOME RUGS IN HOME THAT MAY CAUSE A TRIPPING HAZARD

## 2023-12-22 ASSESSMENT — ACTIVITIES OF DAILY LIVING (ADL)
AMBULATION ASSISTANCE ON FLAT SURFACES: 1
TRANSPORTATION COMMENTS: PT REQUIRES ASSIST AND ASSISTIVE DEVICE TO LEAVE HOME; FALL RISK

## 2023-12-22 ASSESSMENT — ENCOUNTER SYMPTOMS
DIFFICULTY THINKING: 1
PAIN: 1
PERSON REPORTING PAIN: PATIENT

## 2023-12-23 ENCOUNTER — HOME CARE VISIT (OUTPATIENT)
Dept: HOME HEALTH SERVICES | Facility: HOME HEALTHCARE | Age: 88
End: 2023-12-23
Payer: MEDICARE

## 2023-12-26 ENCOUNTER — OFFICE VISIT (OUTPATIENT)
Dept: MEDICAL GROUP | Facility: MEDICAL CENTER | Age: 88
End: 2023-12-26
Payer: MEDICARE

## 2023-12-26 VITALS
BODY MASS INDEX: 25.44 KG/M2 | TEMPERATURE: 98.4 F | RESPIRATION RATE: 16 BRPM | WEIGHT: 149 LBS | DIASTOLIC BLOOD PRESSURE: 60 MMHG | SYSTOLIC BLOOD PRESSURE: 138 MMHG | HEIGHT: 64 IN | OXYGEN SATURATION: 96 % | HEART RATE: 58 BPM

## 2023-12-26 DIAGNOSIS — U07.1 ACUTE HYPOXEMIC RESPIRATORY FAILURE DUE TO COVID-19 (HCC): ICD-10-CM

## 2023-12-26 DIAGNOSIS — J96.01 ACUTE HYPOXEMIC RESPIRATORY FAILURE DUE TO COVID-19 (HCC): ICD-10-CM

## 2023-12-26 DIAGNOSIS — N18.31 STAGE 3A CHRONIC KIDNEY DISEASE: ICD-10-CM

## 2023-12-26 DIAGNOSIS — F03.B3 MODERATE DEMENTIA WITH MOOD DISTURBANCE, UNSPECIFIED DEMENTIA TYPE (HCC): ICD-10-CM

## 2023-12-26 DIAGNOSIS — Z09 HOSPITAL DISCHARGE FOLLOW-UP: ICD-10-CM

## 2023-12-26 DIAGNOSIS — I10 PRIMARY HYPERTENSION: ICD-10-CM

## 2023-12-26 PROCEDURE — 3078F DIAST BP <80 MM HG: CPT | Performed by: FAMILY MEDICINE

## 2023-12-26 PROCEDURE — 3075F SYST BP GE 130 - 139MM HG: CPT | Performed by: FAMILY MEDICINE

## 2023-12-26 PROCEDURE — 99214 OFFICE O/P EST MOD 30 MIN: CPT | Performed by: FAMILY MEDICINE

## 2023-12-26 RX ORDER — BENZONATATE 100 MG/1
CAPSULE ORAL
COMMUNITY
Start: 2023-12-18

## 2023-12-26 ASSESSMENT — FIBROSIS 4 INDEX: FIB4 SCORE: 2.71

## 2023-12-26 NOTE — PROGRESS NOTES
CC: Hospital discharge follow-up    HPI:   Linda presents today for posthospitalization follow-up.    Patient was admitted to [facility] on 12/16/2023, he presented with one day history of confusion and hypoxia in the setting of known COVID-19 exposure. Exam notable for fever (100.8 F),  positive for COVID-19. No leukocytosis, afebrile, negative procalcitonin. CT head (12/16/2023) shows cerebral atrophy and suspected small vessel ischemic disease with no acute abnormalities. CXR (12/16/2023) shows no infiltrates or pulmonary vascular congestion. Patient was started on decadron and supportive care and improved clinically. Home oxygen evaluation did not find patient to require oxygen on discharge. Patient was discharged in stable condition on 12/18/2023.     Coming today for follow-up.  He has been feeling better.  Denies any shortness of breath, cough has been fair but it has been mild.  Oxygen saturation 96% room air.    Patient Active Problem List    Diagnosis Date Noted    Acute hypoxemic respiratory failure due to COVID-19 ([facility]) 12/17/2023    Septic encephalopathy 12/17/2023    Aortic stenosis 12/17/2023    Acute cystitis without hematuria 11/20/2023    Hypophosphatemia 11/15/2023    Anemia 11/15/2023    Shingles 11/09/2023    Closed left hip fracture, with routine healing, subsequent encounter 10/30/2023    Hip fracture requiring operative repair, right, closed, initial encounter ([facility]) 10/28/2023    Left displaced femoral neck fracture ([facility]) 10/28/2023    ACP (advance care planning) 10/28/2023    Continuous leakage of urine 03/13/2023    BMI 25.0-25.9,adult 05/03/2022    Hypertensive heart disease with chronic systolic congestive heart failure (HCC) 05/03/2022    Chronic systolic congestive heart failure (HCC) 05/03/2022    Hypertensive kidney disease with stage 3b chronic kidney disease (HCC) 05/03/2022    Hypercoagulable state (HCC) 05/03/2022    Hyperlipidemia 05/03/2022    Major depressive disorder  with single episode, 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[facility]) 05/03/2022    Cerebral atrophy ([facility]) 05/03/2022    Leukocytosis 04/20/2022    Dementia without behavioral disturbance ([facility]) 04/13/2022    Hypertension 04/13/2022    Mixed stress and urge urinary incontinence 04/13/2022    Ischemic cardiomyopathy 04/08/2022    Premature atrial complex 04/08/2022    Debility 04/08/2022    Paroxysmal A-fib ([facility]) 04/04/2022    B12 deficiency 04/04/2022    Closed stable burst fracture of first lumbar vertebra ([facility]) 11/01/2021    Fall 09/24/2021    Closed fracture of left distal radius 06/24/2021    Other fatigue 03/02/2020    Trigger finger, right middle finger 05/29/2018    Cholecystolithiasis 05/23/2016    Tear of medial cartilage or meniscus of knee, current 03/11/2014    Osteopenia 09/14/2010    Anxiety 09/14/2010       Current Outpatient Medications   Medication Sig Dispense Refill    benzonatate (TESSALON) 100 MG Cap       dexamethasone (DECADRON) 6 MG Tab Take 1 Tablet by mouth every day for 8 days. 8 Tablet 0    albuterol 108 (90 Base) MCG/ACT Aero Soln inhalation aerosol Inhale 2 Puffs every 4 hours. 8.5 g 0    cefdinir (OMNICEF) 300 MG Cap Take 1 Capsule by mouth 2 times a day. (Patient not taking: Reported on 12/19/2023) 14 Capsule 0    carvedilol (COREG) 3.125 MG Tab Take 1 Tablet by mouth 2 times a day. 60 Tablet 2    escitalopram (LEXAPRO) 20 MG tablet Take 1 Tablet by mouth every day. 30 Tablet 2    losartan (COZAAR) 50 MG Tab Take 1 Tablet by mouth every evening. 30 Tablet 2    QUEtiapine (SEROQUEL) 25 MG Tab Take 25 mg at bet time (Patient taking differently: Take 25 mg by mouth at bedtime. Take 25 mg at bet time  Indications: Dementia, Anxiety) 30 Tablet 2    rosuvastatin (CRESTOR) 10 MG Tab Take 1 Tablet by mouth every evening. 30 Tablet 2    valACYclovir (VALTREX) 500 MG Tab Take 1 Tablet by mouth 2 times a day. 11 Tablet 0    GOODSENSE ASPIRIN LOW DOSE 81 MG EC tablet TAKE 1 TABLET BY MOUTH EVERY DAY. (Patient taking  "differently: Take 81 mg by mouth every day. Indications: Preventative postop) 100 Tablet 3     No current facility-administered medications for this visit.         Allergies as of 12/26/2023 - Reviewed 12/21/2023   Allergen Reaction Noted    Ampicillin Hives, Rash, and Swelling 05/18/2018    Bactrim ds Hives, Itching, and Swelling 05/24/2022    Ciprofloxacin Hives, Rash, and Swelling 05/27/2022        ROS: Denies any chest pain, Shortness of breath, Changes bowel or bladder, Lower extremity edema.    Physical Exam:  /60 (BP Location: Right arm, Patient Position: Sitting, BP Cuff Size: Adult)   Pulse (!) 58   Temp 36.9 °C (98.4 °F) (Temporal)   Resp 16   Ht 1.626 m (5' 4\")   Wt 67.6 kg (149 lb)   SpO2 96%   BMI 25.58 kg/m²   Gen.: Well-developed, well-nourished, no apparent distress,pleasant and cooperative with the examination  Skin:  Warm and dry with good turgor. No rashes or suspicious lesions in visible areas  HEENT:Sinuses nontender with palpation, TMs clear, nares patent with pink mucosa and clear rhinorrhea,no septal deviation ,polyps or lesions. lips without lesions, oropharynx clear.  Neck: Trachea midline,no masses or adenopathy. No JVD.  Cor: Regular rate and rhythm without murmur, gallop or rub.  Lungs: Respirations unlabored.Clear to auscultation with equal breath sounds bilaterally. No wheezes, rhonchi.  Extremities: No cyanosis, clubbing or edema.      Assessment and Plan.   89 y.o. female     1. Hospital discharge follow-up  Hospital discharge reviewed.    2. Acute hypoxemic respiratory failure due to COVID-19 (HCC)  Resolved.  Currently asymptomatic.  Oxygen saturation today is 96% in room air.    3. Primary hypertension  Controlled.  Continue on losartan 50 mg daily, carvedilol 3.125 mg twice a day.    4. Stage 3a chronic kidney disease (HCC)  GFR 44, recommend hydration and avoiding nephrotoxic medication.    5. Moderate dementia with mood disturbance, unspecified dementia type " ([facility])  As per caregiver (both daughters) her mood has improved a lot after she was started on Seroquel at night.  Now she sleeps well, her aggression and argument has improved a lot during the day.

## 2023-12-27 ENCOUNTER — HOME CARE VISIT (OUTPATIENT)
Dept: HOME HEALTH SERVICES | Facility: HOME HEALTHCARE | Age: 88
End: 2023-12-27
Payer: MEDICARE

## 2023-12-27 VITALS — BODY MASS INDEX: 24.72 KG/M2 | WEIGHT: 144 LBS

## 2023-12-27 PROCEDURE — G0151 HHCP-SERV OF PT,EA 15 MIN: HCPCS

## 2023-12-27 ASSESSMENT — ACTIVITIES OF DAILY LIVING (ADL)
GROOMING_WITHIN_DEFINED_LIMITS: 1
FEEDING_WITHIN_DEFINED_LIMITS: 1

## 2023-12-27 ASSESSMENT — ENCOUNTER SYMPTOMS
BOWEL PATTERN NORMAL: 1
STOOL FREQUENCY: LESS THAN DAILY
PERSON REPORTING PAIN: PATIENT
LAST BOWEL MOVEMENT: 66835
PAIN: 1

## 2023-12-27 ASSESSMENT — FIBROSIS 4 INDEX
FIB4 SCORE: 2.71
FIB4 SCORE: 2.71

## 2023-12-28 ENCOUNTER — HOME CARE VISIT (OUTPATIENT)
Dept: HOME HEALTH SERVICES | Facility: HOME HEALTHCARE | Age: 88
End: 2023-12-28
Payer: MEDICARE

## 2023-12-28 VITALS
TEMPERATURE: 97.9 F | SYSTOLIC BLOOD PRESSURE: 122 MMHG | BODY MASS INDEX: 25.28 KG/M2 | DIASTOLIC BLOOD PRESSURE: 64 MMHG | HEART RATE: 61 BPM | WEIGHT: 147.25 LBS | OXYGEN SATURATION: 96 % | RESPIRATION RATE: 18 BRPM

## 2023-12-28 SDOH — ECONOMIC STABILITY: HOUSING INSECURITY: HOME SAFETY: SOME THROW RUGS THAT COULD CAUSE A TRIPPING HAZARD

## 2023-12-28 ASSESSMENT — ACTIVITIES OF DAILY LIVING (ADL)
TRANSPORTATION COMMENTS: PT REQUIRES ASSIST AND ASSISTIVE DEVICE TO LEAVE HOME; FALL RISK
OASIS_M1830: 03
AMBULATION_DISTANCE/DURATION_TOLERATED: 2 X 50 FT
AMBULATION ASSISTANCE ON FLAT SURFACES: 1

## 2023-12-28 ASSESSMENT — ENCOUNTER SYMPTOMS
PAIN LOCATION: LEFT HIP
DIFFICULTY THINKING: 1

## 2023-12-28 NOTE — CASE COMMUNICATION
Quality Review Completed for 12/20 [facility] OASIS by ILENE Collins RN on 12/28/2023:     Edits completed by ILENE Collins RN:     1.  and  diagnosis coding updated per chart review  2. Resolved L breast wound, per 12/27 visit, healed per report  3.  is 12/18 date of referral.   4.  is NA  5.  is 12/18  6.  added #1, 3, 9 to risks for readmits per EMR reports  7.  D checked for CHF  8.  changed to 3,   to 1,  to 2,  to 2,  to 3 per narrative supervision level of assist and uses a walker  9. [id number] B and C changed to #4 per narrative   10. [id number] goals changed per PT goals on : D, E, F, I, P is #5, G,J, K, L, M, IS #4  11.  is taking antipsychotic Seroquel with indication, Antibiotic with indication, Antiplatelet with indication  12. Checked exercises prescribed, walker to Activities Permitted on 485 form.  Checked  incontinence to 485 functional limitations. Checked ambulate only w/assist to 485 Safety Measures.   13.  changed to 6 per therapy

## 2023-12-29 NOTE — CASE COMMUNICATION
I agree with changes.  ----- Message -----  From: Fani Collins R.N.  Sent: 12/28/2023   8:48 AM PST  To: Mariam Charles R.N.      Quality Review Completed for 12/20 [facility] OASIS by ILENE Collins, RN on 12/28/2023:     Edits completed by ILENE Collins RN:     1.  and  diagnosis coding updated per chart review  2. Resolved L breast wound, per 12/27 visit, healed per report  3.  is 12/18 date of referral.   4.   is NA  5.  is 12/18  6.  added #1, 3, 9 to risks for readmits per EMR reports  7.  D checked for CHF  8.  changed to 3,  to 1,  to 2,  to 2,  to 3 per narrative supervision level of assist and uses a walker  9. [id number] B and C changed to #4 per narrative   10. [id number] goals changed per PT goals on : D, E, F, I, P is #5, G,J, K, L, M, IS #4  11.  is taking antipsychotic Seroquel with indication,  Antibiotic with indication, Antiplatelet with indication  12. Checked exercises prescribed, walker to Activities Permitted on 485 form.  Checked incontinence to 485 functional limitations. Checked ambulate only w/assist to 485 Safety Measures.   13.  changed to 6 per therapy

## 2023-12-30 ENCOUNTER — HOME CARE VISIT (OUTPATIENT)
Dept: HOME HEALTH SERVICES | Facility: HOME HEALTHCARE | Age: 88
End: 2023-12-30
Payer: MEDICARE

## 2024-01-01 ENCOUNTER — HOME CARE VISIT (OUTPATIENT)
Dept: HOSPICE | Facility: HOSPICE | Age: 89
End: 2024-01-01
Payer: MEDICARE

## 2024-01-01 ENCOUNTER — TELEPHONE (OUTPATIENT)
Dept: HEALTH INFORMATION MANAGEMENT | Facility: OTHER | Age: 89
End: 2024-01-01

## 2024-01-01 ENCOUNTER — HOSPICE ADMISSION (OUTPATIENT)
Dept: HOSPICE | Facility: HOSPICE | Age: 89
End: 2024-01-01
Payer: MEDICARE

## 2024-01-01 VITALS — HEART RATE: 108 BPM | RESPIRATION RATE: 12 BRPM

## 2024-01-01 DIAGNOSIS — F41.9 ANXIETY: Chronic | ICD-10-CM

## 2024-01-01 PROCEDURE — 665036 HSPC NOTICE OF ELECTION NOE

## 2024-01-01 PROCEDURE — S9126 HOSPICE CARE, IN THE HOME, P: HCPCS

## 2024-01-01 PROCEDURE — G0299 HHS/HOSPICE OF RN EA 15 MIN: HCPCS

## 2024-01-01 PROCEDURE — G0155 HHCP-SVS OF CSW,EA 15 MIN: HCPCS

## 2024-01-01 RX ORDER — LORAZEPAM 0.5 MG/1
.25-.5 TABLET ORAL EVERY 4 HOURS PRN
Qty: 15 TABLET | Status: CANCELLED | OUTPATIENT
Start: 2024-01-01

## 2024-01-01 ASSESSMENT — ACTIVITIES OF DAILY LIVING (ADL)
DRESSING_REQUIRES_ASSISTANCE: 1
PHYSICAL_TRANSFER_REQUIRES_ASSISTANCE: 1
TOILETING_REQUIRES_ASSISTANCE: 1
AMBULATION ASSISTANCE: NON-AMBULATORY
SHOPPING_REQUIRES_ASSISTANCE: 1
AMBULATION_REQUIRES_ASSISTANCE: 1
EATING_REQUIRES_ASSISTANCE: 1
LAUNDRY_REQUIRES_ASSISTANCE: 1
BATHING_REQUIRES_ASSISTANCE: 1
GROOMING_REQUIRES_ASSISTANCE: 1

## 2024-01-01 ASSESSMENT — ENCOUNTER SYMPTOMS
SLEEP QUALITY: ADEQUATE
FATIGUE: 1
LIMITED RANGE OF MOTION: 1
MUSCLE WEAKNESS: 1
FATIGUES EASILY: 1
LAST BOWEL MOVEMENT: 67143
DYSPNEA ACTIVITY LEVEL: AT REST
INCREASED FATIGUE: 1
APNEIC BREATHING: 1
DECREASED TO NO URINARY OUTPUT: 1
DECREASED ORAL INTAKE: 1
SLEEP QUALITY: FAIR
CONSTIPATION: 1
INCREASED SLEEPING: 1
STOOL FREQUENCY: LESS THAN DAILY
SHORTNESS OF BREATH: 1
AGITATION: 1

## 2024-01-01 ASSESSMENT — PAIN SCALES - PAIN ASSESSMENT IN ADVANCED DEMENTIA (PAINAD)
FACIALEXPRESSION: 1 - SAD. FRIGHTENED. FROWN.
NEGVOCALIZATION: 0 - NONE.
CONSOLABILITY: 1 - DISTRACTED OR REASSURED BY VOICE OR TOUCH.
TOTALSCORE: 4
BODYLANGUAGE: 1 - TENSE. DISTRESSED PACING. FIDGETING.

## 2024-01-01 ASSESSMENT — SOCIAL DETERMINANTS OF HEALTH (SDOH)
ACTIVE STRESSOR - HEALTH CHANGES: 1
ACTIVE STRESSOR - NO STRESS FACTORS: 1

## 2024-01-02 ENCOUNTER — HOME CARE VISIT (OUTPATIENT)
Dept: HOME HEALTH SERVICES | Facility: HOME HEALTHCARE | Age: 89
End: 2024-01-02
Payer: MEDICARE

## 2024-01-02 VITALS
OXYGEN SATURATION: 96 % | TEMPERATURE: 98.1 F | HEART RATE: 63 BPM | WEIGHT: 139.5 LBS | SYSTOLIC BLOOD PRESSURE: 122 MMHG | BODY MASS INDEX: 23.95 KG/M2 | RESPIRATION RATE: 18 BRPM | DIASTOLIC BLOOD PRESSURE: 70 MMHG

## 2024-01-02 PROCEDURE — G0151 HHCP-SERV OF PT,EA 15 MIN: HCPCS

## 2024-01-02 ASSESSMENT — FIBROSIS 4 INDEX: FIB4 SCORE: 2.71

## 2024-01-03 ENCOUNTER — HOME CARE VISIT (OUTPATIENT)
Dept: HOME HEALTH SERVICES | Facility: HOME HEALTHCARE | Age: 89
End: 2024-01-03
Payer: MEDICARE

## 2024-01-04 ENCOUNTER — HOME CARE VISIT (OUTPATIENT)
Dept: HOME HEALTH SERVICES | Facility: HOME HEALTHCARE | Age: 89
End: 2024-01-04
Payer: MEDICARE

## 2024-01-04 VITALS
HEART RATE: 79 BPM | DIASTOLIC BLOOD PRESSURE: 62 MMHG | TEMPERATURE: 98.4 F | SYSTOLIC BLOOD PRESSURE: 122 MMHG | RESPIRATION RATE: 18 BRPM | OXYGEN SATURATION: 93 %

## 2024-01-04 VITALS
SYSTOLIC BLOOD PRESSURE: 122 MMHG | HEART RATE: 77 BPM | RESPIRATION RATE: 18 BRPM | OXYGEN SATURATION: 93 % | TEMPERATURE: 98.4 F | DIASTOLIC BLOOD PRESSURE: 62 MMHG

## 2024-01-04 PROCEDURE — G0299 HHS/HOSPICE OF RN EA 15 MIN: HCPCS

## 2024-01-04 PROCEDURE — G0151 HHCP-SERV OF PT,EA 15 MIN: HCPCS

## 2024-01-04 ASSESSMENT — ENCOUNTER SYMPTOMS
PERSON REPORTING PAIN: PATIENT
STOOL FREQUENCY: LESS THAN DAILY
DENIES PAIN: 1
SUBJECTIVE PAIN PROGRESSION: UNCHANGED
LOWEST PAIN SEVERITY IN PAST 24 HOURS: 0/10
BOWEL PATTERN NORMAL: 1
PAIN SEVERITY GOAL: 0/10
NAUSEA: DENIES
LAST BOWEL MOVEMENT: 66843
MUSCLE WEAKNESS: 1
HIGHEST PAIN SEVERITY IN PAST 24 HOURS: 0/10
VOMITING: DENIES

## 2024-01-06 ENCOUNTER — HOME CARE VISIT (OUTPATIENT)
Dept: HOME HEALTH SERVICES | Facility: HOME HEALTHCARE | Age: 89
End: 2024-01-06
Payer: MEDICARE

## 2024-01-06 VITALS
OXYGEN SATURATION: 92 % | BODY MASS INDEX: 24.55 KG/M2 | WEIGHT: 143 LBS | TEMPERATURE: 97.2 F | SYSTOLIC BLOOD PRESSURE: 126 MMHG | RESPIRATION RATE: 16 BRPM | HEART RATE: 81 BPM | DIASTOLIC BLOOD PRESSURE: 70 MMHG

## 2024-01-06 PROCEDURE — G0299 HHS/HOSPICE OF RN EA 15 MIN: HCPCS

## 2024-01-06 ASSESSMENT — ENCOUNTER SYMPTOMS
BOWEL PATTERN NORMAL: 1
LIMITED RANGE OF MOTION: 1
PAIN: 1
PAIN LOCATION: LT HIP
ARTHRALGIAS: 1
PAIN LOCATION - EXACERBATING FACTORS: WALKING
STOOL FREQUENCY: DAILY
MUSCLE WEAKNESS: 1
PERSON REPORTING PAIN: PATIENT
POOR JUDGMENT: 1
LAST BOWEL MOVEMENT: 66845

## 2024-01-06 ASSESSMENT — FIBROSIS 4 INDEX: FIB4 SCORE: 2.71

## 2024-01-06 ASSESSMENT — PAIN SCALES - PAIN ASSESSMENT IN ADVANCED DEMENTIA (PAINAD)
TOTALSCORE: 6
NEGVOCALIZATION: 1 - OCCASIONAL MOAN OR GROAN. LOW-LEVEL SPEECH WITH A NEGATIVE OR DISAPPROVING QUALITY.
CONSOLABILITY: 1 - DISTRACTED OR REASSURED BY VOICE OR TOUCH.
BODYLANGUAGE: 1 - TENSE. DISTRESSED PACING. FIDGETING.
FACIALEXPRESSION: 2 - FACIAL GRIMACING.

## 2024-01-06 ASSESSMENT — ACTIVITIES OF DAILY LIVING (ADL)
CURRENT_FUNCTION: STAND BY ASSIST
AMBULATION ASSISTANCE: STAND BY ASSIST

## 2024-01-06 NOTE — Clinical Note
Noted thsnk you.  ----- Message -----  From: Constanza Huggins R.N.  Sent: 1/6/2024  10:10 PM PST  To: Mariam Charles R.N.      Wound healing well. Son ordered silicone adhesive dressings for home use due to very fragile skin. Daughter states she can do wound care if needed. Lungs clear.

## 2024-01-07 NOTE — CASE COMMUNICATION
Wound healing well. Son ordered silicone adhesive dressings for home use due to very fragile skin. Daughter states she can do wound care if needed. Lungs clear.

## 2024-01-08 ENCOUNTER — HOME CARE VISIT (OUTPATIENT)
Dept: HOME HEALTH SERVICES | Facility: HOME HEALTHCARE | Age: 89
End: 2024-01-08
Payer: MEDICARE

## 2024-01-09 ENCOUNTER — HOME CARE VISIT (OUTPATIENT)
Dept: HOME HEALTH SERVICES | Facility: HOME HEALTHCARE | Age: 89
End: 2024-01-09
Payer: MEDICARE

## 2024-01-09 VITALS
DIASTOLIC BLOOD PRESSURE: 70 MMHG | TEMPERATURE: 98.5 F | WEIGHT: 144 LBS | OXYGEN SATURATION: 92 % | BODY MASS INDEX: 24.72 KG/M2 | SYSTOLIC BLOOD PRESSURE: 112 MMHG | HEART RATE: 82 BPM | RESPIRATION RATE: 18 BRPM

## 2024-01-09 PROCEDURE — G0151 HHCP-SERV OF PT,EA 15 MIN: HCPCS

## 2024-01-09 ASSESSMENT — FIBROSIS 4 INDEX: FIB4 SCORE: 2.71

## 2024-01-11 ENCOUNTER — HOME CARE VISIT (OUTPATIENT)
Dept: HOME HEALTH SERVICES | Facility: HOME HEALTHCARE | Age: 89
End: 2024-01-11
Payer: MEDICARE

## 2024-01-11 VITALS
SYSTOLIC BLOOD PRESSURE: 148 MMHG | BODY MASS INDEX: 24.29 KG/M2 | WEIGHT: 141.5 LBS | RESPIRATION RATE: 18 BRPM | OXYGEN SATURATION: 96 % | TEMPERATURE: 99.3 F | HEART RATE: 70 BPM | DIASTOLIC BLOOD PRESSURE: 92 MMHG

## 2024-01-11 PROCEDURE — G0151 HHCP-SERV OF PT,EA 15 MIN: HCPCS

## 2024-01-11 ASSESSMENT — FIBROSIS 4 INDEX: FIB4 SCORE: 2.71

## 2024-01-12 ENCOUNTER — HOME CARE VISIT (OUTPATIENT)
Dept: HOME HEALTH SERVICES | Facility: HOME HEALTHCARE | Age: 89
End: 2024-01-12
Payer: MEDICARE

## 2024-01-12 SDOH — ECONOMIC STABILITY: HOUSING INSECURITY: HOME SAFETY: PT LIVES WITH DIL AND SON AND THEY HELP AS NEEDED

## 2024-01-12 ASSESSMENT — GAIT ASSESSMENTS
BALANCE AND GAIT SCORE: 22
GAIT SCORE: 9
PATH: 1 - MILD/MODERATE DEVIATION OR USES WALKING AID
INITIATION OF GAIT IMMEDIATELY AFTER GO: 1 - NO HESITANCY
TRUNK: 0 - MARKED SWAY OR USES WALKING AID
WALKING STANCE: 1 - HEELS ALMOST TOUCHING WHILE WALKING
PATH SCORE: 1
TRUNK SCORE: 0
STEP CONTINUITY: 1 - STEPS APPEAR CONTINUOUS
STEP SYMMETRY: 1 - RIGHT AND LEFT STEP LENGTH APPEAR EQUAL

## 2024-01-12 ASSESSMENT — BALANCE ASSESSMENTS
ARISING SCORE: 1
BALANCE SCORE: 13
SITTING DOWN: 1 - USES ARMS OR NOT SMOOTH MOTION
SITTING BALANCE: 1 - STEADY, SAFE
STANDING BALANCE: 1 - STEADY BUT WIDE STANCE AND USES CANE OR OTHER SUPPORT
EYES CLOSED AT MAXIMUM POSITION NUDGED: 1 - STEADY
NUDGED SCORE: 2
ATTEMPTS TO ARISE: 2 - ABLE TO RISE, ONE ATTEMPT
TURNING 360 DEGREES STEPS: 1 - CONTINUOUS STEPS
NUDGED: 2 - STEADY
IMMEDIATE STANDING BALANCE FIRST 5 SECONDS: 2 - STEADY WITHOUT WALKER OR OTHER SUPPORT
ARISES: 1 - ABLE, USES ARMS TO HELP

## 2024-01-12 ASSESSMENT — ACTIVITIES OF DAILY LIVING (ADL)
OASIS_M1830: 02
HOME_HEALTH_OASIS: 01

## 2024-01-12 NOTE — CASE COMMUNICATION
"Quality Review for DC OASIS by LUCIANA Redman, RN on  January 12, 2024     Edits completed by LUCIANA Redman, RN:  1. Changed  C to NA per the care plan after last LIZETH  2. Changed  to no, fall was claimed on transfer on 12/17.  No documentation of any fall since that time.  3. Changed  to o per narrative stating \"pt is MI with all transfers using no AD or SPC.\"   "

## 2024-01-13 NOTE — CASE COMMUNICATION
"I agree with changes.     Priyanka Davis PT, DPT      ----- Message -----  From: Cheryl Redman R.N.  Sent: 1/12/2024   9:20 AM PST  To: Priyanka Davis PT      Quality Review for DC OASIS by LUCIANA Redman, RN on  January 12, 2024     Edits completed by LUCIANA Redman, RN:  1. Changed  C to NA per the care plan after last LIZETH  2. Changed  to no, fall was claimed on transfer on 12/17.  No documentation of any fall since  that time.  3. Changed  to o per narrative stating \"pt is MI with all transfers using no AD or SPC.\"   "

## 2024-01-18 ENCOUNTER — PATIENT MESSAGE (OUTPATIENT)
Dept: MEDICAL GROUP | Facility: MEDICAL CENTER | Age: 89
End: 2024-01-18

## 2024-01-18 DIAGNOSIS — U07.1 ACUTE HYPOXEMIC RESPIRATORY FAILURE DUE TO COVID-19 (HCC): ICD-10-CM

## 2024-01-18 DIAGNOSIS — I11.0 HYPERTENSIVE HEART DISEASE WITH CHRONIC SYSTOLIC CONGESTIVE HEART FAILURE (HCC): ICD-10-CM

## 2024-01-18 DIAGNOSIS — I50.22 HYPERTENSIVE HEART DISEASE WITH CHRONIC SYSTOLIC CONGESTIVE HEART FAILURE (HCC): ICD-10-CM

## 2024-01-18 DIAGNOSIS — I10 HYPERTENSION, ESSENTIAL: ICD-10-CM

## 2024-01-18 DIAGNOSIS — J96.01 ACUTE HYPOXEMIC RESPIRATORY FAILURE DUE TO COVID-19 (HCC): ICD-10-CM

## 2024-01-18 DIAGNOSIS — E78.5 DYSLIPIDEMIA: ICD-10-CM

## 2024-01-18 DIAGNOSIS — R29.6 RECURRENT FALLS: ICD-10-CM

## 2024-02-05 ENCOUNTER — OFFICE VISIT (OUTPATIENT)
Dept: URGENT CARE | Facility: CLINIC | Age: 89
End: 2024-02-05
Payer: MEDICARE

## 2024-02-05 ENCOUNTER — HOSPITAL ENCOUNTER (OUTPATIENT)
Facility: MEDICAL CENTER | Age: 89
End: 2024-02-05
Attending: PHYSICIAN ASSISTANT
Payer: MEDICARE

## 2024-02-05 VITALS
TEMPERATURE: 98.5 F | OXYGEN SATURATION: 93 % | RESPIRATION RATE: 16 BRPM | BODY MASS INDEX: 24.75 KG/M2 | HEIGHT: 64 IN | SYSTOLIC BLOOD PRESSURE: 118 MMHG | HEART RATE: 76 BPM | DIASTOLIC BLOOD PRESSURE: 70 MMHG | WEIGHT: 145 LBS

## 2024-02-05 DIAGNOSIS — N30.01 ACUTE CYSTITIS WITH HEMATURIA: Primary | ICD-10-CM

## 2024-02-05 LAB
APPEARANCE UR: NORMAL
BILIRUB UR STRIP-MCNC: NEGATIVE MG/DL
COLOR UR AUTO: YELLOW
GLUCOSE UR STRIP.AUTO-MCNC: NEGATIVE MG/DL
KETONES UR STRIP.AUTO-MCNC: NEGATIVE MG/DL
LEUKOCYTE ESTERASE UR QL STRIP.AUTO: NORMAL
NITRITE UR QL STRIP.AUTO: NEGATIVE
PH UR STRIP.AUTO: 6 [PH] (ref 5–8)
PROT UR QL STRIP: NEGATIVE MG/DL
RBC UR QL AUTO: NORMAL
SP GR UR STRIP.AUTO: 1.02
UROBILINOGEN UR STRIP-MCNC: 0.2 MG/DL

## 2024-02-05 PROCEDURE — 99213 OFFICE O/P EST LOW 20 MIN: CPT | Performed by: PHYSICIAN ASSISTANT

## 2024-02-05 PROCEDURE — 3078F DIAST BP <80 MM HG: CPT | Performed by: PHYSICIAN ASSISTANT

## 2024-02-05 PROCEDURE — 3074F SYST BP LT 130 MM HG: CPT | Performed by: PHYSICIAN ASSISTANT

## 2024-02-05 PROCEDURE — 87077 CULTURE AEROBIC IDENTIFY: CPT

## 2024-02-05 PROCEDURE — 81002 URINALYSIS NONAUTO W/O SCOPE: CPT | Performed by: PHYSICIAN ASSISTANT

## 2024-02-05 PROCEDURE — 87086 URINE CULTURE/COLONY COUNT: CPT

## 2024-02-05 RX ORDER — CEFDINIR 300 MG/1
300 CAPSULE ORAL 2 TIMES DAILY
Qty: 10 CAPSULE | Refills: 0 | Status: SHIPPED | OUTPATIENT
Start: 2024-02-05 | End: 2024-02-10

## 2024-02-05 ASSESSMENT — FIBROSIS 4 INDEX: FIB4 SCORE: 2.71

## 2024-02-05 NOTE — PROGRESS NOTES
"Subjective:   Linda Casas is a 89 y.o. female who presents for Dysuria (X 3-4 days daughters noticed change in her mood, OTC test showed infection )      HPI  The patient presents to the Urgent Care brought in by daughter and granddaughter with complaints of possible UTI onset 3 to 4 days ago.  Reports of a urine odor.  Reports of urinary frequency and urgency.  Patient seems to be more irritable.   Denies any fever, abdominal pain, nausea, vomiting, flank pain. No other complaints or concerns.           Past Medical History:   Diagnosis Date    Anesthesia     Dtr-in-law states patient took a very long time to recover from anesthesia, states patient was incoherent and extremely weak    Anxiety 09/14/2010    Cataract     IOL    Congestive heart failure ([facility]) 2022    High cholesterol     Hypertension     history of but no treatment    Myocardial infarct ([facility]) May2022    OSTEOPOROSIS 09/14/2010    Pain 03/04/2014    6/10=L knee    Pain 11/04/2021    low back    Shingles 11/9/2023    Snoring     no sleep study    Urinary incontinence     only at times at night when sleeping     Allergies   Allergen Reactions    Ampicillin Hives, Rash and Swelling     Feels \"rotten\"     Bactrim Ds Hives, Itching and Swelling     Mouth, lip, tongue, eye swelling, pain, itching    Ciprofloxacin Hives, Rash and Swelling     .        Objective:     /70 (BP Location: Left arm, Patient Position: Sitting, BP Cuff Size: Adult)   Pulse 76   Temp 36.9 °C (98.5 °F) (Temporal)   Resp 16   Ht 1.626 m (5' 4\")   Wt 65.8 kg (145 lb)   SpO2 93%   BMI 24.89 kg/m²     Physical Exam  Vitals reviewed.   Constitutional:       General: She is not in acute distress.     Appearance: Normal appearance. She is not ill-appearing or toxic-appearing.   Eyes:      Conjunctiva/sclera: Conjunctivae normal.   Cardiovascular:      Rate and Rhythm: Normal rate.   Pulmonary:      Effort: Pulmonary effort is normal.   Abdominal:      General: " Abdomen is flat. There is no distension.      Palpations: Abdomen is soft.      Tenderness: There is no abdominal tenderness. There is no right CVA tenderness, left CVA tenderness, guarding or rebound.   Musculoskeletal:      Cervical back: Neck supple.   Skin:     General: Skin is warm and dry.   Neurological:      General: No focal deficit present.      Mental Status: She is alert and oriented to person, place, and time.   Psychiatric:         Mood and Affect: Mood normal.         Behavior: Behavior normal.         Diagnosis and associated orders:     1. Acute cystitis with hematuria  - cefdinir (OMNICEF) 300 MG Cap; Take 1 Capsule by mouth 2 times a day for 5 days.  Dispense: 10 Capsule; Refill: 0  - POCT Urinalysis  - URINE CULTURE(NEW); Future       Comments/MDM:     The patient's presenting symptoms and exam findings are consistent with a simple urinary tract infection. They are overall very well-appearing with normal vital signs and benign examination findings.   Increase fluid intake.  Urine culture: will call back only if positive and if necessary change in therapy.   Advised to return to the Urgent Care or follow up with their PCP if symptoms are not improving in 2-3 days or sooner if any worsening symptoms such as fever, chills, abdominal pain, back/flank pain, nausea, vomiting, or any other concerns.        I personally reviewed prior external notes and test results pertinent to today's visit. Pathogenesis of diagnosis discussed including typical length and natural progression. Supportive care, natural history, differential diagnoses, and indications for immediate follow-up discussed. Patient expresses understanding and agrees to plan. Patient denies any other questions or concerns.     Follow-up with the primary care physician for recheck, reevaluation, and consideration of further management.    Please note that this dictation was created using voice recognition software. I have made a reasonable  attempt to correct obvious errors, but I expect that there are errors of grammar and possibly content that I did not discover before finalizing the note.    This note was electronically signed by Alejandro Camara PA-C

## 2024-02-06 DIAGNOSIS — N30.01 ACUTE CYSTITIS WITH HEMATURIA: ICD-10-CM

## 2024-02-21 DIAGNOSIS — F32.4 MAJOR DEPRESSIVE DISORDER WITH SINGLE EPISODE, IN PARTIAL REMISSION (HCC): ICD-10-CM

## 2024-02-22 DIAGNOSIS — F32.4 MAJOR DEPRESSIVE DISORDER WITH SINGLE EPISODE, IN PARTIAL REMISSION (HCC): ICD-10-CM

## 2024-02-22 RX ORDER — ESCITALOPRAM OXALATE 20 MG/1
20 TABLET ORAL DAILY
Qty: 30 TABLET | Refills: 2 | Status: SHIPPED | OUTPATIENT
Start: 2024-02-22

## 2024-02-22 RX ORDER — ESCITALOPRAM OXALATE 20 MG/1
20 TABLET ORAL DAILY
Qty: 30 TABLET | Refills: 2 | Status: SHIPPED | OUTPATIENT
Start: 2024-02-22 | End: 2024-02-22

## 2024-02-22 NOTE — TELEPHONE ENCOUNTER
Received request via: Pharmacy    Was the patient seen in the last year in this department? Yes    Does the patient have an active prescription (recently filled or refills available) for medication(s) requested? No    Pharmacy Name: [facility]    Does the patient have alf Plus and need 100 day supply (blood pressure, diabetes and cholesterol meds only)? Medication is not for cholesterol, blood pressure or diabetes

## 2024-02-23 ENCOUNTER — PATIENT MESSAGE (OUTPATIENT)
Dept: CARDIOLOGY | Facility: MEDICAL CENTER | Age: 89
End: 2024-02-23
Payer: MEDICARE

## 2024-02-23 DIAGNOSIS — E78.5 DYSLIPIDEMIA: ICD-10-CM

## 2024-02-29 RX ORDER — ALBUTEROL SULFATE 90 UG/1
2 AEROSOL, METERED RESPIRATORY (INHALATION) EVERY 4 HOURS
Qty: 8.5 G | Refills: 0 | Status: SHIPPED | OUTPATIENT
Start: 2024-02-29

## 2024-02-29 RX ORDER — CARVEDILOL 3.12 MG/1
3.12 TABLET ORAL 2 TIMES DAILY
Qty: 200 TABLET | Refills: 2 | Status: SHIPPED | OUTPATIENT
Start: 2024-02-29

## 2024-02-29 RX ORDER — ROSUVASTATIN CALCIUM 10 MG/1
10 TABLET, COATED ORAL EVERY EVENING
Qty: 100 TABLET | Refills: 2 | Status: SHIPPED | OUTPATIENT
Start: 2024-02-29

## 2024-02-29 RX ORDER — LOSARTAN POTASSIUM 50 MG/1
50 TABLET ORAL EVERY EVENING
Qty: 100 TABLET | Refills: 2 | Status: SHIPPED | OUTPATIENT
Start: 2024-02-29

## 2024-05-09 DIAGNOSIS — F32.4 MAJOR DEPRESSIVE DISORDER WITH SINGLE EPISODE, IN PARTIAL REMISSION (HCC): ICD-10-CM

## 2024-05-09 RX ORDER — ESCITALOPRAM OXALATE 20 MG/1
20 TABLET ORAL DAILY
Qty: 30 TABLET | Refills: 1 | Status: SHIPPED | OUTPATIENT
Start: 2024-05-09

## 2024-05-09 NOTE — TELEPHONE ENCOUNTER
Received request via: Pharmacy    Was the patient seen in the last year in this department? Yes    Does the patient have an active prescription (recently filled or refills available) for medication(s) requested? No    Pharmacy Name: [facility] - [place], [place] - [address]     Does the patient have residential Plus and need 100 day supply (blood pressure, diabetes and cholesterol meds only)? Medication is not for cholesterol, blood pressure or diabetes

## 2024-05-21 RX ORDER — ESCITALOPRAM OXALATE 20 MG/1
20 TABLET ORAL DAILY
Qty: 90 TABLET | OUTPATIENT
Start: 2024-05-21

## 2024-05-28 PROBLEM — J96.01 ACUTE HYPOXEMIC RESPIRATORY FAILURE DUE TO COVID-19 (HCC): Status: RESOLVED | Noted: 2023-12-17 | Resolved: 2024-05-28

## 2024-05-28 PROBLEM — S72.002A LEFT DISPLACED FEMORAL NECK FRACTURE (HCC): Status: RESOLVED | Noted: 2023-10-28 | Resolved: 2024-05-28

## 2024-05-28 PROBLEM — N30.00 ACUTE CYSTITIS WITHOUT HEMATURIA: Status: RESOLVED | Noted: 2023-11-20 | Resolved: 2024-05-28

## 2024-05-28 PROBLEM — G93.41 SEPTIC ENCEPHALOPATHY: Status: RESOLVED | Noted: 2023-12-17 | Resolved: 2024-05-28

## 2024-05-28 PROBLEM — U07.1 ACUTE HYPOXEMIC RESPIRATORY FAILURE DUE TO COVID-19 (HCC): Status: RESOLVED | Noted: 2023-12-17 | Resolved: 2024-05-28

## 2024-05-28 PROBLEM — S72.001A HIP FRACTURE REQUIRING OPERATIVE REPAIR, RIGHT, CLOSED, INITIAL ENCOUNTER (HCC): Status: RESOLVED | Noted: 2023-10-28 | Resolved: 2024-05-28

## 2024-06-09 ENCOUNTER — HOSPITAL ENCOUNTER (OUTPATIENT)
Facility: MEDICAL CENTER | Age: 89
End: 2024-06-09
Attending: NURSE PRACTITIONER
Payer: MEDICARE

## 2024-06-09 ENCOUNTER — OFFICE VISIT (OUTPATIENT)
Dept: URGENT CARE | Facility: CLINIC | Age: 89
End: 2024-06-09
Payer: MEDICARE

## 2024-06-09 VITALS
DIASTOLIC BLOOD PRESSURE: 76 MMHG | RESPIRATION RATE: 16 BRPM | SYSTOLIC BLOOD PRESSURE: 124 MMHG | OXYGEN SATURATION: 95 % | HEART RATE: 78 BPM | TEMPERATURE: 97.4 F

## 2024-06-09 DIAGNOSIS — R39.9 SYMPTOMS OF URINARY TRACT INFECTION: ICD-10-CM

## 2024-06-09 DIAGNOSIS — N30.01 ACUTE CYSTITIS WITH HEMATURIA: ICD-10-CM

## 2024-06-09 LAB
AMBIGUOUS DTTM AMBI4: NORMAL
APPEARANCE UR: NORMAL
BILIRUB UR STRIP-MCNC: NORMAL MG/DL
COLOR UR AUTO: NORMAL
GLUCOSE UR STRIP.AUTO-MCNC: NORMAL MG/DL
KETONES UR STRIP.AUTO-MCNC: NORMAL MG/DL
LEUKOCYTE ESTERASE UR QL STRIP.AUTO: NORMAL
NITRITE UR QL STRIP.AUTO: NORMAL
PH UR STRIP.AUTO: 6 [PH] (ref 5–8)
PROT UR QL STRIP: 30 MG/DL
RBC UR QL AUTO: NORMAL
SP GR UR STRIP.AUTO: >=1.03
UROBILINOGEN UR STRIP-MCNC: 1 MG/DL

## 2024-06-09 PROCEDURE — 87086 URINE CULTURE/COLONY COUNT: CPT

## 2024-06-09 PROCEDURE — 3074F SYST BP LT 130 MM HG: CPT | Performed by: NURSE PRACTITIONER

## 2024-06-09 PROCEDURE — 81002 URINALYSIS NONAUTO W/O SCOPE: CPT | Performed by: NURSE PRACTITIONER

## 2024-06-09 PROCEDURE — 87186 SC STD MICRODIL/AGAR DIL: CPT

## 2024-06-09 PROCEDURE — 87077 CULTURE AEROBIC IDENTIFY: CPT

## 2024-06-09 PROCEDURE — 99213 OFFICE O/P EST LOW 20 MIN: CPT | Performed by: NURSE PRACTITIONER

## 2024-06-09 PROCEDURE — 3078F DIAST BP <80 MM HG: CPT | Performed by: NURSE PRACTITIONER

## 2024-06-09 RX ORDER — CEFDINIR 300 MG/1
300 CAPSULE ORAL 2 TIMES DAILY
Qty: 10 CAPSULE | Refills: 0 | Status: SHIPPED | OUTPATIENT
Start: 2024-06-09 | End: 2024-06-14

## 2024-06-09 NOTE — PROGRESS NOTES
Subjective     Dianne Casas is a 89 y.o. female who presents with Urinary Pain (X5days, urinary pain, odor, ankle and foot swelling)            HPI  New problem.  Patient is a 89-year-old female who presents with burning with urination, changes in mentation, urinary odor, and ankle and foot swelling on the right side.  She is brought in by her daughter who is the main historian as this patient does have dementia.  The foot swelling was just noticed this morning.  She has not taken any medications for her symptoms.    Ampicillin, Bactrim ds, and Ciprofloxacin  Current Outpatient Medications on File Prior to Visit   Medication Sig Dispense Refill    escitalopram (LEXAPRO) 20 MG tablet TAKE ONE TABLET BY MOUTH DAILY 30 Tablet 1    carvedilol (COREG) 3.125 MG Tab Take 1 Tablet by mouth 2 times a day. Indications: High Blood Pressure Disorder 200 Tablet 2    losartan (COZAAR) 50 MG Tab Take 1 Tablet by mouth every evening. Indications: High Blood Pressure Disorder 100 Tablet 2    rosuvastatin (CRESTOR) 10 MG Tab Take 1 Tablet by mouth every evening. Indications: Disease of the Peripheral Arteries, High Amount of Fats in the Blood 100 Tablet 2    albuterol 108 (90 Base) MCG/ACT Aero Soln inhalation aerosol Inhale 2 Puffs every 4 hours. Indications: Spasm of Lung Air Passages 8.5 g 0    Misc. Devices Misc Needs a bed rail. Patient has history of recurrent falls when he wake up. 1 Each 0    GOODSENSE ASPIRIN LOW DOSE 81 MG EC tablet TAKE 1 TABLET BY MOUTH EVERY DAY. (Patient taking differently: Take 81 mg by mouth every day. Indications: Preventative postop) 100 Tablet 3    QUEtiapine (SEROQUEL) 25 MG Tab Take 25 mg at bet time (Patient taking differently: Take 25 mg by mouth at bedtime. Take 25 mg at bet time  Indications: Dementia, Anxiety) 30 Tablet 2     No current facility-administered medications on file prior to visit.     Social History     Socioeconomic History    Marital status:      Spouse  name: Not on file    Number of children: Not on file    Years of education: Not on file    Highest education level: 12th grade   Occupational History    Not on file   Tobacco Use    Smoking status: Never     Passive exposure: Never    Smokeless tobacco: Never   Vaping Use    Vaping status: Never Used   Substance and Sexual Activity    Alcohol use: Not Currently     Comment: 1 time per month    Drug use: Never    Sexual activity: Yes     Partners: Male   Other Topics Concern    Not on file   Social History Narrative    ** Merged History Encounter **          Social Determinants of Health     Financial Resource Strain: Low Risk  (11/2/2023)    Overall Financial Resource Strain (CARDIA)     Difficulty of Paying Living Expenses: Not hard at all   Food Insecurity: No Food Insecurity (11/2/2023)    Hunger Vital Sign     Worried About Running Out of Food in the Last Year: Never true     Ran Out of Food in the Last Year: Never true   Transportation Needs: No Transportation Needs (11/10/2023)    PRAPARE - Transportation     Lack of Transportation (Medical): No     Lack of Transportation (Non-Medical): No   Physical Activity: Inactive (11/2/2023)    Exercise Vital Sign     Days of Exercise per Week: 5 days     Minutes of Exercise per Session: 0 min   Stress: Stress Concern Present (11/2/2023)    [employer] - Occupational Stress Questionnaire     Feeling of Stress : Very much   Social Connections: Feeling Somewhat Isolated (1/11/2024)    OASIS : Social Isolation     Frequency of experiencing loneliness or isolation: Sometimes   Intimate Partner Violence: Not on file   Housing Stability: Unknown (11/2/2023)    Housing Stability Vital Sign     Unable to Pay for Housing in the Last Year: No     Number of Places Lived in the Last Year: Not on file     Unstable Housing in the Last Year: No     Breast Cancer-related family history is not on file.      Review of Systems   Unable to perform ROS:  Dementia              Objective     /76 (BP Location: Left arm, Patient Position: Sitting, BP Cuff Size: Adult)   Pulse 78   Temp 36.3 °C (97.4 °F) (Temporal)   Resp 16   SpO2 95%      Physical Exam  Vitals and nursing note reviewed.   Constitutional:       General: She is not in acute distress.     Appearance: She is well-developed.   Cardiovascular:      Rate and Rhythm: Normal rate and regular rhythm.      Heart sounds: Normal heart sounds. No murmur heard.  Pulmonary:      Effort: Pulmonary effort is normal. No respiratory distress.      Breath sounds: Normal breath sounds.   Abdominal:      General: Bowel sounds are normal.      Palpations: Abdomen is soft.      Tenderness: There is no abdominal tenderness. There is no right CVA tenderness or left CVA tenderness.   Musculoskeletal:         General: Normal range of motion.      Comments: Moves all 4 extremities normally. Patient with minimal swelling that is non pitting to right ankle/foot.   Skin:     General: Skin is warm and dry.   Neurological:      Mental Status: She is alert and oriented to person, place, and time.   Psychiatric:         Behavior: Behavior normal.         Thought Content: Thought content normal.                             Assessment & Plan        1. Acute cystitis with hematuria  cefdinir (OMNICEF) 300 MG Cap      2. Symptoms of urinary tract infection  POCT Urinalysis    URINE CULTURE(NEW)        Differential diagnosis, natural history, supportive care, and indications for immediate follow-up were discussed.

## 2024-06-19 ENCOUNTER — TELEPHONE (OUTPATIENT)
Dept: MEDICAL GROUP | Facility: MEDICAL CENTER | Age: 89
End: 2024-06-19
Payer: MEDICARE

## 2024-06-19 NOTE — TELEPHONE ENCOUNTER
Called pt to rescheduled appt for June 25th. PT requesting lab orders prior to her appointment on 7/8/24

## 2024-06-23 ENCOUNTER — OFFICE VISIT (OUTPATIENT)
Dept: URGENT CARE | Facility: CLINIC | Age: 89
End: 2024-06-23
Payer: MEDICARE

## 2024-06-23 VITALS
DIASTOLIC BLOOD PRESSURE: 60 MMHG | OXYGEN SATURATION: 93 % | RESPIRATION RATE: 16 BRPM | HEART RATE: 88 BPM | SYSTOLIC BLOOD PRESSURE: 110 MMHG | TEMPERATURE: 97.5 F

## 2024-06-23 DIAGNOSIS — R39.9 UTI SYMPTOMS: ICD-10-CM

## 2024-06-23 LAB
APPEARANCE UR: NORMAL
BILIRUB UR STRIP-MCNC: NORMAL MG/DL
COLOR UR AUTO: NORMAL
GLUCOSE UR STRIP.AUTO-MCNC: NEGATIVE MG/DL
KETONES UR STRIP.AUTO-MCNC: NORMAL MG/DL
LEUKOCYTE ESTERASE UR QL STRIP.AUTO: NORMAL
NITRITE UR QL STRIP.AUTO: NEGATIVE
PH UR STRIP.AUTO: 5.5 [PH] (ref 5–8)
PROT UR QL STRIP: NORMAL MG/DL
RBC UR QL AUTO: NORMAL
SP GR UR STRIP.AUTO: 1.03
UROBILINOGEN UR STRIP-MCNC: 1 MG/DL

## 2024-06-23 PROCEDURE — 3078F DIAST BP <80 MM HG: CPT | Performed by: NURSE PRACTITIONER

## 2024-06-23 PROCEDURE — 99213 OFFICE O/P EST LOW 20 MIN: CPT | Performed by: NURSE PRACTITIONER

## 2024-06-23 PROCEDURE — 81002 URINALYSIS NONAUTO W/O SCOPE: CPT | Performed by: NURSE PRACTITIONER

## 2024-06-23 PROCEDURE — 3074F SYST BP LT 130 MM HG: CPT | Performed by: NURSE PRACTITIONER

## 2024-06-23 RX ORDER — CEPHALEXIN 500 MG/1
500 CAPSULE ORAL 2 TIMES DAILY
Qty: 10 CAPSULE | Refills: 0 | Status: CANCELLED | OUTPATIENT
Start: 2024-06-23 | End: 2024-06-28

## 2024-06-23 RX ORDER — CEFDINIR 300 MG/1
300 CAPSULE ORAL 2 TIMES DAILY
Qty: 14 CAPSULE | Refills: 0 | Status: SHIPPED | OUTPATIENT
Start: 2024-06-23 | End: 2024-06-30

## 2024-06-23 NOTE — PROGRESS NOTES
Verbal consent was acquired by the patient to use [employer] ambient listening note generation during this visit     Date: 06/23/24        Chief Complaint   Patient presents with    Urinary Pain          History of Present Illness  The patient is a 89-year-old female presenting to clinic with concerns of uti. . She is accompanied by her daughter-in-law.    The patient was recently treated for a urinary tract infection (UTI) approximately 2 weeks ago. The daughter-in-law reports that the patient did not exhibit any symptoms at the time, leading to the diagnosis of a UTI. The patient's personality has altered, characterized by a change in her mood and altered balance. The patient's daughter-in-law has observed an odor, and a change in mentation she already has an at home urine dip which showed a positive leukocytes.  The patient denies experiencing any dysuria. The daughter-in-law did not observe any improvement in her symptoms following the initiation of antibiotics.  Currently reports that 2 weeks ago she was prescribed cefdinir for 5 days.  She reports that on the bottle said to give morning and night for 5 days she does not remember giving it in the nighttime and does not believe there is 10 pills in the bottle.   She is allergic to BACTRIM and CIPRO.         ROS:    No severe shortness of breath   No Cardiac like chest pain, as discussed   As otherwise stated in HPI    Medical/SX/ Social History:  Reviewed per chart    Pertinent Medications:    Current Outpatient Medications on File Prior to Visit   Medication Sig Dispense Refill    escitalopram (LEXAPRO) 20 MG tablet TAKE ONE TABLET BY MOUTH DAILY 30 Tablet 1    carvedilol (COREG) 3.125 MG Tab Take 1 Tablet by mouth 2 times a day. Indications: High Blood Pressure Disorder 200 Tablet 2    losartan (COZAAR) 50 MG Tab Take 1 Tablet by mouth every evening. Indications: High Blood Pressure Disorder 100 Tablet 2    rosuvastatin (CRESTOR) 10 MG Tab Take 1 Tablet by  mouth every evening. Indications: Disease of the Peripheral Arteries, High Amount of Fats in the Blood 100 Tablet 2    albuterol 108 (90 Base) MCG/ACT Aero Soln inhalation aerosol Inhale 2 Puffs every 4 hours. Indications: Spasm of Lung Air Passages 8.5 g 0    Misc. Devices Misc Needs a bed rail. Patient has history of recurrent falls when he wake up. 1 Each 0    GOODSENSE ASPIRIN LOW DOSE 81 MG EC tablet TAKE 1 TABLET BY MOUTH EVERY DAY. (Patient taking differently: Take 81 mg by mouth every day. Indications: Preventative postop) 100 Tablet 3    QUEtiapine (SEROQUEL) 25 MG Tab Take 25 mg at bet time (Patient taking differently: Take 25 mg by mouth at bedtime. Take 25 mg at bet time  Indications: Dementia, Anxiety) 30 Tablet 2     No current facility-administered medications on file prior to visit.        Allergies:    Ampicillin, Bactrim ds, and Ciprofloxacin     Problem list, medications, and allergies reviewed by myself today in Epic     Physical Exam:    Vitals:    06/23/24 1058   BP: 110/60   Pulse: 88   Resp: 16   Temp: 36.4 °C (97.5 °F)   SpO2: 93%             Physical Exam  Constitutional:       General: She is not in acute distress.     Appearance: Normal appearance. She is well-developed. She is not ill-appearing or toxic-appearing.   HENT:      Head: Normocephalic and atraumatic.   Cardiovascular:      Rate and Rhythm: Normal rate and regular rhythm.      Heart sounds: Normal heart sounds.   Pulmonary:      Effort: Pulmonary effort is normal. No respiratory distress.      Breath sounds: Normal breath sounds. No wheezing.   Abdominal:      General: Abdomen is flat. Bowel sounds are normal.      Palpations: Abdomen is soft.      Tenderness: There is no abdominal tenderness. There is no right CVA tenderness, left CVA tenderness, guarding or rebound.   Skin:     General: Skin is warm.      Capillary Refill: Capillary refill takes less than 2 seconds.      Coloration: Skin is not cyanotic or pale.    Neurological:      Mental Status: She is alert and oriented to person, place, and time.      Gait: Gait is intact.   Psychiatric:         Behavior: Behavior normal. Behavior is cooperative.                  Diagnostics:    Results for orders placed or performed in visit on 06/23/24   POCT Urinalysis   Result Value Ref Range    POC Color dark yellow Negative    POC Appearance cloudy Negative    POC Glucose negative Negative mg/dL    POC Bilirubin small Negative mg/dL    POC Ketones trace Negative mg/dL    POC Specific Gravity 1.030 <1.005 - >1.030    POC Blood small Negative    POC Urine PH 5.5 5.0 - 8.0    POC Protein trace Negative mg/dL    POC Urobiligen 1.0 Negative (0.2) mg/dL    POC Nitrites negative Negative    POC Leukocyte Esterase small Negative       Urine culture pending    Medical Decision making and plan :  I personally reviewed prior external notes and test results pertinent to today's visit. Pt is clinically stable at today's acute urgent care visit.  Patient appears nontoxic with no acute distress noted. Appropriate for outpatient care at this time.    Assessment & Plan  1. Urinary tract infection.  The patient's urine exhibits a dark hue. The patient was advised to increase her water intake. A prescription for cefdinir, to be taken twice daily for a duration of 7 days, was issued. A urine culture will be sent for further analysis. Should the urine culture yield a positive result.  Discussed that positive leukocyte Estrace does not always mean there is an infection.  Discussed using a different antibiotic although due to likely incorrect adherence as daughter-in-law does not remember giving it morning and night we will trial cefdinir for 7 days instead of 5.  Antibiotic selection is difficult secondary to allergies of Bactrim Cipro as well as decreased kidney function.  Due to decreased kidney function we will not trial Macrobid at this time.    1. UTI symptoms  - POCT Urinalysis  - URINE  CULTURE(NEW); Future  - cefdinir (OMNICEF) 300 MG Cap; Take 1 Capsule by mouth 2 times a day for 7 days.  Dispense: 14 Capsule; Refill: 0      Shared decision-making was utilized with patient for treatment plan. Medication discussed included indication for use and the potential benefits and side effects. Education was provided regarding the aforementioned assessments.  Differential Diagnosis, natural history, and supportive care discussed. All of the patient's questions were answered to their satisfaction at the time of discharge. Patient was encouraged to monitor symptoms closely. Those signs and symptoms which would warrant concern and mandate seeking a higher level of service through the emergency department discussed at length.  Patient stated agreement and understanding of this plan of care.    Disposition:  Home in stable condition       Voice Recognition Disclaimer:  Portions of this document were created using voice recognition software. The software does have a chance of producing errors of grammar and possibly content. I have made every reasonable attempt to correct obvious errors, but there may be errors of grammar and possibly content that I did not discover before finalizing the documentation.    Michelle Agosto, [email or web address].

## 2024-06-24 ENCOUNTER — HOSPITAL ENCOUNTER (OUTPATIENT)
Facility: MEDICAL CENTER | Age: 89
End: 2024-06-24
Attending: NURSE PRACTITIONER
Payer: MEDICARE

## 2024-06-24 DIAGNOSIS — R39.9 UTI SYMPTOMS: ICD-10-CM

## 2024-06-24 PROCEDURE — 87086 URINE CULTURE/COLONY COUNT: CPT

## 2024-06-26 LAB
BACTERIA UR CULT: NORMAL
SIGNIFICANT IND 70042: NORMAL
SITE SITE: NORMAL
SOURCE SOURCE: NORMAL

## 2024-06-27 DIAGNOSIS — I50.22 HYPERTENSIVE HEART DISEASE WITH CHRONIC SYSTOLIC CONGESTIVE HEART FAILURE (HCC): ICD-10-CM

## 2024-06-27 DIAGNOSIS — I10 HYPERTENSION, ESSENTIAL: ICD-10-CM

## 2024-06-27 DIAGNOSIS — I25.5 ISCHEMIC CARDIOMYOPATHY: ICD-10-CM

## 2024-06-27 DIAGNOSIS — F32.4 MAJOR DEPRESSIVE DISORDER WITH SINGLE EPISODE, IN PARTIAL REMISSION (HCC): ICD-10-CM

## 2024-06-27 DIAGNOSIS — I11.0 HYPERTENSIVE HEART DISEASE WITH CHRONIC SYSTOLIC CONGESTIVE HEART FAILURE (HCC): ICD-10-CM

## 2024-06-27 DIAGNOSIS — E78.5 DYSLIPIDEMIA: ICD-10-CM

## 2024-06-28 RX ORDER — ROSUVASTATIN CALCIUM 10 MG/1
10 TABLET, COATED ORAL EVERY EVENING
Qty: 100 TABLET | Refills: 7 | OUTPATIENT
Start: 2024-06-28

## 2024-06-28 RX ORDER — LOSARTAN POTASSIUM 50 MG/1
50 TABLET ORAL EVERY EVENING
Qty: 100 TABLET | Refills: 9 | OUTPATIENT
Start: 2024-06-28

## 2024-06-28 RX ORDER — CARVEDILOL 3.12 MG/1
3.12 TABLET ORAL 2 TIMES DAILY
Qty: 200 TABLET | Refills: 7 | OUTPATIENT
Start: 2024-06-28

## 2024-06-28 RX ORDER — ESCITALOPRAM OXALATE 20 MG/1
20 TABLET ORAL DAILY
Qty: 30 TABLET | Refills: 0 | Status: SHIPPED | OUTPATIENT
Start: 2024-06-28

## 2024-06-28 NOTE — TELEPHONE ENCOUNTER
These medications were filled by PCP 2/29/24 with 200 day supply. Denied as refills not appropriate.

## 2024-07-08 ENCOUNTER — HOSPITAL ENCOUNTER (OUTPATIENT)
Facility: MEDICAL CENTER | Age: 89
End: 2024-07-08
Attending: FAMILY MEDICINE
Payer: MEDICARE

## 2024-07-08 ENCOUNTER — APPOINTMENT (OUTPATIENT)
Dept: MEDICAL GROUP | Facility: MEDICAL CENTER | Age: 89
End: 2024-07-08
Payer: MEDICARE

## 2024-07-08 VITALS
SYSTOLIC BLOOD PRESSURE: 112 MMHG | OXYGEN SATURATION: 95 % | DIASTOLIC BLOOD PRESSURE: 60 MMHG | WEIGHT: 143 LBS | HEIGHT: 63 IN | BODY MASS INDEX: 25.34 KG/M2 | RESPIRATION RATE: 14 BRPM | TEMPERATURE: 98.4 F | HEART RATE: 67 BPM

## 2024-07-08 DIAGNOSIS — F32.4 MAJOR DEPRESSIVE DISORDER WITH SINGLE EPISODE, IN PARTIAL REMISSION (HCC): ICD-10-CM

## 2024-07-08 DIAGNOSIS — R39.9 UTI SYMPTOMS: ICD-10-CM

## 2024-07-08 DIAGNOSIS — I10 PRIMARY HYPERTENSION: ICD-10-CM

## 2024-07-08 DIAGNOSIS — F03.B3 MODERATE DEMENTIA WITH MOOD DISTURBANCE, UNSPECIFIED DEMENTIA TYPE (HCC): ICD-10-CM

## 2024-07-08 LAB
APPEARANCE UR: ABNORMAL
BILIRUB UR STRIP-MCNC: ABNORMAL MG/DL
COLOR UR AUTO: ABNORMAL
GLUCOSE UR STRIP.AUTO-MCNC: ABNORMAL MG/DL
KETONES UR STRIP.AUTO-MCNC: ABNORMAL MG/DL
LEUKOCYTE ESTERASE UR QL STRIP.AUTO: ABNORMAL
NITRITE UR QL STRIP.AUTO: ABNORMAL
PH UR STRIP.AUTO: 5.5 [PH] (ref 5–8)
PROT UR QL STRIP: ABNORMAL MG/DL
RBC UR QL AUTO: ABNORMAL
SP GR UR STRIP.AUTO: 1.03
UROBILINOGEN UR STRIP-MCNC: 1 MG/DL

## 2024-07-08 PROCEDURE — 3078F DIAST BP <80 MM HG: CPT | Performed by: FAMILY MEDICINE

## 2024-07-08 PROCEDURE — 3074F SYST BP LT 130 MM HG: CPT | Performed by: FAMILY MEDICINE

## 2024-07-08 PROCEDURE — 99214 OFFICE O/P EST MOD 30 MIN: CPT | Performed by: FAMILY MEDICINE

## 2024-07-08 PROCEDURE — 81002 URINALYSIS NONAUTO W/O SCOPE: CPT | Performed by: FAMILY MEDICINE

## 2024-07-08 RX ORDER — QUETIAPINE FUMARATE 25 MG/1
TABLET, FILM COATED ORAL
Qty: 60 TABLET | Refills: 2 | Status: SHIPPED | OUTPATIENT
Start: 2024-07-08

## 2024-07-08 ASSESSMENT — PATIENT HEALTH QUESTIONNAIRE - PHQ9
3. TROUBLE FALLING OR STAYING ASLEEP OR SLEEPING TOO MUCH: NOT AT ALL
5. POOR APPETITE OR OVEREATING: NOT AT ALL
7. TROUBLE CONCENTRATING ON THINGS, SUCH AS READING THE NEWSPAPER OR WATCHING TELEVISION: NOT AT ALL
1. LITTLE INTEREST OR PLEASURE IN DOING THINGS: NOT AT ALL
2. FEELING DOWN, DEPRESSED, IRRITABLE, OR HOPELESS: NOT AT ALL
4. FEELING TIRED OR HAVING LITTLE ENERGY: NOT AT ALL
SUM OF ALL RESPONSES TO PHQ QUESTIONS 1-9: 0
SUM OF ALL RESPONSES TO PHQ9 QUESTIONS 1 AND 2: 0
9. THOUGHTS THAT YOU WOULD BE BETTER OFF DEAD, OR OF HURTING YOURSELF: NOT AT ALL
6. FEELING BAD ABOUT YOURSELF - OR THAT YOU ARE A FAILURE OR HAVE LET YOURSELF OR YOUR FAMILY DOWN: NOT AL ALL
8. MOVING OR SPEAKING SO SLOWLY THAT OTHER PEOPLE COULD HAVE NOTICED. OR THE OPPOSITE, BEING SO FIGETY OR RESTLESS THAT YOU HAVE BEEN MOVING AROUND A LOT MORE THAN USUAL: NOT AT ALL

## 2024-07-08 ASSESSMENT — FIBROSIS 4 INDEX: FIB4 SCORE: 2.74

## 2024-07-30 DIAGNOSIS — F32.4 MAJOR DEPRESSIVE DISORDER WITH SINGLE EPISODE, IN PARTIAL REMISSION (HCC): ICD-10-CM

## 2024-07-30 RX ORDER — ESCITALOPRAM OXALATE 20 MG/1
20 TABLET ORAL DAILY
Qty: 30 TABLET | Refills: 0 | Status: SHIPPED | OUTPATIENT
Start: 2024-07-30

## 2024-08-14 ENCOUNTER — OFFICE VISIT (OUTPATIENT)
Dept: URGENT CARE | Facility: CLINIC | Age: 89
End: 2024-08-14
Payer: MEDICARE

## 2024-08-14 ENCOUNTER — HOSPITAL ENCOUNTER (OUTPATIENT)
Facility: MEDICAL CENTER | Age: 89
End: 2024-08-14
Attending: PHYSICIAN ASSISTANT
Payer: MEDICARE

## 2024-08-14 VITALS
TEMPERATURE: 98.2 F | RESPIRATION RATE: 16 BRPM | DIASTOLIC BLOOD PRESSURE: 82 MMHG | HEIGHT: 63 IN | BODY MASS INDEX: 25.34 KG/M2 | SYSTOLIC BLOOD PRESSURE: 160 MMHG | WEIGHT: 143 LBS | OXYGEN SATURATION: 94 % | HEART RATE: 62 BPM

## 2024-08-14 DIAGNOSIS — N39.0 RECURRENT UTI: ICD-10-CM

## 2024-08-14 DIAGNOSIS — R30.0 DYSURIA: ICD-10-CM

## 2024-08-14 DIAGNOSIS — N30.00 ACUTE CYSTITIS WITHOUT HEMATURIA: ICD-10-CM

## 2024-08-14 LAB
APPEARANCE UR: CLEAR
BILIRUB UR STRIP-MCNC: NORMAL MG/DL
COLOR UR AUTO: YELLOW
GLUCOSE UR STRIP.AUTO-MCNC: NORMAL MG/DL
KETONES UR STRIP.AUTO-MCNC: NORMAL MG/DL
LEUKOCYTE ESTERASE UR QL STRIP.AUTO: NORMAL
NITRITE UR QL STRIP.AUTO: NORMAL
PH UR STRIP.AUTO: 6 [PH] (ref 5–8)
PROT UR QL STRIP: NORMAL MG/DL
RBC UR QL AUTO: NORMAL
SP GR UR STRIP.AUTO: 1.02
UROBILINOGEN UR STRIP-MCNC: 1 MG/DL

## 2024-08-14 PROCEDURE — 3079F DIAST BP 80-89 MM HG: CPT | Performed by: PHYSICIAN ASSISTANT

## 2024-08-14 PROCEDURE — 81002 URINALYSIS NONAUTO W/O SCOPE: CPT | Performed by: PHYSICIAN ASSISTANT

## 2024-08-14 PROCEDURE — 3077F SYST BP >= 140 MM HG: CPT | Performed by: PHYSICIAN ASSISTANT

## 2024-08-14 PROCEDURE — 99214 OFFICE O/P EST MOD 30 MIN: CPT | Performed by: PHYSICIAN ASSISTANT

## 2024-08-14 PROCEDURE — 87086 URINE CULTURE/COLONY COUNT: CPT

## 2024-08-14 RX ORDER — CEFDINIR 300 MG/1
300 CAPSULE ORAL 2 TIMES DAILY
Qty: 14 CAPSULE | Refills: 0 | Status: SHIPPED | OUTPATIENT
Start: 2024-08-14 | End: 2024-08-21

## 2024-08-14 ASSESSMENT — FIBROSIS 4 INDEX: FIB4 SCORE: 2.74

## 2024-08-14 NOTE — PROGRESS NOTES
"Subjective     Dianne Casas is a 89 y.o. female who presents with UTI (Frequency, burning x 2 days)    PMH:  has a past medical history of Anesthesia, Anxiety (09/14/2010), Cataract, Congestive heart failure ([facility]) (2022), High cholesterol, Hip fracture requiring operative repair, right, closed, initial encounter ([facility]) (10/28/2023), Hypertension, Left displaced femoral neck fracture ([facility]) (10/28/2023), Myocardial infarct ([facility]) (May2022), OSTEOPOROSIS (09/14/2010), Pain (03/04/2014), Pain (11/04/2021), Shingles (11/09/2023), Snoring, and Urinary incontinence.       MEDS:   Current Outpatient Medications:     cefdinir (OMNICEF) 300 MG Cap, Take 1 Capsule by mouth 2 times a day for 7 days., Disp: 14 Capsule, Rfl: 0    escitalopram (LEXAPRO) 20 MG tablet, TAKE ONE TABLET BY MOUTH DAILY, Disp: 30 Tablet, Rfl: 0    QUEtiapine (SEROQUEL) 25 MG Tab, Take 25 mg at bed time, and during the day as needed  Indications: Dementia, Anxiety, Disp: 60 Tablet, Rfl: 2    [employer] ASPIRIN LOW DOSE 81 MG EC tablet, TAKE 1 TABLET BY MOUTH EVERY DAY., Disp: 100 Tablet, Rfl: 1    carvedilol (COREG) 3.125 MG Tab, Take 1 Tablet by mouth 2 times a day. Indications: High Blood Pressure Disorder, Disp: 200 Tablet, Rfl: 2    losartan (COZAAR) 50 MG Tab, Take 1 Tablet by mouth every evening. Indications: High Blood Pressure Disorder, Disp: 100 Tablet, Rfl: 2    rosuvastatin (CRESTOR) 10 MG Tab, Take 1 Tablet by mouth every evening. Indications: Disease of the Peripheral Arteries, High Amount of Fats in the Blood, Disp: 100 Tablet, Rfl: 2    Misc. Devices Misc, Needs a bed rail. Patient has history of recurrent falls when he wake up., Disp: 1 Each, Rfl: 0    albuterol 108 (90 Base) MCG/ACT Aero Soln inhalation aerosol, Inhale 2 Puffs every 4 hours. Indications: Spasm of Lung Air Passages, Disp: 8.5 g, Rfl: 0  ALLERGIES:   Allergies   Allergen Reactions    Ampicillin Hives, Rash and Swelling     Feels \"rotten\"     Bactrim Ds Hives, " Itching and Swelling     Mouth, lip, tongue, eye swelling, pain, itching    Ciprofloxacin Hives, Rash and Swelling     .     SURGHX:   Past Surgical History:   Procedure Laterality Date    PB OPEN FIX INTER/SUBTROCH FX,IMPLNT Left 10/28/2023    Procedure: INSERTION, INTRAMEDULLARY THIERRY, FEMUR, PROXIMAL;  Surgeon: Richard Mckenzie M.D.;  Location: SURGERY [facility];  Service: Trauma    KYPHOPLASTY  6/15/2023    Procedure: L2 KYPHOPLASTY WITH BIOPSY;  Surgeon: Tim Barton M.D.;  Location: SURGERY [facility];  Service: Orthopedics    KYPHOPLASTY N/A 11/06/2021    Procedure: KYPHOPLASTY - L1 AND BIOPSY;  Surgeon: Tim Barton M.D.;  Location: SURGERY [facility];  Service: Orthopedics    TRIGGER FINGER RELEASE Left 05/29/2018    Procedure: TRIGGER FINGER RELEASE-  MIDDLE;  Surgeon: Frandy Liz M.D.;  Location: SURGERY [facility];  Service: Orthopedics    JULIAN BY LAPAROSCOPY  05/23/2016    Procedure: JULIAN BY LAPAROSCOPY;  Surgeon: Adan Kearns M.D.;  Location: [facility];  Service:     KNEE ARTHROSCOPY  03/11/2014    Performed by Bonilla Valera M.D. at [facility]    BLADDER SUSPENSION  2001    HYSTERECTOMY, TOTAL ABDOMINAL  2000    APPENDECTOMY  1969    COLON RESECTION      partial; no CA    OTHER CARDIAC SURGERY  2022     SOCHX:  reports that she has never smoked. She has never been exposed to tobacco smoke. She has never used smokeless tobacco. She reports that she does not currently use alcohol. She reports that she does not use drugs.  FH: Reviewed with patient, not pertinent to this visit.           Patient presents with:  UTI: Frequency, burning x 2 days.  Pt has history of frequent UTI with same symptoms.  PT daughter is providing HPI as well as past medical history secondary to moderate dementia of the patient.  Patient's daughter denies history of fever, vomiting or diarrhea with this current complaint, she has just noticed a significant increase in frequency  "of urination.  No other complaints.      UTI  This is a new problem. The current episode started in the past 7 days. The problem occurs constantly. The problem has been gradually worsening. Associated symptoms include urinary symptoms. Pertinent negatives include no change in bowel habit, fever or vomiting. Nothing aggravates the symptoms. She has tried nothing for the symptoms. The treatment provided no relief.       Review of Systems   Unable to perform ROS: Dementia   Constitutional:  Negative for fever.   Gastrointestinal:  Negative for change in bowel habit, diarrhea and vomiting.   Genitourinary:  Positive for dysuria, frequency and urgency. Negative for flank pain and hematuria.   All other systems reviewed and are negative.             Objective     BP (!) 160/82 (BP Location: Left arm, Patient Position: Sitting, BP Cuff Size: Adult)   Pulse 62   Temp 36.8 °C (98.2 °F) (Temporal)   Resp 16   Ht 1.6 m (5' 3\")   Wt 64.9 kg (143 lb)   SpO2 94%   BMI 25.33 kg/m²      Physical Exam  Vitals and nursing note reviewed.   Constitutional:       General: She is not in acute distress.     Appearance: Normal appearance. She is well-developed. She is not ill-appearing or toxic-appearing.   HENT:      Head: Normocephalic and atraumatic.      Right Ear: Tympanic membrane normal.      Left Ear: Tympanic membrane normal.      Nose: Nose normal.      Mouth/Throat:      Lips: Pink.      Mouth: Mucous membranes are moist.      Pharynx: Oropharynx is clear. Uvula midline.   Eyes:      Extraocular Movements: Extraocular movements intact.      Conjunctiva/sclera: Conjunctivae normal.      Pupils: Pupils are equal, round, and reactive to light.   Cardiovascular:      Rate and Rhythm: Normal rate and regular rhythm.      Pulses: Normal pulses.      Heart sounds: Normal heart sounds.   Pulmonary:      Effort: Pulmonary effort is normal.      Breath sounds: Normal breath sounds.   Abdominal:      General: Bowel sounds are " normal.      Palpations: Abdomen is soft.   Musculoskeletal:         General: Normal range of motion.      Cervical back: Normal range of motion and neck supple.   Skin:     General: Skin is warm and dry.      Capillary Refill: Capillary refill takes less than 2 seconds.   Neurological:      General: No focal deficit present.      Mental Status: She is alert and oriented to person, place, and time.      Cranial Nerves: No cranial nerve deficit.      Motor: Motor function is intact.      Coordination: Coordination is intact.      Gait: Gait normal.   Psychiatric:         Mood and Affect: Mood normal.                             Assessment & Plan        Assessment & Plan  Acute cystitis without hematuria    Orders:    cefdinir (OMNICEF) 300 MG Cap; Take 1 Capsule by mouth 2 times a day for 7 days.    POCT Urinalysis    URINE CULTURE(NEW); Future    Recurrent UTI    Orders:    cefdinir (OMNICEF) 300 MG Cap; Take 1 Capsule by mouth 2 times a day for 7 days.    POCT Urinalysis    URINE CULTURE(NEW); Future    Dysuria    Orders:    cefdinir (OMNICEF) 300 MG Cap; Take 1 Capsule by mouth 2 times a day for 7 days.    POCT Urinalysis    URINE CULTURE(NEW); Future              Patient with history of frequent and recurrent UTI presents to clinic today with 2-day history of urinary urgency and frequency as reported by her daughter who takes care of her.  Patient has diagnosis of dementia so daughter is providing HPI past medical history.  UA is positive for leukocytes, cloudy.  I will treat with cefdinir while waiting for urine culture results.      Culture sent to lab, will call with any necessary treatment or treatment changes.        Differential diagnosis, supportive care, and indications for immediate follow-up discussed with patient.  Instructed to return to clinic or nearest emergency department for any change in condition, further concerns, or worsening of symptoms.    I personally reviewed prior external notes and test  results pertinent to today's visit.  I have independently reviewed and interpreted all diagnostics ordered during this urgent care visit.    PT should follow up with PCP in 1-2 days for re-evaluation if symptoms have not improved.      Discussed red flags and reasons to return to UC or ED.      Pt and/or family verbalized understanding of diagnosis and follow up instructions and was offered informational handout on diagnosis.  PT discharged.     Please note that this dictation was created using voice recognition software. I have made every reasonable attempt to correct obvious errors, but I expect that there may be errors of grammar and possibly content that I did not discover before finalizing the note.

## 2024-08-15 ASSESSMENT — ENCOUNTER SYMPTOMS
FEVER: 0
VOMITING: 0
DIARRHEA: 0
FLANK PAIN: 0
CHANGE IN BOWEL HABIT: 0

## 2024-08-17 LAB
BACTERIA UR CULT: NORMAL
SIGNIFICANT IND 70042: NORMAL
SITE SITE: NORMAL
SOURCE SOURCE: NORMAL

## 2024-08-26 ENCOUNTER — PATIENT MESSAGE (OUTPATIENT)
Dept: HEALTH INFORMATION MANAGEMENT | Facility: OTHER | Age: 89
End: 2024-08-26

## 2024-09-05 DIAGNOSIS — F03.B3 MODERATE DEMENTIA WITH MOOD DISTURBANCE, UNSPECIFIED DEMENTIA TYPE (HCC): Primary | ICD-10-CM

## 2024-09-05 DIAGNOSIS — Z51.5 PALLIATIVE CARE PATIENT: ICD-10-CM

## 2024-09-05 NOTE — PROGRESS NOTES
Discussed case with [facility], who received a referral from patient's MD. They cannot take patient as she is SCP, so they forwarded referral to [facility]. I reviewed record. Will plan to reach out to family to set up initial visit with our APRN team.     Matt Caal DO, [facility]   Hospice and Palliative Care  Hospice Medical Director  [address] [place] [address]  P: [phone]  F: [phone]  C: [phone]

## 2024-09-13 ENCOUNTER — OFF SITE VISIT (OUTPATIENT)
Dept: PALLIATIVE MEDICINE | Facility: HOSPICE | Age: 89
End: 2024-09-13
Payer: MEDICARE

## 2024-09-13 DIAGNOSIS — F03.B3 MODERATE DEMENTIA WITH MOOD DISTURBANCE, UNSPECIFIED DEMENTIA TYPE (HCC): ICD-10-CM

## 2024-09-13 PROCEDURE — G0318 PR PROLONG HOME E&M ADDL 15 MIN: HCPCS

## 2024-09-13 PROCEDURE — 99497 ADVNCD CARE PLAN 30 MIN: CPT

## 2024-09-13 PROCEDURE — 99350 HOME/RES VST EST HIGH MDM 60: CPT | Mod: 25

## 2024-09-16 NOTE — PROGRESS NOTES
In-Home Palliative Medicine Evaluation      Linda Casas  89 y.o.  Female  MRN [id number]  PCP Lakeshia Kuo M.D.  Referral Source: [facility]  Location: Patient's residence, currently this is the Son, his wife and another daughter.     Reason for palliative medicine consultation and/or visit: symptom management, goals of care    Assessment and Plan:    Summary: Dianne is a 89 y/o woman with moderately severe dementia, FAST scale 6e. She is living with her son and two daughters. She is being well cared for and doing well herself at this time, however her family would like to have goals of care conversations and discuss caregiving for the future. Will complete POLST next visit and discuss prognosis, expected disease course, etc.     Primary diagnosis: Dementia    Prognosis: PPS: 50%    Physical aspects of care:  Pain: Currently denies pain, although she has had some arthritis pain in the past per family. She has become increasingly agititated during the day, which could be caused by untreated pain especially considering her age.  Start APAP 1000 mg BID.    Sleep: Sleeps well through the night, recommend nightlights, remove rugs, baby monitors.     Frequent Falls: 2 in the last year, no major injury.     Dementia: 6e, moderately severe, incontinent of urine, some wandering, claims family members are being mean and some paranoia. Already on Seroquel 25 mg, increase to 50 mg now.     Psychological aspects of care:  Depression: Denies depression today, hx of depression and on escitalopram 20 mg daily already. Continue this and increase seroquel.     Anxiety: Irritability, agitation. Encourage routine, frequent reminders, increase seroquel today.     Family states she has PTSD from the WWII when she was growing up in [facility].     Social aspects of care:  Lives with son and daughter in law. Other adult daughter lives with them as well. Excellent support and supervisiion at home.  Discuss group homes or alternative care situations with family next visit.     Spiritual aspects of care:  DId not discuss.     Goals of care:  Manage distressing symtpoms ASAP and stay home as much as possible. No hospital visits if possible.     Advance care planning:      The patient and/or legal decision maker has provided voluntary consent to discuss advance care planning. We discussed POLST. The following documents were discussed POLST. Total time spent in ACP discussion 30 minutes, which is separate from the time spent completing the evaluation and management visit.     Physical Exam  Constitutional:       General: She is not in acute distress.     Appearance: She is not ill-appearing.      Comments: Frail appearing   Pulmonary:      Effort: Pulmonary effort is normal. No respiratory distress.      Breath sounds: Normal breath sounds.   Abdominal:      General: There is no distension.      Palpations: Abdomen is soft.      Tenderness: There is no abdominal tenderness. There is no guarding.   Skin:     General: Skin is warm and dry.   Neurological:      Mental Status: She is alert. Mental status is at baseline. She is disoriented.      Motor: Weakness present.   Psychiatric:         Attention and Perception: She is attentive.         Mood and Affect: Mood normal.         Behavior: Behavior normal. Behavior is cooperative.         Cognition and Memory: Cognition is impaired. Memory is impaired. She exhibits impaired recent memory.         Judgment: Judgment is impulsive.           Other Pertinent Medical History OR Surgery Not Listed Above:   Ostepoenia, aortic stenosis, debility, CHF, HTN, HLD, CKD stage 3b, cerebral atrophy.     Current Medications:    Current Outpatient Medications:     escitalopram (LEXAPRO) 20 MG tablet, TAKE ONE TABLET BY MOUTH DAILY, Disp: 30 Tablet, Rfl: 0    QUEtiapine (SEROQUEL) 25 MG Tab, Take 25 mg at bed time, and during the day as needed  Indications: Dementia, Anxiety, Disp: 60  Tablet, Rfl: 2    [facility] ASPIRIN LOW DOSE 81 MG EC tablet, TAKE 1 TABLET BY MOUTH EVERY DAY., Disp: 100 Tablet, Rfl: 1    carvedilol (COREG) 3.125 MG Tab, Take 1 Tablet by mouth 2 times a day. Indications: High Blood Pressure Disorder, Disp: 200 Tablet, Rfl: 2    losartan (COZAAR) 50 MG Tab, Take 1 Tablet by mouth every evening. Indications: High Blood Pressure Disorder, Disp: 100 Tablet, Rfl: 2    rosuvastatin (CRESTOR) 10 MG Tab, Take 1 Tablet by mouth every evening. Indications: Disease of the Peripheral Arteries, High Amount of Fats in the Blood, Disp: 100 Tablet, Rfl: 2    albuterol 108 (90 Base) MCG/ACT Aero Soln inhalation aerosol, Inhale 2 Puffs every 4 hours. Indications: Spasm of Lung Air Passages, Disp: 8.5 g, Rfl: 0    Misc. Devices Misc, Needs a bed rail. Patient has history of recurrent falls when he wake up., Disp: 1 Each, Rfl: 0    Medication Allergies:  Ampicillin, Bactrim ds, and Ciprofloxacin    Thank you for allowing me the opportunity to participate in the care of Linda Casas    I spent a total of 120 minutes reviewing medical records, direct face-to-face time with the patient and/or family, documentation and coordination of care. This is separate from the time spent on advance care planning, which is documented above.       RADHA Madden  Home Health and Palliative Medicine  [address] [place] [place]  [id number]  P: [phone]  F: [phone]

## 2024-09-18 RX ORDER — ACETAMINOPHEN 500 MG
500-1000 TABLET ORAL EVERY 6 HOURS PRN
COMMUNITY
Start: 2024-09-18

## 2024-09-18 RX ORDER — QUETIAPINE FUMARATE 50 MG/1
50 TABLET, FILM COATED ORAL 2 TIMES DAILY
Qty: 60 TABLET | Refills: 3 | Status: SHIPPED | OUTPATIENT
Start: 2024-09-18 | End: 2024-09-26

## 2024-09-20 DIAGNOSIS — F32.4 MAJOR DEPRESSIVE DISORDER WITH SINGLE EPISODE, IN PARTIAL REMISSION (HCC): ICD-10-CM

## 2024-09-23 RX ORDER — ESCITALOPRAM OXALATE 20 MG/1
20 TABLET ORAL DAILY
Qty: 30 TABLET | Refills: 0 | Status: SHIPPED | OUTPATIENT
Start: 2024-09-23

## 2024-09-26 RX ORDER — QUETIAPINE FUMARATE 50 MG/1
100 TABLET, FILM COATED ORAL 2 TIMES DAILY
Qty: 60 TABLET | Refills: 3 | Status: SHIPPED | OUTPATIENT
Start: 2024-09-26

## 2024-09-26 NOTE — PROGRESS NOTES
Call with patient's daughter in law Mary, states patient is still experiencing behavioral disturbances and sundowning, agitation worse at night. She is taking 50 mg Seroquel BID, OK to increase to 100 mg BID and see if that improves her behavioral symptoms. Will f/u in 1 week to evaluate.

## 2024-09-27 ENCOUNTER — APPOINTMENT (OUTPATIENT)
Dept: MEDICAL GROUP | Facility: MEDICAL CENTER | Age: 89
End: 2024-09-27
Payer: MEDICARE

## 2024-09-30 ENCOUNTER — APPOINTMENT (OUTPATIENT)
Dept: MEDICAL GROUP | Facility: MEDICAL CENTER | Age: 89
End: 2024-09-30
Payer: MEDICARE

## 2024-10-07 ENCOUNTER — APPOINTMENT (OUTPATIENT)
Dept: MEDICAL GROUP | Facility: MEDICAL CENTER | Age: 89
End: 2024-10-07
Payer: MEDICARE

## 2024-10-18 ENCOUNTER — OFF SITE VISIT (OUTPATIENT)
Dept: PALLIATIVE MEDICINE | Facility: HOSPICE | Age: 89
End: 2024-10-18
Payer: MEDICARE

## 2024-10-18 DIAGNOSIS — F41.9 ANXIETY: Chronic | ICD-10-CM

## 2024-10-18 PROCEDURE — 99497 ADVNCD CARE PLAN 30 MIN: CPT

## 2024-10-18 PROCEDURE — G0318 PR PROLONG HOME E&M ADDL 15 MIN: HCPCS

## 2024-10-18 PROCEDURE — 99350 HOME/RES VST EST HIGH MDM 60: CPT | Mod: 25

## 2024-10-21 RX ORDER — LORAZEPAM 0.5 MG/1
.25-.5 TABLET ORAL EVERY 4 HOURS PRN
Qty: 15 TABLET | Refills: 0 | Status: SHIPPED | OUTPATIENT
Start: 2024-10-21 | End: 2024-10-26

## 2024-10-29 ENCOUNTER — TELEPHONE (OUTPATIENT)
Dept: PALLIATIVE MEDICINE | Facility: HOSPICE | Age: 89
End: 2024-10-29
Payer: MEDICARE
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
## 2024-10-29 RX ORDER — QUETIAPINE FUMARATE 50 MG/1
75 TABLET, FILM COATED ORAL 2 TIMES DAILY
Qty: 60 TABLET | Refills: 3 | Status: SHIPPED | OUTPATIENT
Start: 2024-10-29 | End: 2024-10-31

## 2024-10-29 RX ORDER — HALOPERIDOL 1 MG/1
1-2 TABLET ORAL NIGHTLY PRN
Qty: 30 TABLET | Refills: 5 | Status: SHIPPED | OUTPATIENT
Start: 2024-10-29 | End: 2024-10-31

## 2024-10-30 DIAGNOSIS — I50.22 CHRONIC SYSTOLIC CONGESTIVE HEART FAILURE (HCC): ICD-10-CM

## 2024-10-31 ENCOUNTER — HOME CARE VISIT (OUTPATIENT)
Dept: HOSPICE | Facility: HOSPICE | Age: 89
End: 2024-10-31
Payer: MEDICARE

## 2024-10-31 ENCOUNTER — PHARMACY VISIT (OUTPATIENT)
Dept: PHARMACY | Facility: MEDICAL CENTER | Age: 89
End: 2024-10-31
Payer: COMMERCIAL

## 2024-10-31 DIAGNOSIS — G31.1 SENILE DEGENERATION OF BRAIN, NOT ELSEWHERE CLASSIFIED (HCC): Primary | ICD-10-CM

## 2024-10-31 DIAGNOSIS — Z51.5 HOSPICE CARE: ICD-10-CM

## 2024-10-31 PROCEDURE — RXMED WILLOW AMBULATORY MEDICATION CHARGE: Performed by: STUDENT IN AN ORGANIZED HEALTH CARE EDUCATION/TRAINING PROGRAM

## 2024-10-31 PROCEDURE — G0299 HHS/HOSPICE OF RN EA 15 MIN: HCPCS

## 2024-10-31 RX ORDER — MORPHINE SULFATE 100 MG/5ML
10-20 SOLUTION ORAL
Qty: 240 ML | Refills: 0 | Status: SHIPPED | OUTPATIENT
Start: 2024-10-31 | End: 2025-10-31

## 2024-10-31 RX ORDER — HALOPERIDOL 2 MG/ML
2-4 SOLUTION ORAL
Qty: 360 ML | Refills: 0 | Status: SHIPPED | OUTPATIENT
Start: 2024-10-31

## 2024-10-31 RX ORDER — ACETAMINOPHEN 650 MG/1
650 SUPPOSITORY RECTAL EVERY 6 HOURS PRN
Qty: 5 SUPPOSITORY | Refills: 10 | Status: SHIPPED | OUTPATIENT
Start: 2024-10-31

## 2024-10-31 RX ORDER — ONDANSETRON 4 MG/1
4 TABLET, ORALLY DISINTEGRATING ORAL EVERY 6 HOURS PRN
Qty: 15 TABLET | Refills: 10 | Status: SHIPPED | OUTPATIENT
Start: 2024-10-31 | End: 2025-10-31

## 2024-10-31 RX ORDER — BISACODYL 10 MG
10 SUPPOSITORY, RECTAL RECTAL PRN
Qty: 5 SUPPOSITORY | Refills: 10 | Status: SHIPPED | OUTPATIENT
Start: 2024-10-31 | End: 2025-10-31

## 2024-10-31 SDOH — ECONOMIC STABILITY: GENERAL

## 2024-10-31 ASSESSMENT — ACTIVITIES OF DAILY LIVING (ADL)
AMBULATION ASSISTANCE: NON-AMBULATORY
MONEY MANAGEMENT (EXPENSES/BILLS): TOTALLY DEPENDENT
AMBULATION_REQUIRES_ASSISTANCE: 1
CURRENT_FUNCTION: TWO PERSON
CONTINENCE_REQUIRES_ASSISTANCE: 1
BATHING_REQUIRES_ASSISTANCE: 1
EATING_REQUIRES_ASSISTANCE: 1
DRESSING_REQUIRES_ASSISTANCE: 1
PHYSICAL_TRANSFER_REQUIRES_ASSISTANCE: 1

## 2024-10-31 ASSESSMENT — ENCOUNTER SYMPTOMS
DYSPNEA ON EXERTION: 1
CONSTIPATION: 1
FATIGUES EASILY: 1
SHORTNESS OF BREATH: 1
LAST BOWEL MOVEMENT: 67143
DECREASED TO NO URINARY OUTPUT: 1
FORGETFULNESS: 1
FATIGUE: 1
INCREASED FATIGUE: 1
DYSPNEA ACTIVITY LEVEL: AT REST
DECREASED ORAL INTAKE: 1
INCREASED SLEEPING: 1
DESCRIPTION OF MEMORY LOSS: SHORT TERM
DESCRIPTION OF MEMORY LOSS: LONG TERM
SLEEP QUALITY: ADEQUATE
LIMITED RANGE OF MOTION: 1
MUSCLE WEAKNESS: 1
STOOL FREQUENCY: LESS THAN DAILY
CHANGE IN LEVEL OF CONSCIOUSNESS: 1

## 2024-10-31 ASSESSMENT — PAIN SCALES - PAIN ASSESSMENT IN ADVANCED DEMENTIA (PAINAD)
CONSOLABILITY: 0 - NO NEED TO CONSOLE.
TOTALSCORE: 4
BODYLANGUAGE: 1 - TENSE. DISTRESSED PACING. FIDGETING.
NEGVOCALIZATION: 1 - OCCASIONAL MOAN OR GROAN. LOW-LEVEL SPEECH WITH A NEGATIVE OR DISAPPROVING QUALITY.
FACIALEXPRESSION: 1 - SAD. FRIGHTENED. FROWN.

## 2024-10-31 ASSESSMENT — SOCIAL DETERMINANTS OF HEALTH (SDOH): ACTIVE STRESSOR - NO STRESS FACTORS: 1

## 2024-10-31 ASSESSMENT — PATIENT HEALTH QUESTIONNAIRE - PHQ9: CLINICAL INTERPRETATION OF PHQ2 SCORE: 0

## (undated) DEVICE — BONE BIOPSY DEVICE

## (undated) DEVICE — COVER LIGHT HANDLE ALC PLUS DISP (18EA/BX)

## (undated) DEVICE — K-WIRE

## (undated) DEVICE — DRAPE LARGE 3 QUARTER - (20/CA)

## (undated) DEVICE — GLOVE SZ 7.5 BIOGEL PI MICRO - PF LF (50PR/BX)

## (undated) DEVICE — GLOVE BIOGEL SZ 8 SURGICAL PF LTX - (50PR/BX 4BX/CA)

## (undated) DEVICE — DRESSING NON ADHERENT 3 X 4 - STERILE (100/BX 12BX/CA)

## (undated) DEVICE — ELECTRODE 850 FOAM ADHESIVE - HYDROGEL RADIOTRNSPRNT (50/PK)

## (undated) DEVICE — LACTATED RINGERS INJ 1000 ML - (14EA/CA 60CA/PF)

## (undated) DEVICE — DRESSING TRANSPARENT FILM TEGADERM 4 X 4.75" (50EA/BX)"

## (undated) DEVICE — GOWN SURGICAL XX-LARGE - (28EA/CA) SIRUS NON REINFORCED

## (undated) DEVICE — PACK MAJOR ORTHO - (2EA/CA)

## (undated) DEVICE — SUCTION INSTRUMENT YANKAUER OPEN TIP W/O VENT (50EA/CA)

## (undated) DEVICE — GLOVE BIOGEL PI ORTHO SZ 7.5 PF LF (40PR/BX)

## (undated) DEVICE — NEEDLE SPINAL NON-SAFETY 18GA X 6 (10EA/BX)

## (undated) DEVICE — GLOVE BIOGEL SZ 8.5 SURGICAL PF LTX - (50PR/BX 4BX/CA)

## (undated) DEVICE — SLEEVE, VASO, THIGH, MED

## (undated) DEVICE — TRAY EXPANDERINFLATABLE BONE TAMP FF 1-STEP WITH CDS KYPHOPAK 15/3

## (undated) DEVICE — PIN

## (undated) DEVICE — CEMENT KYPHON CDS CARTRIDGES

## (undated) DEVICE — GLOVE BIOGEL ECLIPSE PF LATEX SIZE 8.5 (50PR/BX)

## (undated) DEVICE — SENSOR SPO2 NEO LNCS ADHESIVE (20/BX) SEE USER NOTES

## (undated) DEVICE — HEADREST PRONEVIEW LARGE - (10/CA)

## (undated) DEVICE — MASK ANESTHESIA ADULT  - (100/CA)

## (undated) DEVICE — DRAPE STRLE REG TOWEL 18X24 - (10/BX 4BX/CA)"

## (undated) DEVICE — NEPTUNE 4 PORT MANIFOLD - (20/PK)

## (undated) DEVICE — GOWN WARMING STANDARD FLEX - (30/CA)

## (undated) DEVICE — Device

## (undated) DEVICE — TOWEL STOP TIMEOUT SAFETY FLAG (40EA/CA)

## (undated) DEVICE — BLADE SURGICAL #11 - (50/BX)

## (undated) DEVICE — CONTAINER SPECIMEN BAG OR - STERILE 4 OZ W/LID (100EA/CA)

## (undated) DEVICE — SODIUM CHL IRRIGATION 0.9% 1000ML (12EA/CA)

## (undated) DEVICE — STOCKINET BIAS 4 IN STERILE - (20/CA)

## (undated) DEVICE — CLOSURE SKIN STRIP 1/2 X 4 IN - (STERI STRIP) (50/BX 4BX/CA)

## (undated) DEVICE — SUTURE GENERAL

## (undated) DEVICE — SUCTION TIP STRAIGHT ARGYLE - 50EA/CA

## (undated) DEVICE — SET LEADWIRE 5 LEAD BEDSIDE DISPOSABLE ECG (1SET OF 5/EA)

## (undated) DEVICE — CANISTER SUCTION 3000ML MECHANICAL FILTER AUTO SHUTOFF MEDI-VAC NONSTERILE LF DISP  (40EA/CA)

## (undated) DEVICE — MEDICINE CUP STERILE 2 OZ - (100/CA)

## (undated) DEVICE — SET EXTENSION WITH 2 PORTS (48EA/CA) ***PART #2C8610 IS A SUBSTITUTE*****

## (undated) DEVICE — SENSOR OXIMETER ADULT SPO2 RD SET (20EA/BX)

## (undated) DEVICE — STAPLER SKIN DISP - (6/BX 10BX/CA) VISISTAT

## (undated) DEVICE — DRAPE LAPAROTOMY T SHEET - (12EA/CA)

## (undated) DEVICE — KIT ANESTHESIA W/CIRCUIT & 3/LT BAG W/FILTER (20EA/CA)

## (undated) DEVICE — ARMREST CRADLE FOAM - (2PR/PK 12PR/CA)

## (undated) DEVICE — WATER IRRIGATION STERILE 1000ML (12EA/CA)

## (undated) DEVICE — SUTURE 2-0 VICRYL PLUS CT-1 - 8 X 18 INCH(12/BX)

## (undated) DEVICE — LENS/HOOD FOR SPACESUIT - (32/PK) PEEL AWAY FACE

## (undated) DEVICE — PACK MINOR BASIN - (2EA/CA)

## (undated) DEVICE — SUCTION INSTRUMENT YANKAUER BULBOUS TIP W/O VENT (50EA/CA)

## (undated) DEVICE — SUTURE 4-0 ETHILON PS-2 18 (12PK/BX)"

## (undated) DEVICE — TUBE CONNECTING SUCTION - CLEAR PLASTIC STERILE 72 IN (50EA/CA)

## (undated) DEVICE — DRESSING XEROFORM 1X8 - (50/BX 4BX/CA)

## (undated) DEVICE — HEAD HOLDER JUNIOR/ADULT

## (undated) DEVICE — BAG CASSETTE COVER STERILE - 23 X 24 1/2  (25/BX 50/CA)

## (undated) DEVICE — PROTECTOR ULNA NERVE - (36PR/CA)

## (undated) DEVICE — PENCIL ELECTSURG 10FT BTN SWH - (50/CA)

## (undated) DEVICE — SPONGE GAUZESTER 4 X 4 4PLY - (128PK/CA)

## (undated) DEVICE — HUMID-VENT HEAT AND MOISTURE EXCHANGE- (50/BX)

## (undated) DEVICE — BLADE SURGICAL CLIPPER - (50EA/CA)

## (undated) DEVICE — LACTATED RINGERS INJ. 500 ML - (24EA/CA)

## (undated) DEVICE — DRAPE 36X28IN RAD CARM BND BG - (25/CA) O

## (undated) DEVICE — TUBING CLEARLINK DUO-VENT - C-FLO (48EA/CA)

## (undated) DEVICE — GLOVE BIOGEL INDICATOR SZ 6.5 SURGICAL PF LTX - (50PR/BX 4BX/CA)

## (undated) DEVICE — PACK UPPER EXTREMITY SM OR - (3/CA)

## (undated) DEVICE — BAG SPONGE COUNT 10.25 X 32 - BLUE (250/CA)

## (undated) DEVICE — TOURNIQUET CUFF 18 X 3 ONE PORT DISP - STERILE (10/BX)

## (undated) DEVICE — BANDAID SHEER STRIP 3/4 IN (100EA/BX 12BX/CA)

## (undated) DEVICE — GLOVE BIOGEL INDICATOR SZ 8.5 SURGICAL PF LTX - (50/BX 4BX/CA)

## (undated) DEVICE — GLOVE BIOGEL PI INDICATOR SZ 8.0 SURGICAL PF LF -(50/BX 4BX/CA)

## (undated) DEVICE — LOCKING DRILL 4.2X360MM

## (undated) DEVICE — SPONGE GAUZE STER 4X4 8-PL - (2/PK 50PK/BX 12BX/CS)

## (undated) DEVICE — SYRINGE 10 ML CONTROL LL (25EA/BX 4BX/CA)

## (undated) DEVICE — ELECTRODE DUAL RETURN W/ CORD - (50/PK)

## (undated) DEVICE — COVER MAYO STAND X-LG - (22EA/CA)

## (undated) DEVICE — GLOVE BIOGEL INDICATOR SZ 8 SURGICAL PF LTX - (50/BX 4BX/CA)

## (undated) DEVICE — PADDING CAST 4 IN STERILE - 4 X 4 YDS (24/CA)

## (undated) DEVICE — KIT ROOM DECONTAMINATION

## (undated) DEVICE — BAG, SPONGE COUNT 50600

## (undated) DEVICE — SPONGE GAUZESTER. 2X2 4-PL - (2/PK 50PK/BX 30BX/CS)

## (undated) DEVICE — CHLORAPREP 26 ML APPLICATOR - ORANGE TINT(25/CA)

## (undated) DEVICE — GLOVE BIOGEL PI INDICATOR SZ 6.5 SURGICAL PF LF - (50/BX 4BX/CA)

## (undated) DEVICE — STERI STRIP COMPOUND BENZOIN - TINCTURE 0.6ML WITH APPLICATOR (40EA/BX)

## (undated) DEVICE — CEMENT BONE HV-R KYPHON WITH MIXER (1EA)

## (undated) DEVICE — GLOVE, LITE (PAIR)

## (undated) DEVICE — DRAPE C-ARM LARGE 41IN X 74 IN - (10/BX 2BX/CA)

## (undated) DEVICE — DRAPE IOBAN LARGE 2 INCISE FILM (5EA/CA)

## (undated) DEVICE — GLOVE BIOGEL SZ 6.5 SURGICAL PF LTX (50PR/BX 4BX/CA)

## (undated) DEVICE — GLOVE BIOGEL SZ 7 SURGICAL PF LTX - (50PR/BX 4BX/CA)

## (undated) DEVICE — CANISTER SUCTION RIGID RED 1500CC (40EA/CA)

## (undated) DEVICE — PADDING CAST 4 IN X 4 YDS - SOF-ROLL (12RL/BG 6BG/CT)